# Patient Record
Sex: FEMALE | Race: WHITE | Employment: OTHER | URBAN - METROPOLITAN AREA
[De-identification: names, ages, dates, MRNs, and addresses within clinical notes are randomized per-mention and may not be internally consistent; named-entity substitution may affect disease eponyms.]

---

## 2017-06-15 PROBLEM — E11.9 TYPE II OR UNSPECIFIED TYPE DIABETES MELLITUS WITHOUT MENTION OF COMPLICATION, NOT STATED AS UNCONTROLLED: Status: ACTIVE | Noted: 2017-06-15

## 2017-12-27 PROBLEM — N17.9 ACUTE RENAL FAILURE (ARF) (HCC): Status: ACTIVE | Noted: 2017-12-27

## 2018-01-15 ENCOUNTER — CARE COORDINATION (OUTPATIENT)
Dept: CASE MANAGEMENT | Age: 73
End: 2018-01-15

## 2018-01-15 NOTE — CARE COORDINATION
[facility] Update Call    1/15/2018    Patient: Whitney Gutierrez Patient : 1945   MRN: <[id number]>  Reason for Admission: There are no discharge diagnoses documented for the most recent discharge. Discharge Date: 18 RARS: [facility] Risk Score: 14.75       Care Transitions Post Acute Facility Update    Care Transitions Interventions  Post Acute Facility Update  Reported Nursing Issues:  Nurse reports pt is going to dialysis and there has been no change in status since admission.     ADLs:  (Comment: toileting contact guard; lower body dressing min to mod assist; upper body dressing stand by assist; bathing upper body contact guard assist; bathing lower body moderate assist)   Bed Mobility:  Contact Guard Assist - Hands on patient for balance   Ambulation Assistance:  [address] on patient for balance   How far (in feet) is the patient ambulating?:  50   Does patient use an assistive device?:  Yes   Assistive Devices:  Front wheeled walker        Clive Martinez   [phone]

## 2018-01-26 ENCOUNTER — CARE COORDINATION (OUTPATIENT)
Dept: CASE MANAGEMENT | Age: 73
End: 2018-01-26

## 2018-01-26 NOTE — CARE COORDINATION
[facility] Discharge Call    2018    Patient: Gina Fung Patient : 1945   MRN: <[id number]>  Reason for Admission: There are no discharge diagnoses documented for the most recent discharge. Discharge Date: 18 RARS: [facility] Risk Score: 14.75       Discharge Facility: [address] [place] [address] [address] patient for transition to home call. Patient was resting at the time of my call. S.W her sister Mac Duran. Mac Duran reports that patient is \"fine\" she stated that patient did not elevate her legs last night and they were \"swollen and leaking fluid today\". I asked Mac Duran how long they have been \"leaking\" she stated since yesterday. Mac Duran denies that patient is having chest pain, severe sob, fever, weakness, productive cough, dizziness, confusion, or visual changes. Patient was D/C home with [facility] per Graciela Galindo is coming to see patient today. Sheri Dance [facility]. Mac Duran declined med review, they will review with nurse. Pt is scheduled to est care with new PCP on 18. Writer placed call to [facility] s/w Venus, she advised me that patient will be seen today and they are aware of the weeping edema to BLE. Will continue to follow patient for care transitions.       Care Transitions Post Acute Facility Transition    Do you have any ongoing symptoms?:  Yes   Onset of Patient-reported symptoms:  Yesterday   Patient-reported symptoms:  (Comment: BLE Swelling)   Interventions for patient-reported symptoms:  Notified Home Care   Do you have all of your prescriptions and are they filled?:  Yes   Do you have any questions related to your medications?:  No   Have you scheduled your follow up appointment?:  Yes   How are you going to get to your appointment?:  Car - family or friend to transport   Were you discharged with any Home Care or  [facility] or do you currently have any active services?:  Yes   Post Acute Services:  [facility] Mahad Stark (Comment: [facility] [phone])         Do you have support at home?:  Roommate/Housemate   Do you feel like you have everything you need to keep you well at home?:  Yes   Patient DME:  Cali Holly, Other   Other Patient DME:  grab bars in shower and built in shower seat in shower   Patient Home Equipment:  BiPAP, CPAP   Care Transitions Interventions  No Identified Needs     Other Services:  Completed (Comment: contacted [facility])          Future Appointments  Date Time Provider Leeroy Freedman   2/6/2018 1:00 PM Flakita Sheikh, [address]       Manisha Rodriguez RN

## 2018-02-08 ENCOUNTER — CARE COORDINATION (OUTPATIENT)
Dept: CASE MANAGEMENT | Age: 73
End: 2018-02-08

## 2018-02-08 NOTE — CARE COORDINATION
Rob 45 Transitions Follow Up Call    2018    Patient: Micaela Conrad  Patient : 1945   MRN: <[id number]>  Reason for Admission: There are no discharge diagnoses documented for the most recent discharge. Discharge Date: 18 RARS: Risk Score: 14.75       Spoke with: Sister Alan Part Transitions Subsequent and Final Call    Subsequent and Final Calls  Are you currently active with any services?:  Home Health  Care Transitions Interventions     Other Services:  Completed (Comment: contacted [facility])   Other Interventions:          Spoke with pt's sister Dixon Kern who stated that pt is currently at dialysis and goes three days per week. Stated she seems to be doing pretty well. Denies pain, edema has improved since starting dialysis. Pt is attempting to elevate feet more and UOP has been good. Stated pt was released from [facility] and she is unsure if aide is still coming. Stated she has only been to home once. Stated pt wants her hair washed tomorrow and sister in on continuous  and will have difficulty assisting. Called [facility] and spoke with Keyur Ramirez regarding aide for pt. Once nursing is discharged aide is discharged also. Called pt's sister back and updated her. Gave her contact information for senior services to inquire about assistance with aide services. Sister thanked CTC. Will continue to follow for transitions.     Gerda Lynne RN BSN   Care Transitions Coordinator  [phone]     Follow Up  Future Appointments  Date Time Provider Leeroy Freedman   2018 9:30 AM DO Ron Davisdon Lina       Gerda Lynne RN

## 2018-02-15 ENCOUNTER — CARE COORDINATION (OUTPATIENT)
Dept: CASE MANAGEMENT | Age: 73
End: 2018-02-15

## 2018-02-15 NOTE — CARE COORDINATION
Rob 45 Transitions Follow Up Call    2/15/2018    Patient: Saroj Joshua  Patient : 1945   MRN: <[id number]>  Reason for Admission: There are no discharge diagnoses documented for the most recent discharge. Discharge Date: 18 RARS: Risk Score: 14.75       Spoke with: Pedro Delgado (sister)    S/W patient's sister for final call, patient is doing much better. She is currently at dialysis. Per Pedro Delgado they are getting [facility] services now. They will be back to see patient on 18. Pedro Delgado stated that patient has her medications and everything she needs. No issues or concerns to report. Advised her this will be final call. Care Transitions Subsequent and Final Call    Subsequent and Final Calls  Do you have any ongoing symptoms?:  No  Do you have any questions related to your medications?:  No  Do you currently have any active services?:  Yes  Are you currently active with any services?:  Home Health  Do you have any needs or concerns that I can assist you with?:  No  Identified Barriers:  None  Care Transitions Interventions  No Identified Needs     Other Services:  Completed (Comment: contacted [facility])   Other Interventions:             Follow Up  Future Appointments  Date Time Provider Leeroy Freedman   3/2/2018 7:30 AM Amanda Nails MD [facility] None       Arnoldo Mcneill RN

## 2018-02-26 PROBLEM — G47.30 SLEEP APNEA: Status: ACTIVE | Noted: 2018-02-26

## 2018-02-26 PROBLEM — E78.2 MIXED HYPERLIPIDEMIA: Status: ACTIVE | Noted: 2018-02-26

## 2018-02-26 PROBLEM — E03.9 HYPOTHYROIDISM: Status: ACTIVE | Noted: 2018-02-26

## 2018-02-26 PROBLEM — M81.0 OSTEOPOROSIS: Status: ACTIVE | Noted: 2018-02-26

## 2018-02-26 PROBLEM — I10 ESSENTIAL HYPERTENSION: Status: ACTIVE | Noted: 2018-02-26

## 2018-03-20 ENCOUNTER — TELEPHONE (OUTPATIENT)
Dept: INTERNAL MEDICINE CLINIC | Age: 73
End: 2018-03-20

## 2018-03-23 ENCOUNTER — HOSPITAL ENCOUNTER (EMERGENCY)
Age: 73
Discharge: HOME | End: 2018-03-23
Attending: EMERGENCY MEDICINE
Payer: MEDICARE

## 2018-03-23 ENCOUNTER — HOSPITAL ENCOUNTER (OUTPATIENT)
Age: 73
Discharge: HOME | End: 2018-03-25
Payer: MEDICARE

## 2018-03-23 VITALS
HEART RATE: 88 BPM | HEIGHT: 66 IN | TEMPERATURE: 97.7 F | WEIGHT: 213 LBS | DIASTOLIC BLOOD PRESSURE: 71 MMHG | OXYGEN SATURATION: 100 % | SYSTOLIC BLOOD PRESSURE: 116 MMHG | BODY MASS INDEX: 34.23 KG/M2 | RESPIRATION RATE: 20 BRPM

## 2018-03-23 DIAGNOSIS — Z99.2 ESRD (END STAGE RENAL DISEASE) ON DIALYSIS (HCC): ICD-10-CM

## 2018-03-23 DIAGNOSIS — N39.0 URINARY TRACT INFECTION WITHOUT HEMATURIA, SITE UNSPECIFIED: Primary | ICD-10-CM

## 2018-03-23 DIAGNOSIS — E11.8 CONTROLLED TYPE 2 DIABETES MELLITUS WITH COMPLICATION, WITH LONG-TERM CURRENT USE OF INSULIN (HCC): ICD-10-CM

## 2018-03-23 DIAGNOSIS — N18.6 ESRD (END STAGE RENAL DISEASE) ON DIALYSIS (HCC): ICD-10-CM

## 2018-03-23 DIAGNOSIS — E03.9 HYPOTHYROIDISM, UNSPECIFIED TYPE: ICD-10-CM

## 2018-03-23 DIAGNOSIS — Z79.4 CONTROLLED TYPE 2 DIABETES MELLITUS WITH COMPLICATION, WITH LONG-TERM CURRENT USE OF INSULIN (HCC): ICD-10-CM

## 2018-03-23 DIAGNOSIS — E78.2 MIXED HYPERLIPIDEMIA: ICD-10-CM

## 2018-03-23 LAB
ALBUMIN SERPL-MCNC: 3.9 G/DL (ref 3.5–5.2)
ALP BLD-CCNC: 91 U/L (ref 35–104)
ALT SERPL-CCNC: 8 U/L (ref 0–32)
ANION GAP SERPL CALCULATED.3IONS-SCNC: 14 MMOL/L (ref 7–16)
AST SERPL-CCNC: 13 U/L (ref 0–31)
BACTERIA: ABNORMAL /HPF
BASOPHILS ABSOLUTE: 0.04 E9/L (ref 0–0.2)
BASOPHILS RELATIVE PERCENT: 0.6 % (ref 0–2)
BILIRUB SERPL-MCNC: 0.4 MG/DL (ref 0–1.2)
BILIRUBIN URINE: ABNORMAL
BLOOD, URINE: ABNORMAL
BUN BLDV-MCNC: 24 MG/DL (ref 8–23)
CALCIUM SERPL-MCNC: 9.3 MG/DL (ref 8.6–10.2)
CHLORIDE BLD-SCNC: 97 MMOL/L (ref 98–107)
CHOLESTEROL, TOTAL: 146 MG/DL (ref 0–199)
CLARITY: ABNORMAL
CO2: 28 MMOL/L (ref 22–29)
COLOR: YELLOW
CREAT SERPL-MCNC: 4.7 MG/DL (ref 0.5–1)
EOSINOPHILS ABSOLUTE: 0.22 E9/L (ref 0.05–0.5)
EOSINOPHILS RELATIVE PERCENT: 3.3 % (ref 0–6)
FOLATE: 16.9 NG/ML (ref 4.8–24.2)
GFR AFRICAN AMERICAN: 11
GFR NON-AFRICAN AMERICAN: 9 ML/MIN/1.73
GLUCOSE BLD-MCNC: 104 MG/DL (ref 74–109)
GLUCOSE URINE: NEGATIVE MG/DL
HCT VFR BLD CALC: 34.4 % (ref 34–48)
HDLC SERPL-MCNC: 53 MG/DL
HEMOGLOBIN: 11.2 G/DL (ref 11.5–15.5)
IMMATURE GRANULOCYTES #: 0.04 E9/L
IMMATURE GRANULOCYTES %: 0.6 % (ref 0–5)
KETONES, URINE: 15 MG/DL
LDL CHOLESTEROL CALCULATED: 71 MG/DL (ref 0–99)
LEUKOCYTE ESTERASE, URINE: ABNORMAL
LYMPHOCYTES ABSOLUTE: 1.66 E9/L (ref 1.5–4)
LYMPHOCYTES RELATIVE PERCENT: 24.7 % (ref 20–42)
MCH RBC QN AUTO: 33.7 PG (ref 26–35)
MCHC RBC AUTO-ENTMCNC: 32.6 % (ref 32–34.5)
MCV RBC AUTO: 103.6 FL (ref 80–99.9)
MONOCYTES ABSOLUTE: 0.64 E9/L (ref 0.1–0.95)
MONOCYTES RELATIVE PERCENT: 9.5 % (ref 2–12)
NEUTROPHILS ABSOLUTE: 4.11 E9/L (ref 1.8–7.3)
NEUTROPHILS RELATIVE PERCENT: 61.3 % (ref 43–80)
NITRITE, URINE: NEGATIVE
PDW BLD-RTO: 14.3 FL (ref 11.5–15)
PH UA: 5.5 (ref 5–9)
PLATELET # BLD: 185 E9/L (ref 130–450)
PMV BLD AUTO: 9.8 FL (ref 7–12)
POTASSIUM SERPL-SCNC: 4.8 MMOL/L (ref 3.5–5)
PROTEIN UA: >=300 MG/DL
RBC # BLD: 3.32 E12/L (ref 3.5–5.5)
RBC UA: ABNORMAL /HPF (ref 0–2)
SODIUM BLD-SCNC: 139 MMOL/L (ref 132–146)
SPECIFIC GRAVITY UA: 1.02 (ref 1–1.03)
T4 FREE: 0.93 NG/DL (ref 0.93–1.7)
TOTAL PROTEIN: 7 G/DL (ref 6.4–8.3)
TRIGL SERPL-MCNC: 109 MG/DL (ref 0–149)
TSH SERPL DL<=0.05 MIU/L-ACNC: 2.61 UIU/ML (ref 0.27–4.2)
UROBILINOGEN, URINE: 0.2 E.U./DL
VITAMIN B-12: 190 PG/ML (ref 211–946)
VLDLC SERPL CALC-MCNC: 22 MG/DL
WBC # BLD: 6.7 E9/L (ref 4.5–11.5)
WBC UA: ABNORMAL /HPF (ref 0–5)

## 2018-03-23 PROCEDURE — 36415 COLL VENOUS BLD VENIPUNCTURE: CPT

## 2018-03-23 PROCEDURE — 84443 ASSAY THYROID STIM HORMONE: CPT

## 2018-03-23 PROCEDURE — 85025 COMPLETE CBC W/AUTO DIFF WBC: CPT

## 2018-03-23 PROCEDURE — 99283 EMERGENCY DEPT VISIT LOW MDM: CPT

## 2018-03-23 PROCEDURE — 82607 VITAMIN B-12: CPT

## 2018-03-23 PROCEDURE — 81001 URINALYSIS AUTO W/SCOPE: CPT

## 2018-03-23 PROCEDURE — 80061 LIPID PANEL: CPT

## 2018-03-23 PROCEDURE — 82652 VIT D 1 25-DIHYDROXY: CPT

## 2018-03-23 PROCEDURE — 82746 ASSAY OF FOLIC ACID SERUM: CPT

## 2018-03-23 PROCEDURE — G0382 LEV 3 HOSP TYPE B ED VISIT: HCPCS

## 2018-03-23 PROCEDURE — 84439 ASSAY OF FREE THYROXINE: CPT

## 2018-03-23 PROCEDURE — 80053 COMPREHEN METABOLIC PANEL: CPT

## 2018-03-23 RX ORDER — CEPHALEXIN 500 MG/1
500 CAPSULE ORAL 2 TIMES DAILY
Qty: 14 CAPSULE | Refills: 0 | Status: SHIPPED | OUTPATIENT
Start: 2018-03-23 | End: 2018-03-30

## 2018-03-23 RX ORDER — AMLODIPINE BESYLATE 5 MG/1
5 TABLET ORAL DAILY
COMMUNITY
End: 2018-04-13 | Stop reason: ALTCHOICE

## 2018-03-23 ASSESSMENT — ENCOUNTER SYMPTOMS
VOMITING: 0
NAUSEA: 0

## 2018-03-26 ENCOUNTER — CARE COORDINATION (OUTPATIENT)
Dept: CARE COORDINATION | Age: 73
End: 2018-03-26

## 2018-03-26 LAB — VITAMIN D 1,25-DIHYDROXY: 28.7 PG/ML (ref 19.9–79.3)

## 2018-03-26 NOTE — CARE COORDINATION
Ambulatory Care Coordination ED Follow up Call       Reason for ED Visit: UTI  Care Management Risk Score: CMRS 6  How are you feeling? :     improved  Patient Reports the following:  Patient reports it's a little better not much. Did you call your PCP prior to going to the ED? No          Post Discharge Status:  What health concerns since you left the Emergency Room? None    Do you have wounds that you are caring for at home? Yes    Do you have a follow up appt scheduled? yes    Review of Instructions:                                 Do you have any questions regarding your discharge instructions?:  No  Medications:    What questions do you have about your medications? No  Are you taking your medications as directed? If not - why? Yes   Can you afford your medications? yes  ADLS:  Do you need assistance of any kind at home? Yes:    What assistance is needed? Bathing assistance      FU appts/Provider:    Future Appointments  Date Time Provider Leeroy Freedman   4/9/2018 1:00 PM [facility]       Health Maintenance Due   Topic Date Due    Diabetic foot exam  01/25/1955    Diabetic retinal exam  01/25/1957    Shingles Vaccine (1 of 2 - 2 Dose Series) 01/25/1997    Pneumococcal high/highest risk (1 of 2 - PCV13) 01/25/2012    Flu vaccine (1) 09/01/2017     Patient advised to contact PCP office to have HM items/records faxed to PCP Office directly?   N/A

## 2018-04-13 ENCOUNTER — OFFICE VISIT (OUTPATIENT)
Dept: INTERNAL MEDICINE CLINIC | Age: 73
End: 2018-04-13
Payer: MEDICARE

## 2018-04-13 ENCOUNTER — HOSPITAL ENCOUNTER (OUTPATIENT)
Age: 73
Discharge: HOME | End: 2018-04-15
Payer: MEDICARE

## 2018-04-13 VITALS
HEIGHT: 66 IN | TEMPERATURE: 97.6 F | WEIGHT: 215 LBS | SYSTOLIC BLOOD PRESSURE: 104 MMHG | HEART RATE: 86 BPM | BODY MASS INDEX: 34.55 KG/M2 | DIASTOLIC BLOOD PRESSURE: 41 MMHG | RESPIRATION RATE: 16 BRPM

## 2018-04-13 DIAGNOSIS — Z79.4 CONTROLLED TYPE 2 DIABETES MELLITUS WITH CHRONIC KIDNEY DISEASE ON CHRONIC DIALYSIS, WITH LONG-TERM CURRENT USE OF INSULIN (HCC): ICD-10-CM

## 2018-04-13 DIAGNOSIS — E11.22 CONTROLLED TYPE 2 DIABETES MELLITUS WITH CHRONIC KIDNEY DISEASE ON CHRONIC DIALYSIS, WITH LONG-TERM CURRENT USE OF INSULIN (HCC): ICD-10-CM

## 2018-04-13 DIAGNOSIS — N39.0 RECURRENT URINARY TRACT INFECTION: ICD-10-CM

## 2018-04-13 DIAGNOSIS — E03.9 HYPOTHYROIDISM, UNSPECIFIED TYPE: Primary | ICD-10-CM

## 2018-04-13 DIAGNOSIS — Z99.2 CONTROLLED TYPE 2 DIABETES MELLITUS WITH CHRONIC KIDNEY DISEASE ON CHRONIC DIALYSIS, WITH LONG-TERM CURRENT USE OF INSULIN (HCC): ICD-10-CM

## 2018-04-13 DIAGNOSIS — N18.6 CONTROLLED TYPE 2 DIABETES MELLITUS WITH CHRONIC KIDNEY DISEASE ON CHRONIC DIALYSIS, WITH LONG-TERM CURRENT USE OF INSULIN (HCC): ICD-10-CM

## 2018-04-13 DIAGNOSIS — D51.8 MACROCYTIC ANEMIA WITH VITAMIN B12 DEFICIENCY: ICD-10-CM

## 2018-04-13 LAB
BILIRUBIN, POC: ABNORMAL
BLOOD URINE, POC: ABNORMAL
CLARITY, POC: ABNORMAL
COLOR, POC: ABNORMAL
GLUCOSE URINE, POC: ABNORMAL
HBA1C MFR BLD: 5.7 %
KETONES, POC: ABNORMAL
LEUKOCYTE EST, POC: ABNORMAL
NITRITE, POC: ABNORMAL
PH, POC: 5
PROTEIN, POC: ABNORMAL
SPECIFIC GRAVITY, POC: 1030
UROBILINOGEN, POC: 0.2

## 2018-04-13 PROCEDURE — G8428 CUR MEDS NOT DOCUMENT: HCPCS | Performed by: FAMILY MEDICINE

## 2018-04-13 PROCEDURE — 4040F PNEUMOC VAC/ADMIN/RCVD: CPT | Performed by: FAMILY MEDICINE

## 2018-04-13 PROCEDURE — 1036F TOBACCO NON-USER: CPT | Performed by: FAMILY MEDICINE

## 2018-04-13 PROCEDURE — 81002 URINALYSIS NONAUTO W/O SCOPE: CPT | Performed by: FAMILY MEDICINE

## 2018-04-13 PROCEDURE — 2022F DILAT RTA XM EVC RTNOPTHY: CPT | Performed by: FAMILY MEDICINE

## 2018-04-13 PROCEDURE — 3044F HG A1C LEVEL LT 7.0%: CPT | Performed by: FAMILY MEDICINE

## 2018-04-13 PROCEDURE — 83036 HEMOGLOBIN GLYCOSYLATED A1C: CPT | Performed by: FAMILY MEDICINE

## 2018-04-13 PROCEDURE — 87186 SC STD MICRODIL/AGAR DIL: CPT

## 2018-04-13 PROCEDURE — 1123F ACP DISCUSS/DSCN MKR DOCD: CPT | Performed by: FAMILY MEDICINE

## 2018-04-13 PROCEDURE — 87077 CULTURE AEROBIC IDENTIFY: CPT

## 2018-04-13 PROCEDURE — 99215 OFFICE O/P EST HI 40 MIN: CPT | Performed by: FAMILY MEDICINE

## 2018-04-13 PROCEDURE — G8399 PT W/DXA RESULTS DOCUMENT: HCPCS | Performed by: FAMILY MEDICINE

## 2018-04-13 PROCEDURE — 1090F PRES/ABSN URINE INCON ASSESS: CPT | Performed by: FAMILY MEDICINE

## 2018-04-13 PROCEDURE — 3014F SCREEN MAMMO DOC REV: CPT | Performed by: FAMILY MEDICINE

## 2018-04-13 PROCEDURE — 3017F COLORECTAL CA SCREEN DOC REV: CPT | Performed by: FAMILY MEDICINE

## 2018-04-13 PROCEDURE — G8417 CALC BMI ABV UP PARAM F/U: HCPCS | Performed by: FAMILY MEDICINE

## 2018-04-13 PROCEDURE — 99213 OFFICE O/P EST LOW 20 MIN: CPT | Performed by: FAMILY MEDICINE

## 2018-04-13 PROCEDURE — 87088 URINE BACTERIA CULTURE: CPT

## 2018-04-13 RX ORDER — CYANOCOBALAMIN 1000 UG/ML
1000 INJECTION INTRAMUSCULAR; SUBCUTANEOUS ONCE
Status: COMPLETED | OUTPATIENT
Start: 2018-04-13 | End: 2018-04-13

## 2018-04-13 RX ORDER — CIPROFLOXACIN 250 MG/1
250 TABLET, FILM COATED ORAL 2 TIMES DAILY
Qty: 20 TABLET | Refills: 0 | Status: SHIPPED | OUTPATIENT
Start: 2018-04-13 | End: 2018-04-23

## 2018-04-13 RX ORDER — LEVOTHYROXINE SODIUM 0.05 MG/1
TABLET ORAL
Qty: 90 TABLET | Refills: 0 | Status: SHIPPED | OUTPATIENT
Start: 2018-04-13 | End: 2018-09-02 | Stop reason: SDUPTHER

## 2018-04-13 RX ADMIN — CYANOCOBALAMIN 1000 MCG: 1000 INJECTION, SOLUTION INTRAMUSCULAR at 14:40

## 2018-04-13 ASSESSMENT — ENCOUNTER SYMPTOMS
SORE THROAT: 0
NAUSEA: 0
DIARRHEA: 0
VOMITING: 0
BACK PAIN: 1
SHORTNESS OF BREATH: 0
CONSTIPATION: 0
COUGH: 0

## 2018-04-16 LAB
ORGANISM: ABNORMAL
URINE CULTURE, ROUTINE: ABNORMAL
URINE CULTURE, ROUTINE: ABNORMAL

## 2018-05-14 ENCOUNTER — OFFICE VISIT (OUTPATIENT)
Dept: INTERNAL MEDICINE CLINIC | Age: 73
End: 2018-05-14
Payer: MEDICARE

## 2018-05-14 VITALS
DIASTOLIC BLOOD PRESSURE: 56 MMHG | RESPIRATION RATE: 16 BRPM | WEIGHT: 213.8 LBS | HEIGHT: 66 IN | HEART RATE: 80 BPM | BODY MASS INDEX: 34.36 KG/M2 | SYSTOLIC BLOOD PRESSURE: 88 MMHG | TEMPERATURE: 97.9 F

## 2018-05-14 DIAGNOSIS — Z99.2 ESRD ON DIALYSIS (HCC): ICD-10-CM

## 2018-05-14 DIAGNOSIS — Z12.31 ENCOUNTER FOR SCREENING MAMMOGRAM FOR BREAST CANCER: ICD-10-CM

## 2018-05-14 DIAGNOSIS — Z00.00 ROUTINE GENERAL MEDICAL EXAMINATION AT A HEALTH CARE FACILITY: ICD-10-CM

## 2018-05-14 DIAGNOSIS — N18.6 ESRD ON DIALYSIS (HCC): ICD-10-CM

## 2018-05-14 DIAGNOSIS — E03.9 HYPOTHYROIDISM (ACQUIRED): Primary | ICD-10-CM

## 2018-05-14 DIAGNOSIS — Z23 NEED FOR PNEUMOCOCCAL VACCINATION: ICD-10-CM

## 2018-05-14 PROBLEM — N17.9 ACUTE RENAL FAILURE (ARF) (HCC): Status: RESOLVED | Noted: 2017-12-27 | Resolved: 2018-05-14

## 2018-05-14 PROCEDURE — 99213 OFFICE O/P EST LOW 20 MIN: CPT | Performed by: FAMILY MEDICINE

## 2018-05-14 PROCEDURE — 4040F PNEUMOC VAC/ADMIN/RCVD: CPT | Performed by: FAMILY MEDICINE

## 2018-05-14 PROCEDURE — 90670 PCV13 VACCINE IM: CPT | Performed by: FAMILY MEDICINE

## 2018-05-14 PROCEDURE — G0438 PPPS, INITIAL VISIT: HCPCS | Performed by: FAMILY MEDICINE

## 2018-05-14 RX ORDER — CALCIUM ACETATE 667 MG/1
1334 CAPSULE ORAL
COMMUNITY
Start: 2018-04-17 | End: 2018-12-17 | Stop reason: ALTCHOICE

## 2018-05-14 ASSESSMENT — ANXIETY QUESTIONNAIRES: GAD7 TOTAL SCORE: 0

## 2018-05-14 ASSESSMENT — PATIENT HEALTH QUESTIONNAIRE - PHQ9: SUM OF ALL RESPONSES TO PHQ QUESTIONS 1-9: 0

## 2018-05-20 ENCOUNTER — PREP FOR PROCEDURE (OUTPATIENT)
Dept: VASCULAR SURGERY | Age: 73
End: 2018-05-20

## 2018-05-20 RX ORDER — SODIUM CHLORIDE 9 MG/ML
INJECTION, SOLUTION INTRAVENOUS CONTINUOUS
Status: CANCELLED | OUTPATIENT
Start: 2018-05-20

## 2018-05-20 RX ORDER — SODIUM CHLORIDE 0.9 % (FLUSH) 0.9 %
10 SYRINGE (ML) INJECTION PRN
Status: CANCELLED | OUTPATIENT
Start: 2018-05-20

## 2018-05-20 RX ORDER — SODIUM CHLORIDE 0.9 % (FLUSH) 0.9 %
10 SYRINGE (ML) INJECTION EVERY 12 HOURS SCHEDULED
Status: CANCELLED | OUTPATIENT
Start: 2018-05-20

## 2018-05-21 RX ORDER — MIDODRINE HYDROCHLORIDE 5 MG/1
5 TABLET ORAL
COMMUNITY

## 2018-05-23 ENCOUNTER — ANESTHESIA EVENT (OUTPATIENT)
Dept: OPERATING ROOM | Age: 73
End: 2018-05-23
Payer: MEDICARE

## 2018-05-23 ENCOUNTER — HOSPITAL ENCOUNTER (OUTPATIENT)
Age: 73
Setting detail: OUTPATIENT SURGERY
Discharge: HOME | End: 2018-05-23
Attending: SPECIALIST | Admitting: SPECIALIST
Payer: MEDICARE

## 2018-05-23 ENCOUNTER — ANESTHESIA (OUTPATIENT)
Dept: OPERATING ROOM | Age: 73
End: 2018-05-23
Payer: MEDICARE

## 2018-05-23 VITALS
SYSTOLIC BLOOD PRESSURE: 120 MMHG | DIASTOLIC BLOOD PRESSURE: 87 MMHG | HEIGHT: 66 IN | WEIGHT: 213 LBS | OXYGEN SATURATION: 96 % | BODY MASS INDEX: 34.23 KG/M2 | TEMPERATURE: 98.2 F | RESPIRATION RATE: 19 BRPM | HEART RATE: 78 BPM

## 2018-05-23 VITALS — SYSTOLIC BLOOD PRESSURE: 106 MMHG | OXYGEN SATURATION: 100 % | DIASTOLIC BLOOD PRESSURE: 74 MMHG

## 2018-05-23 DIAGNOSIS — I77.0 AVF (ARTERIOVENOUS FISTULA) (HCC): Primary | ICD-10-CM

## 2018-05-23 LAB
ANION GAP SERPL CALCULATED.3IONS-SCNC: 16 MMOL/L (ref 7–16)
APTT: 29.6 SEC (ref 24.5–35.1)
BASOPHILS ABSOLUTE: 0.04 E9/L (ref 0–0.2)
BASOPHILS RELATIVE PERCENT: 0.6 % (ref 0–2)
BUN BLDV-MCNC: 23 MG/DL (ref 8–23)
CALCIUM SERPL-MCNC: 8.7 MG/DL (ref 8.6–10.2)
CHLORIDE BLD-SCNC: 95 MMOL/L (ref 98–107)
CO2: 25 MMOL/L (ref 22–29)
CREAT SERPL-MCNC: 3.8 MG/DL (ref 0.5–1)
EOSINOPHILS ABSOLUTE: 0.33 E9/L (ref 0.05–0.5)
EOSINOPHILS RELATIVE PERCENT: 4.9 % (ref 0–6)
GFR AFRICAN AMERICAN: 14
GFR NON-AFRICAN AMERICAN: 12 ML/MIN/1.73
GLUCOSE BLD-MCNC: 103 MG/DL (ref 74–109)
GLUCOSE BLD-MCNC: 113 MG/DL
HCT VFR BLD CALC: 37.5 % (ref 34–48)
HEMOGLOBIN: 11.9 G/DL (ref 11.5–15.5)
IMMATURE GRANULOCYTES #: 0.03 E9/L
IMMATURE GRANULOCYTES %: 0.4 % (ref 0–5)
INR BLD: 1
LYMPHOCYTES ABSOLUTE: 1.4 E9/L (ref 1.5–4)
LYMPHOCYTES RELATIVE PERCENT: 20.9 % (ref 20–42)
MCH RBC QN AUTO: 30 PG (ref 26–35)
MCHC RBC AUTO-ENTMCNC: 31.7 % (ref 32–34.5)
MCV RBC AUTO: 94.5 FL (ref 80–99.9)
METER GLUCOSE: 113 MG/DL (ref 70–110)
MONOCYTES ABSOLUTE: 0.65 E9/L (ref 0.1–0.95)
MONOCYTES RELATIVE PERCENT: 9.7 % (ref 2–12)
NEUTROPHILS ABSOLUTE: 4.24 E9/L (ref 1.8–7.3)
NEUTROPHILS RELATIVE PERCENT: 63.5 % (ref 43–80)
PDW BLD-RTO: 13.4 FL (ref 11.5–15)
PLATELET # BLD: 138 E9/L (ref 130–450)
PMV BLD AUTO: 11.4 FL (ref 7–12)
POTASSIUM REFLEX MAGNESIUM: 4.3 MMOL/L (ref 3.5–5)
PROTHROMBIN TIME: 11.8 SEC (ref 9.3–12.4)
RBC # BLD: 3.97 E12/L (ref 3.5–5.5)
SODIUM BLD-SCNC: 136 MMOL/L (ref 132–146)
WBC # BLD: 6.7 E9/L (ref 4.5–11.5)

## 2018-05-23 PROCEDURE — 36415 COLL VENOUS BLD VENIPUNCTURE: CPT

## 2018-05-23 PROCEDURE — 6360000002 HC RX W HCPCS

## 2018-05-23 PROCEDURE — 6360000002 HC RX W HCPCS: Performed by: NURSE ANESTHETIST, CERTIFIED REGISTERED

## 2018-05-23 PROCEDURE — 6360000002 HC RX W HCPCS: Performed by: ANESTHESIOLOGY

## 2018-05-23 PROCEDURE — 80048 BASIC METABOLIC PNL TOTAL CA: CPT

## 2018-05-23 PROCEDURE — 3700000000 HC ANESTHESIA ATTENDED CARE: Performed by: SPECIALIST

## 2018-05-23 PROCEDURE — 7100000010 HC PHASE II RECOVERY - FIRST 15 MIN: Performed by: SPECIALIST

## 2018-05-23 PROCEDURE — 2500000003 HC RX 250 WO HCPCS: Performed by: SPECIALIST

## 2018-05-23 PROCEDURE — 85610 PROTHROMBIN TIME: CPT

## 2018-05-23 PROCEDURE — 2709999900 HC NON-CHARGEABLE SUPPLY: Performed by: SPECIALIST

## 2018-05-23 PROCEDURE — 3600000012 HC SURGERY LEVEL 2 ADDTL 15MIN: Performed by: SPECIALIST

## 2018-05-23 PROCEDURE — 85025 COMPLETE CBC W/AUTO DIFF WBC: CPT

## 2018-05-23 PROCEDURE — 82962 GLUCOSE BLOOD TEST: CPT

## 2018-05-23 PROCEDURE — 7100000011 HC PHASE II RECOVERY - ADDTL 15 MIN: Performed by: SPECIALIST

## 2018-05-23 PROCEDURE — 85730 THROMBOPLASTIN TIME PARTIAL: CPT

## 2018-05-23 PROCEDURE — 3600000002 HC SURGERY LEVEL 2 BASE: Performed by: SPECIALIST

## 2018-05-23 PROCEDURE — 3700000001 HC ADD 15 MINUTES (ANESTHESIA): Performed by: SPECIALIST

## 2018-05-23 PROCEDURE — 2580000003 HC RX 258: Performed by: SPECIALIST

## 2018-05-23 RX ORDER — ONDANSETRON 2 MG/ML
INJECTION INTRAMUSCULAR; INTRAVENOUS PRN
Status: DISCONTINUED | OUTPATIENT
Start: 2018-05-23 | End: 2018-05-23 | Stop reason: SDUPTHER

## 2018-05-23 RX ORDER — MIDAZOLAM HYDROCHLORIDE 1 MG/ML
0.5 INJECTION INTRAMUSCULAR; INTRAVENOUS PRN
Status: DISCONTINUED | OUTPATIENT
Start: 2018-05-23 | End: 2018-05-23 | Stop reason: HOSPADM

## 2018-05-23 RX ORDER — SODIUM CHLORIDE 0.9 % (FLUSH) 0.9 %
10 SYRINGE (ML) INJECTION PRN
Status: DISCONTINUED | OUTPATIENT
Start: 2018-05-23 | End: 2018-05-23 | Stop reason: HOSPADM

## 2018-05-23 RX ORDER — HYDROCODONE BITARTRATE AND ACETAMINOPHEN 5; 325 MG/1; MG/1
1 TABLET ORAL EVERY 6 HOURS PRN
Qty: 10 TABLET | Refills: 0 | Status: SHIPPED | OUTPATIENT
Start: 2018-05-23 | End: 2018-05-26

## 2018-05-23 RX ORDER — FENTANYL CITRATE 50 UG/ML
25 INJECTION, SOLUTION INTRAMUSCULAR; INTRAVENOUS ONCE
Status: COMPLETED | OUTPATIENT
Start: 2018-05-23 | End: 2018-05-23

## 2018-05-23 RX ORDER — SODIUM CHLORIDE 0.9 % (FLUSH) 0.9 %
10 SYRINGE (ML) INJECTION EVERY 12 HOURS SCHEDULED
Status: DISCONTINUED | OUTPATIENT
Start: 2018-05-23 | End: 2018-05-23 | Stop reason: HOSPADM

## 2018-05-23 RX ORDER — ROPIVACAINE HYDROCHLORIDE 5 MG/ML
30 INJECTION, SOLUTION EPIDURAL; INFILTRATION; PERINEURAL
Status: COMPLETED | OUTPATIENT
Start: 2018-05-23 | End: 2018-05-23

## 2018-05-23 RX ORDER — SODIUM CHLORIDE 9 MG/ML
INJECTION, SOLUTION INTRAVENOUS CONTINUOUS
Status: DISCONTINUED | OUTPATIENT
Start: 2018-05-23 | End: 2018-05-23 | Stop reason: HOSPADM

## 2018-05-23 RX ORDER — LIDOCAINE HYDROCHLORIDE 10 MG/ML
INJECTION, SOLUTION INFILTRATION; PERINEURAL PRN
Status: DISCONTINUED | OUTPATIENT
Start: 2018-05-23 | End: 2018-05-23 | Stop reason: HOSPADM

## 2018-05-23 RX ADMIN — ONDANSETRON 4 MG: 2 INJECTION INTRAMUSCULAR; INTRAVENOUS at 09:08

## 2018-05-23 RX ADMIN — MIDAZOLAM 1 MG: 1 INJECTION INTRAMUSCULAR; INTRAVENOUS at 07:30

## 2018-05-23 RX ADMIN — VANCOMYCIN HYDROCHLORIDE 1000 MG: 1 INJECTION, POWDER, LYOPHILIZED, FOR SOLUTION INTRAVENOUS at 07:54

## 2018-05-23 RX ADMIN — MIDAZOLAM 1 MG: 1 INJECTION INTRAMUSCULAR; INTRAVENOUS at 07:40

## 2018-05-23 RX ADMIN — ROPIVACAINE HYDROCHLORIDE 30 ML: 5 INJECTION, SOLUTION EPIDURAL; INFILTRATION; PERINEURAL at 07:38

## 2018-05-23 RX ADMIN — SODIUM CHLORIDE: 9 INJECTION, SOLUTION INTRAVENOUS at 06:03

## 2018-05-23 RX ADMIN — FENTANYL CITRATE 50 MCG: 50 INJECTION INTRAMUSCULAR; INTRAVENOUS at 07:30

## 2018-05-23 RX ADMIN — SODIUM CHLORIDE: 9 INJECTION, SOLUTION INTRAVENOUS at 07:57

## 2018-05-23 ASSESSMENT — PULMONARY FUNCTION TESTS
PIF_VALUE: 0
PIF_VALUE: 18
PIF_VALUE: 0
PIF_VALUE: 25
PIF_VALUE: 0
PIF_VALUE: 29
PIF_VALUE: 18
PIF_VALUE: 0
PIF_VALUE: 18
PIF_VALUE: 0
PIF_VALUE: 0
PIF_VALUE: 25
PIF_VALUE: 0
PIF_VALUE: 29
PIF_VALUE: 0
PIF_VALUE: 25
PIF_VALUE: 25
PIF_VALUE: 0
PIF_VALUE: 1
PIF_VALUE: 0
PIF_VALUE: 18
PIF_VALUE: 0
PIF_VALUE: 25
PIF_VALUE: 0
PIF_VALUE: 28
PIF_VALUE: 0
PIF_VALUE: 23
PIF_VALUE: 0
PIF_VALUE: 18
PIF_VALUE: 18
PIF_VALUE: 30
PIF_VALUE: 0
PIF_VALUE: 30
PIF_VALUE: 0
PIF_VALUE: 25
PIF_VALUE: 0
PIF_VALUE: 0
PIF_VALUE: 18
PIF_VALUE: 0
PIF_VALUE: 26
PIF_VALUE: 26
PIF_VALUE: 0
PIF_VALUE: 18
PIF_VALUE: 0
PIF_VALUE: 30
PIF_VALUE: 0
PIF_VALUE: 29
PIF_VALUE: 0
PIF_VALUE: 0
PIF_VALUE: 1
PIF_VALUE: 0
PIF_VALUE: 25

## 2018-05-23 ASSESSMENT — PAIN SCALES - GENERAL
PAINLEVEL_OUTOF10: 0

## 2018-05-23 ASSESSMENT — PAIN - FUNCTIONAL ASSESSMENT: PAIN_FUNCTIONAL_ASSESSMENT: 0-10

## 2018-05-30 ENCOUNTER — HOSPITAL ENCOUNTER (OUTPATIENT)
Dept: MAMMOGRAPHY | Age: 73
Discharge: HOME | End: 2018-06-01
Payer: MEDICARE

## 2018-05-30 DIAGNOSIS — Z12.31 ENCOUNTER FOR SCREENING MAMMOGRAM FOR BREAST CANCER: ICD-10-CM

## 2018-05-30 PROCEDURE — 77063 BREAST TOMOSYNTHESIS BI: CPT

## 2018-06-01 ENCOUNTER — HOSPITAL ENCOUNTER (EMERGENCY)
Age: 73
Discharge: HOME | End: 2018-06-01
Payer: MEDICARE

## 2018-06-01 ENCOUNTER — APPOINTMENT (OUTPATIENT)
Dept: GENERAL RADIOLOGY | Age: 73
End: 2018-06-01
Payer: MEDICARE

## 2018-06-01 VITALS
HEART RATE: 84 BPM | WEIGHT: 214 LBS | SYSTOLIC BLOOD PRESSURE: 143 MMHG | HEIGHT: 66 IN | OXYGEN SATURATION: 98 % | TEMPERATURE: 97.9 F | DIASTOLIC BLOOD PRESSURE: 98 MMHG | BODY MASS INDEX: 34.39 KG/M2 | RESPIRATION RATE: 14 BRPM

## 2018-06-01 DIAGNOSIS — S50.11XA CONTUSION OF RIGHT FOREARM, INITIAL ENCOUNTER: Primary | ICD-10-CM

## 2018-06-01 PROCEDURE — 6370000000 HC RX 637 (ALT 250 FOR IP): Performed by: NURSE PRACTITIONER

## 2018-06-01 PROCEDURE — 99283 EMERGENCY DEPT VISIT LOW MDM: CPT

## 2018-06-01 PROCEDURE — 73090 X-RAY EXAM OF FOREARM: CPT

## 2018-06-01 RX ORDER — HYDROCODONE BITARTRATE AND ACETAMINOPHEN 5; 325 MG/1; MG/1
1 TABLET ORAL ONCE
Status: COMPLETED | OUTPATIENT
Start: 2018-06-01 | End: 2018-06-01

## 2018-06-01 RX ADMIN — HYDROCODONE BITARTRATE AND ACETAMINOPHEN 1 TABLET: 5; 325 TABLET ORAL at 15:46

## 2018-06-01 ASSESSMENT — PAIN SCALES - GENERAL
PAINLEVEL_OUTOF10: 10
PAINLEVEL_OUTOF10: 7

## 2018-06-01 ASSESSMENT — PAIN DESCRIPTION - ORIENTATION: ORIENTATION: RIGHT

## 2018-06-01 ASSESSMENT — PAIN DESCRIPTION - LOCATION: LOCATION: HAND

## 2018-06-01 ASSESSMENT — PAIN DESCRIPTION - PAIN TYPE: TYPE: ACUTE PAIN

## 2018-06-04 ENCOUNTER — CARE COORDINATION (OUTPATIENT)
Dept: CARE COORDINATION | Age: 73
End: 2018-06-04

## 2018-08-02 ENCOUNTER — TELEPHONE (OUTPATIENT)
Dept: INTERNAL MEDICINE CLINIC | Age: 73
End: 2018-08-02

## 2018-08-02 NOTE — TELEPHONE ENCOUNTER
Patient needs new bp machine ordered. Once you order it I will fax it to a medical supply place.  Thanks

## 2018-08-10 NOTE — TELEPHONE ENCOUNTER
Contacted patient regarding BP cuff that insurance will not cover it. Also her bp has been very stable and advises patient that can check 1-2 times weekly and can check it at grocery store/pharmacy. I wants one for personal use, will have to purchase one.

## 2018-09-02 ENCOUNTER — HOSPITAL ENCOUNTER (EMERGENCY)
Age: 73
Discharge: HOME | End: 2018-09-02
Payer: MEDICARE

## 2018-09-02 VITALS
WEIGHT: 211 LBS | RESPIRATION RATE: 8 BRPM | SYSTOLIC BLOOD PRESSURE: 129 MMHG | DIASTOLIC BLOOD PRESSURE: 79 MMHG | TEMPERATURE: 99.4 F | BODY MASS INDEX: 33.91 KG/M2 | OXYGEN SATURATION: 96 % | HEART RATE: 90 BPM | HEIGHT: 66 IN

## 2018-09-02 DIAGNOSIS — J02.9 ACUTE PHARYNGITIS, UNSPECIFIED ETIOLOGY: ICD-10-CM

## 2018-09-02 DIAGNOSIS — H10.9 BACTERIAL CONJUNCTIVITIS OF RIGHT EYE: Primary | ICD-10-CM

## 2018-09-02 DIAGNOSIS — E03.9 HYPOTHYROIDISM, UNSPECIFIED TYPE: ICD-10-CM

## 2018-09-02 PROCEDURE — 99283 EMERGENCY DEPT VISIT LOW MDM: CPT

## 2018-09-02 RX ORDER — MOXIFLOXACIN 5 MG/ML
SOLUTION/ DROPS OPHTHALMIC
Qty: 1 BOTTLE | Refills: 0 | Status: SHIPPED | OUTPATIENT
Start: 2018-09-02 | End: 2018-09-02 | Stop reason: ALTCHOICE

## 2018-09-02 RX ORDER — CIPROFLOXACIN HYDROCHLORIDE 3.5 MG/ML
1 SOLUTION/ DROPS TOPICAL
Qty: 120 DROP | Refills: 0 | Status: SHIPPED | OUTPATIENT
Start: 2018-09-02 | End: 2018-09-12

## 2018-09-02 RX ORDER — GUAIFENESIN AND DEXTROMETHORPHAN HYDROBROMIDE 1200; 60 MG/1; MG/1
1 TABLET, EXTENDED RELEASE ORAL 2 TIMES DAILY
Qty: 28 TABLET | Refills: 0 | Status: SHIPPED | OUTPATIENT
Start: 2018-09-02 | End: 2018-10-19 | Stop reason: ALTCHOICE

## 2018-09-02 ASSESSMENT — PAIN SCALES - GENERAL: PAINLEVEL_OUTOF10: 8

## 2018-09-02 ASSESSMENT — PAIN DESCRIPTION - LOCATION: LOCATION: THROAT

## 2018-09-02 ASSESSMENT — PAIN DESCRIPTION - PAIN TYPE: TYPE: ACUTE PAIN

## 2018-09-02 ASSESSMENT — PAIN DESCRIPTION - DESCRIPTORS: DESCRIPTORS: BURNING;SHARP

## 2018-09-02 NOTE — ED PROVIDER NOTES
only for positive culture results  Score of 4 or 5 = Probability of Strep Pharyngitis: 51% - 53%. Treat empirically with antibiotics. ROS    Pertinent positives and negatives are stated within HPI, all other systems reviewed and are negative. Past Surgical History:  has a past surgical history that includes hernia repair; Gastric bypass surgery (2004); knee surgery (2009); Foot surgery (Left, 2012,2013); Hand surgery (Right, 2012); Dialysis fistula creation (Left, 03/02/2018); and pr revise av fistula,w/o thrombectomy (Left, 5/23/2018). Social History:  reports that she has never smoked. She has never used smokeless tobacco. She reports that she drinks alcohol. She reports that she does not use drugs. Family History: family history is not on file. Allergies: Bactrim [sulfamethoxazole-trimethoprim]; Nsaids; Pcn [penicillins]; and Sulfa antibiotics    Physical Exam           ED Triage Vitals [09/02/18 1805]   BP Temp Temp Source Pulse Resp SpO2 Height Weight   129/79 99.4 °F (37.4 °C) Oral 90 8 96 % 5' 6\" (1.676 m) 211 lb (95.7 kg)     Oxygen Saturation Interpretation: Normal.    Constitutional:  Alert, development consistent with age. .  Eyes: Purulent discharge from the right eye with mild erythema. Ears:  TMs without perforation, injection, or bulging. External canals clear without exudate. Throat: Airway Patent. Mild erythema . No exudates noted. Teeth and gums normal..       Neck/Lymphatic:  Supple. There is Right  anterior cervical node tenderness. respiratory:  Clear to auscultation and breath sounds equal.    CV: Regular rate and rhythm, normal heart sounds, without pathological murmurs, ectopy, gallops, or rubs. GI:  Abdomen Soft, nontender, good bowel sounds. No firm or pulsatile mass. Inegument:  No rashes, erythema present. Neurological:  Oriented. Motor functions intact.     Lab / Imaging Results   (All laboratory and radiology results have been personally reviewed by

## 2018-09-04 RX ORDER — LEVOTHYROXINE SODIUM 0.05 MG/1
TABLET ORAL
Qty: 90 TABLET | Refills: 0 | Status: SHIPPED | OUTPATIENT
Start: 2018-09-04 | End: 2018-11-11 | Stop reason: SDUPTHER

## 2018-09-05 ENCOUNTER — HOSPITAL ENCOUNTER (OUTPATIENT)
Age: 73
Discharge: HOME | End: 2018-09-05
Payer: MEDICARE

## 2018-09-05 ENCOUNTER — CARE COORDINATION (OUTPATIENT)
Dept: CARE COORDINATION | Age: 73
End: 2018-09-05

## 2018-09-05 DIAGNOSIS — Z99.2 ESRD ON DIALYSIS (HCC): ICD-10-CM

## 2018-09-05 DIAGNOSIS — E03.9 HYPOTHYROIDISM (ACQUIRED): ICD-10-CM

## 2018-09-05 DIAGNOSIS — N18.6 ESRD ON DIALYSIS (HCC): ICD-10-CM

## 2018-09-05 LAB
ALBUMIN SERPL-MCNC: 3.3 G/DL (ref 3.5–5.2)
ALP BLD-CCNC: 118 U/L (ref 35–104)
ALT SERPL-CCNC: 9 U/L (ref 0–32)
ANION GAP SERPL CALCULATED.3IONS-SCNC: 13 MMOL/L (ref 7–16)
AST SERPL-CCNC: 10 U/L (ref 0–31)
BASOPHILS ABSOLUTE: 0.04 E9/L (ref 0–0.2)
BASOPHILS RELATIVE PERCENT: 0.5 % (ref 0–2)
BILIRUB SERPL-MCNC: 0.2 MG/DL (ref 0–1.2)
BUN BLDV-MCNC: 38 MG/DL (ref 8–23)
CALCIUM SERPL-MCNC: 8.7 MG/DL (ref 8.6–10.2)
CHLORIDE BLD-SCNC: 96 MMOL/L (ref 98–107)
CO2: 28 MMOL/L (ref 22–29)
CREAT SERPL-MCNC: 5.1 MG/DL (ref 0.5–1)
EOSINOPHILS ABSOLUTE: 0.15 E9/L (ref 0.05–0.5)
EOSINOPHILS RELATIVE PERCENT: 1.8 % (ref 0–6)
GFR AFRICAN AMERICAN: 10
GFR NON-AFRICAN AMERICAN: 8 ML/MIN/1.73
GLUCOSE BLD-MCNC: 170 MG/DL (ref 74–109)
HCT VFR BLD CALC: 34.3 % (ref 34–48)
HEMOGLOBIN: 11 G/DL (ref 11.5–15.5)
IMMATURE GRANULOCYTES #: 0.06 E9/L
IMMATURE GRANULOCYTES %: 0.7 % (ref 0–5)
LYMPHOCYTES ABSOLUTE: 1.64 E9/L (ref 1.5–4)
LYMPHOCYTES RELATIVE PERCENT: 19.5 % (ref 20–42)
MCH RBC QN AUTO: 31.4 PG (ref 26–35)
MCHC RBC AUTO-ENTMCNC: 32.1 % (ref 32–34.5)
MCV RBC AUTO: 98 FL (ref 80–99.9)
MONOCYTES ABSOLUTE: 0.76 E9/L (ref 0.1–0.95)
MONOCYTES RELATIVE PERCENT: 9 % (ref 2–12)
NEUTROPHILS ABSOLUTE: 5.76 E9/L (ref 1.8–7.3)
NEUTROPHILS RELATIVE PERCENT: 68.5 % (ref 43–80)
PDW BLD-RTO: 13.6 FL (ref 11.5–15)
PLATELET # BLD: 266 E9/L (ref 130–450)
PMV BLD AUTO: 9.8 FL (ref 7–12)
POTASSIUM SERPL-SCNC: 4.9 MMOL/L (ref 3.5–5)
RBC # BLD: 3.5 E12/L (ref 3.5–5.5)
SODIUM BLD-SCNC: 137 MMOL/L (ref 132–146)
TOTAL PROTEIN: 6.5 G/DL (ref 6.4–8.3)
TSH SERPL DL<=0.05 MIU/L-ACNC: 1.35 UIU/ML (ref 0.27–4.2)
WBC # BLD: 8.4 E9/L (ref 4.5–11.5)

## 2018-09-05 PROCEDURE — 36415 COLL VENOUS BLD VENIPUNCTURE: CPT

## 2018-09-05 PROCEDURE — 80053 COMPREHEN METABOLIC PANEL: CPT

## 2018-09-05 PROCEDURE — 84443 ASSAY THYROID STIM HORMONE: CPT

## 2018-09-05 PROCEDURE — 85025 COMPLETE CBC W/AUTO DIFF WBC: CPT

## 2018-09-05 NOTE — CARE COORDINATION
Ambulatory Care Coordination ED Follow up Call       Reason for ED Visit:  Pharyngitis, cough  Care Management Risk Score: CMRS 10      Called patient 3 times. Each time a message was received your call couldn't be completed as dialed. Unable to leave a voicemail.

## 2018-09-07 ENCOUNTER — OFFICE VISIT (OUTPATIENT)
Dept: INTERNAL MEDICINE CLINIC | Age: 73
End: 2018-09-07
Payer: MEDICARE

## 2018-09-07 ENCOUNTER — HOSPITAL ENCOUNTER (OUTPATIENT)
Age: 73
Discharge: HOME | End: 2018-09-09
Payer: MEDICARE

## 2018-09-07 ENCOUNTER — HOSPITAL ENCOUNTER (OUTPATIENT)
Dept: GENERAL RADIOLOGY | Age: 73
Discharge: HOME | End: 2018-09-09
Payer: MEDICARE

## 2018-09-07 VITALS
BODY MASS INDEX: 33.91 KG/M2 | WEIGHT: 211 LBS | HEART RATE: 85 BPM | OXYGEN SATURATION: 96 % | HEIGHT: 66 IN | TEMPERATURE: 97.8 F | DIASTOLIC BLOOD PRESSURE: 77 MMHG | SYSTOLIC BLOOD PRESSURE: 137 MMHG

## 2018-09-07 DIAGNOSIS — E11.22 CONTROLLED TYPE 2 DIABETES MELLITUS WITH CHRONIC KIDNEY DISEASE, WITHOUT LONG-TERM CURRENT USE OF INSULIN, UNSPECIFIED CKD STAGE (HCC): Primary | ICD-10-CM

## 2018-09-07 DIAGNOSIS — H91.93 BILATERAL HEARING LOSS, UNSPECIFIED HEARING LOSS TYPE: ICD-10-CM

## 2018-09-07 DIAGNOSIS — J40 BRONCHITIS: ICD-10-CM

## 2018-09-07 LAB — HBA1C MFR BLD: 7.2 %

## 2018-09-07 PROCEDURE — 71046 X-RAY EXAM CHEST 2 VIEWS: CPT

## 2018-09-07 PROCEDURE — 2022F DILAT RTA XM EVC RTNOPTHY: CPT | Performed by: PHYSICIAN ASSISTANT

## 2018-09-07 PROCEDURE — 4040F PNEUMOC VAC/ADMIN/RCVD: CPT | Performed by: PHYSICIAN ASSISTANT

## 2018-09-07 PROCEDURE — 99213 OFFICE O/P EST LOW 20 MIN: CPT | Performed by: PHYSICIAN ASSISTANT

## 2018-09-07 PROCEDURE — 1101F PT FALLS ASSESS-DOCD LE1/YR: CPT | Performed by: PHYSICIAN ASSISTANT

## 2018-09-07 PROCEDURE — 1123F ACP DISCUSS/DSCN MKR DOCD: CPT | Performed by: PHYSICIAN ASSISTANT

## 2018-09-07 PROCEDURE — 1036F TOBACCO NON-USER: CPT | Performed by: PHYSICIAN ASSISTANT

## 2018-09-07 PROCEDURE — G8427 DOCREV CUR MEDS BY ELIG CLIN: HCPCS | Performed by: PHYSICIAN ASSISTANT

## 2018-09-07 PROCEDURE — 3017F COLORECTAL CA SCREEN DOC REV: CPT | Performed by: PHYSICIAN ASSISTANT

## 2018-09-07 PROCEDURE — 1090F PRES/ABSN URINE INCON ASSESS: CPT | Performed by: PHYSICIAN ASSISTANT

## 2018-09-07 PROCEDURE — G8399 PT W/DXA RESULTS DOCUMENT: HCPCS | Performed by: PHYSICIAN ASSISTANT

## 2018-09-07 PROCEDURE — G8417 CALC BMI ABV UP PARAM F/U: HCPCS | Performed by: PHYSICIAN ASSISTANT

## 2018-09-07 PROCEDURE — 3045F PR MOST RECENT HEMOGLOBIN A1C LEVEL 7.0-9.0%: CPT | Performed by: PHYSICIAN ASSISTANT

## 2018-09-07 PROCEDURE — 83036 HEMOGLOBIN GLYCOSYLATED A1C: CPT | Performed by: PHYSICIAN ASSISTANT

## 2018-09-07 RX ORDER — FUROSEMIDE 40 MG/1
TABLET ORAL
Refills: 3 | COMMUNITY
Start: 2018-06-08 | End: 2018-12-12 | Stop reason: ALTCHOICE

## 2018-09-07 RX ORDER — CEFDINIR 250 MG/5ML
75 POWDER, FOR SUSPENSION ORAL 2 TIMES DAILY
Qty: 30 ML | Refills: 0 | Status: SHIPPED | OUTPATIENT
Start: 2018-09-07 | End: 2018-09-17

## 2018-09-07 ASSESSMENT — ENCOUNTER SYMPTOMS
SORE THROAT: 1
WHEEZING: 0
COUGH: 1
SHORTNESS OF BREATH: 0
ABDOMINAL PAIN: 0
SPUTUM PRODUCTION: 1

## 2018-09-07 NOTE — PROGRESS NOTES
dizziness and headaches. Physical Exam:    VS:  /77   Pulse 85   Temp 97.8 °F (36.6 °C) (Oral)   Ht 5' 6\" (1.676 m)   Wt 211 lb (95.7 kg)   SpO2 96%   Breastfeeding? No   BMI 34.06 kg/m²   LAST WEIGHT:  Wt Readings from Last 3 Encounters:   09/07/18 211 lb (95.7 kg)   09/02/18 211 lb (95.7 kg)   06/01/18 214 lb (97.1 kg)     Physical Exam   Constitutional: She is oriented to person, place, and time. She appears well-developed and well-nourished. HENT:   Head: Normocephalic and atraumatic. Right Ear: External ear normal.   Left Ear: External ear normal.   Nose: Nose normal.   Mouth/Throat: Oropharynx is clear and moist.   Eyes: Pupils are equal, round, and reactive to light. Conjunctivae are normal.   Conjunctiva is pink. It is not injected. There is no discharge from the puncta. Strabismus left eye. Neck: Normal range of motion. No tracheal deviation present. Cardiovascular: Normal rate, regular rhythm and normal heart sounds. Pulmonary/Chest: Effort normal and breath sounds normal. No respiratory distress. Breath sounds diminished bilaterally. Rhonchi right base clears with cough. Abdominal: Soft. She exhibits no distension and no mass. There is no tenderness. Musculoskeletal: Normal range of motion. She exhibits no edema or deformity. Neurological: She is alert and oriented to person, place, and time. Skin: Skin is warm and dry. No rash noted. Psychiatric: She has a normal mood and affect. Nursing note and vitals reviewed.       Labs:  Lab Results   Component Value Date     09/05/2018    K 4.9 09/05/2018    K 4.3 05/23/2018    CL 96 09/05/2018    CO2 28 09/05/2018    BUN 38 09/05/2018    CREATININE 5.1 09/05/2018    PROT 6.5 09/05/2018    LABALBU 3.3 09/05/2018    LABALBU 3.9 06/01/2012    CALCIUM 8.7 09/05/2018    GFRAA 10 09/05/2018    LABGLOM 8 09/05/2018    GLUCOSE 170 09/05/2018    GLUCOSE 135 06/01/2012    AST 10 09/05/2018    ALT 9 09/05/2018    ALKPHOS 118 09/05/2018    BILITOT 0.2 09/05/2018    TSH 1.350 09/05/2018    CHOL 146 03/23/2018    TRIG 109 03/23/2018    HDL 53 03/23/2018    LDLCALC 71 03/23/2018    LABA1C 7.2 09/07/2018        Lab Results   Component Value Date    CHOL 146 03/23/2018    CHOL 197 08/22/2017    CHOL 170 05/24/2016     Lab Results   Component Value Date    TRIG 109 03/23/2018    TRIG 164 (H) 08/22/2017    TRIG 137 05/24/2016     Lab Results   Component Value Date    HDL 53 03/23/2018    HDL 39 08/22/2017    HDL 51 05/24/2016     Lab Results   Component Value Date    LDLCALC 71 03/23/2018    LDLCALC 125 (H) 08/22/2017    LDLCALC 92 05/24/2016       Lab Results   Component Value Date    LABA1C 7.2 09/07/2018    LABA1C 5.7 04/13/2018    LABA1C 7.2 (H) 12/27/2017     Lab Results   Component Value Date    LABMICR 33.4 (H) 12/28/2017    LDLCALC 71 03/23/2018    CREATININE 5.1 (H) 09/05/2018           Assessment / Plan:      Doron Zaidi was seen today for follow-up and health maintenance. Diagnoses and all orders for this visit:    Controlled type 2 diabetes mellitus with chronic kidney disease, without long-term current use of insulin, unspecified CKD stage (HCC)  -     POCT glycosylated hemoglobin (Hb A1C)  -     [facility] [place] [facility]rales DPM, ([facility])    Bronchitis  -     XR CHEST STANDARD (2 VW); Future    Bilateral hearing loss, unspecified hearing loss type  -     [facility]    Other orders  -     cefdinir (OMNICEF) 250 MG/5ML suspension; Take 1.5 mLs by mouth 2 times daily for 10 days    Patient is given a prescription for Omnicef 75 mg twice a day. She is advised to follow-up with her primary care physician. Referrals also to [facility] audiology for her hearing complaints. She is also given a new podiatrist. Her old podiatrist when out of dizziness and there is nobody else available to her since she's gone on dialysis. She should go the emergency department if worse.      Call or go to ED immediately if

## 2018-09-24 ENCOUNTER — PROCEDURE VISIT (OUTPATIENT)
Dept: AUDIOLOGY | Age: 73
End: 2018-09-24
Payer: MEDICARE

## 2018-09-24 DIAGNOSIS — H90.3 SENSORINEURAL HEARING LOSS, BILATERAL: Primary | ICD-10-CM

## 2018-09-24 PROCEDURE — 92567 TYMPANOMETRY: CPT | Performed by: AUDIOLOGIST

## 2018-09-24 PROCEDURE — 92552 PURE TONE AUDIOMETRY AIR: CPT | Performed by: AUDIOLOGIST

## 2018-09-24 PROCEDURE — 92556 SPEECH AUDIOMETRY COMPLETE: CPT | Performed by: AUDIOLOGIST

## 2018-10-19 ENCOUNTER — OFFICE VISIT (OUTPATIENT)
Dept: INTERNAL MEDICINE CLINIC | Age: 73
End: 2018-10-19
Payer: MEDICARE

## 2018-10-19 VITALS
TEMPERATURE: 97.5 F | WEIGHT: 211 LBS | HEIGHT: 66 IN | HEART RATE: 86 BPM | OXYGEN SATURATION: 94 % | DIASTOLIC BLOOD PRESSURE: 75 MMHG | SYSTOLIC BLOOD PRESSURE: 124 MMHG | BODY MASS INDEX: 33.91 KG/M2

## 2018-10-19 DIAGNOSIS — R39.15 URINARY URGENCY: Primary | ICD-10-CM

## 2018-10-19 DIAGNOSIS — H91.93 BILATERAL HEARING LOSS, UNSPECIFIED HEARING LOSS TYPE: ICD-10-CM

## 2018-10-19 DIAGNOSIS — E11.22 CONTROLLED TYPE 2 DIABETES MELLITUS WITH CHRONIC KIDNEY DISEASE, WITHOUT LONG-TERM CURRENT USE OF INSULIN, UNSPECIFIED CKD STAGE (HCC): ICD-10-CM

## 2018-10-19 PROCEDURE — 99213 OFFICE O/P EST LOW 20 MIN: CPT | Performed by: FAMILY MEDICINE

## 2018-10-19 PROCEDURE — 3045F PR MOST RECENT HEMOGLOBIN A1C LEVEL 7.0-9.0%: CPT | Performed by: FAMILY MEDICINE

## 2018-10-19 PROCEDURE — 2022F DILAT RTA XM EVC RTNOPTHY: CPT | Performed by: FAMILY MEDICINE

## 2018-10-19 PROCEDURE — 1036F TOBACCO NON-USER: CPT | Performed by: FAMILY MEDICINE

## 2018-10-19 PROCEDURE — G8399 PT W/DXA RESULTS DOCUMENT: HCPCS | Performed by: FAMILY MEDICINE

## 2018-10-19 PROCEDURE — G8484 FLU IMMUNIZE NO ADMIN: HCPCS | Performed by: FAMILY MEDICINE

## 2018-10-19 PROCEDURE — G8427 DOCREV CUR MEDS BY ELIG CLIN: HCPCS | Performed by: FAMILY MEDICINE

## 2018-10-19 PROCEDURE — G8417 CALC BMI ABV UP PARAM F/U: HCPCS | Performed by: FAMILY MEDICINE

## 2018-10-19 PROCEDURE — 3017F COLORECTAL CA SCREEN DOC REV: CPT | Performed by: FAMILY MEDICINE

## 2018-10-19 PROCEDURE — 1123F ACP DISCUSS/DSCN MKR DOCD: CPT | Performed by: FAMILY MEDICINE

## 2018-10-19 PROCEDURE — 4040F PNEUMOC VAC/ADMIN/RCVD: CPT | Performed by: FAMILY MEDICINE

## 2018-10-19 PROCEDURE — 1090F PRES/ABSN URINE INCON ASSESS: CPT | Performed by: FAMILY MEDICINE

## 2018-10-19 PROCEDURE — 1101F PT FALLS ASSESS-DOCD LE1/YR: CPT | Performed by: FAMILY MEDICINE

## 2018-10-19 PROCEDURE — 99212 OFFICE O/P EST SF 10 MIN: CPT | Performed by: FAMILY MEDICINE

## 2018-10-19 ASSESSMENT — ENCOUNTER SYMPTOMS
CONSTIPATION: 0
DIARRHEA: 0
BLOOD IN STOOL: 0
WHEEZING: 0
SHORTNESS OF BREATH: 0
VOMITING: 0
NAUSEA: 0
SORE THROAT: 0
COUGH: 0

## 2018-10-19 NOTE — PATIENT INSTRUCTIONS
daily allowance of carbs to eat at one meal.  · Proteins have very little or no carbs per serving. Examples of proteins are beef, chicken, turkey, fish, eggs, tofu, cheese, cottage cheese, and peanut butter. A serving size of meat is 3 ounces, which is about the size of a deck of cards. Examples of meat substitute serving sizes (equal to 1 ounce of meat) are 1/4 cup of cottage cheese, 1 egg, 1 tablespoon of peanut butter, and ½ cup of tofu. How can you eat out and still eat healthy? · Learn to estimate the serving sizes of foods that have carbohydrate. If you measure food at home, it will be easier to estimate the amount in a serving of restaurant food. · If the meal you order has too much carbohydrate (such as potatoes, corn, or baked beans), ask to have a low-carbohydrate food instead. Ask for a salad or green vegetables. · If you use insulin, check your blood sugar before and after eating out to help you plan how much to eat in the future. · If you eat more carbohydrate at a meal than you had planned, take a walk or do other exercise. This will help lower your blood sugar. What else should you know? · Limit saturated fat, such as the fat from meat and dairy products. This is a healthy choice because people who have diabetes are at higher risk of heart disease. So choose lean cuts of meat and nonfat or low-fat dairy products. Use olive or canola oil instead of butter or shortening when cooking. · Don't skip meals. Your blood sugar may drop too low if you skip meals and take insulin or certain medicines for diabetes. · Check with your doctor before you drink alcohol. Alcohol can cause your blood sugar to drop too low. Alcohol can also cause a bad reaction if you take certain diabetes medicines. Follow-up care is a key part of your treatment and safety. Be sure to make and go to all appointments, and call your doctor if you are having problems.  It's also a good idea to know your test results and keep a

## 2018-10-23 NOTE — PROGRESS NOTES
Attending Physician Statement  I have discussed the case, including pertinent history and exam findings with the resident. I agree with the documented assessment and plan. Patient will follow up 3 months.   Lina Tate MD
Objective:  Vitals:    10/19/18 0915   BP: 124/75   Pulse: 86   Temp: 97.5 °F (36.4 °C)   SpO2: 94%     Physical Exam   Constitutional: No distress. HENT:   Head: Normocephalic and atraumatic. Eyes: Pupils are equal, round, and reactive to light. Conjunctivae are normal. No scleral icterus. Neck: Normal range of motion. Cardiovascular: Normal rate, regular rhythm, normal heart sounds and intact distal pulses. No murmur heard. Pulmonary/Chest: Effort normal and breath sounds normal. She has no wheezes. Abdominal: Soft. She exhibits no distension. There is no tenderness. Neurological: She is alert. Skin: No rash noted. She is not diaphoretic. No erythema. Osteopathic exam:  Patient evaluated in the seated position. Normal thoracic kyphosis and lumbar lordosis. Assessment and Plan:  Lydia Black was seen today for follow-up and health maintenance. Diagnoses and all orders for this visit:    Urinary urgency    Bilateral hearing loss, unspecified hearing loss type    Controlled type 2 diabetes mellitus with chronic kidney disease, without long-term current use of insulin, unspecified CKD stage ([address]    PT stable with current medications. Lifestyle modifications were discussed with regard to maintaining good blood glucose control. Will continue current therapy for above comorbidities with plans to pursue lifestyle modifications for better glycemic control. Will recheck A1C at next appointment. Pt may call for prescription for adult diapers once she decides what works best for her. She plans to get hearing aides before the next appointment so this will be reviewed at that time. Return in about 3 months (around 1/19/2019) for T2 DM, Hearing loss. Patient may come in sooner if needed for medicalconcerns. Patient advised to call at any time to cancel or re-schedule or for any questions/concerns.     Please note that >15 minutes was spent face-to-face with the patient gathering

## 2018-11-11 DIAGNOSIS — E03.9 HYPOTHYROIDISM, UNSPECIFIED TYPE: ICD-10-CM

## 2018-11-12 RX ORDER — LEVOTHYROXINE SODIUM 0.05 MG/1
TABLET ORAL
Qty: 90 TABLET | Refills: 3 | Status: SHIPPED | OUTPATIENT
Start: 2018-11-12 | End: 2019-01-28 | Stop reason: SDUPTHER

## 2018-12-12 ENCOUNTER — OFFICE VISIT (OUTPATIENT)
Dept: INTERNAL MEDICINE CLINIC | Age: 73
End: 2018-12-12
Payer: MEDICARE

## 2018-12-12 VITALS
WEIGHT: 213.4 LBS | HEIGHT: 66 IN | DIASTOLIC BLOOD PRESSURE: 69 MMHG | BODY MASS INDEX: 34.3 KG/M2 | SYSTOLIC BLOOD PRESSURE: 110 MMHG | TEMPERATURE: 97.3 F | HEART RATE: 93 BPM | OXYGEN SATURATION: 96 %

## 2018-12-12 DIAGNOSIS — J32.9 RHINOSINUSITIS: Primary | ICD-10-CM

## 2018-12-12 DIAGNOSIS — J31.0 RHINOSINUSITIS: Primary | ICD-10-CM

## 2018-12-12 LAB — HBA1C MFR BLD: 7.8 %

## 2018-12-12 PROCEDURE — G8484 FLU IMMUNIZE NO ADMIN: HCPCS | Performed by: FAMILY MEDICINE

## 2018-12-12 PROCEDURE — 2022F DILAT RTA XM EVC RTNOPTHY: CPT | Performed by: FAMILY MEDICINE

## 2018-12-12 PROCEDURE — 99213 OFFICE O/P EST LOW 20 MIN: CPT | Performed by: FAMILY MEDICINE

## 2018-12-12 PROCEDURE — 3017F COLORECTAL CA SCREEN DOC REV: CPT | Performed by: FAMILY MEDICINE

## 2018-12-12 PROCEDURE — 83036 HEMOGLOBIN GLYCOSYLATED A1C: CPT | Performed by: FAMILY MEDICINE

## 2018-12-12 PROCEDURE — G8399 PT W/DXA RESULTS DOCUMENT: HCPCS | Performed by: FAMILY MEDICINE

## 2018-12-12 PROCEDURE — 1123F ACP DISCUSS/DSCN MKR DOCD: CPT | Performed by: FAMILY MEDICINE

## 2018-12-12 PROCEDURE — G8417 CALC BMI ABV UP PARAM F/U: HCPCS | Performed by: FAMILY MEDICINE

## 2018-12-12 PROCEDURE — 1101F PT FALLS ASSESS-DOCD LE1/YR: CPT | Performed by: FAMILY MEDICINE

## 2018-12-12 PROCEDURE — 4040F PNEUMOC VAC/ADMIN/RCVD: CPT | Performed by: FAMILY MEDICINE

## 2018-12-12 PROCEDURE — G8427 DOCREV CUR MEDS BY ELIG CLIN: HCPCS | Performed by: FAMILY MEDICINE

## 2018-12-12 PROCEDURE — 1036F TOBACCO NON-USER: CPT | Performed by: FAMILY MEDICINE

## 2018-12-12 PROCEDURE — 3045F PR MOST RECENT HEMOGLOBIN A1C LEVEL 7.0-9.0%: CPT | Performed by: FAMILY MEDICINE

## 2018-12-12 PROCEDURE — 1090F PRES/ABSN URINE INCON ASSESS: CPT | Performed by: FAMILY MEDICINE

## 2018-12-12 ASSESSMENT — ENCOUNTER SYMPTOMS
RHINORRHEA: 1
SINUS PAIN: 1
WHEEZING: 0
CONSTIPATION: 0
DIARRHEA: 0
VOMITING: 0
BLOOD IN STOOL: 0
COUGH: 1
NAUSEA: 0
SINUS PRESSURE: 1
SHORTNESS OF BREATH: 1
SORE THROAT: 0

## 2018-12-12 NOTE — PROGRESS NOTES
Subjective:  72 y.o. female who presents in office today regarding:    T2 DM  Patient states that she has not been watching her diet as closely as she did previously as an explanation for why her HbA1c has risen from her previous visit to a 7.8 currently. She also states that she only checks her blood sugar once daily prior to breakfast.  She is prescribed NovoLog 1 unit per 10 over 150. She says she doesn't need this but has not been checking her blood sugar for some time. Rhinosinusitus  Patient states that since Saturday 12/08/2018 she has been having sinus congestion, cough, runny and stuffy nose. She admits to having shortness of breath with exertion. She denies orthopnea, paroxysmal nocturnal dyspnea. She also denies fevers, chills, weight loss, changes to bowel or bladder habits. She admits to receiving flu shot this year. Current Outpatient Prescriptions on File Prior to Visit   Medication Sig Dispense Refill    levothyroxine (SYNTHROID) 50 MCG tablet TAKE 1 TABLET BY MOUTH EVERY DAY 90 tablet 3    lidocaine viscous (XYLOCAINE) 2 % solution Take 15 mLs by mouth as needed for Irritation 100 mL 0    midodrine (PROAMATINE) 5 MG tablet Take 5 mg by mouth every morning Only on dialysis days  tue , thurs, sat.  Calcium Acetate, Phos Binder, 667 MG CAPS Take 1,334 mg by mouth 3 times daily (before meals)      insulin glargine (LANTUS SOLOSTAR) 100 UNIT/ML injection pen Inject 15 Units into the skin 2 times daily 5 pen 3    insulin aspart (NOVOLOG FLEXPEN) 100 UNIT/ML injection pen Use as needed TID per sliding scale 5 pen 3    Insulin Pen Needle 32G X 4 MM MISC 1 each by Does not apply route daily Use as directed with insulin pen 100 each 3    glucose blood VI test strips (ACCU-CHEK SMARTVIEW) strip 1 each by In Vitro route 4 times daily (before meals and nightly) As needed.  150 each 3    ACCU-CHEK FASTCLIX LANCETS MISC USE TO TEST BLOOD SUGAR FOUR TIMES DAILY BEFORE MEALS AND NIGHTLY 612 each 3

## 2018-12-14 NOTE — PROGRESS NOTES
[facility]    Attending Physician Statement:  Ximena Collier DO    I have discussed the case, including pertinent history and exam findings with the resident. I agree with the assessment, plan and orders as documented by the resident. Patient here for routine follow up of medical problems. Remainder of medical problems as per resident note.

## 2018-12-17 ENCOUNTER — HOSPITAL ENCOUNTER (EMERGENCY)
Age: 73
Discharge: HOME | End: 2018-12-17
Attending: EMERGENCY MEDICINE
Payer: MEDICARE

## 2018-12-17 ENCOUNTER — APPOINTMENT (OUTPATIENT)
Dept: GENERAL RADIOLOGY | Age: 73
End: 2018-12-17
Payer: MEDICARE

## 2018-12-17 VITALS
WEIGHT: 213 LBS | SYSTOLIC BLOOD PRESSURE: 108 MMHG | RESPIRATION RATE: 20 BRPM | HEIGHT: 66 IN | OXYGEN SATURATION: 96 % | BODY MASS INDEX: 34.23 KG/M2 | TEMPERATURE: 97.9 F | HEART RATE: 97 BPM | DIASTOLIC BLOOD PRESSURE: 69 MMHG

## 2018-12-17 DIAGNOSIS — N30.00 ACUTE CYSTITIS WITHOUT HEMATURIA: Primary | ICD-10-CM

## 2018-12-17 DIAGNOSIS — R53.83 OTHER FATIGUE: ICD-10-CM

## 2018-12-17 LAB
ALBUMIN SERPL-MCNC: 3.8 G/DL (ref 3.5–5.2)
ALP BLD-CCNC: 82 U/L (ref 35–104)
ALT SERPL-CCNC: 7 U/L (ref 0–32)
ANION GAP SERPL CALCULATED.3IONS-SCNC: 16 MMOL/L (ref 7–16)
AST SERPL-CCNC: 14 U/L (ref 0–31)
BACTERIA: ABNORMAL /HPF
BASOPHILS ABSOLUTE: 0.02 E9/L (ref 0–0.2)
BASOPHILS RELATIVE PERCENT: 0.3 % (ref 0–2)
BILIRUB SERPL-MCNC: 0.3 MG/DL (ref 0–1.2)
BILIRUBIN URINE: NEGATIVE
BLOOD, URINE: ABNORMAL
BUN BLDV-MCNC: 35 MG/DL (ref 8–23)
CALCIUM SERPL-MCNC: 8.4 MG/DL (ref 8.6–10.2)
CHLORIDE BLD-SCNC: 94 MMOL/L (ref 98–107)
CHP ED QC CHECK: YES
CLARITY: ABNORMAL
CO2: 29 MMOL/L (ref 22–29)
COLOR: YELLOW
CREAT SERPL-MCNC: 5.6 MG/DL (ref 0.5–1)
EKG ATRIAL RATE: 77 BPM
EKG P AXIS: -89 DEGREES
EKG P-R INTERVAL: 118 MS
EKG Q-T INTERVAL: 426 MS
EKG QRS DURATION: 90 MS
EKG QTC CALCULATION (BAZETT): 482 MS
EKG R AXIS: 4 DEGREES
EKG T AXIS: 39 DEGREES
EKG VENTRICULAR RATE: 77 BPM
EOSINOPHILS ABSOLUTE: 0.2 E9/L (ref 0.05–0.5)
EOSINOPHILS RELATIVE PERCENT: 3.4 % (ref 0–6)
EPITHELIAL CELLS, UA: ABNORMAL /HPF
GFR AFRICAN AMERICAN: 9
GFR NON-AFRICAN AMERICAN: 7 ML/MIN/1.73
GLUCOSE BLD-MCNC: 140 MG/DL (ref 74–99)
GLUCOSE BLD-MCNC: 175 MG/DL
GLUCOSE URINE: NEGATIVE MG/DL
HCT VFR BLD CALC: 32 % (ref 34–48)
HEMOGLOBIN: 10.2 G/DL (ref 11.5–15.5)
IMMATURE GRANULOCYTES #: 0.02 E9/L
IMMATURE GRANULOCYTES %: 0.3 % (ref 0–5)
KETONES, URINE: NEGATIVE MG/DL
LACTIC ACID: 2.3 MMOL/L (ref 0.5–2.2)
LEUKOCYTE ESTERASE, URINE: ABNORMAL
LYMPHOCYTES ABSOLUTE: 1.14 E9/L (ref 1.5–4)
LYMPHOCYTES RELATIVE PERCENT: 19.1 % (ref 20–42)
MCH RBC QN AUTO: 31.5 PG (ref 26–35)
MCHC RBC AUTO-ENTMCNC: 31.9 % (ref 32–34.5)
MCV RBC AUTO: 98.8 FL (ref 80–99.9)
METER GLUCOSE: 175 MG/DL (ref 74–99)
MONOCYTES ABSOLUTE: 0.53 E9/L (ref 0.1–0.95)
MONOCYTES RELATIVE PERCENT: 8.9 % (ref 2–12)
NEUTROPHILS ABSOLUTE: 4.05 E9/L (ref 1.8–7.3)
NEUTROPHILS RELATIVE PERCENT: 68 % (ref 43–80)
NITRITE, URINE: NEGATIVE
PDW BLD-RTO: 14.2 FL (ref 11.5–15)
PH UA: 8 (ref 5–9)
PLATELET # BLD: 165 E9/L (ref 130–450)
PMV BLD AUTO: 10 FL (ref 7–12)
POTASSIUM SERPL-SCNC: 4 MMOL/L (ref 3.5–5)
PRO-BNP: 4268 PG/ML (ref 0–125)
PROTEIN UA: 100 MG/DL
RBC # BLD: 3.24 E12/L (ref 3.5–5.5)
RBC UA: ABNORMAL /HPF (ref 0–2)
SODIUM BLD-SCNC: 139 MMOL/L (ref 132–146)
SPECIFIC GRAVITY UA: 1.02 (ref 1–1.03)
TOTAL PROTEIN: 6.3 G/DL (ref 6.4–8.3)
TROPONIN: <0.01 NG/ML (ref 0–0.03)
TSH SERPL DL<=0.05 MIU/L-ACNC: 3.07 UIU/ML (ref 0.27–4.2)
UROBILINOGEN, URINE: 0.2 E.U./DL
WBC # BLD: 6 E9/L (ref 4.5–11.5)
WBC UA: >20 /HPF (ref 0–5)

## 2018-12-17 PROCEDURE — 99285 EMERGENCY DEPT VISIT HI MDM: CPT

## 2018-12-17 PROCEDURE — 84443 ASSAY THYROID STIM HORMONE: CPT

## 2018-12-17 PROCEDURE — 80053 COMPREHEN METABOLIC PANEL: CPT

## 2018-12-17 PROCEDURE — 83880 ASSAY OF NATRIURETIC PEPTIDE: CPT

## 2018-12-17 PROCEDURE — 83605 ASSAY OF LACTIC ACID: CPT

## 2018-12-17 PROCEDURE — 36415 COLL VENOUS BLD VENIPUNCTURE: CPT

## 2018-12-17 PROCEDURE — 96365 THER/PROPH/DIAG IV INF INIT: CPT

## 2018-12-17 PROCEDURE — 93005 ELECTROCARDIOGRAM TRACING: CPT | Performed by: PHYSICIAN ASSISTANT

## 2018-12-17 PROCEDURE — 2580000003 HC RX 258: Performed by: PHYSICIAN ASSISTANT

## 2018-12-17 PROCEDURE — 96366 THER/PROPH/DIAG IV INF ADDON: CPT

## 2018-12-17 PROCEDURE — 81001 URINALYSIS AUTO W/SCOPE: CPT

## 2018-12-17 PROCEDURE — 85025 COMPLETE CBC W/AUTO DIFF WBC: CPT

## 2018-12-17 PROCEDURE — 87040 BLOOD CULTURE FOR BACTERIA: CPT

## 2018-12-17 PROCEDURE — 6360000002 HC RX W HCPCS: Performed by: PHYSICIAN ASSISTANT

## 2018-12-17 PROCEDURE — 71045 X-RAY EXAM CHEST 1 VIEW: CPT

## 2018-12-17 PROCEDURE — 87088 URINE BACTERIA CULTURE: CPT

## 2018-12-17 PROCEDURE — 84484 ASSAY OF TROPONIN QUANT: CPT

## 2018-12-17 PROCEDURE — 82962 GLUCOSE BLOOD TEST: CPT

## 2018-12-17 PROCEDURE — 2580000003 HC RX 258

## 2018-12-17 RX ORDER — SEVELAMER CARBONATE 800 MG/1
3 TABLET, FILM COATED ORAL
COMMUNITY
End: 2020-10-05

## 2018-12-17 RX ORDER — SODIUM CHLORIDE 9 MG/ML
INJECTION, SOLUTION INTRAVENOUS
Status: COMPLETED
Start: 2018-12-17 | End: 2018-12-17

## 2018-12-17 RX ORDER — FOLIC ACID/VIT B COMPLEX AND C 0.8 MG
1 TABLET ORAL DAILY
COMMUNITY

## 2018-12-17 RX ORDER — CEFDINIR 300 MG/1
300 CAPSULE ORAL DAILY
Qty: 10 CAPSULE | Refills: 0 | Status: SHIPPED | OUTPATIENT
Start: 2018-12-17 | End: 2018-12-27

## 2018-12-17 RX ORDER — 0.9 % SODIUM CHLORIDE 0.9 %
500 INTRAVENOUS SOLUTION INTRAVENOUS ONCE
Status: COMPLETED | OUTPATIENT
Start: 2018-12-17 | End: 2018-12-17

## 2018-12-17 RX ORDER — FUROSEMIDE 40 MG/1
40 TABLET ORAL
COMMUNITY
End: 2019-06-11 | Stop reason: ALTCHOICE

## 2018-12-17 RX ADMIN — CEFTRIAXONE SODIUM 1 G: 1 INJECTION, POWDER, FOR SOLUTION INTRAMUSCULAR; INTRAVENOUS at 13:35

## 2018-12-17 RX ADMIN — SODIUM CHLORIDE 50 ML: 900 INJECTION INTRAVENOUS at 13:36

## 2018-12-17 RX ADMIN — SODIUM CHLORIDE 500 ML: 9 INJECTION, SOLUTION INTRAVENOUS at 10:43

## 2018-12-17 NOTE — ED PROVIDER NOTES
surgery (Left, 2012,2013); Hand surgery (Right, 2012); Dialysis fistula creation (Left, 03/02/2018); and pr revise av fistula,w/o thrombectomy (Left, 5/23/2018). Social History:  reports that she has never smoked. She has never used smokeless tobacco. She reports that she drinks alcohol. She reports that she does not use drugs. Family History: family history is not on file. The patients home medications have been reviewed.     Allergies: Bactrim [sulfamethoxazole-trimethoprim]; Nsaids; Pcn [penicillins]; and Sulfa antibiotics    --------------------------------- RESULTS ------------------------------------------  All laboratory and radiology results have been personally reviewed by myself   LABS:  Results for orders placed or performed during the hospital encounter of 12/17/18   Urine Culture   Result Value Ref Range    Urine Culture, Routine Growth not present, incubation continues    CBC Auto Differential   Result Value Ref Range    WBC 6.0 4.5 - 11.5 E9/L    RBC 3.24 (L) 3.50 - 5.50 E12/L    Hemoglobin 10.2 (L) 11.5 - 15.5 g/dL    Hematocrit 32.0 (L) 34.0 - 48.0 %    MCV 98.8 80.0 - 99.9 fL    MCH 31.5 26.0 - 35.0 pg    MCHC 31.9 (L) 32.0 - 34.5 %    RDW 14.2 11.5 - 15.0 fL    Platelets 545 633 - 872 E9/L    MPV 10.0 7.0 - 12.0 fL    Neutrophils % 68.0 43.0 - 80.0 %    Immature Granulocytes % 0.3 0.0 - 5.0 %    Lymphocytes % 19.1 (L) 20.0 - 42.0 %    Monocytes % 8.9 2.0 - 12.0 %    Eosinophils % 3.4 0.0 - 6.0 %    Basophils % 0.3 0.0 - 2.0 %    Neutrophils # 4.05 1.80 - 7.30 E9/L    Immature Granulocytes # 0.02 E9/L    Lymphocytes # 1.14 (L) 1.50 - 4.00 E9/L    Monocytes # 0.53 0.10 - 0.95 E9/L    Eosinophils # 0.20 0.05 - 0.50 E9/L    Basophils # 0.02 0.00 - 0.20 E9/L   Comprehensive Metabolic Panel   Result Value Ref Range    Sodium 139 132 - 146 mmol/L    Potassium 4.0 3.5 - 5.0 mmol/L    Chloride 94 (L) 98 - 107 mmol/L    CO2 29 22 - 29 mmol/L    Anion Gap 16 7 - 16 mmol/L    Glucose 140 (H) 74 - 99 mg/dL    BUN 35 (H) 8 - 23 mg/dL    CREATININE 5.6 (H) 0.5 - 1.0 mg/dL    GFR Non-African American 7 >=60 mL/min/1.73    GFR African American 9     Calcium 8.4 (L) 8.6 - 10.2 mg/dL    Total Protein 6.3 (L) 6.4 - 8.3 g/dL    Alb 3.8 3.5 - 5.2 g/dL    Total Bilirubin 0.3 0.0 - 1.2 mg/dL    Alkaline Phosphatase 82 35 - 104 U/L    ALT 7 0 - 32 U/L    AST 14 0 - 31 U/L   Troponin   Result Value Ref Range    Troponin <0.01 0.00 - 0.03 ng/mL   Brain Natriuretic Peptide   Result Value Ref Range    Pro-BNP 4,268 (H) 0 - 125 pg/mL   Lactic Acid, Plasma   Result Value Ref Range    Lactic Acid 2.3 (H) 0.5 - 2.2 mmol/L   Urinalysis   Result Value Ref Range    Color, UA Yellow Straw/Yellow    Clarity, UA SLCLOUDY Clear    Glucose, Ur Negative Negative mg/dL    Bilirubin Urine Negative Negative    Ketones, Urine Negative Negative mg/dL    Specific Gravity, UA 1.020 1.005 - 1.030    Blood, Urine SMALL (A) Negative    pH, UA 8.0 5.0 - 9.0    Protein,  (A) Negative mg/dL    Urobilinogen, Urine 0.2 <2.0 E.U./dL    Nitrite, Urine Negative Negative    Leukocyte Esterase, Urine LARGE (A) Negative   TSH without Reflex   Result Value Ref Range    TSH 3.070 0.270 - 4.200 uIU/mL   Microscopic Urinalysis   Result Value Ref Range    WBC, UA >20 0 - 5 /HPF    RBC, UA 2-5 0 - 2 /HPF    Epi Cells RARE /HPF    Bacteria, UA MODERATE (A) /HPF   POCT Glucose   Result Value Ref Range    Glucose 175 mg/dL    QC OK?  yes    POCT Glucose   Result Value Ref Range    Meter Glucose 175 (H) 74 - 99 mg/dL   EKG 12 Lead   Result Value Ref Range    Ventricular Rate 77 BPM    Atrial Rate 77 BPM    P-R Interval 118 ms    QRS Duration 90 ms    Q-T Interval 426 ms    QTc Calculation (Bazett) 482 ms    P Axis -89 degrees    R Axis 4 degrees    T Axis 39 degrees       RADIOLOGY:  Interpreted by Radiologist.  XR CHEST PORTABLE   Final Result   No acute cardiopulmonary disease.          ----------------- NURSING NOTES AND VITALS REVIEWED ---------------   The

## 2018-12-19 LAB — URINE CULTURE, ROUTINE: NORMAL

## 2018-12-22 LAB
BLOOD CULTURE, ROUTINE: NORMAL
CULTURE, BLOOD 2: NORMAL

## 2019-01-09 ENCOUNTER — HOSPITAL ENCOUNTER (OUTPATIENT)
Dept: SLEEP CENTER | Age: 74
Discharge: HOME | End: 2019-01-09
Payer: COMMERCIAL

## 2019-01-09 DIAGNOSIS — G47.33 OBSTRUCTIVE SLEEP APNEA SYNDROME: Primary | ICD-10-CM

## 2019-01-09 PROCEDURE — 95806 SLEEP STUDY UNATT&RESP EFFT: CPT

## 2019-01-28 ENCOUNTER — OFFICE VISIT (OUTPATIENT)
Dept: INTERNAL MEDICINE CLINIC | Age: 74
End: 2019-01-28
Payer: COMMERCIAL

## 2019-01-28 VITALS
OXYGEN SATURATION: 94 % | BODY MASS INDEX: 35.65 KG/M2 | SYSTOLIC BLOOD PRESSURE: 114 MMHG | DIASTOLIC BLOOD PRESSURE: 67 MMHG | HEIGHT: 66 IN | WEIGHT: 221.8 LBS | TEMPERATURE: 97.5 F | HEART RATE: 83 BPM

## 2019-01-28 DIAGNOSIS — M06.9 RHEUMATOID ARTHRITIS INVOLVING MULTIPLE SITES, UNSPECIFIED RHEUMATOID FACTOR PRESENCE: ICD-10-CM

## 2019-01-28 DIAGNOSIS — E03.9 HYPOTHYROIDISM, UNSPECIFIED TYPE: ICD-10-CM

## 2019-01-28 DIAGNOSIS — E11.22 CONTROLLED TYPE 2 DIABETES MELLITUS WITH CHRONIC KIDNEY DISEASE ON CHRONIC DIALYSIS, WITH LONG-TERM CURRENT USE OF INSULIN (HCC): ICD-10-CM

## 2019-01-28 DIAGNOSIS — N18.6 CONTROLLED TYPE 2 DIABETES MELLITUS WITH CHRONIC KIDNEY DISEASE ON CHRONIC DIALYSIS, WITH LONG-TERM CURRENT USE OF INSULIN (HCC): ICD-10-CM

## 2019-01-28 DIAGNOSIS — F32.89 OTHER DEPRESSION: ICD-10-CM

## 2019-01-28 DIAGNOSIS — Z79.4 CONTROLLED TYPE 2 DIABETES MELLITUS WITH CHRONIC KIDNEY DISEASE ON CHRONIC DIALYSIS, WITH LONG-TERM CURRENT USE OF INSULIN (HCC): ICD-10-CM

## 2019-01-28 DIAGNOSIS — Z99.2 CONTROLLED TYPE 2 DIABETES MELLITUS WITH CHRONIC KIDNEY DISEASE ON CHRONIC DIALYSIS, WITH LONG-TERM CURRENT USE OF INSULIN (HCC): ICD-10-CM

## 2019-01-28 DIAGNOSIS — E78.49 OTHER HYPERLIPIDEMIA: ICD-10-CM

## 2019-01-28 PROCEDURE — 3046F HEMOGLOBIN A1C LEVEL >9.0%: CPT | Performed by: FAMILY MEDICINE

## 2019-01-28 PROCEDURE — G8417 CALC BMI ABV UP PARAM F/U: HCPCS | Performed by: FAMILY MEDICINE

## 2019-01-28 PROCEDURE — 99212 OFFICE O/P EST SF 10 MIN: CPT | Performed by: FAMILY MEDICINE

## 2019-01-28 PROCEDURE — 1101F PT FALLS ASSESS-DOCD LE1/YR: CPT | Performed by: FAMILY MEDICINE

## 2019-01-28 PROCEDURE — G8427 DOCREV CUR MEDS BY ELIG CLIN: HCPCS | Performed by: FAMILY MEDICINE

## 2019-01-28 PROCEDURE — 1090F PRES/ABSN URINE INCON ASSESS: CPT | Performed by: FAMILY MEDICINE

## 2019-01-28 PROCEDURE — 1123F ACP DISCUSS/DSCN MKR DOCD: CPT | Performed by: FAMILY MEDICINE

## 2019-01-28 PROCEDURE — G8484 FLU IMMUNIZE NO ADMIN: HCPCS | Performed by: FAMILY MEDICINE

## 2019-01-28 PROCEDURE — G8399 PT W/DXA RESULTS DOCUMENT: HCPCS | Performed by: FAMILY MEDICINE

## 2019-01-28 PROCEDURE — 3017F COLORECTAL CA SCREEN DOC REV: CPT | Performed by: FAMILY MEDICINE

## 2019-01-28 PROCEDURE — 99213 OFFICE O/P EST LOW 20 MIN: CPT | Performed by: FAMILY MEDICINE

## 2019-01-28 PROCEDURE — 4040F PNEUMOC VAC/ADMIN/RCVD: CPT | Performed by: FAMILY MEDICINE

## 2019-01-28 PROCEDURE — 2022F DILAT RTA XM EVC RTNOPTHY: CPT | Performed by: FAMILY MEDICINE

## 2019-01-28 PROCEDURE — 1036F TOBACCO NON-USER: CPT | Performed by: FAMILY MEDICINE

## 2019-01-28 RX ORDER — LEVOTHYROXINE SODIUM 0.05 MG/1
TABLET ORAL
Qty: 90 TABLET | Refills: 3 | Status: SHIPPED | OUTPATIENT
Start: 2019-01-28 | End: 2020-01-03 | Stop reason: DRUGHIGH

## 2019-01-28 RX ORDER — ATORVASTATIN CALCIUM 10 MG/1
10 TABLET, FILM COATED ORAL NIGHTLY
Qty: 30 TABLET | Refills: 3 | Status: SHIPPED | OUTPATIENT
Start: 2019-01-28 | End: 2019-07-29 | Stop reason: SDUPTHER

## 2019-01-28 RX ORDER — SERTRALINE HYDROCHLORIDE 100 MG/1
100 TABLET, FILM COATED ORAL NIGHTLY
Qty: 30 TABLET | Refills: 5 | Status: SHIPPED | OUTPATIENT
Start: 2019-01-28 | End: 2019-07-29 | Stop reason: SDUPTHER

## 2019-01-28 ASSESSMENT — ENCOUNTER SYMPTOMS
NAUSEA: 0
VOMITING: 0
DIARRHEA: 0
CONSTIPATION: 0
BLOOD IN STOOL: 0
COUGH: 0
SORE THROAT: 0
WHEEZING: 0

## 2019-03-11 ENCOUNTER — OFFICE VISIT (OUTPATIENT)
Dept: INTERNAL MEDICINE CLINIC | Age: 74
End: 2019-03-11
Payer: COMMERCIAL

## 2019-03-11 VITALS
WEIGHT: 220.6 LBS | TEMPERATURE: 97.8 F | HEIGHT: 66 IN | DIASTOLIC BLOOD PRESSURE: 85 MMHG | SYSTOLIC BLOOD PRESSURE: 123 MMHG | HEART RATE: 97 BPM | OXYGEN SATURATION: 94 % | BODY MASS INDEX: 35.45 KG/M2

## 2019-03-11 DIAGNOSIS — M06.9 RHEUMATOID ARTHRITIS INVOLVING MULTIPLE SITES, UNSPECIFIED RHEUMATOID FACTOR PRESENCE: ICD-10-CM

## 2019-03-11 DIAGNOSIS — R09.89 LEFT CAROTID BRUIT: ICD-10-CM

## 2019-03-11 DIAGNOSIS — I35.8 AORTIC SYSTOLIC MURMUR ON EXAMINATION: ICD-10-CM

## 2019-03-11 DIAGNOSIS — G25.81 RESTLESS LEG SYNDROME, UNCONTROLLED: ICD-10-CM

## 2019-03-11 PROBLEM — E11.8 DISORDER ASSOCIATED WITH WELL-CONTROLLED TYPE 2 DIABETES MELLITUS (HCC): Status: ACTIVE | Noted: 2017-06-15

## 2019-03-11 LAB — HBA1C MFR BLD: 6.8 %

## 2019-03-11 PROCEDURE — 3017F COLORECTAL CA SCREEN DOC REV: CPT | Performed by: FAMILY MEDICINE

## 2019-03-11 PROCEDURE — 99213 OFFICE O/P EST LOW 20 MIN: CPT | Performed by: FAMILY MEDICINE

## 2019-03-11 PROCEDURE — G8427 DOCREV CUR MEDS BY ELIG CLIN: HCPCS | Performed by: FAMILY MEDICINE

## 2019-03-11 PROCEDURE — 83036 HEMOGLOBIN GLYCOSYLATED A1C: CPT | Performed by: FAMILY MEDICINE

## 2019-03-11 PROCEDURE — 1123F ACP DISCUSS/DSCN MKR DOCD: CPT | Performed by: FAMILY MEDICINE

## 2019-03-11 PROCEDURE — 1036F TOBACCO NON-USER: CPT | Performed by: FAMILY MEDICINE

## 2019-03-11 PROCEDURE — 3044F HG A1C LEVEL LT 7.0%: CPT | Performed by: FAMILY MEDICINE

## 2019-03-11 PROCEDURE — 1090F PRES/ABSN URINE INCON ASSESS: CPT | Performed by: FAMILY MEDICINE

## 2019-03-11 PROCEDURE — G8399 PT W/DXA RESULTS DOCUMENT: HCPCS | Performed by: FAMILY MEDICINE

## 2019-03-11 PROCEDURE — 4040F PNEUMOC VAC/ADMIN/RCVD: CPT | Performed by: FAMILY MEDICINE

## 2019-03-11 PROCEDURE — 1101F PT FALLS ASSESS-DOCD LE1/YR: CPT | Performed by: FAMILY MEDICINE

## 2019-03-11 PROCEDURE — 2022F DILAT RTA XM EVC RTNOPTHY: CPT | Performed by: FAMILY MEDICINE

## 2019-03-11 PROCEDURE — G8484 FLU IMMUNIZE NO ADMIN: HCPCS | Performed by: FAMILY MEDICINE

## 2019-03-11 PROCEDURE — G8417 CALC BMI ABV UP PARAM F/U: HCPCS | Performed by: FAMILY MEDICINE

## 2019-03-11 RX ORDER — ROPINIROLE 0.25 MG/1
TABLET, FILM COATED ORAL
Qty: 60 TABLET | Refills: 1 | Status: SHIPPED | OUTPATIENT
Start: 2019-03-11 | End: 2019-03-11 | Stop reason: SDUPTHER

## 2019-03-11 ASSESSMENT — ENCOUNTER SYMPTOMS
VOMITING: 0
WHEEZING: 0
ABDOMINAL PAIN: 0
COUGH: 0
SHORTNESS OF BREATH: 0
NAUSEA: 0

## 2019-03-12 ENCOUNTER — TELEPHONE (OUTPATIENT)
Dept: INTERNAL MEDICINE CLINIC | Age: 74
End: 2019-03-12

## 2019-03-12 RX ORDER — ROPINIROLE 0.25 MG/1
TABLET, FILM COATED ORAL
Qty: 168 TABLET | Refills: 1 | Status: SHIPPED | OUTPATIENT
Start: 2019-03-12 | End: 2019-06-11 | Stop reason: DRUGHIGH

## 2019-03-20 ENCOUNTER — HOSPITAL ENCOUNTER (OUTPATIENT)
Dept: INTERVENTIONAL RADIOLOGY/VASCULAR | Age: 74
Discharge: HOME | End: 2019-03-22
Payer: COMMERCIAL

## 2019-03-20 DIAGNOSIS — R09.89 LEFT CAROTID BRUIT: ICD-10-CM

## 2019-03-20 PROCEDURE — 93880 EXTRACRANIAL BILAT STUDY: CPT

## 2019-04-22 ENCOUNTER — HOSPITAL ENCOUNTER (OUTPATIENT)
Dept: GENERAL RADIOLOGY | Age: 74
Discharge: HOME | End: 2019-04-24
Payer: COMMERCIAL

## 2019-04-22 ENCOUNTER — OFFICE VISIT (OUTPATIENT)
Dept: INTERNAL MEDICINE CLINIC | Age: 74
End: 2019-04-22
Payer: COMMERCIAL

## 2019-04-22 ENCOUNTER — HOSPITAL ENCOUNTER (OUTPATIENT)
Age: 74
Discharge: HOME | End: 2019-04-24
Payer: COMMERCIAL

## 2019-04-22 VITALS
SYSTOLIC BLOOD PRESSURE: 120 MMHG | HEIGHT: 66 IN | BODY MASS INDEX: 35.52 KG/M2 | WEIGHT: 221 LBS | HEART RATE: 92 BPM | OXYGEN SATURATION: 96 % | DIASTOLIC BLOOD PRESSURE: 80 MMHG | TEMPERATURE: 97.8 F

## 2019-04-22 DIAGNOSIS — Z99.2 CONTROLLED TYPE 2 DIABETES MELLITUS WITH CHRONIC KIDNEY DISEASE ON CHRONIC DIALYSIS, WITH LONG-TERM CURRENT USE OF INSULIN (HCC): ICD-10-CM

## 2019-04-22 DIAGNOSIS — N18.6 CONTROLLED TYPE 2 DIABETES MELLITUS WITH CHRONIC KIDNEY DISEASE ON CHRONIC DIALYSIS, WITH LONG-TERM CURRENT USE OF INSULIN (HCC): ICD-10-CM

## 2019-04-22 DIAGNOSIS — G47.33 OBSTRUCTIVE SLEEP APNEA SYNDROME: Primary | ICD-10-CM

## 2019-04-22 DIAGNOSIS — E11.22 CONTROLLED TYPE 2 DIABETES MELLITUS WITH CHRONIC KIDNEY DISEASE ON CHRONIC DIALYSIS, WITH LONG-TERM CURRENT USE OF INSULIN (HCC): ICD-10-CM

## 2019-04-22 DIAGNOSIS — R76.12 POSITIVE QUANTIFERON-TB GOLD TEST: ICD-10-CM

## 2019-04-22 DIAGNOSIS — E78.2 MIXED HYPERLIPIDEMIA: ICD-10-CM

## 2019-04-22 DIAGNOSIS — I65.23 BILATERAL CAROTID ARTERY STENOSIS: ICD-10-CM

## 2019-04-22 DIAGNOSIS — G25.81 RESTLESS LEG SYNDROME, CONTROLLED: ICD-10-CM

## 2019-04-22 DIAGNOSIS — Z79.4 CONTROLLED TYPE 2 DIABETES MELLITUS WITH CHRONIC KIDNEY DISEASE ON CHRONIC DIALYSIS, WITH LONG-TERM CURRENT USE OF INSULIN (HCC): ICD-10-CM

## 2019-04-22 PROCEDURE — 1090F PRES/ABSN URINE INCON ASSESS: CPT | Performed by: FAMILY MEDICINE

## 2019-04-22 PROCEDURE — 99213 OFFICE O/P EST LOW 20 MIN: CPT | Performed by: FAMILY MEDICINE

## 2019-04-22 PROCEDURE — G8599 NO ASA/ANTIPLAT THER USE RNG: HCPCS | Performed by: FAMILY MEDICINE

## 2019-04-22 PROCEDURE — 71046 X-RAY EXAM CHEST 2 VIEWS: CPT

## 2019-04-22 PROCEDURE — 4040F PNEUMOC VAC/ADMIN/RCVD: CPT | Performed by: FAMILY MEDICINE

## 2019-04-22 PROCEDURE — 2022F DILAT RTA XM EVC RTNOPTHY: CPT | Performed by: FAMILY MEDICINE

## 2019-04-22 PROCEDURE — 3044F HG A1C LEVEL LT 7.0%: CPT | Performed by: FAMILY MEDICINE

## 2019-04-22 PROCEDURE — G8417 CALC BMI ABV UP PARAM F/U: HCPCS | Performed by: FAMILY MEDICINE

## 2019-04-22 PROCEDURE — 3017F COLORECTAL CA SCREEN DOC REV: CPT | Performed by: FAMILY MEDICINE

## 2019-04-22 PROCEDURE — 1123F ACP DISCUSS/DSCN MKR DOCD: CPT | Performed by: FAMILY MEDICINE

## 2019-04-22 PROCEDURE — G8427 DOCREV CUR MEDS BY ELIG CLIN: HCPCS | Performed by: FAMILY MEDICINE

## 2019-04-22 PROCEDURE — 1036F TOBACCO NON-USER: CPT | Performed by: FAMILY MEDICINE

## 2019-04-22 PROCEDURE — G8399 PT W/DXA RESULTS DOCUMENT: HCPCS | Performed by: FAMILY MEDICINE

## 2019-04-22 ASSESSMENT — ENCOUNTER SYMPTOMS
COUGH: 0
NAUSEA: 0
WHEEZING: 0
SHORTNESS OF BREATH: 0
ABDOMINAL PAIN: 0
VOMITING: 0

## 2019-04-22 NOTE — PATIENT INSTRUCTIONS
Patient Education        Learning About Cutting Calories  How do calories affect your weight? Food gives your body energy. Energy from the food you eat is measured in calories. This energy keeps your heart beating, your brain active, and your muscles working. Your body needs a certain number of calories each day. After your body uses the calories it needs, it stores extra calories as fat. To lose weight safely, you have to eat fewer calories while eating in a healthy way. How many calories do you need each day? The more active you are, the more calories you need. When you are less active, you need fewer calories. How many calories you need each day also depends on several things, including your age and whether you are male or female. Here are some general guidelines for adults:  · Less active women and older adults need 1,600 to 2,000 calories each day. · Active women and less active men need 2,000 to 2,400 calories each day. · Active men need 2,400 to 3,000 calories each day. How can you cut calories and eat healthy meals? Whole grains, vegetables and fruits, and dried beans are good lower-calorie foods. They give you lots of nutrients and fiber. And they fill you up. Sweets, energy drinks, and soda pop are high in calories. They give you few nutrients and no fiber. Try to limit soda pop, fruit juice, and energy drinks. Drink water instead. Some fats can be part of a healthy diet. But cutting back on fats from highly processed foods like fast foods and many snack foods is a good way to lower the calories in your diet. Also, use smaller amounts of fats like butter, margarine, salad dressing, and mayonnaise. Add fresh garlic, lemon, or herbs to your meals to add flavor without adding fat. Meats and dairy products can be a big source of hidden fats. Try to choose lean or low-fat versions of these products. Fat-free cookies, candies, chips, and frozen treats can still be high in sugar and calories.  Some fat-free foods have more calories than regular ones. Eat fat-free treats in moderation, as you would other foods. If your favorite foods are high in fat, salt, sugar, or calories, limit how often you eat them. Eat smaller servings, or look for healthy substitutes. Fill up on fruits, vegetables, and whole grains. Eating at home  · Use meat as a side dish instead of as the main part of your meal.  · Try main dishes that use whole wheat pasta, brown rice, dried beans, or vegetables. · Find ways to cook with little or no fat, such as broiling, steaming, or grilling. · Use cooking spray instead of oil. If you use oil, use a monounsaturated oil, such as canola or olive oil. · Trim fat from meats before you cook them. · Drain off fat after you brown the meat or while you roast it. · Chill soups and stews after you cook them. Then skim the fat off the top after it hardens. Eating out  · Order foods that are broiled or poached rather than fried or breaded. · Cut back on the amount of butter or margarine that you use on bread. · Order sauces, gravies, and salad dressings on the side, and use only a little. · When you order pasta, choose tomato sauce rather than cream sauce. · Ask for salsa with your baked potato instead of sour cream, butter, cheese, or nunez. · Order meals in a small size instead of upgrading to a large. · Share an entree, or take part of your food home to eat as another meal.  · Share appetizers and desserts. Where can you learn more? Go to [email or web address] and sign in to your [employer] account. Enter L889 in the [employer] box to learn more about \"Learning About Cutting Calories. \"     If you do not have an account, please click on the \"Sign Up Now\" link. Current as of: March 28, 2018  Content Version: 11.9  © 4156-8505 [employer]. Care instructions adapted under license by [facility] ([facility]).  If you have questions about a medical condition or this instruction, always ask your healthcare professional. [place] [address] any warranty or liability for your use of this information.

## 2019-04-22 NOTE — PROGRESS NOTES
Subjective:  69 y.o. female who presents in office today regarding:    Carotid aa. U/S follow-up  B/l results less than 50% stenosis. The imaging was done after a subsequent finding of systolic murmur with b/l bruit on physical exam. The pt denies feeling light-headed, dizzy or any syncopal episodes. KAYKAY  Now on CPAP rather than the previous BiPAP. Working well and she feels better over all. Restless leg syndrome  Pt has been recently placed on Requip .5 mg and is doing well. Says she had jerking in legs worse at night and her sister had a similar problem. Now she reports they are only minimal and do not disturb her sleep. Current Outpatient Medications on File Prior to Visit   Medication Sig Dispense Refill    rOPINIRole (REQUIP) 0.25 MG tablet TAKE 1 TABLET BY MOUTH NIGHTLY FOR THE FIRST 5 NIGHTS, THEN 2 TABLETS NIGHTLY FOR RESTLESS LEGS 168 tablet 1    sertraline (ZOLOFT) 100 MG tablet Take 1 tablet by mouth nightly 30 tablet 5    insulin glargine (LANTUS SOLOSTAR) 100 UNIT/ML injection pen Use 17 units in the morning and 15 units at night every day.  5 pen 3    atorvastatin (LIPITOR) 10 MG tablet Take 1 tablet by mouth nightly 30 tablet 3    levothyroxine (SYNTHROID) 50 MCG tablet TAKE 1 TABLET BY MOUTH EVERY DAY 90 tablet 3    insulin lispro (HUMALOG) 100 UNIT/ML injection vial Inject 0-5 Units into the skin 3 times daily (before meals) *Per Sliding Scale*  1 unit for every 10 carbs      iron sucrose (VENOFER) 20 MG/ML injection Infuse 50 mg intravenously once a week Last Dose: 12/11/2018  Next Dose: 12/18/2018      B Complex-C-Folic Acid (TOD-RENETTA) TABS Take 1 tablet by mouth daily      sevelamer (RENVELA) 800 MG tablet Take 1 tablet by mouth 3 times daily (with meals)      midodrine (PROAMATINE) 5 MG tablet Take 5 mg by mouth three times a week Tuesday, Thursday, Saturday      ADVAIR DISKUS 500-50 MCG/DOSE diskus inhaler Inhale 1 puff into the lungs 2 times daily 60 each 5    traZODone (DESYREL) 50 MG tablet TAKE 1 TABLET BY MOUTH ONCE NIGHTLY 90 tablet 3    PROAIR  (90 BASE) MCG/ACT inhaler Inhale 2 puffs into the lungs every 6 hours as needed for Wheezing or Shortness of Breath   5    Insulin Pen Needle (B-D ULTRAFINE III SHORT PEN) 31G X 8 MM MISC Inject 1 each into the skin daily 100 each 5    influenza virus trivalent vaccine (FLUZONE) injection Inject 0.5 mLs into the muscle once Given 8/28/2018 @@ Dialysis      Methoxy PEG-Epoetin Beta (MIRCERA) 30 MCG/0.3ML SOSY Inject 30 mcg as directed every 30 days Last Dose: 12/13/2018  Next Dose: 1/10/2019      furosemide (LASIX) 40 MG tablet Take 40 mg by mouth four times a week Sunday, Monday, Wednesday, Friday      Insulin Pen Needle 32G X 4 MM MISC 1 each by Does not apply route daily Use as directed with insulin pen 100 each 3    glucose blood VI test strips (ACCU-CHEK SMARTVIEW) strip 1 each by In Vitro route 4 times daily (before meals and nightly) As needed. 150 each 3    ACCU-CHEK FASTCLIX LANCETS MISC USE TO TEST BLOOD SUGAR FOUR TIMES DAILY BEFORE MEALS AND NIGHTLY 612 each 3    Alcohol Swabs (ALCOHOL PREP) 70 % PADS 1 each by Does not apply route 4 times daily (before meals and nightly) 200 each 3    Blood Glucose Monitoring Suppl (ACCU-CHEK DERREK SMARTVIEW) W/DEVICE KIT 1 kit by Does not apply route 4 times daily (before meals and nightly) 1 kit 0    albuterol (PROVENTIL) (2.5 MG/3ML) 0.083% nebulizer solution Take 2.5 mg by nebulization every 4 hours as needed for Wheezing or Shortness of Breath   3     No current facility-administered medications on file prior to visit. Review of Systems   Constitutional: Negative for chills and fever. HENT: Negative for congestion and hearing loss. Respiratory: Negative for cough, shortness of breath and wheezing. Cardiovascular: Negative for chest pain, palpitations and leg swelling. Gastrointestinal: Negative for abdominal pain, nausea and vomiting.    Genitourinary:

## 2019-06-11 ENCOUNTER — APPOINTMENT (OUTPATIENT)
Dept: GENERAL RADIOLOGY | Age: 74
End: 2019-06-11
Payer: COMMERCIAL

## 2019-06-11 ENCOUNTER — HOSPITAL ENCOUNTER (EMERGENCY)
Age: 74
Discharge: HOME | End: 2019-06-11
Attending: EMERGENCY MEDICINE
Payer: COMMERCIAL

## 2019-06-11 VITALS
OXYGEN SATURATION: 93 % | BODY MASS INDEX: 35.36 KG/M2 | DIASTOLIC BLOOD PRESSURE: 57 MMHG | HEIGHT: 66 IN | HEART RATE: 81 BPM | RESPIRATION RATE: 16 BRPM | WEIGHT: 220 LBS | SYSTOLIC BLOOD PRESSURE: 96 MMHG | TEMPERATURE: 97.5 F

## 2019-06-11 DIAGNOSIS — R07.9 CHEST PAIN, UNSPECIFIED TYPE: Primary | ICD-10-CM

## 2019-06-11 LAB
ANION GAP SERPL CALCULATED.3IONS-SCNC: 16 MMOL/L (ref 7–16)
BASOPHILS ABSOLUTE: 0.03 E9/L (ref 0–0.2)
BASOPHILS RELATIVE PERCENT: 0.6 % (ref 0–2)
BUN BLDV-MCNC: 25 MG/DL (ref 8–23)
CALCIUM SERPL-MCNC: 8.1 MG/DL (ref 8.6–10.2)
CHLORIDE BLD-SCNC: 93 MMOL/L (ref 98–107)
CO2: 29 MMOL/L (ref 22–29)
CREAT SERPL-MCNC: 3.5 MG/DL (ref 0.5–1)
EOSINOPHILS ABSOLUTE: 0.16 E9/L (ref 0.05–0.5)
EOSINOPHILS RELATIVE PERCENT: 3 % (ref 0–6)
GFR AFRICAN AMERICAN: 16
GFR NON-AFRICAN AMERICAN: 13 ML/MIN/1.73
GLUCOSE BLD-MCNC: 101 MG/DL (ref 74–99)
HCT VFR BLD CALC: 32.9 % (ref 34–48)
HEMOGLOBIN: 11 G/DL (ref 11.5–15.5)
IMMATURE GRANULOCYTES #: 0.02 E9/L
IMMATURE GRANULOCYTES %: 0.4 % (ref 0–5)
LYMPHOCYTES ABSOLUTE: 1.74 E9/L (ref 1.5–4)
LYMPHOCYTES RELATIVE PERCENT: 32.5 % (ref 20–42)
MAGNESIUM: 2 MG/DL (ref 1.6–2.6)
MCH RBC QN AUTO: 33.4 PG (ref 26–35)
MCHC RBC AUTO-ENTMCNC: 33.4 % (ref 32–34.5)
MCV RBC AUTO: 100 FL (ref 80–99.9)
MONOCYTES ABSOLUTE: 0.53 E9/L (ref 0.1–0.95)
MONOCYTES RELATIVE PERCENT: 9.9 % (ref 2–12)
NEUTROPHILS ABSOLUTE: 2.87 E9/L (ref 1.8–7.3)
NEUTROPHILS RELATIVE PERCENT: 53.6 % (ref 43–80)
PDW BLD-RTO: 13.4 FL (ref 11.5–15)
PLATELET # BLD: 160 E9/L (ref 130–450)
PMV BLD AUTO: 10.7 FL (ref 7–12)
POTASSIUM REFLEX MAGNESIUM: 3.4 MMOL/L (ref 3.5–5)
RBC # BLD: 3.29 E12/L (ref 3.5–5.5)
SODIUM BLD-SCNC: 138 MMOL/L (ref 132–146)
TROPONIN: <0.01 NG/ML (ref 0–0.03)
WBC # BLD: 5.4 E9/L (ref 4.5–11.5)

## 2019-06-11 PROCEDURE — 83735 ASSAY OF MAGNESIUM: CPT

## 2019-06-11 PROCEDURE — 6370000000 HC RX 637 (ALT 250 FOR IP): Performed by: EMERGENCY MEDICINE

## 2019-06-11 PROCEDURE — 36415 COLL VENOUS BLD VENIPUNCTURE: CPT

## 2019-06-11 PROCEDURE — 71046 X-RAY EXAM CHEST 2 VIEWS: CPT

## 2019-06-11 PROCEDURE — 85025 COMPLETE CBC W/AUTO DIFF WBC: CPT

## 2019-06-11 PROCEDURE — 84484 ASSAY OF TROPONIN QUANT: CPT

## 2019-06-11 PROCEDURE — 99285 EMERGENCY DEPT VISIT HI MDM: CPT

## 2019-06-11 PROCEDURE — 80048 BASIC METABOLIC PNL TOTAL CA: CPT

## 2019-06-11 PROCEDURE — 93005 ELECTROCARDIOGRAM TRACING: CPT | Performed by: EMERGENCY MEDICINE

## 2019-06-11 RX ORDER — INSULIN GLARGINE 100 [IU]/ML
15 INJECTION, SOLUTION SUBCUTANEOUS NIGHTLY
COMMUNITY
End: 2020-05-01 | Stop reason: DRUGHIGH

## 2019-06-11 RX ORDER — PHENOL 1.4 %
1 AEROSOL, SPRAY (ML) MUCOUS MEMBRANE 2 TIMES DAILY
COMMUNITY
End: 2019-09-30 | Stop reason: SDUPTHER

## 2019-06-11 RX ORDER — INSULIN GLARGINE 100 [IU]/ML
17 INJECTION, SOLUTION SUBCUTANEOUS EVERY MORNING
COMMUNITY
End: 2020-05-01 | Stop reason: DRUGHIGH

## 2019-06-11 RX ORDER — ASPIRIN 81 MG/1
324 TABLET, CHEWABLE ORAL ONCE
Status: COMPLETED | OUTPATIENT
Start: 2019-06-11 | End: 2019-06-11

## 2019-06-11 RX ORDER — ROPINIROLE 0.25 MG/1
0.5 TABLET, FILM COATED ORAL NIGHTLY
COMMUNITY
End: 2019-07-29 | Stop reason: SDUPTHER

## 2019-06-11 RX ORDER — LIDOCAINE AND PRILOCAINE 25; 25 MG/G; MG/G
1 CREAM TOPICAL
Status: ON HOLD | COMMUNITY
End: 2022-06-21

## 2019-06-11 RX ADMIN — ASPIRIN 81 MG 324 MG: 81 TABLET ORAL at 11:16

## 2019-06-11 ASSESSMENT — PAIN SCALES - GENERAL
PAINLEVEL_OUTOF10: 0
PAINLEVEL_OUTOF10: 3

## 2019-06-11 ASSESSMENT — PAIN DESCRIPTION - ONSET: ONSET: SUDDEN

## 2019-06-11 ASSESSMENT — PAIN DESCRIPTION - PAIN TYPE: TYPE: ACUTE PAIN

## 2019-06-11 ASSESSMENT — PAIN DESCRIPTION - FREQUENCY: FREQUENCY: INTERMITTENT

## 2019-06-11 ASSESSMENT — PAIN DESCRIPTION - PROGRESSION: CLINICAL_PROGRESSION: NOT CHANGED

## 2019-06-11 ASSESSMENT — PAIN DESCRIPTION - LOCATION: LOCATION: CHEST

## 2019-06-11 ASSESSMENT — PAIN DESCRIPTION - DESCRIPTORS: DESCRIPTORS: ACHING;DULL

## 2019-06-11 ASSESSMENT — PAIN - FUNCTIONAL ASSESSMENT: PAIN_FUNCTIONAL_ASSESSMENT: PREVENTS OR INTERFERES SOME ACTIVE ACTIVITIES AND ADLS

## 2019-06-11 ASSESSMENT — PAIN DESCRIPTION - ORIENTATION: ORIENTATION: LEFT

## 2019-06-11 NOTE — ED NOTES
Bed: 09  Expected date:   Expected time:   Means of arrival:   Comments:  Kasey Renteria RN  06/11/19 6604 operating room

## 2019-06-11 NOTE — ED NOTES
Patient's sister, Rock Ramirez, to be called for ride home or admission information, home [phone] or cell [phone]     Abby Squires RN  06/11/19 7828

## 2019-06-11 NOTE — ED PROVIDER NOTES
HPI:  6/11/19, Time: 11:01 AM         Norm Marsh is a 69 y.o. female presenting to the ED for chest pain, beginning about an hour ago. The complaint has been persistent, mild in severity, and worsened by nothing. Patient states that she was about 2 hours into her dialysis session when she began to experience left-sided chest pain associated with a bit of lightheadedness and tingling in her fingertips. She continue with her dialysis and finished all but 45 minutes of her 4-hour session. She denies ever experiencing symptoms like this before during dialysis. She describes the pain as a dull aching sensation that does not radiate anywhere. She denies any associated palpitations or shortness of breath. She denies any previous cardiac history. She denies any recent cough, fever, or chills. Review of Systems:   Pertinent positives and negatives are stated within HPI, all other systems reviewed and are negative.          --------------------------------------------- PAST HISTORY ---------------------------------------------  Past Medical History:  has a past medical history of Asthma, Chronic kidney disease, Hemodialysis patient ([facility] Hyperlipidemia, Hypertension, Hypothyroid, Type II or unspecified type diabetes mellitus without mention of complication, not stated as uncontrolled, and 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 ([facility] Past Surgical History:  has a past surgical history that includes hernia repair; Gastric bypass surgery (2004); knee surgery (2009); Foot surgery (Left, 2012,2013); Hand surgery (Right, 2012); Dialysis fistula creation (Left, 03/02/2018); and pr revise av fistula,w/o thrombectomy (Left, 5/23/2018). Social History:  reports that she has never smoked. She has never used smokeless tobacco. She reports that she drinks alcohol. She reports that she does not use drugs. Family History: family history is not on file.      The patients home medications have been reviewed.     Allergies: Bactrim [sulfamethoxazole-trimethoprim]; Nsaids; Pcn [penicillins]; and Sulfa antibiotics    -------------------------------------------------- RESULTS -------------------------------------------------  All laboratory and radiology results have been personally reviewed by myself   LABS:  Results for orders placed or performed during the hospital encounter of 06/11/19   CBC Auto Differential   Result Value Ref Range    WBC 5.4 4.5 - 11.5 E9/L    RBC 3.29 (L) 3.50 - 5.50 E12/L    Hemoglobin 11.0 (L) 11.5 - 15.5 g/dL    Hematocrit 32.9 (L) 34.0 - 48.0 %    .0 (H) 80.0 - 99.9 fL    MCH 33.4 26.0 - 35.0 pg    MCHC 33.4 32.0 - 34.5 %    RDW 13.4 11.5 - 15.0 fL    Platelets 075 879 - 269 E9/L    MPV 10.7 7.0 - 12.0 fL    Neutrophils % 53.6 43.0 - 80.0 %    Immature Granulocytes % 0.4 0.0 - 5.0 %    Lymphocytes % 32.5 20.0 - 42.0 %    Monocytes % 9.9 2.0 - 12.0 %    Eosinophils % 3.0 0.0 - 6.0 %    Basophils % 0.6 0.0 - 2.0 %    Neutrophils # 2.87 1.80 - 7.30 E9/L    Immature Granulocytes # 0.02 E9/L    Lymphocytes # 1.74 1.50 - 4.00 E9/L    Monocytes # 0.53 0.10 - 0.95 E9/L    Eosinophils # 0.16 0.05 - 0.50 E9/L    Basophils # 0.03 0.00 - 0.20 O0/Z   Basic Metabolic Panel w/ Reflex to MG   Result Value Ref Range    Sodium 138 132 - 146 mmol/L    Potassium reflex Magnesium 3.4 (L) 3.5 - 5.0 mmol/L    Chloride 93 (L) 98 - 107 mmol/L    CO2 29 22 - 29 mmol/L    Anion Gap 16 7 - 16 mmol/L    Glucose 101 (H) 74 - 99 mg/dL    BUN 25 (H) 8 - 23 mg/dL    CREATININE 3.5 (H) 0.5 - 1.0 mg/dL    GFR Non-African American 13 >=60 mL/min/1.73    GFR African American 16     Calcium 8.1 (L) 8.6 - 10.2 mg/dL   Troponin   Result Value Ref Range    Troponin <0.01 0.00 - 0.03 ng/mL   Magnesium   Result Value Ref Range    Magnesium 2.0 1.6 - 2.6 mg/dL   Troponin   Result Value Ref Range    Troponin <0.01 0.00 - 0.03 ng/mL   Troponin   Result Value Ref Range    Troponin <0.01 0.00 - 0.03 ng/mL EKG 12 Lead   Result Value Ref Range    Ventricular Rate 86 BPM    Atrial Rate 86 BPM    P-R Interval 168 ms    QRS Duration 92 ms    Q-T Interval 442 ms    QTc Calculation (Bazett) 528 ms    R Axis -2 degrees    T Axis 27 degrees       RADIOLOGY:  Interpreted by Radiologist.  XR CHEST STANDARD (2 VW)   Final Result   No acute cardiopulmonary disease. EKG Interpretation    Interpreted by emergency department physician    Rhythm: normal sinus   Rate: normal  Axis: normal  Ectopy: none  Conduction: Prolonged QTC  ST Segments: no acute change  T Waves: no acute change  Q Waves: none    Clinical Impression: Normal sinus rhythm, rate of 86, normal axis, prolonged QTC of 528 ms, no ST segment changes, no T wave changes, no Q waves. No evidence of acute ischemia, infarction, or dysrhythmia. Compared with previous EKG of 12/17/2018, QTC is prolonged or but no other changes. Alexandra Parkinson      ------------------------- NURSING NOTES AND VITALS REVIEWED ---------------------------   The nursing notes within the ED encounter and vital signs as below have been reviewed. BP (!) 96/57   Pulse 81   Temp 97.5 °F (36.4 °C) (Oral)   Resp 16   Ht 5' 6\" (1.676 m)   Wt 220 lb (99.8 kg)   SpO2 93%   BMI 35.51 kg/m²   Oxygen Saturation Interpretation: Normal      ---------------------------------------------------PHYSICAL EXAM--------------------------------------      Constitutional/General: Alert and oriented x3, well appearing, non toxic in NAD  Head: Normocephalic and atraumatic  Eyes: PERRL, EOMI  Mouth: Oropharynx clear, handling secretions, no trismus  Neck: Supple, full ROM,   Pulmonary: Lungs clear to auscultation bilaterally, no wheezes, rales, or rhonchi. Not in respiratory distress  Cardiovascular:  Regular rate and rhythm, no murmurs, gallops, or rubs. 2+ distal pulses. Tenderness to palpation of the left anterior chest wall without any obvious bony deformity or rashes.   Abdomen: Soft, non tender, non distended,   Extremities: Moves all extremities x 4. Warm and well perfused. No calf erythema, edema, or tenderness. Skin: warm and dry without rash  Neurologic: GCS 15,  Psych: Normal Affect      ------------------------------ ED COURSE/MEDICAL DECISION MAKING----------------------  Medications   aspirin chewable tablet 324 mg (324 mg Oral Given 6/11/19 1116)         ED COURSE:  ED Course as of Jun 11 1542   Tue Jun 11, 2019   1119   ATTENDING PROVIDER ATTESTATION:     I have personally performed and/or participated in the history, exam, medical decision making, and procedures and agree with all pertinent clinical information unless otherwise noted. I have also reviewed and agree with the past medical, family and social history unless otherwise noted. I have discussed this patient in detail with the resident and provided the instruction and education regarding the evidence-based evaluation and treatment of [unfilled]  History: patient presents with complaint of chest pain that is dull in nature. It began during dialysis and they stopped 1.5 hours early. She states her pain has spontaneously gone from 8/10-2/10. She denies associated shortness of breath, nausea or light-headedness. My findings: Kevin Wesley is a 69 y.o. female whom is in no distress. Physical exam reveals well appearing. No pallor. Heart RRR, lungs CTA, abdomen is soft and nontender. No chest wall tenderness to palpation. No peripheral edema noted. My plan: Symptomatic and supportive care. Patient to have cardiac evaluation. Electronically signed by John Cohen DO on 6/11/19 at 11:20 AM          [JS]   1984 Patient's third troponin is negative. She states that she now has no more chest pain. She denies any issues or complaints at this time. Explained the importance of following up with her PCP for further evaluation and management.  Also explained that she should immediately return to the ED for any recurrence of chest pain. Patient voices an understanding and is agreeable with this plan. Patient is in no distress and is stable for discharge.    [BM]      ED Course User Index  [BM] Alecia Chadwick DO  [JS] [address], DO       Medical Decision Making:    Patient presents with an episode of left-sided chest pain and lightheadedness during dialysis. Without any interventions, the patient's pain went from a 10 out of 10 to 3 out of 10. She states that that the pain is currently very mild. Patient will have baseline blood work drawn including troponin and electro lites as well as an EKG and chest x-ray. She will be kept on telemetry monitoring and be given aspirin 325 mg. Counseling: The emergency provider has spoken with the patient and discussed todays results, in addition to providing specific details for the plan of care and counseling regarding the diagnosis and prognosis. Questions are answered at this time and they are agreeable with the plan.      --------------------------------- IMPRESSION AND DISPOSITION ---------------------------------    IMPRESSION  1.  Chest pain, unspecified type        DISPOSITION  Disposition: Discharge to home  Patient condition is stable           Alecia Chadwick DO  Resident  06/11/19 0744

## 2019-06-14 LAB
EKG ATRIAL RATE: 86 BPM
EKG P-R INTERVAL: 168 MS
EKG Q-T INTERVAL: 442 MS
EKG QRS DURATION: 92 MS
EKG QTC CALCULATION (BAZETT): 528 MS
EKG R AXIS: -2 DEGREES
EKG T AXIS: 27 DEGREES
EKG VENTRICULAR RATE: 86 BPM

## 2019-06-14 PROCEDURE — 93010 ELECTROCARDIOGRAM REPORT: CPT | Performed by: INTERNAL MEDICINE

## 2019-06-24 ENCOUNTER — OFFICE VISIT (OUTPATIENT)
Dept: INTERNAL MEDICINE CLINIC | Age: 74
End: 2019-06-24
Payer: COMMERCIAL

## 2019-06-24 VITALS
OXYGEN SATURATION: 94 % | DIASTOLIC BLOOD PRESSURE: 69 MMHG | HEIGHT: 66 IN | HEART RATE: 72 BPM | SYSTOLIC BLOOD PRESSURE: 117 MMHG | BODY MASS INDEX: 36.03 KG/M2 | WEIGHT: 224.2 LBS | TEMPERATURE: 97.4 F

## 2019-06-24 DIAGNOSIS — R07.9 LEFT-SIDED CHEST PAIN: Primary | ICD-10-CM

## 2019-06-24 DIAGNOSIS — F32.89 OTHER DEPRESSION: ICD-10-CM

## 2019-06-24 DIAGNOSIS — Z79.4 CONTROLLED TYPE 2 DIABETES MELLITUS WITH CHRONIC KIDNEY DISEASE ON CHRONIC DIALYSIS, WITH LONG-TERM CURRENT USE OF INSULIN (HCC): ICD-10-CM

## 2019-06-24 DIAGNOSIS — E11.22 CONTROLLED TYPE 2 DIABETES MELLITUS WITH CHRONIC KIDNEY DISEASE ON CHRONIC DIALYSIS, WITH LONG-TERM CURRENT USE OF INSULIN (HCC): ICD-10-CM

## 2019-06-24 DIAGNOSIS — Z99.2 CONTROLLED TYPE 2 DIABETES MELLITUS WITH CHRONIC KIDNEY DISEASE ON CHRONIC DIALYSIS, WITH LONG-TERM CURRENT USE OF INSULIN (HCC): ICD-10-CM

## 2019-06-24 DIAGNOSIS — N18.6 CONTROLLED TYPE 2 DIABETES MELLITUS WITH CHRONIC KIDNEY DISEASE ON CHRONIC DIALYSIS, WITH LONG-TERM CURRENT USE OF INSULIN (HCC): ICD-10-CM

## 2019-06-24 PROBLEM — F32.A DEPRESSION: Status: ACTIVE | Noted: 2019-06-24

## 2019-06-24 LAB — HBA1C MFR BLD: 7.4 %

## 2019-06-24 PROCEDURE — 2022F DILAT RTA XM EVC RTNOPTHY: CPT | Performed by: FAMILY MEDICINE

## 2019-06-24 PROCEDURE — 1123F ACP DISCUSS/DSCN MKR DOCD: CPT | Performed by: FAMILY MEDICINE

## 2019-06-24 PROCEDURE — G8427 DOCREV CUR MEDS BY ELIG CLIN: HCPCS | Performed by: FAMILY MEDICINE

## 2019-06-24 PROCEDURE — 3045F PR MOST RECENT HEMOGLOBIN A1C LEVEL 7.0-9.0%: CPT | Performed by: FAMILY MEDICINE

## 2019-06-24 PROCEDURE — G8399 PT W/DXA RESULTS DOCUMENT: HCPCS | Performed by: FAMILY MEDICINE

## 2019-06-24 PROCEDURE — G8598 ASA/ANTIPLAT THER USED: HCPCS | Performed by: FAMILY MEDICINE

## 2019-06-24 PROCEDURE — G8417 CALC BMI ABV UP PARAM F/U: HCPCS | Performed by: FAMILY MEDICINE

## 2019-06-24 PROCEDURE — 4040F PNEUMOC VAC/ADMIN/RCVD: CPT | Performed by: FAMILY MEDICINE

## 2019-06-24 PROCEDURE — 3017F COLORECTAL CA SCREEN DOC REV: CPT | Performed by: FAMILY MEDICINE

## 2019-06-24 PROCEDURE — 1090F PRES/ABSN URINE INCON ASSESS: CPT | Performed by: FAMILY MEDICINE

## 2019-06-24 PROCEDURE — 99214 OFFICE O/P EST MOD 30 MIN: CPT | Performed by: FAMILY MEDICINE

## 2019-06-24 PROCEDURE — 1036F TOBACCO NON-USER: CPT | Performed by: FAMILY MEDICINE

## 2019-06-24 PROCEDURE — 99213 OFFICE O/P EST LOW 20 MIN: CPT | Performed by: FAMILY MEDICINE

## 2019-06-24 PROCEDURE — 83036 HEMOGLOBIN GLYCOSYLATED A1C: CPT | Performed by: FAMILY MEDICINE

## 2019-06-24 RX ORDER — HYDROXYZINE HYDROCHLORIDE 25 MG/1
25 TABLET, FILM COATED ORAL 2 TIMES DAILY PRN
Qty: 60 TABLET | Refills: 1 | Status: SHIPPED | OUTPATIENT
Start: 2019-06-24 | End: 2019-07-24

## 2019-06-24 NOTE — PROGRESS NOTES
S: Arlyn Alva 69 y.o. female  here for ED F/U. IRDM with ESRD/on hemodialysis/ COPD/Asthma  Had chest pain while in dialysis and transferred to ED. Cardiac etiology R/O. She reports pain is worse with stress and anxiety. Echo ordered in 3/19 but not completed. Needs Cardiology referral for stress test, stress echo  Anxiety: on sertraline 100 mg/day with fair relief. Consider empiric trial of hydroxyzine for anxiety  Labs reviewed: mild hypokalemia  IRDM: HgbA1C 7.4% today. Advised to monitor and record BS and review at another visit  O: VS: /69   Pulse 72   Temp 97.4 °F (36.3 °C) (Oral)   Ht 5' 6\" (1.676 m)   Wt 224 lb 3.2 oz (101.7 kg)   SpO2 94%   Breastfeeding? No   BMI 36.19 kg/m²    General: NAD   CV:  RRR, no gallops, rubs, or murmurs   Resp: CTAB no R/R/W   Abd:  Soft, nontender, no masses    Ext:  no C/C/E. Left AV shunt of LUE    Assessment / Plan:      Eliza Hare was seen today for follow-up, diabetes and health maintenance. Diagnoses and all orders for this visit:    Left-sided chest pain  -     Cady Hernandez MD, Cardiology, [place] ([facility])    Other Depression and Anxiety  -     hydrOXYzine (ATARAX) 25 MG tablet; Take 1 tablet by mouth 2 times daily as needed for Anxiety    Controlled type 2 diabetes mellitus with chronic kidney disease on chronic dialysis, with long-term current use of insulin (HCC)  -     POCT glycosylated hemoglobin (Hb A1C)      Chest Pain: cardiology referral for assessment  Anxiety/Depression: add hydroxyzine  IRDM: Patient advised to monitor and record blood sugars for PCP review next visit         Return in about 1 month (around 7/22/2019). Attending Physician Statement  I have discussed the case, including pertinent history and exam findings with the resident. I also have seen the patient and performed key portions of the examination. I agree with the documented assessment and plan.          Jean Pierre Rubi MD

## 2019-06-26 ASSESSMENT — ENCOUNTER SYMPTOMS
SORE THROAT: 0
NAUSEA: 0
DIARRHEA: 0
COUGH: 0
VOMITING: 0
CONSTIPATION: 0
BLOOD IN STOOL: 0
WHEEZING: 0

## 2019-07-15 ENCOUNTER — HOSPITAL ENCOUNTER (OUTPATIENT)
Dept: GENERAL RADIOLOGY | Age: 74
Discharge: HOME | End: 2019-07-17
Payer: COMMERCIAL

## 2019-07-15 ENCOUNTER — HOSPITAL ENCOUNTER (OUTPATIENT)
Age: 74
Discharge: HOME | End: 2019-07-17
Payer: COMMERCIAL

## 2019-07-15 DIAGNOSIS — Z11.1 NORMAL SCREENING CHEST X-RAY FOR TUBERCULOSIS: ICD-10-CM

## 2019-07-15 PROCEDURE — 71046 X-RAY EXAM CHEST 2 VIEWS: CPT

## 2019-07-29 ENCOUNTER — OFFICE VISIT (OUTPATIENT)
Dept: INTERNAL MEDICINE CLINIC | Age: 74
End: 2019-07-29
Payer: COMMERCIAL

## 2019-07-29 VITALS
HEIGHT: 66 IN | TEMPERATURE: 97.8 F | DIASTOLIC BLOOD PRESSURE: 76 MMHG | HEART RATE: 95 BPM | WEIGHT: 222.4 LBS | BODY MASS INDEX: 35.74 KG/M2 | SYSTOLIC BLOOD PRESSURE: 133 MMHG | OXYGEN SATURATION: 93 %

## 2019-07-29 DIAGNOSIS — E78.49 OTHER HYPERLIPIDEMIA: ICD-10-CM

## 2019-07-29 DIAGNOSIS — G47.9 SLEEPING DIFFICULTY: ICD-10-CM

## 2019-07-29 DIAGNOSIS — F32.89 OTHER DEPRESSION: ICD-10-CM

## 2019-07-29 DIAGNOSIS — R07.9 LEFT-SIDED CHEST PAIN: ICD-10-CM

## 2019-07-29 DIAGNOSIS — G25.81 RESTLESS LEG SYNDROME, CONTROLLED: Primary | ICD-10-CM

## 2019-07-29 DIAGNOSIS — G25.81 RESTLESS LEG SYNDROME, CONTROLLED: ICD-10-CM

## 2019-07-29 PROCEDURE — 4040F PNEUMOC VAC/ADMIN/RCVD: CPT | Performed by: FAMILY MEDICINE

## 2019-07-29 PROCEDURE — 3045F PR MOST RECENT HEMOGLOBIN A1C LEVEL 7.0-9.0%: CPT | Performed by: FAMILY MEDICINE

## 2019-07-29 PROCEDURE — 99212 OFFICE O/P EST SF 10 MIN: CPT | Performed by: FAMILY MEDICINE

## 2019-07-29 PROCEDURE — G8427 DOCREV CUR MEDS BY ELIG CLIN: HCPCS | Performed by: FAMILY MEDICINE

## 2019-07-29 PROCEDURE — G8399 PT W/DXA RESULTS DOCUMENT: HCPCS | Performed by: FAMILY MEDICINE

## 2019-07-29 PROCEDURE — 1036F TOBACCO NON-USER: CPT | Performed by: FAMILY MEDICINE

## 2019-07-29 PROCEDURE — 2022F DILAT RTA XM EVC RTNOPTHY: CPT | Performed by: FAMILY MEDICINE

## 2019-07-29 PROCEDURE — G8417 CALC BMI ABV UP PARAM F/U: HCPCS | Performed by: FAMILY MEDICINE

## 2019-07-29 PROCEDURE — G8598 ASA/ANTIPLAT THER USED: HCPCS | Performed by: FAMILY MEDICINE

## 2019-07-29 PROCEDURE — 99213 OFFICE O/P EST LOW 20 MIN: CPT | Performed by: FAMILY MEDICINE

## 2019-07-29 PROCEDURE — 1090F PRES/ABSN URINE INCON ASSESS: CPT | Performed by: FAMILY MEDICINE

## 2019-07-29 PROCEDURE — 1123F ACP DISCUSS/DSCN MKR DOCD: CPT | Performed by: FAMILY MEDICINE

## 2019-07-29 PROCEDURE — 3017F COLORECTAL CA SCREEN DOC REV: CPT | Performed by: FAMILY MEDICINE

## 2019-07-29 RX ORDER — ATORVASTATIN CALCIUM 10 MG/1
10 TABLET, FILM COATED ORAL NIGHTLY
Qty: 30 TABLET | Refills: 3 | Status: SHIPPED | OUTPATIENT
Start: 2019-07-29 | End: 2019-07-29 | Stop reason: SDUPTHER

## 2019-07-29 RX ORDER — NITROGLYCERIN 0.4 MG/1
0.4 TABLET SUBLINGUAL EVERY 5 MIN PRN
Qty: 25 TABLET | Refills: 3 | Status: ON HOLD
Start: 2019-07-29 | End: 2022-06-21

## 2019-07-29 RX ORDER — SERTRALINE HYDROCHLORIDE 100 MG/1
100 TABLET, FILM COATED ORAL NIGHTLY
Qty: 30 TABLET | Refills: 5 | Status: SHIPPED | OUTPATIENT
Start: 2019-07-29 | End: 2019-07-29 | Stop reason: SDUPTHER

## 2019-07-29 RX ORDER — TRAZODONE HYDROCHLORIDE 50 MG/1
50 TABLET ORAL NIGHTLY
Qty: 90 TABLET | Refills: 1 | Status: SHIPPED
Start: 2019-07-29 | End: 2020-05-01 | Stop reason: SDUPTHER

## 2019-07-29 RX ORDER — ROPINIROLE 0.5 MG/1
TABLET, FILM COATED ORAL
Qty: 90 TABLET | Refills: 5 | Status: SHIPPED | OUTPATIENT
Start: 2019-07-29 | End: 2019-09-06 | Stop reason: SDUPTHER

## 2019-07-29 RX ORDER — ATORVASTATIN CALCIUM 10 MG/1
TABLET, FILM COATED ORAL
Qty: 90 TABLET | Refills: 3 | Status: SHIPPED | OUTPATIENT
Start: 2019-07-29 | End: 2019-09-06 | Stop reason: SDUPTHER

## 2019-07-29 RX ORDER — ROPINIROLE 0.5 MG/1
0.5 TABLET, FILM COATED ORAL NIGHTLY
Qty: 30 TABLET | Refills: 5 | Status: SHIPPED | OUTPATIENT
Start: 2019-07-29 | End: 2019-11-21 | Stop reason: SDUPTHER

## 2019-07-29 RX ORDER — SERTRALINE HYDROCHLORIDE 100 MG/1
TABLET, FILM COATED ORAL
Qty: 90 TABLET | Refills: 5 | Status: SHIPPED | OUTPATIENT
Start: 2019-07-29 | End: 2019-09-06 | Stop reason: SDUPTHER

## 2019-07-29 ASSESSMENT — ENCOUNTER SYMPTOMS
CONSTIPATION: 0
VOMITING: 0
DIARRHEA: 0
WHEEZING: 0
SORE THROAT: 0
COUGH: 0
BLOOD IN STOOL: 0
NAUSEA: 0

## 2019-07-29 NOTE — PROGRESS NOTES
Saturday      calcium carbonate 600 MG TABS tablet Take 1 tablet by mouth 2 times daily      Insulin Pen Needle (B-D ULTRAFINE III SHORT PEN) 31G X 8 MM MISC Inject 1 each into the skin daily 100 each 5    sertraline (ZOLOFT) 100 MG tablet Take 1 tablet by mouth nightly 30 tablet 5    atorvastatin (LIPITOR) 10 MG tablet Take 1 tablet by mouth nightly 30 tablet 3    levothyroxine (SYNTHROID) 50 MCG tablet TAKE 1 TABLET BY MOUTH EVERY DAY 90 tablet 3    insulin lispro (HUMALOG) 100 UNIT/ML injection vial Inject 0-5 Units into the skin 3 times daily (before meals) *Per Sliding Scale*  1 unit for every 10 carbs      iron sucrose (VENOFER) 20 MG/ML injection Infuse 50 mg intravenously once a week Last Dose: 6/4/2019  Next Dose: 6/11/2019      B Complex-C-Folic Acid (TOD-RENETTA) TABS Take 1 tablet by mouth daily      sevelamer (RENVELA) 800 MG tablet Take 3 tablets by mouth 3 times daily (with meals)       midodrine (PROAMATINE) 5 MG tablet Take 5 mg by mouth three times a week Tuesday, Thursday, Saturday      Insulin Pen Needle 32G X 4 MM MISC 1 each by Does not apply route daily Use as directed with insulin pen 100 each 3    glucose blood VI test strips (ACCU-CHEK SMARTVIEW) strip 1 each by In Vitro route 4 times daily (before meals and nightly) As needed.  150 each 3    ACCU-CHEK FASTCLIX LANCETS MISC USE TO TEST BLOOD SUGAR FOUR TIMES DAILY BEFORE MEALS AND NIGHTLY 612 each 3    Alcohol Swabs (ALCOHOL PREP) 70 % PADS 1 each by Does not apply route 4 times daily (before meals and nightly) 200 each 3    Blood Glucose Monitoring Suppl (ACCU-CHEK DERREK SMARTVIEW) W/DEVICE KIT 1 kit by Does not apply route 4 times daily (before meals and nightly) 1 kit 0    ADVAIR DISKUS 500-50 MCG/DOSE diskus inhaler Inhale 1 puff into the lungs 2 times daily (Patient taking differently: Inhale 1 puff into the lungs 2 times daily as needed (Shortness of Breath) ) 60 each 5    albuterol (PROVENTIL) (2.5 MG/3ML) 0.083% nebulizer solution Take 2.5 mg by nebulization every 4 hours as needed for Wheezing or Shortness of Breath   3    PROAIR  (90 BASE) MCG/ACT inhaler Inhale 2 puffs into the lungs every 6 hours as needed for Wheezing or Shortness of Breath   5     No current facility-administered medications on file prior to visit. Review of Systems   Constitutional: Positive for fatigue. Negative for chills and fever. HENT: Negative for congestion and sore throat. Respiratory: Negative for cough and wheezing. Cardiovascular: Negative for chest pain, palpitations and leg swelling. Gastrointestinal: Negative for blood in stool, constipation, diarrhea, nausea and vomiting. Genitourinary: Negative for dysuria, frequency and urgency. Skin: Negative for rash. Neurological: Negative for dizziness and weakness. Psychiatric/Behavioral: Positive for sleep disturbance. Objective:  Vitals:    07/29/19 0958   BP: 133/76   Pulse: 95   Temp: 97.8 °F (36.6 °C)   TempSrc: Oral   SpO2: 93%   Weight: 222 lb 6.4 oz (100.9 kg)   Height: 5' 6\" (1.676 m)     Physical Exam   Constitutional: She appears well-developed and well-nourished. Obese   HENT:   Head: Normocephalic and atraumatic. Cardiovascular: Normal rate, regular rhythm and normal heart sounds. Exam reveals no gallop and no friction rub. No murmur heard. Palpable bruit in the left brachial artery at dialysis site   Pulmonary/Chest: Effort normal and breath sounds normal. She has no wheezes. She has no rales. Abdominal: Soft. She exhibits no distension. There is no tenderness. There is no guarding. Skin: No rash noted. Psychiatric: She has a normal mood and affect. Her behavior is normal.   Vitals reviewed. Osteopathic exam: Patient evaluated in the seated position. Normal thoracic kyphosis and lumbar lordosis.   Lab Results  Hemoglobin A1C  03/19: 6.8%  06/19: 7.4%      Assessment and Plan:  Edis Reyes was seen today for 3 month follow-up and health maintenance. 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 If no relief, call 911. Sleeping difficulty  -     traZODone (DESYREL) 50 MG tablet; Take 1 tablet by mouth nightly      Refills provided as appropriate. The patient was given nitroglycerin as precaution for her chest pain. She is advised to discuss the potential need for this with her cardiologist when she sees him in August.  Additionally, the patient will begin tracking her blood glucose 3 times daily prior to meals. She will increase her Lantus to 20 units in the morning and 15 at night from the previous 17 in the morning and 15 at night. Return in about 2 months (around 9/29/2019) for Type II Diabetes Mellitus. Patient may come in sooner if needed for medical concerns. Patient advised to call at any time to cancel or re-schedule or for any questions/concerns. Please note that >15 minutes was spent face-to-face with the patient gathering history, performing physical exam, discussing findings, counseling the patient, and determining plan forward. Patient and plan was discussed with attending physician, Dr. Gregory Santos.     Levi Gomez DO PGY3 [facility] [place]  07/29/19  9:42

## 2019-08-29 ENCOUNTER — TELEPHONE (OUTPATIENT)
Dept: AUDIOLOGY | Age: 74
End: 2019-08-29

## 2019-09-04 DIAGNOSIS — E78.49 OTHER HYPERLIPIDEMIA: ICD-10-CM

## 2019-09-04 DIAGNOSIS — F32.89 OTHER DEPRESSION: ICD-10-CM

## 2019-09-05 DIAGNOSIS — G25.81 RESTLESS LEG SYNDROME, CONTROLLED: ICD-10-CM

## 2019-09-06 RX ORDER — SERTRALINE HYDROCHLORIDE 100 MG/1
TABLET, FILM COATED ORAL
Qty: 90 TABLET | Refills: 5 | Status: SHIPPED | OUTPATIENT
Start: 2019-09-06 | End: 2019-10-23 | Stop reason: SDUPTHER

## 2019-09-06 RX ORDER — ATORVASTATIN CALCIUM 10 MG/1
TABLET, FILM COATED ORAL
Qty: 90 TABLET | Refills: 3 | Status: SHIPPED | OUTPATIENT
Start: 2019-09-06 | End: 2019-10-23 | Stop reason: SDUPTHER

## 2019-09-06 RX ORDER — ROPINIROLE 0.5 MG/1
TABLET, FILM COATED ORAL
Qty: 90 TABLET | Refills: 5 | Status: SHIPPED | OUTPATIENT
Start: 2019-09-06 | End: 2019-10-23 | Stop reason: SDUPTHER

## 2019-09-25 ENCOUNTER — HOSPITAL ENCOUNTER (OUTPATIENT)
Age: 74
Discharge: HOME | End: 2019-09-25
Payer: COMMERCIAL

## 2019-09-25 DIAGNOSIS — E78.49 OTHER HYPERLIPIDEMIA: ICD-10-CM

## 2019-09-25 LAB
ALBUMIN SERPL-MCNC: 4 G/DL (ref 3.5–5.2)
ALP BLD-CCNC: 255 U/L (ref 35–104)
ALT SERPL-CCNC: 6 U/L (ref 0–32)
ANION GAP SERPL CALCULATED.3IONS-SCNC: 13 MMOL/L (ref 7–16)
AST SERPL-CCNC: 11 U/L (ref 0–31)
BILIRUB SERPL-MCNC: 0.5 MG/DL (ref 0–1.2)
BUN BLDV-MCNC: 36 MG/DL (ref 8–23)
CALCIUM SERPL-MCNC: 9 MG/DL (ref 8.6–10.2)
CHLORIDE BLD-SCNC: 90 MMOL/L (ref 98–107)
CHOLESTEROL, FASTING: 157 MG/DL (ref 0–199)
CO2: 33 MMOL/L (ref 22–29)
CREAT SERPL-MCNC: 4.7 MG/DL (ref 0.5–1)
GFR AFRICAN AMERICAN: 11
GFR NON-AFRICAN AMERICAN: 9 ML/MIN/1.73
GLUCOSE FASTING: 116 MG/DL (ref 74–99)
HCT VFR BLD CALC: 37.5 % (ref 34–48)
HDLC SERPL-MCNC: 60 MG/DL
HEMOGLOBIN: 12.2 G/DL (ref 11.5–15.5)
LDL CHOLESTEROL CALCULATED: 72 MG/DL (ref 0–99)
MCH RBC QN AUTO: 32.9 PG (ref 26–35)
MCHC RBC AUTO-ENTMCNC: 32.5 % (ref 32–34.5)
MCV RBC AUTO: 101.1 FL (ref 80–99.9)
PDW BLD-RTO: 13.8 FL (ref 11.5–15)
PLATELET # BLD: 174 E9/L (ref 130–450)
PMV BLD AUTO: 9.4 FL (ref 7–12)
POTASSIUM SERPL-SCNC: 5.2 MMOL/L (ref 3.5–5)
RBC # BLD: 3.71 E12/L (ref 3.5–5.5)
SODIUM BLD-SCNC: 136 MMOL/L (ref 132–146)
TOTAL PROTEIN: 7 G/DL (ref 6.4–8.3)
TRIGLYCERIDE, FASTING: 126 MG/DL (ref 0–149)
VLDLC SERPL CALC-MCNC: 25 MG/DL
WBC # BLD: 5.8 E9/L (ref 4.5–11.5)

## 2019-09-25 PROCEDURE — 80061 LIPID PANEL: CPT

## 2019-09-25 PROCEDURE — 36415 COLL VENOUS BLD VENIPUNCTURE: CPT

## 2019-09-25 PROCEDURE — 85027 COMPLETE CBC AUTOMATED: CPT

## 2019-09-25 PROCEDURE — 80053 COMPREHEN METABOLIC PANEL: CPT

## 2019-09-27 ENCOUNTER — OFFICE VISIT (OUTPATIENT)
Dept: ENT CLINIC | Age: 74
End: 2019-09-27
Payer: COMMERCIAL

## 2019-09-27 ENCOUNTER — PROCEDURE VISIT (OUTPATIENT)
Dept: AUDIOLOGY | Age: 74
End: 2019-09-27
Payer: COMMERCIAL

## 2019-09-27 VITALS
HEART RATE: 72 BPM | DIASTOLIC BLOOD PRESSURE: 76 MMHG | WEIGHT: 222 LBS | HEIGHT: 66 IN | SYSTOLIC BLOOD PRESSURE: 124 MMHG | BODY MASS INDEX: 35.68 KG/M2

## 2019-09-27 DIAGNOSIS — H93.13 TINNITUS OF BOTH EARS: ICD-10-CM

## 2019-09-27 DIAGNOSIS — H90.3 SENSORINEURAL HEARING LOSS, BILATERAL: ICD-10-CM

## 2019-09-27 DIAGNOSIS — H90.3 SENSORINEURAL HEARING LOSS (SNHL), BILATERAL: Primary | ICD-10-CM

## 2019-09-27 PROCEDURE — 1123F ACP DISCUSS/DSCN MKR DOCD: CPT | Performed by: OTOLARYNGOLOGY

## 2019-09-27 PROCEDURE — 1036F TOBACCO NON-USER: CPT | Performed by: OTOLARYNGOLOGY

## 2019-09-27 PROCEDURE — G8399 PT W/DXA RESULTS DOCUMENT: HCPCS | Performed by: OTOLARYNGOLOGY

## 2019-09-27 PROCEDURE — 92557 COMPREHENSIVE HEARING TEST: CPT | Performed by: AUDIOLOGIST

## 2019-09-27 PROCEDURE — 92567 TYMPANOMETRY: CPT | Performed by: AUDIOLOGIST

## 2019-09-27 PROCEDURE — 99213 OFFICE O/P EST LOW 20 MIN: CPT | Performed by: OTOLARYNGOLOGY

## 2019-09-27 PROCEDURE — 3017F COLORECTAL CA SCREEN DOC REV: CPT | Performed by: OTOLARYNGOLOGY

## 2019-09-27 PROCEDURE — G8598 ASA/ANTIPLAT THER USED: HCPCS | Performed by: OTOLARYNGOLOGY

## 2019-09-27 PROCEDURE — 4040F PNEUMOC VAC/ADMIN/RCVD: CPT | Performed by: OTOLARYNGOLOGY

## 2019-09-27 PROCEDURE — 1090F PRES/ABSN URINE INCON ASSESS: CPT | Performed by: OTOLARYNGOLOGY

## 2019-09-27 PROCEDURE — G8417 CALC BMI ABV UP PARAM F/U: HCPCS | Performed by: OTOLARYNGOLOGY

## 2019-09-27 PROCEDURE — G8427 DOCREV CUR MEDS BY ELIG CLIN: HCPCS | Performed by: OTOLARYNGOLOGY

## 2019-09-27 ASSESSMENT — ENCOUNTER SYMPTOMS
CHEST TIGHTNESS: 0
SINUS PAIN: 0
SHORTNESS OF BREATH: 0
EYE ITCHING: 0
SINUS PRESSURE: 0
RHINORRHEA: 0
COUGH: 0

## 2019-09-30 ENCOUNTER — OFFICE VISIT (OUTPATIENT)
Dept: INTERNAL MEDICINE CLINIC | Age: 74
End: 2019-09-30
Payer: COMMERCIAL

## 2019-09-30 VITALS
WEIGHT: 221.8 LBS | SYSTOLIC BLOOD PRESSURE: 138 MMHG | TEMPERATURE: 97.9 F | HEIGHT: 66 IN | BODY MASS INDEX: 35.65 KG/M2 | DIASTOLIC BLOOD PRESSURE: 89 MMHG | OXYGEN SATURATION: 96 % | HEART RATE: 87 BPM

## 2019-09-30 DIAGNOSIS — E83.51 HYPOCALCEMIA: ICD-10-CM

## 2019-09-30 DIAGNOSIS — M81.8 OTHER OSTEOPOROSIS WITHOUT CURRENT PATHOLOGICAL FRACTURE: ICD-10-CM

## 2019-09-30 DIAGNOSIS — D51.8 MACROCYTIC ANEMIA WITH VITAMIN B12 DEFICIENCY: ICD-10-CM

## 2019-09-30 DIAGNOSIS — M06.9 RHEUMATOID ARTHRITIS INVOLVING MULTIPLE SITES, UNSPECIFIED RHEUMATOID FACTOR PRESENCE: ICD-10-CM

## 2019-09-30 DIAGNOSIS — Z12.31 BREAST CANCER SCREENING BY MAMMOGRAM: ICD-10-CM

## 2019-09-30 DIAGNOSIS — J02.9 ACUTE PHARYNGITIS, UNSPECIFIED ETIOLOGY: ICD-10-CM

## 2019-09-30 LAB — HBA1C MFR BLD: 6.5 %

## 2019-09-30 PROCEDURE — 83036 HEMOGLOBIN GLYCOSYLATED A1C: CPT | Performed by: FAMILY MEDICINE

## 2019-09-30 PROCEDURE — 1090F PRES/ABSN URINE INCON ASSESS: CPT | Performed by: FAMILY MEDICINE

## 2019-09-30 PROCEDURE — G8427 DOCREV CUR MEDS BY ELIG CLIN: HCPCS | Performed by: FAMILY MEDICINE

## 2019-09-30 PROCEDURE — 1123F ACP DISCUSS/DSCN MKR DOCD: CPT | Performed by: FAMILY MEDICINE

## 2019-09-30 PROCEDURE — 1036F TOBACCO NON-USER: CPT | Performed by: FAMILY MEDICINE

## 2019-09-30 PROCEDURE — 99213 OFFICE O/P EST LOW 20 MIN: CPT | Performed by: FAMILY MEDICINE

## 2019-09-30 PROCEDURE — 4040F PNEUMOC VAC/ADMIN/RCVD: CPT | Performed by: FAMILY MEDICINE

## 2019-09-30 PROCEDURE — 3044F HG A1C LEVEL LT 7.0%: CPT | Performed by: FAMILY MEDICINE

## 2019-09-30 PROCEDURE — 2022F DILAT RTA XM EVC RTNOPTHY: CPT | Performed by: FAMILY MEDICINE

## 2019-09-30 PROCEDURE — 3017F COLORECTAL CA SCREEN DOC REV: CPT | Performed by: FAMILY MEDICINE

## 2019-09-30 PROCEDURE — G8598 ASA/ANTIPLAT THER USED: HCPCS | Performed by: FAMILY MEDICINE

## 2019-09-30 PROCEDURE — G8417 CALC BMI ABV UP PARAM F/U: HCPCS | Performed by: FAMILY MEDICINE

## 2019-09-30 PROCEDURE — G8399 PT W/DXA RESULTS DOCUMENT: HCPCS | Performed by: FAMILY MEDICINE

## 2019-09-30 RX ORDER — DOXYCYCLINE HYCLATE 100 MG
100 TABLET ORAL 2 TIMES DAILY WITH MEALS
Qty: 20 TABLET | Refills: 0 | Status: SHIPPED | OUTPATIENT
Start: 2019-09-30 | End: 2019-10-10

## 2019-09-30 RX ORDER — PHENOL 1.4 %
1 AEROSOL, SPRAY (ML) MUCOUS MEMBRANE 2 TIMES DAILY
Qty: 60 TABLET | Refills: 2 | Status: SHIPPED | OUTPATIENT
Start: 2019-09-30 | End: 2021-09-30 | Stop reason: SDUPTHER

## 2019-09-30 ASSESSMENT — ENCOUNTER SYMPTOMS
ABDOMINAL DISTENTION: 0
CONSTIPATION: 0
CHOKING: 0
COUGH: 1
WHEEZING: 0
DIARRHEA: 0
VOMITING: 0
RHINORRHEA: 0
BACK PAIN: 0
PHOTOPHOBIA: 0
EYE DISCHARGE: 0
ABDOMINAL PAIN: 0
CHEST TIGHTNESS: 1
SORE THROAT: 1
TROUBLE SWALLOWING: 0
NAUSEA: 0
SHORTNESS OF BREATH: 0
EYE PAIN: 0
BLOOD IN STOOL: 0

## 2019-10-15 ENCOUNTER — TELEPHONE (OUTPATIENT)
Dept: AUDIOLOGY | Age: 74
End: 2019-10-15

## 2019-10-18 ENCOUNTER — HOSPITAL ENCOUNTER (OUTPATIENT)
Dept: MAMMOGRAPHY | Age: 74
Discharge: HOME | End: 2019-10-20
Payer: COMMERCIAL

## 2019-10-18 DIAGNOSIS — M06.9 RHEUMATOID ARTHRITIS INVOLVING MULTIPLE SITES, UNSPECIFIED RHEUMATOID FACTOR PRESENCE: ICD-10-CM

## 2019-10-18 DIAGNOSIS — M81.8 OTHER OSTEOPOROSIS WITHOUT CURRENT PATHOLOGICAL FRACTURE: ICD-10-CM

## 2019-10-18 DIAGNOSIS — Z12.31 BREAST CANCER SCREENING BY MAMMOGRAM: ICD-10-CM

## 2019-10-18 PROCEDURE — 77080 DXA BONE DENSITY AXIAL: CPT

## 2019-10-18 PROCEDURE — 77067 SCR MAMMO BI INCL CAD: CPT

## 2019-10-22 DIAGNOSIS — E78.49 OTHER HYPERLIPIDEMIA: ICD-10-CM

## 2019-10-22 DIAGNOSIS — F32.89 OTHER DEPRESSION: ICD-10-CM

## 2019-10-22 DIAGNOSIS — G25.81 RESTLESS LEG SYNDROME, CONTROLLED: ICD-10-CM

## 2019-10-23 RX ORDER — ATORVASTATIN CALCIUM 10 MG/1
TABLET, FILM COATED ORAL
Qty: 90 TABLET | Refills: 3 | Status: SHIPPED
Start: 2019-10-23 | End: 2020-03-25

## 2019-10-23 RX ORDER — ROPINIROLE 0.5 MG/1
TABLET, FILM COATED ORAL
Qty: 90 TABLET | Refills: 5 | Status: SHIPPED | OUTPATIENT
Start: 2019-10-23 | End: 2020-01-03 | Stop reason: SDUPTHER

## 2019-10-23 RX ORDER — SERTRALINE HYDROCHLORIDE 100 MG/1
TABLET, FILM COATED ORAL
Qty: 90 TABLET | Refills: 5 | Status: SHIPPED
Start: 2019-10-23 | End: 2020-03-25

## 2019-10-28 ENCOUNTER — OFFICE VISIT (OUTPATIENT)
Dept: INTERNAL MEDICINE CLINIC | Age: 74
End: 2019-10-28
Payer: COMMERCIAL

## 2019-10-28 VITALS
WEIGHT: 220.6 LBS | TEMPERATURE: 97.8 F | HEART RATE: 93 BPM | HEIGHT: 66 IN | SYSTOLIC BLOOD PRESSURE: 134 MMHG | BODY MASS INDEX: 35.45 KG/M2 | DIASTOLIC BLOOD PRESSURE: 82 MMHG | OXYGEN SATURATION: 94 %

## 2019-10-28 DIAGNOSIS — N18.6 CONTROLLED TYPE 2 DIABETES MELLITUS WITH CHRONIC KIDNEY DISEASE ON CHRONIC DIALYSIS, WITH LONG-TERM CURRENT USE OF INSULIN (HCC): ICD-10-CM

## 2019-10-28 DIAGNOSIS — J06.9 UPPER RESPIRATORY TRACT INFECTION, UNSPECIFIED TYPE: ICD-10-CM

## 2019-10-28 DIAGNOSIS — J40 BRONCHITIS: Primary | ICD-10-CM

## 2019-10-28 DIAGNOSIS — Z79.4 CONTROLLED TYPE 2 DIABETES MELLITUS WITH CHRONIC KIDNEY DISEASE ON CHRONIC DIALYSIS, WITH LONG-TERM CURRENT USE OF INSULIN (HCC): ICD-10-CM

## 2019-10-28 DIAGNOSIS — Z99.2 CONTROLLED TYPE 2 DIABETES MELLITUS WITH CHRONIC KIDNEY DISEASE ON CHRONIC DIALYSIS, WITH LONG-TERM CURRENT USE OF INSULIN (HCC): ICD-10-CM

## 2019-10-28 DIAGNOSIS — F41.9 ANXIETY: ICD-10-CM

## 2019-10-28 DIAGNOSIS — J45.901 MODERATE ASTHMA WITH EXACERBATION, UNSPECIFIED WHETHER PERSISTENT: ICD-10-CM

## 2019-10-28 DIAGNOSIS — J30.9 ALLERGIC RHINITIS, UNSPECIFIED SEASONALITY, UNSPECIFIED TRIGGER: ICD-10-CM

## 2019-10-28 DIAGNOSIS — E11.22 CONTROLLED TYPE 2 DIABETES MELLITUS WITH CHRONIC KIDNEY DISEASE ON CHRONIC DIALYSIS, WITH LONG-TERM CURRENT USE OF INSULIN (HCC): ICD-10-CM

## 2019-10-28 PROCEDURE — 3017F COLORECTAL CA SCREEN DOC REV: CPT | Performed by: PHYSICIAN ASSISTANT

## 2019-10-28 PROCEDURE — G8484 FLU IMMUNIZE NO ADMIN: HCPCS | Performed by: PHYSICIAN ASSISTANT

## 2019-10-28 PROCEDURE — G8427 DOCREV CUR MEDS BY ELIG CLIN: HCPCS | Performed by: PHYSICIAN ASSISTANT

## 2019-10-28 PROCEDURE — 1036F TOBACCO NON-USER: CPT | Performed by: PHYSICIAN ASSISTANT

## 2019-10-28 PROCEDURE — 1123F ACP DISCUSS/DSCN MKR DOCD: CPT | Performed by: PHYSICIAN ASSISTANT

## 2019-10-28 PROCEDURE — 3044F HG A1C LEVEL LT 7.0%: CPT | Performed by: PHYSICIAN ASSISTANT

## 2019-10-28 PROCEDURE — 4040F PNEUMOC VAC/ADMIN/RCVD: CPT | Performed by: PHYSICIAN ASSISTANT

## 2019-10-28 PROCEDURE — G8598 ASA/ANTIPLAT THER USED: HCPCS | Performed by: PHYSICIAN ASSISTANT

## 2019-10-28 PROCEDURE — 99214 OFFICE O/P EST MOD 30 MIN: CPT | Performed by: PHYSICIAN ASSISTANT

## 2019-10-28 PROCEDURE — G8399 PT W/DXA RESULTS DOCUMENT: HCPCS | Performed by: PHYSICIAN ASSISTANT

## 2019-10-28 PROCEDURE — 1090F PRES/ABSN URINE INCON ASSESS: CPT | Performed by: PHYSICIAN ASSISTANT

## 2019-10-28 PROCEDURE — G8417 CALC BMI ABV UP PARAM F/U: HCPCS | Performed by: PHYSICIAN ASSISTANT

## 2019-10-28 PROCEDURE — 99212 OFFICE O/P EST SF 10 MIN: CPT | Performed by: PHYSICIAN ASSISTANT

## 2019-10-28 PROCEDURE — 2022F DILAT RTA XM EVC RTNOPTHY: CPT | Performed by: PHYSICIAN ASSISTANT

## 2019-10-28 RX ORDER — FEXOFENADINE HCL 180 MG/1
180 TABLET ORAL DAILY
Qty: 30 TABLET | Refills: 0 | Status: SHIPPED | OUTPATIENT
Start: 2019-10-28 | End: 2019-11-27

## 2019-10-28 RX ORDER — HYDROXYZINE PAMOATE 25 MG/1
25 CAPSULE ORAL 3 TIMES DAILY PRN
Qty: 30 CAPSULE | Refills: 0 | Status: SHIPPED | OUTPATIENT
Start: 2019-10-28 | End: 2019-11-27

## 2019-10-28 RX ORDER — DOXYCYCLINE HYCLATE 100 MG
100 TABLET ORAL 2 TIMES DAILY
Qty: 20 TABLET | Refills: 0 | Status: SHIPPED | OUTPATIENT
Start: 2019-10-28 | End: 2019-11-07

## 2019-10-28 RX ORDER — FLUTICASONE PROPIONATE 50 MCG
2 SPRAY, SUSPENSION (ML) NASAL DAILY
Qty: 1 BOTTLE | Refills: 0 | Status: SHIPPED
Start: 2019-10-28 | End: 2020-05-01

## 2019-10-28 ASSESSMENT — ENCOUNTER SYMPTOMS
SORE THROAT: 1
COLOR CHANGE: 0
SINUS PRESSURE: 1
WHEEZING: 1
TROUBLE SWALLOWING: 0
EYE REDNESS: 0
BACK PAIN: 0
COUGH: 1
EYE DISCHARGE: 0
RHINORRHEA: 1
SHORTNESS OF BREATH: 0
EYE ITCHING: 0
EYE PAIN: 0
VOICE CHANGE: 0
ABDOMINAL PAIN: 0
SINUS PAIN: 1
STRIDOR: 0
CHEST TIGHTNESS: 0

## 2019-11-21 DIAGNOSIS — G25.81 RESTLESS LEG SYNDROME, CONTROLLED: ICD-10-CM

## 2019-11-21 RX ORDER — ROPINIROLE 0.5 MG/1
TABLET, FILM COATED ORAL
Qty: 90 TABLET | Refills: 5 | Status: SHIPPED | OUTPATIENT
Start: 2019-11-21 | End: 2020-01-03 | Stop reason: DRUGHIGH

## 2019-12-31 ENCOUNTER — HOSPITAL ENCOUNTER (OUTPATIENT)
Age: 74
Discharge: HOME | End: 2019-12-31
Payer: COMMERCIAL

## 2019-12-31 LAB
ANION GAP SERPL CALCULATED.3IONS-SCNC: 19 MMOL/L (ref 7–16)
BASOPHILS ABSOLUTE: 0.02 E9/L (ref 0–0.2)
BASOPHILS RELATIVE PERCENT: 0.3 % (ref 0–2)
BUN BLDV-MCNC: 42 MG/DL (ref 8–23)
CALCIUM SERPL-MCNC: 8.8 MG/DL (ref 8.6–10.2)
CHLORIDE BLD-SCNC: 91 MMOL/L (ref 98–107)
CO2: 27 MMOL/L (ref 22–29)
CREAT SERPL-MCNC: 4.9 MG/DL (ref 0.5–1)
EOSINOPHILS ABSOLUTE: 0.25 E9/L (ref 0.05–0.5)
EOSINOPHILS RELATIVE PERCENT: 4.3 % (ref 0–6)
FOLATE: 4.4 NG/ML (ref 4.8–24.2)
GFR AFRICAN AMERICAN: 11
GFR NON-AFRICAN AMERICAN: 9 ML/MIN/1.73
GLUCOSE BLD-MCNC: 212 MG/DL (ref 74–99)
HCT VFR BLD CALC: 34.7 % (ref 34–48)
HEMOGLOBIN: 11.7 G/DL (ref 11.5–15.5)
IMMATURE GRANULOCYTES #: 0.02 E9/L
IMMATURE GRANULOCYTES %: 0.3 % (ref 0–5)
LYMPHOCYTES ABSOLUTE: 1.64 E9/L (ref 1.5–4)
LYMPHOCYTES RELATIVE PERCENT: 28.2 % (ref 20–42)
MCH RBC QN AUTO: 34.8 PG (ref 26–35)
MCHC RBC AUTO-ENTMCNC: 33.7 % (ref 32–34.5)
MCV RBC AUTO: 103.3 FL (ref 80–99.9)
MONOCYTES ABSOLUTE: 0.66 E9/L (ref 0.1–0.95)
MONOCYTES RELATIVE PERCENT: 11.4 % (ref 2–12)
NEUTROPHILS ABSOLUTE: 3.22 E9/L (ref 1.8–7.3)
NEUTROPHILS RELATIVE PERCENT: 55.5 % (ref 43–80)
PDW BLD-RTO: 14.3 FL (ref 11.5–15)
PLATELET # BLD: 148 E9/L (ref 130–450)
PMV BLD AUTO: 9.6 FL (ref 7–12)
POTASSIUM SERPL-SCNC: 4.8 MMOL/L (ref 3.5–5)
RBC # BLD: 3.36 E12/L (ref 3.5–5.5)
SODIUM BLD-SCNC: 137 MMOL/L (ref 132–146)
TSH SERPL DL<=0.05 MIU/L-ACNC: 5.16 UIU/ML (ref 0.27–4.2)
VITAMIN B-12: 191 PG/ML (ref 211–946)
WBC # BLD: 5.8 E9/L (ref 4.5–11.5)

## 2019-12-31 PROCEDURE — 84443 ASSAY THYROID STIM HORMONE: CPT

## 2019-12-31 PROCEDURE — 80048 BASIC METABOLIC PNL TOTAL CA: CPT

## 2019-12-31 PROCEDURE — 82746 ASSAY OF FOLIC ACID SERUM: CPT

## 2019-12-31 PROCEDURE — 82607 VITAMIN B-12: CPT

## 2019-12-31 PROCEDURE — 36415 COLL VENOUS BLD VENIPUNCTURE: CPT

## 2019-12-31 PROCEDURE — 85025 COMPLETE CBC W/AUTO DIFF WBC: CPT

## 2020-01-03 ENCOUNTER — TELEPHONE (OUTPATIENT)
Dept: INTERNAL MEDICINE CLINIC | Age: 75
End: 2020-01-03

## 2020-01-03 ENCOUNTER — OFFICE VISIT (OUTPATIENT)
Dept: INTERNAL MEDICINE CLINIC | Age: 75
End: 2020-01-03
Payer: COMMERCIAL

## 2020-01-03 VITALS
DIASTOLIC BLOOD PRESSURE: 85 MMHG | TEMPERATURE: 97.6 F | HEIGHT: 66 IN | HEART RATE: 90 BPM | OXYGEN SATURATION: 93 % | BODY MASS INDEX: 34.52 KG/M2 | WEIGHT: 214.8 LBS | SYSTOLIC BLOOD PRESSURE: 127 MMHG

## 2020-01-03 LAB — HBA1C MFR BLD: 7.2 %

## 2020-01-03 PROCEDURE — G8484 FLU IMMUNIZE NO ADMIN: HCPCS | Performed by: FAMILY MEDICINE

## 2020-01-03 PROCEDURE — 3017F COLORECTAL CA SCREEN DOC REV: CPT | Performed by: FAMILY MEDICINE

## 2020-01-03 PROCEDURE — 1123F ACP DISCUSS/DSCN MKR DOCD: CPT | Performed by: FAMILY MEDICINE

## 2020-01-03 PROCEDURE — 4040F PNEUMOC VAC/ADMIN/RCVD: CPT | Performed by: FAMILY MEDICINE

## 2020-01-03 PROCEDURE — 1090F PRES/ABSN URINE INCON ASSESS: CPT | Performed by: FAMILY MEDICINE

## 2020-01-03 PROCEDURE — 99213 OFFICE O/P EST LOW 20 MIN: CPT | Performed by: FAMILY MEDICINE

## 2020-01-03 PROCEDURE — 2022F DILAT RTA XM EVC RTNOPTHY: CPT | Performed by: FAMILY MEDICINE

## 2020-01-03 PROCEDURE — G8399 PT W/DXA RESULTS DOCUMENT: HCPCS | Performed by: FAMILY MEDICINE

## 2020-01-03 PROCEDURE — 83036 HEMOGLOBIN GLYCOSYLATED A1C: CPT | Performed by: FAMILY MEDICINE

## 2020-01-03 PROCEDURE — G8417 CALC BMI ABV UP PARAM F/U: HCPCS | Performed by: FAMILY MEDICINE

## 2020-01-03 PROCEDURE — 1036F TOBACCO NON-USER: CPT | Performed by: FAMILY MEDICINE

## 2020-01-03 PROCEDURE — G8427 DOCREV CUR MEDS BY ELIG CLIN: HCPCS | Performed by: FAMILY MEDICINE

## 2020-01-03 RX ORDER — ROPINIROLE 1 MG/1
TABLET, FILM COATED ORAL
Qty: 1 TABLET | Refills: 1 | Status: SHIPPED
Start: 2020-01-03 | End: 2020-05-01 | Stop reason: SDUPTHER

## 2020-01-03 RX ORDER — LEVOTHYROXINE SODIUM 75 MCG
75 TABLET ORAL DAILY
Qty: 30 TABLET | Refills: 3
Start: 2020-01-03 | End: 2020-05-01 | Stop reason: SDUPTHER

## 2020-01-03 ASSESSMENT — ENCOUNTER SYMPTOMS
SORE THROAT: 0
CHOKING: 0
ABDOMINAL PAIN: 0
EYE PAIN: 0
RHINORRHEA: 0
BACK PAIN: 0
VOMITING: 0
CONSTIPATION: 0
COUGH: 0
ABDOMINAL DISTENTION: 0
NAUSEA: 0
WHEEZING: 0
SHORTNESS OF BREATH: 0
BLOOD IN STOOL: 0
TROUBLE SWALLOWING: 0
DIARRHEA: 0
CHEST TIGHTNESS: 0
PHOTOPHOBIA: 0
EYE DISCHARGE: 0

## 2020-01-03 NOTE — PROGRESS NOTES
CO2 27 12/31/2019    TSH 5.160 (H) 12/31/2019    INR 1.0 05/23/2018    GLUF 116 (H) 09/25/2019    LABA1C 7.2 01/03/2020    LABMICR 33.4 (H) 12/28/2017         Assessment and Plan:  Mindi Bailey was seen today for diabetes and results. Diagnoses and all orders for this visit:    Controlled type 2 diabetes mellitus with chronic kidney disease on chronic dialysis, with long-term current use of insulin (HCC)  -     POCT glycosylated hemoglobin (Hb A1C)    Restless leg syndrome, controlled  -     rOPINIRole (REQUIP) 1 MG tablet; TAKE 1 TABLET BY MOUTH EVERY NIGHT    Obstructive sleep apnea syndrome    Rheumatoid arthritis involving multiple sites, unspecified [facility] factor presence ([facility]  -     AFL - Koby Salomon MD, Rheumatology, [place]  -     C-REACTIVE PROTEIN; Future  -     RHEUMATOID FACTOR; Future  -     SEDIMENTATION RATE; Future  -     Misc. Devices (BARIATRIC ROLLATOR) MISC; 1 Device by Does not apply route continuous prn (Walking)    Gait difficulty  -     Misc. Devices (BARIATRIC ROLLATOR) MISC; 1 Device by Does not apply route continuous prn (Walking)    Hypothyroidism, unspecified type  -     SYNTHROID 75 MCG tablet; Take 1 tablet by mouth Daily    B12 deficiency  -     cyanocobalamin ([employer] VITAMIN B12) 1000 MCG tablet; Take 1 tablet by mouth daily    The patient will continue current basal insulin doses and add Humalog at mealtimes 4 units with breakfast and supper. Return in about 2 weeks (around 1/17/2020) for Finger tingling, depression. Patient may come in sooner if needed for medical concerns. Patient advised tocall at any time to cancel or re-schedule or for any questions/concerns. Patient was seen and discussed with attending physician, Dr. Lizbeth Morris.     Maribel Chirinos DO PGY3 [facility]  01/03/20  11:34 AM

## 2020-01-03 NOTE — PATIENT INSTRUCTIONS
Patient Education        cyanocobalamin (oral)  Pronunciation:  ameya ISIDRO oh koe BAL a min  Brand:  B-12, Eligen B12, Vitamin B12  What is the most important information I should know about oral cyanocobalamin? You should not use this medicine if you are allergic to cobalt, or if you have Corine's disease. What is oral cyanocobalamin? Cyanocobalamin is a man-made form of vitamin B12. Vitamin B12 is important for growth, cell reproduction, blood formation, and protein and tissue synthesis. Cyanocobalamin is used to treat vitamin B12 deficiency in people with pernicious anemia and other conditions. Cyanocobalamin may also be used for purposes not listed in this medication guide. What should I discuss with my healthcare provider before taking oral cyanocobalamin? You should not use this medicine if you are allergic to cobalt, or if you have Corine's disease. Cyanocobalamin can lead to optic nerve damage (and possibly blindness) in people with Corine's disease. Tell your doctor if you have ever had:  · heart disease;  · diabetes;  · a bleeding or blood clotting disorder;  · gout;  · an iron or folic acid deficiency;  · low levels of potassium in your blood; or  · an intestinal disorder such as ulcerative colitis. Tell your doctor if you are pregnant or breast-feeding. Do not give this medicine to a child without medical advice. How should I take oral cyanocobalamin? Follow all directions on your prescription label and read all medication guides or instruction sheets. Use the medicine exactly as directed. Carefully follow instructions about whether to take your cyanocobalamin with or without food. Your dose needs may change if you become pregnant, if you breast-feed, or if you eat a vegetarian diet. Tell your doctor about any changes in your diet or medical condition. Do not swallow a lozenge or sublingual tablet whole. Allow it to dissolve in your mouth without chewing.  The sublingual tablet should be placed doctor for medical advice about side effects. You may report side effects to FDA at 8-155-FDA-5669. What other drugs will affect oral cyanocobalamin? Tell your doctor about all your other medicines, especially:  · an antibiotic;  · drugs that weaken the immune system such as cancer medicine, or steroids;  · medicine to reduce stomach acid such as cimetidine, omeprazole, lansoprazole, Nexium, Prilosec, or Zantac;  · oral diabetes medicine that contains metformin; or  · seizure medication. This list is not complete. Other drugs may affect cyanocobalamin, including prescription and over-the-counter medicines, vitamins, and herbal products. Not all possible drug interactions are listed here. Where can I get more information? Your pharmacist can provide more information about oral cyanocobalamin. Remember, keep this and all other medicines out of the reach of children, never share your medicines with others, and use this medication only for the indication prescribed. Every effort has been made to ensure that the information provided by [address] is accurate, up-to-date, and complete, but no guarantee is made to that effect. Drug information contained herein may be time sensitive. [facility] information has been compiled for use by healthcare practitioners and consumers in the [place] and therefore [employer] does not warrant that uses outside of the [place] are appropriate, unless specifically indicated otherwise. [facility]'s drug information does not endorse drugs, diagnose patients or recommend therapy. [facility] drug information is an informational resource designed to assist licensed healthcare practitioners in caring for their patients and/or to serve consumers viewing this service as a supplement to, and not a substitute for, the expertise, skill, knowledge and judgment of healthcare practitioners.  The absence of a warning for a given drug or drug combination in no way should be synthesis. Cyanocobalamin is used to treat vitamin B12 deficiency in people with pernicious anemia and other conditions. Cyanocobalamin may also be used for purposes not listed in this medication guide. What should I discuss with my healthcare provider before taking oral cyanocobalamin? You should not use this medicine if you are allergic to cobalt, or if you have Corine's disease. Cyanocobalamin can lead to optic nerve damage (and possibly blindness) in people with Corine's disease. Tell your doctor if you have ever had:  · heart disease;  · diabetes;  · a bleeding or blood clotting disorder;  · gout;  · an iron or folic acid deficiency;  · low levels of potassium in your blood; or  · an intestinal disorder such as ulcerative colitis. Tell your doctor if you are pregnant or breast-feeding. Do not give this medicine to a child without medical advice. How should I take oral cyanocobalamin? Follow all directions on your prescription label and read all medication guides or instruction sheets. Use the medicine exactly as directed. Carefully follow instructions about whether to take your cyanocobalamin with or without food. Your dose needs may change if you become pregnant, if you breast-feed, or if you eat a vegetarian diet. Tell your doctor about any changes in your diet or medical condition. Do not swallow a lozenge or sublingual tablet whole. Allow it to dissolve in your mouth without chewing. The sublingual tablet should be placed under your tongue. Do not crush, chew, or break an extended-release tablet. Swallow it whole with a full glass of water. You may need frequent medical tests to help your doctor determine how long to treat you with cyanocobalamin. To treat pernicious anemia, you will have to use cyanocobalamin on a regular basis for the rest of your life. Not using the medication can lead to irreversible nerve damage in your spinal cord.   Pernicious anemia is also treated with folic acid to help maintain red blood cells. However, folic acid will not treat Vitamin B12 deficiency and will not prevent possible damage to the spinal cord. Take all of your medications as directed. Store at room temperature away from moisture, heat, and light. What happens if I miss a dose? Take the medicine as soon as you can, but skip the missed dose if it is almost time for your next dose. Do not take two doses at one time. What happens if I overdose? Seek emergency medical attention or call the Poison Help line at 1-897.520.3840. What should I avoid while taking oral cyanocobalamin? Avoid drinking large amounts of alcohol. Heavy drinking can make it harder for your body to absorb cyanocobalamin. What are the possible side effects of oral cyanocobalamin? Get emergency medical help if you have signs of an allergic reaction:  hives; difficulty breathing; swelling of your face, lips, tongue, or throat. Call your doctor at once if you have:  · shortness of breath (even with mild exertion), swelling, rapid weight gain;  · cough, chest pain; or  · low potassium level --leg cramps, constipation, irregular heartbeats, fluttering in your chest, increased thirst or urination, numbness or tingling, muscle weakness or limp feeling. Common side effects may include diarrhea. · diarrhea;  · numbness or tingling;  · fever;  · joint pain;  · swollen tongue; or  · itching or rash. This is not a complete list of side effects and others may occur. Call your doctor for medical advice about side effects. You may report side effects to FDA at 3-592-IQN-7573. What other drugs will affect oral cyanocobalamin?   Tell your doctor about all your other medicines, especially:  · an antibiotic;  · drugs that weaken the immune system such as cancer medicine, or steroids;  · medicine to reduce stomach acid such as cimetidine, omeprazole, lansoprazole, Nexium, Prilosec, or Zantac;  · oral diabetes medicine that contains metformin;

## 2020-01-03 NOTE — TELEPHONE ENCOUNTER
Pharmacist left message regarding requip prescription. The quantity is 1 can you go in and change the quantity to 90? Thanks. Tried to contact pharmacy to have it changed verbally but the phone system is currently down. Also the bariatric rollator needs to be done as a dme order and not sent to the pharmacy. Thanks. When done we can send the order to one of the medical supply places for it.   Thanks

## 2020-01-13 ENCOUNTER — HOSPITAL ENCOUNTER (OUTPATIENT)
Age: 75
Discharge: HOME | End: 2020-01-13
Payer: COMMERCIAL

## 2020-01-13 LAB
C-REACTIVE PROTEIN: 0.4 MG/DL (ref 0–0.4)
RHEUMATOID FACTOR: <10 IU/ML (ref 0–13)
SEDIMENTATION RATE, ERYTHROCYTE: 35 MM/HR (ref 0–20)

## 2020-01-13 PROCEDURE — 85651 RBC SED RATE NONAUTOMATED: CPT

## 2020-01-13 PROCEDURE — 36415 COLL VENOUS BLD VENIPUNCTURE: CPT

## 2020-01-13 PROCEDURE — 86140 C-REACTIVE PROTEIN: CPT

## 2020-01-13 PROCEDURE — 86431 RHEUMATOID FACTOR QUANT: CPT

## 2020-01-17 ENCOUNTER — OFFICE VISIT (OUTPATIENT)
Dept: INTERNAL MEDICINE CLINIC | Age: 75
End: 2020-01-17
Payer: COMMERCIAL

## 2020-01-17 VITALS
OXYGEN SATURATION: 96 % | DIASTOLIC BLOOD PRESSURE: 78 MMHG | HEART RATE: 97 BPM | WEIGHT: 212.4 LBS | BODY MASS INDEX: 34.13 KG/M2 | HEIGHT: 66 IN | TEMPERATURE: 97.7 F | SYSTOLIC BLOOD PRESSURE: 122 MMHG

## 2020-01-17 PROCEDURE — G8399 PT W/DXA RESULTS DOCUMENT: HCPCS | Performed by: FAMILY MEDICINE

## 2020-01-17 PROCEDURE — 4040F PNEUMOC VAC/ADMIN/RCVD: CPT | Performed by: FAMILY MEDICINE

## 2020-01-17 PROCEDURE — 3017F COLORECTAL CA SCREEN DOC REV: CPT | Performed by: FAMILY MEDICINE

## 2020-01-17 PROCEDURE — G8427 DOCREV CUR MEDS BY ELIG CLIN: HCPCS | Performed by: FAMILY MEDICINE

## 2020-01-17 PROCEDURE — G8417 CALC BMI ABV UP PARAM F/U: HCPCS | Performed by: FAMILY MEDICINE

## 2020-01-17 PROCEDURE — 1123F ACP DISCUSS/DSCN MKR DOCD: CPT | Performed by: FAMILY MEDICINE

## 2020-01-17 PROCEDURE — 2022F DILAT RTA XM EVC RTNOPTHY: CPT | Performed by: FAMILY MEDICINE

## 2020-01-17 PROCEDURE — G8484 FLU IMMUNIZE NO ADMIN: HCPCS | Performed by: FAMILY MEDICINE

## 2020-01-17 PROCEDURE — 99213 OFFICE O/P EST LOW 20 MIN: CPT | Performed by: FAMILY MEDICINE

## 2020-01-17 PROCEDURE — 1090F PRES/ABSN URINE INCON ASSESS: CPT | Performed by: FAMILY MEDICINE

## 2020-01-17 PROCEDURE — 1036F TOBACCO NON-USER: CPT | Performed by: FAMILY MEDICINE

## 2020-01-17 ASSESSMENT — ENCOUNTER SYMPTOMS
SHORTNESS OF BREATH: 0
ABDOMINAL DISTENTION: 0
TROUBLE SWALLOWING: 0
ABDOMINAL PAIN: 0
PHOTOPHOBIA: 0
SORE THROAT: 0
DIARRHEA: 0
VOMITING: 0
EYE DISCHARGE: 0
EYE PAIN: 0
RHINORRHEA: 0
CHEST TIGHTNESS: 0
NAUSEA: 0
WHEEZING: 0
CONSTIPATION: 0
BACK PAIN: 0
CHOKING: 0
BLOOD IN STOOL: 0
COUGH: 0

## 2020-01-17 NOTE — PROGRESS NOTES
Subjective:  69 y.o. female with PMH of T2DM with CKD on dialysis and other co-morbidities who presents in office today for 2 month follow-up. T2DM with CKD  Lantus 20 units in AM and 15 units nightly  Humalog -at mealtimes 3 units with breakfast and supper  Checking sugars twice daily fasting in AM and just prior to bed. Dr. Jayda Sanchez for Podiatry    ESRD on Dialysis   Dr. Micah Torres, Nephrologist seen monthly f/u. No medication changes. Dialysis on Tues, Thurs, and Sat. Rheumatoid Arthritis   Patient relates that Humira did not relieve her symptoms, last used about 6 months ago. Patient has been referred to Dr. Lor Gonzalez office for further evaluation. B12 & Folate deficiency  Now on B12 replacement at 1000 mcg daily. No JENN. Hypothyroidism  On levothyroxine 50 mcg. She was supposed to increase to 75 mcg daily d/t elevated TSH but says she hasn't heard anything about the new dose from her pharmacy. She has no complaints of unexpected weight gain, dry hair/nails. TSH at 5. 16. She is taking it on an empty stomach now as directed. Review of Systems   Constitutional: Negative for activity change, appetite change, chills, fever and unexpected weight change. HENT: Negative for congestion, ear pain, hearing loss, rhinorrhea, sore throat and trouble swallowing. Eyes: Negative for photophobia, pain, discharge and visual disturbance. Respiratory: Negative for cough, choking, chest tightness, shortness of breath and wheezing. Cardiovascular: Negative for chest pain, palpitations and leg swelling. Gastrointestinal: Negative for abdominal distention, abdominal pain, blood in stool, constipation, diarrhea, nausea and vomiting. Endocrine: Negative for cold intolerance, heat intolerance, polydipsia and polyuria. Genitourinary: Negative for decreased urine volume, dysuria, flank pain, frequency, hematuria and urgency.    Musculoskeletal: Negative for arthralgias, back pain, joint swelling, neck pain and neck

## 2020-01-17 NOTE — PATIENT INSTRUCTIONS
all of the medicines you take, including those with or without a prescription. · Keep the numbers for these national suicide hotlines: 4-506-760-TALK (4-469.223.2381) and 7-175-WSBRCQA (8-973.928.1384). If you or someone you know talks about suicide or feeling hopeless, get help right away. When should you call for help? Call 911 anytime you think you may need emergency care. For example, call if:    · You feel like hurting yourself or someone else.     · Someone you know has depression and is about to attempt or is attempting suicide.   [facility] your doctor now or seek immediate medical care if:    · You hear voices.     · Someone you know has depression and:  ? Starts to give away his or her possessions. ? Uses illegal drugs or drinks alcohol heavily. ? Talks or writes about death, including writing suicide notes or talking about guns, knives, or pills. ? Starts to spend a lot of time alone. ? Acts very aggressively or suddenly appears calm.    Watch closely for changes in your health, and be sure to contact your doctor if:    · You do not get better as expected. Where can you learn more? Go to https://Ad Dynamo.[employer]. org and sign in to your [employer] account. Enter V157 in the [employer] box to learn more about \"Recovering From Depression: Care Instructions. \"     If you do not have an account, please click on the \"Sign Up Now\" link. Current as of: May 28, 2019  Content Version: 12.3  © 2855-0338 Healthwise, Incorporated. Care instructions adapted under license by [facility] ([facility]). If you have questions about a medical condition or this instruction, always ask your healthcare professional. [place] [address] any warranty or liability for your use of this information. Patient Education        Learning About Mood Disorders  What are mood disorders? Mood disorders are medical problems that affect how you feel. They can impact your moods, thoughts, and actions. Mood disorders include:  · Depression. This causes you to feel sad or hopeless for much of the time. · Bipolar disorder. This causes extreme mood changes from manic episodes of very high energy to extreme lows of depression. · Seasonal affective disorder (SAD). This is a type of depression that affects you during the same season each year. Most often people experience SAD during the fall and winter months when days are shorter and there is less light. What are the symptoms? Depression  You may:  · Feel sad or hopeless nearly every day. · Lose interest in or not get pleasure from most daily activities. You feel this way nearly every day. · Have low energy, changes in your appetite, or changes in how well you sleep. · Have trouble concentrating. · Think about death and suicide. Keep the numbers for these national suicide hotlines: 3-097-923-TALK (6-394.381.5947) and 4-442-NMWUGLS (9-262.136.3242). If you or someone you know talks about suicide or feeling hopeless, get help right away. Bipolar disorder  Symptoms depend on your mood swings. You may:  · Feel very happy, energetic, or on edge. · Feel like you need very little sleep. · Feel overly self-confident. · Do impulsive things, such as spending a lot of money. · Feel sad or hopeless. · Have racing thoughts or trouble thinking and making decisions. · Lose interest in things you have enjoyed in the past.  · Think about death and suicide. Keep the numbers for these national suicide hotlines: 1-188-495-TALK (8-953.398.7412) and 8-296-UTCTYQV (2-323.488.3504). If you or someone you know talks about suicide or feeling hopeless, get help right away. Seasonal affective disorder (SAD)  Symptoms come and go at about the same time each year. For most people with SAD, symptoms come during the winter when there is less daylight. You may:  · Feel sad, grumpy, guerrero, or anxious. · Lose interest in your usual activities.   · Eat more and crave carbohydrates, such as bread and pasta. · Gain weight. · Sleep more and feel drowsy during the daytime. How are mood disorders treated? Mood disorders can be treated with medicines or counseling, or a combination of both. Medicines for depression and SAD may include antidepressants. Medicines for bipolar disorder may include:  · Mood stabilizers. · Antipsychotics. · Benzodiazepines. Counseling may involve cognitive-behavioral therapy. It teaches you how to change the ways you think and behave. This can help you stop thinking bad thoughts about yourself and your life. Light therapy is the main treatment for SAD. This therapy uses a special kind of lamp. You let the lamp shine on you at certain times, usually in the morning. This may help your symptoms during the months when there is less sunlight. Healthy lifestyle  Healthy lifestyle changes may help you feel better. · Get at least 30 minutes of exercise on most days of the week. Walking is a good choice. · Eat a healthy diet. Include fruits, vegetables, lean proteins, and whole grains in your diet each day. · Keep a regular sleep schedule. Try for 8 hours of sleep a night. · Find ways to manage stress, such as relaxation exercises. · Avoid alcohol and illegal drugs. Follow-up care is a key part of your treatment and safety. Be sure to make and go to all appointments, and call your doctor if you are having problems. It's also a good idea to know your test results and keep a list of the medicines you take. Where can you learn more? Go to https://heather.[employer]M. org and sign in to your [employer] account. Enter G216 in the [facility] box to learn more about \"Learning About Mood Disorders. \"     If you do not have an account, please click on the \"Sign Up Now\" link. Current as of: May 28, 2019  Content Version: 12.3  © 0799-5054 Healthwise, Incorporated. Care instructions adapted under license by [facility] ([facility]).  If you have questions about a medical

## 2020-01-23 NOTE — PROGRESS NOTES
Attending Physician Statement  I have discussed the case, including pertinent history and exam findings with the resident. I agree with the documented assessment and plan. Patient will follow up 6 weeks.   Kindra De aL Fuente MD

## 2020-01-31 VITALS
BODY MASS INDEX: 34.39 KG/M2 | WEIGHT: 214 LBS | HEART RATE: 88 BPM | DIASTOLIC BLOOD PRESSURE: 82 MMHG | HEIGHT: 66 IN | SYSTOLIC BLOOD PRESSURE: 120 MMHG

## 2020-02-28 ENCOUNTER — OFFICE VISIT (OUTPATIENT)
Dept: INTERNAL MEDICINE CLINIC | Age: 75
End: 2020-02-28
Payer: COMMERCIAL

## 2020-02-28 ENCOUNTER — TELEPHONE (OUTPATIENT)
Dept: CARDIOLOGY CLINIC | Age: 75
End: 2020-02-28

## 2020-02-28 VITALS
TEMPERATURE: 97.8 F | DIASTOLIC BLOOD PRESSURE: 72 MMHG | HEIGHT: 66 IN | OXYGEN SATURATION: 95 % | RESPIRATION RATE: 18 BRPM | WEIGHT: 213 LBS | SYSTOLIC BLOOD PRESSURE: 108 MMHG | HEART RATE: 95 BPM | BODY MASS INDEX: 34.23 KG/M2

## 2020-02-28 PROCEDURE — 3051F HG A1C>EQUAL 7.0%<8.0%: CPT | Performed by: FAMILY MEDICINE

## 2020-02-28 PROCEDURE — 4040F PNEUMOC VAC/ADMIN/RCVD: CPT | Performed by: FAMILY MEDICINE

## 2020-02-28 PROCEDURE — 3017F COLORECTAL CA SCREEN DOC REV: CPT | Performed by: FAMILY MEDICINE

## 2020-02-28 PROCEDURE — 99213 OFFICE O/P EST LOW 20 MIN: CPT | Performed by: FAMILY MEDICINE

## 2020-02-28 PROCEDURE — G8427 DOCREV CUR MEDS BY ELIG CLIN: HCPCS | Performed by: FAMILY MEDICINE

## 2020-02-28 PROCEDURE — 1036F TOBACCO NON-USER: CPT | Performed by: FAMILY MEDICINE

## 2020-02-28 PROCEDURE — G8399 PT W/DXA RESULTS DOCUMENT: HCPCS | Performed by: FAMILY MEDICINE

## 2020-02-28 PROCEDURE — G8417 CALC BMI ABV UP PARAM F/U: HCPCS | Performed by: FAMILY MEDICINE

## 2020-02-28 PROCEDURE — 99212 OFFICE O/P EST SF 10 MIN: CPT | Performed by: FAMILY MEDICINE

## 2020-02-28 PROCEDURE — 1090F PRES/ABSN URINE INCON ASSESS: CPT | Performed by: FAMILY MEDICINE

## 2020-02-28 PROCEDURE — 2022F DILAT RTA XM EVC RTNOPTHY: CPT | Performed by: FAMILY MEDICINE

## 2020-02-28 PROCEDURE — G8484 FLU IMMUNIZE NO ADMIN: HCPCS | Performed by: FAMILY MEDICINE

## 2020-02-28 PROCEDURE — 1123F ACP DISCUSS/DSCN MKR DOCD: CPT | Performed by: FAMILY MEDICINE

## 2020-02-28 ASSESSMENT — ENCOUNTER SYMPTOMS
EYE PAIN: 0
ABDOMINAL DISTENTION: 0
CHOKING: 0
COUGH: 0
BLOOD IN STOOL: 0
EYE DISCHARGE: 0
CHEST TIGHTNESS: 0
BACK PAIN: 0
SHORTNESS OF BREATH: 0
DIARRHEA: 0
TROUBLE SWALLOWING: 0
RHINORRHEA: 0
NAUSEA: 0
SORE THROAT: 0
WHEEZING: 0
CONSTIPATION: 0
VOMITING: 0
ABDOMINAL PAIN: 0
PHOTOPHOBIA: 0

## 2020-02-28 NOTE — PROGRESS NOTES
Subjective:  70 y.o. female with PMH of T2DM with CKD on dialysis and other co-morbidities who presents in office today for 2 month follow-up. CTS on L  Pt hat attempted conservative therapy with no improvement. 3-4 months+ numbness/tingling on L. Patient has not yet had an EMG but notes that she is having trouble with buttons on her clothing due to the symptoms. T2DM with CKD  Lantus 20 units in AM and 15 units nightly  Humalog -at mealtimes 3 units with breakfast and supper  Checking sugars twice daily fasting in AM and just prior to bed. Dr. Luzmaria Collier for Podiatry    Lab Results   Component Value Date    LABA1C 7.2 01/03/2020    LABA1C 6.5 09/30/2019    LABA1C 7.4 06/24/2019    LABA1C 6.8 03/11/2019     ESRD on Dialysis   Dr. Kwan Livingston, Nephrologist seen monthly f/u. No medication changes. Dialysis on Tues, Thurs, and Sat. Rheumatoid Arthritis   Patient relates that Humira did not relieve her symptoms, last used about 6 months ago. Patient has been referred to Dr. Tegan Santos office for further evaluation. No appt scheduled yet. B12 & Folate deficiency  Now on B12 replacement at 1000 mcg daily. No JENN. Hypothyroidism  On levothyroxine 75 mcg daily -recently increased d/t elevated TSH- for about the last week. She has no complaints of unexpected weight gain, dry hair/nails. TSH at 5. 16. She is taking it on an empty stomach now as directed. Sleep difficulty and depression  Trazodone 50 mg nightly helps and Zoloft 100 mg helps keep her mood reasonably good. She has socioeconomic circumstances that are affecting her mood but they are temporary and she doesn't feel she needs any adjustment in her current therapy. KAYKAY not currently on CPAP  Not using d/t discomfort. She would like to see someone to discuss alternative mask/method for treatment. Review of Systems   Constitutional: Negative for activity change, appetite change, chills, fever and unexpected weight change.    HENT: Negative for Skin is warm and dry. Findings: No rash. Neurological:      Mental Status: She is alert and oriented to person, place, and time. Deep Tendon Reflexes: Reflexes are normal and symmetric. Psychiatric:         Thought Content: Thought content normal.         Judgment: Judgment normal.       Osteopathic exam:  Patient evaluated inthe seated position. Normal thoracic kyphosis and lumbar lordosis. No acute tissue texture changes. No acute somatic dysfunction of the cervical, thoracic, or lumbar spine. Lab Results    Lab Results   Component Value Date    WBC 5.8 12/31/2019    HGB 11.7 12/31/2019    HCT 34.7 12/31/2019     12/31/2019    CHOL 146 03/23/2018    TRIG 109 03/23/2018    HDL 60 09/25/2019    ALT 6 09/25/2019    AST 11 09/25/2019     12/31/2019    K 4.8 12/31/2019    CL 91 (L) 12/31/2019    CREATININE 4.9 (H) 12/31/2019    BUN 42 (H) 12/31/2019    CO2 27 12/31/2019    TSH 5.160 (H) 12/31/2019    INR 1.0 05/23/2018    GLUF 116 (H) 09/25/2019    LABA1C 7.2 01/03/2020    LABMICR 33.4 (H) 12/28/2017         Assessment and Plan:  Andrea Hays was seen today for diabetes, sleep apnea and depression. Diagnoses and all orders for this visit:    Essential hypertension    Obstructive sleep apnea syndrome  -     Maria De Jesus Jacobs MD, Sleep Medicine, [place]    Acquired hypothyroidism  -     TSH;  Future    Controlled type 2 diabetes mellitus with chronic kidney disease on chronic dialysis, with long-term current use of insulin (HCC)  -     HEMOGLOBIN A1C; Future    Rheumatoid arthritis of multiple sites with negative rheumatoid factor (HCC)    ESRD on dialysis ([address]    Restless leg syndrome, controlled    Current severe episode of major depressive disorder without psychotic features, unspecified whether recurrent ([address]    Carpal tunnel syndrome of left wrist  -     EMG; Future    Patient for the EMG to address carpal tunnel syndrome, referral for sleep medicine as patient wishes to be compliant with therapy for sleep apnea and TSH with recent adjustment of dosage for hypothyroidism. Return in about 2 months (around 4/28/2020) for Hypothyroidism, CTS, Type II Diabetes Mellitus. Patient may come in sooner if needed for medical concerns. Patient advised tocall at any time to cancel or re-schedule or for any questions/concerns. Patient was seen and discussed with attending physician, Dr. Nayla Goss.     Melina Werner DO PGY3 [facility]  02/28/20  11:02 AM

## 2020-02-28 NOTE — PATIENT INSTRUCTIONS
Patient Education        Carpal Tunnel Syndrome: Exercises  Introduction  Here are some examples of exercises for you to try. The exercises may be suggested for a condition or for rehabilitation. Start each exercise slowly. Ease off the exercises if you start to have pain. You will be told when to start these exercises and which ones will work best for you. Warm-up stretches  When you no longer have pain or numbness, you can do exercises to help prevent carpal tunnel syndrome from coming back. Do not do any stretch or movement that is uncomfortable or painful. 1. Rotate your wrist up, down, and from side to side. Repeat 4 times. 2. Stretch your fingers far apart. Relax them, and then stretch them again. Repeat 4 times. 3. Stretch your thumb by pulling it back gently, holding it, and then releasing it. Repeat 4 times. How to do the exercises  Prayer stretch   1. Start with your palms together in front of your chest just below your chin. 2. Slowly lower your hands toward your waistline, keeping your hands close to your stomach and your palms together until you feel a mild to moderate stretch under your forearms. 3. Hold for at least 15 to 30 seconds. Repeat 2 to 4 times. Wrist flexor stretch   1. Extend your arm in front of you with your palm up. 2. Bend your wrist, pointing your hand toward the floor. 3. With your other hand, gently bend your wrist farther until you feel a mild to moderate stretch in your forearm. 4. Hold for at least 15 to 30 seconds. Repeat 2 to 4 times. Wrist extensor stretch   1. Repeat steps 1 through 4 of the stretch above, but begin with your extended hand palm down. Follow-up care is a key part of your treatment and safety. Be sure to make and go to all appointments, and call your doctor if you are having problems. It's also a good idea to know your test results and keep a list of the medicines you take. Where can you learn more?   Go to such as when using a computer mouse. · Try to avoid bending or twisting your wrists. · Ask your doctor if you can take an over-the-counter pain medicine, such as acetaminophen (Tylenol), ibuprofen (Advil, Motrin), or naproxen (Aleve). Be safe with medicines. Read and follow all instructions on the label. · If your doctor prescribes corticosteroid medicine to help reduce pain and swelling, take it exactly as prescribed. Call your doctor if you think you are having a problem with your medicine. · Put ice or a cold pack on your wrist for 10 to 20 minutes at a time to ease pain. Put a thin cloth between the ice and your skin. · If your doctor or your physical or occupational therapist tells you to wear a wrist splint, wear it as directed to keep your wrist in a neutral position. This also eases pressure on your median nerve. · Ask your doctor whether you should have physical or occupational therapy to learn how to do tasks differently. · Try a yoga class to stretch your muscles and build strength in your hands and wrists. Yoga has been shown to ease carpal tunnel symptoms. To prevent carpal tunnel  · When working at a computer, keep your hands and wrists in line with your forearms. Hold your elbows close to your sides. Take a break every 10 to 15 minutes. · Try these exercises:  ? Warm up: Rotate your wrist up, down, and from side to side. Repeat this 4 times. Stretch your fingers far apart, relax them, then stretch them again. Repeat 4 times. Stretch your thumb by pulling it back gently, holding it, and then releasing it. Repeat 4 times. ? Prayer stretch: Start with your palms together in front of your chest just below your chin. Slowly lower your hands toward your waistline while keeping your hands close to your stomach and your palms together until you feel a mild to moderate stretch under your forearms. Hold for 10 to 20 seconds. Repeat 4 times.   ? Wrist flexor stretch: Hold your arm in front of you with your palm up. Bend your wrist, pointing your hand toward the floor. With your other hand, gently bend your wrist further until you feel a mild to moderate stretch in your forearm. Hold for 10 to 20 seconds. Repeat 4 times. ? Wrist extensor stretch: Repeat the steps for the wrist flexor stretch, but begin with your extended hand palm down. · Squeeze a rubber exercise ball several times a day to keep your hands and fingers strong. · Avoid holding objects (such as a book) in one position for a long time. When possible, use your whole hand to grasp an object. Using just the thumb and index finger can put stress on the wrist.  · Do not smoke. It can make this condition worse by reducing blood flow to the median nerve. If you need help quitting, talk to your doctor about stop-smoking programs and medicines. These can increase your chances of quitting for good. When should you call for help? Watch closely for changes in your health, and be sure to contact your doctor if:    · Your pain or other problems do not get better with home care.     · You want more information about physical or occupational therapy.     · You have side effects of your corticosteroid medicine, such as:  ? Weight gain. ? Mood changes. ? Trouble sleeping. ? Bruising easily.     · You have any other problems with your medicine. Where can you learn more? Go to https://[employer].eEye. org and sign in to your Egomotion account. Enter R432 in the MedStartr box to learn more about \"Carpal Tunnel Syndrome: Care Instructions. \"     If you do not have an account, please click on the \"Sign Up Now\" link. Current as of: June 26, 2019  Content Version: 12.3  © 5262-4268 Healthwise, Incorporated. Care instructions adapted under license by [facility] [employer] [facility] ([facility]).  If you have questions about a medical condition or this instruction, always ask your healthcare professional. [address] any warranty or liability for your

## 2020-03-04 NOTE — PROGRESS NOTES
Attending Physician Statement  I have discussed the case, including pertinent history and exam findings with the resident. I agree with the documented assessment and plan. Patient will follow up 2 months.   Walt Ledesma MD

## 2020-03-13 ENCOUNTER — OFFICE VISIT (OUTPATIENT)
Dept: PHYSICAL MEDICINE AND REHAB | Age: 75
End: 2020-03-13
Payer: COMMERCIAL

## 2020-03-13 VITALS — WEIGHT: 220 LBS | HEIGHT: 66 IN | BODY MASS INDEX: 35.36 KG/M2

## 2020-03-13 PROCEDURE — 99202 OFFICE O/P NEW SF 15 MIN: CPT | Performed by: PHYSICAL MEDICINE & REHABILITATION

## 2020-03-13 PROCEDURE — G8484 FLU IMMUNIZE NO ADMIN: HCPCS | Performed by: PHYSICAL MEDICINE & REHABILITATION

## 2020-03-13 PROCEDURE — 1036F TOBACCO NON-USER: CPT | Performed by: PHYSICAL MEDICINE & REHABILITATION

## 2020-03-13 PROCEDURE — 1090F PRES/ABSN URINE INCON ASSESS: CPT | Performed by: PHYSICAL MEDICINE & REHABILITATION

## 2020-03-13 PROCEDURE — 4040F PNEUMOC VAC/ADMIN/RCVD: CPT | Performed by: PHYSICAL MEDICINE & REHABILITATION

## 2020-03-13 PROCEDURE — G8428 CUR MEDS NOT DOCUMENT: HCPCS | Performed by: PHYSICAL MEDICINE & REHABILITATION

## 2020-03-13 PROCEDURE — 1123F ACP DISCUSS/DSCN MKR DOCD: CPT | Performed by: PHYSICAL MEDICINE & REHABILITATION

## 2020-03-13 PROCEDURE — G8399 PT W/DXA RESULTS DOCUMENT: HCPCS | Performed by: PHYSICAL MEDICINE & REHABILITATION

## 2020-03-13 PROCEDURE — 95886 MUSC TEST DONE W/N TEST COMP: CPT | Performed by: PHYSICAL MEDICINE & REHABILITATION

## 2020-03-13 PROCEDURE — 3017F COLORECTAL CA SCREEN DOC REV: CPT | Performed by: PHYSICAL MEDICINE & REHABILITATION

## 2020-03-13 PROCEDURE — 95909 NRV CNDJ TST 5-6 STUDIES: CPT | Performed by: PHYSICAL MEDICINE & REHABILITATION

## 2020-03-13 PROCEDURE — G8417 CALC BMI ABV UP PARAM F/U: HCPCS | Performed by: PHYSICAL MEDICINE & REHABILITATION

## 2020-03-13 NOTE — PROGRESS NOTES
[address] [facility]  Electrodiagnostic Laboratory  *Accredited by the [address] [place] [address] with exemplary status  [address] [place]  Phone: [phone]  Fax: [phone]    Referring Provider: Fidencio Campos DO  Primary Care Physician: Gabriel Frias DO  Patient Name: Cecy Morales  Patient YOB: 1945  Gender: female  BMI: Body mass index is 35.51 kg/m². Height 5' 6\" (1.676 m), weight 220 lb (99.8 kg), not currently breastfeeding. 3/13/2020    Description of clinical problem:   Chief Complaint   Patient presents with    Hand Pain     Occassional hand pain     Hand Numbness     Right hand numbness and tingling worse in digits 1-3 present for several months after no acute injury     Extremity Weakness     Right hand weakness      Pain Yes  Pain Score:   8; Numbness/tingling  Yes; Weakness  Yes       Sensory NCS      Nerve / Sites Rec. Site Peak Lat PP Amp Segments Distance Velocity Temp. ms µV  cm m/s °C   L Median - Digit II (Antidromic)      Palm Dig II 2.08 1.4* Palm - Dig II 7 50 32.6      Wrist Dig II NR NR* Wrist - Dig II 14 NR 32.6   L Ulnar - Digit V (Antidromic)      Wrist Dig V 3.91 10.5 Wrist - Dig V 14 50 32.1   L Radial - Anatomical snuff box (Forearm)      Forearm Wrist 2.76 11.0 Forearm - Wrist 10 51 33.1           Motor NCS      Nerve / Sites Muscle Onset Amplitude Segments Distance Velocity Temp.     ms mV  cm m/s °C   L Median - APB      Palm APB 2.03 6.0 Palm - APB   32.5      Wrist APB NR NR* Wrist - Palm 8 NR 32.5      Elbow APB NR NR* Elbow - Wrist 16.5 NR 32.5   L Ulnar - ADM      Wrist ADM 2.92 9.9 Wrist - ADM 8  33.2      B. Elbow ADM 6.88 9.5 B. Elbow - Wrist 20 51 33.2      A. Elbow ADM 8.85 9.2 A. Elbow - B. Elbow 10 51 33.2       F  Wave      Nerve Fmin % F    ms %   L Median - APB NR* NR   L Ulnar - ADM 27.34 50       EMG      EMG Summary Table     Spontaneous MUAP Recruitment   Muscle Nerve Roots IA Fib PSW Fasc Amp Dur. PPP Pattern   L. Deltoid Axillary C5-C6 N None None None N N N N   L. Triceps brachii Radial C6-C8 N None None None N N N N   L. Pronator teres Median C6-C7 N None None None N N N N   L. First dorsal interosseous Ulnar C8-T1 N None None None N N N N   L. Abductor pollicis brevis Median Z3-U8 N 2+ 2+ None N N N Reduced          Study Limitations:  obesity    Summary of Findings:   Nerve conduction studies:   · The following nerve conduction studies were abnormal:   · The left median sensory response at the palm was small and there is complete sensory conduction block across the wrist.    · There is complete median motor conduction block across the left wrist.  The left minimal F wave is absent. · All other nerve conduction studies listed in the table above were normal in latency, amplitude and conduction velocity. Needle EMG:   · Needle EMG was performed using a concentric needle. · The following abnormalities were seen on needle EMG: positive sharp waves, fibrillation potentials and decreased motor unit recruitment is present in the left abductor pollicis brevis. All other muscles tested, as listed in the table above demonstrated normal amplitude, duration, phases and recruitment and no active denervation signs were seen. Diagnostic Interpretation: This study was abnormal.     Electrodiagnosis: There is electrodiagnostic evidence of a median mononeuropathy. · Location: left, at the wrist.   · Nature: [  ] Axonal   [  ] Demyelinating  Turi.Gill  ] Mixed axonal and demyelinating     [  ] Sensory [  ] Motor               [ X ] Mixed sensorimotor     Turi.Gill  ] with active denervation       [  ] without active denervation  · Duration: Subacute  · Severity: severe  · Prognosis: Fair. The prognosis for recovery of demyelinating lesions is good if the cause is alleviated. The prognosis for recovery of axonal lesions is poor and dependant on collateral sprouting and reinnervation.         Previous Study: None      Follow up EMG is recommended if symptoms persist in one year. Technologist: Kellen Barnett LPN, CNCT   Physician:    Shavonne Narayanan D.O., P.T. Board Certified Physical Medicine and Rehabilitation  Board Certified Electrodiagnostic Medicine      Nerve conduction studies and electromyography were performed according to our laboratory policies and procedures which can be provided upon request. All abnormal values are identified in the table.  Laboratory normal values can also be provided upon request.       Cc: DO David Wu DO

## 2020-03-13 NOTE — PATIENT INSTRUCTIONS
Electrodiagnotic Laboratory  Accredited by the [facility] with Exemplary status  BHARAT Yanez D.O. [facility]  [address] [facility] [place]  Phone: [phone]  Fax: [phone]        Today you had an electrodiagnostic exam which included nerve conduction studies (NCS) and electromyography (EMG). This test evaluated the electrical activity of your nerves and muscles to help determine if you have a nerve or muscle disease. This test can help determine the location and type of a nerve or muscle problem. This will help your referring doctor diagnose your condition and determine the appropriate next step in your treatment plan. After your test:    1. There are no long lasting side effects of the test.     2. You may resume your normal activities without restrictions. 3.  Resume any medications that were stopped for the test.     4  If you have sore areas or bruising in your muscles where the needle was placed, apply a cold pack to the sore area for 15-20 minutes three to four times a day as needed for pain. The soreness should go away in about 1-2 days. 5. Your results were provided  Briefly at the end of your test and the final detailed report will be provided to your referring physician, and/or primary care physician and any other parties you requested within 1-2 days of the examination. You may wish to contact your referring provider after a few days to determine what they would like you to do next. 6.  Please call [phone] with any questions or concerns and if you develop increased body temperature/fever, swelling, tenderness, increased pain and/or drainage from the sites where the needle was placed. Thank you for choosing us for your health care needs.

## 2020-03-13 NOTE — PROGRESS NOTES
[address] [facility]  Electrodiagnostic Laboratory  *Accredited by the [address] [place] [address] with exemplary status  [address] [facility] [place]  Phone: [phone]  Fax: [phone]      Date of Examination: 03/13/20  Patient Name: Nimesh Celeste  is a 70y.o. year old female who was seen due to complaints of   Chief Complaint   Patient presents with    Hand Pain     Occassional hand pain     Hand Numbness     Right hand numbness and tingling worse in digits 1-3 present for several months after no acute injury     Extremity Weakness     Right hand weakness        Physical Exam: MSK: There is no joint effusion, deformity, instability, swelling, erythema or warmth. AROM is full in the spine and extremities. +tienl left wrist.  Neurologic:  Left  is weak and there is left thenar atrophy, otherwise, no focal sensorimotor deficit. Reflexes 2+ and symmetric. Gait is normal.    Impression:     1. Carpal tunnel syndrome of left wrist        Plan:   · EMG is indicated to evaluate the above diagnosis. Orders Placed This Encounter   Procedures    NM NEEDLE EMG EA EXTREMTY W/PARASPINL AREA COMPLETE    NM MOTOR &/SENS 5-6 NRV CNDJ PRECONF ELTRODE LIMB     · EMG was done today and showed carpal tunnel syndrome, severe. The patient was educated about the diagnosis and the prognosis. · Recommend surgical consultation. · Advised patient to follow up with referring provider. Thank you for allowing me to participate in the care of your patient.       Sincerely,     Adriano Hernandes

## 2020-03-18 RX ORDER — LEVOTHYROXINE SODIUM 0.05 MG/1
TABLET ORAL
Qty: 90 TABLET | Refills: 3 | OUTPATIENT
Start: 2020-03-18

## 2020-03-26 RX ORDER — SERTRALINE HYDROCHLORIDE 100 MG/1
TABLET, FILM COATED ORAL
Qty: 30 TABLET | Refills: 0 | Status: SHIPPED
Start: 2020-03-26 | End: 2020-04-20

## 2020-03-26 RX ORDER — ATORVASTATIN CALCIUM 10 MG/1
TABLET, FILM COATED ORAL
Qty: 30 TABLET | Refills: 0 | Status: SHIPPED
Start: 2020-03-26 | End: 2020-03-27

## 2020-03-27 RX ORDER — ATORVASTATIN CALCIUM 10 MG/1
TABLET, FILM COATED ORAL
Qty: 90 TABLET | Refills: 5 | Status: SHIPPED
Start: 2020-03-27 | End: 2021-09-30 | Stop reason: SDUPTHER

## 2020-04-13 ENCOUNTER — TELEPHONE (OUTPATIENT)
Dept: CARDIOLOGY CLINIC | Age: 75
End: 2020-04-13

## 2020-04-20 RX ORDER — SERTRALINE HYDROCHLORIDE 100 MG/1
TABLET, FILM COATED ORAL
Qty: 30 TABLET | Refills: 0 | Status: SHIPPED
Start: 2020-04-20 | End: 2020-05-01 | Stop reason: SDUPTHER

## 2020-05-01 ENCOUNTER — TELEPHONE (OUTPATIENT)
Dept: INTERNAL MEDICINE CLINIC | Age: 75
End: 2020-05-01

## 2020-05-01 ENCOUNTER — OFFICE VISIT (OUTPATIENT)
Dept: INTERNAL MEDICINE CLINIC | Age: 75
End: 2020-05-01
Payer: COMMERCIAL

## 2020-05-01 VITALS
WEIGHT: 219.2 LBS | SYSTOLIC BLOOD PRESSURE: 136 MMHG | HEIGHT: 66 IN | DIASTOLIC BLOOD PRESSURE: 86 MMHG | BODY MASS INDEX: 35.23 KG/M2 | TEMPERATURE: 97.2 F | HEART RATE: 100 BPM | OXYGEN SATURATION: 94 %

## 2020-05-01 LAB — HBA1C MFR BLD: 7.9 %

## 2020-05-01 PROCEDURE — 3017F COLORECTAL CA SCREEN DOC REV: CPT | Performed by: FAMILY MEDICINE

## 2020-05-01 PROCEDURE — 4040F PNEUMOC VAC/ADMIN/RCVD: CPT | Performed by: FAMILY MEDICINE

## 2020-05-01 PROCEDURE — 2022F DILAT RTA XM EVC RTNOPTHY: CPT | Performed by: FAMILY MEDICINE

## 2020-05-01 PROCEDURE — 99213 OFFICE O/P EST LOW 20 MIN: CPT | Performed by: FAMILY MEDICINE

## 2020-05-01 PROCEDURE — 1036F TOBACCO NON-USER: CPT | Performed by: FAMILY MEDICINE

## 2020-05-01 PROCEDURE — G8427 DOCREV CUR MEDS BY ELIG CLIN: HCPCS | Performed by: FAMILY MEDICINE

## 2020-05-01 PROCEDURE — 1123F ACP DISCUSS/DSCN MKR DOCD: CPT | Performed by: FAMILY MEDICINE

## 2020-05-01 PROCEDURE — G8399 PT W/DXA RESULTS DOCUMENT: HCPCS | Performed by: FAMILY MEDICINE

## 2020-05-01 PROCEDURE — G8417 CALC BMI ABV UP PARAM F/U: HCPCS | Performed by: FAMILY MEDICINE

## 2020-05-01 PROCEDURE — 1090F PRES/ABSN URINE INCON ASSESS: CPT | Performed by: FAMILY MEDICINE

## 2020-05-01 PROCEDURE — 83036 HEMOGLOBIN GLYCOSYLATED A1C: CPT | Performed by: FAMILY MEDICINE

## 2020-05-01 PROCEDURE — 3051F HG A1C>EQUAL 7.0%<8.0%: CPT | Performed by: FAMILY MEDICINE

## 2020-05-01 RX ORDER — INSULIN GLARGINE 100 [IU]/ML
35 INJECTION, SOLUTION SUBCUTANEOUS NIGHTLY
Qty: 5 PEN | Refills: 5 | Status: ON HOLD
Start: 2020-05-01 | End: 2020-12-28 | Stop reason: SDUPTHER

## 2020-05-01 RX ORDER — SERTRALINE HYDROCHLORIDE 100 MG/1
TABLET, FILM COATED ORAL
Qty: 30 TABLET | Refills: 0 | Status: SHIPPED
Start: 2020-05-01 | End: 2020-05-27

## 2020-05-01 RX ORDER — ROPINIROLE 1 MG/1
TABLET, FILM COATED ORAL
Qty: 1 TABLET | Refills: 1 | Status: SHIPPED
Start: 2020-05-01 | End: 2021-05-10

## 2020-05-01 RX ORDER — UBIQUINOL 100 MG
1 CAPSULE ORAL
Qty: 200 EACH | Refills: 3 | Status: SHIPPED
Start: 2020-05-01 | End: 2021-10-05 | Stop reason: SDUPTHER

## 2020-05-01 RX ORDER — TRAZODONE HYDROCHLORIDE 50 MG/1
50 TABLET ORAL NIGHTLY
Qty: 90 TABLET | Refills: 1 | Status: SHIPPED
Start: 2020-05-01 | End: 2021-10-05 | Stop reason: SDUPTHER

## 2020-05-01 RX ORDER — LEVOTHYROXINE SODIUM 75 MCG
75 TABLET ORAL DAILY
Qty: 30 TABLET | Refills: 3 | Status: SHIPPED
Start: 2020-05-01 | End: 2021-05-10 | Stop reason: SDUPTHER

## 2020-05-01 ASSESSMENT — ENCOUNTER SYMPTOMS
NAUSEA: 0
EYE PAIN: 0
EYE DISCHARGE: 0
BACK PAIN: 0
COUGH: 0
SHORTNESS OF BREATH: 0
DIARRHEA: 0
RHINORRHEA: 0
VOMITING: 0
ABDOMINAL PAIN: 0
SORE THROAT: 0
CHEST TIGHTNESS: 0
ABDOMINAL DISTENTION: 0
CHOKING: 0
WHEEZING: 0
TROUBLE SWALLOWING: 0
CONSTIPATION: 0
BLOOD IN STOOL: 0
PHOTOPHOBIA: 0

## 2020-05-01 NOTE — PATIENT INSTRUCTIONS
think you are having a problem with your medicine. You will get more details on the specific medicines your doctor prescribes. · Check your blood sugar as often as your doctor recommends. It is important to keep track of any symptoms you have, such as low blood sugar. Also tell your doctor if you have any changes in your activities, diet, or insulin use. · Talk to your doctor before you start taking aspirin every day. Aspirin can help certain people lower their risk of a heart attack or stroke. But taking aspirin isn't right for everyone, because it can cause serious bleeding. · Do not smoke. If you need help quitting, talk to your doctor about stop-smoking programs and medicines. These can increase your chances of quitting for good. · Keep your cholesterol and blood pressure at normal levels. You may need to take one or more medicines to reach your goals. Take them exactly as directed. Do not stop or change a medicine without talking to your doctor first.  When should you call for help? Call 911 anytime you think you may need emergency care. For example, call if:    · You passed out (lost consciousness), or you suddenly become very sleepy or confused. (You may have very low blood sugar.)    Call your doctor now or seek immediate medical care if:    · Your blood sugar is 300 mg/dL or is higher than the level your doctor has set for you.     · You have symptoms of low blood sugar, such as:  ? Sweating. ? Feeling nervous, shaky, and weak. ? Extreme hunger and slight nausea. ? Dizziness and headache.  ? Blurred vision. ? Confusion.    Watch closely for changes in your health, and be sure to contact your doctor if:    · You often have problems controlling your blood sugar.     · You have symptoms of long-term diabetes problems, such as:  ? New vision changes. ? New pain, numbness, or tingling in your hands or feet. ? Skin problems. Where can you learn more? Go to https://chdanikaeb.health[employer]. org and sign in to your Admittedly account. Enter C553 in the Vinogusto.com box to learn more about \"Type 2 Diabetes: Care Instructions. \"     If you do not have an account, please click on the \"Sign Up Now\" link. Current as of: December 19, 2019Content Version: 12.4  © 6571-0266 Healthwise, Incorporated. Care instructions adapted under license by [facility] ([facility]). If you have questions about a medical condition or this instruction, always ask your healthcare professional. [address] any warranty or liability for your use of this information.

## 2020-05-27 RX ORDER — SERTRALINE HYDROCHLORIDE 100 MG/1
TABLET, FILM COATED ORAL
Qty: 30 TABLET | Refills: 0 | Status: SHIPPED
Start: 2020-05-27 | End: 2021-05-10 | Stop reason: SDUPTHER

## 2020-06-19 ENCOUNTER — TELEPHONE (OUTPATIENT)
Dept: ORTHOPEDIC SURGERY | Age: 75
End: 2020-06-19

## 2020-06-22 ENCOUNTER — OFFICE VISIT (OUTPATIENT)
Dept: ORTHOPEDIC SURGERY | Age: 75
End: 2020-06-22
Payer: COMMERCIAL

## 2020-06-22 VITALS — TEMPERATURE: 96.7 F | HEIGHT: 66 IN | RESPIRATION RATE: 20 BRPM | WEIGHT: 220 LBS | BODY MASS INDEX: 35.36 KG/M2

## 2020-06-22 PROCEDURE — 1123F ACP DISCUSS/DSCN MKR DOCD: CPT | Performed by: ORTHOPAEDIC SURGERY

## 2020-06-22 PROCEDURE — 1036F TOBACCO NON-USER: CPT | Performed by: ORTHOPAEDIC SURGERY

## 2020-06-22 PROCEDURE — G8417 CALC BMI ABV UP PARAM F/U: HCPCS | Performed by: ORTHOPAEDIC SURGERY

## 2020-06-22 PROCEDURE — 3017F COLORECTAL CA SCREEN DOC REV: CPT | Performed by: ORTHOPAEDIC SURGERY

## 2020-06-22 PROCEDURE — 1090F PRES/ABSN URINE INCON ASSESS: CPT | Performed by: ORTHOPAEDIC SURGERY

## 2020-06-22 PROCEDURE — G8399 PT W/DXA RESULTS DOCUMENT: HCPCS | Performed by: ORTHOPAEDIC SURGERY

## 2020-06-22 PROCEDURE — 4040F PNEUMOC VAC/ADMIN/RCVD: CPT | Performed by: ORTHOPAEDIC SURGERY

## 2020-06-22 PROCEDURE — 99204 OFFICE O/P NEW MOD 45 MIN: CPT | Performed by: ORTHOPAEDIC SURGERY

## 2020-06-22 PROCEDURE — G8427 DOCREV CUR MEDS BY ELIG CLIN: HCPCS | Performed by: ORTHOPAEDIC SURGERY

## 2020-07-17 ENCOUNTER — OFFICE VISIT (OUTPATIENT)
Dept: PHYSICAL MEDICINE AND REHAB | Age: 75
End: 2020-07-17
Payer: COMMERCIAL

## 2020-07-17 VITALS — BODY MASS INDEX: 35.36 KG/M2 | WEIGHT: 220 LBS | HEIGHT: 66 IN

## 2020-07-17 PROCEDURE — 95886 MUSC TEST DONE W/N TEST COMP: CPT | Performed by: PHYSICAL MEDICINE & REHABILITATION

## 2020-07-17 PROCEDURE — 95909 NRV CNDJ TST 5-6 STUDIES: CPT | Performed by: PHYSICAL MEDICINE & REHABILITATION

## 2020-07-17 NOTE — PROGRESS NOTES
[address] [facility]  Electrodiagnostic Laboratory  *Accredited by the [address] [place] [address] with exemplary status  [address] [facility] [address]  Phone: [phone]  Fax: [phone]    Referring Provider: Nirmala Mendez MD  Primary Care Physician: Zahira Lincoln DO  Patient Name: Radha Man  Patient YOB: 1945  Gender: female  BMI: Body mass index is 35.51 kg/m². Height 5' 6\" (1.676 m), weight 220 lb (99.8 kg), not currently breastfeeding. 7/17/2020    Description of clinical problem:   Chief Complaint   Patient presents with    Hand Numbness     Intermittent numbness involving the right hand and fingers present for approximately 1 year after no acute injury. Pain No  Pain Score:   0 - No pain; Numbness/tingling  Yes; Weakness  No       Sensory NCS      Nerve / Sites Rec. Site Peak Lat PP Amp Segments Distance Velocity Temp. ms µV  cm m/s °C   R Median - Digit II (Antidromic)      Palm Dig II 1.93 1.4* Palm - Dig II 7 54 33.4      Wrist Dig II NR NR* Wrist - Dig II 14 NR 33.4   R Ulnar - Digit V (Antidromic)      Wrist Dig V 3.85 17.9 Wrist - Dig V 14 49 32.6   R Radial - Anatomical snuff box (Forearm)      Forearm Wrist 2.76 9.0 Forearm - Wrist 10 52 32.4       Motor NCS      Nerve / Sites Muscle Onset Amplitude Segments Distance Velocity Temp.     ms mV  cm m/s °C   R Median - APB      Palm APB 2.14 5.0 Palm - APB   32.6      Wrist APB 10.00* 3.7 Wrist - Palm 8 10* 32.6      Elbow APB 14.95 3.6 Elbow - Wrist 20 40* 32.6   R Ulnar - ADM      Wrist ADM 3.96 9.6 Wrist - ADM 8  32.6      B. Elbow ADM 7.55 9.5 B. Elbow - Wrist 18 50 32.6      A. Elbow ADM 9.38 8.3 A. Elbow - B. Elbow 10 55 32.6       F  Wave      Nerve Fmin % F    ms %   R Median - APB 42.08* 60   R Ulnar - ADM 30.99 20       EMG      EMG Summary Table     Spontaneous MUAP Recruitment   Muscle Nerve Roots IA Fib PSW Fasc Amp Dur. PPP Pattern   R.  Biceps brachii Musculocutaneous C5-C6 N None None None N N N N R. Triceps brachii Radial C6-C8 N None None None N N N N   R. Pronator teres Median C6-C7 N None None None N N N N   R. First dorsal interosseous Ulnar C8-T1 N None None None N N N N   R. Abductor pollicis brevis Median C9-G1 N None None None N N N Reduced       Study Limitations:  obesity    Summary of Findings:   Nerve conduction studies:   · The following nerve conduction studies were abnormal:   · The right median sensory response in the palm was very small and at the wrist was absent. · The right median motor latency at the wrist is prolonged and the conduction velocity is slow with focal slowing across the wrist.   · All other nerve conduction studies listed in the table above were normal in latency, amplitude and conduction velocity. Needle EMG:   · Needle EMG was performed using a concentric needle. · The following abnormalities were seen on needle EMG: reduced motor unit recruitment to the right abductor pollicis brevis. All other muscles tested, as listed in the table above demonstrated normal amplitude, duration, phases and recruitment and no active denervation signs were seen. Diagnostic Interpretation: This study was abnormal.     Electrodiagnosis: There is electrodiagnostic evidence of a median mononeuropathy. · Location: right at the wrist.   · Nature: [  ] Axonal   [  ] Demyelinating  [ X ] Mixed axonal and demyelinating     [  ] Sensory [  ] Motor               [ X ] Mixed sensorimotor     [  ] with active denervation       [ X ] without active denervation  · Duration: Subacute  · Severity: moderate  · Prognosis: Fair. The prognosis for recovery of demyelinating lesions is good if the cause is alleviated. The prognosis for recovery of axonal lesions is poor and dependant on collateral sprouting and reinnervation. Previous Study: None on the right hand.        Follow up EMG is recommended if no surgical intervention and symptoms persist in 6 months to monitor for further

## 2020-07-17 NOTE — PATIENT INSTRUCTIONS
Electrodiagnotic Laboratory  [facility]  BHARAT Crystal D.O. [facility]  [address] [facility] [address]  Phone: [phone]  Fax: [phone]        Today you had an electrodiagnostic exam which included nerve conduction studies (NCS) and electromyography (EMG). This test evaluated the electrical activity of your nerves and muscles to help determine if you have a nerve or muscle disease. This test can help determine the location and type of a nerve or muscle problem. This will help your referring doctor diagnose your condition and determine the appropriate next step in your treatment plan. After your test:    1. There are no long lasting side effects of the test.     2. You may resume your normal activities without restrictions. 3.  Resume any medications that were stopped for the test.     4  If you have sore areas or bruising in your muscles where the needle was placed, apply a cold pack to the sore area for 15-20 minutes three to four times a day as needed for pain. The soreness should go away in about 1-2 days. 5. Your results were provided  Briefly at the end of your test and the final detailed report will be provided to your referring physician, and/or primary care physician and any other parties you requested within 1-2 days of the examination. You may wish to contact your referring provider after a few days to determine what they would like you to do next. 6.  Please call [phone] with any questions or concerns and if you develop increased body temperature/fever, swelling, tenderness, increased pain and/or drainage from the sites where the needle was placed. Thank you for choosing us for your health care needs.

## 2020-08-06 ENCOUNTER — PREP FOR PROCEDURE (OUTPATIENT)
Dept: ORTHOPEDIC SURGERY | Age: 75
End: 2020-08-06

## 2020-08-06 ENCOUNTER — HOSPITAL ENCOUNTER (OUTPATIENT)
Age: 75
Discharge: HOME | End: 2020-08-08
Payer: COMMERCIAL

## 2020-08-06 PROCEDURE — U0003 INFECTIOUS AGENT DETECTION BY NUCLEIC ACID (DNA OR RNA); SEVERE ACUTE RESPIRATORY SYNDROME CORONAVIRUS 2 (SARS-COV-2) (CORONAVIRUS DISEASE [COVID-19]), AMPLIFIED PROBE TECHNIQUE, MAKING USE OF HIGH THROUGHPUT TECHNOLOGIES AS DESCRIBED BY CMS-2020-01-R: HCPCS

## 2020-08-06 RX ORDER — SODIUM CHLORIDE 0.9 % (FLUSH) 0.9 %
10 SYRINGE (ML) INJECTION EVERY 12 HOURS SCHEDULED
Status: CANCELLED | OUTPATIENT
Start: 2020-08-06

## 2020-08-06 RX ORDER — SODIUM CHLORIDE 0.9 % (FLUSH) 0.9 %
10 SYRINGE (ML) INJECTION PRN
Status: CANCELLED | OUTPATIENT
Start: 2020-08-06

## 2020-08-06 RX ORDER — SODIUM CHLORIDE 9 MG/ML
INJECTION, SOLUTION INTRAVENOUS CONTINUOUS
Status: CANCELLED | OUTPATIENT
Start: 2020-08-06

## 2020-08-08 LAB
SARS-COV-2: NOT DETECTED
SOURCE: NORMAL

## 2020-08-10 RX ORDER — CALCIUM ACETATE 667 MG/1
CAPSULE ORAL
COMMUNITY
Start: 2020-06-19 | End: 2020-09-30 | Stop reason: ALTCHOICE

## 2020-08-10 ASSESSMENT — PAIN DESCRIPTION - LOCATION: LOCATION: FINGER (COMMENT WHICH ONE)

## 2020-08-10 ASSESSMENT — PAIN DESCRIPTION - DESCRIPTORS: DESCRIPTORS: NUMBNESS

## 2020-08-10 ASSESSMENT — PAIN DESCRIPTION - FREQUENCY: FREQUENCY: CONTINUOUS

## 2020-08-10 ASSESSMENT — PAIN DESCRIPTION - ONSET: ONSET: ON-GOING

## 2020-08-10 ASSESSMENT — PAIN DESCRIPTION - PAIN TYPE: TYPE: ACUTE PAIN

## 2020-08-10 ASSESSMENT — PAIN DESCRIPTION - ORIENTATION: ORIENTATION: LEFT

## 2020-08-10 NOTE — PROGRESS NOTES
Have you been tested for COVID  Yes           Have you been told you were positive for COVID No  Have you had any known exposure to someone that is positive for COVID No  Do you have a cough                   No              Do you have shortness of breath No                 Do you have a sore throat            No                Are you having chills                    No                Are you having muscle aches. No                    Please come to the hospital wearing a mask and have your significant other wear a mask as well. Both of you should check your temperature before leaving to come here,  if it is 100 or higher please call [phone] for instruction.

## 2020-08-10 NOTE — PROGRESS NOTES
[facility] PRE-ADMISSION TESTING INSTRUCTIONS    The Preadmission Testing patient is instructed accordingly using the following criteria (check applicable):    ARRIVAL INSTRUCTIONS:  [x] Parking the day of Surgery is located in the Main Entrance lot. Upon entering the door, make an immediate right to the surgery reception desk    [] [phone] is available Monday through Friday 6 am to 6 pm    [x] Bring photo ID and insurance card    [] Bring in a copy of Living will or Durable Power of  papers. [x] Please be sure to arrange for responsible adult to provide transportation to and from the hospital    [x] Please arrange for responsible adult to be with you for the 24 hour period post procedure due to having anesthesia      GENERAL INSTRUCTIONS:    [x] Nothing by mouth after midnight, including gum, candy, mints or water    [x] You may brush your teeth, but do not swallow any water    [] Take medications as instructed with 1-2 oz of water    [] Stop herbal supplements and vitamins 5 days prior to procedure    [] Follow preop dosing of blood thinners per physician instructions    [x] Take 1/2 dose of evening insulin, but no insulin after midnight    [] No oral diabetic medications after midnight    [x] If diabetic and have low blood sugar or feel symptomatic, take 1-2oz apple juice only    [x] Bring inhalers day of surgery    [] Bring C-PAP/ Bi-Pap day of surgery    [] Bring urine specimen day of surgery    [x] Shower or bath with soap, lather and rinse well, AM of Surgery, no lotion, powders or creams to surgical site    [] Follow bowel prep as instructed per surgeon    [] No tobacco products within 24 hours of surgery     [x] No alcohol or illegal drug use within 24 hours of surgery.     [x] Jewelry, body piercing's, eyeglasses, contact lenses and dentures are not permitted into surgery (bring cases)      [x] Please do not wear any nail polish, make up or hair products on the day of surgery    [x] If not already done, you can expect a call from registration    [x] You can expect a call the business day prior to procedure to notify you if your arrival time changes    [x] If you receive a survey after surgery we would greatly appreciate your comments    [] Parent/guardian of a minor must accompany their child and remain on the premises  the entire time they are under our care     [] Pediatric patients may bring favorite toy, blanket or comfort item with them    [] A caregiver or family member must remain with the patient during their stay if they are mentally handicapped, have dementia, disoriented or unable to use a call light or would be a safety concern if left unattended    [x] Please notify surgeon if you develop any illness between now and time of surgery (cold, cough, sore throat, fever, nausea, vomiting) or any signs of infections  including skin, wounds, and dental.    [x]  The Outpatient Pharmacy is available to fill your prescription here on your day of surgery, ask your preop nurse for details    [] Other instructions  EDUCATIONAL MATERIALS PROVIDED:    [] PAT Preoperative Education Packet/Booklet     [] Medication List    [] Fluoroscopy Information Pamphlet    [] Transfusion bracelet applied with instructions    [] Joint replacement video reviewed    [] Shower with soap, lather and rinse well, and use CHG wipes provided the evening before surgery as instructed

## 2020-08-11 ENCOUNTER — TELEPHONE (OUTPATIENT)
Dept: SLEEP MEDICINE | Age: 75
End: 2020-08-11

## 2020-08-12 ENCOUNTER — ANESTHESIA EVENT (OUTPATIENT)
Dept: OPERATING ROOM | Age: 75
End: 2020-08-12
Payer: COMMERCIAL

## 2020-08-12 ENCOUNTER — HOSPITAL ENCOUNTER (OUTPATIENT)
Age: 75
Setting detail: OUTPATIENT SURGERY
Discharge: HOME | End: 2020-08-12
Attending: ORTHOPAEDIC SURGERY | Admitting: ORTHOPAEDIC SURGERY
Payer: COMMERCIAL

## 2020-08-12 ENCOUNTER — ANESTHESIA (OUTPATIENT)
Dept: OPERATING ROOM | Age: 75
End: 2020-08-12
Payer: COMMERCIAL

## 2020-08-12 VITALS
RESPIRATION RATE: 18 BRPM | WEIGHT: 220 LBS | HEART RATE: 80 BPM | DIASTOLIC BLOOD PRESSURE: 63 MMHG | BODY MASS INDEX: 35.36 KG/M2 | TEMPERATURE: 97.9 F | HEIGHT: 66 IN | OXYGEN SATURATION: 95 % | SYSTOLIC BLOOD PRESSURE: 96 MMHG

## 2020-08-12 VITALS — OXYGEN SATURATION: 93 % | DIASTOLIC BLOOD PRESSURE: 40 MMHG | SYSTOLIC BLOOD PRESSURE: 73 MMHG

## 2020-08-12 LAB
METER GLUCOSE: 163 MG/DL (ref 74–99)
POTASSIUM SERPL-SCNC: 4.2 MMOL/L (ref 3.5–5)

## 2020-08-12 PROCEDURE — 3600000012 HC SURGERY LEVEL 2 ADDTL 15MIN: Performed by: ORTHOPAEDIC SURGERY

## 2020-08-12 PROCEDURE — 2580000003 HC RX 258: Performed by: NURSE ANESTHETIST, CERTIFIED REGISTERED

## 2020-08-12 PROCEDURE — 7100000010 HC PHASE II RECOVERY - FIRST 15 MIN: Performed by: ORTHOPAEDIC SURGERY

## 2020-08-12 PROCEDURE — 2500000003 HC RX 250 WO HCPCS: Performed by: PHYSICIAN ASSISTANT

## 2020-08-12 PROCEDURE — 2709999900 HC NON-CHARGEABLE SUPPLY: Performed by: ORTHOPAEDIC SURGERY

## 2020-08-12 PROCEDURE — 36415 COLL VENOUS BLD VENIPUNCTURE: CPT

## 2020-08-12 PROCEDURE — 3700000000 HC ANESTHESIA ATTENDED CARE: Performed by: ORTHOPAEDIC SURGERY

## 2020-08-12 PROCEDURE — 6360000002 HC RX W HCPCS: Performed by: NURSE ANESTHETIST, CERTIFIED REGISTERED

## 2020-08-12 PROCEDURE — 2500000003 HC RX 250 WO HCPCS: Performed by: ORTHOPAEDIC SURGERY

## 2020-08-12 PROCEDURE — 82962 GLUCOSE BLOOD TEST: CPT

## 2020-08-12 PROCEDURE — 3600000002 HC SURGERY LEVEL 2 BASE: Performed by: ORTHOPAEDIC SURGERY

## 2020-08-12 PROCEDURE — 64721 CARPAL TUNNEL SURGERY: CPT | Performed by: ORTHOPAEDIC SURGERY

## 2020-08-12 PROCEDURE — 7100000011 HC PHASE II RECOVERY - ADDTL 15 MIN: Performed by: ORTHOPAEDIC SURGERY

## 2020-08-12 PROCEDURE — 84132 ASSAY OF SERUM POTASSIUM: CPT

## 2020-08-12 PROCEDURE — 3700000001 HC ADD 15 MINUTES (ANESTHESIA): Performed by: ORTHOPAEDIC SURGERY

## 2020-08-12 RX ORDER — FENTANYL CITRATE 50 UG/ML
INJECTION, SOLUTION INTRAMUSCULAR; INTRAVENOUS PRN
Status: DISCONTINUED | OUTPATIENT
Start: 2020-08-12 | End: 2020-08-12 | Stop reason: SDUPTHER

## 2020-08-12 RX ORDER — PROPOFOL 10 MG/ML
INJECTION, EMULSION INTRAVENOUS PRN
Status: DISCONTINUED | OUTPATIENT
Start: 2020-08-12 | End: 2020-08-12 | Stop reason: SDUPTHER

## 2020-08-12 RX ORDER — HYDROCODONE BITARTRATE AND ACETAMINOPHEN 5; 325 MG/1; MG/1
1 TABLET ORAL PRN
Status: DISCONTINUED | OUTPATIENT
Start: 2020-08-12 | End: 2020-08-12 | Stop reason: HOSPADM

## 2020-08-12 RX ORDER — SODIUM CHLORIDE 9 MG/ML
INJECTION, SOLUTION INTRAVENOUS CONTINUOUS PRN
Status: DISCONTINUED | OUTPATIENT
Start: 2020-08-12 | End: 2020-08-12 | Stop reason: SDUPTHER

## 2020-08-12 RX ORDER — SODIUM CHLORIDE 0.9 % (FLUSH) 0.9 %
10 SYRINGE (ML) INJECTION PRN
Status: DISCONTINUED | OUTPATIENT
Start: 2020-08-12 | End: 2020-08-12 | Stop reason: HOSPADM

## 2020-08-12 RX ORDER — SODIUM CHLORIDE 9 MG/ML
INJECTION, SOLUTION INTRAVENOUS CONTINUOUS
Status: DISCONTINUED | OUTPATIENT
Start: 2020-08-12 | End: 2020-08-12 | Stop reason: HOSPADM

## 2020-08-12 RX ORDER — HYDROCODONE BITARTRATE AND ACETAMINOPHEN 5; 325 MG/1; MG/1
1 TABLET ORAL EVERY 6 HOURS PRN
Qty: 12 TABLET | Refills: 0 | Status: SHIPPED | OUTPATIENT
Start: 2020-08-12 | End: 2020-08-19

## 2020-08-12 RX ORDER — LIDOCAINE HYDROCHLORIDE AND EPINEPHRINE 10; 10 MG/ML; UG/ML
INJECTION, SOLUTION INFILTRATION; PERINEURAL PRN
Status: DISCONTINUED | OUTPATIENT
Start: 2020-08-12 | End: 2020-08-12 | Stop reason: ALTCHOICE

## 2020-08-12 RX ORDER — CLINDAMYCIN PHOSPHATE 900 MG/50ML
900 INJECTION INTRAVENOUS
Status: COMPLETED | OUTPATIENT
Start: 2020-08-12 | End: 2020-08-12

## 2020-08-12 RX ORDER — SODIUM CHLORIDE 0.9 % (FLUSH) 0.9 %
10 SYRINGE (ML) INJECTION EVERY 12 HOURS SCHEDULED
Status: DISCONTINUED | OUTPATIENT
Start: 2020-08-12 | End: 2020-08-12 | Stop reason: HOSPADM

## 2020-08-12 RX ADMIN — FENTANYL CITRATE 100 MCG: 50 INJECTION, SOLUTION INTRAMUSCULAR; INTRAVENOUS at 12:40

## 2020-08-12 RX ADMIN — SODIUM CHLORIDE: 9 INJECTION, SOLUTION INTRAVENOUS at 12:37

## 2020-08-12 RX ADMIN — PROPOFOL 200 MG: 10 INJECTION, EMULSION INTRAVENOUS at 12:40

## 2020-08-12 RX ADMIN — CLINDAMYCIN IN 5 PERCENT DEXTROSE 900 MG: 18 INJECTION, SOLUTION INTRAVENOUS at 12:37

## 2020-08-12 ASSESSMENT — PULMONARY FUNCTION TESTS
PIF_VALUE: 1
PIF_VALUE: 0
PIF_VALUE: 0
PIF_VALUE: 1
PIF_VALUE: 0
PIF_VALUE: 1
PIF_VALUE: 0
PIF_VALUE: 1
PIF_VALUE: 0
PIF_VALUE: 1
PIF_VALUE: 1
PIF_VALUE: 0
PIF_VALUE: 1
PIF_VALUE: 0

## 2020-08-12 ASSESSMENT — PAIN SCALES - GENERAL
PAINLEVEL_OUTOF10: 0

## 2020-08-12 ASSESSMENT — PAIN DESCRIPTION - PAIN TYPE
TYPE: SURGICAL PAIN

## 2020-08-12 ASSESSMENT — ENCOUNTER SYMPTOMS: SHORTNESS OF BREATH: 0

## 2020-08-12 ASSESSMENT — LIFESTYLE VARIABLES: SMOKING_STATUS: 0

## 2020-08-12 NOTE — PROGRESS NOTES
CLINICAL PHARMACY NOTE: MEDS TO [address] Select Patient?: No  Total # of Prescriptions Filled: 1   The following medications were delivered to the patient:  · norco 5  Total # of Interventions Completed: 3  Time Spent (min): 30    Additional Documentation:

## 2020-08-12 NOTE — ANESTHESIA POSTPROCEDURE EVALUATION
Department of Anesthesiology  Postprocedure Note    Patient: Georgiana Laws  MRN: [id number]  YOB: 1945  Date of evaluation: 8/12/2020  Time:  4:41 PM     Procedure Summary     Date:  08/12/20 Room / Location:  [address]    Anesthesia Start:  0242 Anesthesia Stop:  1261    Procedure:  LEFT CARPAL TUNNEL RELEASE (Left ) Diagnosis:  (LEFT CARPAL TUNNEL SYNDROME)    Surgeon:  Emelyn Bang MD Responsible Provider:  Tony Ramirez MD    Anesthesia Type:  MAC ASA Status:  3          Anesthesia Type: MAC    Tavo Phase I: Tavo Score: 10    Tavo Phase II: Tavo Score: 10    Last vitals: Reviewed and per EMR flowsheets.        Anesthesia Post Evaluation    Patient location during evaluation: PACU  Patient participation: complete - patient participated  Level of consciousness: awake and alert  Airway patency: patent  Nausea & Vomiting: no vomiting and no nausea  Complications: no  Cardiovascular status: blood pressure returned to baseline  Respiratory status: acceptable  Hydration status: euvolemic

## 2020-08-12 NOTE — PROGRESS NOTES
Discharge instructions and post anesthesia instructions given to pt and nephew.  Verbalized agreement

## 2020-08-12 NOTE — ANESTHESIA PRE PROCEDURE
Facility-Administered Medications   Medication Dose Route Frequency Provider Last Rate Last Dose    0.9 % sodium chloride infusion   Intravenous Continuous TIMMY Smith        clindamycin (CLEOCIN) 900 mg in dextrose 5 % 50 mL IVPB  900 mg Intravenous On Call to [address]        sodium chloride flush 0.9 % injection 10 mL  10 mL Intravenous 2 times per day TIMMY Smith        sodium chloride flush 0.9 % injection 10 mL  10 mL Intravenous PRN TIMMY Smith           Allergies:     Allergies   Allergen Reactions    Bactrim [Sulfamethoxazole-Trimethoprim] Hives    Pcn [Penicillins] Hives    Penicillin G      Other reaction(s): BREAK OUT    Sulfa Antibiotics Other (See Comments)     \"passed out with IV\"  Other reaction(s): BREAK OUT       Problem List:    Patient Active Problem List   Diagnosis Code    Controlled type 2 diabetes mellitus with chronic kidney disease on chronic dialysis, with long-term current use of insulin ([facility]) E11.22, N18.6, Z79.4, Z99.2    Mixed hyperlipidemia E78.2    Hypothyroidism E03.9    Essential hypertension I10    Sleep apnea G47.30    Osteoporosis M81.0    Macrocytic anemia with vitamin B12 deficiency D51.9    ESRD on dialysis ([facility]) N18.6, Z99.2    Rheumatoid arthritis involving multiple sites ([facility] M06.9    Restless leg syndrome, controlled G25.81    Depression F32.9       Past Medical History:        Diagnosis Date    Anemia     Arthritis     RA    Asthma     Chronic kidney disease     Depression     Hemodialysis patient ([facility]     T-Th-Sat    Hyperlipidemia     Hypertension     Hypothyroid     Sleep apnea     no CPAP    Type II or unspecified type diabetes mellitus without mention of complication, not stated as uncontrolled     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 ([facility] 6/15/2017       Past Surgical History:        Procedure Laterality Date    CHOLECYSTECTOMY  2004    DIALYSIS FISTULA CREATION Left 03/02/2018    AV fistula arm/Dr. Saul Hernandez    FOOT SURGERY Left 2012,2013    pin put in, then pin removed   [facility]  [facility]    HAND SURGERY Right 2012    ligament was cut    HERNIA REPAIR      KNEE SURGERY  2009    R knee left side    MN REVISE AV FISTULA,W/O THROMBECTOMY Left 5/23/2018    SUPERFICIALIZATION AV FISTULA - LEFT UPPER ARM performed by Chakaesau Anderson MD at [address] History:    Social History     Tobacco Use    Smoking status: Never Smoker    Smokeless tobacco: Never Used   Substance Use Topics    Alcohol use: Yes     Comment: rarely                                Counseling given: Not Answered      Vital Signs (Current):   Vitals:    08/10/20 1137 08/12/20 1027   BP:  (!) 139/96   Pulse:  97   Resp:  16   Temp:  97.3 °F (36.3 °C)   SpO2:  95%   Weight: 220 lb (99.8 kg) 220 lb (99.8 kg)   Height: 5' 6\" (1.676 m) 5' 6\" (1.676 m)                                              BP Readings from Last 3 Encounters:   08/12/20 (!) 139/96   05/01/20 136/86   02/28/20 108/72       NPO Status: Time of last liquid consumption: 2200                        Time of last solid consumption: 2100                        Date of last liquid consumption: 08/11/20                        Date of last solid food consumption: 08/11/20    BMI:   Wt Readings from Last 3 Encounters:   08/12/20 220 lb (99.8 kg)   07/17/20 220 lb (99.8 kg)   06/22/20 220 lb (99.8 kg)     Body mass index is 35.51 kg/m².     CBC:   Lab Results   Component Value Date    WBC 5.8 12/31/2019    RBC 3.36 12/31/2019    HGB 11.7 12/31/2019    HCT 34.7 12/31/2019    .3 12/31/2019    RDW 14.3 12/31/2019     12/31/2019       CMP:   Lab Results   Component Value Date     12/31/2019    K 4.8 12/31/2019    K 3.4 06/11/2019    CL 91 12/31/2019    CO2 27 12/31/2019    BUN 42 12/31/2019    CREATININE 4.9 12/31/2019    GFRAA 11 12/31/2019    LABGLOM 9 12/31/2019    GLUCOSE 212 12/31/2019    GLUCOSE 135 06/01/2012    PROT 7.0 09/25/2019    CALCIUM 8.8 12/31/2019    BILITOT 0.5 09/25/2019    ALKPHOS 255 09/25/2019    AST 11 09/25/2019    ALT 6 09/25/2019       POC Tests: No results for input(s): POCGLU, POCNA, POCK, POCCL, POCBUN, POCHEMO, POCHCT in the last 72 hours. Coags:   Lab Results   Component Value Date    PROTIME 11.8 05/23/2018    INR 1.0 05/23/2018    APTT 29.6 05/23/2018       HCG (If Applicable): No results found for: PREGTESTUR, PREGSERUM, HCG, HCGQUANT     ABGs: No results found for: PHART, PO2ART, ACC7ELF, ULD1NHE, BEART, S5QAJNZB     Type & Screen (If Applicable):  No results found for: LABABO, LABRH    Drug/Infectious Status (If Applicable):  No results found for: HIV, HEPCAB    COVID-19 Screening (If Applicable):   Lab Results   Component Value Date    COVID19 Not Detected 08/06/2020         Anesthesia Evaluation  Patient summary reviewed and Nursing notes reviewed no history of anesthetic complications:   Airway: Mallampati: II  TM distance: >3 FB   Neck ROM: full  Mouth opening: > = 3 FB Dental:    (+) edentulous      Pulmonary: breath sounds clear to auscultation  (+) sleep apnea: on noncompliant,  asthma:     (-) shortness of breath, recent URI and not a current smoker                           Cardiovascular:  Exercise tolerance: poor (<4 METS),   (+) hypertension:, hyperlipidemia        Rhythm: regular  Rate: normal           Beta Blocker:  Not on Beta Blocker         Neuro/Psych:   (+) neuromuscular disease:, depression/anxiety              ROS comment: RLS GI/Hepatic/Renal:   (+) renal disease: dialysis and ESRD,           Endo/Other:    (+) DiabetesType II DM, using insulin, hypothyroidism: arthritis:., .                 Abdominal:           Vascular: negative vascular ROS. Anesthesia Plan      MAC     ASA 3             Anesthetic plan and risks discussed with patient.                       Onur Diego MD   8/12/2020

## 2020-08-12 NOTE — H&P
Department of Orthopedic Surgery  History and Physical        CHIEF COMPLAINT:  Left greater than right hand numbness     HISTORY OF PRESENT ILLNESS:                 The patient is a RHD 70 y.o. female who presents with left greater than right hand numbness. Patient states she has had left hand numbness for the last 6 months. This is progressively worsened over the last couple months. She also reports occasional numbness and tingling on the right. Her symptoms on the right only started about a month ago. She notices her symptoms when she is on the phone or computer. States her biggest complaint is left hand numbness. She states she does have occasional pain to her left thumb. She denies any pins-and-needles or paresthesias. She does wear braces at night which does not help her symptoms. She does have a fistula in her left upper extremity. She does have diabetic neuropathy to her ankles.     Patient did have an EMG and nerve conduction study test dated March 13 of 2020. Left median nerve sensory latency was non-recordable. Left median nerve motor velocity was nonreportable. On the EMG portion of the exam she did have 2+ fibrillations and 2+ PSW.   This is consistent with left severe carpal tunnel syndrome.     Past Medical History:    Past Medical History             Diagnosis Date    Asthma      Chronic kidney disease      [facility]od[facility] ([facility]      Hyperlipidemia      Hypertension      Hypothyroid      Type II or unspecified type diabetes mellitus without mention of complication, not stated as uncontrolled      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 ([facility] 6/15/2017        Past Surgical History:    Past Surgical History             Procedure Laterality Date    DIALYSIS FISTULA CREATION Left 03/02/2018     AV fistula arm/Dr. Toby Hickman    FOOT SURGERY Left 2012,2013     pin put in, then pin removed    GASTRIC BYPASS SURGERY   2004    HAND SURGERY Right 2012     ligament was cut    HERNIA REPAIR        KNEE SURGERY   2009     R knee left side    [place] REVISE AV FISTULA,W/O THROMBECTOMY Left 5/23/2018     SUPERFICIALIZATION AV FISTULA - LEFT UPPER ARM performed by Arlyn Koch MD at [address]        Current Medications:   [address] Medications   No current facility-administered medications for this visit. Allergies:  Bactrim [sulfamethoxazole-trimethoprim]; Nsaids; Pcn [penicillins]; Penicillin g; Sulfa antibiotics; and Sulfamethoxazole-trimethoprim     Social History:   TOBACCO:   reports that she has never smoked. She has never used smokeless tobacco.  ETOH:   reports current alcohol use. DRUGS:   reports no history of drug use. ACTIVITIES OF DAILY LIVING:    OCCUPATION:    Family History:   Family History   No family history on file.        REVIEW OF SYSTEMS:  CONSTITUTIONAL:  negative  EYES:  negative  HEENT:  negative  RESPIRATORY:  negative  CARDIOVASCULAR:  HTN, Hyperlipidemia  GASTROINTESTINAL:  negative  GENITOURINARY:  negative  INTEGUMENT/BREAST:  negative  HEMATOLOGIC/LYMPHATIC:  negative  ALLERGIC/IMMUNOLOGIC:  negative  ENDOCRINE:  negative  MUSCULOSKELETAL:  negative  NEUROLOGICAL:  numbness  BEHAVIOR/PSYCH:  negative     PHYSICAL EXAM:    VITALS:  Temp 96.7 °F (35.9 °C) (Infrared)   Resp 20   Ht 5' 6\" (1.676 m)   Wt 220 lb (99.8 kg)   BMI 35.51 kg/m²   CONSTITUTIONAL:  awake, alert, cooperative, no apparent distress, and appears stated age  EYES:  Lids and lashes normal, pupils equal, round and reactive to light, extra ocular muscles intact, sclera clear, conjunctiva normal  ENT:  Normocephalic, without obvious abnormality, atraumatic, sinuses nontender on palpation, external ears without lesions, oral pharynx with moist mucus membranes, tonsils without erythema or exudates, gums normal and good dentition.   NECK:  Supple, symmetrical, trachea midline, no adenopathy, thyroid symmetric, not enlarged and no tenderness, skin normal  LUNGS:  CTA  CARDIOVASCULAR:  2+ radial pulses, extremities warm and well perfused  ABDOMEN:  obese, NTTP  CHEST:  Atraumatic   GENITAL/URINARY:  deferred  NEUROLOGIC:  Awake, alert, oriented to name, place and time. Cranial nerves II-XII are grossly intact. Motor is 5 out of 5 bilaterally. Sensory is intact.  gait is normal.  MUSCULOSKELETAL:     Right upper extremity: Non-tender about the shoulder and elbow with good ROM. - tinels of the cubital tunnel, + tinels of the carpal tunnel, + durkans, - finkelsteins, - CMC grind, - tenderness over the A1 pulleys with no active triggering. Heberden and Bell's nodes to the fingers with stiffness with flexion extension of the fingers. - wartenbergs and cross finger testing, APB strength 2/5 with atrophy. Median, ulnar, radial n intact to light touch. Brisk capillary refill. Gross motor 5/5.      Left upper extremity: Non-tender about the shoulder and elbow with good ROM. - tinels of the cubital tunnel, + tinels of the carpal tunnel, + durkans, - finkelsteins, - CMC grind, - tenderness over the A1 pulleys with no active triggering. Heberden and Bell's nodes to the fingers with stiffness with flexion extension of the fingers - wartenbergs and cross finger testing, APB strength 4/5 with atrophy. Median, ulnar, radial n intact to light touch. Brisk capillary refill. Gross motor 5/5.         DATA:    CBC:         Lab Results   Component Value Date     WBC 5.8 12/31/2019     RBC 3.36 12/31/2019     HGB 11.7 12/31/2019     HCT 34.7 12/31/2019     .3 12/31/2019     MCH 34.8 12/31/2019     MCHC 33.7 12/31/2019     RDW 14.3 12/31/2019      12/31/2019     MPV 9.6 12/31/2019     PT/INR:          Lab Results   Component Value Date     PROTIME 11.8 05/23/2018     INR 1.0 05/23/2018        Radiology Review: X-rays of bilateral wrists were obtained today in the office and reviewed with patient.   4 views: AP, lateral, oblique, carpal tunnel view demonstrate

## 2020-08-12 NOTE — OP NOTE
Operative Note      Patient: Radha Man  YOB: 1945  MRN: [id number]    Date of Procedure: 8/12/2020    Pre-Op Diagnosis: LEFT CARPAL TUNNEL SYNDROME    Post-Op Diagnosis: Same       Procedure(s):  LEFT CARPAL TUNNEL RELEASE    Surgeon(s):  Peyman Cardona MD    Assistant:   * No surgical staff found *    Anesthesia: Monitor Anesthesia Care    Estimated Blood Loss (mL): Minimal    Complications: None    Specimens:   * No specimens in log *    Implants:  * No implants in log *      Drains: * No LDAs found *    Findings: see details    Detailed Description of Procedure:   DATE OF PROCEDURE: 8/12/2020  SURGEON: DELIA Salazarp: Dr Harrison Juan resident  PREOPERATIVE DIAGNOSIS: left carpal tunnel syndrome. POSTOPERATIVE DIAGNOSIS: left carpal tunnel syndrome. OPERATION: left carpal tunnel release. ANESTHESIA:  1. Monitored anesthesia care  2. Local anesthetic by surgeon consisting of 10cc of 1% lidocaine with epinephrine  ESTIMATED BLOOD LOSS: Minimal  COMPLICATIONS: None. TOTAL TOURNIQUET TIME: NONE USED    DISPOSITION: The patient was stable throughout the procedure. FINDINGS:  1. Evidence of median nerve compression beneath the transverse carpal  ligament. It was adequately decompressed with release of the transverse  carpal ligament. 2. Status post decompression, the median nerve was fully decompressed  throughout its course including distal forearm fascia through the carpal  tunnel to its trifurcation distally  OPERATIVE INDICATION: The patient is a very pleasant patient with  persistent and recalcitrant of carpal tunnel syndrome. After failing  conservative management, she wished to proceed with carpal tunnel release.   Risks, benefits, alternatives, and complications were fully explained to her  including, but not limited to, the risk of infection, damage to  nerves/vessels/tendons, failure to relieve her symptoms, worsening symptoms,  recurrence of symptoms, pillar pain, thenar pain, hypothenar pain, scar sensitivity as well as unforeseen complications. She voiced her understanding and wished to proceed. PROCEDURE IN DETAIL: The patient was identified in the holding area. The  left hand was identified as the operative site. She was then seen by  Anesthesia, taken to the operating room, placed supine on the table, and  underwent monitored anesthesia care per Anesthesia Department. Under sterile  conditions, approximately 10 mL of 1:1 mixture of 1% lidocaine with epinephrine were infiltrated over the surgical site. The left upperextremity was prepared and draped in standard sterile fashion. The arm was exsanguinated and the tourniquet was inflated to 250 mmHg. An incision in line with the radial border of the ring finger, extending from  Riojas's cardinal line to within 1 cm of the volar wrist flexion crease was  then created followed by blunt dissection and identification of the  superficial palmar fascia, underlying median nerve, and transverse carpal  ligament. Dissection was performed proximally to identify the contents of  Guyon canal, which was reflected off of the distal forearm fascia and  transverse carpal ligament. The transverse carpal ligament was then clearly  identified and released from a proximal to distal direction, decompressing  the carpal tunnel. The median nerve was inspected. There was flattening and  hyperemia to the nerve, consistent with median nerve compression. The  remaining portion of the proximal transverse carpal ligament and the distal  forearm fascia was then released using blunt-tip Metzenbaum scissors with a  distal to proximal slide technique while visualizing and protecting the  underlying median nerve. The median nerve was fully decompressed  throughout its visualized course, including the distal forearm fascia,  through the carpal tunnel and to its level of trifurcation distally.  With  this accomplished, the tourniquet was deflated and hemostasis was achieved with bipolar cautery. The wound was copiously irrigated out. The skin was closed with nylon suture followed by a soft sterile dressing.     Electronically signed by Jonah Mauricio MD on 8/12/2020 at 7:06 PM        Electronically signed by Jonah Mauricio MD on 8/12/2020 at 7:06 PM

## 2020-08-12 NOTE — BRIEF OP NOTE
Brief Postoperative Note      Patient: Keyshawn Hensley  YOB: 1945  MRN: [id number]    Date of Procedure: 8/12/2020    Pre-Op Diagnosis: LEFT CARPAL TUNNEL SYNDROME    Post-Op Diagnosis: Same       Procedure(s):  LEFT CARPAL TUNNEL RELEASE    Surgeon(s):  Kayli Carter MD    Assistant:  * No surgical staff found *    Anesthesia: Monitor Anesthesia Care    Estimated Blood Loss (mL): <17 cc    Complications: None    Specimens:   * No specimens in log *    Implants:  * No implants in log *      Drains: * No LDAs found *    Findings: see op note    Electronically signed by Khadijah Metz DO on 8/12/2020 at 1:01 PM

## 2020-08-24 ENCOUNTER — OFFICE VISIT (OUTPATIENT)
Dept: ORTHOPEDIC SURGERY | Age: 75
End: 2020-08-24
Payer: COMMERCIAL

## 2020-08-24 VITALS — RESPIRATION RATE: 18 BRPM | TEMPERATURE: 97.3 F | BODY MASS INDEX: 35.36 KG/M2 | WEIGHT: 220 LBS | HEIGHT: 66 IN

## 2020-08-24 PROCEDURE — G8417 CALC BMI ABV UP PARAM F/U: HCPCS | Performed by: ORTHOPAEDIC SURGERY

## 2020-08-24 PROCEDURE — 99214 OFFICE O/P EST MOD 30 MIN: CPT | Performed by: ORTHOPAEDIC SURGERY

## 2020-08-24 PROCEDURE — 4040F PNEUMOC VAC/ADMIN/RCVD: CPT | Performed by: ORTHOPAEDIC SURGERY

## 2020-08-24 PROCEDURE — 1036F TOBACCO NON-USER: CPT | Performed by: ORTHOPAEDIC SURGERY

## 2020-08-24 PROCEDURE — G8399 PT W/DXA RESULTS DOCUMENT: HCPCS | Performed by: ORTHOPAEDIC SURGERY

## 2020-08-24 PROCEDURE — G8427 DOCREV CUR MEDS BY ELIG CLIN: HCPCS | Performed by: ORTHOPAEDIC SURGERY

## 2020-08-24 PROCEDURE — 1090F PRES/ABSN URINE INCON ASSESS: CPT | Performed by: ORTHOPAEDIC SURGERY

## 2020-08-24 PROCEDURE — 3017F COLORECTAL CA SCREEN DOC REV: CPT | Performed by: ORTHOPAEDIC SURGERY

## 2020-08-24 PROCEDURE — 1123F ACP DISCUSS/DSCN MKR DOCD: CPT | Performed by: ORTHOPAEDIC SURGERY

## 2020-08-24 NOTE — PATIENT INSTRUCTIONS
Patient Education        Carpal Tunnel Release: Before Your Surgery  What is carpal tunnel release? Carpal tunnel surgery reduces the pressure on a nerve in the wrist. Your doctor will cut a ligament that presses on the nerve. This lets the nerve pass freely through the tunnel without being squeezed. This is also called carpal tunnel release surgery. The surgery can be open or endoscopic. In open surgery, your doctor makes a small cut in the palm of your hand. This cut is called an incision. In endoscopic surgery, your doctor makes one small incision in the wrist. Or you may have one small incision in the wrist and one in the palm. Your doctor puts a thin tube with a camera attached (endoscope) into the incision. Surgical tools are put in along with the endoscope. In both types of surgeries, the incisions are closed with stitches. The incisions leave scars that usually fade in time. You may be asleep during the surgery. Or you may be awake and have medicine to numb your hand and arm so you won't feel pain. After surgery, your wrist and hand pain should start to go away. It usually takes 3 to 4 months to recover and 1 year before your hand strength returns. How much hand strength returns is different for each person. You will go home the same day as the surgery. When you can go back to work depends on the type of work you do. Follow-up care is a key part of your treatment and safety. Be sure to make and go to all appointments, and call your doctor if you are having problems. It's also a good idea to know your test results and keep a list of the medicines you take. How do you prepare for surgery? Surgery can be stressful. This information will help you understand what you can expect. And it will help you safely prepare for surgery. Preparing for surgery  · Be sure you have someone to take you home. Anesthesia and pain medicine will make it unsafe for you to drive or get home on your own.   · Understand exactly what surgery is planned, along with the risks, benefits, and other options. · Tell your doctor ALL the medicines, vitamins, supplements, and herbal remedies you take. Some may increase the risk of problems during your surgery. Your doctor will tell you if you should stop taking any of them before the surgery and how soon to do it. · If you take aspirin or some other blood thinner, ask your doctor if you should stop taking it before your surgery. Make sure that you understand exactly what your doctor wants you to do. These medicines increase the risk of bleeding. · Make sure your doctor and the hospital have a copy of your advance directive. If you don't have one, you may want to prepare one. It lets others know your health care wishes. It's a good thing to have before any type of surgery or procedure. What happens on the day of surgery? · Follow the instructions exactly about when to stop eating and drinking. If you don't, your surgery may be canceled. If your doctor told you to take your medicines on the day of surgery, take them with only a sip of water. · Take a bath or shower before you come in for your surgery. Do not apply lotions, perfumes, deodorants, or nail polish. · Do not shave the surgical site yourself. · Take off all jewelry and piercings. And take out contact lenses, if you wear them. At the hospital or surgery center    · Bring a picture ID. · The area for surgery is often marked to make sure there are no errors. · You will be kept comfortable and safe by your anesthesia provider. The anesthesia may make you sleep. Or it may just numb the area being worked on. · The surgery will take about 15 to 60 minutes. When should you call your doctor? · You have questions or concerns. · You don't understand how to prepare for your surgery. · You become ill before the surgery (such as fever, flu, or a cold).   · You need to reschedule or have changed your mind about having the surgery. Where can you learn more? Go to [email or web address] and sign in to your [email or web address] account. Enter P143 in the [employer] box to learn more about \"Carpal Tunnel Release: Before Your Surgery. \"     If you do not have an account, please click on the \"Sign Up Now\" link. Current as of: March 2, 2020               Content Version: 12.5  © 2006-2020 [employer]. Care instructions adapted under license by [facility] ([facility]). If you have questions about a medical condition or this instruction, always ask your healthcare professional. [place] [address] any warranty or liability for your use of this information. Patient Education        Carpal Tunnel Release: What to Expect at [facility] tunnel reduces the pressure on a nerve in the wrist. Your doctor cut a ligament that presses on the nerve. This lets the nerve pass freely through the tunnel without being squeezed. Your hand will hurt and may feel weak with some numbness. This usually goes away in a few days, but it may take several months. Your doctor may remove the large bandage, or he or she will tell you when and how to remove it yourself. In some cases, you may have a splint. If you have one, you will wear it for about 2 weeks. Your doctor will take out your stitches in 1 to 2 weeks. Your hand and wrist may feel worse than they used to feel. But the pain should start to go away. It usually takes 3 to 4 months to recover and up to 1 year before hand strength returns. How much strength returns will vary. The timing of your return to work depends on the type of surgery you had, whether the surgery was on your dominant hand (the hand you use most), and your work activities. If you had open surgery on your dominant hand and you do repeated actions at work, you may be able to go back to work in 10 to 8 weeks. Repeated motions include typing or assembly-line work.  If the surgery was exactly what your doctor wants you to do. · Take pain medicines exactly as directed. ? If the doctor gave you a prescription medicine for pain, take it as prescribed. ? If you are not taking a prescription pain medicine, take an over-the-counter medicine such as acetaminophen (Tylenol), ibuprofen (Advil, Motrin), or naproxen (Aleve). Read and follow all instructions on the label. ? Do not take two or more pain medicines at the same time unless the doctor told you to. Many pain medicines have acetaminophen, which is Tylenol. Too much acetaminophen (Tylenol) can be harmful. · If you think your pain medicine is making you sick to your stomach:  ? Take your medicine after meals (unless your doctor has told you not to). ? Ask your doctor for a different pain medicine. · If your doctor prescribed antibiotics, take them as directed. Do not stop taking them just because you feel better. You need to take the full course of antibiotics. Incision and splint care  · Keep your bandage dry. If it gets dirty, you may change it. · If you have a splint, talk to your doctor about when you should wear it. Exercise  · You may need wrist and hand rehabilitation. This is a series of exercises you do after your surgery. This helps you get back your wrist's and hand's range of motion, strength, and . You will work with your doctor and physical or occupational therapist to plan this exercise program. To get the best results, you need to do the exercises correctly and as often and as long as your doctor tells you. Ice and elevation  · Put ice or a cold pack on your wrist for 10 to 20 minutes at a time. Try to do this every 1 to 2 hours for the next 3 days (when you are awake) or until the swelling goes down. Put a thin cloth between the ice and your skin. · Prop up the sore wrist on a pillow when you ice it or anytime you sit or lie down during the next 3 days. Try to keep it above the level of your heart.  This will help reduce swelling. Other instructions  · Avoid letting your hand hang down. This can cause swelling. Follow-up care is a key part of your treatment and safety. Be sure to make and go to all appointments, and call your doctor if you are having problems. It's also a good idea to know your test results and keep a list of the medicines you take. When should you call for help? [id number] anytime you think you may need emergency care. For example, call if:  · You passed out (lost consciousness). · You have chest pain, are short of breath, or cough up blood. Call your doctor now or seek immediate medical care if:  · You have pain that does not get better after you take pain medicine. · Your hand is cool or pale or changes color. · Your cast or splint feels too tight. · You have tingling, weakness, or numbness in your hand or fingers. · You are sick to your stomach or cannot drink fluids. · You have loose stitches, or your incision comes open. · You have signs of a blood clot in your leg (called a deep vein thrombosis), such as:  ? Pain in your calf, back of the knee, thigh, or groin. ? Redness or swelling in your leg. · You have signs of infection, such as:  ? Increased pain, swelling, warmth, or redness. ? Red streaks leading from the incision. ? Pus draining from the incision. ? A fever. · Bright red blood has soaked through the bandage over your incision. Watch closely for any changes in your health, and be sure to contact your doctor if:  · You have a problem with your cast or splint. · You do not get better as expected. Where can you learn more? Go to [email or web address] org and sign in to your Hyperink account. Enter N388 in the [employer] box to learn more about \"Carpal Tunnel Release: What to Expect at Home. \"     If you do not have an account, please click on the \"Sign Up Now\" link.   Current as of: March 2, 2020               Content Version: 12.5  © 0676-4940 is a key part of your treatment and safety. Be sure to make and go to all appointments, and call your doctor if you are having problems. It's also a good idea to know your test results and keep a list of the medicines you take. Where can you learn more? Go to [email or web address] and sign in to your Wrnch account. Enter G800 in the OffScale box to learn more about \"Carpal Tunnel Syndrome: Exercises. \"     If you do not have an account, please click on the \"Sign Up Now\" link. Current as of: March 2, 2020               Content Version: 12.5  © 3815-7951 [employer]. Care instructions adapted under license by [facility] ([facility]). If you have questions about a medical condition or this instruction, always ask your healthcare professional. [address] any warranty or liability for your use of this information.

## 2020-08-24 NOTE — PROGRESS NOTES
Department of Orthopedic Surgery  History and Physical      CHIEF COMPLAINT:  s/p left CTR,  right hand numbness    HISTORY OF PRESENT ILLNESS:                The patient is a RHD 70 y.o. female who presents with left greater than right hand numbness. Patient states she has had left hand numbness for the last 6 months. This is progressively worsened over the last couple months. She also reports occasional numbness and tingling on the right. Her symptoms on the right only started about a month ago. She notices her symptoms when she is on the phone or computer. States her biggest complaint is left hand numbness. She states she does have occasional pain to her left thumb. She denies any pins-and-needles or paresthesias. She does wear braces at night which does not help her symptoms. She does have a fistula in her left upper extremity. She does have diabetic neuropathy to her ankles. Patient is s/p left CTR. Preoperative symptoms have improved. She still reports numbness to the fingers. She would like to schedule surgery on the right. Patient did have an EMG and nerve conduction study test dated 7/17/20. Right median nerve sensory latency was non-recordable. Right median nerve motor latency was 10.00. On the EMG portion of the exam she did have reduced recruitment pattern of her right ABP. This is consistent with right moderate to severe carpal tunnel syndrome.     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 Ivan Sal    FOOT SURGERY Left 2012,2013    pin put in, then pin [facility]   [facility]  2004    HAND SURGERY Right 2012    ligament was cut    HERNIA REPAIR      KNEE SURGERY  2009    R knee left side    FL REVISE AV FISTULA,W/O THROMBECTOMY Left 5/23/2018    SUPERFICIALIZATION AV FISTULA - LEFT UPPER ARM performed by Claritza Oro MD at [address] [place] [address]     Current Medications:   No current facility-administered medications for this visit. Allergies:  Bactrim [sulfamethoxazole-trimethoprim]; Pcn [penicillins]; Penicillin g; and Sulfa antibiotics    Social History:   TOBACCO:   reports that she has never smoked. She has never used smokeless tobacco.  ETOH:   reports current alcohol use. DRUGS:   reports no history of drug use. ACTIVITIES OF DAILY LIVING:    OCCUPATION:    Family History:   No family history on file.     REVIEW OF SYSTEMS:  CONSTITUTIONAL:  negative  EYES:  negative  HEENT:  negative  RESPIRATORY:  negative  CARDIOVASCULAR:  HTN, Hyperlipidemia  GASTROINTESTINAL:  negative  GENITOURINARY:  negative  INTEGUMENT/BREAST:  negative  HEMATOLOGIC/LYMPHATIC:  negative  ALLERGIC/IMMUNOLOGIC:  negative  ENDOCRINE:  negative  MUSCULOSKELETAL:  negative  NEUROLOGICAL:  numbness  BEHAVIOR/PSYCH:  negative    PHYSICAL EXAM:    VITALS:  Temp 97.3 °F (36.3 °C) (Infrared)   Resp 18   Ht 5' 6\" (1.676 m)   Wt 220 lb (99.8 kg)   BMI 35.51 kg/m²   CONSTITUTIONAL:  awake, alert, cooperative, no apparent distress, and appears stated age  EYES:  Lids and lashes normal, pupils equal, round and reactive to light, extra ocular muscles intact, sclera clear, conjunctiva normal  ENT:  Normocephalic, without obvious abnormality, atraumatic, sinuses nontender on palpation, external ears without lesions, oral pharynx with moist mucus membranes, tonsils without erythema or exudates, gums normal and good dentition. NECK:  Supple, symmetrical, trachea midline, no adenopathy, thyroid symmetric, not enlarged and no tenderness, skin normal  LUNGS:  CTA  CARDIOVASCULAR:  2+ radial pulses, extremities warm and well perfused  ABDOMEN:  obese, NTTP  CHEST:  Atraumatic   GENITAL/URINARY:  deferred  NEUROLOGIC:  Awake, alert, oriented to name, place and time. Cranial nerves II-XII are grossly intact. Motor is 5 out of 5 bilaterally. Sensory is intact.  gait is normal.  MUSCULOSKELETAL:    Right upper extremity: Non-tender about the shoulder and elbow with good ROM. - tinels of the cubital tunnel, + tinels of the carpal tunnel, + durkans, - finkelsteins, - CMC grind, - tenderness over the A1 pulleys with no active triggering. Heberden and Bell's nodes to the fingers with stiffness with flexion extension of the fingers. - wartenbergs and cross finger testing, APB strength 2/5 with atrophy. Median, ulnar, radial n intact to light touch. Brisk capillary refill. Gross motor 5/5. Left upper extremity: incision c/d/i with sutures in place. No erythema or signs of infection. Stiffness with flexion and extension of the fingers. + pillar pain. Brisk capillary refill. Otherwise NVI. DATA:    CBC:   Lab Results   Component Value Date    WBC 5.8 12/31/2019    RBC 3.36 12/31/2019    HGB 11.7 12/31/2019    HCT 34.7 12/31/2019    .3 12/31/2019    MCH 34.8 12/31/2019    MCHC 33.7 12/31/2019    RDW 14.3 12/31/2019     12/31/2019    MPV 9.6 12/31/2019     PT/INR:    Lab Results   Component Value Date    PROTIME 11.8 05/23/2018    INR 1.0 05/23/2018         IMPRESSION:  · S/p left carpal tunnel release  · Right severe carpal tunnel syndrome    PLAN:  Discussed findings with patient. Sutures removed today in the office. Scar management advised. Activity restrictions reviewed with the patient.  HEP and ROM exercises demonstrated to the patient. Due to her good results on the left, she would like to proceed with surgery on the right. She does use a cane on her right. Postoperatively we will plan to the patient into a splint. We will plan for a right carpal tunnel release. All questions answered. I explained the risks, benefits, alternatives and complications of surgery with the patient including but not limited to the risks of infection, possible damage to nerves, vessels, or tendons, stiffness, loss of range of motion, scar sensitivity, wound healing complications, worsening symptoms, possible need for therapy, as well as the possible need further surgery and unanticipated complications. The patient voiced understanding and all questions were answered. The patient elected to proceed with surgical intervention. I have seen and evaluated the patient and agree with the above assessment and plan on today's visit. I have performed the key components of the history and physical examination with significant findings of severe right carpal tunnel syndrome. Doing well status post left carpal tunnel release. I concur with the findings and plan as documented.     Felicia Domingo MD  8/24/2020

## 2020-09-03 ENCOUNTER — TELEPHONE (OUTPATIENT)
Dept: SLEEP MEDICINE | Age: 75
End: 2020-09-03

## 2020-09-08 ENCOUNTER — TELEPHONE (OUTPATIENT)
Dept: SLEEP MEDICINE | Age: 75
End: 2020-09-08

## 2020-09-08 NOTE — TELEPHONE ENCOUNTER
Received message from pt that she is unable to come to her sched appt on 9/8--ret pt call to resched and pt stated she can not come to appt on Tuesday or thursdays d/t dialysis--appt was reschedule for 9/30 at 1130

## 2020-09-30 ENCOUNTER — OFFICE VISIT (OUTPATIENT)
Dept: SLEEP MEDICINE | Age: 75
End: 2020-09-30
Payer: COMMERCIAL

## 2020-09-30 VITALS
HEART RATE: 58 BPM | OXYGEN SATURATION: 95 % | SYSTOLIC BLOOD PRESSURE: 115 MMHG | RESPIRATION RATE: 12 BRPM | HEIGHT: 66 IN | BODY MASS INDEX: 34.58 KG/M2 | WEIGHT: 215.2 LBS | DIASTOLIC BLOOD PRESSURE: 70 MMHG

## 2020-09-30 PROCEDURE — 99204 OFFICE O/P NEW MOD 45 MIN: CPT | Performed by: PHYSICIAN ASSISTANT

## 2020-09-30 PROCEDURE — 4040F PNEUMOC VAC/ADMIN/RCVD: CPT | Performed by: PHYSICIAN ASSISTANT

## 2020-09-30 PROCEDURE — 3017F COLORECTAL CA SCREEN DOC REV: CPT | Performed by: PHYSICIAN ASSISTANT

## 2020-09-30 PROCEDURE — 1090F PRES/ABSN URINE INCON ASSESS: CPT | Performed by: PHYSICIAN ASSISTANT

## 2020-09-30 PROCEDURE — 1036F TOBACCO NON-USER: CPT | Performed by: PHYSICIAN ASSISTANT

## 2020-09-30 PROCEDURE — 1123F ACP DISCUSS/DSCN MKR DOCD: CPT | Performed by: PHYSICIAN ASSISTANT

## 2020-09-30 PROCEDURE — G8399 PT W/DXA RESULTS DOCUMENT: HCPCS | Performed by: PHYSICIAN ASSISTANT

## 2020-09-30 PROCEDURE — G8417 CALC BMI ABV UP PARAM F/U: HCPCS | Performed by: PHYSICIAN ASSISTANT

## 2020-09-30 PROCEDURE — G8427 DOCREV CUR MEDS BY ELIG CLIN: HCPCS | Performed by: PHYSICIAN ASSISTANT

## 2020-09-30 ASSESSMENT — SLEEP AND FATIGUE QUESTIONNAIRES
HOW LIKELY ARE YOU TO NOD OFF OR FALL ASLEEP IN A CAR, WHILE STOPPED FOR A FEW MINUTES IN TRAFFIC: 0
ESS TOTAL SCORE: 11
HOW LIKELY ARE YOU TO NOD OFF OR FALL ASLEEP WHILE SITTING INACTIVE IN A PUBLIC PLACE: 0
HOW LIKELY ARE YOU TO NOD OFF OR FALL ASLEEP WHILE LYING DOWN TO REST IN THE AFTERNOON WHEN CIRCUMSTANCES PERMIT: 3
HOW LIKELY ARE YOU TO NOD OFF OR FALL ASLEEP WHILE WATCHING TV: 3
HOW LIKELY ARE YOU TO NOD OFF OR FALL ASLEEP WHILE SITTING QUIETLY AFTER LUNCH WITHOUT ALCOHOL: 1
HOW LIKELY ARE YOU TO NOD OFF OR FALL ASLEEP WHILE SITTING AND TALKING TO SOMEONE: 0
HOW LIKELY ARE YOU TO NOD OFF OR FALL ASLEEP WHEN YOU ARE A PASSENGER IN A CAR FOR AN HOUR WITHOUT A BREAK: 3
HOW LIKELY ARE YOU TO NOD OFF OR FALL ASLEEP WHILE SITTING AND READING: 1

## 2020-09-30 NOTE — PROGRESS NOTES
[facility] Sleep Medicine    Patient Name: Ivey Collet  Age: 70 y.o.   : 1945    Date of Service: 2020      HPI:   Ivey Collet is a 70 y.o. female with medical history of ESRD on HD, T2DM, asthma, HTN, hyperlipidemia, hypothyroidism, depression, and anemia of chronic disease who presents as a new patient to Sleep Clinic, referred by Dr. Celia Ramos DO, for KAYKAY evaluation. She reports a history of snoring, unrefreshing sleep, witnessed apneas, and daytime fatigue. She had an in lab PSG about 5 years ago in [place], [place] and she was told she had severe KAYKAY. She wore a BIPAP with FFM but only wore it for a few months. She underwent HST in  and was diagnosed with severe KAYKAY. She was prescribed CPAP and was prescribed a FFM. She wore it for a few hours each night for a few weeks and she reports that she couldn't tolerate the mask. Her machine was ultimately revoked. She is a mouth breather and has reported sinus issues. She admits that since the pandemic and getting used to wearing her face mask daily, she now feels that she could tolerate a CPAP mask more easily. She is motivated to treat her KAYKAY and to wear a CPAP to treat her breathing events. She is presently on Trazodone 50 mg po nightly and Requip 1 mg po daily (reports prescribed by her pcp. She denies any dream enactment behavior.     STOP-BANG score of 6/8 for  (+)snoring, (+)daytime fatigue, (+)observed apneas, (+)high blood pressure, (-)BMI>35, (+)age >50, (+)neck circumference >15in, (-)female gender    Sleep History:  Time in bed: 11 pm  Bed activities: TV is on for 1-2 hours (sometimes it is on all night)  Lights out time: 11 pm  Time to fall asleep: 5-30 minutes  Nighttime awakenings: 3-4 times each night   Waking time: 4 am (has early dialysis)  Time out of bed in mornin am  Daytime naps: daily around 1 pm (sleeps for hour)  Sleep position: side sleeper, toss and turns    Bed partner: medium size dog  Symptoms witnessed:  Hx snoring, apneas, personally experienced choking and gasping  RLS symptoms: on Requip 1 mg po nightly for one year (prescribed by pcp)- she feels it helped.  Since taking Requip, she has rare occasions of RLS (urge to move legs, -3-4 times per month)  Cataplexy and sleep paralysis: none reported    Current employment: retired  Drowsy driving: does not drive  Caffeine use: 2 cups per day, 1 pop per day - last before 1 pm  Nicotine use: none   Alcohol use: rare social occasions  Sleep aides: on Trazodone 50 mg po nightly (has been on for 5 years, prescribed by pcp)     [place] Sleepiness Scale score of 11/24 (scores of 10 or higher are considered indicative of excessive daytime sleepiness)  Sleep Medicine 9/30/2020   Sitting and reading 1   Watching TV 3   Sitting, inactive in a public place (e.g. a theatre or a meeting) 0   As a passenger in a car for an hour without a break 3   Lying down to rest in the afternoon when circumstances permit 3   Sitting and talking to someone 0   Sitting quietly after a lunch without alcohol 1   In a car, while stopped for a few minutes in traffic 0   Total score 11   Neck circumference 16         PMH:  Past Medical History:   Diagnosis Date    Anemia     Arthritis     RA    Asthma     Chronic kidney disease     Depression     Hemodialysis patient ([facility]     T-Th-Sat    Hyperlipidemia     Hypertension     Hypothyroid     Sleep apnea     no CPAP    Type II or unspecified type diabetes mellitus without mention of complication, not stated as uncontrolled     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 ([facility] 6/15/2017        PSH:  Past Surgical History:   Procedure Laterality Date    CARPAL TUNNEL RELEASE Left 8/12/2020    LEFT CARPAL TUNNEL RELEASE performed by Marilyn Burnett MD at [address]  2004    DIALYSIS FISTULA CREATION Left 03/02/2018    AV fistula arm/Dr. Sam Josue    FOOT SURGERY Left 7132,1580    pin put in, then pin removed    GASTRIC BYPASS SURGERY  2004    HAND SURGERY Right 2012    ligament was cut    HERNIA REPAIR      KNEE SURGERY  2009    R knee left side    NC REVISE AV FISTULA,W/O THROMBECTOMY Left 5/23/2018    SUPERFICIALIZATION AV FISTULA - LEFT UPPER ARM performed by Ericka Conti MD at [address]      hx of adenotonsillectomy (childhood)    Soc Hx:  Social History     Tobacco Use    Smoking status: Never Smoker    Smokeless tobacco: Never Used   Substance Use Topics    Alcohol use: Yes     Comment: rarely    Drug use: No        Fam Hx:  History of KAYKAY or insomnia- none that she is aware of  No family history on file. Medications:   Current Outpatient Medications   Medication Sig Dispense Refill    ALBUTEROL IN Inhale into the lungs as needed      sertraline (ZOLOFT) 100 MG tablet TAKE 1 TABLET BY MOUTH EVERY NIGHT 30 tablet 0    cyanocobalamin ([employer] VITAMIN B12) 1000 MCG tablet Take 1 tablet by mouth daily 30 tablet 3    traZODone (DESYREL) 50 MG tablet Take 1 tablet by mouth nightly 90 tablet 1    SYNTHROID 75 MCG tablet Take 1 tablet by mouth Daily 30 tablet 3    rOPINIRole (REQUIP) 1 MG tablet TAKE 1 TABLET BY MOUTH EVERY NIGHT 1 tablet 1    Alcohol Swabs (ALCOHOL PREP) 70 % PADS 1 each by Does not apply route 4 times daily (before meals and nightly) 200 each 3    insulin glargine (LANTUS SOLOSTAR) 100 UNIT/ML injection pen Inject 35 Units into the skin nightly 5 pen 5    atorvastatin (LIPITOR) 10 MG tablet TAKE 1 TABLET BY MOUTH EVERY NIGHT 90 tablet 5    Elastic Bandages & Supports (WRIST SPLINT LEFT/RIGHT) MISC 1 each by Does not apply route daily as needed (numbness) 1 each 0    Misc.  Devices (BARIATRIC ROLLATOR) MISC 1 Device by Does not apply route continuous prn (Walking) 1 each 0    calcium carbonate 600 MG TABS tablet Take 1 tablet by mouth 2 times daily (Patient taking differently: Take 2 tablets by mouth 3 times daily (with meals) Rx) 60 tablet 2    Insulin Pen Needle (B-D ULTRAFINE III SHORT PEN) 31G X 8 MM MISC Inject 1 each into the skin daily 100 each 5    nitroGLYCERIN (NITROSTAT) 0.4 MG SL tablet Place 1 tablet under the tongue every 5 minutes as needed for Chest pain up to max of 3 total doses. If no relief, call 911. 25 tablet 3    Methoxy PEG-Epoetin Beta (MIRCERA) 50 MCG/0.3ML SOSY Inject 50 mcg as directed every 14 days Last Dose: 4/4/2019  Next Dose: 6/13/2019      lidocaine-prilocaine (EMLA) 2.5-2.5 % cream Apply 1 each topically three times a week Tuesday, Thursday, Saturday      insulin lispro (HUMALOG) 100 UNIT/ML injection vial Inject 0-5 Units into the skin 3 times daily (before meals) *Per Sliding Scale*  1 unit for every 10 carbs   Takes 2 units with each meal      iron sucrose (VENOFER) 20 MG/ML injection Infuse 50 mg intravenously once a week Last Dose: 6/4/ 2020 at dialysis      B Complex-C-Folic Acid (TOD-RENETTA) TABS Take 1 tablet by mouth daily Rx      sevelamer (RENVELA) 800 MG tablet Take 3 tablets by mouth 3 times daily (with meals) Has only been taking once a day      midodrine (PROAMATINE) 5 MG tablet Take 5 mg by mouth three times a week Tuesday, Thursday, Saturday      Insulin Pen Needle 32G X 4 MM MISC 1 each by Does not apply route daily Use as directed with insulin pen 100 each 3    glucose blood VI test strips (ACCU-CHEK SMARTVIEW) strip 1 each by In Vitro route 4 times daily (before meals and nightly) As needed. 150 each 3    ACCU-CHEK FASTCLIX LANCETS MISC USE TO TEST BLOOD SUGAR FOUR TIMES DAILY BEFORE MEALS AND NIGHTLY 612 each 3    Blood Glucose Monitoring Suppl (ACCU-CHEK DERREK SMARTVIEW) W/DEVICE KIT 1 kit by Does not apply route 4 times daily (before meals and nightly) 1 kit 0     No current facility-administered medications for this visit. Allergies:   is allergic to bactrim [sulfamethoxazole-trimethoprim]; pcn [penicillins]; penicillin g; and sulfa antibiotics.     Review of Systems:  Constitutional: + for Comment:     FERRITIN Reference Ranges:  Adult Males   20 - 61 yrars:    30 - 400 ng/mL  Adult females 16 - 61 years:    15 - 150 ng/mL  Adults greater than 60 years:   no established reference range  Pediatrics:                     no established reference range          Assessment & Plan:      Flakito Olson is a 70 y.o. female with medical history of ESRD on HD, T2DM, asthma, HTN, hyperlipidemia, hypothyroidism, depression, and anemia of chronic disease who presents as a new patient to Sleep Clinic, referred by Dr. Wing Marcus DO, for KAYKAY evaluation. She reports a history of snoring, unrefreshing sleep, witnessed apneas, and daytime fatigue. 1. Obstructive Sleep Apnea      Multiple risk factors with STOP-BANG 6/8. Will proceed with in lab titration study at [address] (note was diagnosed with severe KAYKAY on HST 1/9/19 and was initially noncompliant with CPAP; her CPAP ultimately was revoked by her prior DME>> she states that she is now motivated to wear CPAP to treat her KAYKAY.  Discussed pathophysiology of KAYKAY and its impact on daily well-being, as well as cardiometabolic and neurocognitive health (particularly in moderate-severe cases).  Discussed CPAP as first-line and gold-standard therapy for KAYKAY. Patient understands that CPAP should be worn every night for the duration of the night (in order to not miss therapy during early-morning REM period) for maximum benefit. Reviewed Medicare requirements for compliance (>70% of nights for >/=4 hours nightly in first 90 days).  Counseled on risks of driving while drowsy.  Provided handouts on KAYKAY, CPAP, and sleep hygiene. 2. Excessive Daytime Sleepiness      Elevated Melvin 11/24. Likely secondary to untreated KAYKAY, as above. Will assess for improvement with PAP therapy.  Counseled on risks of driving while drowsy.  Recommended a short, 10-15 min power nap (in a safe location, with car doors locked) as most effective tool if experiencing drowsiness while driving. 3. Chronic Sleep Maintenance Insomnia      Patient reports difficulty staying asleep. May be multifactorial: poor sleep hygiene + untreated KAYKAY + underlying anxiety.  Will assess for improvement with PAP therapy and consider Cognitive Behavioral Therapy for Insomnia if persistent. 4. Obesity (Body mass index is 34.73 kg/m².)      Discussed impact of weight gain on KAYKAY severity. Patient understands that KAYKAY severity may improve with weight loss but no guarantee of cure can be made. Encouraged weight loss efforts. 5. Restless Leg Syndrome       Mild in severity, symptoms occur twice per month on average.  Presently receives iron infusions bi-monthly at dialysis sessions (as per nephrology)   Discussed conservative management with pre-bedtime leg stretching and ambulation   Denies any impulse control issues; on Requip 1 mg po nightly (prescribed by pcp one year ago)      Patient will follow-up in 3 months or sooner if any issues arise. A total of 45 minutes' time was spent with the patient, of which >50% was spent in face-to-face discussion and counseling.        Ishaan Morley PA-C  [facility] Sleep Medicine

## 2020-09-30 NOTE — PATIENT INSTRUCTIONS
Please call our office at [phone] and dial #[phone] to reach JOANN. Please leave a message if there isn't an answer and your call will be returned. Thank you! Sleep Hygiene Guidelines  Good dental hygiene is important in determining the health of your teeth and gums. Similarly, good sleep hygiene is important in determining the quality and quantity of your sleep. Review the below guidelines and check the ones you think you might be breaking. 1. Screen time: Turn off TV, computers, tablets, and smart phones 1 hour Before Bedtime  The short waves of blue light (emitted from the screens of TVs, laptops, iPads, smart phones, etc.) mimic daylight. Thinking its daytime, your brain suppresses melatonin and becomes more alert because we have evolved to see this type of light only during the day. Whats more, the overall stimulation we get from these devices serves to keep us more alert. If TV is your relaxing activity, try to move it up a bit earlier in the evening. The TV should be in a separate room - NOT your bedroom. 2. Caffeine: Avoid Caffeine 6-8 Hours Before Bedtime   Caffeine disturbs sleep, even in people who dont think they experience a stimulation effect.  Individuals with insomnia are often more sensitive to mild stimulants than are normal sleepers.  Caffeine is found in items such as coffee, tea, soda, chocolate, and many over-the-counter medications (e.g., Excedrin).  Caffeine should be avoided in the afternoon and evening, preferably taken no later than 1pm. You might consider a trial period of no caffeine at all. 3. Nicotine: Avoid Nicotine Before Bedtime   Although some smokers claim that smoking helps them relax, nicotine is a stimulant.  The initial relaxing effects occur with the initial entry of the nicotine, but as the nicotine builds in the body, it produces an effect similar to caffeine.  Nicotine should be avoided near bedtime and during the night.  Dont smoke to get yourself back to sleep. 4. Alcohol: Avoid Alcohol After Dinner   Alcohol often promotes the onset of sleep, but as alcohol is metabolized during the night, sleep becomes disturbed and fragmented, leading to poor sleep quality.  Limit alcohol use to 1 beer or glass of wine per day for women, 2  drinks per day for men. 5. Sleeping Pills: Sleep Medications are Effective Only Temporarily   Research has shown that sleep medications lose their effectiveness in about 2 - 4 weeks when taken regularly.  Over time, sleeping pills actually can make sleep problems worse. When sleeping pills have been used over a long period, withdrawal from the medication can lead to an insomnia rebound. Thus, after long- term use, many individuals incorrectly conclude that they need sleeping pills in order to sleep normally.  Keep use of sleep meds infrequent, but dont worry if you need to use one on an occasional basis. (And always consult with your doctor first if you decide to make changes to your medication regimen.)     6. Regular Exercise   Exercise has been shown to aid sleep, although the positive effect often takes several weeks to become noticeable.  However, exercise earlier in the day if possible. Exercise within 2 hours of bedtime may elevate nervous system activity and interfere with sleep onset.  Get regular exercise, preferably at least 20 minutes each day of an activity that causes sweating. 7. Hot Baths   Spending 20 minutes in a tub of hot water an hour or two prior to bedtime may promote sleep. 8.Bedroom Environment: Moderate Temperature, Quiet, and Dark   Extremes of heat or cold can disrupt sleep. Keep your room at a comfortable temperature.  Noises can be masked with background white noise (such as the noise of a fan) or with earplugs.  Bedrooms may be darkened with black-out shades or sleep masks can be worn.    Position clocks out-of-sight since clock-watching can increase worry daytime. · CPAP may help keep heart failure or other heart problems from getting worse. · CPAP may help lower your blood pressure. · If you use CPAP, your bed partner may also sleep better because you are not snoring or restless. What are the side effects? Some people who use CPAP have:  · A dry or stuffy nose and a sore throat. · Irritated skin on the face. · Sore eyes. · Bloating. If you have any of these problems, work with your doctor to fix them. Here are some things you can try:  · Be sure the mask or nasal prongs fit well. · See if your doctor can adjust the pressure of your CPAP. · If your nose is dry, try a humidifier. · If your nose is runny or stuffy, try decongestant medicine or a steroid nasal spray. Be safe with medicines. Read and follow all instructions on the label. Do not use the medicine longer than the label says. If these things do not help, you might try a different type of machine. Some machines have air pressure that adjusts on its own. Others have air pressures that are different when you breathe in than when you breathe out. This may reduce discomfort caused by too much pressure in your nose. Where can you learn more? Go to https://bencheepeMibuzz.tv.SensiGen. org and sign in to your fivesquids.co.uk account. Enter C017 in the FlowJob box to learn more about \"Learning About CPAP for Sleep Apnea. \"     If you do not have an account, please click on the \"Sign Up Now\" link. Current as of: February 24, 2020               Content Version: 12.5  © 2006-2020 Healthwise, Incorporated. Care instructions adapted under license by [facility] ([facility]). If you have questions about a medical condition or this instruction, always ask your healthcare professional. [place] [address] any warranty or liability for your use of this information.

## 2020-10-02 ENCOUNTER — HOSPITAL ENCOUNTER (OUTPATIENT)
Age: 75
Discharge: HOME | End: 2020-10-04
Payer: COMMERCIAL

## 2020-10-02 ENCOUNTER — PREP FOR PROCEDURE (OUTPATIENT)
Dept: ORTHOPEDIC SURGERY | Age: 75
End: 2020-10-02

## 2020-10-02 ENCOUNTER — TELEPHONE (OUTPATIENT)
Dept: SLEEP CENTER | Age: 75
End: 2020-10-02

## 2020-10-02 PROCEDURE — U0003 INFECTIOUS AGENT DETECTION BY NUCLEIC ACID (DNA OR RNA); SEVERE ACUTE RESPIRATORY SYNDROME CORONAVIRUS 2 (SARS-COV-2) (CORONAVIRUS DISEASE [COVID-19]), AMPLIFIED PROBE TECHNIQUE, MAKING USE OF HIGH THROUGHPUT TECHNOLOGIES AS DESCRIBED BY CMS-2020-01-R: HCPCS

## 2020-10-02 RX ORDER — SODIUM CHLORIDE 0.9 % (FLUSH) 0.9 %
10 SYRINGE (ML) INJECTION EVERY 12 HOURS SCHEDULED
Status: CANCELLED | OUTPATIENT
Start: 2020-10-02

## 2020-10-02 RX ORDER — SODIUM CHLORIDE 9 MG/ML
INJECTION, SOLUTION INTRAVENOUS CONTINUOUS
Status: CANCELLED | OUTPATIENT
Start: 2020-10-02

## 2020-10-02 RX ORDER — SODIUM CHLORIDE 0.9 % (FLUSH) 0.9 %
10 SYRINGE (ML) INJECTION PRN
Status: CANCELLED | OUTPATIENT
Start: 2020-10-02

## 2020-10-04 LAB
SARS-COV-2: NOT DETECTED
SOURCE: NORMAL

## 2020-10-05 ENCOUNTER — TELEPHONE (OUTPATIENT)
Dept: SLEEP CENTER | Age: 75
End: 2020-10-05

## 2020-10-05 NOTE — PROGRESS NOTES
Have you been tested for COVID  Yes           Have you been told you were positive for COVID No  Have you had any known exposure to someone that is positive for COVID No  Do you have a cough                   No              Do you have shortness of breath No                 Do you have a sore throat            No                Are you having chills                    No                Are you having muscle aches. No                    Please come to the hospital wearing a mask and have your significant other wear a mask as well. Both of you should check your temperature before leaving to come here,  if it is 100 or higher please call [phone] for instruction.

## 2020-10-05 NOTE — PROGRESS NOTES
Updated Bennie Franco in scheduling, and  at Dr. Treva William office, not to change time of procedure. She is getting a medical ride which is not flexible.

## 2020-10-05 NOTE — PROGRESS NOTES
[facility] PRE-ADMISSION TESTING INSTRUCTIONS    The Preadmission Testing patient is instructed accordingly using the following criteria (check applicable):    ARRIVAL INSTRUCTIONS:  [x] Parking the day of Surgery is located in the Main Entrance lot. Upon entering the door, make an immediate right to the surgery reception desk    [x] Bring photo ID and insurance card    [x] Bring in a copy of Living will or Durable Power of  papers. [x] Please be sure to arrange transportation to and from the hospital    [x] Please arrange for someone to be with you the remainder of the day due to having anesthesia      GENERAL INSTRUCTIONS:    [x] Nothing by mouth after midnight, including gum, candy, mints or water    [x] You may brush your teeth, but do not swallow any water    [] Take medications as instructed with 1-2 oz of water None  [x] Stop herbal supplements and vitamins 5 days prior to procedure    [] Follow preop dosing of blood thinners per physician instructions    [] Do not take insulin or oral diabetic medications    [] If diabetic and have low blood sugar or feel symptomatic, take 1-2oz apple juice or glucose tablets    [] Bring inhalers day of surgery    [] Bring C-PAP/ Bi-Pap day of surgery    [] Bring urine specimen day of surgery    [x] Soap shower or bath AM of Surgery, no lotion, powders or creams to surgical site    [] Follow bowel prep as instructed per surgeon    [] No tobacco products within 24 hours of surgery     [] No alcohol or illegal drug use within 24 hours of surgery.     [x] Jewelry, body piercing's, eyeglasses, contact lenses and dentures are not permitted into surgery (bring cases)      [x] Please do not wear any nail polish or make up on the day of surgery    [x] If not already done, you can expect a call from registration    [x] If surgeon requests a time change you will be notified the day prior to surgery    [x] If you receive a survey after surgery we would greatly appreciate your comments    [] Parent/guardian of a minor must accompany their child and remain on the premises  the entire time they are under our care     [] Pediatric patients may bring favorite toy, blanket or comfort item with them    [] A caregiver or family member must remain with the patient during their stay if they are mentally handicapped, have dementia, disoriented or unable to use a call light or would be a safety concern if left unattended    [x] Please notify surgeon if you develop any illness between now and time of surgery (cold, cough, sore throat, fever, nausea, vomiting) or any signs of infections  including skin, wounds, and dental.    [] Other instructions    EDUCATIONAL MATERIALS PROVIDED:    [] PAT Preoperative Education Packet/Booklet     [] Medication List    [] Fluoroscopy Information Pamphlet    [] Transfusion bracelet applied with instructions    [] Joint replacement video reviewed    [] Shower with antibacterial soap and use CHG wipes provided the evening before surgery as instructed

## 2020-10-07 ENCOUNTER — ANESTHESIA (OUTPATIENT)
Dept: OPERATING ROOM | Age: 75
End: 2020-10-07
Payer: COMMERCIAL

## 2020-10-07 ENCOUNTER — ANESTHESIA EVENT (OUTPATIENT)
Dept: OPERATING ROOM | Age: 75
End: 2020-10-07
Payer: COMMERCIAL

## 2020-10-07 ENCOUNTER — HOSPITAL ENCOUNTER (OUTPATIENT)
Age: 75
Setting detail: OUTPATIENT SURGERY
Discharge: HOME | End: 2020-10-07
Attending: ORTHOPAEDIC SURGERY | Admitting: ORTHOPAEDIC SURGERY
Payer: COMMERCIAL

## 2020-10-07 VITALS
DIASTOLIC BLOOD PRESSURE: 58 MMHG | SYSTOLIC BLOOD PRESSURE: 120 MMHG | OXYGEN SATURATION: 97 % | RESPIRATION RATE: 2 BRPM | TEMPERATURE: 97 F

## 2020-10-07 VITALS
HEIGHT: 66 IN | BODY MASS INDEX: 34.55 KG/M2 | WEIGHT: 215 LBS | RESPIRATION RATE: 20 BRPM | OXYGEN SATURATION: 100 % | SYSTOLIC BLOOD PRESSURE: 143 MMHG | TEMPERATURE: 97.4 F | HEART RATE: 80 BPM | DIASTOLIC BLOOD PRESSURE: 63 MMHG

## 2020-10-07 LAB
METER GLUCOSE: 150 MG/DL (ref 74–99)
POTASSIUM SERPL-SCNC: 4.3 MMOL/L (ref 3.5–5)
REASON FOR REJECTION: NORMAL
REJECTED TEST: NORMAL

## 2020-10-07 PROCEDURE — 84132 ASSAY OF SERUM POTASSIUM: CPT

## 2020-10-07 PROCEDURE — 3700000001 HC ADD 15 MINUTES (ANESTHESIA): Performed by: ORTHOPAEDIC SURGERY

## 2020-10-07 PROCEDURE — 2500000003 HC RX 250 WO HCPCS: Performed by: NURSE ANESTHETIST, CERTIFIED REGISTERED

## 2020-10-07 PROCEDURE — 2580000003 HC RX 258: Performed by: PHYSICIAN ASSISTANT

## 2020-10-07 PROCEDURE — 2709999900 HC NON-CHARGEABLE SUPPLY: Performed by: ORTHOPAEDIC SURGERY

## 2020-10-07 PROCEDURE — 3700000000 HC ANESTHESIA ATTENDED CARE: Performed by: ORTHOPAEDIC SURGERY

## 2020-10-07 PROCEDURE — 6360000002 HC RX W HCPCS: Performed by: NURSE ANESTHETIST, CERTIFIED REGISTERED

## 2020-10-07 PROCEDURE — 82962 GLUCOSE BLOOD TEST: CPT

## 2020-10-07 PROCEDURE — 7100000010 HC PHASE II RECOVERY - FIRST 15 MIN: Performed by: ORTHOPAEDIC SURGERY

## 2020-10-07 PROCEDURE — 64721 CARPAL TUNNEL SURGERY: CPT | Performed by: ORTHOPAEDIC SURGERY

## 2020-10-07 PROCEDURE — 3600000002 HC SURGERY LEVEL 2 BASE: Performed by: ORTHOPAEDIC SURGERY

## 2020-10-07 PROCEDURE — 2500000003 HC RX 250 WO HCPCS: Performed by: ORTHOPAEDIC SURGERY

## 2020-10-07 PROCEDURE — 7100000011 HC PHASE II RECOVERY - ADDTL 15 MIN: Performed by: ORTHOPAEDIC SURGERY

## 2020-10-07 PROCEDURE — 3600000012 HC SURGERY LEVEL 2 ADDTL 15MIN: Performed by: ORTHOPAEDIC SURGERY

## 2020-10-07 PROCEDURE — 36415 COLL VENOUS BLD VENIPUNCTURE: CPT

## 2020-10-07 RX ORDER — DEXAMETHASONE SODIUM PHOSPHATE 4 MG/ML
INJECTION, SOLUTION INTRA-ARTICULAR; INTRALESIONAL; INTRAMUSCULAR; INTRAVENOUS; SOFT TISSUE PRN
Status: DISCONTINUED | OUTPATIENT
Start: 2020-10-07 | End: 2020-10-07 | Stop reason: SDUPTHER

## 2020-10-07 RX ORDER — LIDOCAINE HYDROCHLORIDE AND EPINEPHRINE 10; 10 MG/ML; UG/ML
INJECTION, SOLUTION INFILTRATION; PERINEURAL PRN
Status: DISCONTINUED | OUTPATIENT
Start: 2020-10-07 | End: 2020-10-07 | Stop reason: ALTCHOICE

## 2020-10-07 RX ORDER — HYDROCODONE BITARTRATE AND ACETAMINOPHEN 5; 325 MG/1; MG/1
1 TABLET ORAL EVERY 6 HOURS PRN
Qty: 12 TABLET | Refills: 0 | Status: SHIPPED | OUTPATIENT
Start: 2020-10-07 | End: 2020-10-14

## 2020-10-07 RX ORDER — CLINDAMYCIN PHOSPHATE 150 MG/ML
INJECTION, SOLUTION INTRAVENOUS PRN
Status: DISCONTINUED | OUTPATIENT
Start: 2020-10-07 | End: 2020-10-07 | Stop reason: SDUPTHER

## 2020-10-07 RX ORDER — PROPOFOL 10 MG/ML
INJECTION, EMULSION INTRAVENOUS PRN
Status: DISCONTINUED | OUTPATIENT
Start: 2020-10-07 | End: 2020-10-07 | Stop reason: SDUPTHER

## 2020-10-07 RX ORDER — MIDAZOLAM HYDROCHLORIDE 1 MG/ML
INJECTION INTRAMUSCULAR; INTRAVENOUS PRN
Status: DISCONTINUED | OUTPATIENT
Start: 2020-10-07 | End: 2020-10-07 | Stop reason: SDUPTHER

## 2020-10-07 RX ORDER — LIDOCAINE HYDROCHLORIDE 20 MG/ML
INJECTION, SOLUTION EPIDURAL; INFILTRATION; INTRACAUDAL; PERINEURAL PRN
Status: DISCONTINUED | OUTPATIENT
Start: 2020-10-07 | End: 2020-10-07 | Stop reason: SDUPTHER

## 2020-10-07 RX ORDER — CLINDAMYCIN PHOSPHATE 900 MG/50ML
900 INJECTION INTRAVENOUS
Status: DISCONTINUED | OUTPATIENT
Start: 2020-10-07 | End: 2020-10-07 | Stop reason: HOSPADM

## 2020-10-07 RX ORDER — SODIUM CHLORIDE 0.9 % (FLUSH) 0.9 %
10 SYRINGE (ML) INJECTION PRN
Status: DISCONTINUED | OUTPATIENT
Start: 2020-10-07 | End: 2020-10-07 | Stop reason: HOSPADM

## 2020-10-07 RX ORDER — SODIUM CHLORIDE 9 MG/ML
INJECTION, SOLUTION INTRAVENOUS CONTINUOUS
Status: DISCONTINUED | OUTPATIENT
Start: 2020-10-07 | End: 2020-10-07 | Stop reason: HOSPADM

## 2020-10-07 RX ORDER — HYDROCODONE BITARTRATE AND ACETAMINOPHEN 5; 325 MG/1; MG/1
1 TABLET ORAL
Status: DISCONTINUED | OUTPATIENT
Start: 2020-10-07 | End: 2020-10-07 | Stop reason: HOSPADM

## 2020-10-07 RX ORDER — ONDANSETRON 2 MG/ML
INJECTION INTRAMUSCULAR; INTRAVENOUS PRN
Status: DISCONTINUED | OUTPATIENT
Start: 2020-10-07 | End: 2020-10-07 | Stop reason: SDUPTHER

## 2020-10-07 RX ORDER — SODIUM CHLORIDE 0.9 % (FLUSH) 0.9 %
10 SYRINGE (ML) INJECTION EVERY 12 HOURS SCHEDULED
Status: DISCONTINUED | OUTPATIENT
Start: 2020-10-07 | End: 2020-10-07 | Stop reason: HOSPADM

## 2020-10-07 RX ADMIN — LIDOCAINE HYDROCHLORIDE 60 MG: 20 INJECTION, SOLUTION EPIDURAL; INFILTRATION; INTRACAUDAL; PERINEURAL at 12:25

## 2020-10-07 RX ADMIN — PROPOFOL 50 MG: 10 INJECTION, EMULSION INTRAVENOUS at 12:26

## 2020-10-07 RX ADMIN — ONDANSETRON 4 MG: 2 INJECTION INTRAMUSCULAR; INTRAVENOUS at 12:25

## 2020-10-07 RX ADMIN — MIDAZOLAM 1 MG: 1 INJECTION INTRAMUSCULAR; INTRAVENOUS at 12:25

## 2020-10-07 RX ADMIN — CLINDAMYCIN PHOSPHATE 900 MG: 150 INJECTION, SOLUTION INTRAVENOUS at 12:37

## 2020-10-07 RX ADMIN — DEXAMETHASONE SODIUM PHOSPHATE 10 MG: 4 INJECTION, SOLUTION INTRAMUSCULAR; INTRAVENOUS at 12:27

## 2020-10-07 RX ADMIN — SODIUM CHLORIDE: 9 INJECTION, SOLUTION INTRAVENOUS at 10:30

## 2020-10-07 ASSESSMENT — PULMONARY FUNCTION TESTS
PIF_VALUE: 1
PIF_VALUE: 16
PIF_VALUE: 21
PIF_VALUE: 0
PIF_VALUE: 0
PIF_VALUE: 1
PIF_VALUE: 1
PIF_VALUE: 0
PIF_VALUE: 1
PIF_VALUE: 28
PIF_VALUE: 0
PIF_VALUE: 1
PIF_VALUE: 22
PIF_VALUE: 1
PIF_VALUE: 0
PIF_VALUE: 0
PIF_VALUE: 1
PIF_VALUE: 0
PIF_VALUE: 0
PIF_VALUE: 1
PIF_VALUE: 1

## 2020-10-07 ASSESSMENT — ENCOUNTER SYMPTOMS: SHORTNESS OF BREATH: 0

## 2020-10-07 ASSESSMENT — PAIN SCALES - GENERAL
PAINLEVEL_OUTOF10: 0
PAINLEVEL_OUTOF10: 0

## 2020-10-07 ASSESSMENT — PAIN - FUNCTIONAL ASSESSMENT: PAIN_FUNCTIONAL_ASSESSMENT: 0-10

## 2020-10-07 ASSESSMENT — LIFESTYLE VARIABLES: SMOKING_STATUS: 0

## 2020-10-07 NOTE — ANESTHESIA PRE PROCEDURE
Department of Anesthesiology  Preprocedure Note       Name:  Precious Vázquez   Age:  70 y.o.  :  1945                                          MRN:  [id number]         Date:  10/7/2020      Surgeon: Dayna Toussaint):  Ofe Del Real MD    Procedure: Procedure(s):  RIGHT CARPAL TUNNEL RELEASE    Medications prior to admission:   Prior to Admission medications    Medication Sig Start Date End Date Taking? Authorizing Provider   HYDROcodone-acetaminophen (NORCO) 5-325 MG per tablet Take 1 tablet by mouth every 6 hours as needed for Pain for up to 7 days. 10/7/20 10/14/20 Yes TIMMY Fu   ALBUTEROL IN Inhale into the lungs as needed   Yes Historical Provider, MD   sertraline (ZOLOFT) 100 MG tablet TAKE 1 TABLET BY MOUTH EVERY NIGHT 20  Yes Dwane Litten, DO   cyanocobalamin ([employer] VITAMIN B12) 1000 MCG tablet Take 1 tablet by mouth daily 20  Yes Dwane Litten, DO   traZODone (DESYREL) 50 MG tablet Take 1 tablet by mouth nightly 20  Yes Dwane Litten, DO   SYNTHROID 75 MCG tablet Take 1 tablet by mouth Daily 20  Yes Dwane Litten, DO   rOPINIRole (REQUIP) 1 MG tablet TAKE 1 TABLET BY MOUTH EVERY NIGHT 20  Yes Dwane Litten, DO   Alcohol Swabs (ALCOHOL PREP) 70 % PADS 1 each by Does not apply route 4 times daily (before meals and nightly) 20  Yes Dwane Litten, DO   insulin glargine (LANTUS SOLOSTAR) 100 UNIT/ML injection pen Inject 35 Units into the skin nightly 20  Yes Dwane Litten, DO   atorvastatin (LIPITOR) 10 MG tablet TAKE 1 TABLET BY MOUTH EVERY NIGHT 3/27/20  Yes Dwane Litten, DO   Elastic Bandages & Supports (WRIST SPLINT LEFT/RIGHT) MISC 1 each by Does not apply route daily as needed (numbness) 20  Yes Dwane Litten, DO   Misc.  Devices (BARIATRIC ROLLATOR) MISC 1 Device by Does not apply route continuous prn (Walking) 1/3/20  Yes Dwane Litten, DO   calcium carbonate 600 MG TABS tablet Take 1 tablet by mouth 2 times daily  Patient taking differently: Take 2 tablets by mouth 3 times daily (with meals) Rx 9/30/19  Yes Tony Loya, DO   Insulin Pen Needle (B-D ULTRAFINE III SHORT PEN) 31G X 8 MM MISC Inject 1 each into the skin daily 7/29/19  Yes Deborah Quispe, DO   nitroGLYCERIN (NITROSTAT) 0.4 MG SL tablet Place 1 tablet under the tongue every 5 minutes as needed for Chest pain up to max of 3 total doses. If no relief, call 911. 7/29/19  Yes Deborah Quispe, DO   Methoxy PEG-Epoetin Beta (MIRCERA) 50 MCG/0.3ML SOSY Inject 50 mcg as directed every 14 days Last Dose: 4/4/2019  Next Dose: 6/13/2019   Yes Historical Provider, MD   lidocaine-prilocaine (EMLA) 2.5-2.5 % cream Apply 1 each topically three times a week Tuesday, Thursday, Saturday   Yes Historical Provider, MD   insulin lispro (HUMALOG) 100 UNIT/ML injection vial Inject 0-5 Units into the skin 3 times daily (before meals) *Per Sliding Scale*  1 unit for every 10 carbs   Takes 2 units with each meal   Yes Historical Provider, MD   iron sucrose (VENOFER) 20 MG/ML injection Infuse 50 mg intravenously once a week Last Dose: 6/4/ 2020 at dialysis   Yes Historical Provider, MD   B Complex-C-Folic Acid (TOD-RENETTA) TABS Take 1 tablet by mouth daily Rx   Yes Historical Provider, MD   midodrine (PROAMATINE) 5 MG tablet Take 5 mg by mouth three times a week Tuesday, Thursday, Saturday   Yes Historical Provider, MD   Insulin Pen Needle 32G X 4 MM MISC 1 each by Does not apply route daily Use as directed with insulin pen 2/14/18  Yes Deborah Quispe, DO   glucose blood VI test strips (ACCU-CHEK SMARTVIEW) strip 1 each by In Vitro route 4 times daily (before meals and nightly) As needed.  6/15/17  Yes Brannon Blanco, DO   ACCU-CHEK FASTCLIX LANCETS MISC USE TO TEST BLOOD SUGAR FOUR TIMES DAILY BEFORE MEALS AND NIGHTLY 6/8/17  Yes Laura Goodrich, DO   Blood Glucose Monitoring Suppl (ACCU-CHEK DERREK SMARTVIEW) W/DEVICE KIT 1 kit by Does not apply route 4 times daily (before meals and nightly) 3/29/17  Yes Shauna Hassan, DO OR    CHOLECYSTECTOMY  2004    DIALYSIS FISTULA CREATION Left 03/02/2018    AV fistula arm/Dr. Elvia Dorado    EYE SURGERY Right 2010    cataract    FOOT SURGERY Left 2012,2013    pin put in, then pin removed   Megan  2004    HAND SURGERY Right 2012    ligament was cut   [address]      KNEE SURGERY  2009    R knee left side    CA REVISE AV FISTULA,W/O THROMBECTOMY Left 5/23/2018    SUPERFICIALIZATION AV FISTULA - LEFT UPPER ARM performed by Maliha Luu MD at [address] History:    Social History     Tobacco Use    Smoking status: Never Smoker    Smokeless tobacco: Never Used   Substance Use Topics    Alcohol use: Yes     Comment: rarely                                Counseling given: Not Answered      Vital Signs (Current):   Vitals:    10/05/20 1013 10/07/20 0842   BP:  126/77   Pulse:  82   Resp:  20   Temp:  96.9 °F (36.1 °C)   TempSrc:  Temporal   SpO2:  95%   Weight: 215 lb (97.5 kg) 215 lb (97.5 kg)   Height: 5' 6\" (1.676 m) 5' 6\" (1.676 m)                                              BP Readings from Last 3 Encounters:   10/07/20 126/77   09/30/20 115/70   08/12/20 96/63       NPO Status: Time of last liquid consumption: 2100                        Time of last solid consumption: 1800                        Date of last liquid consumption: 10/06/20                        Date of last solid food consumption: 10/06/20    BMI:   Wt Readings from Last 3 Encounters:   10/07/20 215 lb (97.5 kg)   09/30/20 215 lb 3.2 oz (97.6 kg)   08/24/20 220 lb (99.8 kg)     Body mass index is 34.7 kg/m².     CBC:   Lab Results   Component Value Date    WBC 5.8 12/31/2019    RBC 3.36 12/31/2019    HGB 11.7 12/31/2019    HCT 34.7 12/31/2019    .3 12/31/2019    RDW 14.3 12/31/2019     12/31/2019       CMP:   Lab Results   Component Value Date     12/31/2019    K 4.2 08/12/2020    K 3.4 06/11/2019    CL 91 12/31/2019    CO2 27 12/31/2019    BUN 42 12/31/2019    CREATININE 4.9 12/31/2019    GFRAA 11 12/31/2019    LABGLOM 9 12/31/2019    GLUCOSE 212 12/31/2019    GLUCOSE 135 06/01/2012    PROT 7.0 09/25/2019    CALCIUM 8.8 12/31/2019    BILITOT 0.5 09/25/2019    ALKPHOS 255 09/25/2019    AST 11 09/25/2019    ALT 6 09/25/2019       POC Tests: No results for input(s): POCGLU, POCNA, POCK, POCCL, POCBUN, POCHEMO, POCHCT in the last 72 hours. Coags:   Lab Results   Component Value Date    PROTIME 11.8 05/23/2018    INR 1.0 05/23/2018    APTT 29.6 05/23/2018       HCG (If Applicable): No results found for: PREGTESTUR, PREGSERUM, HCG, HCGQUANT     ABGs: No results found for: PHART, PO2ART, GYE1XRA, CBG3AUF, BEART, J9UEAIMQ     Type & Screen (If Applicable):  No results found for: LABABO, LABRH    Drug/Infectious Status (If Applicable):  No results found for: HIV, HEPCAB    COVID-19 Screening (If Applicable):   Lab Results   Component Value Date    COVID19 Not Detected 10/02/2020         Anesthesia Evaluation  Patient summary reviewed and Nursing notes reviewed no history of anesthetic complications:   Airway: Mallampati: II  TM distance: >3 FB   Neck ROM: full  Mouth opening: > = 3 FB Dental:    (+) edentulous      Pulmonary: breath sounds clear to auscultation  (+) sleep apnea: on noncompliant,  asthma:     (-) shortness of breath, recent URI and not a current smoker                           Cardiovascular:  Exercise tolerance: poor (<4 METS),   (+) hypertension:, hyperlipidemia        Rhythm: regular  Rate: normal           Beta Blocker:  Not on Beta Blocker         Neuro/Psych:   (+) neuromuscular disease:, depression/anxiety    (-) psychiatric history            ROS comment: RLS GI/Hepatic/Renal:   (+) renal disease: dialysis and ESRD,           Endo/Other:    (+) DiabetesType II DM, using insulin, hypothyroidism: arthritis:., .                 Abdominal:           Vascular: negative vascular ROS.                                          Anesthesia

## 2020-10-07 NOTE — PROGRESS NOTES
1361 pt discharged into the care of her nephew and  [facility] is here to take pt home

## 2020-10-07 NOTE — H&P
Department of Orthopedic Surgery  History and Physical        CHIEF COMPLAINT:  s/p left CTR,  right hand numbness     HISTORY OF PRESENT ILLNESS:                 The patient is a RHD 70 y.o. female who presents with left greater than right hand numbness. Patient states she has had left hand numbness for the last 6 months. This is progressively worsened over the last couple months. She also reports occasional numbness and tingling on the right. Her symptoms on the right only started about a month ago. She notices her symptoms when she is on the phone or computer. States her biggest complaint is left hand numbness. She states she does have occasional pain to her left thumb. She denies any pins-and-needles or paresthesias. She does wear braces at night which does not help her symptoms. She does have a fistula in her left upper extremity. She does have diabetic neuropathy to her ankles.     Patient is s/p left CTR. Preoperative symptoms have improved. She still reports numbness to the fingers. She would like to schedule surgery on the right.      Patient did have an EMG and nerve conduction study test dated 7/17/20. Right median nerve sensory latency was non-recordable. Right median nerve motor latency was 10.00. On the EMG portion of the exam she did have reduced recruitment pattern of her right ABP.   This is consistent with right moderate to severe carpal tunnel syndrome.     Past Medical History:    Past Medical History             Diagnosis Date    Anemia      Arthritis       RA    Asthma      Chronic kidney disease      Depression      [facility] ([email or web address]       T-Th-Sat    Hyperlipidemia      Hypertension      Hypothyroid      Sleep apnea       no CPAP    Type II or unspecified type diabetes mellitus without mention of complication, not stated as uncontrolled      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 ([email or web address].) 6/15/2017        Past Surgical History:    Past Surgical History             Procedure Laterality Date    CARPAL TUNNEL RELEASE Left 8/12/2020     LEFT CARPAL TUNNEL RELEASE performed by Michelle Shaw MD at [address]   2004    DIALYSIS FISTULA CREATION Left 03/02/2018     AV fistula arm/Dr. Dolan Goltz    FOOT SURGERY Left 2012,2013     pin put in, then pin removed    GASTRIC BYPASS SURGERY   2004    HAND SURGERY Right 2012     ligament was cut    HERNIA REPAIR        KNEE SURGERY   2009     R knee left side    MO REVISE AV FISTULA,W/O THROMBECTOMY Left 5/23/2018     SUPERFICIALIZATION AV FISTULA - LEFT UPPER ARM performed by Rob Kovacs MD at [address]        Current Medications:   Current Hospital Medications   No current facility-administered medications for this visit. Allergies:  Bactrim [sulfamethoxazole-trimethoprim]; Pcn [penicillins]; Penicillin g; and Sulfa antibiotics     Social History:   TOBACCO:   reports that she has never smoked. She has never used smokeless tobacco.  ETOH:   reports current alcohol use. DRUGS:   reports no history of drug use.   ACTIVITIES OF DAILY LIVING:    OCCUPATION:    Family History:   Family History   No family history on file.        REVIEW OF SYSTEMS:  CONSTITUTIONAL:  negative  EYES:  negative  HEENT:  negative  RESPIRATORY:  negative  CARDIOVASCULAR:  HTN, Hyperlipidemia  GASTROINTESTINAL:  negative  GENITOURINARY:  negative  INTEGUMENT/BREAST:  negative  HEMATOLOGIC/LYMPHATIC:  negative  ALLERGIC/IMMUNOLOGIC:  negative  ENDOCRINE:  negative  MUSCULOSKELETAL:  negative  NEUROLOGICAL:  numbness  BEHAVIOR/PSYCH:  negative     PHYSICAL EXAM:    VITALS:  Temp 97.3 °F (36.3 °C) (Infrared)   Resp 18   Ht 5' 6\" (1.676 m)   Wt 220 lb (99.8 kg)   BMI 35.51 kg/m²   CONSTITUTIONAL:  awake, alert, cooperative, no apparent distress, and appears stated age  EYES:  Lids and lashes normal, pupils equal, round and reactive to light, extra ocular muscles intact, sclera clear, conjunctiva normal  ENT:  Normocephalic, without obvious abnormality, atraumatic, sinuses nontender on palpation, external ears without lesions, oral pharynx with moist mucus membranes, tonsils without erythema or exudates, gums normal and good dentition. NECK:  Supple, symmetrical, trachea midline, no adenopathy, thyroid symmetric, not enlarged and no tenderness, skin normal  LUNGS:  CTA  CARDIOVASCULAR:  2+ radial pulses, extremities warm and well perfused  ABDOMEN:  obese, NTTP  CHEST:  Atraumatic   GENITAL/URINARY:  deferred  NEUROLOGIC:  Awake, alert, oriented to name, place and time. Cranial nerves II-XII are grossly intact. Motor is 5 out of 5 bilaterally. Sensory is intact.  gait is normal.  MUSCULOSKELETAL:     Right upper extremity: Non-tender about the shoulder and elbow with good ROM. - tinels of the cubital tunnel, + tinels of the carpal tunnel, + durkans, - finkelsteins, - CMC grind, - tenderness over the A1 pulleys with no active triggering. Heberden and Bell's nodes to the fingers with stiffness with flexion extension of the fingers. - wartenbergs and cross finger testing, APB strength 2/5 with atrophy. Median, ulnar, radial n intact to light touch. Brisk capillary refill. Gross motor 5/5.      Left upper extremity: incision c/d/i with sutures in place. No erythema or signs of infection. Stiffness with flexion and extension of the fingers. + pillar pain. Brisk capillary refill.  Otherwise NVI.         DATA:    CBC:         Lab Results   Component Value Date     WBC 5.8 12/31/2019     RBC 3.36 12/31/2019     HGB 11.7 12/31/2019     HCT 34.7 12/31/2019     .3 12/31/2019     MCH 34.8 12/31/2019     MCHC 33.7 12/31/2019     RDW 14.3 12/31/2019      12/31/2019     MPV 9.6 12/31/2019      PT/INR:          Lab Results   Component Value Date     PROTIME 11.8 05/23/2018     INR 1.0 05/23/2018            IMPRESSION:  · S/p left carpal tunnel release  · Right severe carpal tunnel syndrome     PLAN:  Discussed findings with patient. Sutures removed today in the office. Scar management advised. Activity restrictions reviewed with the patient. HEP and ROM exercises demonstrated to the patient.      Due to her good results on the left, she would like to proceed with surgery on the right. She does use a cane on her right. Postoperatively we will plan to the patient into a splint. We will plan for a right carpal tunnel release. All questions answered.     I explained the risks, benefits, alternatives and complications of surgery with the patient including but not limited to the risks of infection, possible damage to nerves, vessels, or tendons, stiffness, loss of range of motion, scar sensitivity, wound healing complications, worsening symptoms, possible need for therapy, as well as the possible need further surgery and unanticipated complications. The patient voiced understanding and all questions were answered. The patient elected to proceed with surgical intervention.         I have seen and evaluated the patient and agree with the above assessment and plan on today's visit. I have performed the key components of the history and physical examination with significant findings of severe right carpal tunnel syndrome. Doing well status post left carpal tunnel release. I concur with the findings and plan as documented. The patient was counseled at length about the risks of arvind Covid-19 during their perioperative period and any recovery window from their procedure. The patient was made aware that arvind Covid-19  may worsen their prognosis for recovering from their procedure  and lend to a higher morbidity and/or mortality risk. All material risks, benefits, and reasonable alternatives including postponing the procedure were discussed. The patient does wish to proceed with the procedure at this time. History and Physical Update     Patient was seen and examined. Patient's history and physical was reviewed with the patient. There has been no significant interval changes.

## 2020-10-07 NOTE — BRIEF OP NOTE
Brief Postoperative Note      Patient: Leigh Gonzales  YOB: 1945  MRN: [id number]    Date of Procedure: 10/7/2020    Pre-Op Diagnosis: RIGHT CARPAL TUNNEL SYNDROME    Post-Op Diagnosis: Same       Procedure(s):  RIGHT CARPAL TUNNEL RELEASE    +++DO NOT MOVE+++    Surgeon(s):  Dayana Mina MD    Assistant:  Physician Assistant: TIMMY Smith  Resident: Gema Damon DO    Anesthesia: Monitor Anesthesia Care    Estimated Blood Loss (mL): Minimal    Complications: None    Specimens:   * No specimens in log *    Implants:  * No implants in log *      Drains: * No LDAs found *    Findings: see op note    Electronically signed by TIMMY Smith on 10/7/2020 at 12:29 PM

## 2020-10-07 NOTE — PROGRESS NOTES
CLINICAL PHARMACY NOTE: MEDS TO [address] Select Patient?: No  Total # of Prescriptions Filled: 1   The following medications were delivered to the patient:  · norco 5/325 mg     Total # of Interventions Completed: 2  Time Spent (min): 30    Additional Documentation:

## 2020-10-07 NOTE — OP NOTE
Operative Note      Patient: Fany Wang  YOB: 1945  MRN: [id number]    Date of Procedure: 10/7/2020    Pre-Op Diagnosis: RIGHT CARPAL TUNNEL SYNDROME    Post-Op Diagnosis: Same       Procedure(s):  RIGHT CARPAL TUNNEL RELEASE    Surgeon(s):  Lois Murphy MD    Assistant:   Physician Assistant: TIMMY Puentes  Resident: Duke Boyer DO    Anesthesia: Monitor Anesthesia Care    Estimated Blood Loss (mL): Minimal    Complications: None    Specimens:   * No specimens in log *    Implants:  * No implants in log *      Drains: * No LDAs found *    Findings: see details    Detailed Description of Procedure:   DATE OF PROCEDURE: 10/7/2020  SURGEON: Melodee December, M.D. Lucrecia Lesch: Dr Nixon Yepze resident  PREOPERATIVE DIAGNOSIS: right carpal tunnel syndrome. POSTOPERATIVE DIAGNOSIS: right carpal tunnel syndrome. OPERATION: right carpal tunnel release. ANESTHESIA:  1. Monitored anesthesia care  2. Local anesthetic by surgeon consisting of 10cc of 1% lidocaine with epinephrine  ESTIMATED BLOOD LOSS: Minimal  COMPLICATIONS: None. TOTAL TOURNIQUET TIME: Approximately 6 minutes, 250 mm brachial tourniquet. DISPOSITION: The patient was stable throughout the procedure. FINDINGS:  1. Evidence of median nerve compression beneath the transverse carpal  ligament. It was adequately decompressed with release of the transverse  carpal ligament. 2. Status post decompression, the median nerve was fully decompressed  throughout its course including distal forearm fascia through the carpal  tunnel to its trifurcation distally  OPERATIVE INDICATION: The patient is a very pleasant patient with  persistent and recalcitrant of carpal tunnel syndrome. After failing  conservative management, she wished to proceed with carpal tunnel release.   Risks, benefits, alternatives, and complications were fully explained to her  including, but not limited to, the risk of infection, damage to  nerves/vessels/tendons, failure to relieve her symptoms, worsening symptoms,  recurrence of symptoms, pillar pain, thenar pain, hypothenar pain, scar sensitivity as well as unforeseen complications. She voiced her understanding and wished to proceed. PROCEDURE IN DETAIL: The patient was identified in the holding area. The  right hand was identified as the operative site. She was then seen by  Anesthesia, taken to the operating room, placed supine on the table, and  underwent monitored anesthesia care per Anesthesia Department. Under sterile  conditions, approximately 10 mL of 1:1 mixture of 1% lidocaine with epinephrine were infiltrated over the surgical site. With thisaccomplished, a well-padded arm tourniquet was placed. The right upperextremity was prepared and draped in standard sterile fashion. The arm was exsanguinated and the tourniquet was inflated to 250 mmHg. An incision in line with the radial border of the ring finger, extending from  Riojas's cardinal line to within 1 cm of the volar wrist flexion crease was  then created followed by blunt dissection and identification of the  superficial palmar fascia, underlying median nerve, and transverse carpal  ligament. Dissection was performed proximally to identify the contents of  Guyon canal, which was reflected off of the distal forearm fascia and  transverse carpal ligament. The transverse carpal ligament was then clearly  identified and released from a proximal to distal direction, decompressing  the carpal tunnel. The median nerve was inspected. There was flattening and  hyperemia to the nerve, consistent with median nerve compression. The  remaining portion of the proximal transverse carpal ligament and the distal  forearm fascia was then released using blunt-tip Metzenbaum scissors with a  distal to proximal slide technique while visualizing and protecting the  underlying median nerve.  The median nerve was fully decompressed  throughout its visualized course, including the distal forearm fascia,  through the carpal tunnel and to its level of trifurcation distally. With  this accomplished, the tourniquet was deflated and hemostasis was achieved with bipolar cautery. The wound was copiously irrigated out. The skin was closed with nylon suture followed by a soft sterile dressing.       Electronically signed by Jack Mcbride MD on 10/7/2020 at 1:27 PM

## 2020-10-07 NOTE — PROGRESS NOTES
Patient verifies that a responsible adult will be with them for the next 24 hours. Patient has a ride to go home. Discharge instructions reviewed with patient and nephew, copy given. Anesthesia instructions reviewed and copy given. Denies questions or concerns. Prescription provided. Awaiting transport home via [employer], who stated they will arrive shortly.

## 2020-10-07 NOTE — PROGRESS NOTES
IV access established labs drawn and sent. IV site infiltrated, IV removed, direct pressure applied. Bleeding controlled. Dr. Beata Cullen notified, order for IV team placed.

## 2020-10-07 NOTE — ANESTHESIA POSTPROCEDURE EVALUATION
Department of Anesthesiology  Postprocedure Note    Patient: Fany Wang  MRN: [id number]  YOB: 1945  Date of evaluation: 10/7/2020  Time:  2:11 PM     Procedure Summary     Date:  10/07/20 Room / Location:  [address]    Anesthesia Start:  1217 Anesthesia Stop:  4239    Procedure:  RIGHT CARPAL TUNNEL RELEASE (Right Wrist) Diagnosis:  (RIGHT CARPAL TUNNEL SYNDROME)    Surgeon:  Lois Murphy MD Responsible Provider:  Serge Singh DO    Anesthesia Type:  MAC ASA Status:  4          Anesthesia Type: MAC    Tavo Phase I: Tavo Score: 10    Tavo Phase II: Tavo Score: 10    Last vitals: Reviewed and per EMR flowsheets.        Anesthesia Post Evaluation    Patient location during evaluation: PACU  Patient participation: complete - patient participated  Level of consciousness: awake and alert  Airway patency: patent  Nausea & Vomiting: no nausea and no vomiting  Complications: no  Cardiovascular status: hemodynamically stable  Respiratory status: acceptable  Hydration status: euvolemic

## 2020-10-19 ENCOUNTER — OFFICE VISIT (OUTPATIENT)
Dept: ORTHOPEDIC SURGERY | Age: 75
End: 2020-10-19

## 2020-10-19 VITALS — TEMPERATURE: 97.2 F | BODY MASS INDEX: 34.55 KG/M2 | HEIGHT: 66 IN | RESPIRATION RATE: 22 BRPM | WEIGHT: 215 LBS

## 2020-10-19 PROCEDURE — 99024 POSTOP FOLLOW-UP VISIT: CPT | Performed by: ORTHOPAEDIC SURGERY

## 2020-10-19 NOTE — PROGRESS NOTES
HPI:   Patient follows up today that is post right carpal tunnel release, open. No complaints. Pain controlled. Physical Exam:  right carpal tunnel incision clean, dry, and intact  No signs of infection  ROM appropriate  NVI  Radial, median, and ulnar sensation intact to light touch  Brisk capillary refill  Muscle strength 5/5 grossly      Assessment:  Post-op right carpal tunnel release on 10/7/2020    Plan:  Sutures removed in office  Okay for soapy water. Okay to resume doing dishes and other daily activities after the next 48 hours. If patient would like a brace will be provided for her given she uses a cane. Follow-up as needed    I have seen and evaluated the patient and agree with the above assessment and plan on today's visit. I have performed the key components of the history and physical examination with significant findings of doing well postop. I concur with the findings and plan as documented.     Jakob Alcantara MD  10/19/2020

## 2020-10-20 ENCOUNTER — HOSPITAL ENCOUNTER (OUTPATIENT)
Dept: SLEEP CENTER | Age: 75
Discharge: HOME | End: 2020-10-20
Payer: COMMERCIAL

## 2020-10-20 PROCEDURE — 95811 POLYSOM 6/>YRS CPAP 4/> PARM: CPT

## 2020-10-21 ENCOUNTER — OFFICE VISIT (OUTPATIENT)
Dept: FAMILY MEDICINE CLINIC | Age: 75
End: 2020-10-21
Payer: COMMERCIAL

## 2020-10-21 VITALS
TEMPERATURE: 97.8 F | HEART RATE: 97 BPM | WEIGHT: 216.6 LBS | BODY MASS INDEX: 34.81 KG/M2 | SYSTOLIC BLOOD PRESSURE: 110 MMHG | RESPIRATION RATE: 20 BRPM | DIASTOLIC BLOOD PRESSURE: 68 MMHG | OXYGEN SATURATION: 98 % | HEIGHT: 66 IN

## 2020-10-21 LAB — HBA1C MFR BLD: 8 %

## 2020-10-21 PROCEDURE — G0009 ADMIN PNEUMOCOCCAL VACCINE: HCPCS | Performed by: FAMILY MEDICINE

## 2020-10-21 PROCEDURE — 90732 PPSV23 VACC 2 YRS+ SUBQ/IM: CPT | Performed by: FAMILY MEDICINE

## 2020-10-21 PROCEDURE — 83036 HEMOGLOBIN GLYCOSYLATED A1C: CPT | Performed by: FAMILY MEDICINE

## 2020-10-21 PROCEDURE — 3052F HG A1C>EQUAL 8.0%<EQUAL 9.0%: CPT | Performed by: FAMILY MEDICINE

## 2020-10-21 PROCEDURE — 99213 OFFICE O/P EST LOW 20 MIN: CPT | Performed by: FAMILY MEDICINE

## 2020-10-21 RX ORDER — MONTELUKAST SODIUM 10 MG/1
10 TABLET ORAL NIGHTLY
Qty: 30 TABLET | Refills: 3 | Status: SHIPPED
Start: 2020-10-21 | End: 2020-10-22

## 2020-10-21 RX ORDER — ALBUTEROL SULFATE 90 UG/1
1 AEROSOL, METERED RESPIRATORY (INHALATION) EVERY 4 HOURS PRN
Qty: 1 INHALER | Refills: 3 | Status: ON HOLD
Start: 2020-10-21 | End: 2022-06-21

## 2020-10-21 NOTE — PROGRESS NOTES
Subjective:  70 y.o. female who presents to the office today with chief complaint:  Chief Complaint   Patient presents with   Tung Boyd Doctor     Previously seeing Dr. Marisol Patel, last seen on 5/1/2020. Had a sleep apnea test last night.  Diabetes     A1C today is     Referral - General     Refferal to a Pulmonologist     Asthma- would like referral to another pulmonologist. Needs albuterol inhaler refilled- using every other day. Previously using advair. 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 Forrest Taylor. Dialysis T,R,Sat    Medications: Atorvastatin 10 mg, insulin glargine 35 units nightly, humalog 2 units per meal. , nitroglycerin 0.4 mg sublingual tab, ropinirole 1 mg, Zoloft 100 mg, Synthroid 75 mcg, trazodone 50 mg nightly, iron sucrose 50 mg weekly, methoxy Peg-epoeitin, midodrine    Allergies: Bactrim, sulfa, penicillins    Family History: Mother: Emphysema. Does not know father's history. Sister with COPD. Social:    Education: 12th grade- [employer] in [place]    Employment: Retired. Diet: Follows Diabetic and renal diet. Caffeine: 2-3 cups of coffee daily. Occasional soda. Tobacco: Never smoker. Alcohol: holidays    Health Maintenance Due   Topic Date Due    Diabetic foot exam  01/25/1955    Shingles Vaccine (1 of 2) 01/25/1997    Pneumococcal 65+ yrs at Risk Vaccine (2 of 2 - PPSV23) 05/14/2019    Annual Wellness Visit (AWV)  06/21/2019    Flu vaccine (1) 09/01/2020    Lipid screen  09/25/2020     Podiatrist: Dr. Lulu Dupree- last visit 2 months ago. Will fill out BARBARA. Cancer History: None. Family Cancer History: none. Review of Systems    Review of Systems: All bolded are positive, all others are negative.   General:  Fever, chills, diaphoresis, fatigue, malaise, night sweats, weight loss  Psychological:  Anxiety, disorientation, hallucinations. ENT:  Epistaxis, headaches, vertigo, visual changes. Cardiovascular:  Chest pain, irregular heartbeats, palpitations, paroxysmal nocturnal dyspnea. Respiratory:  Shortness of breath, coughing, sputum production, hemoptysis, wheezing, orthopnea. Gastrointestinal:  Nausea, vomiting, diarrhea, heartburn, constipation, abdominal pain, hematemesis, hematochezia, melena, acholic stools  Genito-Urinary:  Dysuria, urgency, frequency, hematuria  Musculoskeletal:  Joint pain, joint stiffness, joint swelling, muscle pain  Neurology:  Headache, focal neurological deficits, weakness, numbness, paresthesia  Extremities:  Decreased ROM, peripheral edema, mottling      Objective:  Vitals:    10/21/20 0927   BP: 110/68   Pulse: 97   Resp: 20   Temp: 97.8 °F (36.6 °C)   SpO2: 98%     Physical Exam  Vitals signs and nursing note reviewed. Constitutional:       General: She is not in acute distress. Appearance: She is well-developed. She is obese. She is not ill-appearing, toxic-appearing or diaphoretic. Comments: Using large-base quad cane to walk. HENT:      Head: Normocephalic and atraumatic. Right Ear: External ear normal.      Left Ear: External ear normal.      Nose: Nose normal.   Eyes:      General:         Right eye: No discharge. Left eye: No discharge. Conjunctiva/sclera: Conjunctivae normal.      Pupils: Pupils are equal, round, and reactive to light. Neck:      Musculoskeletal: Normal range of motion and neck supple. Cardiovascular:      Rate and Rhythm: Normal rate and regular rhythm. Heart sounds: Normal heart sounds. No murmur. No friction rub. No gallop. Pulmonary:      Effort: Pulmonary effort is normal. No respiratory distress. Breath sounds: Normal breath sounds. No wheezing or rales.    Abdominal:      General: Bowel sounds are normal. There is no distension. Palpations: Abdomen is soft. Tenderness: There is no abdominal tenderness. There is no guarding or rebound. Lymphadenopathy:      Cervical: No cervical adenopathy. Neurological:      Mental Status: She is alert and oriented to person, place, and time. Psychiatric:         Mood and Affect: Mood normal.         Behavior: Behavior normal.         Thought Content: Thought content normal.         Judgment: Judgment normal.         Results for orders placed or performed during the hospital encounter of 10/07/20   SPECIMEN REJECTION   Result Value Ref Range    Rejected Test K     Reason for Rejection see below    Potassium   Result Value Ref Range    Potassium 4.3 3.5 - 5.0 mmol/L   POCT Glucose   Result Value Ref Range    Meter Glucose 150 (H) 74 - 99 mg/dL         Assessment and Plan:  Chandan Harrington was seen today for established new doctor, diabetes and referral - general.    Diagnoses and all orders for this visit:    Mild persistent asthma without complication  -     albuterol sulfate  (90 Base) MCG/ACT inhaler; Inhale 1 puff into the lungs every 4 hours as needed for Wheezing  -     fluticasone-salmeterol (ADVAIR DISKUS) 100-50 MCG/DOSE diskus inhaler; Inhale 1 puff into the lungs 2 times daily Rinse mouth after use. -     montelukast (SINGULAIR) 10 MG tablet; Take 1 tablet by mouth nightly    Controlled type 2 diabetes mellitus with chronic kidney disease on chronic dialysis, with long-term current use of insulin ([facility])  -     POCT glycosylated hemoglobin (Hb A1C)  -     CBC; Future  -     Comprehensive Metabolic Panel; Future  -     TSH without Reflex; Future  -     Lipid Panel; Future    ESRD on dialysis ([facility] [address]  -     CBC; Future  -     Comprehensive Metabolic Panel; Future  -     TSH without Reflex; Future  -     Lipid Panel; Future        1: Mild persistent asthma: Currently controlled.   Patient initially had requested a referral to pulmonology, however, at this time she may be managed at this clinic. Her Advair was restarted. She was also started on Singulair 10 mg nightly. 2: End-stage renal disease on dialysis: Continue to follow with nephrology. Follow-up in 1 month. Patient may come in sooner if needed for medical concerns. Patient advised to call at any time to cancel or re-scheduleor for any questions/concerns. Please note that >15 minutes was spent face-to-face with the patient gathering history, performing physical exam, discussing findings, counseling the patient, and determining planforward. All questions and concerns addressed and answered.          Carlos Kelly,    10/21/20  9:46 AM

## 2020-10-21 NOTE — PROGRESS NOTES
[address]                               SLEEP STUDY REPORT    PATIENT NAME: Sailaja Williamson                    :        1945  MED REC NO:   [id number]                            ROOM:  ACCOUNT NO:   [de-identified]                           ADMIT DATE: 10/20/2020  PROVIDER:     Dianne Nelson MD    DATE OF STUDY:  10/20/2020    POLYSOMNOGRAPHY    PATIENT OF:  TIMMY Villavicencio    INDICATION FOR POLYSOMNOGRAPHY:  Known sleep apnea - for positive airway  pressure titration. CURRENT MEDICATIONS:  Advair, atorvastatin, Humalog, midodrine,  Jaquelin-To, ropinirole, sertraline, sevelamer, Lantus, vitamin,  fexofenadine, calcium. INTERPRETATION:  SLEEP ARCHITECTURE:  This patient had a total time in bed of 473  minutes. Total sleep time was 375 minutes. Sleep efficiency was 79%. Sleep latency is 62 minutes. REM latency was 80 minutes. SLEEP STAGING:  The patient was awake 21% of the time in bed. Stage N1  was 8% and N2 was 52% of the total sleep time. Slow-wave sleep was 12%  of the sleep time and stage REM sleep was 29% of the sleep time. RESPIRATION SUMMARY:  APNEA:  There were 56 apneic events including 9 central and 47  obstructive. Maximum duration of an apneic event was 115 seconds and 24  events occurred during REM stage sleep. HYPOPNEA:  There were 102 hypopneic events, 5 occurred during REM stage  sleep and maximum duration was 44 seconds. APNEA/HYPOPNEA INDEX:  The apnea/hypopnea index is 25. TITRATION OF POSITIVE AIRWAY PRESSURE:  This patient was started on CPAP  of 7 which was gradually increased to 13 before changing to Bilevel. Bilevel was increased to a maximum of 22/18 cm water pressure. At a  level of 20/16 cm water pressure, the patient slept for 32 minutes which  was all REM stage sleep. There were no respiratory events.     AROUSAL ANALYSIS:  There were 93 Sukhwinder Blount MD

## 2020-10-22 RX ORDER — MONTELUKAST SODIUM 10 MG/1
10 TABLET ORAL NIGHTLY
Qty: 90 TABLET | Refills: 0 | Status: SHIPPED
Start: 2020-10-22 | End: 2021-10-05 | Stop reason: SDUPTHER

## 2020-10-26 ENCOUNTER — TELEPHONE (OUTPATIENT)
Dept: ADMINISTRATIVE | Age: 75
End: 2020-10-26

## 2020-10-27 NOTE — PROGRESS NOTES
Patient's in lab titration sleep study interpreted by Briana Bui, consistent with moderate KAYKAY which improved with BiPAP therapy. Auto-BiPAP therapy ordered. Patient will be notified of results and order will be sent to preferred DME. She will be reminded of insurance requirements for compliance and 30-day window to exchange mask interface. She will follow up in clinic within 3 months or sooner if any issues arise. René Rodríguez.  Neyda Masters, [address] [facility] Sleep Medicine

## 2020-10-27 NOTE — Clinical Note
Milton Guzman,    Can you please:    1) contact the patient with sleep study results (KAYKAY) and let them know that we recommend auto-BiPAP therapy? 2) send the order for PAP plus all necessary documents to [facility] (unless patient prefers another DME)? 3) please arrange for patient to have Sleep Medicine follow up in 12/2020 with Dr. Tawanda Luu or sooner if any issues arise ? Thank you!

## 2020-10-27 NOTE — PROGRESS NOTES
[facility] Cardiology Progress Note  Dr. [facility]      Referring Physician: Jo-Ann Velazquez DO  CHIEF COMPLAINT:   Chief Complaint   Patient presents with    Follow-up     6 month. Pt complains of occasional SOBOE. No other cardiac complaints today       HISTORY OF PRESENT ILLNESS:   70year old female with history of end-stage renal disease on hemodialysis, hyperlipidemia, i diabetes mellitus is here for a follow up visit. Patient denies any chest pain, no shortness of breath, no lightheadedness, no dizziness, no palpitations, no pedal edema, no PND, no orthopnea, no syncope, no presyncopal episodes.     Past Medical History:   Diagnosis Date    Anemia     Arthritis     RA    Asthma     Chronic kidney disease     Depression     Hemodialysis patient ([facility] [address]     T-Th-Sat    Hyperlipidemia     Hypothyroid     Sleep apnea     no CPAP    Type II or unspecified type diabetes mellitus without mention of complication, not stated as uncontrolled     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 ([facility] [address] 6/15/2017         Past Surgical History:   Procedure Laterality Date    CARPAL TUNNEL RELEASE Left 8/12/2020    LEFT CARPAL TUNNEL RELEASE performed by Fidelina Jenkins MD at [facility] Right 10/7/2020    RIGHT CARPAL TUNNEL RELEASE performed by Fidelina Jenkins MD at [address]  2004    DIALYSIS FISTULA CREATION Left 03/02/2018    AV fistula arm/Dr. Monique Nunn    EYE SURGERY Right 2010    cataract    FOOT SURGERY Left 2012,2013    pin put in, then pin removed   [facility]  2004    HAND SURGERY Right 2012    ligament was cut    HERNIA REPAIR      KNEE SURGERY  2009    R knee left side    [place] REVISE AV FISTULA,W/O THROMBECTOMY Left 5/23/2018    SUPERFICIALIZATION AV FISTULA - LEFT UPPER ARM performed by Hallie Santos MD at [address]           Current Outpatient Medications   Medication Sig Dispense Refill  montelukast (SINGULAIR) 10 MG tablet Take 1 tablet by mouth nightly 90 tablet 0    albuterol sulfate  (90 Base) MCG/ACT inhaler Inhale 1 puff into the lungs every 4 hours as needed for Wheezing 1 Inhaler 3    fluticasone-salmeterol (ADVAIR DISKUS) 100-50 MCG/DOSE diskus inhaler Inhale 1 puff into the lungs 2 times daily Rinse mouth after use. 1 Inhaler 5    sertraline (ZOLOFT) 100 MG tablet TAKE 1 TABLET BY MOUTH EVERY NIGHT 30 tablet 0    cyanocobalamin ([employer] VITAMIN B12) 1000 MCG tablet Take 1 tablet by mouth daily 30 tablet 3    traZODone (DESYREL) 50 MG tablet Take 1 tablet by mouth nightly 90 tablet 1    SYNTHROID 75 MCG tablet Take 1 tablet by mouth Daily 30 tablet 3    rOPINIRole (REQUIP) 1 MG tablet TAKE 1 TABLET BY MOUTH EVERY NIGHT 1 tablet 1    Alcohol Swabs (ALCOHOL PREP) 70 % PADS 1 each by Does not apply route 4 times daily (before meals and nightly) 200 each 3    insulin glargine (LANTUS SOLOSTAR) 100 UNIT/ML injection pen Inject 35 Units into the skin nightly 5 pen 5    atorvastatin (LIPITOR) 10 MG tablet TAKE 1 TABLET BY MOUTH EVERY NIGHT 90 tablet 5    Elastic Bandages & Supports (WRIST SPLINT LEFT/RIGHT) MISC 1 each by Does not apply route daily as needed (numbness) 1 each 0    Misc. Devices (BARIATRIC ROLLATOR) MISC 1 Device by Does not apply route continuous prn (Walking) 1 each 0    calcium carbonate 600 MG TABS tablet Take 1 tablet by mouth 2 times daily (Patient taking differently: Take 2 tablets by mouth 3 times daily (with meals) Rx) 60 tablet 2    Insulin Pen Needle (B-D ULTRAFINE III SHORT PEN) 31G X 8 MM MISC Inject 1 each into the skin daily 100 each 5    nitroGLYCERIN (NITROSTAT) 0.4 MG SL tablet Place 1 tablet under the tongue every 5 minutes as needed for Chest pain up to max of 3 total doses.  If no relief, call 911. 25 tablet 3    Methoxy PEG-Epoetin Beta (MIRCERA) 50 MCG/0.3ML SOSY Inject 50 mcg as directed every 14 days Last Dose: 4/4/2019  Next Dose: 6/13/2019      lidocaine-prilocaine (EMLA) 2.5-2.5 % cream Apply 1 each topically three times a week Tuesday, Thursday, Saturday      insulin lispro (HUMALOG) 100 UNIT/ML injection vial Inject 0-5 Units into the skin 3 times daily (before meals) *Per Sliding Scale*  1 unit for every 10 carbs   Takes 2 units with each meal      iron sucrose (VENOFER) 20 MG/ML injection Infuse 50 mg intravenously once a week Last Dose: 6/4/ 2020 at dialysis      B Complex-C-Folic Acid (TOD-RENETTA) TABS Take 1 tablet by mouth daily Rx      midodrine (PROAMATINE) 5 MG tablet Take 5 mg by mouth three times a week Tuesday, Thursday, Saturday      Insulin Pen Needle 32G X 4 MM MISC 1 each by Does not apply route daily Use as directed with insulin pen 100 each 3    glucose blood VI test strips (ACCU-CHEK SMARTVIEW) strip 1 each by In Vitro route 4 times daily (before meals and nightly) As needed. 150 each 3    ACCU-CHEK FASTCLIX LANCETS MISC USE TO TEST BLOOD SUGAR FOUR TIMES DAILY BEFORE MEALS AND NIGHTLY 612 each 3    Blood Glucose Monitoring Suppl (ACCU-CHEK DERREK SMARTVIEW) W/DEVICE KIT 1 kit by Does not apply route 4 times daily (before meals and nightly) 1 kit 0     No current facility-administered medications for this visit.           Allergies as of 11/02/2020 - Review Complete 11/02/2020   Allergen Reaction Noted    Pcn [penicillins] Hives 11/13/2014    Sulfa antibiotics Other (See Comments) 11/13/2014    Bactrim [sulfamethoxazole-trimethoprim] Hives 11/13/2014       Social History     Socioeconomic History    Marital status:      Spouse name: Not on file    Number of children: Not on file    Years of education: Not on file    Highest education level: Not on file   Occupational History    Not on file   Social Needs    Financial resource strain: Not on file    Food insecurity     Worry: Not on file     Inability: Not on file    Transportation needs     Medical: Not on file     Non-medical: Not on file Tobacco Use    Smoking status: Never Smoker    Smokeless tobacco: Never Used   Substance and Sexual Activity    Alcohol use: Yes     Comment: rarely. 2-3 coffee per day    Drug use: No    Sexual activity: Not on file   Lifestyle    Physical activity     Days per week: Not on file     Minutes per session: Not on file    Stress: Not on file   Relationships    Social connections     Talks on phone: Not on file     Gets together: Not on file     Attends Islam service: Not on file     Active member of club or organization: Not on file     Attends meetings of clubs or organizations: Not on file     Relationship status: Not on file    Intimate partner violence     Fear of current or ex partner: Not on file     Emotionally abused: Not on file     Physically abused: Not on file     Forced sexual activity: Not on file   Other Topics Concern    Not on file   Social History Narrative    Not on file       History reviewed. No pertinent family history. REVIEW OF SYSTEMS:     CONSTITUTIONAL:  negative for  fevers, chills, sweats and fatigue  HEENT:  negative for  tinnitus, earaches, nasal congestion and epistaxis  RESPIRATORY:  negative for  dry cough, cough with sputum, dyspnea, wheezing and hemoptysis  GASTROINTESTINAL:  negative for nausea, vomiting, diarrhea, constipation, pruritus and jaundice  HEMATOLOGIC/LYMPHATIC:  negative for easy bruising, bleeding, lymphadenopathy and petechiae  ENDOCRINE:  negative for heat intolerance, cold intolerance, tremor, hair loss and diabetic symptoms including neither polyuria nor polydipsia nor blurred vision  MUSCULOSKELETAL:  negative for  myalgias, arthralgias, joint swelling, stiff joints and decreased range of motion  NEUROLOGICAL:  negative for memory problems, speech problems, visual disturbance, dysphagia, weakness and numbness      PHYSICAL EXAM:   Constitutional:  Awake, alert cooperative, no apparant distress, and appears stated age.    HEENT:  Moist and pink mucous membranes, normocephalic, without obvious abnormality, atraumatic, normal ears and nose. NECK:  Supple, symmetrical, trachea midline, no JVD, no adenopathy, thyroid symmetric, not enlarged and no tenderness, good carotid upstroke bilaterally, no carotid bruit, skin normal.  LUNGS: No increased work of breathing, good air exchange, clear to ascultation bilaterally, no crackles or wheezing. Cardiovascular: Normal apical impulse, regular rate and rhythm, normal S1 and S2, no S3 or S4, no murmur, no pedal edema, good carotid upstroke bilaterally, no carotid bruit, no JVD, no adbominal pulsating masses. ABDOMEN: Soft, nontender, no hepatomegaly, no splenomegaly, bowel sound positive. CHEST:  Expands symmetrically, nontender to palpation. Musculoskeletal:  No clubbing or cyanosis. No redness, warmth, or swelling of the joints. Neurological: Alert, awake, and oriented X3   SKIN: No bruises, no bleeding, normal skin color, texture, turgor and no redness, warmth or swelling. /86 (Site: Right Upper Arm, Position: Sitting, Cuff Size: Large Adult)   Pulse 94   Ht 5' 6\" (1.676 m)   Wt 215 lb (97.5 kg)   BMI 34.70 kg/m²     DATA:   I personally reviewed the visit EKG with the following interpretation: Junctional rhythm alternating with sinus rhythm    8/23/2019) Normal sinus rhythm. Cannot rule out Anterior infarct. Prolonged QT. ECHO: 12/30/17 Summary   Left ventricular size is grossly normal.   Normal left ventricular wall thickness. Ejection fraction is visually estimated at 50%. No evidence of left ventricular mass or thrombus noted. No regional wall motion abnormalities seen. The left atrium is mildly dilated. Interatrial septum appears intact. No evidence of thrombus within left atrium. Borderline dilated right ventricle. No evidence of a thrombus in the right ventricle. Physiologic and/or trace mitral regurgitation is present. No mitral valve stenosis present. Physiologic and/or trace tricuspid regurgitation. Regular rhythm. Stress Test: 8/30/19 1. Negative Lexiscan stress test for ischemic symptoms or ischemic EKG changes  2. Probably normal Cardiolite perfusion scan showing decreased uptake of the radioactive tracer in the lateral wall on stress and rest images with improvement of the stress images which is consistent with artifacts, very small mild reversible apical defect most likely represents soft tissue attenuation artifact  3. Normal left ventricular systolic function with normal wall motion  4. There is no comparison study  5.   The results of the study predict low probability for significant coronary artery disease or future cardiac events    Angiography:    Cardiology Labs: BMP:    Lab Results   Component Value Date     11/09/2020    K 5.7 11/09/2020    K 3.4 06/11/2019    CL 92 11/09/2020    CO2 32 11/09/2020    BUN 36 11/09/2020    CREATININE 5.2 11/09/2020     CMP:    Lab Results   Component Value Date     11/09/2020    K 5.7 11/09/2020    K 3.4 06/11/2019    CL 92 11/09/2020    CO2 32 11/09/2020    BUN 36 11/09/2020    CREATININE 5.2 11/09/2020    PROT 7.0 11/09/2020     CBC:    Lab Results   Component Value Date    WBC 7.6 11/09/2020    RBC 3.21 11/09/2020    HGB 10.9 11/09/2020    HCT 34.5 11/09/2020    .5 11/09/2020    RDW 14.7 11/09/2020     11/09/2020     PT/INR:  No results found for: PTINR  PT/INR Warfarin:  No components found for: PTPATWAR, PTINRWAR  PTT:    Lab Results   Component Value Date    APTT 29.6 05/23/2018     PTT Heparin:  No components found for: APTTHEP  Magnesium:    Lab Results   Component Value Date    MG 2.0 06/11/2019     TSH:    Lab Results   Component Value Date    TSH 4.260 11/09/2020     TROPONIN:  No components found for: TROP  BNP:  No results found for: BNP  FASTING LIPID PANEL:    Lab Results   Component Value Date    CHOL 175 11/09/2020    HDL 40 11/09/2020    TRIG 229 11/09/2020     No orders to display     I have personally reviewed the laboratory, cardiac diagnostic and radiographic testing as outlined above:      IMPRESSION:  1. Chest pain: No recurrence  2. Hyperlipidemia: On statin  3. Type 2 diabetes mellitus  4. End-stage renal disease on hemodialysis replacement therapy    RECOMMENDATIONS:   1. Continue current treatment  2. Follow-up with Dr. Clarence Green as scheduled  3. Follow-up with Dr. Severiano Hoe in 1 year, sooner if symptomatic for the visit    I have reviewed my findings and recommendations with patient    Electronically signed by Angelina Childs MD on 12/19/2020 at 5:54 PM    NOTE: This report was transcribed using voice recognition software.  Every effort was made to ensure accuracy; however, inadvertent computerized transcription errors may be present 98.5 36.9

## 2020-10-28 ENCOUNTER — TELEPHONE (OUTPATIENT)
Dept: SLEEP MEDICINE | Age: 75
End: 2020-10-28

## 2020-10-28 NOTE — TELEPHONE ENCOUNTER
Spoke with pt and gave SS results (KAYKAY)-- recommends BiPAP therapy--pt would like to use [facility] as her DME co--all necessary info will be sent to [facility]

## 2020-11-02 ENCOUNTER — OFFICE VISIT (OUTPATIENT)
Dept: CARDIOLOGY CLINIC | Age: 75
End: 2020-11-02
Payer: COMMERCIAL

## 2020-11-02 VITALS
WEIGHT: 215 LBS | BODY MASS INDEX: 34.55 KG/M2 | HEART RATE: 94 BPM | DIASTOLIC BLOOD PRESSURE: 86 MMHG | HEIGHT: 66 IN | SYSTOLIC BLOOD PRESSURE: 128 MMHG

## 2020-11-02 PROCEDURE — 1090F PRES/ABSN URINE INCON ASSESS: CPT | Performed by: INTERNAL MEDICINE

## 2020-11-02 PROCEDURE — G8484 FLU IMMUNIZE NO ADMIN: HCPCS | Performed by: INTERNAL MEDICINE

## 2020-11-02 PROCEDURE — G8417 CALC BMI ABV UP PARAM F/U: HCPCS | Performed by: INTERNAL MEDICINE

## 2020-11-02 PROCEDURE — 4040F PNEUMOC VAC/ADMIN/RCVD: CPT | Performed by: INTERNAL MEDICINE

## 2020-11-02 PROCEDURE — 1036F TOBACCO NON-USER: CPT | Performed by: INTERNAL MEDICINE

## 2020-11-02 PROCEDURE — 99213 OFFICE O/P EST LOW 20 MIN: CPT | Performed by: INTERNAL MEDICINE

## 2020-11-02 PROCEDURE — G8399 PT W/DXA RESULTS DOCUMENT: HCPCS | Performed by: INTERNAL MEDICINE

## 2020-11-02 PROCEDURE — 93000 ELECTROCARDIOGRAM COMPLETE: CPT | Performed by: INTERNAL MEDICINE

## 2020-11-02 PROCEDURE — G8427 DOCREV CUR MEDS BY ELIG CLIN: HCPCS | Performed by: INTERNAL MEDICINE

## 2020-11-02 PROCEDURE — 1123F ACP DISCUSS/DSCN MKR DOCD: CPT | Performed by: INTERNAL MEDICINE

## 2020-11-02 PROCEDURE — 3017F COLORECTAL CA SCREEN DOC REV: CPT | Performed by: INTERNAL MEDICINE

## 2020-11-09 ENCOUNTER — HOSPITAL ENCOUNTER (OUTPATIENT)
Age: 75
Discharge: HOME | End: 2020-11-09
Payer: COMMERCIAL

## 2020-11-09 LAB
ALBUMIN SERPL-MCNC: 3.8 G/DL (ref 3.5–5.2)
ALP BLD-CCNC: 103 U/L (ref 35–104)
ALT SERPL-CCNC: 5 U/L (ref 0–32)
ANION GAP SERPL CALCULATED.3IONS-SCNC: 14 MMOL/L (ref 7–16)
AST SERPL-CCNC: 9 U/L (ref 0–31)
BILIRUB SERPL-MCNC: 0.6 MG/DL (ref 0–1.2)
BUN BLDV-MCNC: 36 MG/DL (ref 8–23)
CALCIUM SERPL-MCNC: 9.3 MG/DL (ref 8.6–10.2)
CHLORIDE BLD-SCNC: 92 MMOL/L (ref 98–107)
CHOLESTEROL, TOTAL: 175 MG/DL (ref 0–199)
CO2: 32 MMOL/L (ref 22–29)
CREAT SERPL-MCNC: 5.2 MG/DL (ref 0.5–1)
GFR AFRICAN AMERICAN: 10
GFR NON-AFRICAN AMERICAN: 8 ML/MIN/1.73
GLUCOSE BLD-MCNC: 185 MG/DL (ref 74–99)
HCT VFR BLD CALC: 34.5 % (ref 34–48)
HDLC SERPL-MCNC: 40 MG/DL
HEMOGLOBIN: 10.9 G/DL (ref 11.5–15.5)
LDL CHOLESTEROL CALCULATED: 89 MG/DL (ref 0–99)
MCH RBC QN AUTO: 34 PG (ref 26–35)
MCHC RBC AUTO-ENTMCNC: 31.6 % (ref 32–34.5)
MCV RBC AUTO: 107.5 FL (ref 80–99.9)
PDW BLD-RTO: 14.7 FL (ref 11.5–15)
PLATELET # BLD: 153 E9/L (ref 130–450)
PMV BLD AUTO: 10.5 FL (ref 7–12)
POTASSIUM SERPL-SCNC: 5.7 MMOL/L (ref 3.5–5)
RBC # BLD: 3.21 E12/L (ref 3.5–5.5)
SODIUM BLD-SCNC: 138 MMOL/L (ref 132–146)
TOTAL PROTEIN: 7 G/DL (ref 6.4–8.3)
TRIGL SERPL-MCNC: 229 MG/DL (ref 0–149)
TSH SERPL DL<=0.05 MIU/L-ACNC: 4.26 UIU/ML (ref 0.27–4.2)
VLDLC SERPL CALC-MCNC: 46 MG/DL
WBC # BLD: 7.6 E9/L (ref 4.5–11.5)

## 2020-11-09 PROCEDURE — 36415 COLL VENOUS BLD VENIPUNCTURE: CPT

## 2020-11-09 PROCEDURE — 80053 COMPREHEN METABOLIC PANEL: CPT

## 2020-11-09 PROCEDURE — 84443 ASSAY THYROID STIM HORMONE: CPT

## 2020-11-09 PROCEDURE — 85027 COMPLETE CBC AUTOMATED: CPT

## 2020-11-09 PROCEDURE — 80061 LIPID PANEL: CPT

## 2020-11-20 ENCOUNTER — VIRTUAL VISIT (OUTPATIENT)
Dept: FAMILY MEDICINE CLINIC | Age: 75
End: 2020-11-20
Payer: COMMERCIAL

## 2020-11-20 PROCEDURE — 99212 OFFICE O/P EST SF 10 MIN: CPT | Performed by: FAMILY MEDICINE

## 2020-11-20 NOTE — PROGRESS NOTES
Deja Hanley is a 70 y.o. female evaluated via telephone on 11/20/2020. Consent:  She and/or health care decision maker is aware that that she may receive a bill for this telephone service, depending on her insurance coverage, and has provided verbal consent to proceed: Yes      Documentation:  I communicated with the patient and/or health care decision maker about asthma, labs. Details of this discussion including any medical advice provided:     Early satiety: Over the last several months. Feels full no matter how much she eats. Increased burping. Had gastric bypass completed in 2003. No upper endoscopy. Not on reflux medications- admits to indigestion less than weekly. No history of stomach or abdominal cancers in the family. Asthma: Better since starting the advair. Has not had to use use rescue inhaler. Taking singulair. Very happy about improvement. Follow-up in 2 months for KAYKAY. I affirm this is a Patient Initiated Episode with a Patient who has not had a related appointment within my department in the past 7 days or scheduled within the next 24 hours.     Patient identification was verified at the start of the visit: Yes    Total Time: minutes: 11-20 minutes    Note: not billable if this call serves to triage the patient into an appointment for the relevant concern      Phyllis Zaidi DO  11/20/20  12:16 PM

## 2020-12-21 ENCOUNTER — APPOINTMENT (OUTPATIENT)
Dept: CT IMAGING | Age: 75
DRG: 193 | End: 2020-12-21
Payer: COMMERCIAL

## 2020-12-21 ENCOUNTER — HOSPITAL ENCOUNTER (INPATIENT)
Age: 75
LOS: 7 days | Discharge: HOME | DRG: 193 | End: 2020-12-28
Attending: EMERGENCY MEDICINE | Admitting: INTERNAL MEDICINE
Payer: COMMERCIAL

## 2020-12-21 PROBLEM — R06.02 SHORTNESS OF BREATH: Status: ACTIVE | Noted: 2020-12-21

## 2020-12-21 PROBLEM — N18.6 END STAGE RENAL DISEASE (HCC): Status: ACTIVE | Noted: 2020-12-21

## 2020-12-21 LAB
ALBUMIN SERPL-MCNC: 2.8 G/DL (ref 3.5–5.2)
ALP BLD-CCNC: 116 U/L (ref 35–104)
ALT SERPL-CCNC: 7 U/L (ref 0–32)
ANION GAP SERPL CALCULATED.3IONS-SCNC: 19 MMOL/L (ref 7–16)
ANISOCYTOSIS: ABNORMAL
AST SERPL-CCNC: 10 U/L (ref 0–31)
BASOPHILIC STIPPLING: ABNORMAL
BASOPHILS ABSOLUTE: 0 E9/L (ref 0–0.2)
BASOPHILS RELATIVE PERCENT: 0.2 % (ref 0–2)
BILIRUB SERPL-MCNC: 0.3 MG/DL (ref 0–1.2)
BUN BLDV-MCNC: 81 MG/DL (ref 8–23)
CALCIUM SERPL-MCNC: 8.5 MG/DL (ref 8.6–10.2)
CHLORIDE BLD-SCNC: 92 MMOL/L (ref 98–107)
CO2: 23 MMOL/L (ref 22–29)
CREAT SERPL-MCNC: 8.8 MG/DL (ref 0.5–1)
EOSINOPHILS ABSOLUTE: 0 E9/L (ref 0.05–0.5)
EOSINOPHILS RELATIVE PERCENT: 0.2 % (ref 0–6)
GFR AFRICAN AMERICAN: 5
GFR NON-AFRICAN AMERICAN: 4 ML/MIN/1.73
GLUCOSE BLD-MCNC: 216 MG/DL (ref 74–99)
HCT VFR BLD CALC: 27.6 % (ref 34–48)
HEMOGLOBIN: 9.2 G/DL (ref 11.5–15.5)
LACTIC ACID: 1 MMOL/L (ref 0.5–2.2)
LYMPHOCYTES ABSOLUTE: 1.14 E9/L (ref 1.5–4)
LYMPHOCYTES RELATIVE PERCENT: 9.6 % (ref 20–42)
MCH RBC QN AUTO: 35.9 PG (ref 26–35)
MCHC RBC AUTO-ENTMCNC: 33.3 % (ref 32–34.5)
MCV RBC AUTO: 107.8 FL (ref 80–99.9)
METAMYELOCYTES RELATIVE PERCENT: 2.6 % (ref 0–1)
METER GLUCOSE: 244 MG/DL (ref 74–99)
MONOCYTES ABSOLUTE: 0.57 E9/L (ref 0.1–0.95)
MONOCYTES RELATIVE PERCENT: 5.2 % (ref 2–12)
NEUTROPHILS ABSOLUTE: 9.69 E9/L (ref 1.8–7.3)
NEUTROPHILS RELATIVE PERCENT: 82.6 % (ref 43–80)
PDW BLD-RTO: 15.2 FL (ref 11.5–15)
PLATELET # BLD: 243 E9/L (ref 130–450)
PMV BLD AUTO: 9.8 FL (ref 7–12)
POLYCHROMASIA: ABNORMAL
POTASSIUM REFLEX MAGNESIUM: 4.8 MMOL/L (ref 3.5–5)
PRO-BNP: ABNORMAL PG/ML (ref 0–125)
RBC # BLD: 2.56 E12/L (ref 3.5–5.5)
SARS-COV-2, NAAT: NOT DETECTED
SODIUM BLD-SCNC: 134 MMOL/L (ref 132–146)
TOTAL PROTEIN: 6.2 G/DL (ref 6.4–8.3)
TROPONIN: 0.03 NG/ML (ref 0–0.03)
WBC # BLD: 11.4 E9/L (ref 4.5–11.5)

## 2020-12-21 PROCEDURE — 1200000000 HC SEMI PRIVATE

## 2020-12-21 PROCEDURE — 71275 CT ANGIOGRAPHY CHEST: CPT

## 2020-12-21 PROCEDURE — G0378 HOSPITAL OBSERVATION PER HR: HCPCS

## 2020-12-21 PROCEDURE — U0002 COVID-19 LAB TEST NON-CDC: HCPCS

## 2020-12-21 PROCEDURE — 96360 HYDRATION IV INFUSION INIT: CPT

## 2020-12-21 PROCEDURE — 6360000004 HC RX CONTRAST MEDICATION: Performed by: RADIOLOGY

## 2020-12-21 PROCEDURE — 99284 EMERGENCY DEPT VISIT MOD MDM: CPT

## 2020-12-21 PROCEDURE — 5A1D70Z PERFORMANCE OF URINARY FILTRATION, INTERMITTENT, LESS THAN 6 HOURS PER DAY: ICD-10-PCS | Performed by: INTERNAL MEDICINE

## 2020-12-21 PROCEDURE — 96361 HYDRATE IV INFUSION ADD-ON: CPT

## 2020-12-21 PROCEDURE — 83605 ASSAY OF LACTIC ACID: CPT

## 2020-12-21 PROCEDURE — 83880 ASSAY OF NATRIURETIC PEPTIDE: CPT

## 2020-12-21 PROCEDURE — 85025 COMPLETE CBC W/AUTO DIFF WBC: CPT

## 2020-12-21 PROCEDURE — 93005 ELECTROCARDIOGRAM TRACING: CPT | Performed by: STUDENT IN AN ORGANIZED HEALTH CARE EDUCATION/TRAINING PROGRAM

## 2020-12-21 PROCEDURE — 6370000000 HC RX 637 (ALT 250 FOR IP): Performed by: INTERNAL MEDICINE

## 2020-12-21 PROCEDURE — 6360000002 HC RX W HCPCS: Performed by: INTERNAL MEDICINE

## 2020-12-21 PROCEDURE — 2580000003 HC RX 258: Performed by: STUDENT IN AN ORGANIZED HEALTH CARE EDUCATION/TRAINING PROGRAM

## 2020-12-21 PROCEDURE — 84484 ASSAY OF TROPONIN QUANT: CPT

## 2020-12-21 PROCEDURE — 80053 COMPREHEN METABOLIC PANEL: CPT

## 2020-12-21 PROCEDURE — 82962 GLUCOSE BLOOD TEST: CPT

## 2020-12-21 RX ORDER — MIDODRINE HYDROCHLORIDE 5 MG/1
5 TABLET ORAL
Status: DISCONTINUED | OUTPATIENT
Start: 2020-12-21 | End: 2020-12-23

## 2020-12-21 RX ORDER — ALBUTEROL SULFATE 90 UG/1
1 AEROSOL, METERED RESPIRATORY (INHALATION) EVERY 4 HOURS PRN
Status: DISCONTINUED | OUTPATIENT
Start: 2020-12-21 | End: 2020-12-28 | Stop reason: HOSPADM

## 2020-12-21 RX ORDER — ALBUTEROL SULFATE 2.5 MG/3ML
2.5 SOLUTION RESPIRATORY (INHALATION) EVERY 6 HOURS PRN
Status: DISCONTINUED | OUTPATIENT
Start: 2020-12-21 | End: 2020-12-21 | Stop reason: SDUPTHER

## 2020-12-21 RX ORDER — MONTELUKAST SODIUM 10 MG/1
10 TABLET ORAL NIGHTLY
Status: DISCONTINUED | OUTPATIENT
Start: 2020-12-21 | End: 2020-12-28 | Stop reason: HOSPADM

## 2020-12-21 RX ORDER — LANOLIN ALCOHOL/MO/W.PET/CERES
1000 CREAM (GRAM) TOPICAL DAILY
Status: DISCONTINUED | OUTPATIENT
Start: 2020-12-22 | End: 2020-12-28 | Stop reason: HOSPADM

## 2020-12-21 RX ORDER — TRAZODONE HYDROCHLORIDE 50 MG/1
50 TABLET ORAL NIGHTLY
Status: DISCONTINUED | OUTPATIENT
Start: 2020-12-21 | End: 2020-12-28 | Stop reason: HOSPADM

## 2020-12-21 RX ORDER — ONDANSETRON 2 MG/ML
4 INJECTION INTRAMUSCULAR; INTRAVENOUS EVERY 6 HOURS PRN
Status: DISCONTINUED | OUTPATIENT
Start: 2020-12-21 | End: 2020-12-28 | Stop reason: HOSPADM

## 2020-12-21 RX ORDER — LEVOTHYROXINE SODIUM 0.07 MG/1
75 TABLET ORAL DAILY
Status: DISCONTINUED | OUTPATIENT
Start: 2020-12-22 | End: 2020-12-28 | Stop reason: HOSPADM

## 2020-12-21 RX ORDER — HEPARIN SODIUM 5000 [USP'U]/ML
5000 INJECTION, SOLUTION INTRAVENOUS; SUBCUTANEOUS EVERY 8 HOURS SCHEDULED
Status: DISCONTINUED | OUTPATIENT
Start: 2020-12-21 | End: 2020-12-23

## 2020-12-21 RX ORDER — NICOTINE POLACRILEX 4 MG
15 LOZENGE BUCCAL PRN
Status: DISCONTINUED | OUTPATIENT
Start: 2020-12-21 | End: 2020-12-28 | Stop reason: HOSPADM

## 2020-12-21 RX ORDER — CHOLECALCIFEROL (VITAMIN D3) 10 MCG
1 TABLET ORAL DAILY
Status: DISCONTINUED | OUTPATIENT
Start: 2020-12-22 | End: 2020-12-28 | Stop reason: HOSPADM

## 2020-12-21 RX ORDER — INSULIN GLARGINE 100 [IU]/ML
35 INJECTION, SOLUTION SUBCUTANEOUS NIGHTLY
Status: DISCONTINUED | OUTPATIENT
Start: 2020-12-21 | End: 2020-12-25

## 2020-12-21 RX ORDER — ATORVASTATIN CALCIUM 10 MG/1
10 TABLET, FILM COATED ORAL DAILY
Status: DISCONTINUED | OUTPATIENT
Start: 2020-12-22 | End: 2020-12-28 | Stop reason: HOSPADM

## 2020-12-21 RX ORDER — DEXTROSE MONOHYDRATE 25 G/50ML
12.5 INJECTION, SOLUTION INTRAVENOUS PRN
Status: DISCONTINUED | OUTPATIENT
Start: 2020-12-21 | End: 2020-12-28 | Stop reason: HOSPADM

## 2020-12-21 RX ORDER — LIDOCAINE AND PRILOCAINE 25; 25 MG/G; MG/G
1 CREAM TOPICAL
Status: DISCONTINUED | OUTPATIENT
Start: 2020-12-23 | End: 2020-12-28 | Stop reason: HOSPADM

## 2020-12-21 RX ORDER — BUDESONIDE AND FORMOTEROL FUMARATE DIHYDRATE 160; 4.5 UG/1; UG/1
2 AEROSOL RESPIRATORY (INHALATION) 2 TIMES DAILY
Status: DISCONTINUED | OUTPATIENT
Start: 2020-12-21 | End: 2020-12-28 | Stop reason: HOSPADM

## 2020-12-21 RX ORDER — CALCIUM CARBONATE 500(1250)
1000 TABLET ORAL
Status: DISCONTINUED | OUTPATIENT
Start: 2020-12-22 | End: 2020-12-28 | Stop reason: HOSPADM

## 2020-12-21 RX ORDER — 0.9 % SODIUM CHLORIDE 0.9 %
1000 INTRAVENOUS SOLUTION INTRAVENOUS ONCE
Status: COMPLETED | OUTPATIENT
Start: 2020-12-21 | End: 2020-12-21

## 2020-12-21 RX ORDER — DEXTROSE MONOHYDRATE 50 MG/ML
100 INJECTION, SOLUTION INTRAVENOUS PRN
Status: DISCONTINUED | OUTPATIENT
Start: 2020-12-21 | End: 2020-12-28 | Stop reason: HOSPADM

## 2020-12-21 RX ADMIN — INSULIN LISPRO 1 UNITS: 100 INJECTION, SOLUTION INTRAVENOUS; SUBCUTANEOUS at 23:56

## 2020-12-21 RX ADMIN — IOPAMIDOL 75 ML: 755 INJECTION, SOLUTION INTRAVENOUS at 18:16

## 2020-12-21 RX ADMIN — TRAZODONE HYDROCHLORIDE 50 MG: 50 TABLET ORAL at 23:56

## 2020-12-21 RX ADMIN — INSULIN GLARGINE 35 UNITS: 100 INJECTION, SOLUTION SUBCUTANEOUS at 23:56

## 2020-12-21 RX ADMIN — HEPARIN SODIUM 5000 UNITS: 5000 INJECTION INTRAVENOUS; SUBCUTANEOUS at 23:55

## 2020-12-21 RX ADMIN — SODIUM CHLORIDE 1000 ML: 9 INJECTION, SOLUTION INTRAVENOUS at 16:08

## 2020-12-21 RX ADMIN — MONTELUKAST 10 MG: 10 TABLET, FILM COATED ORAL at 23:56

## 2020-12-21 ASSESSMENT — ENCOUNTER SYMPTOMS
PHOTOPHOBIA: 0
ABDOMINAL PAIN: 0
RHINORRHEA: 0
NAUSEA: 0
DIARRHEA: 0
VOMITING: 0
BLOOD IN STOOL: 0
CONSTIPATION: 0
SHORTNESS OF BREATH: 1

## 2020-12-21 NOTE — ED PROVIDER NOTES
Patient is a 27-year-old female with a history of end-stage renal disease on dialysis (Tuesday, Thursday, Saturday), diabetes, hyperlipidemia presents to the emergency department with a complaint of shortness of breath. Patient states he she has had flulike symptoms since last Monday and has not been to dialysis for the last 3 appointments. Her last HD was last Tuesday, which she states was a full treatment. She states she has called her dialysis center which stated she needed to negative test for Covid prior to getting dialysis there. She also states that the ambulance has not been picking her up to take her to dialysis. Patient complains of shortness of breath at rest and on exertion, decreased appetite, decreased energy for the past week. Patient denies fever or chills, no urinary or GI symptoms. Review of Systems   Constitutional: Positive for fatigue. Negative for chills and fever. HENT: Negative for congestion and rhinorrhea. Eyes: Negative for photophobia and visual disturbance. Respiratory: Positive for shortness of breath. Cardiovascular: Negative for chest pain, palpitations and leg swelling. Gastrointestinal: Negative for abdominal pain, blood in stool, constipation, diarrhea, nausea and vomiting. Endocrine: Negative for polyuria. Genitourinary: Negative for dysuria, frequency, hematuria and urgency. Musculoskeletal: Negative for arthralgias and myalgias. Skin: Negative for rash and wound. Allergic/Immunologic: Negative for immunocompromised state. Neurological: Negative for dizziness, syncope, weakness, light-headedness and numbness. Psychiatric/Behavioral: Negative for behavioral problems. The patient is not nervous/anxious. Physical Exam  Vitals signs and nursing note reviewed. Constitutional:       General: She is not in acute distress. Appearance: She is not ill-appearing or toxic-appearing. HENT:      Head: Normocephalic and atraumatic. Patient lactic acid is negative. EKG shows sinus tachycardia without ectopy or signs of ischemia. Covid test is negative. CTA for pulmonary embolus was ordered for new onset shortness of breath which shows no pulmonary embolism, is positive for infiltrates consistent with bilateral pneumonia. Patient was monitored in the emergency department for further change. Work-up was discussed with patient as well as need for dialysis which it is unsure if she will be able to do her outpatient dialysis tomorrow due to the need for negative Covid testing. Decision was made to admit and plan was discussed with patient, she is agreeable. Nephrologist was consulted and patient was discussed, was accepted for dialysis planned in the morning. Medicine service was consulted and patient was accepted with plan again for dialysis in the morning. She was monitored in the emergency department that further change and was admitted in stable condition. Amount and/or Complexity of Data Reviewed  Clinical lab tests: ordered and reviewed  Tests in the radiology section of CPT®: ordered and reviewed         Radiology Procedure Waiver   Name: Mayelin Bustos  : 1945  MRN: [id number]    Date:  20    Time: 5:15 PM EST    Benefits of immediately proceeding with Radiology exam(s) without pre-testing outweigh the risks or are not indicated as specified below and therefore the following is/are being waived:    [] Pregnancy test   [] Patients LMP on-time and regular.   [] Patient had Tubal Ligation or has other Contraception Device. [] Patient  is Menopausal or Premenarcheal.    [] Patient had Full or Partial Hysterectomy. [] Protocol for Iodine allergy    [] MRI Questionnaire     [x] BUN/Creatinine   [] Patient age w/no hx of renal dysfunction. [x] Patient on Dialysis. [] Recent Normal Labs.   Electronically signed by Marta Chamorro MD on 20 at 5:15 PM EST          ED Course as of Dec 21 2231   Mon Dec 21, 2020 [address] Edith Mctawer:     I have personally performed and/or participated in the history, exam, medical decision making, and procedures and agree with all pertinent clinical information unless otherwise noted. I have also reviewed and agree with the past medical, family and social history unless otherwise noted. I have discussed this patient in detail with the resident and provided the instruction and education regarding the evidence-based evaluation and treatment of [unfilled]  History: patient presents with complaint of increased SOB and states she has not had her dialysis in a week. She states they would not allow her to come while having URI symptoms. My findings: Ana Silver is a 70 y.o. female whom is in no distress. Physical exam reveals generalized weakness. Heart rate is elevated and regular. Lungs CTA. Abdomen is soft and nontender. No peripheral edema or focal neurologic deficits. My plan: Symptomatic and supportive care. Will evaluate with labs and admit for dialysis. Electronically signed by Wanda Hay DO on 12/21/20 at 4:38 PM EST          [JS]   2012 Spoke with Dr. Cruz Moreland patient was discussed. He recommends patient be admitted and will get HD in the morning.    [QC]   2012 Creatinine(!!): 8.8 [QC]      ED Course User Index  [JS] Wanda Hay DO  [QC] Omaira Duffy MD       --------------------------------------------- PAST HISTORY ---------------------------------------------  Past Medical History:  has a past medical history of Anemia, Arthritis, Asthma, Chronic kidney disease, Depression, [facility] Hyperlipidemia, Hypothyroid, Sleep apnea, Type II or unspecified type diabetes mellitus without mention of complication, not stated as uncontrolled, and 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 ([facility]     Past Surgical History:  has a past surgical history that includes hernia repair; Gastric bypass surgery (2004); knee surgery (2009); Foot surgery (Left, 2012,2013); Hand surgery (Right, 2012); Dialysis fistula creation (Left, 03/02/2018); pr revise av fistula,w/o thrombectomy (Left, 5/23/2018); Cholecystectomy (2004); Carpal tunnel release (Left, 8/12/2020); Tonsillectomy; eye surgery (Right, 2010); and Carpal tunnel release (Right, 10/7/2020). Social History:  reports that she has never smoked. She has never used smokeless tobacco. She reports current alcohol use. She reports that she does not use drugs. Family History: family history is not on file. The patients home medications have been reviewed.     Allergies: Pcn [penicillins], Sulfa antibiotics, and Bactrim [sulfamethoxazole-trimethoprim]    -------------------------------------------------- RESULTS -------------------------------------------------    LABS:  Results for orders placed or performed during the hospital encounter of 12/21/20   CBC Auto Differential   Result Value Ref Range    WBC 11.4 4.5 - 11.5 E9/L    RBC 2.56 (L) 3.50 - 5.50 E12/L    Hemoglobin 9.2 (L) 11.5 - 15.5 g/dL    Hematocrit 27.6 (L) 34.0 - 48.0 %    .8 (H) 80.0 - 99.9 fL    MCH 35.9 (H) 26.0 - 35.0 pg    MCHC 33.3 32.0 - 34.5 %    RDW 15.2 (H) 11.5 - 15.0 fL    Platelets 362 648 - 517 E9/L    MPV 9.8 7.0 - 12.0 fL    Neutrophils % 82.6 (H) 43.0 - 80.0 %    Lymphocytes % 9.6 (L) 20.0 - 42.0 %    Monocytes % 5.2 2.0 - 12.0 %    Eosinophils % 0.2 0.0 - 6.0 %    Basophils % 0.2 0.0 - 2.0 %    Neutrophils Absolute 9.69 (H) 1.80 - 7.30 E9/L    Lymphocytes Absolute 1.14 (L) 1.50 - 4.00 E9/L    Monocytes Absolute 0.57 0.10 - 0.95 E9/L    Eosinophils Absolute 0.00 (L) 0.05 - 0.50 E9/L    Basophils Absolute 0.00 0.00 - 0.20 E9/L    Metamyelocytes Relative 2.6 (H) 0.0 - 1.0 %    Anisocytosis 1+     Polychromasia 1+     Basophilic Stippling 1+    Comprehensive Metabolic Panel w/ Reflex to MG   Result Value Ref Range    Sodium 134 132 - 146 mmol/L Potassium reflex Magnesium 4.8 3.5 - 5.0 mmol/L    Chloride 92 (L) 98 - 107 mmol/L    CO2 23 22 - 29 mmol/L    Anion Gap 19 (H) 7 - 16 mmol/L    Glucose 216 (H) 74 - 99 mg/dL    BUN 81 (H) 8 - 23 mg/dL    CREATININE 8.8 (HH) 0.5 - 1.0 mg/dL    GFR Non-African American 4 >=60 mL/min/1.73    GFR African American 5     Calcium 8.5 (L) 8.6 - 10.2 mg/dL    Total Protein 6.2 (L) 6.4 - 8.3 g/dL    Alb 2.8 (L) 3.5 - 5.2 g/dL    Total Bilirubin 0.3 0.0 - 1.2 mg/dL    Alkaline Phosphatase 116 (H) 35 - 104 U/L    ALT 7 0 - 32 U/L    AST 10 0 - 31 U/L   Troponin   Result Value Ref Range    Troponin 0.03 0.00 - 0.03 ng/mL   Brain Natriuretic Peptide   Result Value Ref Range    Pro-BNP >70,000 (H) 0 - 125 pg/mL   Lactic Acid, Plasma   Result Value Ref Range    Lactic Acid 1.0 0.5 - 2.2 mmol/L   COVID-19   Result Value Ref Range    SARS-CoV-2, NAAT Not Detected Not Detected   EKG 12 Lead   Result Value Ref Range    Ventricular Rate 108 BPM    Atrial Rate 108 BPM    P-R Interval 88 ms    QRS Duration 78 ms    Q-T Interval 352 ms    QTc Calculation (Bazett) 471 ms    P Axis -91 degrees    R Axis 7 degrees    T Axis 49 degrees     RADIOLOGY:  CTA CHEST W CONTRAST   Final Result   1. Bilateral multifocal infiltrates compatible with pneumonia. 2.  No PE or pleural effusion. 3.  Mediastinal and right hilar mild lymphadenopathy. 4.  Prior cholecystectomy and gastric surgery. EKG: This EKG is signed and interpreted by me. Rate: 108  Rhythm: Sinus  Interpretation: sinus tachycardia  Comparison: changes compared to previous EKG      ------------------------- NURSING NOTES AND VITALS REVIEWED ---------------------------  Date / Time Roomed:  12/21/2020  3:08 PM  ED Bed Assignment:  0421/0421-02    The nursing notes within the ED encounter and vital signs as below have been reviewed.      Patient Vitals for the past 24 hrs:   BP Temp Temp src Pulse Resp SpO2   12/21/20 1953 137/65 98.2 °F (36.8 °C) Oral 114 16 100 % 12/21/20 1608 (!) 138/92 -- -- 111 20 99 %   12/21/20 1501 (!) 153/102 98.2 °F (36.8 °C) Oral 114 28 (!) 88 %   12/21/20 1500 -- 97 °F (36.1 °C) Temporal -- -- --       Oxygen Saturation Interpretation: Normal    ------------------------------------------ PROGRESS NOTES ------------------------------------------  Re-evaluation(s):  Patients symptoms are improving  Repeat physical examination is not changed    Counseling:  I have spoken with the patient and discussed todays results, in addition to providing specific details for the plan of care and counseling regarding the diagnosis and prognosis. Their questions are answered at this time and they are agreeable with the plan of admission.    --------------------------------- ADDITIONAL PROVIDER NOTES ---------------------------------  Consultations:  Spoke with Dr. Marlon Mobley. Discussed case. They will admit the patient. Spoke with Dr. Ellis Ballard. Discussed case. They will be on consult. This patient's ED course included: a personal history and physicial examination, re-evaluation prior to disposition, multiple bedside re-evaluations, cardiac monitoring and continuous pulse oximetry    This patient has remained hemodynamically stable during their ED course. Diagnosis:  1. Shortness of breath    2. ESRD (end stage renal disease) on dialysis ([facility]    3. Elevated serum creatinine      Disposition:  Patient's disposition: Admit to telemetry  Patient's condition is stable. 12/21/20, 10:28 PM EST.     This note is prepared by Aravind Flores MD -PGY-1           Aravind Flores MD  Resident  12/21/20 5375

## 2020-12-22 LAB
ADENOVIRUS BY PCR: NOT DETECTED
ANION GAP SERPL CALCULATED.3IONS-SCNC: 20 MMOL/L (ref 7–16)
BASOPHILIC STIPPLING: ABNORMAL
BASOPHILS ABSOLUTE: 0 E9/L (ref 0–0.2)
BASOPHILS RELATIVE PERCENT: 0.3 % (ref 0–2)
BORDETELLA PARAPERTUSSIS BY PCR: NOT DETECTED
BORDETELLA PERTUSSIS BY PCR: NOT DETECTED
BUN BLDV-MCNC: 81 MG/DL (ref 8–23)
CALCIUM SERPL-MCNC: 8.3 MG/DL (ref 8.6–10.2)
CHLAMYDOPHILIA PNEUMONIAE BY PCR: NOT DETECTED
CHLORIDE BLD-SCNC: 95 MMOL/L (ref 98–107)
CHOLESTEROL, TOTAL: 123 MG/DL (ref 0–199)
CO2: 21 MMOL/L (ref 22–29)
CORONAVIRUS 229E BY PCR: NOT DETECTED
CORONAVIRUS HKU1 BY PCR: NOT DETECTED
CORONAVIRUS NL63 BY PCR: NOT DETECTED
CORONAVIRUS OC43 BY PCR: NOT DETECTED
CREAT SERPL-MCNC: 9.3 MG/DL (ref 0.5–1)
EKG ATRIAL RATE: 108 BPM
EKG P AXIS: -91 DEGREES
EKG P-R INTERVAL: 88 MS
EKG Q-T INTERVAL: 352 MS
EKG QRS DURATION: 78 MS
EKG QTC CALCULATION (BAZETT): 471 MS
EKG R AXIS: 7 DEGREES
EKG T AXIS: 49 DEGREES
EKG VENTRICULAR RATE: 108 BPM
EOSINOPHILS ABSOLUTE: 0.1 E9/L (ref 0.05–0.5)
EOSINOPHILS RELATIVE PERCENT: 0.9 % (ref 0–6)
GFR AFRICAN AMERICAN: 5
GFR NON-AFRICAN AMERICAN: 4 ML/MIN/1.73
GLUCOSE BLD-MCNC: 140 MG/DL (ref 74–99)
HBA1C MFR BLD: 7.3 % (ref 4–5.6)
HCT VFR BLD CALC: 29.3 % (ref 34–48)
HDLC SERPL-MCNC: 40 MG/DL
HEMOGLOBIN: 9.3 G/DL (ref 11.5–15.5)
HUMAN METAPNEUMOVIRUS BY PCR: NOT DETECTED
HUMAN RHINOVIRUS/ENTEROVIRUS BY PCR: NOT DETECTED
HYPOCHROMIA: ABNORMAL
INFLUENZA A BY PCR: NOT DETECTED
INFLUENZA B BY PCR: NOT DETECTED
LDL CHOLESTEROL CALCULATED: 59 MG/DL (ref 0–99)
LYMPHOCYTES ABSOLUTE: 0.58 E9/L (ref 1.5–4)
LYMPHOCYTES RELATIVE PERCENT: 5.3 % (ref 20–42)
MAGNESIUM: 2.2 MG/DL (ref 1.6–2.6)
MCH RBC QN AUTO: 35.9 PG (ref 26–35)
MCHC RBC AUTO-ENTMCNC: 31.7 % (ref 32–34.5)
MCV RBC AUTO: 113.1 FL (ref 80–99.9)
METAMYELOCYTES RELATIVE PERCENT: 0.9 % (ref 0–1)
METER GLUCOSE: 118 MG/DL (ref 74–99)
METER GLUCOSE: 147 MG/DL (ref 74–99)
METER GLUCOSE: 213 MG/DL (ref 74–99)
MONOCYTES ABSOLUTE: 0.58 E9/L (ref 0.1–0.95)
MONOCYTES RELATIVE PERCENT: 5.3 % (ref 2–12)
MYCOPLASMA PNEUMONIAE BY PCR: NOT DETECTED
NEUTROPHILS ABSOLUTE: 10.24 E9/L (ref 1.8–7.3)
NEUTROPHILS RELATIVE PERCENT: 87.7 % (ref 43–80)
PARAINFLUENZA VIRUS 1 BY PCR: NOT DETECTED
PARAINFLUENZA VIRUS 2 BY PCR: NOT DETECTED
PARAINFLUENZA VIRUS 3 BY PCR: NOT DETECTED
PARAINFLUENZA VIRUS 4 BY PCR: NOT DETECTED
PDW BLD-RTO: 15.4 FL (ref 11.5–15)
PHOSPHORUS: 10.6 MG/DL (ref 2.5–4.5)
PLATELET # BLD: 251 E9/L (ref 130–450)
PMV BLD AUTO: 9.8 FL (ref 7–12)
POLYCHROMASIA: ABNORMAL
POTASSIUM SERPL-SCNC: 4.2 MMOL/L (ref 3.5–5)
POTASSIUM SERPL-SCNC: 5.6 MMOL/L (ref 3.5–5)
PROCALCITONIN: 2.06 NG/ML (ref 0–0.08)
RBC # BLD: 2.59 E12/L (ref 3.5–5.5)
RESPIRATORY SYNCYTIAL VIRUS BY PCR: NOT DETECTED
SARS-COV-2, PCR: NOT DETECTED
SODIUM BLD-SCNC: 136 MMOL/L (ref 132–146)
TRIGL SERPL-MCNC: 119 MG/DL (ref 0–149)
TROPONIN: 0.02 NG/ML (ref 0–0.03)
TSH SERPL DL<=0.05 MIU/L-ACNC: 1.17 UIU/ML (ref 0.27–4.2)
VLDLC SERPL CALC-MCNC: 24 MG/DL
WBC # BLD: 11.5 E9/L (ref 4.5–11.5)

## 2020-12-22 PROCEDURE — 84443 ASSAY THYROID STIM HORMONE: CPT

## 2020-12-22 PROCEDURE — 80048 BASIC METABOLIC PNL TOTAL CA: CPT

## 2020-12-22 PROCEDURE — 85025 COMPLETE CBC W/AUTO DIFF WBC: CPT

## 2020-12-22 PROCEDURE — 94664 DEMO&/EVAL PT USE INHALER: CPT

## 2020-12-22 PROCEDURE — 2500000003 HC RX 250 WO HCPCS: Performed by: INTERNAL MEDICINE

## 2020-12-22 PROCEDURE — 84145 PROCALCITONIN (PCT): CPT

## 2020-12-22 PROCEDURE — 36415 COLL VENOUS BLD VENIPUNCTURE: CPT

## 2020-12-22 PROCEDURE — 82962 GLUCOSE BLOOD TEST: CPT

## 2020-12-22 PROCEDURE — 83735 ASSAY OF MAGNESIUM: CPT

## 2020-12-22 PROCEDURE — 80074 ACUTE HEPATITIS PANEL: CPT

## 2020-12-22 PROCEDURE — U0003 INFECTIOUS AGENT DETECTION BY NUCLEIC ACID (DNA OR RNA); SEVERE ACUTE RESPIRATORY SYNDROME CORONAVIRUS 2 (SARS-COV-2) (CORONAVIRUS DISEASE [COVID-19]), AMPLIFIED PROBE TECHNIQUE, MAKING USE OF HIGH THROUGHPUT TECHNOLOGIES AS DESCRIBED BY CMS-2020-01-R: HCPCS

## 2020-12-22 PROCEDURE — 6360000002 HC RX W HCPCS: Performed by: INTERNAL MEDICINE

## 2020-12-22 PROCEDURE — 90935 HEMODIALYSIS ONE EVALUATION: CPT

## 2020-12-22 PROCEDURE — 94660 CPAP INITIATION&MGMT: CPT

## 2020-12-22 PROCEDURE — 80061 LIPID PANEL: CPT

## 2020-12-22 PROCEDURE — 6370000000 HC RX 637 (ALT 250 FOR IP): Performed by: INTERNAL MEDICINE

## 2020-12-22 PROCEDURE — 84484 ASSAY OF TROPONIN QUANT: CPT

## 2020-12-22 PROCEDURE — 84100 ASSAY OF PHOSPHORUS: CPT

## 2020-12-22 PROCEDURE — 83036 HEMOGLOBIN GLYCOSYLATED A1C: CPT

## 2020-12-22 PROCEDURE — 0202U NFCT DS 22 TRGT SARS-COV-2: CPT

## 2020-12-22 PROCEDURE — 84132 ASSAY OF SERUM POTASSIUM: CPT

## 2020-12-22 PROCEDURE — 94640 AIRWAY INHALATION TREATMENT: CPT

## 2020-12-22 PROCEDURE — 2580000003 HC RX 258: Performed by: INTERNAL MEDICINE

## 2020-12-22 PROCEDURE — 93005 ELECTROCARDIOGRAM TRACING: CPT | Performed by: INTERNAL MEDICINE

## 2020-12-22 PROCEDURE — 1200000000 HC SEMI PRIVATE

## 2020-12-22 RX ORDER — SODIUM CHLORIDE 9 MG/ML
25 INJECTION, SOLUTION INTRAVENOUS EVERY 24 HOURS
Status: DISCONTINUED | OUTPATIENT
Start: 2020-12-22 | End: 2020-12-28 | Stop reason: HOSPADM

## 2020-12-22 RX ORDER — METOPROLOL TARTRATE 5 MG/5ML
5 INJECTION INTRAVENOUS EVERY 5 MIN PRN
Status: DISCONTINUED | OUTPATIENT
Start: 2020-12-22 | End: 2020-12-28 | Stop reason: HOSPADM

## 2020-12-22 RX ADMIN — CALCIUM 1000 MG: 500 TABLET ORAL at 07:57

## 2020-12-22 RX ADMIN — HEPARIN SODIUM 5000 UNITS: 5000 INJECTION INTRAVENOUS; SUBCUTANEOUS at 05:34

## 2020-12-22 RX ADMIN — METOROPROLOL TARTRATE 5 MG: 5 INJECTION, SOLUTION INTRAVENOUS at 16:35

## 2020-12-22 RX ADMIN — TRAZODONE HYDROCHLORIDE 50 MG: 50 TABLET ORAL at 22:07

## 2020-12-22 RX ADMIN — VANCOMYCIN HYDROCHLORIDE 1500 MG: 10 INJECTION, POWDER, LYOPHILIZED, FOR SOLUTION INTRAVENOUS at 15:37

## 2020-12-22 RX ADMIN — MONTELUKAST 10 MG: 10 TABLET, FILM COATED ORAL at 22:07

## 2020-12-22 RX ADMIN — CALCIUM 1000 MG: 500 TABLET ORAL at 14:47

## 2020-12-22 RX ADMIN — SODIUM CHLORIDE 25 ML: 9 INJECTION, SOLUTION INTRAVENOUS at 18:01

## 2020-12-22 RX ADMIN — BUDESONIDE AND FORMOTEROL FUMARATE DIHYDRATE 2 PUFF: 160; 4.5 AEROSOL RESPIRATORY (INHALATION) at 06:26

## 2020-12-22 RX ADMIN — INSULIN LISPRO 2 UNITS: 100 INJECTION, SOLUTION INTRAVENOUS; SUBCUTANEOUS at 17:45

## 2020-12-22 RX ADMIN — METOROPROLOL TARTRATE 5 MG: 5 INJECTION, SOLUTION INTRAVENOUS at 16:26

## 2020-12-22 RX ADMIN — LEVOTHYROXINE SODIUM 75 MCG: 75 TABLET ORAL at 05:34

## 2020-12-22 RX ADMIN — HEPARIN SODIUM 5000 UNITS: 5000 INJECTION INTRAVENOUS; SUBCUTANEOUS at 14:47

## 2020-12-22 RX ADMIN — CALCIUM 1000 MG: 500 TABLET ORAL at 17:45

## 2020-12-22 RX ADMIN — INSULIN LISPRO 1 UNITS: 100 INJECTION, SOLUTION INTRAVENOUS; SUBCUTANEOUS at 07:58

## 2020-12-22 RX ADMIN — BUDESONIDE AND FORMOTEROL FUMARATE DIHYDRATE 2 PUFF: 160; 4.5 AEROSOL RESPIRATORY (INHALATION) at 18:34

## 2020-12-22 RX ADMIN — CYANOCOBALAMIN TAB 1000 MCG 1000 MCG: 1000 TAB at 07:56

## 2020-12-22 RX ADMIN — NEPHROCAP 1 MG: 1 CAP ORAL at 07:56

## 2020-12-22 RX ADMIN — EPOETIN ALFA-EPBX 10000 UNITS: 10000 INJECTION, SOLUTION INTRAVENOUS; SUBCUTANEOUS at 12:02

## 2020-12-22 RX ADMIN — ANTI-FUNGAL POWDER MICONAZOLE NITRATE TALC FREE: 1.42 POWDER TOPICAL at 22:07

## 2020-12-22 RX ADMIN — INSULIN GLARGINE 35 UNITS: 100 INJECTION, SOLUTION SUBCUTANEOUS at 22:07

## 2020-12-22 RX ADMIN — ATORVASTATIN CALCIUM 10 MG: 10 TABLET, FILM COATED ORAL at 07:57

## 2020-12-22 RX ADMIN — EPOETIN ALFA-EPBX 3000 UNITS: 3000 INJECTION, SOLUTION INTRAVENOUS; SUBCUTANEOUS at 12:04

## 2020-12-22 RX ADMIN — CEFEPIME HYDROCHLORIDE 1 G: 1 INJECTION, POWDER, FOR SOLUTION INTRAMUSCULAR; INTRAVENOUS at 14:56

## 2020-12-22 RX ADMIN — HEPARIN SODIUM 5000 UNITS: 5000 INJECTION INTRAVENOUS; SUBCUTANEOUS at 22:07

## 2020-12-22 RX ADMIN — SERTRALINE HYDROCHLORIDE 100 MG: 50 TABLET ORAL at 07:57

## 2020-12-22 ASSESSMENT — PAIN SCALES - GENERAL
PAINLEVEL_OUTOF10: 0
PAINLEVEL_OUTOF10: 0

## 2020-12-22 NOTE — PROGRESS NOTES
Physical Therapy      0421/0421-02    Patient unavailable for physical therapy treatment due to out of room for testing:   dialysis.

## 2020-12-22 NOTE — PROGRESS NOTES
Occupational Therapy  OT SESSION ATTEMPT     Date:2020  Patient Name: Ana Silver  MRN: [id number]  : 1945  Room: [address] [place] [address]     Attempted OT session this date:    [] unavailable due to other medical staff currently with pt   [] on hold per nursing staff   [] on hold per nursing staff secondary to lab / radiology results    [] pt declined due to ____. Benefits of participation in therapy reviewed with pt. [x] off unit - dialysis   [] Other:     Will reattempt OT evaluation and/or treatment at a later time.     Yair Jason OTR/L #[id number]

## 2020-12-22 NOTE — PROGRESS NOTES
Meenu Stewart MD  Nephrology    Hemodialysis/Progress note. Patient seen and examined on hemodialyisis. Chart reviewed. Patient is well-known to me from inpatient and outpatient follow-up. Formal consultation is deferred. Patient is a 68-year-old lady with underlying history of chronic disease stage G5/ESRD. Patient recently experienced symptoms suggestive of Covid infection such as loss of taste, shortness of breath and fatigue. Patient was placed in a separate hemodialysis unit for COVID-19 patients and patients under investigation for COVID-19. There were issues with her transportation. So patient ended up missing her hemodialysis treatments. Her last hemodialysis treatment was a week ago. .  Patient presented with chief complaints of shortness of breath. Rapid Covid test was negative. Patient had been temporarily relocated to another dialysis unit pending investigation for COVID-19. She was to revert t back to her old hemodialysis unit once she had two  COVID-19 tests. When seen on dialysis she is less short of breath. Overall she is feeling better. .  Awake and alert . In no acute distress. Labs and medications reviewed.       PAST MEDICAL Hx:  Past Medical History[]Expand by Default        Past Medical History:   Diagnosis Date    Anemia      Arthritis       RA    Asthma      Chronic kidney disease      Depression      Hemodialysis patient ([facility]       T-Th-Sat    Hyperlipidemia      Hypothyroid      Sleep apnea       no CPAP    Type II or unspecified type diabetes mellitus without mention of complication, not stated as uncontrolled      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 ([facility] 6/15/2017            PAST SURGICAL Hx:   Past Surgical History[]Expand by Default         Past Surgical History:   Procedure Laterality Date    CARPAL TUNNEL RELEASE Left 8/12/2020     LEFT CARPAL TUNNEL RELEASE performed by Cat Olson MD at [address] [place] [address] puff  1 puff Inhalation Q4H PRN Annelise Scriver, DO        atorvastatin (LIPITOR) tablet 10 mg  10 mg Oral Daily Annelise Scriver, DO   10 mg at 12/22/20 0757    b complex-C-folic acid (NEPHROCAPS) capsule 1 mg  1 mg Oral Daily Annelise Scriver, DO   1 mg at 12/22/20 0756    calcium elemental (OSCAL) tablet 1,000 mg  1,000 mg Oral TID WC Annelies Scriver, DO   1,000 mg at 12/22/20 0757    vitamin B-12 (CYANOCOBALAMIN) tablet 1,000 mcg  1,000 mcg Oral Daily Annelise Scriver, DO   1,000 mcg at 12/22/20 0756    budesonide-formoterol (SYMBICORT) 160-4.5 MCG/ACT inhaler 2 puff  2 puff Inhalation BID Annelise Scriver, DO   2 puff at 12/22/20 4456    insulin glargine (LANTUS) injection vial 35 Units  35 Units Subcutaneous Nightly Annelise Scriver, DO   35 Units at 12/21/20 2356    [START ON 12/23/2020] lidocaine-prilocaine (EMLA) cream 1 each  1 each Topical Once per day on Mon Wed Fri Danny Black, DO        midodrine (PROAMATINE) tablet 5 mg  5 mg Oral Once per day on Mon Wed Fri Danny Black, DO        montelukast (SINGULAIR) tablet 10 mg  10 mg Oral Nightly Annelise Scriver, DO   10 mg at 12/21/20 2356    sertraline (ZOLOFT) tablet 100 mg  100 mg Oral Daily Annelise Scriver, DO   100 mg at 12/22/20 0757    levothyroxine (SYNTHROID) tablet 75 mcg  75 mcg Oral Daily Annelise Scriver, DO   75 mcg at 12/22/20 0534    traZODone (DESYREL) tablet 50 mg  50 mg Oral Nightly Annelise Scriver, DO   50 mg at 12/21/20 2356    ondansetron (ZOFRAN) injection 4 mg  4 mg Intravenous Q6H PRN Annelise Scriver, DO        heparin (porcine) injection 5,000 Units  5,000 Units Subcutaneous 3 times per day Annelise Scriver, DO   5,000 Units at 12/22/20 0534    glucose (GLUTOSE) 40 % oral gel 15 g  15 g Oral PRN Annelise Scriver, DO        dextrose 50 % IV solution  12.5 g Intravenous PRN Annelise Scriver, DO        glucagon (rDNA) injection 1 mg  1 mg Intramuscular PRN Annelise Scriver, DO        dextrose 5 % solution  100 mL/hr Intravenous PRN Annelise Scriver, DO        insulin lispro (HUMALOG) injection

## 2020-12-22 NOTE — PROGRESS NOTES
Pharmacy Consultation Note  (Antibiotic Dosing and Monitoring)    Initial consult date: 12/22/2020  Consulting physician:  [facility]  Drug(s): Vancomycin  Indication: Pneumonia    Ht Readings from Last 1 Encounters:   12/21/20 5' 6\" (1.676 m)     Wt Readings from Last 1 Encounters:   12/21/20 221 lb (100.2 kg)     Age/  Gender IBW DW  Allergy Information   70 y.o.  female 59.3 kg 75.7 kg  Pcn [penicillins], Sulfa antibiotics, and Bactrim [sulfamethoxazole-trimethoprim]         Date  Tmax WBC Dialysis Drug/Dose Time   Given Level(s)   (Time) Comments   12/22  (#1) afebrile 11.5 HD x 3.75 hr Vancomycin 1500 mg IV x 1 <1530>       (#2)            (#3)            (#4)            (#5)            (#6)            (#7)            Estimated Creatinine Clearance: 6 mL/min (A) (based on SCr of 9.3 mg/dL (HH)). UOP over the past 24 hours:       Intake/Output Summary (Last 24 hours) at 12/22/2020 0858  Last data filed at 12/22/2020 6436  Gross per 24 hour   Intake 60 ml   Output 0 ml   Net 60 ml     Anti-infective Regimen:  Anti-infective Dose Date Initiated Date Stopped   Cefepime 1g IV q24hr 12/22      Cultures:  available culture and sensitivity results were reviewed in EPIC  Cultures sent and are pending. Culture Date Result    COVID-19 12/21 Negative   Respiratory panel, molecular 12/22                Assessment:  · Consulted by  [facility] to dose/monitor vancomycin  · Goal trough level:  15-20 mcg/mL  · Pt is a 70 yoF who presented from home after missing 3 consecutive dialysis treatments. Started on antibiotics for possible pneumonia.    · Hx ESRD, on hemodialysis three times weekly  · S/p 3.75hr HD today    Plan:  · Vancomycin 1500 mg IV x 1 today  · Follow hemodialysis schedule for further dosing and levels  · Pharmacist will follow and monitor/adjust dosing as necessary      Thank you for the consult,    Annette Hanna, PharmD, BCPS 12/22/2020 8:58 AM   Ext: [phone]

## 2020-12-22 NOTE — H&P
Department of Internal Medicine  History and Physical    PCP: Dr. Amelie Queen   Admitting Physician: Dr. Armani Duncan  Consultants: Dr. Marte Glasiddharth:  Missed dialysis    HISTORY OF PRESENT ILLNESS:    Ari Nixon is a 19-year-old female who presented to [address] [place] [address] emergency department after missing 3 dialysis appointments as an outpatient. She states she initially presented to hemodialysis last week with cold-like symptoms. She was notified by the dialysis unit that she was not to return until she tested negative for Covid. She was unable to undergo Covid testing and therefore missed 3 consecutive dialysis treatments. Upon presentation to the hospital, she is weak and deconditioned as expected. Work-up has revealed questionable multifocal pneumonia. Rapid Covid test was negative but respiratory film array panel is pending. She anticipates receiving hemodialysis today.     PAST MEDICAL Hx:  Past Medical History:   Diagnosis Date    Anemia     Arthritis     RA    Asthma     Chronic kidney disease     Depression     Hemodialysis patient ([facility]     T-Th-Sat    Hyperlipidemia     Hypothyroid     Sleep apnea     no CPAP    Type II or unspecified type diabetes mellitus without mention of complication, not stated as uncontrolled     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 ([facility] 6/15/2017       PAST SURGICAL Hx:   Past Surgical History:   Procedure Laterality Date    CARPAL TUNNEL RELEASE Left 8/12/2020    LEFT CARPAL TUNNEL RELEASE performed by Kate Ballesteros MD at [facility] Right 10/7/2020    RIGHT CARPAL TUNNEL RELEASE performed by Kate Ballesteros MD at [address] [place] [address]   2004    DIALYSIS FISTULA CREATION Left 03/02/2018    AV fistula arm/Dr. Nicolas Tolbert    EYE SURGERY Right 2010    cataract    FOOT SURGERY Left 2012,2013    pin put in, then pin removed   Megan  2004    HAND SURGERY Right 2012 ligament was cut    HERNIA REPAIR      KNEE SURGERY  2009    R knee left side    [place] REVISE AV FISTULA,W/O THROMBECTOMY Left 5/23/2018    SUPERFICIALIZATION AV FISTULA - LEFT UPPER ARM performed by Carmen Washington MD at [address] [place] [address] Hx:  No family history on file. HOME MEDICATIONS:  Prior to Admission medications    Medication Sig Start Date End Date Taking? Authorizing Provider   montelukast (SINGULAIR) 10 MG tablet Take 1 tablet by mouth nightly 10/22/20  Yes Ric Kelly, DO   sertraline (ZOLOFT) 100 MG tablet TAKE 1 TABLET BY MOUTH EVERY NIGHT 5/27/20  Yes Wing Marcus DO   traZODone (DESYREL) 50 MG tablet Take 1 tablet by mouth nightly 5/1/20  Yes Wing Marcus DO   SYNTHROID 75 MCG tablet Take 1 tablet by mouth Daily 5/1/20  Yes Wing Marcus DO   rOPINIRole (REQUIP) 1 MG tablet TAKE 1 TABLET BY MOUTH EVERY NIGHT 5/1/20  Yes Wing Marcus DO   insulin glargine (LANTUS SOLOSTAR) 100 UNIT/ML injection pen Inject 35 Units into the skin nightly 5/1/20  Yes Wing Marcus DO   atorvastatin (LIPITOR) 10 MG tablet TAKE 1 TABLET BY MOUTH EVERY NIGHT 3/27/20  Yes Wing Marcus DO   albuterol sulfate  (90 Base) MCG/ACT inhaler Inhale 1 puff into the lungs every 4 hours as needed for Wheezing 10/21/20   Ric Kelly DO   fluticasone-salmeterol (ADVAIR DISKUS) 100-50 MCG/DOSE diskus inhaler Inhale 1 puff into the lungs 2 times daily Rinse mouth after use. 10/21/20   Ric Kelly, DO   cyanocobalamin ([employer] VITAMIN B12) 1000 MCG tablet Take 1 tablet by mouth daily 5/1/20   Wing Marcus, DO   Alcohol Swabs (ALCOHOL PREP) 70 % PADS 1 each by Does not apply route 4 times daily (before meals and nightly) 5/1/20   Wing Marcus, DO   Elastic Bandages & Supports (WRIST SPLINT LEFT/RIGHT) MISC 1 each by Does not apply route daily as needed (numbness) 1/17/20   Wing Marcus, DO   Misc.  Devices (BARIATRIC ROLLATOR) MISC 1 Device by Does not apply route continuous prn (Walking) 1/3/20   Wing Marcus DO   calcium carbonate 600 MG TABS tablet Take 1 tablet by mouth 2 times daily  Patient taking differently: Take 2 tablets by mouth 3 times daily (with meals) Rx 9/30/19   Hayden Willoughby, DO   Insulin Pen Needle (B-D ULTRAFINE III SHORT PEN) 31G X 8 MM MISC Inject 1 each into the skin daily 7/29/19   Wing Marcus DO   nitroGLYCERIN (NITROSTAT) 0.4 MG SL tablet Place 1 tablet under the tongue every 5 minutes as needed for Chest pain up to max of 3 total doses. If no relief, call 911. 7/29/19   Wing Marcus DO   Methoxy PEG-Epoetin Beta (MIRCERA) 50 MCG/0.3ML SOSY Inject 50 mcg as directed every 14 days Last Dose: 4/4/2019  Next Dose: 6/13/2019    Historical Provider, MD   lidocaine-prilocaine (EMLA) 2.5-2.5 % cream Apply 1 each topically three times a week Tuesday, Thursday, Saturday    Historical Provider, MD   insulin lispro (HUMALOG) 100 UNIT/ML injection vial Inject 0-5 Units into the skin 3 times daily (before meals) *Per Sliding Scale*  1 unit for every 10 carbs   Takes 2 units with each meal    Historical Provider, MD   iron sucrose (VENOFER) 20 MG/ML injection Infuse 50 mg intravenously once a week Last Dose: 6/4/ 2020 at dialysis    Historical Provider, MD ZHANG Complex-C-Folic Acid (TOD-RENETTA) TABS Take 1 tablet by mouth daily Rx    Historical Provider, MD   midodrine (PROAMATINE) 5 MG tablet Take 5 mg by mouth three times a week Tuesday, Thursday, Saturday    Historical Provider, MD   Insulin Pen Needle 32G X 4 MM MISC 1 each by Does not apply route daily Use as directed with insulin pen 2/14/18   Wing Marcus DO   glucose blood VI test strips (ACCU-CHEK SMARTVIEW) strip 1 each by In Vitro route 4 times daily (before meals and nightly) As needed.  6/15/17   Brannon Blanco, DO   ACCU-CHEK FASTCLIX LANCETS MISC USE TO TEST BLOOD SUGAR FOUR TIMES DAILY BEFORE MEALS AND NIGHTLY 6/8/17   Alicja Charles, DO   Blood Glucose Monitoring Suppl (ACCU-CHEK DERREK SMARTVIEW) W/DEVICE KIT 1 kit by Does not apply route 4 times daily (before meals and nightly) 3/29/17   John Holiday Bisel, DO       ALLERGIES:  Pcn [penicillins], Sulfa antibiotics, and Bactrim [sulfamethoxazole-trimethoprim]    SOCIAL Hx:  Social History     Socioeconomic History    Marital status:      Spouse name: Not on file    Number of children: Not on file    Years of education: Not on file    Highest education level: Not on file   Occupational History    Not on file   Social Needs    Financial resource strain: Not on file    Food insecurity     Worry: Not on file     Inability: Not on file   [employer] needs     Medical: Not on file     Non-medical: Not on file   Tobacco Use    Smoking status: Never Smoker    Smokeless tobacco: Never Used   Substance and Sexual Activity    Alcohol use: Yes     Comment: rarely. 2-3 coffee per day    Drug use: No    Sexual activity: Not on file   Lifestyle    Physical activity     Days per week: Not on file     Minutes per session: Not on file    Stress: Not on file   Relationships    Social connections     Talks on phone: Not on file     Gets together: Not on file     Attends Rastafari service: Not on file     Active member of club or organization: Not on file     Attends meetings of clubs or organizations: Not on file     Relationship status: Not on file    Intimate partner violence     Fear of current or ex partner: Not on file     Emotionally abused: Not on file     Physically abused: Not on file     Forced sexual activity: Not on file   Other Topics Concern    Not on file   Social History Narrative    Not on file       ROS:  General:   Admits to profound weakness and deconditioning. Admits to generalized malaise and fatigue. Psychological:   Denies anxiety, disorientation or hallucinations    ENT:    Denies epistaxis, headaches, vertigo or visual changes. Admits to sinus congestion.     Cardiovascular:   Denies any chest pain, irregular heartbeats, or palpitations. No paroxysmal nocturnal dyspnea. Respiratory:   Admits to shortness of breath with minimal coughing. Gastrointestinal:   Denies nausea, vomiting, diarrhea, or constipation. Denies any abdominal pain. Denies change in bowel habits or stools. Genito-Urinary:    Denies any urgency, frequency, hematuria. Voiding without difficulty. Musculoskeletal:   Denies joint pain, joint stiffness, joint swelling or muscle pain    Neurology:    Denies any headache or focal neurological deficits. Admits to weakness and deconditioning. Derm:    Denies any rashes, ulcers, or excoriations. Denies bruising. Extremities:   Admits to chronic lower extremity edema. PHYSICAL EXAM:  VITALS:  Vitals:    12/22/20 0740   BP: 110/62   Pulse: 105   Resp: 20   Temp: 97.8 °F (36.6 °C)   SpO2: 97%         CONSTITUTIONAL:    Awake, alert, cooperative, no apparent distress, and appears stated age    EYES:    PERRL, EOMI, sclera clear, conjunctiva normal    ENT:    Normocephalic, atraumatic, sinuses nontender on palpation. External ears without lesions. Oral pharynx with moist mucus membranes. Tonsils without erythema or exudates. NECK:    Supple, symmetrical, trachea midline, no adenopathy, thyroid symmetric, not enlarged and no tenderness, skin normal, no bruits, no JVD    HEMATOLOGIC/LYMPHATICS:    No cervical lymphadenopathy and no supraclavicular lymphadenopathy    LUNGS:    Diminished and coarse bilaterally. CARDIOVASCULAR:    Normal apical impulse, regular rate and rhythm, normal S1 and S2, no S3 or S4, and no murmur noted    ABDOMEN:    No scars, normal bowel sounds, soft, non-distended, non-tender, no masses palpated, no hepatosplenomegaly, no rebound or guarding elicited on palpation     MUSCULOSKELETAL:    There is no redness, warmth, or swelling of the joints. Full range of motion noted. Motor strength is 5 out of 5 all extremities bilaterally.   Tone is array panel. We will temporarily employ antibiotics. I believe her chest x-ray may represent volume overload as she has missed the above-mentioned dialysis treatments. The nephrology team will provide consultation and the patient will undergo dialysis today. Chronic comorbidities will be monitored. The therapy teams will work with the patient.     Joselo Koenig D.O., FACOI  8:36 AM  12/22/2020    Electronically signed by Joselo Koenig DO on 12/22/20 at 8:36 AM EST

## 2020-12-22 NOTE — PROGRESS NOTES
Notified about patient being in atrial fibrillation with RVR. EKG and troponin ordered. Echocardiogram ordered. patient was given 2 doses of 5 mg IV metoprolol. Rate improved however patient has a history of hypotension. Additionally her risk of stroke with atrial fibrillation is increased about she also has bleeding risks as well. Rate control and anticoagulation per cardiology recommendation.

## 2020-12-22 NOTE — PROGRESS NOTES
Physical Therapy    Physical Therapy Initial Evaluation    Room #:  0421/0421-02  Patient Name: Jose Antonio Garsia  YOB: 1945  MRN: [id number]    Referring Provider:   Quincy Lefort, DO      Date of Service: 12/23/2020    Evaluating Physical Therapist: Caty Riojas, PT  #[id number]       Diagnosis:   Shortness of breath [R06.02]   Admitted with   Missed dialysis d/t flu like symptoms    Patient Active Problem List   Diagnosis    Controlled type 2 diabetes mellitus with chronic kidney disease on chronic dialysis, with long-term current use of insulin ([address]    Mixed hyperlipidemia    Hypothyroidism    Essential hypertension    Sleep apnea    Osteoporosis    Macrocytic anemia with vitamin B12 deficiency    ESRD on dialysis ([address]    Rheumatoid arthritis involving multiple sites ([facility]    Restless leg syndrome, controlled    Depression    Shortness of breath    End stage renal disease ([facility]        Tentative placement recommendation: Home Health Physical Therapy     Equipment recommendation: Patient has needed equipment       Prior Level of Function: Patient ambulated independently  with quad cane  Rehab Potential: good  for baseline    Past medical history:   Past Medical History:   Diagnosis Date    Anemia     Arthritis     RA    Asthma     Chronic kidney disease     Depression     Hemodialysis patient ([address]     T-Th-Sat    Hyperlipidemia     Hypothyroid     Sleep apnea     no CPAP    Type II or unspecified type diabetes mellitus without mention of complication, not stated as uncontrolled     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 ([facility] 6/15/2017     Past Surgical History:   Procedure Laterality Date    CARPAL TUNNEL RELEASE Left 8/12/2020    LEFT CARPAL TUNNEL RELEASE performed by Phyllis [facility]co MD at [facility] Right 10/7/2020    RIGHT CARPAL TUNNEL RELEASE performed by Phyllis Orozco MD at [address] [place] [address]  2004    DIALYSIS FISTULA CREATION Left 03/02/2018    AV fistula arm/Dr. Roxann Zaldivar    EYE SURGERY Right 2010    cataract    FOOT SURGERY Left 2012,2013    pin put in, then pin removed   Megan Guallpa    HAND SURGERY Right 2012    ligament was cut    HERNIA REPAIR      KNEE SURGERY  2009    R knee left side    [place] REVISE AV FISTULA,W/O THROMBECTOMY Left 5/23/2018    SUPERFICIALIZATION AV FISTULA - LEFT UPPER ARM [facility]         Precautions: Activity as tolerated, falls, alarm, O2 and rule out COVID-19 , 4 Liters of o2 via nasal cannula     SUBJECTIVE:    Social history: Patient lives sister   in a ranch home  with No steps  to enter   Equipment owned: [employer], [address] [employer] [address], Standard Walker and Shower chair,  cpap but no home o2    AM-PAC Basic Mobility         AM-PAC Mobility Inpatient   How much difficulty turning over in bed?: A Little  How much difficulty sitting down on / standing up from a chair with arms?: A Little  How much difficulty moving from lying on back to sitting on side of bed?: A Lot  How much help from another person moving to and from a bed to a chair?: A Little  How much help from another person needed to walk in hospital room?: A Little  How much help from another person for climbing 3-5 steps with a railing?: A Lot  AM-PAC Inpatient Mobility Raw Score : 16  AM-PAC Inpatient T-Scale Score : 40.78  Mobility Inpatient CMS 0-100% Score: 54.16  Mobility Inpatient CMS G-Code Modifier : CK    Nursing cleared patient for PT evaluation. The admitting diagnosis and active problem list as listed above have been reviewed prior to the initiation of this evaluation. OBJECTIVE;   Initial Evaluation  Date: 12/23/20 Treatment Date:     Short Term/ Long Term   Goals   Was pt agreeable to Eval/treatment?  Yes    To be met in 2 days   Pain level   0/10        Bed Mobility    Rolling: Minimal assist of 1    Supine to sit: Minimal assist of 1 Sit to supine: Not assessed     Scooting: Minimal assist of 1    Rolling: Independent    Supine to sit: Independent    Sit to supine: Independent    Scooting: Independent     Transfers Sit to stand: Minimal assist of 1 x 2 reps ; cues for hand placement and to lower to chair slower  Sit to stand: Independent     Ambulation    2x20 feet using  wheeled walker with Minimal assist of 1   with dizziness   100 feet using  wheeled walker with Independent    ROM Within functional limits       Strength BUE:  4-/5  RLE:  4-/5  LLE:  4-/5   Increase strength in affected mm groups by 1/3 grade   Balance Sitting EOB:  fair    Dynamic Standing:  poor    Sitting EOB:  good    Dynamic Standing: fair       Patient is Alert & Oriented x person, place, time and situation and follows directions    Sensation:  Patient  denies numbness and tingling     Edema:  yes bilateral lower extremities    Endurance: poor      Vitals: 4 Liters of o2 via nasal cannula   Blood Pressure at rest 89/52 Blood Pressure during session 99/56   Heart Rate at rest 107 Heart Rate during session 98   SPO2 at rest 95%  SPO2 during session 89-94%  Increased o2 to 6 Liters of o2 via nasal cannula     Patient education  Patient educated on role of Physical Therapy, risks of immobility, safety and plan of care, energy conservation, importance of positional changes for oxygen exchange and  importance of mobility while in hospital       Patient response to education:   Pt verbalized understanding Pt demonstrated skill Pt requires further education in this area   Yes Partial Yes      Treatment:  Patient practiced and was instructed/facilitated in the following treatment: Patient   Sat edge of bed 10 minutes with Supervision  to increase dynamic sitting balance and activity tolerance. Gait and standing challenges      Therapeutic Exercises:  ankle pumps  x 10 reps.          At end of session, patient in chair with alarm call light and phone within reach,   all lines and tubes intact, nursing notified. Patient would benefit from continued skilled Physical Therapy to improve functional independence and quality of life. Patient's/ family goals   home        ASSESSMENT: Patient exhibits decreased strength, balance, coordination impairing functional mobility. Decreased balance d/t dizziness, decreased endurandce and strength  impacts safe mobility and while patient requires min assist with mobility and continues to require skilled intervention to progress activity and monitor vital signs and response to activity. Plan of Care:     -Bed Mobility: Lower extremity exercises , Upper extremity exercises  and Trunk control activities   -Standing Balance: Perform strengthening exercises in standing to promote motor control with or without upper extremity support   -Transfers: Provide instruction on proper hand and foot position for adequate transfer of weight onto lower extremities and use of gait device, Cues for hand placement, technique and safety and Provide stabilization to prevent fall   -Gait: Gait training and Standing activities to improve: base of support, weight shift, weight bearing    -Endurance: Utilize Supervised activities to increase level of endurance to allow for safe functional mobility including transfers and gait   -Stairs: Stair training with instruction on proper technique and hand placement on rail    Patient and or family understand(s) diagnosis, prognosis, and plan of care. Frequency of treatments: Patient will be seen   daily. Time in  945  Time out  1002    Total Treatment Time  10 minutes    Evaluation time includes thorough review of current medical information, gathering information on past medical history/social history and prior level of function, completion of standardized testing/informal observation of tasks, assessment of data, and development of Plan of care and goals.      CPT codes:  Low Complexity PT evaluation (79832)  Gait Training (37124) 10 minutes 1 unit(s)    Eladio Herrmann, PT

## 2020-12-22 NOTE — FLOWSHEET NOTE
12/22/20 1410   Vital Signs   BP (!) 83/45   Temp 98.1 °F (36.7 °C)   Pulse 99   Resp 18   Weight 220 lb 7.4 oz (100 kg)   Weight Method Bed scale   Percent Weight Change -1.38   Pain Assessment   Pain Assessment 0-10   Pain Level 0   Post-Hemodialysis Assessment   Post-Treatment Procedures Blood returned; Access bleeding time < 10 minutes   Machine Disinfection Process Acid/Vinegar Clean;Bleach; Exterior Machine Disinfection   Dialyzer Clearance Clear   Duration of Treatment (minutes) 225 minutes   Hemodialysis Intake (ml) 600 ml   Hemodialysis Output (ml) 2100 ml   NET Removed (ml) 1500 ml   Tolerated Treatment Fair   Patient Response to Treatment pt became hypotensive throughout tx 1500 ml removed   Bilateral Breath Sounds Diminished   Edema Generalized

## 2020-12-23 ENCOUNTER — TELEPHONE (OUTPATIENT)
Dept: FAMILY MEDICINE CLINIC | Age: 75
End: 2020-12-23

## 2020-12-23 LAB
ANION GAP SERPL CALCULATED.3IONS-SCNC: 9 MMOL/L (ref 7–16)
BASOPHILIC STIPPLING: ABNORMAL
BASOPHILS ABSOLUTE: 0 E9/L (ref 0–0.2)
BASOPHILS RELATIVE PERCENT: 0.2 % (ref 0–2)
BUN BLDV-MCNC: 38 MG/DL (ref 8–23)
CALCIUM SERPL-MCNC: 8 MG/DL (ref 8.6–10.2)
CHLORIDE BLD-SCNC: 96 MMOL/L (ref 98–107)
CO2: 29 MMOL/L (ref 22–29)
CREAT SERPL-MCNC: 5.3 MG/DL (ref 0.5–1)
EKG ATRIAL RATE: 115 BPM
EKG Q-T INTERVAL: 368 MS
EKG QRS DURATION: 78 MS
EKG QTC CALCULATION (BAZETT): 531 MS
EKG R AXIS: -10 DEGREES
EKG T AXIS: 134 DEGREES
EKG VENTRICULAR RATE: 125 BPM
EOSINOPHILS ABSOLUTE: 0.09 E9/L (ref 0.05–0.5)
EOSINOPHILS RELATIVE PERCENT: 0.9 % (ref 0–6)
GFR AFRICAN AMERICAN: 10
GFR NON-AFRICAN AMERICAN: 8 ML/MIN/1.73
GLUCOSE BLD-MCNC: 81 MG/DL (ref 74–99)
HAV IGM SER IA-ACNC: NORMAL
HCT VFR BLD CALC: 26.6 % (ref 34–48)
HEMOGLOBIN: 8.4 G/DL (ref 11.5–15.5)
HEPATITIS B CORE IGM ANTIBODY: NORMAL
HEPATITIS B SURFACE ANTIGEN INTERPRETATION: NORMAL
HEPATITIS C ANTIBODY INTERPRETATION: NORMAL
LV EF: 74 %
LVEF MODALITY: NORMAL
LYMPHOCYTES ABSOLUTE: 0.38 E9/L (ref 1.5–4)
LYMPHOCYTES RELATIVE PERCENT: 3.5 % (ref 20–42)
MAGNESIUM: 2.2 MG/DL (ref 1.6–2.6)
MCH RBC QN AUTO: 35.4 PG (ref 26–35)
MCHC RBC AUTO-ENTMCNC: 31.6 % (ref 32–34.5)
MCV RBC AUTO: 112.2 FL (ref 80–99.9)
METAMYELOCYTES RELATIVE PERCENT: 0.9 % (ref 0–1)
METER GLUCOSE: 111 MG/DL (ref 74–99)
METER GLUCOSE: 136 MG/DL (ref 74–99)
METER GLUCOSE: 69 MG/DL (ref 74–99)
MONOCYTES ABSOLUTE: 0.38 E9/L (ref 0.1–0.95)
MONOCYTES RELATIVE PERCENT: 4.3 % (ref 2–12)
NEUTROPHILS ABSOLUTE: 8.65 E9/L (ref 1.8–7.3)
NEUTROPHILS RELATIVE PERCENT: 90.4 % (ref 43–80)
PDW BLD-RTO: 15.2 FL (ref 11.5–15)
PHOSPHORUS: 5.8 MG/DL (ref 2.5–4.5)
PLATELET # BLD: 188 E9/L (ref 130–450)
PMV BLD AUTO: 10.1 FL (ref 7–12)
POLYCHROMASIA: ABNORMAL
POTASSIUM SERPL-SCNC: 4.3 MMOL/L (ref 3.5–5)
RBC # BLD: 2.37 E12/L (ref 3.5–5.5)
SARS-COV-2, PCR: NOT DETECTED
SODIUM BLD-SCNC: 134 MMOL/L (ref 132–146)
WBC # BLD: 9.5 E9/L (ref 4.5–11.5)

## 2020-12-23 PROCEDURE — 83735 ASSAY OF MAGNESIUM: CPT

## 2020-12-23 PROCEDURE — 97165 OT EVAL LOW COMPLEX 30 MIN: CPT

## 2020-12-23 PROCEDURE — 6360000002 HC RX W HCPCS: Performed by: INTERNAL MEDICINE

## 2020-12-23 PROCEDURE — 6370000000 HC RX 637 (ALT 250 FOR IP): Performed by: INTERNAL MEDICINE

## 2020-12-23 PROCEDURE — APPSS60 APP SPLIT SHARED TIME 46-60 MINUTES: Performed by: NURSE PRACTITIONER

## 2020-12-23 PROCEDURE — 97161 PT EVAL LOW COMPLEX 20 MIN: CPT | Performed by: PHYSICAL THERAPIST

## 2020-12-23 PROCEDURE — 1200000000 HC SEMI PRIVATE

## 2020-12-23 PROCEDURE — 84100 ASSAY OF PHOSPHORUS: CPT

## 2020-12-23 PROCEDURE — 97116 GAIT TRAINING THERAPY: CPT | Performed by: PHYSICAL THERAPIST

## 2020-12-23 PROCEDURE — 94660 CPAP INITIATION&MGMT: CPT

## 2020-12-23 PROCEDURE — 99223 1ST HOSP IP/OBS HIGH 75: CPT | Performed by: INTERNAL MEDICINE

## 2020-12-23 PROCEDURE — 2700000000 HC OXYGEN THERAPY PER DAY

## 2020-12-23 PROCEDURE — 94640 AIRWAY INHALATION TREATMENT: CPT

## 2020-12-23 PROCEDURE — 80048 BASIC METABOLIC PNL TOTAL CA: CPT

## 2020-12-23 PROCEDURE — 93306 TTE W/DOPPLER COMPLETE: CPT

## 2020-12-23 PROCEDURE — 97530 THERAPEUTIC ACTIVITIES: CPT

## 2020-12-23 PROCEDURE — 82962 GLUCOSE BLOOD TEST: CPT

## 2020-12-23 PROCEDURE — 85025 COMPLETE CBC W/AUTO DIFF WBC: CPT

## 2020-12-23 PROCEDURE — 2580000003 HC RX 258: Performed by: INTERNAL MEDICINE

## 2020-12-23 PROCEDURE — 90935 HEMODIALYSIS ONE EVALUATION: CPT

## 2020-12-23 PROCEDURE — 36415 COLL VENOUS BLD VENIPUNCTURE: CPT

## 2020-12-23 PROCEDURE — 2500000003 HC RX 250 WO HCPCS: Performed by: INTERNAL MEDICINE

## 2020-12-23 RX ORDER — MIDODRINE HYDROCHLORIDE 5 MG/1
10 TABLET ORAL
Status: DISCONTINUED | OUTPATIENT
Start: 2020-12-25 | End: 2020-12-28 | Stop reason: HOSPADM

## 2020-12-23 RX ADMIN — NEPHROCAP 1 MG: 1 CAP ORAL at 09:20

## 2020-12-23 RX ADMIN — CEFEPIME HYDROCHLORIDE 1 G: 1 INJECTION, POWDER, FOR SOLUTION INTRAMUSCULAR; INTRAVENOUS at 09:20

## 2020-12-23 RX ADMIN — BUDESONIDE AND FORMOTEROL FUMARATE DIHYDRATE 2 PUFF: 160; 4.5 AEROSOL RESPIRATORY (INHALATION) at 19:20

## 2020-12-23 RX ADMIN — ATORVASTATIN CALCIUM 10 MG: 10 TABLET, FILM COATED ORAL at 09:20

## 2020-12-23 RX ADMIN — SODIUM CHLORIDE 25 ML: 9 INJECTION, SOLUTION INTRAVENOUS at 14:00

## 2020-12-23 RX ADMIN — MIDODRINE HYDROCHLORIDE 5 MG: 5 TABLET ORAL at 09:20

## 2020-12-23 RX ADMIN — APIXABAN 5 MG: 5 TABLET, FILM COATED ORAL at 23:37

## 2020-12-23 RX ADMIN — INSULIN GLARGINE 35 UNITS: 100 INJECTION, SOLUTION SUBCUTANEOUS at 23:38

## 2020-12-23 RX ADMIN — ANTI-FUNGAL POWDER MICONAZOLE NITRATE TALC FREE: 1.42 POWDER TOPICAL at 09:21

## 2020-12-23 RX ADMIN — ALBUTEROL SULFATE 1 PUFF: 90 AEROSOL, METERED RESPIRATORY (INHALATION) at 06:27

## 2020-12-23 RX ADMIN — SERTRALINE HYDROCHLORIDE 100 MG: 50 TABLET ORAL at 09:20

## 2020-12-23 RX ADMIN — TRAZODONE HYDROCHLORIDE 50 MG: 50 TABLET ORAL at 23:37

## 2020-12-23 RX ADMIN — CYANOCOBALAMIN TAB 1000 MCG 1000 MCG: 1000 TAB at 09:20

## 2020-12-23 RX ADMIN — MONTELUKAST 10 MG: 10 TABLET, FILM COATED ORAL at 23:54

## 2020-12-23 RX ADMIN — APIXABAN 5 MG: 5 TABLET, FILM COATED ORAL at 09:20

## 2020-12-23 RX ADMIN — LIDOCAINE AND PRILOCAINE 1 EACH: 25; 25 CREAM TOPICAL at 10:43

## 2020-12-23 RX ADMIN — HEPARIN SODIUM 5000 UNITS: 5000 INJECTION INTRAVENOUS; SUBCUTANEOUS at 06:06

## 2020-12-23 RX ADMIN — BUDESONIDE AND FORMOTEROL FUMARATE DIHYDRATE 2 PUFF: 160; 4.5 AEROSOL RESPIRATORY (INHALATION) at 06:27

## 2020-12-23 RX ADMIN — CALCIUM 1000 MG: 500 TABLET ORAL at 09:20

## 2020-12-23 RX ADMIN — LEVOTHYROXINE SODIUM 75 MCG: 75 TABLET ORAL at 06:56

## 2020-12-23 RX ADMIN — ANTI-FUNGAL POWDER MICONAZOLE NITRATE TALC FREE: 1.42 POWDER TOPICAL at 23:54

## 2020-12-23 ASSESSMENT — PAIN SCALES - GENERAL
PAINLEVEL_OUTOF10: 0

## 2020-12-23 NOTE — PROGRESS NOTES
Mark Adame MD  Nephrology    Hemodialysis/Progress note. Patient seen and examined on hemodialyisis. Patient being dialyzed again to get her on her regular schedule. Chart reviewed. Patient developed new onset atrial fibrillation with rapid ventricular response which converted to normal sinus rhythm spontaneously. Feels better. Less short of breath. Appetite improving. No nausea or dysguesia. Awake and alert . In no acute distress. Vital SignsBP (!) 113/57   Pulse 96   Temp 98.2 °F (36.8 °C) (Oral)   Resp 18   Ht 5' 6\" (1.676 m)   Wt 220 lb 7.4 oz (100 kg)   SpO2 93%   BMI 35.58 kg/m²   24HR INTAKE/OUTPUT:    Intake/Output Summary (Last 24 hours) at 12/23/2020 1350  Last data filed at 12/23/2020 1137  Gross per 24 hour   Intake 1633 ml   Output 2100 ml   Net -467 ml          Physical  Exam      Neck: No JVD  Lungs: Breath sounds decreased at the bases. No rales or ronchi. Heart: Regular rate and rhythm. No S3 gallop. No murmrur. Abdomen: Soft non distended, non tender and normal bowel sounds.   Extremeties: +1 bipedal edema           Medications:  Current Facility-Administered Medications   Medication Dose Route Frequency Provider Last Rate Last Admin    apixaban (ELIQUIS) tablet 5 mg  5 mg Oral BID Taylor Padilla, DO   5 mg at 12/23/20 0920    cefepime (MAXIPIME) 1 g IVPB extended (mini-bag)  1 g Intravenous Q24H Taylor Padilla, DO 12.5 mL/hr at 12/23/20 0920 1 g at 12/23/20 0920    0.9 % sodium chloride infusion  25 mL Intravenous Q24H Taylor Padilla, DO   Stopped at 12/22/20 2001    epoetin andrzej-epbx (RETACRIT) injection 10,000 Units  10,000 Units Subcutaneous Once per day on Tue Thu Jesse Almeida MD   10,000 Units at 12/22/20 1202    And    epoetin andrzej-epbx (RETACRIT) injection 3,000 Units  3,000 Units Subcutaneous Once per day on Tue Thu Jesse Almeida MD   3,000 Units at 12/22/20 1204    miconazole (MICOTIN) 2 % powder   Topical BID Taylor Padilla DO   Given at 12/23/20 9273    % oral gel 15 g  15 g Oral PRN Angelica Fess, DO        dextrose 50 % IV solution  12.5 g Intravenous PRN Angelica Fess, DO        glucagon (rDNA) injection 1 mg  1 mg Intramuscular PRN Angelica Fess, DO        dextrose 5 % solution  100 mL/hr Intravenous PRN Angelica Fess, DO        insulin lispro (HUMALOG) injection vial 0-6 Units  0-6 Units Subcutaneous TID WC Angelica Fess, DO   2 Units at 12/22/20 1745    insulin lispro (HUMALOG) injection vial 0-3 Units  0-3 Units Subcutaneous Nightly Angelica Fess, DO   1 Units at 12/21/20 2356       Labs:    CBC:   Recent Labs     12/21/20  1520 12/22/20  0620 12/23/20  0615   WBC 11.4 11.5 9.5   HGB 9.2* 9.3* 8.4*    251 188       BMP:   Recent Labs     12/21/20  1520 12/22/20  0620 12/22/20  1900 12/23/20  0615    136  --  134   K 4.8 5.6* 4.2 4.3   CL 92* 95*  --  96*   CO2 23 21*  --  29   BUN 81* 81*  --  38*   CREATININE 8.8* 9.3*  --  5.3*   GLUCOSE 216* 140*  --  81       Phos and Calcium:  Lab Results   Component Value Date    CALCIUM 8.0 (L) 12/23/2020    PHOS 5.8 (H) 12/23/2020        Hemodialysis Note  Procedure: Hemodialysis  Access:  AVF      Dialysate: 3 K     3  Ca  Indication:  Chronic kidney disease stage G5/ESRD ,  Volume Overload ,    Hyperkalemia    Goal: Diffuse dialytic support and ultrafilteration     Assessment/Plan:     1. Chronic kidney disease stage G5/ESRD. Acute kidney Injury. Continue dialytic support.     2.   Volume overload/fluid retention. Attempt  2.5  L off on hemodialysis. Attempt ultrafilteration as allowed by hemodynamics.     3. Hypertension with Chronic Kidney Disease Stage G5/ESRD. Hypertension  control is  fair with blood pressure at target of less than 130/80 mmHg. Will continue current medications.     4. Anemia secondary to chronic kidney disease. Target hemoglobin is 10 g/dL. Continue JESSE and adjust dosage.     5.  Renal Osteodystrophy. Follow calcium phosphorus and PTH levels.   Phosphorus levels markedly

## 2020-12-23 NOTE — PROGRESS NOTES
Occupational Therapy  OCCUPATIONAL THERAPY INITIAL EVALUATION      Date:2020  Patient Name: Leeann Hooker  MRN: [id number]  : 1945  Room: [address] [place] [address]    Referring Provider: Cristin Escoto DO    Evaluating OT: Anju Graves OTR/L #[id number]    AM-PAC Daily Activity Raw Score:     Recommended Placement: Home with HHOT  Recommended Adaptive Equipment: TBD     Diagnosis:   1. Shortness of breath    2. ESRD (end stage renal disease) on dialysis (HCC)    3. Elevated serum creatinine        Surgery: N/A      Pertinent Medical History:    Past Medical History:   Diagnosis Date    Anemia     Arthritis     RA    Asthma     Chronic kidney disease     Depression     Hemodialysis patient ([facility]     T-Th-Sat    Hyperlipidemia     Hypothyroid     Sleep apnea     no CPAP    Type II or unspecified type diabetes mellitus without mention of complication, not stated as uncontrolled     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 ([facility] 6/15/2017      Precautions:  Falls, alarm, O2     Home Living: Pt lives with sister in a 1 story home with ramp  Bathroom setup: walk in shower   Equipment owned: cane, [employer], SW, shower chair, O2    Prior Level of Function: Mod I with ADLs , Mod I with  with IADLs; ambulated with QC  Driving: No    Pain Level: 0/10;   Cognition: A&O: 4/4; Follows 2 step directions   Memory:  good   Sequencing:  Fair+   Problem solving:  Fair+   Judgement/safety:  Fair+     Functional Assessment:   Initial Eval Status  Date: 20 Treatment Status  Date: STGs = LTGs  Time frame: 5-7 days   Feeding Independent        Grooming Supervision     Independent    UB Dressing Independent     Independent    LB Dressing Moderate Assist     Modified Power    Bathing Moderate Assist    Modified Power    Toileting Minimal Assist     Independent    Bed Mobility  Supine to sit: NT   Sit to supine: Stand by Assist     Supine to sit:  Independent   Sit to supine: Independent    Functional Transfers Minimal Assist   Sit<>stand  Chair, bed  Independent    Functional Mobility Minimal Assist   Using [employer]  Several feet in room. Independent    Balance Sitting:     Static:  good    Dynamic:good  Standing: fair+  Sitting:     Static:  good    Dynamic:good  Standing: good   Activity Tolerance Fair+  good   Visual/  Perceptual Glasses: Yes   WFL       Hand Dominance Right     Strength ROM Additional Info:    RUE  4/5  WFL good  and wfl FMC/dexterity noted during ADL tasks       LUE 4/5  WFL good  and wfl FMC/dexterity noted during ADL tasks       Hearing: WFL   Sensation:  No c/o numbness or tingling   Tone: WFL   Edema: None noted    Comments:   Nursing approved therapy session. Upon arrival, patient up in chair and agreeable to OT session. Therapist educated pt on role of OT. At end of session, patient supine in bed with call light and phone within reach, alarm on, all lines and tubes intact; nursing aware. Pt demonstrated good understanding of education/techniques and decreased independence and safety during completion of ADL/functional transfer/mobility tasks. Pt would benefit from continued skilled OT to increase safety and independence with completion of ADL/IADL tasks for functional independence and quality of life. Treatment:   Skilled occupational therapy services provided include instruction/training on safety and adapted techniques for completion of therapeutic activities. Skilled monitoring of O2 sats, HR, and pt response throughout treatment.  Therapist facilitated therapeutic activities: bed mobility, functional transfers, graded functional activities (static/dynamic sitting, static/dynamic standing, functional reaching), and functional ambulation - providing min cuing (verbal, visual, tactile) on AD management, hand placement, body mechanics, posture, breathing techniques, energy conservation, compensatory strategies, and safety.      Eval Complexity: Low    Assessment of current deficits   Functional mobility [x]  ADLs [x] Strength [x]  Cognition []  Functional transfers  [x] IADLs [x] Safety Awareness [x]  Endurance [x]  Fine Motor Coordination [] Balance [x] Vision/perception [] Sensation []   Gross Motor Coordination [] ROM [] Delirium []                  Motor Control []    Plan of Care: 1-3 days/week for 1-2 weeks PRN   Instruction/training on adapted ADL techniques and AE recommendations to increase functional independence within precautions  Training on energy conservation strategies/techniques to improve independence/tolerance for self-care routine  Functional transfer/mobility training/DME recommendations for increased independence, safety, and fall prevention  Patient/Family education to increase follow through with safety techniques and functional independence  Recommendation of environmental modifications for increased safety with functional transfers/mobility and ADLs  Therapeutic exercise to improve motor endurance, ROM, and functional strength for ADLs/functional transfers  Therapeutic activities to facilitate/challenge dynamic balance, stand tolerance, fine motor dexterity/in-hand manipulation for increased independence with ADLs    Rehab Potential: Good for established goals     Patient / Family Goal: Not reported      Patient and/or family were instructed on functional diagnosis, prognosis/goals and OT plan of care. Demonstrated good understanding.     Eval Complexity: Low      Time In: 1115  Time Out: 1140  Total Treatment Time: 8    Min Units   OT Eval Low 97165  X  1   OT Eval Medium 50029      OT Eval High 59125       OT Re-Eval [id number]       Therapeutic Ex 28358       Therapeutic Activities 32773  8 1    ADL/Self Care 47143       Orthotic Management 78276       Neuro Re-Ed 40394       Non-Billable Time          Evaluation Time includes thorough review of current medical information, gathering information on past medical history/social history and prior level of function, completion of standardized testing/informal observation of tasks, assessment of data and education on plan of care and goals.     Tate Alberts, OTR/L #[id number]

## 2020-12-23 NOTE — TELEPHONE ENCOUNTER
Bud Bell from [facility] home care called will you follow pt for home care skilled nursing PT an OT    [phone] ext.  [phone]    Last seen 11/20/2020  Next appt 1/20/2021

## 2020-12-23 NOTE — FLOWSHEET NOTE
12/23/20 1540   Vital Signs   BP (!) 94/56   Temp 98.4 °F (36.9 °C)   Pulse 109   Resp 16   Weight 215 lb 6.2 oz (97.7 kg)   Weight Method Bed scale   Percent Weight Change -1.51   Pain Assessment   Pain Assessment 0-10   Pain Level 0   Post-Hemodialysis Assessment   Post-Treatment Procedures Blood returned; Access bleeding time < 10 minutes   Machine Disinfection Process Acid/Vinegar Clean;Exterior Machine Disinfection;Bleach; Machine Absence of Bleach Machine   Rinseback Volume (ml) 300 ml   Total Liters Processed (l/min) 76.8 l/min   Dialyzer Clearance Lightly streaked   Duration of Treatment (minutes) 210 minutes   Heparin amount administered during treatment (units) 0 units   Hemodialysis Intake (ml) 300 ml   Hemodialysis Output (ml) 1800 ml   NET Removed (ml) 1500 ml   Tolerated Treatment Good   Patient Response to Treatment Tolerated well; net UF 1.5 L; A-B profile throughout treatment; report called to Ila Britt RN   Bilateral Breath Sounds Diminished   Edema Generalized   Edema Generalized +1;Non-pitting   Physician Notified?  No

## 2020-12-23 NOTE — CARE COORDINATION
Tried multiple times to call room for IMM signature and tried to call cell. Phone just rang busy. Left voice message on cell I needed her verbal signature for letter.  Electronically signed by Cynthia Conti on 12/23/2020 at 2:24 PM

## 2020-12-23 NOTE — CONSULTS
Inpatient Cardiology Consultation      Reason for Consult:  New Onset Atrial Fibrillation     Consulting Physician: Dr. Erin Stacy     Requesting Physician:  Dr. Corwin Mathis    Date of Consultation: 12/23/2020    HISTORY OF PRESENT ILLNESS:   Ms. Yue Dyer is a 70year old  female who follows with Dr. Erin Stacy. She was most recently seen in the office with Dr. Erin Stacy on 11/02/2020. Her medical history includes ESRD on HD, HLD, DM, Morbid Obesity s/p gastric bypass, Rheumatoid Arthritis, Hypothyroidism, KAYKAY with noncompliance with Cpap, and depression. Ms. Yue Dyer presented to [facility] ED on 12/21/2020 with complaints of DE LA CRUZ. She states that prior to presentation, she missed 3 HD treatments as \"I didn't have a ride to get a Covid test and the drivers wouldn't take me to dialysis without the test\". She states that over the past week she has been having worsening DE LA CRUZ with a dry, hacking cough. She denies chest pain, orthopnea and PND and states that she has barely been eating and has not taken any of her medication in >1 week as \"I just wasn't feeling well\". Upon arrival to the  ED her VS were -04-88% on RA-153/102. EKG ST. WBC 11.4. H&H 9.2/27. 6. CTA of the chest with bilateral multifocal infiltrates, pneumonia. Negative for pulmonary embolism. BUN/SCr 81/8.8. Troponin 0.03. ProBNP 70,000. Rapid Covid negative. She received NS. Repeat EKG A Fib with RVR with lateral T inversions. She was placed on Eliquis BID. She was admitted to a telemetry monitored unit. Cardiology was consulted by Dr. Corwin Mathis for management of new onset Atrial Fibrillation. Please note: past medical records were reviewed per electronic medical record (EMR) - see detailed reports under Past Medical/ Surgical History. Past Medical and Surgical  History:    1. Echocardiogram 12/30/2017: No WMA. EF 50%. 2. Lexiscan MPS 08/30/2019: Negative Lexiscan stress test for ischemic symptoms or ischemic EKG changes.  Probably normal Cardiolite perfusion scan showing decreased uptake of the radioactive tracer in the lateral wall on stress and rest images with improvement of the stress images which is consistent with artifacts, very small mild reversible apical defect most likely represents soft tissue attenuation artifact. Normal left ventricular systolic function with normal wall motion. There is no comparison study. The results of the study predict low probability for significant coronary artery disease or future cardiac events  3. ESRD on HD  4. HLD  5. DM  6. Depression  7. Rheumatoid Arthritis  8. Hypothyroidism  9. KAYKAY with noncompliance with Cpap  10. Morbid Obesity s/p gastric bypass  11. S/p Hernia repair, right knee, tonsillectomy, bilateral carpal tunnel release, cholecystectomy, cataract removal, and left foot surgery        Medications Prior to admit:  Prior to Admission medications    Medication Sig Start Date End Date Taking?  Authorizing Provider   montelukast (SINGULAIR) 10 MG tablet Take 1 tablet by mouth nightly 10/22/20  Yes Ric Kelly, DO   sertraline (ZOLOFT) 100 MG tablet TAKE 1 TABLET BY MOUTH EVERY NIGHT 5/27/20  Yes Alida Dwyer, DO   traZODone (DESYREL) 50 MG tablet Take 1 tablet by mouth nightly 5/1/20  Yes Alida Dwyer DO   SYNTHROID 75 MCG tablet Take 1 tablet by mouth Daily 5/1/20  Yes Alida Dwyer DO   rOPINIRole (REQUIP) 1 MG tablet TAKE 1 TABLET BY MOUTH EVERY NIGHT 5/1/20  Yes Alida Dwyer, DO   insulin glargine (LANTUS SOLOSTAR) 100 UNIT/ML injection pen Inject 35 Units into the skin nightly 5/1/20  Yes Alida Dwyer DO   atorvastatin (LIPITOR) 10 MG tablet TAKE 1 TABLET BY MOUTH EVERY NIGHT 3/27/20  Yes Alida Dwyer, DO   albuterol sulfate  (90 Base) MCG/ACT inhaler Inhale 1 puff into the lungs every 4 hours as needed for Wheezing 10/21/20   Ric Kelly, DO   fluticasone-salmeterol (ADVAIR DISKUS) 100-50 MCG/DOSE diskus inhaler Inhale 1 puff into the lungs 2 times daily Rinse mouth after use. 10/21/20   Ric Kelly, DO   cyanocobalamin ([employer] VITAMIN B12) 1000 MCG tablet Take 1 tablet by mouth daily 5/1/20   Francisco Webb, DO   Alcohol Swabs (ALCOHOL PREP) 70 % PADS 1 each by Does not apply route 4 times daily (before meals and nightly) 5/1/20   Francisco Webb, DO   Elastic Bandages & Supports (WRIST SPLINT LEFT/RIGHT) MISC 1 each by Does not apply route daily as needed (numbness) 1/17/20   Francisco Webb, DO   Misc. Devices (BARIATRIC ROLLATOR) MISC 1 Device by Does not apply route continuous prn (Walking) 1/3/20   Francisco Webb, DO   calcium carbonate 600 MG TABS tablet Take 1 tablet by mouth 2 times daily  Patient taking differently: Take 2 tablets by mouth 3 times daily (with meals) Rx 9/30/19   Pritesh Barragan, DO   Insulin Pen Needle (B-D ULTRAFINE III SHORT PEN) 31G X 8 MM MISC Inject 1 each into the skin daily 7/29/19   Francisco Webb DO   nitroGLYCERIN (NITROSTAT) 0.4 MG SL tablet Place 1 tablet under the tongue every 5 minutes as needed for Chest pain up to max of 3 total doses.  If no relief, call 911. 7/29/19   Francisco Webb, DO   Methoxy PEG-Epoetin Beta (MIRCERA) 50 MCG/0.3ML SOSY Inject 50 mcg as directed every 14 days Last Dose: 4/4/2019  Next Dose: 6/13/2019    Historical Provider, MD   lidocaine-prilocaine (EMLA) 2.5-2.5 % cream Apply 1 each topically three times a week Tuesday, Thursday, Saturday    Historical Provider, MD   insulin lispro (HUMALOG) 100 UNIT/ML injection vial Inject 0-5 Units into the skin 3 times daily (before meals) *Per Sliding Scale*  1 unit for every 10 carbs   Takes 2 units with each meal    Historical Provider, MD   iron sucrose (VENOFER) 20 MG/ML injection Infuse 50 mg intravenously once a week Last Dose: 6/4/ 2020 at dialysis    Historical Provider, MD   B Complex-C-Folic Acid (TOD-RENETTA) TABS Take 1 tablet by mouth daily Rx    Historical Provider, MD pickardrine (PROAMATINE) 5 MG tablet Take 5 mg by mouth three times a week Tuesday, Thursday, Saturday    Historical Provider, MD   Insulin Pen Needle 32G X 4 MM MISC 1 each by Does not apply route daily Use as directed with insulin pen 2/14/18   Garrett Pollack, DO   glucose blood VI test strips (ACCU-CHEK SMARTVIEW) strip 1 each by In Vitro route 4 times daily (before meals and nightly) As needed.  6/15/17   Carmen Blanco, DO   ACCU-CHEK FASTCLIX LANCETS MISC USE TO TEST BLOOD SUGAR FOUR TIMES DAILY BEFORE MEALS AND NIGHTLY 6/8/17   Susie Almeida, DO   Blood Glucose Monitoring Suppl (ACCU-CHEK DERREK SMARTVIEW) W/DEVICE KIT 1 kit by Does not apply route 4 times daily (before meals and nightly) 3/29/17   Elsa Chase DO       Current Medications:    Current Facility-Administered Medications: apixaban (ELIQUIS) tablet 5 mg, 5 mg, Oral, BID  cefepime (MAXIPIME) 1 g IVPB extended (mini-bag), 1 g, Intravenous, Q24H  0.9 % sodium chloride infusion, 25 mL, Intravenous, Q24H  epoetin andrzej-epbx (RETACRIT) injection 10,000 Units, 10,000 Units, Subcutaneous, Once per day on Tue Thu Sat **AND** epoetin andrzej-epbx (RETACRIT) injection 3,000 Units, 3,000 Units, Subcutaneous, Once per day on Tue Thu Sat  miconazole (MICOTIN) 2 % powder, , Topical, BID  vancomycin (VANCOCIN) intermittent dosing (placeholder), , Other, RX Placeholder  metoprolol (LOPRESSOR) injection 5 mg, 5 mg, Intravenous, Q5 Min PRN  perflutren lipid microspheres (DEFINITY) injection 1.65 mg, 1.5 mL, Intravenous, ONCE PRN  albuterol sulfate  (90 Base) MCG/ACT inhaler 1 puff, 1 puff, Inhalation, Q4H PRN  atorvastatin (LIPITOR) tablet 10 mg, 10 mg, Oral, Daily  b complex-C-folic acid (NEPHROCAPS) capsule 1 mg, 1 mg, Oral, Daily  calcium elemental (OSCAL) tablet 1,000 mg, 1,000 mg, Oral, TID WC  vitamin B-12 (CYANOCOBALAMIN) tablet 1,000 mcg, 1,000 mcg, Oral, Daily  budesonide-formoterol (SYMBICORT) 160-4.5 MCG/ACT inhaler 2 puff, 2 puff, Inhalation, BID  insulin glargine (LANTUS) injection vial 35 Units, 35 Units, Subcutaneous, Nightly  lidocaine-prilocaine (EMLA) cream 1 each, 1 each, Topical, Once per day on Mon Wed Fri  midodrine (PROAMATINE) tablet 5 mg, 5 mg, Oral, Once per day on Mon Wed Fri  montelukast (SINGULAIR) tablet 10 mg, 10 mg, Oral, Nightly  sertraline (ZOLOFT) tablet 100 mg, 100 mg, Oral, Daily  levothyroxine (SYNTHROID) tablet 75 mcg, 75 mcg, Oral, Daily  traZODone (DESYREL) tablet 50 mg, 50 mg, Oral, Nightly  ondansetron (ZOFRAN) injection 4 mg, 4 mg, Intravenous, Q6H PRN  glucose (GLUTOSE) 40 % oral gel 15 g, 15 g, Oral, PRN  dextrose 50 % IV solution, 12.5 g, Intravenous, PRN  glucagon (rDNA) injection 1 mg, 1 mg, Intramuscular, PRN  dextrose 5 % solution, 100 mL/hr, Intravenous, PRN  insulin lispro (HUMALOG) injection vial 0-6 Units, 0-6 Units, Subcutaneous, TID WC  insulin lispro (HUMALOG) injection vial 0-3 Units, 0-3 Units, Subcutaneous, Nightly    Allergies:  Pcn [penicillins], Sulfa antibiotics, and Bactrim [sulfamethoxazole-trimethoprim]    Social History:    Denies tobacco, alcohol, and illicit drug use. She drinks 2 cups of coffee a day. She lives with her sister, brother, and her nephew. She uses a cane with ambulation. Family History:   Please note this information was not obtained at this time as it is limited in nature due to the patient's advanced age. REVIEW OF SYSTEMS:     · Constitutional: Denies fevers, chills, night sweats, and fatigue  · HEENT: Denies headaches, nose bleeds, and blurred vision,oral pain, abscess or lesion. · Musculoskeletal: Denies falls, pain to BLE with ambulation and edema to BLE. · Neurological: Denies dizziness and lightheadedness, numbness and tingling  · Cardiovascular: Denies chest pain, palpitations, and feelings of heart racing. · Respiratory: Denies orthopnea and PND. Complains of cough and DE LA CRUZ. · Gastrointestinal: Denies heartburn, nausea/vomiting, diarrhea and constipation, black/bloody, and tarry stools.    · Genitourinary: Denies dysuria and hematuria  · Hematologic: Denies excessive bruising or bleeding  · Lymphatic: Denies lumps and bumps to neck, axilla, breast, and groin  · Endocrine: Denies excessive thirst. Denies intolerance to hot and cold  · GYN: Postmenopausal state; Denies vaginal bleeding. · Psychiatric: Denies anxiety and depression. PHYSICAL EXAM:   BP (!) 89/52   Pulse 107   Temp 98.2 °F (36.8 °C) (Oral)   Resp 18   Ht 5' 6\" (1.676 m)   Wt 220 lb 7.4 oz (100 kg)   SpO2 93%   BMI 35.58 kg/m²   CONST:  Well developed, obese, elderly female who appears stated age. Awake, alert, cooperative, no apparent distress  HEENT:   Head- Normocephalic, atraumatic   Eyes- Conjunctivae pink, anicteric  Throat- Oral mucosa pink and moist  Neck-  No stridor, trachea midline, no jugular venous distention. No adenopathy   CHEST: Chest symmetrical and non-tender to palpation. No accessory muscle use or intercostal retractions  RESPIRATORY: Lung sounds -diminished throughout fields   CARDIOVASCULAR:     No carotid bruit  Heart Inspection- shows no noted pulsations  Heart Palpation- no heaves or thrills; PMI is non-displaced   Heart Ausculation- Irregular rate and rhythm, no murmur. No s3, or rub   PV: No lower extremity edema. No varicosities. Pedal pulses palpable, no clubbing or cyanosis   ABDOMEN: Soft, non-tender to light palpation. Bowel sounds present. No palpable masses no organomegaly; no abdominal bruit  MS: Good muscle strength and tone. No atrophy or abnormal movements. : Deferred  SKIN: Warm and dry no statis dermatitis or ulcers   NEURO / PSYCH: Oriented to person, place and time. Speech clear and appropriate. Follows all commands.  Pleasant affect     DATA:    ECG: As above   Tele strips: A Fib with HR now 104-118  Diagnostic:      Intake/Output Summary (Last 24 hours) at 12/23/2020 0948  Last data filed at 12/23/2020 0600  Gross per 24 hour   Intake 1513 ml   Output 2100 ml   Net -587 ml       Labs:   CBC:   Recent Labs 12/22/20  0620 12/23/20  0615   WBC 11.5 9.5   HGB 9.3* 8.4*   HCT 29.3* 26.6*    188     BMP:   Recent Labs     12/22/20  0620 12/22/20  1900 12/23/20  0615     --  134   K 5.6* 4.2 4.3   CO2 21*  --  29   BUN 81*  --  38*   CREATININE 9.3*  --  5.3*   LABGLOM 4  --  8   CALCIUM 8.3*  --  8.0*     Mag:   Recent Labs     12/22/20  0620   MG 2.2     Phos:   Recent Labs     12/22/20  0620   PHOS 10.6*     TSH:   Recent Labs     12/22/20  0620   TSH 1.170     HgA1c:   Lab Results   Component Value Date    LABA1C 7.3 (H) 12/22/2020   CARDIAC ENZYMES:  Recent Labs     12/21/20  1520 12/22/20  1805   TROPONINI 0.03 0.02     FASTING LIPID PANEL:  Lab Results   Component Value Date    CHOL 123 12/22/2020    HDL 40 12/22/2020    LDLCALC 59 12/22/2020    TRIG 119 12/22/2020     LIVER PROFILE:  Recent Labs     12/21/20  1520   AST 10   ALT 7   LABALBU 2.8*     12/21/2020 CTA of Chest:   1.  Bilateral multifocal infiltrates compatible with pneumonia. 2.  No PE or pleural effusion. 3.  Mediastinal and right hilar mild lymphadenopathy. 4.  Prior cholecystectomy and gastric surgery. IMPRESSION and PLAN to follow per Dr. Erin Stacy     Electronically signed by KACIE Lugo CNP on 12/23/2020 at 9:48 AM     [facility] Cardiology Consult       Frances Trejo    I have personally participated in a face-to-face and personally obtained history and performed physical exam on the date of service. I reviewed chart, vitals, labs and radiologic studies. I also participated in medical decision making with KACIE Lugo CNP on the date of service All of the assessments and recommendations are from me and I agree with all of the pertinent clinical information, assessment and treatment plan. I have reviewed and edited the note above based on my findings during my history, exam, and decision making. Please see my additional contributions to the history, physical exam, assessment, and recommendations below.       HISTORY OF PRESENT ILLNESS:     As above      Past medical history:  Reviewed, as above. Past surgical history:  Reviewed, as above. Current medications:  Reviewed, as above    Allergies:  Reviewed, as above    Social history:  Reviewed, as above    Family medical history:  Reviewed, as above. REVIEW OF SYSTEMS:   Reviewed, as above. PHYSICAL EXAM:   CONSTITUTIONAL:  awake, alert, cooperative, no apparent distress, and appears stated age  EYES:  lids and lashes normal and pupils equal, round and reactive to light, anicteric sclerae  HEAD:  normocepalic, without obvious abnormality, atraumatic, pink, moist mucous membranes. NECK:  Supple, symmetrical, trachea midline, no adenopathy, thyroid symmetric, not enlarged and no tenderness, skin normal  HEMATOLOGIC/LYMPHATICS:  no cervical lymphadenopathy and no supraclavicular lymphadenopathy  LUNGS:  No increased work of breathing, good air exchange, scattered rhonchi CARDIOVASCULAR:  Normal apical impulse, tachycardic, normal S1 and S2, no S3 or S4, and no murmur noted and no JVD, no carotid bruit, no pedal edema, good carotid upstroke bilaterally. ABDOMEN:  Soft, nontender, no masses, no hepatomegaly or splenomegaly, BS+  CHEST: nontender to palpation, expands symmetrically  MUSCULOSKELETAL:  No clubbing no cyanosis. there is no redness, warmth, or swelling of the joints, full range of motion noted  NEUROLOGIC:  Alert, awake,oriented x3. SKIN:  no bruising or bleeding, normal skin color, texture, turgor and no redness, warmth, or swelling    BP (!) 94/56   Pulse 109   Temp 98.4 °F (36.9 °C)   Resp 16   Ht 5' 6\" (1.676 m)   Wt 215 lb 6.2 oz (97.7 kg)   SpO2 94%   BMI 34.76 kg/m²       I/O last 3 completed shifts:   In: 46 [P.O.:460; I.V.:333]  Out: 0   I/O this shift:  In: 300   Out: 1800       DATA:   I personally reviewed the admission EKG with the following interpretation: Atrial tachycardia    ECHO: 12/30/2017:Summary   Left ventricular size is grossly normal.   Normal left ventricular wall thickness. Ejection fraction is visually estimated at 50%. No evidence of left ventricular mass or thrombus noted. No regional wall motion abnormalities seen. The left atrium is mildly dilated. Interatrial septum appears intact. No evidence of thrombus within left atrium. Borderline dilated right ventricle. No evidence of a thrombus in the right ventricle. Physiologic and/or trace mitral regurgitation is present. No mitral valve stenosis present. Physiologic and/or trace tricuspid regurgitation. Regular rhythm. Stress Test:    Angiography:    Cardiology Labs:   BMP:    Lab Results   Component Value Date     12/23/2020    K 4.3 12/23/2020    K 4.8 12/21/2020    CL 96 12/23/2020    CO2 29 12/23/2020    BUN 38 12/23/2020     CMP:    Lab Results   Component Value Date     12/23/2020    K 4.3 12/23/2020    K 4.8 12/21/2020    CL 96 12/23/2020    CO2 29 12/23/2020    BUN 38 12/23/2020    PROT 6.2 12/21/2020     CBC:    Lab Results   Component Value Date    WBC 9.5 12/23/2020    RBC 2.37 12/23/2020    HGB 8.4 12/23/2020    HCT 26.6 12/23/2020    .2 12/23/2020    RDW 15.2 12/23/2020     12/23/2020     PT/INR:  No results found for: PTINR  PT/INR Warfarin:  No components found for: PTPATWAR, PTINRWAR  PTT:    Lab Results   Component Value Date    APTT 29.6 05/23/2018     PTT Heparin:  No components found for: APTTHEP  Magnesium:    Lab Results   Component Value Date    MG 2.2 12/23/2020     TSH:    Lab Results   Component Value Date    TSH 1.170 12/22/2020     TROPONIN:  No components found for: TROP  BNP:  No results found for: BNP  FASTING LIPID PANEL:    Lab Results   Component Value Date    CHOL 123 12/22/2020    HDL 40 12/22/2020    TRIG 119 12/22/2020     CTA CHEST W CONTRAST   Final Result   1. Bilateral multifocal infiltrates compatible with pneumonia. 2.  No PE or pleural effusion.    3.  Mediastinal and right hilar mild lymphadenopathy. 4.  Prior cholecystectomy and gastric surgery. I have personally reviewed the laboratory, cardiac diagnostic and radiographic testing as outlined above:    IMPRESSION:  1. Atrial fibrillation: Paroxysmal, rapid ventricular response, low 100s, low blood pressure, we cannot start on beta-blocker or calcium channel blocker, will start digoxin, VIGNESH 2 DS 2 VASC score of 4, started on Eliquis  2. History of atrial tachycardia  3. Hypotension: Etiology?,  Will increase midodrine to 10 mg 3 times daily, will check cortisol level  4. Type 2 diabetes mellitus  5. End-stage renal disease on hemodialysis replacement therapy  6. History of rheumatoid arthritis  7. Hypothyroidism    RECOMMENDATIONS:   1. Digoxin  2. Will increase midodrine to 10 mg 3 times daily  3. will continue the rest of treatment  4. Cortisol level in a.m.  5. Basic metabolic panel and CBC in a.m.  6. Further cardiac recommendations will be forthcoming pending her clinical course and diagnostic test findings    I have reviewed my findings and recommendations with patient    Thank you for the consult  Electronically signed by Javier Coker MD on 12/23/2020 at 8:47 PM  NOTE: This report was transcribed using voice recognition software.  Every effort was made to ensure accuracy; however, inadvertent computerized transcription errors may be present

## 2020-12-23 NOTE — PROGRESS NOTES
Internal Medicine Progress Note    [facility]    Remigio Craw. Bessie Kocher., F.A.C.O.I. Jillian Beck D.O., PATIOIVAN Vargas D.O. Irina Cali, MSN, APRN, NP-C  Aliya Ramirez. Silas Hernandez, MSN, APRN-CNP     Primary Care Physician: Layo Donovan DO   Admitting Physician:  Rosalind Wen DO  Admission date and time: 12/21/2020  3:08 PM    Room:  [address] [place] [address]  Admitting diagnosis: Shortness of breath [R06.02]    Patient Name: Ana Silver  MRN: [id number]    Date of Service: 12/23/2020     Subjective:  Colby Singh is a 70 y.o. female who was seen and examined today,12/23/2020, at the bedside. Colby Singh states she is feeling much better today but did develop new onset atrial fibrillation with rapid ventricular response last evening. She converted to normal sinus rhythm with the administration of metoprolol. Unfortunately, she remains intermittently hypotensive. Her respiratory status is stable. We discussed complete work-up for the atrial fibrillation. Review of System:   Constitutional:   Denies fever or chills, weight loss or gain, fatigue or malaise. HEENT:   Denies ear pain, sore throat, sinus or eye problems. Cardiovascular:   Denies any chest pain, irregular heartbeats, or palpitations. Respiratory:   Admits to improving shortness of breath. Minimal coughing without sputum production. Gastrointestinal:   Denies nausea, vomiting, diarrhea, or constipation. Denies any abdominal pain. Genitourinary:    Denies any urgency, frequency, hematuria. Voiding  without difficulty. Extremities:   Denies lower extremity swelling, edema or cyanosis. Neurology:    Denies any headache or focal neurological deficits, Denies generalized weakness or memory difficulty. Psch:   Denies being anxious or depressed. Musculoskeletal:    Denies  myalgias, joint complaints or back pain. Integumentary:   Denies any rashes, ulcers, or excoriations.   Denies bruising. Hematologic/Lymphatic:  Denies bruising or bleeding. Physical Exam:  No intake/output data recorded. Intake/Output Summary (Last 24 hours) at 12/23/2020 0700  Last data filed at 12/22/2020 1806  Gross per 24 hour   Intake 1413 ml   Output 2100 ml   Net -687 ml   I/O last 3 completed shifts: In: 2639 [P.O.:540; I.V.:333]  Out: 2100   Patient Vitals for the past 96 hrs (Last 3 readings):   Weight   12/22/20 1410 220 lb 7.4 oz (100 kg)   12/22/20 1000 223 lb 8.7 oz (101.4 kg)   12/21/20 2257 221 lb (100.2 kg)     Vital Signs:   Blood pressure 95/62, pulse 120, temperature 97.8 °F (36.6 °C), temperature source Axillary, resp. rate 21, height 5' 6\" (1.676 m), weight 220 lb 7.4 oz (100 kg), SpO2 96 %, not currently breastfeeding. General appearance:  Alert, responsive, oriented to person, place, and time. Well preserved, alert, no distress. Head:  Normocephalic. No masses, lesions or tenderness. Eyes:  PERRLA. EOMI. Sclera clear. Buccal mucosa moist.  ENT:  Ears normal. Mucosa normal.  Nasal cannula oxygen is in place. Neck:    Supple. Trachea midline. No thyromegaly. No JVD. No bruits. Heart:    Rhythm regular. Rate controlled. No murmurs. Lungs:    Symmetrical. Clear to auscultation bilaterally. No wheezes. No rhonchi. No rales. Abdomen:   Soft. Non-tender. Non-distended. Bowel sounds positive. No organomegaly or masses. No pain on palpation. Extremities:    Peripheral pulses present. No peripheral edema. No ulcers. No cyanosis. No clubbing. Neurologic:    Alert x 3. No focal deficit. Cranial nerves grossly intact. No focal weakness. Psych:   Behavior is normal. Mood appears normal. Speech is not rapid and/or pressured. Musculoskeletal:   Spine ROM normal. Muscular strength intact. Gait not assessed. Integumentary:  No rashes  Skin normal color and texture.   Genitalia/Breast:  Deferred    Medication:  Scheduled Meds:   apixaban  5 mg Oral BID    cefepime  1 g Intravenous Q24H  epoetin andrzej-epbx  10,000 Units Subcutaneous Once per day on Tue Thu Sat    And    epoetin andrzej-epbx  3,000 Units Subcutaneous Once per day on Tue Thu Sat    miconazole   Topical BID    vancomycin (VANCOCIN) intermittent dosing (placeholder)   Other RX Placeholder    atorvastatin  10 mg Oral Daily    b complex-C-folic acid  1 mg Oral Daily    calcium elemental  1,000 mg Oral TID WC    cyanocobalamin  1,000 mcg Oral Daily    budesonide-formoterol  2 puff Inhalation BID    insulin glargine  35 Units Subcutaneous Nightly    lidocaine-prilocaine  1 each Topical Once per day on Mon Wed Fri    midodrine  5 mg Oral Once per day on Mon Wed Fri    montelukast  10 mg Oral Nightly    sertraline  100 mg Oral Daily    levothyroxine  75 mcg Oral Daily    traZODone  50 mg Oral Nightly    insulin lispro  0-6 Units Subcutaneous TID WC    insulin lispro  0-3 Units Subcutaneous Nightly     Continuous Infusions:   sodium chloride Stopped (12/22/20 2001)    dextrose         Objective Data:  CBC with Differential:    Lab Results   Component Value Date    WBC 9.5 12/23/2020    RBC 2.37 12/23/2020    HGB 8.4 12/23/2020    HCT 26.6 12/23/2020     12/23/2020    .2 12/23/2020    MCH 35.4 12/23/2020    MCHC 31.6 12/23/2020    RDW 15.2 12/23/2020    SEGSPCT 54 05/18/2012    METASPCT 0.9 12/22/2020    LYMPHOPCT 5.3 12/22/2020    MONOPCT 5.3 12/22/2020    MYELOPCT 0.5 01/01/2018    BASOPCT 0.3 12/22/2020    MONOSABS 0.58 12/22/2020    LYMPHSABS 0.58 12/22/2020    EOSABS 0.10 12/22/2020    BASOSABS 0.00 12/22/2020     BMP:    Lab Results   Component Value Date     12/23/2020    K 4.3 12/23/2020    K 4.8 12/21/2020    CL 96 12/23/2020    CO2 29 12/23/2020    BUN 38 12/23/2020    LABALBU 2.8 12/21/2020    LABALBU 3.9 06/01/2012    CREATININE 5.3 12/23/2020    CALCIUM 8.0 12/23/2020    GFRAA 10 12/23/2020    LABGLOM 8 12/23/2020    GLUCOSE 81 12/23/2020    GLUCOSE 135 06/01/2012       Wound Documentation: Wound 12/21/20 Abdomen Lower;Medial (Active)   Dressing Status Clean;Dry; Intact 12/22/20 0746   Dressing/Treatment Foam 12/21/20 2257   Wound Length (cm) 1.1 cm 12/21/20 2257   Wound Width (cm) 1.4 cm 12/21/20 2257   Wound Surface Area (cm^2) 1.54 cm^2 12/21/20 2257   Wound Assessment Dry;Pink/red 12/22/20 0746   Drainage Amount None 12/22/20 0746   Odor None 12/21/20 2257   Number of days: 1       Assessment:  1. Acute on end-stage renal disease in the setting of missed dialysis x3 as an outpatient  2. Healthcare associated pneumonia resulting in acute respiratory failure with hypoxia and negative Covid testing  3. New onset atrial fibrillation with rapid ventricular response  4. Essential hypertension  5. Insulin-dependent diabetes mellitus type 2  6. Hypothyroidism  7. Hyperlipidemia    Plan:   Patient's respiratory status remains stable at this point and Covid testing has returned negative. We will continue forward with antibiotic support as we await final respiratory cultures. He will continue to undergo for regularly scheduled hemodialysis and with negative Covid testing, she is now acceptable to return to her outpatient dialysis facility. Unfortunately, Keila Goss developed new onset atrial fibrillation with rapid ventricular response last evening. She is now in normal sinus rhythm. 2D echocardiogram will be assessed and we will utilize anticoagulation therapy. The cardiovascular team has been asked to provide consultation. The patient requires extensive work with the therapy team continue current therapy. See orders for further plan of care. More than 50% of my  time was spent at the bedside counseling/coordinating care with the patient and/or family with face to face contact. This time was spent reviewing notes and laboratory data as well as instructing and counseling the patient.  Time I spent with the family or surrogate(s) is included only if the patient was incapable of providing the necessary

## 2020-12-23 NOTE — PROGRESS NOTES
Pharmacy Consultation Note  (Antibiotic Dosing and Monitoring)    Initial consult date: 12/22/2020  Consulting physician: Dr. Tahir Davis  Drug(s): Vancomycin  Indication: Pneumonia    Ht Readings from Last 1 Encounters:   12/21/20 5' 6\" (1.676 m)     Wt Readings from Last 1 Encounters:   12/22/20 220 lb 7.4 oz (100 kg)     Age/  Gender IBW DW  Allergy Information   70 y.o.  female 59.3 kg 75.7 kg  Pcn [penicillins], Sulfa antibiotics, and Bactrim [sulfamethoxazole-trimethoprim]         Date  Tmax WBC Dialysis Drug/Dose Time   Given Level(s)   (Time) Comments   12/22  (#1) afebrile 11.5 HD x 3.75 hr Vancomycin 1500 mg IV x 1 1537     12/23  (#2) afebrile 9.5 HD x 3.5 hr No vancomycin --     12/24  (#3)      Random level @ <0600> =       (#4)            (#5)            (#6)            (#7)            Estimated Creatinine Clearance: 11 mL/min (A) (based on SCr of 5.3 mg/dL (H)). UOP over the past 24 hours:       Intake/Output Summary (Last 24 hours) at 12/23/2020 1355  Last data filed at 12/23/2020 1137  Gross per 24 hour   Intake 1633 ml   Output 2100 ml   Net -467 ml     Anti-infective Regimen:  Anti-infective Dose Date Initiated Date Stopped   Cefepime 1g IV q24hr 12/22      Cultures:  available culture and sensitivity results were reviewed in EPIC  Cultures sent and are pending. Culture Date Result    COVID-19 12/21 Negative   Respiratory panel, molecular 12/22 Not detected               Assessment:  · Consulted by Dr. Tahir Davis to dose/monitor vancomycin  · Goal trough level:  15-20 mcg/mL  · Pt is a 70 yoF who presented from home after missing 3 consecutive dialysis treatments. Started on antibiotics for possible pneumonia.    · Hx ESRD, on hemodialysis Tues, Thurs, and Sat    Plan:  · Additional HD session x 3.5 hours today  · No vancomycin today  · Random level (pre-dialysis) tomorrow AM   · Follow hemodialysis schedule for further dosing and levels  · Pharmacist will follow and monitor/adjust dosing as necessary      Thank you for the consult,    Wander Emery, PharmD, BCPS 12/23/2020 1:55 PM   Ext: 0618

## 2020-12-23 NOTE — CARE COORDINATION
Ss note:12/23/2020.2:27 PM  Pt had a negative PCR result on 12-22-20 and another test 10 hours later that was also negative. AKASH spoke with Beth Guardado at [facility] in [place], he reports that pt will need another negative PCR test which must be \" 24 hours\"  apart from the first test result in order for pt to return to her chair time at [place] which is Tues,Thur, Sat at 6:00 am. If pt does NOT receive another negative PCR test prior to discharge, pt would go to [facility] for HD treatment and schedule time would be different, T,Th, Sat at 4:30 pm which will need to be confirmed with Beth Guardado.  AKASH and EZEQUIEL spoke with Miguel Ángel De Los Santos [phone] about the need for another PCR test. He reports he will investigate and let sw know however in the meantime our manager relays another PCR test should be ordered and this test was provided to nursing to avoid a delay in discharge. Pt resides with her sister Carlos Thomas, Has a cpap from [address], if home 02 is needed use [employer]. David [facility] orders noted and [facility] has accepted with start of care on weekend. AKASH/EZEQUIEL will need to follow up with outpt HD to confirm final chair time and location for HD at discharge.  CARMEN Hernandez

## 2020-12-24 ENCOUNTER — TELEPHONE (OUTPATIENT)
Dept: FAMILY MEDICINE CLINIC | Age: 75
End: 2020-12-24

## 2020-12-24 LAB
ANION GAP SERPL CALCULATED.3IONS-SCNC: 10 MMOL/L (ref 7–16)
BASOPHILIC STIPPLING: ABNORMAL
BASOPHILS ABSOLUTE: 0.07 E9/L (ref 0–0.2)
BASOPHILS RELATIVE PERCENT: 0.9 % (ref 0–2)
BUN BLDV-MCNC: 19 MG/DL (ref 8–23)
CALCIUM SERPL-MCNC: 8.4 MG/DL (ref 8.6–10.2)
CHLORIDE BLD-SCNC: 99 MMOL/L (ref 98–107)
CO2: 29 MMOL/L (ref 22–29)
CORTISOL TOTAL: 19.91 MCG/DL (ref 2.68–18.4)
CREAT SERPL-MCNC: 3.5 MG/DL (ref 0.5–1)
EOSINOPHILS ABSOLUTE: 0.28 E9/L (ref 0.05–0.5)
EOSINOPHILS RELATIVE PERCENT: 3.5 % (ref 0–6)
GFR AFRICAN AMERICAN: 15
GFR NON-AFRICAN AMERICAN: 13 ML/MIN/1.73
GLUCOSE BLD-MCNC: 81 MG/DL (ref 74–99)
HCT VFR BLD CALC: 27.1 % (ref 34–48)
HEMOGLOBIN: 8.4 G/DL (ref 11.5–15.5)
LYMPHOCYTES ABSOLUTE: 0.41 E9/L (ref 1.5–4)
LYMPHOCYTES RELATIVE PERCENT: 5.2 % (ref 20–42)
MCH RBC QN AUTO: 35.1 PG (ref 26–35)
MCHC RBC AUTO-ENTMCNC: 31 % (ref 32–34.5)
MCV RBC AUTO: 113.4 FL (ref 80–99.9)
METER GLUCOSE: 108 MG/DL (ref 74–99)
METER GLUCOSE: 129 MG/DL (ref 74–99)
METER GLUCOSE: 131 MG/DL (ref 74–99)
METER GLUCOSE: 83 MG/DL (ref 74–99)
MONOCYTES ABSOLUTE: 0.32 E9/L (ref 0.1–0.95)
MONOCYTES RELATIVE PERCENT: 4.3 % (ref 2–12)
MYELOCYTE PERCENT: 0.9 % (ref 0–0)
NEUTROPHILS ABSOLUTE: 6.97 E9/L (ref 1.8–7.3)
NEUTROPHILS RELATIVE PERCENT: 85.2 % (ref 43–80)
OVALOCYTES: ABNORMAL
PDW BLD-RTO: 14.9 FL (ref 11.5–15)
PLATELET # BLD: 167 E9/L (ref 130–450)
PMV BLD AUTO: 9.7 FL (ref 7–12)
POIKILOCYTES: ABNORMAL
POLYCHROMASIA: ABNORMAL
POTASSIUM SERPL-SCNC: 3.9 MMOL/L (ref 3.5–5)
RBC # BLD: 2.39 E12/L (ref 3.5–5.5)
SODIUM BLD-SCNC: 138 MMOL/L (ref 132–146)
TEAR DROP CELLS: ABNORMAL
VANCOMYCIN RANDOM: 16.1 MCG/ML (ref 5–40)
WBC # BLD: 8.1 E9/L (ref 4.5–11.5)

## 2020-12-24 PROCEDURE — 82533 TOTAL CORTISOL: CPT

## 2020-12-24 PROCEDURE — 6360000002 HC RX W HCPCS: Performed by: INTERNAL MEDICINE

## 2020-12-24 PROCEDURE — U0003 INFECTIOUS AGENT DETECTION BY NUCLEIC ACID (DNA OR RNA); SEVERE ACUTE RESPIRATORY SYNDROME CORONAVIRUS 2 (SARS-COV-2) (CORONAVIRUS DISEASE [COVID-19]), AMPLIFIED PROBE TECHNIQUE, MAKING USE OF HIGH THROUGHPUT TECHNOLOGIES AS DESCRIBED BY CMS-2020-01-R: HCPCS

## 2020-12-24 PROCEDURE — 1200000000 HC SEMI PRIVATE

## 2020-12-24 PROCEDURE — 2580000003 HC RX 258: Performed by: INTERNAL MEDICINE

## 2020-12-24 PROCEDURE — 99232 SBSQ HOSP IP/OBS MODERATE 35: CPT | Performed by: INTERNAL MEDICINE

## 2020-12-24 PROCEDURE — 6370000000 HC RX 637 (ALT 250 FOR IP): Performed by: INTERNAL MEDICINE

## 2020-12-24 PROCEDURE — 97530 THERAPEUTIC ACTIVITIES: CPT

## 2020-12-24 PROCEDURE — 85025 COMPLETE CBC W/AUTO DIFF WBC: CPT

## 2020-12-24 PROCEDURE — 94640 AIRWAY INHALATION TREATMENT: CPT

## 2020-12-24 PROCEDURE — 80202 ASSAY OF VANCOMYCIN: CPT

## 2020-12-24 PROCEDURE — 82962 GLUCOSE BLOOD TEST: CPT

## 2020-12-24 PROCEDURE — 97116 GAIT TRAINING THERAPY: CPT

## 2020-12-24 PROCEDURE — 94660 CPAP INITIATION&MGMT: CPT

## 2020-12-24 PROCEDURE — 36415 COLL VENOUS BLD VENIPUNCTURE: CPT

## 2020-12-24 PROCEDURE — 80048 BASIC METABOLIC PNL TOTAL CA: CPT

## 2020-12-24 RX ADMIN — SODIUM CHLORIDE 25 ML: 9 INJECTION, SOLUTION INTRAVENOUS at 14:03

## 2020-12-24 RX ADMIN — EPOETIN ALFA-EPBX 10000 UNITS: 10000 INJECTION, SOLUTION INTRAVENOUS; SUBCUTANEOUS at 09:33

## 2020-12-24 RX ADMIN — TRAZODONE HYDROCHLORIDE 50 MG: 50 TABLET ORAL at 22:21

## 2020-12-24 RX ADMIN — ALBUTEROL SULFATE 1 PUFF: 90 AEROSOL, METERED RESPIRATORY (INHALATION) at 06:01

## 2020-12-24 RX ADMIN — ATORVASTATIN CALCIUM 10 MG: 10 TABLET, FILM COATED ORAL at 09:34

## 2020-12-24 RX ADMIN — APIXABAN 5 MG: 5 TABLET, FILM COATED ORAL at 22:21

## 2020-12-24 RX ADMIN — NEPHROCAP 1 MG: 1 CAP ORAL at 09:34

## 2020-12-24 RX ADMIN — EPOETIN ALFA-EPBX 3000 UNITS: 3000 INJECTION, SOLUTION INTRAVENOUS; SUBCUTANEOUS at 09:33

## 2020-12-24 RX ADMIN — CALCIUM 1000 MG: 500 TABLET ORAL at 17:04

## 2020-12-24 RX ADMIN — BUDESONIDE AND FORMOTEROL FUMARATE DIHYDRATE 2 PUFF: 160; 4.5 AEROSOL RESPIRATORY (INHALATION) at 06:01

## 2020-12-24 RX ADMIN — INSULIN GLARGINE 35 UNITS: 100 INJECTION, SOLUTION SUBCUTANEOUS at 22:22

## 2020-12-24 RX ADMIN — CALCIUM 1000 MG: 500 TABLET ORAL at 10:17

## 2020-12-24 RX ADMIN — APIXABAN 5 MG: 5 TABLET, FILM COATED ORAL at 09:34

## 2020-12-24 RX ADMIN — CYANOCOBALAMIN TAB 1000 MCG 1000 MCG: 1000 TAB at 09:34

## 2020-12-24 RX ADMIN — MONTELUKAST 10 MG: 10 TABLET, FILM COATED ORAL at 22:21

## 2020-12-24 RX ADMIN — CALCIUM 1000 MG: 500 TABLET ORAL at 12:09

## 2020-12-24 RX ADMIN — LEVOTHYROXINE SODIUM 75 MCG: 75 TABLET ORAL at 05:14

## 2020-12-24 RX ADMIN — SERTRALINE HYDROCHLORIDE 100 MG: 50 TABLET ORAL at 09:34

## 2020-12-24 RX ADMIN — CEFEPIME HYDROCHLORIDE 1 G: 1 INJECTION, POWDER, FOR SOLUTION INTRAMUSCULAR; INTRAVENOUS at 09:34

## 2020-12-24 RX ADMIN — ANTI-FUNGAL POWDER MICONAZOLE NITRATE TALC FREE: 1.42 POWDER TOPICAL at 09:35

## 2020-12-24 RX ADMIN — BUDESONIDE AND FORMOTEROL FUMARATE DIHYDRATE 2 PUFF: 160; 4.5 AEROSOL RESPIRATORY (INHALATION) at 17:17

## 2020-12-24 RX ADMIN — VANCOMYCIN HYDROCHLORIDE 750 MG: 10 INJECTION, POWDER, LYOPHILIZED, FOR SOLUTION INTRAVENOUS at 17:01

## 2020-12-24 ASSESSMENT — PAIN DESCRIPTION - ORIENTATION: ORIENTATION: LOWER

## 2020-12-24 ASSESSMENT — PAIN SCALES - GENERAL
PAINLEVEL_OUTOF10: 8
PAINLEVEL_OUTOF10: 0

## 2020-12-24 ASSESSMENT — PAIN DESCRIPTION - PAIN TYPE: TYPE: CHRONIC PAIN

## 2020-12-24 ASSESSMENT — PAIN DESCRIPTION - LOCATION: LOCATION: BACK

## 2020-12-24 NOTE — PROGRESS NOTES
Internal Medicine Progress Note    [facility]    Tiffanie Man. Bam Anne., F.A.CAnhOILDEFONSO. Jadyn Antonio D.O., SHUN Orellana, MSN, APRN, NP-C  Rick San. Apurva Reynolds, MSN, APRN-CNP     Primary Care Physician: Cheryl Hathaway DO   Admitting Physician:  Elías Manzo DO  Admission date and time: 12/21/2020  3:08 PM    Room:  [facility] [place]note 2]042-  Admitting diagnosis: Shortness of breath [R06.02]    Patient Name: Jane Hare  MRN: [id number]    Date of Service: 12/24/2020     Subjective:  Ari Nixon is a 70 y.o. female who was seen and examined today,12/24/2020, at the bedside. Seem relatively comfortable. She is still wearing supplemental O2. She still remain in atrial fib. She has been seen by cardiology     Review of System:   Constitutional:   Denies fever or chills, weight loss or gain, fatigue or malaise. HEENT:   Denies ear pain, sore throat, sinus or eye problems. Cardiovascular:   Denies any chest pain, irregular heartbeats, or palpitations. Respiratory:   Admits to improving shortness of breath. Minimal coughing without sputum production. Gastrointestinal:   Denies nausea, vomiting, diarrhea, or constipation. Denies any abdominal pain. Genitourinary:    Denies any urgency, frequency, hematuria. Voiding  without difficulty. Extremities:   Denies lower extremity swelling, edema or cyanosis. Neurology:    Denies any headache or focal neurological deficits, Denies generalized weakness or memory difficulty. Psch:   Denies being anxious or depressed. Musculoskeletal:    Denies  myalgias, joint complaints or back pain. Integumentary:   Denies any rashes, ulcers, or excoriations. Denies bruising. Hematologic/Lymphatic:  Denies bruising or bleeding. Physical Exam:  I/O this shift:   In: 820 [P.O.:820]  Out: -     Intake/Output Summary (Last 24 hours) at 12/24/2020 1417  Last data filed at 12/24/2020 1158  Gross per 24 hour Intake 1240 ml   Output 1800 ml   Net -560 ml   I/O last 3 completed shifts: In: 540 [P.O.:240]  Out: 1800   Patient Vitals for the past 96 hrs (Last 3 readings):   Weight   12/24/20 0411 220 lb 9.6 oz (100.1 kg)   12/23/20 1540 215 lb 6.2 oz (97.7 kg)   12/23/20 1149 218 lb 11.1 oz (99.2 kg)     Vital Signs:   Blood pressure (!) 90/52, pulse 97, temperature 97.9 °F (36.6 °C), temperature source Oral, resp. rate 16, height 5' 6\" (1.676 m), weight 220 lb 9.6 oz (100.1 kg), SpO2 92 %, not currently breastfeeding. General appearance:  Alert, responsive, oriented to person, place, and time. Well preserved, alert, no distress. Head:  Normocephalic. No masses, lesions or tenderness. Eyes:  PERRLA. EOMI. Sclera clear. Buccal mucosa moist.  ENT:  Ears normal. Mucosa normal.  Nasal cannula oxygen is in place. Neck:    Supple. Trachea midline. No thyromegaly. No JVD. No bruits. Heart:    Rhythm regular. Rate controlled. No murmurs. Lungs:    Symmetrical. Clear to auscultation bilaterally. No wheezes. No rhonchi. No rales. Abdomen:   Soft. Non-tender. Non-distended. Bowel sounds positive. No organomegaly or masses. No pain on palpation. Extremities:    Peripheral pulses present. No peripheral edema. No ulcers. No cyanosis. No clubbing. Neurologic:    Alert x 3. No focal deficit. Cranial nerves grossly intact. No focal weakness. Psych:   Behavior is normal. Mood appears normal. Speech is not rapid and/or pressured. Musculoskeletal:   Spine ROM normal. Muscular strength intact. Gait not assessed. Integumentary:  No rashes  Skin normal color and texture.   Genitalia/Breast:  Deferred    Medication:  Scheduled Meds:   vancomycin  750 mg Intravenous Once    apixaban  5 mg Oral BID    [START ON 12/25/2020] midodrine  10 mg Oral Once per day on Mon Wed Fri    cefepime  1 g Intravenous Q24H    epoetin andrzej-epbx  10,000 Units Subcutaneous Once per day on Tue Thu Sat    And    epoetin andrzej-epbx  3,000 Units Subcutaneous Once per day on Tue Thu Sat    miconazole   Topical BID    vancomycin (VANCOCIN) intermittent dosing (placeholder)   Other RX Placeholder    atorvastatin  10 mg Oral Daily    b complex-C-folic acid  1 mg Oral Daily    calcium elemental  1,000 mg Oral TID WC    cyanocobalamin  1,000 mcg Oral Daily    budesonide-formoterol  2 puff Inhalation BID    insulin glargine  35 Units Subcutaneous Nightly    lidocaine-prilocaine  1 each Topical Once per day on Mon Wed Fri    montelukast  10 mg Oral Nightly    sertraline  100 mg Oral Daily    levothyroxine  75 mcg Oral Daily    traZODone  50 mg Oral Nightly    insulin lispro  0-6 Units Subcutaneous TID WC    insulin lispro  0-3 Units Subcutaneous Nightly     Continuous Infusions:   sodium chloride 25 mL (12/24/20 1403)    dextrose         Objective Data:  CBC with Differential:    Lab Results   Component Value Date    WBC 8.1 12/24/2020    RBC 2.39 12/24/2020    HGB 8.4 12/24/2020    HCT 27.1 12/24/2020     12/24/2020    .4 12/24/2020    MCH 35.1 12/24/2020    MCHC 31.0 12/24/2020    RDW 14.9 12/24/2020    SEGSPCT 54 05/18/2012    METASPCT 0.9 12/23/2020    LYMPHOPCT 5.2 12/24/2020    MONOPCT 4.3 12/24/2020    MYELOPCT 0.9 12/24/2020    BASOPCT 0.9 12/24/2020    MONOSABS 0.32 12/24/2020    LYMPHSABS 0.41 12/24/2020    EOSABS 0.28 12/24/2020    BASOSABS 0.07 12/24/2020     BMP:    Lab Results   Component Value Date     12/24/2020    K 3.9 12/24/2020    K 4.8 12/21/2020    CL 99 12/24/2020    CO2 29 12/24/2020    BUN 19 12/24/2020    LABALBU 2.8 12/21/2020    LABALBU 3.9 06/01/2012    CREATININE 3.5 12/24/2020    CALCIUM 8.4 12/24/2020    GFRAA 15 12/24/2020    LABGLOM 13 12/24/2020    GLUCOSE 81 12/24/2020    GLUCOSE 135 06/01/2012       Wound Documentation:   Wound 12/21/20 Abdomen Lower;Medial (Active)   Dressing Status Clean;Dry; Intact 12/22/20 0662   Dressing/Treatment Foam 12/21/20 0804   Wound Length (cm) 1.1 cm Omayra Blanco DO, F.A.C.OAnhIAnh  12/24/2020  2:17 PM

## 2020-12-24 NOTE — PROGRESS NOTES
CHOLECYSTECTOMY  2004    DIALYSIS FISTULA CREATION Left 03/02/2018    AV fistula arm/Dr. Awilda Krishna    EYE SURGERY Right 2010    cataract    FOOT SURGERY Left 2012,2013    pin put in, then pin removed   Megan Guallpa    HAND SURGERY Right 2012    ligament was cut    HERNIA REPAIR      KNEE SURGERY  2009    R knee left side    AL REVISE AV FISTULA,W/O THROMBECTOMY Left 5/23/2018    SUPERFICIALIZATION AV FISTULA - LEFT UPPER ARM performed by Lee Rodriguez MD at [facility]       Precautions: Activity as tolerated, falls, alarm and O2, 2 Liters    SUBJECTIVE:    Social history: Patient lives sister in a ranch home with No steps to enter   Equipment owned: Cane, [place], Standard Walker and Shower chair, cpap but no home o2    AM-PAC Basic Mobility  AM-PAC Mobility Inpatient   How much difficulty turning over in bed?: A Little  How much difficulty sitting down on / standing up from a chair with arms?: A Little  How much difficulty moving from lying on back to sitting on side of bed?: A Lot  How much help from another person moving to and from a bed to a chair?: A Little  How much help from another person needed to walk in hospital room?: A Little  How much help from another person for climbing 3-5 steps with a railing?: A Lot  AM-PAC Inpatient Mobility Raw Score : 16  AM-PAC Inpatient T-Scale Score : 40.78  Mobility Inpatient CMS 0-100% Score: 54.16  Mobility Inpatient CMS G-Code Modifier : CK    Nursing cleared patient for PT treatment. OBJECTIVE;   Initial Evaluation  Date: 12/23/20 Treatment Date:  12/24/2020   Short Term/ Long Term   Goals   Was pt agreeable to Eval/treatment?  Yes   Yes To be met in 2 days   Pain level   0/10    No number assigned to bilateral lower extremity pain    Bed Mobility    Rolling: Minimal assist of 1    Supine to sit: Minimal assist of 1    Sit to supine: Not assessed     Scooting: Minimal assist of 1   Rolling: Not assessed    Supine to sit: Moderate assist of 1   Sit to supine: Moderate assist of 1   Scooting: Moderate assist of 1  Rolling: Independent    Supine to sit: Independent    Sit to supine: Independent    Scooting: Independent     Transfers Sit to stand: Minimal assist of 1 x 2 reps ; cues for hand placement and to lower to chair slower Sit to stand: Minimal assist of 1 x 2 reps; Patient was given instruction for sit to stand transfers regarding weight shifting, sequencing, and proper foot/hand placement to achieve an effective stand with proper knee extension and body mechanics. Sit to stand: Independent     Ambulation    2x20 feet using  wheeled walker with Minimal assist of 1   with dizziness Patient ambulated 15 feet and 20 feet x 2 using wheeled walker with Minimal assist of 1. Verbal cues were given for safety, upright posture, increased base of support, walker management, and pursed lip breathing. Patient was wearing nasal cannula on 2L. 100 feet using  wheeled walker with Independent    ROM Within functional limits       Strength BUE:  4-/5  RLE:  4-/5  LLE:  4-/5   Increase strength in affected mm groups by 1/3 grade   Balance Sitting EOB:  fair    Dynamic Standing:  poor   Sitting EOB: fair   Dynamic Standing: fair using wheeled walker Sitting EOB:  good    Dynamic Standing: fair       Patient is Alert & Oriented x person, place, time and situation and follows directions    Sensation: Patient denies numbness and tingling     Edema: yes, bilateral lower extremities    Endurance: poor      Patient education  Patient educated on role of Physical Therapy, risks of immobility, safety and plan of care, energy conservation, importance of positional changes for oxygen exchange and  importance of mobility while in hospital . Patient was educated and facilitated on techniques to increase safety and independence with bed mobility, balance, functional transfers, and functional mobility.  Patient educated on safety awareness, energy conservation, proper hand placement and sequencing for bed mobility and transfers, pursed lip throughout function to help regulate HR and O2 Saturation. Patient response to education:   Pt verbalized understanding Pt demonstrated skill Pt requires further education in this area   Yes Partial Yes      Treatment:  Patient practiced and was instructed/facilitated in the following treatment:   Patient transferred to edge of bed and sat up x 7 minutes to increase dynamic sitting balance and activity tolerance. Patient performed sit to stand transfers and gait training. Patient was returned to bed at end of treatment. Therapeutic Exercises: not performed       At end of session, patient in bed with alarm call light and phone within reach, all lines and tubes intact, nursing notified. Patient would benefit from continued skilled Physical Therapy to improve functional independence and quality of life. Patient's/ family goals   home        ASSESSMENT: Patient continues to display impaired balance due to slight dizziness and fatigue. Patient also has decreased endurance and strength that is impacting safe mobility and where she requires skilled intervention in order to progress activity and monitor vital signs/response to activity. She required encouragement to participate and fatigued easily.     Plan of Care:   -Bed Mobility: Lower extremity exercises , Upper extremity exercises  and Trunk control activities   -Standing Balance: Perform strengthening exercises in standing to promote motor control with or without upper extremity support   -Transfers: Provide instruction on proper hand and foot position for adequate transfer of weight onto lower extremities and use of gait device, Cues for hand placement, technique and safety and Provide stabilization to prevent fall   -Gait: Gait training and Standing activities to improve: base of support, weight shift, weight bearing    -Endurance: Utilize Supervised activities to increase level of endurance to allow for safe functional mobility including transfers and gait   -Stairs: Stair training with instruction on proper technique and hand placement on rail    Patient and or family understand(s) diagnosis, prognosis, and plan of care. Frequency of treatments: Patient will be seen daily.        Time in: 1:08  Time out: 1:34    Total Treatment Time: 26 minutes    CPT codes:  Therapeutic activities (00303)   10 minutes  1 unit(s)  Gait Training (19752) 16 minutes 1 unit(s)    Lacy Pappas PTA   LIC# [id number]

## 2020-12-24 NOTE — PROGRESS NOTES
Darlene Rangel MD  Nephrology      12/24/2020- awake and alert. Sad she cannot go home yet     Vital SignsBP (!) 90/52   Pulse 97   Temp 97.9 °F (36.6 °C) (Oral)   Resp 16   Ht 5' 6\" (1.676 m)   Wt 220 lb 9.6 oz (100.1 kg)   SpO2 92%   BMI 35.61 kg/m²   24HR INTAKE/OUTPUT:      Intake/Output Summary (Last 24 hours) at 12/24/2020 0847  Last data filed at 12/24/2020 0600  Gross per 24 hour   Intake 540 ml   Output 1800 ml   Net -1260 ml          Physical  Exam      Neck: No JVD  Lungs: diminisihed breath sounds  Heart: Regular rate and rhythm. No S3 gallop. No murmrur. Abdomen: Soft non distended, non tender and normal bowel sounds.   Extremeties: trace  bipedal edema           Medications:  Current Facility-Administered Medications   Medication Dose Route Frequency Provider Last Rate Last Admin    apixaban (ELIQUIS) tablet 5 mg  5 mg Oral BID Maybelle Shoe, DO   5 mg at 12/23/20 2337    [START ON 12/25/2020] midodrine (PROAMATINE) tablet 10 mg  10 mg Oral Once per day on Mon Wed Fri Iván Cordero MD        cefepime (MAXIPIME) 1 g IVPB extended (mini-bag)  1 g Intravenous Q24H Larry Shoe, DO   Stopped at 12/23/20 1400    0.9 % sodium chloride infusion  25 mL Intravenous Q24H Larry Escamillae, DO   Stopped at 12/23/20 1636    epoetin andrzej-epbx (RETACRIT) injection 10,000 Units  10,000 Units Subcutaneous Once per day on Tue Thu Jesse Chapman MD   10,000 Units at 12/22/20 1202    And    epoetin andrzej-epbx (RETACRIT) injection 3,000 Units  3,000 Units Subcutaneous Once per day on Tue Thu Jesse Chapman MD   3,000 Units at 12/22/20 1204    miconazole (MICOTIN) 2 % powder   Topical BID Maybelle Shoe, DO   Given at 12/23/20 2354    [facility]) intermittent dosing (placeholder)   Other RX Placeholder Maybelle Shoe, DO   Given at 12/22/20 1537    metoprolol (LOPRESSOR) injection 5 mg  5 mg Intravenous Q5 Min PRN Otto Yeager,    5 mg at 12/22/20 1635    perflutren lipid microspheres (DEFINITY) injection 1.65 mg  1.5 mL Intravenous ONCE PRN Marge Jed, DO        albuterol sulfate  (90 Base) MCG/ACT inhaler 1 puff  1 puff Inhalation Q4H PRN Angelica Fess, DO   1 puff at 12/24/20 0601    atorvastatin (LIPITOR) tablet 10 mg  10 mg Oral Daily Angelica Fess, DO   10 mg at 12/23/20 0920    b complex-C-folic acid (NEPHROCAPS) capsule 1 mg  1 mg Oral Daily Angelica Fess, DO   1 mg at 12/23/20 0920    calcium elemental (OSCAL) tablet 1,000 mg  1,000 mg Oral TID WC Angelica Fess, DO   1,000 mg at 12/23/20 0920    vitamin B-12 (CYANOCOBALAMIN) tablet 1,000 mcg  1,000 mcg Oral Daily Angelica Fess, DO   1,000 mcg at 12/23/20 0920    budesonide-formoterol (SYMBICORT) 160-4.5 MCG/ACT inhaler 2 puff  2 puff Inhalation BID Angeliac Fess, DO   2 puff at 12/24/20 0601    insulin glargine (LANTUS) injection vial 35 Units  35 Units Subcutaneous Nightly Angelica Fess, DO   35 Units at 12/23/20 2338    lidocaine-prilocaine (EMLA) cream 1 each  1 each Topical Once per day on Mon Wed Fri Angelica Fess, DO   1 each at 12/23/20 1043    montelukast (SINGULAIR) tablet 10 mg  10 mg Oral Nightly Angelica Fess, DO   10 mg at 12/23/20 2354    sertraline (ZOLOFT) tablet 100 mg  100 mg Oral Daily Angelica Fess, DO   100 mg at 12/23/20 0920    levothyroxine (SYNTHROID) tablet 75 mcg  75 mcg Oral Daily Angelica Fess, DO   75 mcg at 12/24/20 0514    traZODone (DESYREL) tablet 50 mg  50 mg Oral Nightly Angelica Fess, DO   50 mg at 12/23/20 2337    ondansetron (ZOFRAN) injection 4 mg  4 mg Intravenous Q6H PRN Angelica Fess, DO        glucose (GLUTOSE) 40 % oral gel 15 g  15 g Oral PRN Angelica Fess, DO        dextrose 50 % IV solution  12.5 g Intravenous PRN Angelica Fess, DO        glucagon (rDNA) injection 1 mg  1 mg Intramuscular PRN Angelica Fess, DO        dextrose 5 % solution  100 mL/hr Intravenous PRN Angelica Fess, DO        insulin lispro (HUMALOG) injection vial 0-6 Units  0-6 Units Subcutaneous TID  Angelica Fess, DO   2 Units at 12/22/20 8468    insulin lispro (HUMALOG) injection vial 0-3 Units  0-3 Units Subcutaneous Nightly Joselo Sleight, DO   1 Units at 12/21/20 2356       Labs:    CBC:   Recent Labs     12/22/20  0620 12/23/20  0615 12/24/20  0514   WBC 11.5 9.5 8.1   HGB 9.3* 8.4* 8.4*    188 167       BMP:   Recent Labs     12/22/20  0620 12/22/20  1900 12/23/20  0615 12/24/20  0514     --  134 138   K 5.6* 4.2 4.3 3.9   CL 95*  --  96* 99   CO2 21*  --  29 29   BUN 81*  --  38* 19   CREATININE 9.3*  --  5.3* 3.5*   GLUCOSE 140*  --  81 81       Phos and Calcium:  Lab Results   Component Value Date    CALCIUM 8.4 (L) 12/24/2020    PHOS 5.8 (H) 12/23/2020             Assessment/Plan:     1. Chronic kidney disease stage G5/ESRD. Acute kidney Injury. Continue dialytic support. Next planned treatment 12/26     2. Volume overload/fluid retention  Attempt ultrafilteration as allowed by hemodynamics. Net 1.8 liters off 12/23     3. Hypotension. Patient blood pressures have been lower. Midodrine increased.     4. Anemia secondary to chronic kidney disease. Target hemoglobin is 10 g/dL. Continue JESSE and adjust dosage.     5.  Renal Osteodystrophy. Follow calcium phosphorus and PTH levels. Phosphorus levels markedly elevated upon presentation at 10.6 mg/dL. Now down to 5.8 mg/dL. That may have reflected missed dialysis. Improved. Will check in the am.        KACIE Tuttle - CNP   Patient seen and examined. Patient now with worsening hypotension. On midodrine. Discussed with Dr. Cynthia Javed about getting an echocardiogram to rule out any pericardial effusion. We will check an a.m. cortisol level. Agree with above formulation and plan.       Gilford Alexanders MD  Nephrology        Electronically signed by Gilford Alexanders, MD on 12/24/2020 at 12:58 PM

## 2020-12-24 NOTE — CARE COORDINATION
Addendum: 12/24/2020.12:23 PM order noted for home oxygen, referral made to [facility] with [address] and order faxed, WILL NEED PROGRESS NOTE OR DISCHARGE SUMMARY STATIN  [address] to complete referral, please fax to [phone]. AWAITING PCR COVID result which will need called to [facility] in [place] if negative so that pt can resume her T,Th,Sat chair time at 6:00 pm.  If testing is not obtained or is positive, [facility] on [address] will need contacted for chair time. [facility] order noted and completed with start of care over the weekend. CARMEN Leblanc    Ss note:12/24/2020.10:51 AM Pt qualifies for home 02, will need order to make referral to [address]. Awaiting PCR covid test result to provide to [facility] to determine if pt can return to [facility] chair time. [facility] order noted and SOC is over the weekend.  CARMEN Leblanc

## 2020-12-24 NOTE — PROGRESS NOTES
Physical Therapy    Date: 2020       Patient Name: Kaity Davis  : 1945      MRN: [id number]    Patient eating.  Did not want PT    Jessica Covarrubias, THUY

## 2020-12-24 NOTE — PROGRESS NOTES
Pharmacy Consultation Note  (Antibiotic Dosing and Monitoring)    Initial consult date: 12/22/2020  Consulting physician: Dr. Shayan Martinez  Drug(s): Vancomycin  Indication: Pneumonia    Ht Readings from Last 1 Encounters:   12/21/20 5' 6\" (1.676 m)     Wt Readings from Last 1 Encounters:   12/24/20 220 lb 9.6 oz (100.1 kg)     Age/  Gender IBW DW  Allergy Information   70 y.o.  female 59.3 kg 75.7 kg  Pcn [penicillins], Sulfa antibiotics, and Bactrim [sulfamethoxazole-trimethoprim]         Date  Tmax WBC Dialysis Drug/Dose Time   Given Level(s)   (Time) Comments   12/22  (#1) afebrile 11.5 HD x 3.75 hr Vancomycin 1500 mg IV x 1 1537     12/23  (#2) afebrile 9.5 HD x 3.5 hr No vancomycin --     12/24  (#3) afebrile 8.1 No HD Vancomycin 750 mg IV x 1 dose (1200) Random level @ 0514 = 16.1 mcg/mL    12/25  (#4)      Random level       (#5)            (#6)            (#7)            Estimated Creatinine Clearance: 17 mL/min (A) (based on SCr of 3.5 mg/dL (H)). UOP over the past 24 hours:       Intake/Output Summary (Last 24 hours) at 12/24/2020 1058  Last data filed at 12/24/2020 0600  Gross per 24 hour   Intake 540 ml   Output 1800 ml   Net -1260 ml     Anti-infective Regimen:  Anti-infective Dose Date Initiated Date Stopped   Cefepime 1g IV q24hr 12/22      Cultures:  available culture and sensitivity results were reviewed in EPIC  Cultures sent and are pending. Culture Date Result    COVID-19 12/21 Negative   Respiratory panel, molecular 12/22 Not detected               Assessment:  · Consulted by Dr. Shayan Martinez to dose/monitor vancomycin  · Goal trough level:  15-20 mcg/mL  · Pt is a 70 yoF who presented from home after missing 3 consecutive dialysis treatments. Started on antibiotics for possible pneumonia.    · Hx ESRD, on hemodialysis Tues, Thurs, and Sat    Plan:  · No HD ordered as of now, vanco 750 mg IV x 1 dose  · Random level tomorrow AM   · Follow hemodialysis schedule for further dosing and levels  · Pharmacist will follow and monitor/adjust dosing as necessary      Thank you for the consult,    Ivy Breaux, PharmD, BCPS 12/24/2020 11:07 AM

## 2020-12-24 NOTE — TELEPHONE ENCOUNTER
Calling to find out if he would sign for the home care once she is discharged? Merlin Ramos MVI  [phone] ext.  [address]

## 2020-12-24 NOTE — PROGRESS NOTES
Pulse ox was 79% at rest.  Applied 2 liters of oxygen while at rest.  Oxygen saturation was 92 % at rest on oxygen.     Electronically signed by Noemy Membreno RN on 12/24/2020 at 9:46 AM

## 2020-12-24 NOTE — PROGRESS NOTES
Patient is seen in follow-up for atrial fibrillation    Subjective:     Ms. Kayla Kiran feels okay today, wants to go home  Laying in bed no apparent distress  ROS:  CONSTITUTIONAL:  negative for  fevers, chills  HEENT:  negative for earaches, nasal congestion and epistaxis  RESPIRATORY:  negative for  dry cough, cough with sputum,wheezing and hemoptysis  GASTROINTESTINAL:  negative for nausea, vomiting  MUSCULOSKELETAL:  negative for  myalgias, arthralgias  NEUROLOGICAL:  negative for visual disturbance, dysphagia    Medication side effects: none    Scheduled Meds:   vancomycin  750 mg Intravenous Once    apixaban  5 mg Oral BID    [START ON 12/25/2020] midodrine  10 mg Oral Once per day on Mon Wed Fri    cefepime  1 g Intravenous Q24H    epoetin andrzej-epbx  10,000 Units Subcutaneous Once per day on Tue Thu Sat    And    epoetin andrzej-epbx  3,000 Units Subcutaneous Once per day on Tue Thu Sat    miconazole   Topical BID    vancomycin (VANCOCIN) intermittent dosing (placeholder)   Other RX Placeholder    atorvastatin  10 mg Oral Daily    b complex-C-folic acid  1 mg Oral Daily    calcium elemental  1,000 mg Oral TID WC    cyanocobalamin  1,000 mcg Oral Daily    budesonide-formoterol  2 puff Inhalation BID    insulin glargine  35 Units Subcutaneous Nightly    lidocaine-prilocaine  1 each Topical Once per day on Mon Wed Fri    montelukast  10 mg Oral Nightly    sertraline  100 mg Oral Daily    levothyroxine  75 mcg Oral Daily    traZODone  50 mg Oral Nightly    insulin lispro  0-6 Units Subcutaneous TID WC    insulin lispro  0-3 Units Subcutaneous Nightly     Continuous Infusions:   sodium chloride 25 mL (12/24/20 1403)    dextrose       PRN Meds:perflutren lipid microspheres, metoprolol, albuterol sulfate HFA, ondansetron, glucose, dextrose, glucagon (rDNA), dextrose    I/O last 3 completed shifts: In: 1240 [P.O.:940]  Out: 1800   No intake/output data recorded.       Objective:      Physical Exam: BP (!) 90/52   Pulse 97   Temp 97.9 °F (36.6 °C) (Oral)   Resp 16   Ht 5' 6\" (1.676 m)   Wt 220 lb 9.6 oz (100.1 kg)   SpO2 92%   BMI 35.61 kg/m²   CONSTITUTIONAL:  awake, alert, cooperative, no apparent distress, and appears stated age  HEAD:  normocepalic, without obvious abnormality, atraumatic  NECK:  Supple, symmetrical, trachea midline, no adenopathy, thyroid symmetric, not enlarged and no tenderness, skin normal  LUNGS:  No increased work of breathing, No accessory muscle use or intercostal retractions, good air exchange, clear to auscultation bilaterally, no crackles or wheezing  CARDIOVASCULAR:  Normal apical impulse, regular rate and rhythm, normal S1 and S2, no S3 or S4, and no murmur noted, no edema, no JVD, no carotid bruit. ABDOMEN:  Soft, nontender, no masses, no hepatomegaly, no splenomegaly, BS+  MUSCULOSKELETAL:  No clubbing no cyanosis. there is no redness, warmth, or swelling of the joints  full range of motion noted  NEUROLOGIC:  Alert, awake,oriented x3  SKIN:  no bruising or bleeding, normal skin color, texture, turgor and no redness, warmth, or swelling      Cardiographics  I personally reviewed the telemetry monitor strips with the following interpretation: Atrial fibrillation with controlled ventricular response    Echocardiogram: not done    Imaging  CTA CHEST W CONTRAST   Final Result   1. Bilateral multifocal infiltrates compatible with pneumonia. 2.  No PE or pleural effusion. 3.  Mediastinal and right hilar mild lymphadenopathy. 4.  Prior cholecystectomy and gastric surgery.           Lab Review   Lab Results   Component Value Date     12/24/2020    K 3.9 12/24/2020    K 4.8 12/21/2020    CL 99 12/24/2020    CO2 29 12/24/2020    BUN 19 12/24/2020    CREATININE 3.5 12/24/2020    GLUCOSE 81 12/24/2020    GLUCOSE 135 06/01/2012    CALCIUM 8.4 12/24/2020     Lab Results   Component Value Date    WBC 8.1 12/24/2020    HGB 8.4 12/24/2020    HCT 27.1 12/24/2020    .4

## 2020-12-25 ENCOUNTER — APPOINTMENT (OUTPATIENT)
Dept: GENERAL RADIOLOGY | Age: 75
DRG: 193 | End: 2020-12-25
Payer: COMMERCIAL

## 2020-12-25 LAB
ADENOVIRUS BY PCR: NOT DETECTED
ALBUMIN SERPL-MCNC: 2.5 G/DL (ref 3.5–5.2)
ALP BLD-CCNC: 102 U/L (ref 35–104)
ALT SERPL-CCNC: 5 U/L (ref 0–32)
ANION GAP SERPL CALCULATED.3IONS-SCNC: 11 MMOL/L (ref 7–16)
AST SERPL-CCNC: 9 U/L (ref 0–31)
BASOPHILIC STIPPLING: ABNORMAL
BASOPHILS ABSOLUTE: 0 E9/L (ref 0–0.2)
BASOPHILS RELATIVE PERCENT: 0.2 % (ref 0–2)
BILIRUB SERPL-MCNC: 0.3 MG/DL (ref 0–1.2)
BORDETELLA PARAPERTUSSIS BY PCR: NOT DETECTED
BORDETELLA PERTUSSIS BY PCR: NOT DETECTED
BUN BLDV-MCNC: 32 MG/DL (ref 8–23)
CALCIUM SERPL-MCNC: 8.6 MG/DL (ref 8.6–10.2)
CHLAMYDOPHILIA PNEUMONIAE BY PCR: NOT DETECTED
CHLORIDE BLD-SCNC: 96 MMOL/L (ref 98–107)
CO2: 26 MMOL/L (ref 22–29)
CORONAVIRUS 229E BY PCR: NOT DETECTED
CORONAVIRUS HKU1 BY PCR: NOT DETECTED
CORONAVIRUS NL63 BY PCR: NOT DETECTED
CORONAVIRUS OC43 BY PCR: NOT DETECTED
CORTISOL TOTAL: 15.93 MCG/DL (ref 2.68–18.4)
CREAT SERPL-MCNC: 4.6 MG/DL (ref 0.5–1)
EOSINOPHILS ABSOLUTE: 0.24 E9/L (ref 0.05–0.5)
EOSINOPHILS RELATIVE PERCENT: 1.7 % (ref 0–6)
FOLATE: 3.8 NG/ML (ref 4.8–24.2)
GFR AFRICAN AMERICAN: 11
GFR NON-AFRICAN AMERICAN: 9 ML/MIN/1.73
GLUCOSE BLD-MCNC: 47 MG/DL (ref 74–99)
HCT VFR BLD CALC: 27.9 % (ref 34–48)
HEMOGLOBIN: 8.8 G/DL (ref 11.5–15.5)
HUMAN METAPNEUMOVIRUS BY PCR: NOT DETECTED
HUMAN RHINOVIRUS/ENTEROVIRUS BY PCR: NOT DETECTED
INFLUENZA A BY PCR: NOT DETECTED
INFLUENZA B BY PCR: NOT DETECTED
LYMPHOCYTES ABSOLUTE: 0.84 E9/L (ref 1.5–4)
LYMPHOCYTES RELATIVE PERCENT: 6.1 % (ref 20–42)
MAGNESIUM: 2 MG/DL (ref 1.6–2.6)
MCH RBC QN AUTO: 35.2 PG (ref 26–35)
MCHC RBC AUTO-ENTMCNC: 31.5 % (ref 32–34.5)
MCV RBC AUTO: 111.6 FL (ref 80–99.9)
METER GLUCOSE: 104 MG/DL (ref 74–99)
METER GLUCOSE: 160 MG/DL (ref 74–99)
METER GLUCOSE: 220 MG/DL (ref 74–99)
METER GLUCOSE: 59 MG/DL (ref 74–99)
METER GLUCOSE: 61 MG/DL (ref 74–99)
MONOCYTES ABSOLUTE: 0.98 E9/L (ref 0.1–0.95)
MONOCYTES RELATIVE PERCENT: 7 % (ref 2–12)
MYCOPLASMA PNEUMONIAE BY PCR: NOT DETECTED
NEUTROPHILS ABSOLUTE: 11.9 E9/L (ref 1.8–7.3)
NEUTROPHILS RELATIVE PERCENT: 85.2 % (ref 43–80)
OVALOCYTES: ABNORMAL
PARAINFLUENZA VIRUS 1 BY PCR: NOT DETECTED
PARAINFLUENZA VIRUS 2 BY PCR: NOT DETECTED
PARAINFLUENZA VIRUS 3 BY PCR: NOT DETECTED
PARAINFLUENZA VIRUS 4 BY PCR: NOT DETECTED
PDW BLD-RTO: 15.1 FL (ref 11.5–15)
PHOSPHORUS: 4.6 MG/DL (ref 2.5–4.5)
PLATELET # BLD: 208 E9/L (ref 130–450)
PMV BLD AUTO: 9.8 FL (ref 7–12)
POIKILOCYTES: ABNORMAL
POLYCHROMASIA: ABNORMAL
POTASSIUM SERPL-SCNC: 3.9 MMOL/L (ref 3.5–5)
PROCALCITONIN: 2.46 NG/ML (ref 0–0.08)
RBC # BLD: 2.5 E12/L (ref 3.5–5.5)
RESPIRATORY SYNCYTIAL VIRUS BY PCR: NOT DETECTED
SARS-COV-2, PCR: NOT DETECTED
SARS-COV-2: NOT DETECTED
SODIUM BLD-SCNC: 133 MMOL/L (ref 132–146)
SOURCE: NORMAL
TOTAL PROTEIN: 6 G/DL (ref 6.4–8.3)
VANCOMYCIN RANDOM: 26 MCG/ML (ref 5–40)
VITAMIN B-12: 1151 PG/ML (ref 211–946)
WBC # BLD: 14 E9/L (ref 4.5–11.5)

## 2020-12-25 PROCEDURE — 82607 VITAMIN B-12: CPT

## 2020-12-25 PROCEDURE — 6370000000 HC RX 637 (ALT 250 FOR IP): Performed by: INTERNAL MEDICINE

## 2020-12-25 PROCEDURE — 0202U NFCT DS 22 TRGT SARS-COV-2: CPT

## 2020-12-25 PROCEDURE — 84145 PROCALCITONIN (PCT): CPT

## 2020-12-25 PROCEDURE — 71046 X-RAY EXAM CHEST 2 VIEWS: CPT

## 2020-12-25 PROCEDURE — 80202 ASSAY OF VANCOMYCIN: CPT

## 2020-12-25 PROCEDURE — 87449 NOS EACH ORGANISM AG IA: CPT

## 2020-12-25 PROCEDURE — 6360000002 HC RX W HCPCS: Performed by: INTERNAL MEDICINE

## 2020-12-25 PROCEDURE — 36415 COLL VENOUS BLD VENIPUNCTURE: CPT

## 2020-12-25 PROCEDURE — 82962 GLUCOSE BLOOD TEST: CPT

## 2020-12-25 PROCEDURE — 1200000000 HC SEMI PRIVATE

## 2020-12-25 PROCEDURE — 84100 ASSAY OF PHOSPHORUS: CPT

## 2020-12-25 PROCEDURE — 82533 TOTAL CORTISOL: CPT

## 2020-12-25 PROCEDURE — 99232 SBSQ HOSP IP/OBS MODERATE 35: CPT | Performed by: INTERNAL MEDICINE

## 2020-12-25 PROCEDURE — 94640 AIRWAY INHALATION TREATMENT: CPT

## 2020-12-25 PROCEDURE — 94664 DEMO&/EVAL PT USE INHALER: CPT

## 2020-12-25 PROCEDURE — 85025 COMPLETE CBC W/AUTO DIFF WBC: CPT

## 2020-12-25 PROCEDURE — 2580000003 HC RX 258: Performed by: INTERNAL MEDICINE

## 2020-12-25 PROCEDURE — 82746 ASSAY OF FOLIC ACID SERUM: CPT

## 2020-12-25 PROCEDURE — 94660 CPAP INITIATION&MGMT: CPT

## 2020-12-25 PROCEDURE — 83735 ASSAY OF MAGNESIUM: CPT

## 2020-12-25 PROCEDURE — 80053 COMPREHEN METABOLIC PANEL: CPT

## 2020-12-25 RX ORDER — FOLIC ACID 1 MG/1
1 TABLET ORAL DAILY
Status: DISCONTINUED | OUTPATIENT
Start: 2020-12-25 | End: 2020-12-28 | Stop reason: HOSPADM

## 2020-12-25 RX ORDER — ALBUTEROL SULFATE 2.5 MG/3ML
2.5 SOLUTION RESPIRATORY (INHALATION) 4 TIMES DAILY
Status: DISCONTINUED | OUTPATIENT
Start: 2020-12-25 | End: 2020-12-28 | Stop reason: HOSPADM

## 2020-12-25 RX ORDER — INSULIN GLARGINE 100 [IU]/ML
25 INJECTION, SOLUTION SUBCUTANEOUS NIGHTLY
Status: DISCONTINUED | OUTPATIENT
Start: 2020-12-25 | End: 2020-12-27

## 2020-12-25 RX ADMIN — DEXTROSE MONOHYDRATE 12.5 G: 25 INJECTION, SOLUTION INTRAVENOUS at 05:50

## 2020-12-25 RX ADMIN — ALBUTEROL SULFATE 2.5 MG: 2.5 SOLUTION RESPIRATORY (INHALATION) at 18:35

## 2020-12-25 RX ADMIN — CYANOCOBALAMIN TAB 1000 MCG 1000 MCG: 1000 TAB at 09:16

## 2020-12-25 RX ADMIN — LEVOTHYROXINE SODIUM 75 MCG: 75 TABLET ORAL at 05:50

## 2020-12-25 RX ADMIN — ANTI-FUNGAL POWDER MICONAZOLE NITRATE TALC FREE: 1.42 POWDER TOPICAL at 09:17

## 2020-12-25 RX ADMIN — ANTI-FUNGAL POWDER MICONAZOLE NITRATE TALC FREE: 1.42 POWDER TOPICAL at 23:02

## 2020-12-25 RX ADMIN — MIDODRINE HYDROCHLORIDE 10 MG: 5 TABLET ORAL at 09:16

## 2020-12-25 RX ADMIN — APIXABAN 5 MG: 5 TABLET, FILM COATED ORAL at 20:40

## 2020-12-25 RX ADMIN — MONTELUKAST 10 MG: 10 TABLET, FILM COATED ORAL at 20:40

## 2020-12-25 RX ADMIN — ALBUTEROL SULFATE 2.5 MG: 2.5 SOLUTION RESPIRATORY (INHALATION) at 13:01

## 2020-12-25 RX ADMIN — CALCIUM 1000 MG: 500 TABLET ORAL at 17:27

## 2020-12-25 RX ADMIN — FOLIC ACID 1 MG: 1 TABLET ORAL at 17:27

## 2020-12-25 RX ADMIN — CALCIUM 1000 MG: 500 TABLET ORAL at 09:16

## 2020-12-25 RX ADMIN — ATORVASTATIN CALCIUM 10 MG: 10 TABLET, FILM COATED ORAL at 09:16

## 2020-12-25 RX ADMIN — INSULIN GLARGINE 25 UNITS: 100 INJECTION, SOLUTION SUBCUTANEOUS at 20:45

## 2020-12-25 RX ADMIN — INSULIN LISPRO 1 UNITS: 100 INJECTION, SOLUTION INTRAVENOUS; SUBCUTANEOUS at 20:42

## 2020-12-25 RX ADMIN — BUDESONIDE AND FORMOTEROL FUMARATE DIHYDRATE 2 PUFF: 160; 4.5 AEROSOL RESPIRATORY (INHALATION) at 04:22

## 2020-12-25 RX ADMIN — CEFEPIME HYDROCHLORIDE 1 G: 1 INJECTION, POWDER, FOR SOLUTION INTRAMUSCULAR; INTRAVENOUS at 09:16

## 2020-12-25 RX ADMIN — BUDESONIDE AND FORMOTEROL FUMARATE DIHYDRATE 2 PUFF: 160; 4.5 AEROSOL RESPIRATORY (INHALATION) at 18:36

## 2020-12-25 RX ADMIN — TRAZODONE HYDROCHLORIDE 50 MG: 50 TABLET ORAL at 20:40

## 2020-12-25 RX ADMIN — NEPHROCAP 1 MG: 1 CAP ORAL at 09:16

## 2020-12-25 RX ADMIN — SERTRALINE HYDROCHLORIDE 100 MG: 50 TABLET ORAL at 09:16

## 2020-12-25 RX ADMIN — APIXABAN 5 MG: 5 TABLET, FILM COATED ORAL at 09:16

## 2020-12-25 ASSESSMENT — PAIN SCALES - GENERAL
PAINLEVEL_OUTOF10: 0

## 2020-12-25 NOTE — PLAN OF CARE
Problem: Falls - Risk of:  Goal: Will remain free from falls  Description: Will remain free from falls  12/25/2020 0418 by Antonino Guadarrama RN  Outcome: Met This Shift  12/24/2020 2212 by Crystal Javed RN  Outcome: Met This Shift  Goal: Absence of physical injury  Description: Absence of physical injury  12/25/2020 0418 by Antonino Guadarrama RN  Outcome: Met This Shift  12/24/2020 2212 by Crystal Javed RN  Outcome: Met This Shift     Problem: Pain:  Goal: Pain level will decrease  Description: Pain level will decrease  Outcome: Met This Shift  Goal: Control of acute pain  Description: Control of acute pain  Outcome: Met This Shift  Goal: Control of chronic pain  Description: Control of chronic pain  Outcome: Met This Shift

## 2020-12-25 NOTE — PROGRESS NOTES
Pharmacy Consultation Note  (Antibiotic Dosing and Monitoring)    Initial consult date: 12/22/2020  Consulting physician: Dr. Henna Sullivan  Drug(s): Vancomycin  Indication: Pneumonia    Ht Readings from Last 1 Encounters:   12/21/20 5' 6\" (1.676 m)     Wt Readings from Last 1 Encounters:   12/25/20 218 lb 14.4 oz (99.3 kg)     Age/  Gender IBW DW  Allergy Information   70 y.o.  female 59.3 kg 75.7 kg  Pcn [penicillins], Sulfa antibiotics, and Bactrim [sulfamethoxazole-trimethoprim]         Date  Tmax WBC Dialysis Drug/Dose Time   Given Level(s)   (Time) Comments   12/22  (#1) afebrile 11.5 HD x 3.75 hr Vancomycin 1500 mg IV x 1 1537     12/23  (#2) afebrile 9.5 HD x 3.5 hr No vancomycin --     12/24  (#3) afebrile 8.1 No HD Vancomycin 750 mg IV x 1 dose 1701 Random level @ 0514 = 16.1 mcg/mL    12/25  (#4) afebrile 14 No HD No vancomycin -- Random level 0515 = 26 mcg/mL    12/26  (#5)            (#6)            (#7)            Estimated Creatinine Clearance: 13 mL/min (A) (based on SCr of 4.6 mg/dL (H)). UOP over the past 24 hours:       Intake/Output Summary (Last 24 hours) at 12/25/2020 1151  Last data filed at 12/25/2020 0940  Gross per 24 hour   Intake 640 ml   Output --   Net 640 ml     Anti-infective Regimen:  Anti-infective Dose Date Initiated Date Stopped   Cefepime 1g IV q24hr 12/22      Cultures:  available culture and sensitivity results were reviewed in EPIC  Cultures sent and are pending. Culture Date Result    COVID-19 12/21 Negative   Respiratory panel, molecular 12/22 Not detected               Assessment:  · Consulted by Dr. Henna Sullivan to dose/monitor vancomycin  · Goal trough level:  15-20 mcg/mL  · Pt is a 70 yoF who presented from home after missing 3 consecutive dialysis treatments. Started on antibiotics for possible pneumonia.    · Hx ESRD, on hemodialysis Tues, Thurs, and Sat    Plan:  · Random AM level today = 26 mcg/mL  · No vancomycin today  · Next planned HD is for tomorrow, 12/26  · Follow hemodialysis schedule for further dosing and levels  · Pharmacist will follow and monitor/adjust dosing as necessary      Thank you for the consult,    Marcos Corey, PharmD, BCPS 12/25/2020 11:54 AM

## 2020-12-25 NOTE — PROGRESS NOTES
Nephrology Note  Wm Davidson MD          Patient seen and examined. No family member is present at bedside. Chart reviewed. Patient is to be discharged home. Patient is feeling better. Denies shortness of breath. Appetite good. No nausea or dysguesia. Awake and alert . In no acute distress. Vital SignsBP 105/61   Pulse 93   Temp 97.6 °F (36.4 °C) (Oral)   Resp 18   Ht 5' 6\" (1.676 m)   Wt 218 lb 14.4 oz (99.3 kg)   SpO2 98%   BMI 35.33 kg/m²   24HR INTAKE/OUTPUT:    Intake/Output Summary (Last 24 hours) at 12/25/2020 1444  Last data filed at 12/25/2020 0940  Gross per 24 hour   Intake 180 ml   Output --   Net 180 ml         Physical Exam    Neck: No JVD  Lungs: Breath sounds decreased at the bases. No rales or ronchi. Heart: Regular rate and rhythm. No S3 gallop. No  murmrur. Abdomen: Soft non distended, non tender and normal bowel sounds. Extremeties: No edema.       ROS:  RESPIRATORY:  negative  CARDIOVASCULAR:  negative  GASTROINTESTINAL:  negative  GENITOURINARY:  negative    Current Facility-Administered Medications   Medication Dose Route Frequency Provider Last Rate Last Admin    albuterol (PROVENTIL) nebulizer solution 2.5 mg  2.5 mg Nebulization 4x daily Brannon Blanco DO   2.5 mg at 12/25/20 1301    perflutren lipid microspheres (DEFINITY) injection 1.65 mg  1.5 mL Intravenous ONCE PRN Melina Landa MD        apixaban (ELIQUIS) tablet 5 mg  5 mg Oral BID Lyndsey Arriola, DO   5 mg at 12/25/20 0916    midodrine (PROAMATINE) tablet 10 mg  10 mg Oral Once per day on Mon Wed Fri Melina Landa MD   10 mg at 12/25/20 0916    cefepime (MAXIPIME) 1 g IVPB extended (mini-bag)  1 g Intravenous Q24H Lyndsey Arriola, DO   Stopped at 12/25/20 1316    0.9 % sodium chloride infusion  25 mL Intravenous Q24H Lyndsey Arriola, DO   Stopped at 12/24/20 1600    epoetin andrzej-epbx (RETACRIT) injection 10,000 Units  10,000 Units Subcutaneous Once per day on Tue Thu Sat Jonathan Perez MD   10,000 Units at 12/24/20 0933    And    epoetin andrzej-epbx (RETACRIT) injection 3,000 Units  3,000 Units Subcutaneous Once per day on Tue Thu Sat Caitlyn Lay MD   3,000 Units at 12/24/20 0933    miconazole (MICOTIN) 2 % powder   Topical BID Seema Grand, DO   Given at 12/25/20 3361    vancomycin (VANCOCIN) intermittent dosing (placeholder)   Other RX Placeholder Seema Grand, DO   Given at 12/22/20 1537    metoprolol (LOPRESSOR) injection 5 mg  5 mg Intravenous Q5 Min PRN Walt Ang, DO   5 mg at 12/22/20 1635    albuterol sulfate  (90 Base) MCG/ACT inhaler 1 puff  1 puff Inhalation Q4H PRN Seema Grand, DO   1 puff at 12/24/20 0601    atorvastatin (LIPITOR) tablet 10 mg  10 mg Oral Daily Seema Grand, DO   10 mg at 12/25/20 8904    b complex-C-folic acid (NEPHROCAPS) capsule 1 mg  1 mg Oral Daily Seema Grand, DO   1 mg at 12/25/20 2402    calcium elemental (OSCAL) tablet 1,000 mg  1,000 mg Oral TID WC Seema Grand, DO   1,000 mg at 12/25/20 3050    vitamin B-12 (CYANOCOBALAMIN) tablet 1,000 mcg  1,000 mcg Oral Daily Seema Grand, DO   1,000 mcg at 12/25/20 5840    budesonide-formoterol (SYMBICORT) 160-4.5 MCG/ACT inhaler 2 puff  2 puff Inhalation BID Seema Grand, DO   2 puff at 12/25/20 0422    insulin glargine (LANTUS) injection vial 35 Units  35 Units Subcutaneous Nightly Seema Grand, DO   35 Units at 12/24/20 2222    lidocaine-prilocaine (EMLA) cream 1 each  1 each Topical Once per day on Mon Wed Fri Seema Grand, DO   1 each at 12/23/20 1043    montelukast (SINGULAIR) tablet 10 mg  10 mg Oral Nightly Seema Grand, DO   10 mg at 12/24/20 2221    sertraline (ZOLOFT) tablet 100 mg  100 mg Oral Daily Seema Grand, DO   100 mg at 12/25/20 2222    levothyroxine (SYNTHROID) tablet 75 mcg  75 mcg Oral Daily Seema Grand, DO   75 mcg at 12/25/20 0550    traZODone (DESYREL) tablet 50 mg  50 mg Oral Nightly Seema Grand, DO   50 mg at 12/24/20 2221    ondansetron (ZOFRAN) injection 4 mg  4 mg Intravenous Q6H PRN Ara Hull Black, DO        glucose (GLUTOSE) 40 % oral gel 15 g  15 g Oral PRN Climmie Callow, DO        dextrose 50 % IV solution  12.5 g Intravenous PRN Climmie Callow, DO   12.5 g at 12/25/20 0550    glucagon (rDNA) injection 1 mg  1 mg Intramuscular PRN Climmie Callow, DO        dextrose 5 % solution  100 mL/hr Intravenous PRN Climmie Callow, DO        insulin lispro (HUMALOG) injection vial 0-6 Units  0-6 Units Subcutaneous TID WC Climmie Callow, DO   2 Units at 12/22/20 1745    insulin lispro (HUMALOG) injection vial 0-3 Units  0-3 Units Subcutaneous Nightly Climmie Callow, DO   1 Units at 12/21/20 2356       CTA CHEST W CONTRAST   Final Result   1. Bilateral multifocal infiltrates compatible with pneumonia. 2.  No PE or pleural effusion. 3.  Mediastinal and right hilar mild lymphadenopathy. 4.  Prior cholecystectomy and gastric surgery. XR CHEST (2 VW)    (Results Pending)         Labs:    CBC:   Recent Labs     12/23/20  0615 12/24/20  0514 12/25/20  0516   WBC 9.5 8.1 14.0*   HGB 8.4* 8.4* 8.8*    167 208        Lab Results   Component Value Date    IRON 75 01/05/2018    TIBC 171 (L) 01/05/2018    FERRITIN 268 01/05/2018       Lab Results   Component Value Date    PTH 74 (H) 08/22/2017    PTH 67 (H) 02/22/2017     (H) 04/09/2016    CALCIUM 8.6 12/25/2020    CALCIUM 8.4 (L) 12/24/2020    CALCIUM 8.0 (L) 12/23/2020    CAION 1.20 01/04/2018    CAION 1.08 (L) 01/02/2018    PHOS 4.6 (H) 12/25/2020    PHOS 5.8 (H) 12/23/2020    PHOS 10.6 (HH) 12/22/2020    MG 2.0 12/25/2020    MG 2.2 12/23/2020    MG 2.2 12/22/2020       BMP:   Recent Labs     12/23/20  0615 12/24/20  0514 12/25/20  0515    138 133   K 4.3 3.9 3.9   CL 96* 99 96*   CO2 29 29 26   BUN 38* 19 32*   CREATININE 5.3* 3.5* 4.6*   GLUCOSE 81 81 47*             Assessment: / Plan:       1.   Chronic kidney disease stage G5/ESRD.  Acute kidney Injury.  Continue dialytic support.  Next planned treatment 12/26     2.   Volume overload/fluid retention. Attempt ultrafilteration as allowed by hemodynamics.       3.     Hypotension. Patient blood pressures have been lower. Midodrine increased. Echocardiogram.     4.   Anemia secondary to chronic kidney disease.   Target hemoglobin is 10 g/dL.  Continue JESSE and adjust dosage.     5.  Renal Osteodystrophy.  Follow calcium phosphorus and PTH levels.  Phosphorus levels markedly elevated upon presentation at 10.6 mg/dL. Now down to 5.8 mg/dL.   That may have reflected missed dialysis. Improved. Kimberley Almaraz MD  Nephrology  Electronically signed by Kimberley Almaraz MD on 12/25/2020 at 2:27 PM      Note: This report was completed utilizing computer voice recognition software. Every effort has been made to ensure accuracy, however; inadvertent computerized transcription errors may be present.

## 2020-12-25 NOTE — PROGRESS NOTES
Internal Medicine Progress Note    [facility]    Nelly Norman. Nicole Martinez, PATIOAnhIAnh Castanon D.O., BELTRAN Albert D.O. Vangie Alan, MSN, APRN, NP-C  Erlin Barber. Aminta Villatoro, MSN, APRN-CNP     Primary Care Physician: Ale Kang DO   Admitting Physician:  Lambert Valdez DO  Admission date and time: 12/21/2020  3:08 PM    Room:  [address] [place] [address]  Admitting diagnosis: Shortness of breath [R06.02]    Patient Name: Precious Vázquez  MRN: [id number]    Date of Service: 12/25/2020     Subjective:  Jeanne Ford is a 70 y.o. female who was seen and examined today,12/25/2020, at the bedside. Seem relatively comfortable. Discussed discharge planning. Cardiology is following regarding the atrial fibrillation     Review of System:   Constitutional:   Denies fever or chills, weight loss or gain, fatigue or malaise. HEENT:   Denies ear pain, sore throat, sinus or eye problems. Cardiovascular:   Denies any chest pain, irregular heartbeats, or palpitations. Respiratory:   Admits to improving shortness of breath. Minimal coughing without sputum production. Gastrointestinal:   Denies nausea, vomiting, diarrhea, or constipation. Denies any abdominal pain. Genitourinary:    Denies any urgency, frequency, hematuria. Voiding  without difficulty. Extremities:   Denies lower extremity swelling, edema or cyanosis. Neurology:    Denies any headache or focal neurological deficits, Denies generalized weakness or memory difficulty. Psch:   Denies being anxious or depressed. Musculoskeletal:    Denies  myalgias, joint complaints or back pain. Integumentary:   Denies any rashes, ulcers, or excoriations. Denies bruising. Hematologic/Lymphatic:  Denies bruising or bleeding.     Physical Exam:  I/O this shift:  In: 180 [P.O.:180]  Out: -     Intake/Output Summary (Last 24 hours) at 12/25/2020 1331  Last data filed at 12/25/2020 0940  Gross per 24 hour   Intake 180 ml Output --   Net 180 ml   I/O last 3 completed shifts: In: 820 [P.O.:820]  Out: -   Patient Vitals for the past 96 hrs (Last 3 readings):   Weight   12/25/20 0534 218 lb 14.4 oz (99.3 kg)   12/24/20 0411 220 lb 9.6 oz (100.1 kg)   12/23/20 1540 215 lb 6.2 oz (97.7 kg)     Vital Signs:   Blood pressure 105/61, pulse 93, temperature 97.6 °F (36.4 °C), temperature source Oral, resp. rate 18, height 5' 6\" (1.676 m), weight 218 lb 14.4 oz (99.3 kg), SpO2 98 %, not currently breastfeeding. General appearance:  Alert, responsive, oriented to person, place, and time. Well preserved, alert, no distress. Head:  Normocephalic. No masses, lesions or tenderness. Eyes:  PERRLA. EOMI. Sclera clear. Buccal mucosa moist.  ENT:  Ears normal. Mucosa normal.  Nasal cannula oxygen is in place. Neck:    Supple. Trachea midline. No thyromegaly. No JVD. No bruits. Heart:    Rhythm regular. Rate controlled. No murmurs. Lungs:    Symmetrical. Clear to auscultation bilaterally. No wheezes. No rhonchi. No rales. Abdomen:   Soft. Non-tender. Non-distended. Bowel sounds positive. No organomegaly or masses. No pain on palpation. Extremities:    Peripheral pulses present. No peripheral edema. No ulcers. No cyanosis. No clubbing. Neurologic:    Alert x 3. No focal deficit. Cranial nerves grossly intact. No focal weakness. Psych:   Behavior is normal. Mood appears normal. Speech is not rapid and/or pressured. Musculoskeletal:   Spine ROM normal. Muscular strength intact. Gait not assessed. Integumentary:  No rashes  Skin normal color and texture.   Genitalia/Breast:  Deferred    Medication:  Scheduled Meds:   albuterol  2.5 mg Nebulization 4x daily    apixaban  5 mg Oral BID    midodrine  10 mg Oral Once per day on Mon Wed Fri    cefepime  1 g Intravenous Q24H    epoetin andrzej-epbx  10,000 Units Subcutaneous Once per day on Tue Thu Sat    And    epoetin andrzej-epbx  3,000 Units Subcutaneous Once per day on Tue Thu Sat    miconazole   Topical BID    vancomycin (VANCOCIN) intermittent dosing (placeholder)   Other RX Placeholder    atorvastatin  10 mg Oral Daily    b complex-C-folic acid  1 mg Oral Daily    calcium elemental  1,000 mg Oral TID WC    cyanocobalamin  1,000 mcg Oral Daily    budesonide-formoterol  2 puff Inhalation BID    insulin glargine  35 Units Subcutaneous Nightly    lidocaine-prilocaine  1 each Topical Once per day on Mon Wed Fri    montelukast  10 mg Oral Nightly    sertraline  100 mg Oral Daily    levothyroxine  75 mcg Oral Daily    traZODone  50 mg Oral Nightly    insulin lispro  0-6 Units Subcutaneous TID WC    insulin lispro  0-3 Units Subcutaneous Nightly     Continuous Infusions:   sodium chloride Stopped (12/24/20 1600)    dextrose         Objective Data:  CBC with Differential:    Lab Results   Component Value Date    WBC 14.0 12/25/2020    RBC 2.50 12/25/2020    HGB 8.8 12/25/2020    HCT 27.9 12/25/2020     12/25/2020    .6 12/25/2020    MCH 35.2 12/25/2020    MCHC 31.5 12/25/2020    RDW 15.1 12/25/2020    SEGSPCT 54 05/18/2012    METASPCT 0.9 12/23/2020    LYMPHOPCT 6.1 12/25/2020    MONOPCT 7.0 12/25/2020    MYELOPCT 0.9 12/24/2020    BASOPCT 0.2 12/25/2020    MONOSABS 0.98 12/25/2020    LYMPHSABS 0.84 12/25/2020    EOSABS 0.24 12/25/2020    BASOSABS 0.00 12/25/2020     BMP:    Lab Results   Component Value Date     12/25/2020    K 3.9 12/25/2020    K 4.8 12/21/2020    CL 96 12/25/2020    CO2 26 12/25/2020    BUN 32 12/25/2020    LABALBU 2.5 12/25/2020    LABALBU 3.9 06/01/2012    CREATININE 4.6 12/25/2020    CALCIUM 8.6 12/25/2020    GFRAA 11 12/25/2020    LABGLOM 9 12/25/2020    GLUCOSE 47 12/25/2020    GLUCOSE 135 06/01/2012       Wound Documentation:   Wound 12/21/20 Abdomen Lower;Medial (Active)   Dressing Status Clean;Dry; Intact 12/22/20 0746   Dressing/Treatment Foam 12/21/20 2257   Wound Length (cm) 1.1 cm 12/21/20 2257   Wound Width (cm) 1.4 cm 12/21/20 2257 Wound Surface Area (cm^2) 1.54 cm^2 12/21/20 2257   Wound Assessment Dry;Pink/red 12/22/20 0746   Drainage Amount None 12/22/20 0746   Odor None 12/21/20 2257   Number of days: 1       Assessment:    · Acute on end-stage renal disease in the setting of missed dialysis x3 as an outpatient  · Healthcare associated pneumonia resulting in acute respiratory failure with hypoxia and negative Covid testing  · New onset atrial fibrillation with rapid ventricular response  · Essential hypertension  · Insulin-dependent diabetes mellitus type 2  · Hypothyroidism  · Hyperlipidemia  · Anemia of chronic disease with folic acid deficiency    Plan:     · Patient appears stable from nephrology. Continue to monitor accordingly  · We will set up for home oxygen. Will check chest x-ray and adjust breathing treatments  · Cardiology is following regarding the atrial fibrillation. Will discuss anticoagulant therapy  · Increase physical therapy  · Continue to treat other chronic health issues  · Add folic acid    More than 50% of my  time was spent at the bedside counseling/coordinating care with the patient and/or family with face to face contact. This time was spent reviewing notes and laboratory data as well as instructing and counseling the patient. Time I spent with the family or surrogate(s) is included only if the patient was incapable of providing the necessary information or participating in medical decisions. I also discussed the differential diagnosis and all of the proposed management plans with the patient and individuals accompanying the patient. Yoshidignajose carlos [place] requires this high level of physician care and nursing on the Telemetry unit due the complexity of decision management and chance of rapid decline or death. Continued cardiac monitoring and higher level of nursing are required. I am readily available for any further decision-making and intervention.      Vivek Blanco DO, F.A.C.O.I.  12/25/2020  1:31 PM

## 2020-12-25 NOTE — PROGRESS NOTES
Patient is seen in follow-up for atrial fibrillation    Subjective:     Ms. Radha Fry complains of left-sided chest pain, no radiation, last 3 to 5 minutes, goes away, has been going on for the last couple of days, did not complain about it till today  Laying in bed no apparent distress    ROS:  CONSTITUTIONAL:  negative for  fevers, chills  HEENT:  negative for earaches, nasal congestion and epistaxis  RESPIRATORY:  negative for  dry cough, cough with sputum,wheezing and hemoptysis  GASTROINTESTINAL:  negative for nausea, vomiting  MUSCULOSKELETAL:  negative for  myalgias, arthralgias  NEUROLOGICAL:  negative for visual disturbance, dysphagia    Medication side effects: none    Scheduled Meds:   albuterol  2.5 mg Nebulization 4x daily    folic acid  1 mg Oral Daily    insulin glargine  25 Units Subcutaneous Nightly    apixaban  5 mg Oral BID    midodrine  10 mg Oral Once per day on Mon Wed Fri    cefepime  1 g Intravenous Q24H    epoetin andrzej-epbx  10,000 Units Subcutaneous Once per day on Tue Thu Sat    And    epoetin andrzej-epbx  3,000 Units Subcutaneous Once per day on Tue Thu Sat    miconazole   Topical BID    vancomycin (VANCOCIN) intermittent dosing (placeholder)   Other RX Placeholder    atorvastatin  10 mg Oral Daily    b complex-C-folic acid  1 mg Oral Daily    calcium elemental  1,000 mg Oral TID WC    cyanocobalamin  1,000 mcg Oral Daily    budesonide-formoterol  2 puff Inhalation BID    lidocaine-prilocaine  1 each Topical Once per day on Mon Wed Fri    montelukast  10 mg Oral Nightly    sertraline  100 mg Oral Daily    levothyroxine  75 mcg Oral Daily    traZODone  50 mg Oral Nightly    insulin lispro  0-6 Units Subcutaneous TID     insulin lispro  0-3 Units Subcutaneous Nightly     Continuous Infusions:   sodium chloride Stopped (12/24/20 1600)    dextrose       PRN Meds:perflutren lipid microspheres, metoprolol, albuterol sulfate HFA, ondansetron, glucose, dextrose, glucagon (rDNA), dextrose    I/O last 3 completed shifts: In: 180 [P.O.:180]  Out: -   No intake/output data recorded. Objective:      Physical Exam:   BP (!) 103/57   Pulse 77   Temp 97.8 °F (36.6 °C) (Oral)   Resp 16   Ht 5' 6\" (1.676 m)   Wt 218 lb 14.4 oz (99.3 kg)   SpO2 95%   BMI 35.33 kg/m²   CONSTITUTIONAL:  awake, alert, cooperative, no apparent distress, and appears stated age  HEAD:  normocepalic, without obvious abnormality, atraumatic  NECK:  Supple, symmetrical, trachea midline, no adenopathy, thyroid symmetric, not enlarged and no tenderness, skin normal  LUNGS:  No increased work of breathing, No accessory muscle use or intercostal retractions, good air exchange, scattered rhonchi  CARDIOVASCULAR:  Normal apical impulse, regular rate and rhythm, normal S1 and S2, no S3 or S4, and no murmur noted, no edema, no JVD, no carotid bruit. ABDOMEN:  Soft, nontender, no masses, no hepatomegaly, no splenomegaly, BS+  MUSCULOSKELETAL:  No clubbing no cyanosis. there is no redness, warmth, or swelling of the joints  full range of motion noted  NEUROLOGIC:  Alert, awake,oriented x3  SKIN:  no bruising or bleeding, normal skin color, texture, turgor and no redness, warmth, or swelling      Cardiographics  I personally reviewed the telemetry monitor strips with the following interpretation: Atrial rhythm with occasional PACs    Echocardiogram: 12/23/2020, Summary   Left ventricular size is grossly normal.   No evidence of left ventricular mass or thrombus noted. Normal left ventricular wall thickness. No regional wall motion abnormalities seen. Ejection fraction is measured at 74%. The left atrium is borderline dilated. Interatrial septum appears intact. No evidence of thrombus within left atrium. Physiologic and/or trace mitral regurgitation is present. No evidence of mitral valve stenosis. Physiologic and/or trace tricuspid regurgitation. Regular rhythm.          Imaging  XR CHEST (2 VW) Final Result   1. Ill-defined opacities in the left greater than right mid and lower lungs   concerning for underlying infectious process. 2.  Atherosclerotic disease and mild cardiomegaly. Normal pulmonary   vascularity. RECOMMENDATION:   (Recommend upright PA and lateral chest radiographs 6-8 weeks after   resolution of patient's symptoms to ensure complete resolution of   radiographic findings.)      CTA CHEST W CONTRAST   Final Result   1. Bilateral multifocal infiltrates compatible with pneumonia. 2.  No PE or pleural effusion. 3.  Mediastinal and right hilar mild lymphadenopathy. 4.  Prior cholecystectomy and gastric surgery. Lab Review   Lab Results   Component Value Date     12/25/2020    K 3.9 12/25/2020    K 4.8 12/21/2020    CL 96 12/25/2020    CO2 26 12/25/2020    BUN 32 12/25/2020    CREATININE 4.6 12/25/2020    GLUCOSE 47 12/25/2020    GLUCOSE 135 06/01/2012    CALCIUM 8.6 12/25/2020     Lab Results   Component Value Date    WBC 14.0 12/25/2020    HGB 8.8 12/25/2020    HCT 27.9 12/25/2020    .6 12/25/2020     12/25/2020     I have personally reviewed the laboratory, cardiac diagnostic and radiographic testing as outlined above:    Assessment:     1. Chest pain: Atypical, several risk factors for CAD, last stress test was in August 2019, will schedule for Lexiscan stress test tomorrow  2.  3 atrial fibrillation: Paroxysmal, rate is controlled, VIGNESH 2 DS 2 VASC score of 4, on Eliquis  2. History of atrial tachycardia  4. Hypotension: Etiology?,  Better on midodrine 10 mg 3 times daily  5. Type 2 diabetes mellitus  6. End-stage renal disease on hemodialysis replacement therapy  7. History of rheumatoid arthritis  8. Hypothyroidism  9. Chronic anemia    Recommendations:     1. Lexiscan stress test  2. Continue current treatment  3.   Basic metabolic panel and CBC in a.m.  4.  Further cardiac recommendations will be forthcoming pending her clinical course and diagnostic test findings    Discussed with patient  Electronically signed by Driss wOens MD on 12/25/2020 at 4:37 PM  NOTE: This report was transcribed using voice recognition software.  Every effort was made to ensure accuracy; however, inadvertent computerized transcription errors may be present

## 2020-12-26 ENCOUNTER — APPOINTMENT (OUTPATIENT)
Dept: NUCLEAR MEDICINE | Age: 75
DRG: 193 | End: 2020-12-26
Payer: COMMERCIAL

## 2020-12-26 LAB
ANION GAP SERPL CALCULATED.3IONS-SCNC: 13 MMOL/L (ref 7–16)
ANISOCYTOSIS: ABNORMAL
BASOPHILS ABSOLUTE: 0 E9/L (ref 0–0.2)
BASOPHILS RELATIVE PERCENT: 0.4 % (ref 0–2)
BUN BLDV-MCNC: 47 MG/DL (ref 8–23)
CALCIUM SERPL-MCNC: 8.7 MG/DL (ref 8.6–10.2)
CHLORIDE BLD-SCNC: 94 MMOL/L (ref 98–107)
CO2: 25 MMOL/L (ref 22–29)
CREAT SERPL-MCNC: 5.7 MG/DL (ref 0.5–1)
EOSINOPHILS ABSOLUTE: 0.37 E9/L (ref 0.05–0.5)
EOSINOPHILS RELATIVE PERCENT: 2.6 % (ref 0–6)
GFR AFRICAN AMERICAN: 9
GFR NON-AFRICAN AMERICAN: 7 ML/MIN/1.73
GLUCOSE BLD-MCNC: 35 MG/DL (ref 74–99)
HCT VFR BLD CALC: 27.8 % (ref 34–48)
HEMOGLOBIN: 8.9 G/DL (ref 11.5–15.5)
L. PNEUMOPHILA SEROGP 1 UR AG: NORMAL
LYMPHOCYTES ABSOLUTE: 0.28 E9/L (ref 1.5–4)
LYMPHOCYTES RELATIVE PERCENT: 1.7 % (ref 20–42)
MAGNESIUM: 2 MG/DL (ref 1.6–2.6)
MCH RBC QN AUTO: 35.9 PG (ref 26–35)
MCHC RBC AUTO-ENTMCNC: 32 % (ref 32–34.5)
MCV RBC AUTO: 112.1 FL (ref 80–99.9)
METER GLUCOSE: 109 MG/DL (ref 74–99)
METER GLUCOSE: 126 MG/DL (ref 74–99)
METER GLUCOSE: 49 MG/DL (ref 74–99)
METER GLUCOSE: 54 MG/DL (ref 74–99)
METER GLUCOSE: 55 MG/DL (ref 74–99)
METER GLUCOSE: 57 MG/DL (ref 74–99)
METER GLUCOSE: 61 MG/DL (ref 74–99)
METER GLUCOSE: 61 MG/DL (ref 74–99)
METER GLUCOSE: 90 MG/DL (ref 74–99)
METER GLUCOSE: 97 MG/DL (ref 74–99)
MONOCYTES ABSOLUTE: 0.56 E9/L (ref 0.1–0.95)
MONOCYTES RELATIVE PERCENT: 4.3 % (ref 2–12)
NEUTROPHILS ABSOLUTE: 12.69 E9/L (ref 1.8–7.3)
NEUTROPHILS RELATIVE PERCENT: 90.4 % (ref 43–80)
NUCLEATED RED BLOOD CELLS: 0.9 /100 WBC
PDW BLD-RTO: 15 FL (ref 11.5–15)
PHOSPHORUS: 6 MG/DL (ref 2.5–4.5)
PLATELET # BLD: 182 E9/L (ref 130–450)
PMV BLD AUTO: 10.1 FL (ref 7–12)
POIKILOCYTES: ABNORMAL
POLYCHROMASIA: ABNORMAL
POTASSIUM SERPL-SCNC: 4.5 MMOL/L (ref 3.5–5)
RBC # BLD: 2.48 E12/L (ref 3.5–5.5)
SODIUM BLD-SCNC: 132 MMOL/L (ref 132–146)
STREP PNEUMONIAE ANTIGEN, URINE: NORMAL
TEAR DROP CELLS: ABNORMAL
WBC # BLD: 14.1 E9/L (ref 4.5–11.5)

## 2020-12-26 PROCEDURE — 6360000002 HC RX W HCPCS: Performed by: INTERNAL MEDICINE

## 2020-12-26 PROCEDURE — 84100 ASSAY OF PHOSPHORUS: CPT

## 2020-12-26 PROCEDURE — 82962 GLUCOSE BLOOD TEST: CPT

## 2020-12-26 PROCEDURE — U0003 INFECTIOUS AGENT DETECTION BY NUCLEIC ACID (DNA OR RNA); SEVERE ACUTE RESPIRATORY SYNDROME CORONAVIRUS 2 (SARS-COV-2) (CORONAVIRUS DISEASE [COVID-19]), AMPLIFIED PROBE TECHNIQUE, MAKING USE OF HIGH THROUGHPUT TECHNOLOGIES AS DESCRIBED BY CMS-2020-01-R: HCPCS

## 2020-12-26 PROCEDURE — 6370000000 HC RX 637 (ALT 250 FOR IP): Performed by: INTERNAL MEDICINE

## 2020-12-26 PROCEDURE — 2700000000 HC OXYGEN THERAPY PER DAY

## 2020-12-26 PROCEDURE — 80048 BASIC METABOLIC PNL TOTAL CA: CPT

## 2020-12-26 PROCEDURE — 90935 HEMODIALYSIS ONE EVALUATION: CPT

## 2020-12-26 PROCEDURE — 94660 CPAP INITIATION&MGMT: CPT

## 2020-12-26 PROCEDURE — 94640 AIRWAY INHALATION TREATMENT: CPT

## 2020-12-26 PROCEDURE — 2500000003 HC RX 250 WO HCPCS: Performed by: INTERNAL MEDICINE

## 2020-12-26 PROCEDURE — 1200000000 HC SEMI PRIVATE

## 2020-12-26 PROCEDURE — U0002 COVID-19 LAB TEST NON-CDC: HCPCS

## 2020-12-26 PROCEDURE — 99231 SBSQ HOSP IP/OBS SF/LOW 25: CPT | Performed by: INTERNAL MEDICINE

## 2020-12-26 PROCEDURE — 85025 COMPLETE CBC W/AUTO DIFF WBC: CPT

## 2020-12-26 PROCEDURE — 83735 ASSAY OF MAGNESIUM: CPT

## 2020-12-26 PROCEDURE — 36415 COLL VENOUS BLD VENIPUNCTURE: CPT

## 2020-12-26 PROCEDURE — 2580000003 HC RX 258: Performed by: INTERNAL MEDICINE

## 2020-12-26 PROCEDURE — 94669 MECHANICAL CHEST WALL OSCILL: CPT

## 2020-12-26 RX ADMIN — CALCIUM 1000 MG: 500 TABLET ORAL at 17:33

## 2020-12-26 RX ADMIN — SERTRALINE HYDROCHLORIDE 100 MG: 50 TABLET ORAL at 11:49

## 2020-12-26 RX ADMIN — ATORVASTATIN CALCIUM 10 MG: 10 TABLET, FILM COATED ORAL at 11:50

## 2020-12-26 RX ADMIN — BUDESONIDE AND FORMOTEROL FUMARATE DIHYDRATE 2 PUFF: 160; 4.5 AEROSOL RESPIRATORY (INHALATION) at 18:28

## 2020-12-26 RX ADMIN — CYANOCOBALAMIN TAB 1000 MCG 1000 MCG: 1000 TAB at 11:49

## 2020-12-26 RX ADMIN — ANTI-FUNGAL POWDER MICONAZOLE NITRATE TALC FREE: 1.42 POWDER TOPICAL at 11:49

## 2020-12-26 RX ADMIN — CEFEPIME HYDROCHLORIDE 1 G: 1 INJECTION, POWDER, FOR SOLUTION INTRAMUSCULAR; INTRAVENOUS at 22:41

## 2020-12-26 RX ADMIN — ALBUTEROL SULFATE 2.5 MG: 2.5 SOLUTION RESPIRATORY (INHALATION) at 09:32

## 2020-12-26 RX ADMIN — NEPHROCAP 1 MG: 1 CAP ORAL at 11:49

## 2020-12-26 RX ADMIN — ALBUTEROL SULFATE 2.5 MG: 2.5 SOLUTION RESPIRATORY (INHALATION) at 12:55

## 2020-12-26 RX ADMIN — EPOETIN ALFA-EPBX 10000 UNITS: 10000 INJECTION, SOLUTION INTRAVENOUS; SUBCUTANEOUS at 11:49

## 2020-12-26 RX ADMIN — MONTELUKAST 10 MG: 10 TABLET, FILM COATED ORAL at 23:51

## 2020-12-26 RX ADMIN — VANCOMYCIN HYDROCHLORIDE 750 MG: 10 INJECTION, POWDER, LYOPHILIZED, FOR SOLUTION INTRAVENOUS at 22:19

## 2020-12-26 RX ADMIN — ALBUTEROL SULFATE 2.5 MG: 2.5 SOLUTION RESPIRATORY (INHALATION) at 18:28

## 2020-12-26 RX ADMIN — GLUCAGON HYDROCHLORIDE 1 MG: KIT at 06:27

## 2020-12-26 RX ADMIN — TRAZODONE HYDROCHLORIDE 50 MG: 50 TABLET ORAL at 23:52

## 2020-12-26 RX ADMIN — APIXABAN 5 MG: 5 TABLET, FILM COATED ORAL at 11:50

## 2020-12-26 RX ADMIN — ANTI-FUNGAL POWDER MICONAZOLE NITRATE TALC FREE: 1.42 POWDER TOPICAL at 23:59

## 2020-12-26 RX ADMIN — ALBUTEROL SULFATE 1 PUFF: 90 AEROSOL, METERED RESPIRATORY (INHALATION) at 06:13

## 2020-12-26 RX ADMIN — APIXABAN 5 MG: 5 TABLET, FILM COATED ORAL at 23:51

## 2020-12-26 RX ADMIN — Medication 15 G: at 08:33

## 2020-12-26 RX ADMIN — CALCIUM 1000 MG: 500 TABLET ORAL at 11:50

## 2020-12-26 RX ADMIN — BUDESONIDE AND FORMOTEROL FUMARATE DIHYDRATE 2 PUFF: 160; 4.5 AEROSOL RESPIRATORY (INHALATION) at 06:13

## 2020-12-26 RX ADMIN — FOLIC ACID 1 MG: 1 TABLET ORAL at 11:50

## 2020-12-26 RX ADMIN — EPOETIN ALFA-EPBX 3000 UNITS: 3000 INJECTION, SOLUTION INTRAVENOUS; SUBCUTANEOUS at 11:49

## 2020-12-26 ASSESSMENT — PAIN SCALES - GENERAL: PAINLEVEL_OUTOF10: 0

## 2020-12-26 NOTE — PROGRESS NOTES
for HD later today    Plan:  · Vancomycin 750 mg IV x 1 after dialysis  · Follow hemodialysis schedule for further dosing and levels  · Pharmacist will follow and monitor/adjust dosing as necessary      Thank you for the consult,    Aramis PhamD, BCPS 12/26/2020 1:37 PM   [phone]

## 2020-12-26 NOTE — PROGRESS NOTES
Nephrology Note  Mark Adame MD      12/26/2020- awake and alert. She states she was to have a stress test this am, thinks it has been postponed, she is hungry. Spoke with her nurse and she is checking about stress as she lost IV access overnight. Plan HD today. Vital SignsBP 124/64   Pulse 82   Temp 97.6 °F (36.4 °C) (Oral)   Resp 18   Ht 5' 6\" (1.676 m)   Wt 217 lb 6.4 oz (98.6 kg)   SpO2 100%   BMI 35.09 kg/m²   24HR INTAKE/OUTPUT:  No intake or output data in the 24 hours ending 12/26/20 1003      Physical Exam    Neck: No JVD  Lungs: Breath sounds decreased at the bases. No rales or ronchi. Heart: Regular rate and rhythm. No S3 gallop. No  murmrur. Abdomen: Soft non distended, non tender and normal bowel sounds. Extremeties: No edema.       ROS:  RESPIRATORY:  negative  CARDIOVASCULAR:  negative  GASTROINTESTINAL:  negative  GENITOURINARY:  negative    Current Facility-Administered Medications   Medication Dose Route Frequency Provider Last Rate Last Admin    albuterol (PROVENTIL) nebulizer solution 2.5 mg  2.5 mg Nebulization 4x daily Brannon Blanco DO   2.5 mg at 12/97/61 8021    folic acid (FOLVITE) tablet 1 mg  1 mg Oral Daily Brannon Blanco DO   1 mg at 12/25/20 1727    insulin glargine (LANTUS) injection vial 25 Units  25 Units Subcutaneous Nightly Dominique Blanco DO   25 Units at 12/25/20 2045    regadenoson (LEXISCAN) injection 0.4 mg  0.4 mg Intravenous ONCE PRN Trever Hernandez MD        perflutren lipid microspheres (DEFINITY) injection 1.65 mg  1.5 mL Intravenous ONCE PRN Trever Hernandez MD        apixaban (ELIQUIS) tablet 5 mg  5 mg Oral BID Taylor Padilla DO   5 mg at 12/25/20 2040    midodrine (PROAMATINE) tablet 10 mg  10 mg Oral Once per day on Mon Wed Fri Trever Hernandez MD   10 mg at 12/25/20 0916    cefepime (MAXIPIME) 1 g IVPB extended (mini-bag)  1 g Intravenous Q24H Taylor Padilla DO   Stopped at 12/25/20 1316    0.9 % sodium chloride infusion  25 mL Intravenous Q24H Derick Casas Black, DO   Stopped at 12/24/20 1600    epoetin andrzej-epbx (RETACRIT) injection 10,000 Units  10,000 Units Subcutaneous Once per day on Tue Thu Sat Jeanette Hair MD   10,000 Units at 12/24/20 7367    And    epoetin andrzej-epbx (RETACRIT) injection 3,000 Units  3,000 Units Subcutaneous Once per day on Tue Thu Sat Jeanette Hair MD   3,000 Units at 12/24/20 0933    miconazole (MICOTIN) 2 % powder   Topical BID Annelise Scriver, DO   Given at 12/25/20 2302    vancomycin (VANCOCIN) intermittent dosing (placeholder)   Other RX Placeholder Annelise Scriver, DO   Given at 12/22/20 1537    metoprolol (LOPRESSOR) injection 5 mg  5 mg Intravenous Q5 Min PRN Nayely Valdez, DO   5 mg at 12/22/20 1635    albuterol sulfate  (90 Base) MCG/ACT inhaler 1 puff  1 puff Inhalation Q4H PRN Annelise Scriver, DO   1 puff at 12/26/20 3095    atorvastatin (LIPITOR) tablet 10 mg  10 mg Oral Daily Annelise Scriver, DO   10 mg at 12/25/20 8524    b complex-C-folic acid (NEPHROCAPS) capsule 1 mg  1 mg Oral Daily Annelise Scriver, DO   1 mg at 12/25/20 1066    calcium elemental (OSCAL) tablet 1,000 mg  1,000 mg Oral TID WC Annelise Scriver, DO   1,000 mg at 12/25/20 1727    vitamin B-12 (CYANOCOBALAMIN) tablet 1,000 mcg  1,000 mcg Oral Daily Annelise Scriver, DO   1,000 mcg at 12/25/20 6561    budesonide-formoterol (SYMBICORT) 160-4.5 MCG/ACT inhaler 2 puff  2 puff Inhalation BID Annelise Scriver, DO   2 puff at 12/26/20 1751    lidocaine-prilocaine (EMLA) cream 1 each  1 each Topical Once per day on Mon Wed Fri Annelise Scriver, DO   1 each at 12/23/20 1043    montelukast (SINGULAIR) tablet 10 mg  10 mg Oral Nightly Annelise Scriver, DO   10 mg at 12/25/20 2040    sertraline (ZOLOFT) tablet 100 mg  100 mg Oral Daily Annelise Scriver, DO   100 mg at 12/25/20 4851    levothyroxine (SYNTHROID) tablet 75 mcg  75 mcg Oral Daily Annelise Scriver, DO   75 mcg at 12/25/20 0550    traZODone (DESYREL) tablet 50 mg  50 mg Oral Nightly Annelise Scriver, DO   50 mg at 12/25/20 2040    ondansetron [facility]) injection 4 mg  4 mg Intravenous Q6H PRN Rogelio Bustamante, DO        glucose (GLUTOSE) 40 % oral gel 15 g  15 g Oral PRN Rogelio Bustamante, DO   15 g at 12/26/20 2624    dextrose 50 % IV solution  12.5 g Intravenous PRN Rogelio Bustamante, DO   12.5 g at 12/25/20 0550    glucagon (rDNA) injection 1 mg  1 mg Intramuscular PRN Rogelio Bustamante, DO   1 mg at 12/26/20 3827    dextrose 5 % solution  100 mL/hr Intravenous PRN Rogelio Bustamante, DO        insulin lispro (HUMALOG) injection vial 0-6 Units  0-6 Units Subcutaneous TID WC Rogelio Bustamante, DO   2 Units at 12/22/20 1745    insulin lispro (HUMALOG) injection vial 0-3 Units  0-3 Units Subcutaneous Nightly Rogelio Bustamante, DO   1 Units at 12/25/20 2042       XR CHEST (2 VW)   Final Result   1. Ill-defined opacities in the left greater than right mid and lower lungs   concerning for underlying infectious process. 2.  Atherosclerotic disease and mild cardiomegaly. Normal pulmonary   vascularity. RECOMMENDATION:   (Recommend upright PA and lateral chest radiographs 6-8 weeks after   resolution of patient's symptoms to ensure complete resolution of   radiographic findings.)      CTA CHEST W CONTRAST   Final Result   1. Bilateral multifocal infiltrates compatible with pneumonia. 2.  No PE or pleural effusion. 3.  Mediastinal and right hilar mild lymphadenopathy. 4.  Prior cholecystectomy and gastric surgery.       NM Cardiac Stress Test Nuclear Imaging    (Results Pending)         Labs:    CBC:   Recent Labs     12/24/20  0514 12/25/20  0516 12/26/20  0546   WBC 8.1 14.0* 14.1*   HGB 8.4* 8.8* 8.9*    208 182        Lab Results   Component Value Date    IRON 75 01/05/2018    TIBC 171 (L) 01/05/2018    FERRITIN 268 01/05/2018       Lab Results   Component Value Date    PTH 74 (H) 08/22/2017    PTH 67 (H) 02/22/2017     (H) 04/09/2016    CALCIUM 8.6 12/25/2020    CALCIUM 8.4 (L) 12/24/2020    CALCIUM 8.0 (L) 12/23/2020    CAION 1.20 01/04/2018    CAION 1.08 (L)

## 2020-12-26 NOTE — PROGRESS NOTES
Patient is seen in follow-up for atrial fibrillation    Subjective:     Ms. David Hinds denies any complaints, wants to go home  Laying in bed no apparent distress    ROS:  CONSTITUTIONAL:  negative for  fevers, chills  HEENT:  negative for earaches, nasal congestion and epistaxis  RESPIRATORY:  negative for  dry cough, cough with sputum,wheezing and hemoptysis  GASTROINTESTINAL:  negative for nausea, vomiting  MUSCULOSKELETAL:  negative for  myalgias, arthralgias  NEUROLOGICAL:  negative for visual disturbance, dysphagia    Medication side effects: none    Scheduled Meds:   vancomycin  750 mg Intravenous Once    albuterol  2.5 mg Nebulization 4x daily    folic acid  1 mg Oral Daily    insulin glargine  25 Units Subcutaneous Nightly    apixaban  5 mg Oral BID    midodrine  10 mg Oral Once per day on Mon Wed Fri    cefepime  1 g Intravenous Q24H    epoetin andrzej-epbx  10,000 Units Subcutaneous Once per day on Tue Thu Sat    And    epoetin andrzej-epbx  3,000 Units Subcutaneous Once per day on Tue Thu Sat    miconazole   Topical BID    vancomycin (VANCOCIN) intermittent dosing (placeholder)   Other RX Placeholder    atorvastatin  10 mg Oral Daily    b complex-C-folic acid  1 mg Oral Daily    calcium elemental  1,000 mg Oral TID WC    cyanocobalamin  1,000 mcg Oral Daily    budesonide-formoterol  2 puff Inhalation BID    lidocaine-prilocaine  1 each Topical Once per day on Mon Wed Fri    montelukast  10 mg Oral Nightly    sertraline  100 mg Oral Daily    levothyroxine  75 mcg Oral Daily    traZODone  50 mg Oral Nightly    insulin lispro  0-6 Units Subcutaneous TID WC    insulin lispro  0-3 Units Subcutaneous Nightly     Continuous Infusions:   sodium chloride Stopped (12/24/20 1600)    dextrose       PRN Meds:regadenoson, perflutren lipid microspheres, metoprolol, albuterol sulfate HFA, ondansetron, glucose, dextrose, glucagon (rDNA), dextrose    I/O last 3 completed shifts:   In: 240 [P.O.:240]  Out: - No intake/output data recorded. Objective:      Physical Exam:   /64   Pulse 82   Temp 97.6 °F (36.4 °C) (Oral)   Resp 18   Ht 5' 6\" (1.676 m)   Wt 217 lb 6.4 oz (98.6 kg)   SpO2 100%   BMI 35.09 kg/m²   CONSTITUTIONAL:  awake, alert, cooperative, no apparent distress, and appears stated age  HEAD:  normocepalic, without obvious abnormality, atraumatic  NECK:  Supple, symmetrical, trachea midline, no adenopathy, thyroid symmetric, not enlarged and no tenderness, skin normal  LUNGS:  No increased work of breathing, No accessory muscle use or intercostal retractions, good air exchange, scattered rhonchi  CARDIOVASCULAR:  Normal apical impulse, regular rate and rhythm, normal S1 and S2, no S3 or S4, and no murmur noted, no edema, no JVD, no carotid bruit. ABDOMEN:  Soft, nontender, no masses, no hepatomegaly, no splenomegaly, BS+  MUSCULOSKELETAL:  No clubbing no cyanosis. there is no redness, warmth, or swelling of the joints  full range of motion noted  NEUROLOGIC:  Alert, awake,oriented x3  SKIN:  no bruising or bleeding, normal skin color, texture, turgor and no redness, warmth, or swelling      Cardiographics  I personally reviewed the telemetry monitor strips with the following interpretation: Atrial rhythm with occasional PACs    Echocardiogram: 12/23/2020, Summary   Left ventricular size is grossly normal.   No evidence of left ventricular mass or thrombus noted. Normal left ventricular wall thickness. No regional wall motion abnormalities seen. Ejection fraction is measured at 74%. The left atrium is borderline dilated. Interatrial septum appears intact. No evidence of thrombus within left atrium. Physiologic and/or trace mitral regurgitation is present. No evidence of mitral valve stenosis. Physiologic and/or trace tricuspid regurgitation. Regular rhythm. Imaging  XR CHEST (2 VW)   Final Result   1.   Ill-defined opacities in the left greater than right mid and lower lungs   concerning for underlying infectious process. 2.  Atherosclerotic disease and mild cardiomegaly. Normal pulmonary   vascularity. RECOMMENDATION:   (Recommend upright PA and lateral chest radiographs 6-8 weeks after   resolution of patient's symptoms to ensure complete resolution of   radiographic findings.)      CTA CHEST W CONTRAST   Final Result   1. Bilateral multifocal infiltrates compatible with pneumonia. 2.  No PE or pleural effusion. 3.  Mediastinal and right hilar mild lymphadenopathy. 4.  Prior cholecystectomy and gastric surgery. NM Cardiac Stress Test Nuclear Imaging    (Results Pending)   IR MIDLINE CATH    (Results Pending)       Lab Review   Lab Results   Component Value Date     12/26/2020    K 4.5 12/26/2020    K 4.8 12/21/2020    CL 94 12/26/2020    CO2 25 12/26/2020    BUN 47 12/26/2020    CREATININE 5.7 12/26/2020    GLUCOSE 35 12/26/2020    GLUCOSE 135 06/01/2012    CALCIUM 8.7 12/26/2020     Lab Results   Component Value Date    WBC 14.1 12/26/2020    HGB 8.9 12/26/2020    HCT 27.8 12/26/2020    .1 12/26/2020     12/26/2020     I have personally reviewed the laboratory, cardiac diagnostic and radiographic testing as outlined above:    Assessment:     1. Chest pain: Atypical, no recurrence, wants to go home   2.  atrial fibrillation: Paroxysmal, now in sinus rhythm alternating with ectopic atrial rhythm, VIGNESH 2 DS 2 VASC score of 4, on Eliquis  3. History of atrial tachycardia  4. Hypotension: Etiology?,  Resolved, will continue midodrine 10 mg 3 times daily  5. Type 2 diabetes mellitus  6. End-stage renal disease on hemodialysis replacement therapy  7. History of rheumatoid arthritis  8. Hypothyroidism  9. Chronic anemia    Recommendations:     1.   Does not want stress test, would like to go home   okay to discharge home from cardiac standpoint, patient was strongly advised to call 911 if chest pain recurs for any reason    Electronically signed by Chanelle Marsh MD on 12/26/2020 at 4:13 PM  NOTE: This report was transcribed using voice recognition software.  Every effort was made to ensure accuracy; however, inadvertent computerized transcription errors may be present

## 2020-12-26 NOTE — PROGRESS NOTES
Internal Medicine Progress Note    [facility]    Remigio Craw. Bessie Kocher., F.A.C.O.I. Jillian Beck D.O., PATIOIVAN Vargas D.O. Irina Cali, MSN, APRN, NP-C  Aliya Ramirez. Silas Hernandez, MSN, APRN-CNP     Primary Care Physician: Layo Donovan DO   Admitting Physician:  Rosalind Wen DO  Admission date and time: 12/21/2020  3:08 PM    Room:  [address] [place] [address]  Admitting diagnosis: Shortness of breath [R06.02]    Patient Name: Ana Silver  MRN: [id number]    Date of Service: 12/26/2020     Subjective:  Colby Singh is a 70 y.o. female who was seen and examined today,12/26/2020, at the bedside. She remained comfortable today. Patient has been seen by cardiology and Mary Deed stress test is being planned for today. This will make further decision on the atrial fibrillation. Pending this discharge planning will be completed    Review of System:   Constitutional:   Denies fever or chills, weight loss or gain, fatigue or malaise. HEENT:   Denies ear pain, sore throat, sinus or eye problems. Cardiovascular:   Denies any chest pain, irregular heartbeats, or palpitations. Respiratory:   Admits to improving shortness of breath. Minimal coughing without sputum production. Gastrointestinal:   Denies nausea, vomiting, diarrhea, or constipation. Denies any abdominal pain. Genitourinary:    Denies any urgency, frequency, hematuria. Voiding  without difficulty. Extremities:   Denies lower extremity swelling, edema or cyanosis. Neurology:    Denies any headache or focal neurological deficits, Denies generalized weakness or memory difficulty. Psch:   Denies being anxious or depressed. Musculoskeletal:    Denies  myalgias, joint complaints or back pain. Integumentary:   Denies any rashes, ulcers, or excoriations. Denies bruising. Hematologic/Lymphatic:  Denies bruising or bleeding.     Physical Exam:  I/O this shift:  In: 240 [P.O.:240]  Out: -     Intake/Output Summary (Last 24 hours) at 12/26/2020 1301  Last data filed at 12/26/2020 1221  Gross per 24 hour   Intake 240 ml   Output --   Net 240 ml   I/O last 3 completed shifts: In: 180 [P.O.:180]  Out: -   Patient Vitals for the past 96 hrs (Last 3 readings):   Weight   12/26/20 0542 217 lb 6.4 oz (98.6 kg)   12/25/20 0534 218 lb 14.4 oz (99.3 kg)   12/24/20 0411 220 lb 9.6 oz (100.1 kg)     Vital Signs:   Blood pressure 124/64, pulse 82, temperature 97.6 °F (36.4 °C), temperature source Oral, resp. rate 18, height 5' 6\" (1.676 m), weight 217 lb 6.4 oz (98.6 kg), SpO2 100 %, not currently breastfeeding. General appearance:  Alert, responsive, oriented to person, place, and time. Well preserved, alert, no distress. Head:  Normocephalic. No masses, lesions or tenderness. Eyes:  PERRLA. EOMI. Sclera clear. Buccal mucosa moist.  ENT:  Ears normal. Mucosa normal.  Nasal cannula oxygen is in place. Neck:    Supple. Trachea midline. No thyromegaly. No JVD. No bruits. Heart:    Rhythm regular. Rate controlled. No murmurs. Lungs:    Symmetrical. Clear to auscultation bilaterally. No wheezes. No rhonchi. No rales. Abdomen:   Soft. Non-tender. Non-distended. Bowel sounds positive. No organomegaly or masses. No pain on palpation. Extremities:    Peripheral pulses present. No peripheral edema. No ulcers. No cyanosis. No clubbing. Neurologic:    Alert x 3. No focal deficit. Cranial nerves grossly intact. No focal weakness. Psych:   Behavior is normal. Mood appears normal. Speech is not rapid and/or pressured. Musculoskeletal:   Spine ROM normal. Muscular strength intact. Gait not assessed. Integumentary:  No rashes  Skin normal color and texture.   Genitalia/Breast:  Deferred    Medication:  Scheduled Meds:   albuterol  2.5 mg Nebulization 4x daily    folic acid  1 mg Oral Daily    insulin glargine  25 Units Subcutaneous Nightly    apixaban  5 mg Oral BID    midodrine  10 mg Oral Once per day on Mon Wed Fri  cefepime  1 g Intravenous Q24H    epoetin andrzej-epbx  10,000 Units Subcutaneous Once per day on Tue Thu Sat    And    epoetin andrzej-epbx  3,000 Units Subcutaneous Once per day on Tue Thu Sat    miconazole   Topical BID    vancomycin (VANCOCIN) intermittent dosing (placeholder)   Other RX Placeholder    atorvastatin  10 mg Oral Daily    b complex-C-folic acid  1 mg Oral Daily    calcium elemental  1,000 mg Oral TID WC    cyanocobalamin  1,000 mcg Oral Daily    budesonide-formoterol  2 puff Inhalation BID    lidocaine-prilocaine  1 each Topical Once per day on Mon Wed Fri    montelukast  10 mg Oral Nightly    sertraline  100 mg Oral Daily    levothyroxine  75 mcg Oral Daily    traZODone  50 mg Oral Nightly    insulin lispro  0-6 Units Subcutaneous TID WC    insulin lispro  0-3 Units Subcutaneous Nightly     Continuous Infusions:   sodium chloride Stopped (12/24/20 1600)    dextrose         Objective Data:  CBC with Differential:    Lab Results   Component Value Date    WBC 14.1 12/26/2020    RBC 2.48 12/26/2020    HGB 8.9 12/26/2020    HCT 27.8 12/26/2020     12/26/2020    .1 12/26/2020    MCH 35.9 12/26/2020    MCHC 32.0 12/26/2020    RDW 15.0 12/26/2020    NRBC 0.9 12/26/2020    SEGSPCT 54 05/18/2012    METASPCT 0.9 12/23/2020    LYMPHOPCT 1.7 12/26/2020    MONOPCT 4.3 12/26/2020    MYELOPCT 0.9 12/24/2020    BASOPCT 0.4 12/26/2020    MONOSABS 0.56 12/26/2020    LYMPHSABS 0.28 12/26/2020    EOSABS 0.37 12/26/2020    BASOSABS 0.00 12/26/2020     BMP:    Lab Results   Component Value Date     12/26/2020    K 4.5 12/26/2020    K 4.8 12/21/2020    CL 94 12/26/2020    CO2 25 12/26/2020    BUN 47 12/26/2020    LABALBU 2.5 12/25/2020    LABALBU 3.9 06/01/2012    CREATININE 5.7 12/26/2020    CALCIUM 8.7 12/26/2020    GFRAA 9 12/26/2020    LABGLOM 7 12/26/2020    GLUCOSE 35 12/26/2020    GLUCOSE 135 06/01/2012       Wound Documentation:   Wound 12/21/20 Abdomen Lower;Medial (Active) readily available for any further decision-making and intervention.      James Blanco DO, FAnhA.C.O.I.  12/26/2020  1:01 PM

## 2020-12-27 ENCOUNTER — APPOINTMENT (OUTPATIENT)
Dept: NUCLEAR MEDICINE | Age: 75
DRG: 193 | End: 2020-12-27
Payer: COMMERCIAL

## 2020-12-27 LAB
ALBUMIN SERPL-MCNC: 2 G/DL (ref 3.5–5.2)
ALP BLD-CCNC: 102 U/L (ref 35–104)
ALT SERPL-CCNC: 5 U/L (ref 0–32)
ANION GAP SERPL CALCULATED.3IONS-SCNC: 9 MMOL/L (ref 7–16)
AST SERPL-CCNC: 11 U/L (ref 0–31)
BASOPHILS ABSOLUTE: 0.03 E9/L (ref 0–0.2)
BASOPHILS RELATIVE PERCENT: 0.3 % (ref 0–2)
BILIRUB SERPL-MCNC: 0.2 MG/DL (ref 0–1.2)
BUN BLDV-MCNC: 22 MG/DL (ref 8–23)
CALCIUM SERPL-MCNC: 8.4 MG/DL (ref 8.6–10.2)
CHLORIDE BLD-SCNC: 96 MMOL/L (ref 98–107)
CO2: 30 MMOL/L (ref 22–29)
CREAT SERPL-MCNC: 3.2 MG/DL (ref 0.5–1)
EOSINOPHILS ABSOLUTE: 0.31 E9/L (ref 0.05–0.5)
EOSINOPHILS RELATIVE PERCENT: 3.3 % (ref 0–6)
GFR AFRICAN AMERICAN: 17
GFR NON-AFRICAN AMERICAN: 14 ML/MIN/1.73
GLUCOSE BLD-MCNC: 97 MG/DL (ref 74–99)
HCT VFR BLD CALC: 25.2 % (ref 34–48)
HEMOGLOBIN: 8.2 G/DL (ref 11.5–15.5)
IMMATURE GRANULOCYTES #: 0.38 E9/L
IMMATURE GRANULOCYTES %: 4 % (ref 0–5)
LV EF: 60 %
LVEF MODALITY: NORMAL
LYMPHOCYTES ABSOLUTE: 0.47 E9/L (ref 1.5–4)
LYMPHOCYTES RELATIVE PERCENT: 5 % (ref 20–42)
MCH RBC QN AUTO: 35.3 PG (ref 26–35)
MCHC RBC AUTO-ENTMCNC: 32.5 % (ref 32–34.5)
MCV RBC AUTO: 108.6 FL (ref 80–99.9)
METER GLUCOSE: 107 MG/DL (ref 74–99)
METER GLUCOSE: 108 MG/DL (ref 74–99)
METER GLUCOSE: 128 MG/DL (ref 74–99)
METER GLUCOSE: 201 MG/DL (ref 74–99)
MONOCYTES ABSOLUTE: 0.8 E9/L (ref 0.1–0.95)
MONOCYTES RELATIVE PERCENT: 8.5 % (ref 2–12)
NEUTROPHILS ABSOLUTE: 7.43 E9/L (ref 1.8–7.3)
NEUTROPHILS RELATIVE PERCENT: 78.9 % (ref 43–80)
PDW BLD-RTO: 15.3 FL (ref 11.5–15)
PLATELET # BLD: 174 E9/L (ref 130–450)
PMV BLD AUTO: 10 FL (ref 7–12)
POTASSIUM SERPL-SCNC: 4.2 MMOL/L (ref 3.5–5)
RBC # BLD: 2.32 E12/L (ref 3.5–5.5)
SODIUM BLD-SCNC: 135 MMOL/L (ref 132–146)
TOTAL PROTEIN: 5.4 G/DL (ref 6.4–8.3)
WBC # BLD: 9.4 E9/L (ref 4.5–11.5)

## 2020-12-27 PROCEDURE — 85025 COMPLETE CBC W/AUTO DIFF WBC: CPT

## 2020-12-27 PROCEDURE — 3430000000 HC RX DIAGNOSTIC RADIOPHARMACEUTICAL: Performed by: RADIOLOGY

## 2020-12-27 PROCEDURE — 6360000002 HC RX W HCPCS: Performed by: INTERNAL MEDICINE

## 2020-12-27 PROCEDURE — A9500 TC99M SESTAMIBI: HCPCS | Performed by: RADIOLOGY

## 2020-12-27 PROCEDURE — 82962 GLUCOSE BLOOD TEST: CPT

## 2020-12-27 PROCEDURE — 2580000003 HC RX 258: Performed by: INTERNAL MEDICINE

## 2020-12-27 PROCEDURE — 80053 COMPREHEN METABOLIC PANEL: CPT

## 2020-12-27 PROCEDURE — 94669 MECHANICAL CHEST WALL OSCILL: CPT

## 2020-12-27 PROCEDURE — 94660 CPAP INITIATION&MGMT: CPT

## 2020-12-27 PROCEDURE — 93016 CV STRESS TEST SUPVJ ONLY: CPT | Performed by: INTERNAL MEDICINE

## 2020-12-27 PROCEDURE — 6370000000 HC RX 637 (ALT 250 FOR IP): Performed by: INTERNAL MEDICINE

## 2020-12-27 PROCEDURE — 94640 AIRWAY INHALATION TREATMENT: CPT

## 2020-12-27 PROCEDURE — 36415 COLL VENOUS BLD VENIPUNCTURE: CPT

## 2020-12-27 PROCEDURE — 99232 SBSQ HOSP IP/OBS MODERATE 35: CPT | Performed by: INTERNAL MEDICINE

## 2020-12-27 PROCEDURE — 93017 CV STRESS TEST TRACING ONLY: CPT

## 2020-12-27 PROCEDURE — 1200000000 HC SEMI PRIVATE

## 2020-12-27 PROCEDURE — 93018 CV STRESS TEST I&R ONLY: CPT | Performed by: INTERNAL MEDICINE

## 2020-12-27 PROCEDURE — 78452 HT MUSCLE IMAGE SPECT MULT: CPT | Performed by: INTERNAL MEDICINE

## 2020-12-27 PROCEDURE — 78452 HT MUSCLE IMAGE SPECT MULT: CPT

## 2020-12-27 RX ORDER — INSULIN GLARGINE 100 [IU]/ML
5 INJECTION, SOLUTION SUBCUTANEOUS NIGHTLY
Status: DISCONTINUED | OUTPATIENT
Start: 2020-12-27 | End: 2020-12-28 | Stop reason: HOSPADM

## 2020-12-27 RX ADMIN — BUDESONIDE AND FORMOTEROL FUMARATE DIHYDRATE 2 PUFF: 160; 4.5 AEROSOL RESPIRATORY (INHALATION) at 18:01

## 2020-12-27 RX ADMIN — Medication 10 MILLICURIE: at 07:48

## 2020-12-27 RX ADMIN — FOLIC ACID 1 MG: 1 TABLET ORAL at 12:58

## 2020-12-27 RX ADMIN — CALCIUM 1000 MG: 500 TABLET ORAL at 12:58

## 2020-12-27 RX ADMIN — ATORVASTATIN CALCIUM 10 MG: 10 TABLET, FILM COATED ORAL at 12:58

## 2020-12-27 RX ADMIN — ALBUTEROL SULFATE 2.5 MG: 2.5 SOLUTION RESPIRATORY (INHALATION) at 06:06

## 2020-12-27 RX ADMIN — MONTELUKAST 10 MG: 10 TABLET, FILM COATED ORAL at 21:44

## 2020-12-27 RX ADMIN — CYANOCOBALAMIN TAB 1000 MCG 1000 MCG: 1000 TAB at 12:58

## 2020-12-27 RX ADMIN — ANTI-FUNGAL POWDER MICONAZOLE NITRATE TALC FREE: 1.42 POWDER TOPICAL at 13:01

## 2020-12-27 RX ADMIN — Medication 35 MILLICURIE: at 14:13

## 2020-12-27 RX ADMIN — BUDESONIDE AND FORMOTEROL FUMARATE DIHYDRATE 2 PUFF: 160; 4.5 AEROSOL RESPIRATORY (INHALATION) at 06:06

## 2020-12-27 RX ADMIN — APIXABAN 5 MG: 5 TABLET, FILM COATED ORAL at 12:58

## 2020-12-27 RX ADMIN — CEFEPIME HYDROCHLORIDE 1 G: 1 INJECTION, POWDER, FOR SOLUTION INTRAMUSCULAR; INTRAVENOUS at 21:44

## 2020-12-27 RX ADMIN — ALBUTEROL SULFATE 2.5 MG: 2.5 SOLUTION RESPIRATORY (INHALATION) at 18:00

## 2020-12-27 RX ADMIN — APIXABAN 5 MG: 5 TABLET, FILM COATED ORAL at 21:44

## 2020-12-27 RX ADMIN — CALCIUM 1000 MG: 500 TABLET ORAL at 16:54

## 2020-12-27 RX ADMIN — REGADENOSON 0.4 MG: 0.08 INJECTION, SOLUTION INTRAVENOUS at 11:23

## 2020-12-27 RX ADMIN — NEPHROCAP 1 MG: 1 CAP ORAL at 12:58

## 2020-12-27 RX ADMIN — INSULIN GLARGINE 5 UNITS: 100 INJECTION, SOLUTION SUBCUTANEOUS at 21:45

## 2020-12-27 RX ADMIN — INSULIN LISPRO 2 UNITS: 100 INJECTION, SOLUTION INTRAVENOUS; SUBCUTANEOUS at 16:54

## 2020-12-27 RX ADMIN — SERTRALINE HYDROCHLORIDE 100 MG: 50 TABLET ORAL at 12:57

## 2020-12-27 RX ADMIN — ANTI-FUNGAL POWDER MICONAZOLE NITRATE TALC FREE: 1.42 POWDER TOPICAL at 21:47

## 2020-12-27 RX ADMIN — TRAZODONE HYDROCHLORIDE 50 MG: 50 TABLET ORAL at 21:44

## 2020-12-27 ASSESSMENT — PAIN SCALES - GENERAL
PAINLEVEL_OUTOF10: 0
PAINLEVEL_OUTOF10: 0

## 2020-12-27 NOTE — PROGRESS NOTES
andrzej-epbx (RETACRIT) injection 10,000 Units  10,000 Units Subcutaneous Once per day on Tue Thu Sat Celi Dhaliwal MD   10,000 Units at 12/26/20 1149    And    epoetin andrzej-epbx (RETACRIT) injection 3,000 Units  3,000 Units Subcutaneous Once per day on Tue Thu Sat Celi Dhaliwal MD   3,000 Units at 12/26/20 1149    miconazole (MICOTIN) 2 % powder   Topical BID Mardelle Postal, DO   Given at 12/26/20 2359    vancomycin (VANCOCIN) intermittent dosing (placeholder)   Other RX Placeholder Mardelle Postal, DO   Given at 12/22/20 1537    metoprolol (LOPRESSOR) injection 5 mg  5 mg Intravenous Q5 Min PRN Akin Emely, DO   5 mg at 12/22/20 1635    albuterol sulfate  (90 Base) MCG/ACT inhaler 1 puff  1 puff Inhalation Q4H PRN Mardelle Postal, DO   1 puff at 12/26/20 7597    atorvastatin (LIPITOR) tablet 10 mg  10 mg Oral Daily Mardelle Postal, DO   10 mg at 12/26/20 1150    b complex-C-folic acid (NEPHROCAPS) capsule 1 mg  1 mg Oral Daily Mardelle Postal, DO   1 mg at 12/26/20 1149    calcium elemental (OSCAL) tablet 1,000 mg  1,000 mg Oral TID WC Mardelle Postal, DO   1,000 mg at 12/26/20 1733    vitamin B-12 (CYANOCOBALAMIN) tablet 1,000 mcg  1,000 mcg Oral Daily Mardelle Postal, DO   1,000 mcg at 12/26/20 1149    budesonide-formoterol (SYMBICORT) 160-4.5 MCG/ACT inhaler 2 puff  2 puff Inhalation BID Mardelle Postal, DO   2 puff at 12/27/20 0606    lidocaine-prilocaine (EMLA) cream 1 each  1 each Topical Once per day on Mon Wed Fri Mardelle Postal, DO   1 each at 12/23/20 1043    montelukast (SINGULAIR) tablet 10 mg  10 mg Oral Nightly Mardelle Postal, DO   10 mg at 12/26/20 2351    sertraline (ZOLOFT) tablet 100 mg  100 mg Oral Daily Mardelle Postal, DO   100 mg at 12/26/20 1149    levothyroxine (SYNTHROID) tablet 75 mcg  75 mcg Oral Daily Mardelle Postal, DO   75 mcg at 12/25/20 0550    traZODone (DESYREL) tablet 50 mg  50 mg Oral Nightly Mardelle Postal, DO   50 mg at 12/26/20 8112    ondansetron (ZOFRAN) injection 4 mg  4 mg Intravenous Q6H PRN Sheri Ashburn, DO        glucose (GLUTOSE) 40 % oral gel 15 g  15 g Oral PRN Sheri Ashburn, DO   15 g at 12/26/20 4187    dextrose 50 % IV solution  12.5 g Intravenous PRN Sheri Ashburn, DO   12.5 g at 12/25/20 0550    glucagon (rDNA) injection 1 mg  1 mg Intramuscular PRN Sheri Marbin, DO   1 mg at 12/26/20 9407    dextrose 5 % solution  100 mL/hr Intravenous PRN Sheri Ashburn, DO        insulin lispro (HUMALOG) injection vial 0-6 Units  0-6 Units Subcutaneous TID WC Sheri Ashburn, DO   2 Units at 12/22/20 1745    insulin lispro (HUMALOG) injection vial 0-3 Units  0-3 Units Subcutaneous Nightly Sheri Marbin, DO   1 Units at 12/25/20 2042       XR CHEST (2 VW)   Final Result   1. Ill-defined opacities in the left greater than right mid and lower lungs   concerning for underlying infectious process. 2.  Atherosclerotic disease and mild cardiomegaly. Normal pulmonary   vascularity. RECOMMENDATION:   (Recommend upright PA and lateral chest radiographs 6-8 weeks after   resolution of patient's symptoms to ensure complete resolution of   radiographic findings.)      CTA CHEST W CONTRAST   Final Result   1. Bilateral multifocal infiltrates compatible with pneumonia. 2.  No PE or pleural effusion. 3.  Mediastinal and right hilar mild lymphadenopathy. 4.  Prior cholecystectomy and gastric surgery.       NM Cardiac Stress Test Nuclear Imaging    (Results Pending)   IR MIDLINE CATH    (Results Pending)         Labs:    CBC:   Recent Labs     12/25/20  0516 12/26/20  0546 12/27/20  0531   WBC 14.0* 14.1* 9.4   HGB 8.8* 8.9* 8.2*    182 174        Lab Results   Component Value Date    IRON 75 01/05/2018    TIBC 171 (L) 01/05/2018    FERRITIN 268 01/05/2018       Lab Results   Component Value Date    PTH 74 (H) 08/22/2017    PTH 67 (H) 02/22/2017     (H) 04/09/2016    CALCIUM 8.4 (L) 12/27/2020    CALCIUM 8.7 12/26/2020    CALCIUM 8.6 12/25/2020    CAION 1.20 01/04/2018    CAION 1.08 (L) 01/02/2018    PHOS 6.0 (H) 12/26/2020    PHOS 4.6 (H) 12/25/2020    PHOS 5.8 (H) 12/23/2020    MG 2.0 12/26/2020    MG 2.0 12/25/2020    MG 2.2 12/23/2020       BMP:   Recent Labs     12/25/20  0515 12/26/20  0546 12/27/20  0531    132 135   K 3.9 4.5 4.2   CL 96* 94* 96*   CO2 26 25 30*   BUN 32* 47* 22   CREATININE 4.6* 5.7* 3.2*   GLUCOSE 47* 35* 97             Assessment: / Plan:       1.   Chronic kidney disease stage G5/ESRD.   Continue dialytic support. Next planned treatment tomorrow. Planned discharge post treatment      2.   Volume overload/fluid retention. Attempt ultrafilteration as allowed by hemodynamics. 3.     Hypotension. Patient blood pressures have been lower. Midodrine increased. Echocardiogram done 12/23.     4.   Anemia secondary to chronic kidney disease.   Target hemoglobin is less than goal of > 10 g/dL.  Continue JESSE and adjust dosage.     5.  Renal Osteodystrophy.  Follow calcium phosphorus and PTH levels.  Phosphorus levels markedly elevated upon presentation at 10.6 mg/dL. Now down to 6.0 mg/dL.   That may have reflected missed dialysis. KACIE Serrano - CNP     Patient seen and examined. Patient finished hemodialysis treatment late last night. She is undergoingcardiac stress test.      Agree with above formulation and plan.       Shala Hooks MD  Nephrology        Electronically signed by Shala Hooks MD on 12/27/2020 at 3:58 PM

## 2020-12-27 NOTE — PROGRESS NOTES
Patient is seen in follow-up for atrial fibrillation    Subjective:     Ms. Thea Willard denies any complaints, wants to go home  Laying in bed no apparent distress    ROS:  CONSTITUTIONAL:  negative for  fevers, chills  HEENT:  negative for earaches, nasal congestion and epistaxis  RESPIRATORY:  negative for  dry cough, cough with sputum,wheezing and hemoptysis  GASTROINTESTINAL:  negative for nausea, vomiting  MUSCULOSKELETAL:  negative for  myalgias, arthralgias  NEUROLOGICAL:  negative for visual disturbance, dysphagia    Medication side effects: none    Scheduled Meds:   albuterol  2.5 mg Nebulization 4x daily    folic acid  1 mg Oral Daily    insulin glargine  25 Units Subcutaneous Nightly    apixaban  5 mg Oral BID    midodrine  10 mg Oral Once per day on Mon Wed Fri    cefepime  1 g Intravenous Q24H    epoetin andrzej-epbx  10,000 Units Subcutaneous Once per day on Tue Thu Sat    And    epoetin andrzej-epbx  3,000 Units Subcutaneous Once per day on Tue Thu Sat    miconazole   Topical BID    vancomycin (VANCOCIN) intermittent dosing (placeholder)   Other RX Placeholder    atorvastatin  10 mg Oral Daily    b complex-C-folic acid  1 mg Oral Daily    calcium elemental  1,000 mg Oral TID WC    cyanocobalamin  1,000 mcg Oral Daily    budesonide-formoterol  2 puff Inhalation BID    lidocaine-prilocaine  1 each Topical Once per day on Mon Wed Fri    montelukast  10 mg Oral Nightly    sertraline  100 mg Oral Daily    levothyroxine  75 mcg Oral Daily    traZODone  50 mg Oral Nightly    insulin lispro  0-6 Units Subcutaneous TID WC    insulin lispro  0-3 Units Subcutaneous Nightly     Continuous Infusions:   sodium chloride Stopped (12/24/20 1600)    dextrose       PRN Meds:perflutren lipid microspheres, metoprolol, albuterol sulfate HFA, ondansetron, glucose, dextrose, glucagon (rDNA), dextrose    I/O last 3 completed shifts: In: 980 [P.O.:480]  Out: 1500   No intake/output data recorded.       Objective: Physical Exam:   BP (!) 98/53   Pulse 80   Temp 97.9 °F (36.6 °C) (Oral)   Resp 18   Ht 5' 6\" (1.676 m)   Wt 216 lb 12.8 oz (98.3 kg)   SpO2 98%   BMI 34.99 kg/m²   CONSTITUTIONAL:  awake, alert, cooperative, no apparent distress, and appears stated age  HEAD:  normocepalic, without obvious abnormality, atraumatic  NECK:  Supple, symmetrical, trachea midline, no adenopathy, thyroid symmetric, not enlarged and no tenderness, skin normal  LUNGS:  No increased work of breathing, No accessory muscle use or intercostal retractions, good air exchange, scattered rhonchi  CARDIOVASCULAR:  Normal apical impulse, regular rate and rhythm, normal S1 and S2, no S3 or S4, and no murmur noted, no edema, no JVD, no carotid bruit. ABDOMEN:  Soft, nontender, no masses, no hepatomegaly, no splenomegaly, BS+  MUSCULOSKELETAL:  No clubbing no cyanosis. there is no redness, warmth, or swelling of the joints  full range of motion noted  NEUROLOGIC:  Alert, awake,oriented x3  SKIN:  no bruising or bleeding, normal skin color, texture, turgor and no redness, warmth, or swelling      Cardiographics  I personally reviewed the telemetry monitor strips with the following interpretation: Atrial rhythm with occasional PACs    Echocardiogram: 12/23/2020, Summary   Left ventricular size is grossly normal.   No evidence of left ventricular mass or thrombus noted. Normal left ventricular wall thickness. No regional wall motion abnormalities seen. Ejection fraction is measured at 74%. The left atrium is borderline dilated. Interatrial septum appears intact. No evidence of thrombus within left atrium. Physiologic and/or trace mitral regurgitation is present. No evidence of mitral valve stenosis. Physiologic and/or trace tricuspid regurgitation. Regular rhythm. Imaging  XR CHEST (2 VW)   Final Result   1.   Ill-defined opacities in the left greater than right mid and lower lungs   concerning for underlying infectious process. 2.  Atherosclerotic disease and mild cardiomegaly. Normal pulmonary   vascularity. RECOMMENDATION:   (Recommend upright PA and lateral chest radiographs 6-8 weeks after   resolution of patient's symptoms to ensure complete resolution of   radiographic findings.)      CTA CHEST W CONTRAST   Final Result   1. Bilateral multifocal infiltrates compatible with pneumonia. 2.  No PE or pleural effusion. 3.  Mediastinal and right hilar mild lymphadenopathy. 4.  Prior cholecystectomy and gastric surgery. NM Cardiac Stress Test Nuclear Imaging    (Results Pending)   IR MIDLINE CATH    (Results Pending)       Lab Review   Lab Results   Component Value Date     12/27/2020    K 4.2 12/27/2020    K 4.8 12/21/2020    CL 96 12/27/2020    CO2 30 12/27/2020    BUN 22 12/27/2020    CREATININE 3.2 12/27/2020    GLUCOSE 97 12/27/2020    GLUCOSE 135 06/01/2012    CALCIUM 8.4 12/27/2020     Lab Results   Component Value Date    WBC 9.4 12/27/2020    HGB 8.2 12/27/2020    HCT 25.2 12/27/2020    .6 12/27/2020     12/27/2020     I have personally reviewed the laboratory, cardiac diagnostic and radiographic testing as outlined above:    Assessment:     1. Chest pain: Atypical, changed her mind, agreeable to stress test, scheduled for today  2.  atrial fibrillation: Paroxysmal, now in sinus rhythm alternating with ectopic atrial rhythm, VIGNESH 2 DS 2 VASC score of 4, on Eliquis  3. History of atrial tachycardia  4. Hypotension: Better, will continue midodrine 10 mg 3 times daily  5. Type 2 diabetes mellitus  6. End-stage renal disease on hemodialysis replacement therapy  7. History of rheumatoid arthritis  8. Hypothyroidism  9. Chronic anemia    Recommendations:     1. Lexiscan stress test today  2. Continue current treatment      Electronically signed by Dez Terrazas MD on 12/27/2020 at 12:52 PM  NOTE: This report was transcribed using voice recognition software.  Every effort

## 2020-12-27 NOTE — PROCEDURES
Dr. Jessi Dickinson present, Completed Lexiscan Protocol 0.4 mg IV per Right hand vein. Patient  experienced feeling \"weird\" mild headache. Post sips of Diet Pepsi, symptoms lessened.

## 2020-12-27 NOTE — PROCEDURES
Procedure: Nydia stress test     Ordering physician: Cassie Marcano  Referring [email or web address] Ashamookie    Indication: Chest Pain     Pretest evaluation no chest pain, no shortness of breath    Resting EKG showed: atrial rhythm     Protocol: Patient was given 0.4mg of Lexiscan followed by Cardiolite injection    Heart rate response:   Resting heart rate: 84 BPM   Stress heart rate: 92 BPM     Blood pressure response:   Resting blood pressure:116/78 mmHg   Stress blood pressure: 110/64 mmHg     Symptoms and signs:feeling weird,headache     EKG changes:  Resting EKG: nonischemic   Stress EKG : nonischemic with PACs    Impression:   Clinical: nonischemic   EKG: nonischemic with PACs    Cardiolite injected and nuclear images are pending

## 2020-12-27 NOTE — FLOWSHEET NOTE
12/26/20 2316   Vital Signs   /60   Pulse 81   Resp 18   Weight 216 lb 0.8 oz (98 kg)   Weight Method Bed scale   Percent Weight Change -1.01   Post-Hemodialysis Assessment   Post-Treatment Procedures Blood returned; Access bleeding time < 10 minutes   Machine Disinfection Process Acid/Vinegar Clean;Heat Disinfect; Machine Absence of [employer]; Exterior Machine Disinfection   Rinseback Volume (ml) 300 ml   Total Liters Processed (l/min) 74.7 l/min   Dialyzer Clearance Lightly streaked   Duration of Treatment (minutes) 210 minutes   Hemodialysis Intake (ml) 500 ml   Hemodialysis Output (ml) 1500 ml   NET Removed (ml) 1000 ml   Tolerated Treatment Good   Patient Response to Treatment tolerated tx well vss no complaints sites held and dressed + bruit + thrill no bleeding removed 1L without difficulty   Bilateral Breath Sounds Diminished   Edema Generalized   Edema Generalized Trace

## 2020-12-27 NOTE — PROGRESS NOTES
Nephrology Progress Note      12/28/2020- awake and alert up in bed eating breakfast          Vital SignsBP (!) 111/54   Pulse 86   Temp 98.1 °F (36.7 °C) (Axillary)   Resp 18   Ht 5' 6\" (1.676 m)   Wt 216 lb 12.8 oz (98.3 kg)   SpO2 98%   BMI 34.99 kg/m²   24HR INTAKE/OUTPUT:      Intake/Output Summary (Last 24 hours) at 12/27/2020 1737  Last data filed at 12/27/2020 1705  Gross per 24 hour   Intake 980 ml   Output 1500 ml   Net -520 ml         Physical Exam    Neck: No JVD  Lungs: clear upper, diminished in bases  Heart: Regular rate and rhythm. No S3 gallop. No  murmrur. Abdomen: Soft non distended, non tender and normal bowel sounds. Extremeties: No edema.       ROS:  RESPIRATORY:  negative  CARDIOVASCULAR:  negative  GASTROINTESTINAL:  negative  GENITOURINARY:  negative    Current Facility-Administered Medications   Medication Dose Route Frequency Provider Last Rate Last Admin    albuterol (PROVENTIL) nebulizer solution 2.5 mg  2.5 mg Nebulization 4x daily Brannon Blanco, DO   2.5 mg at 30/75/50 4866    folic acid (FOLVITE) tablet 1 mg  1 mg Oral Daily Brannon Blanco DO   1 mg at 12/27/20 1258    insulin glargine (LANTUS) injection vial 25 Units  25 Units Subcutaneous Nightly Phylliss Shay Blanco, DO   25 Units at 12/25/20 2045    perflutren lipid microspheres (DEFINITY) injection 1.65 mg  1.5 mL Intravenous ONCE PRN Javier Coker MD        apixaban (ELIQUIS) tablet 5 mg  5 mg Oral BID Leonardo Lefort, DO   5 mg at 12/27/20 1258    midodrine (PROAMATINE) tablet 10 mg  10 mg Oral Once per day on Mon Wed Fri Javier Coker MD   10 mg at 12/25/20 0916    cefepime (MAXIPIME) 1 g IVPB extended (mini-bag)  1 g Intravenous Q24H Leonardo Lefort, DO   Stopped at 12/27/20 0241    0.9 % sodium chloride infusion  25 mL Intravenous Q24H Leonardo Lefort, DO   Stopped at 12/24/20 1600    epoetin andrzej-epbx (RETACRIT) injection 10,000 Units  10,000 Units Subcutaneous Once per day on Tue Thu Jesse Rojas MD   10,000 Units at 12/26/20 1149    And    epoetin andrzej-epbx (RETACRIT) injection 3,000 Units  3,000 Units Subcutaneous Once per day on Tue Thu Sat Anoop Salazar MD   3,000 Units at 12/26/20 1149    miconazole (MICOTIN) 2 % powder   Topical BID Fouzia Ramp, DO   Given at 12/27/20 1301    vancomycin (VANCOCIN) intermittent dosing (placeholder)   Other RX Placeholder Fouzia Ramp, DO   Given at 12/22/20 1537    metoprolol (LOPRESSOR) injection 5 mg  5 mg Intravenous Q5 Min PRN Nicolas Esters, DO   5 mg at 12/22/20 1635    albuterol sulfate  (90 Base) MCG/ACT inhaler 1 puff  1 puff Inhalation Q4H PRN Fouzia Ramp, DO   1 puff at 12/26/20 8119    atorvastatin (LIPITOR) tablet 10 mg  10 mg Oral Daily Fouzia Ramp, DO   10 mg at 12/27/20 1258    b complex-C-folic acid (NEPHROCAPS) capsule 1 mg  1 mg Oral Daily Fouzia Ramp, DO   1 mg at 12/27/20 1258    calcium elemental (OSCAL) tablet 1,000 mg  1,000 mg Oral TID WC Fouzia Ramp, DO   1,000 mg at 12/27/20 1654    vitamin B-12 (CYANOCOBALAMIN) tablet 1,000 mcg  1,000 mcg Oral Daily Fouzia Ramp, DO   1,000 mcg at 12/27/20 1258    budesonide-formoterol (SYMBICORT) 160-4.5 MCG/ACT inhaler 2 puff  2 puff Inhalation BID Fouzia Ramp, DO   2 puff at 12/27/20 0606    lidocaine-prilocaine (EMLA) cream 1 each  1 each Topical Once per day on Mon Wed Fri Fouzia Ramp, DO   1 each at 12/23/20 1043    montelukast (SINGULAIR) tablet 10 mg  10 mg Oral Nightly Fouzia Ramp, DO   10 mg at 12/26/20 2351    sertraline (ZOLOFT) tablet 100 mg  100 mg Oral Daily Fouzia Ramp, DO   100 mg at 12/27/20 1257    levothyroxine (SYNTHROID) tablet 75 mcg  75 mcg Oral Daily Fouzia Ramp, DO   75 mcg at 12/25/20 0550    traZODone (DESYREL) tablet 50 mg  50 mg Oral Nightly Fouzia Ramp, DO   50 mg at 12/26/20 9148    ondansetron (ZOFRAN) injection 4 mg  4 mg Intravenous Q6H PRN Fouzia Ramp, DO        glucose (GLUTOSE) 40 % oral gel 15 g  15 g Oral PRN Fouzia Ramp, DO   15 g at 12/26/20 0833    dextrose 50 % IV solution  12.5 g Intravenous PRN Shauna Shall, DO   12.5 g at 12/25/20 0550    glucagon (rDNA) injection 1 mg  1 mg Intramuscular PRN Shauna Shall, DO   1 mg at 12/26/20 2063    dextrose 5 % solution  100 mL/hr Intravenous PRN Shauna Shall, DO        insulin lispro (HUMALOG) injection vial 0-6 Units  0-6 Units Subcutaneous TID WC Shauna Shall, DO   2 Units at 12/27/20 1654    insulin lispro (HUMALOG) injection vial 0-3 Units  0-3 Units Subcutaneous Nightly Shauna Shall, DO   1 Units at 12/25/20 2042       NM Cardiac Stress Test Nuclear Imaging   Final Result   The myocardial perfusion imaging was abnormal   The abnormality was a fixed defects in the moderate area of inferior   wall and small area of inferolateral wall   Overall left ventricular systolic function was normal, 60%   No recent stress test available for comparison. XR CHEST (2 VW)   Final Result   1. Ill-defined opacities in the left greater than right mid and lower lungs   concerning for underlying infectious process. 2.  Atherosclerotic disease and mild cardiomegaly. Normal pulmonary   vascularity. RECOMMENDATION:   (Recommend upright PA and lateral chest radiographs 6-8 weeks after   resolution of patient's symptoms to ensure complete resolution of   radiographic findings.)      CTA CHEST W CONTRAST   Final Result   1. Bilateral multifocal infiltrates compatible with pneumonia. 2.  No PE or pleural effusion. 3.  Mediastinal and right hilar mild lymphadenopathy. 4.  Prior cholecystectomy and gastric surgery.       IR MIDLINE CATH    (Results Pending)         Labs:    CBC:   Recent Labs     12/25/20  0516 12/26/20  0546 12/27/20  0531   WBC 14.0* 14.1* 9.4   HGB 8.8* 8.9* 8.2*    182 174        Lab Results   Component Value Date    IRON 75 01/05/2018    TIBC 171 (L) 01/05/2018    FERRITIN 268 01/05/2018       Lab Results   Component Value Date    PTH 74 (H) 08/22/2017    PTH 67 (H) 02/22/2017     (H) 04/09/2016         There also was a mild defect present in the inferolateral  wall(s)   that was small  sized by quantification. The resting images showed no reversibility in the inferior or   inferolateral wall    Gated SPECT left ventricular ejection fraction was estimated about   60%.     Impression   The myocardial perfusion imaging was abnormal   The abnormality was a fixed defects in the moderate area of inferior   wall and small area of inferolateral wall   Overall left ventricular systolic function was normal, 60%   No recent stress test available for comparison. Assessment: / Plan:       1.   Chronic kidney disease stage G5/ESRD.   Continue dialytic support. PLAN:1. discharge post IHD treatment today     2.   Volume overload/fluid retention. PLAN:1.Attempt ultrafilteration as allowed by hemodynamics. 3.     Hypotension.  Echocardiogram done 12/23. 12/27/20 Nuc Med Stress Fixed defect  PLAN:1. Continue midodrine     4.   Anemia secondary to chronic kidney disease.   Target hemoglobin is less than goal of > 10 g/dL.    PLAN:1. Continue JESSE      5.  Renal Osteodystrophy.  Phosphorus levels markedly elevated upon presentation at 10.6 mg/dL down to 6.0 to 4.8 mg/dL.   PLAN:1. Follow calcium phosphorus and PTH levels.            Ela Weems MD

## 2020-12-27 NOTE — PROGRESS NOTES
Pharmacy Consultation Note  (Antibiotic Dosing and Monitoring)    Initial consult date: 12/22/2020  Consulting physician: Dr. Caroline Russell  Drug(s): Vancomycin  Indication: Pneumonia    Ht Readings from Last 1 Encounters:   12/21/20 5' 6\" (1.676 m)     Wt Readings from Last 1 Encounters:   12/27/20 216 lb 12.8 oz (98.3 kg)     Age/  Gender IBW DW  Allergy Information   70 y.o.  female 59.3 kg 75.7 kg  Pcn [penicillins], Sulfa antibiotics, and Bactrim [sulfamethoxazole-trimethoprim]         Date  Tmax WBC Dialysis Drug/Dose Time   Given Level(s)   (Time) Comments   12/22  (#1) afebrile 11.5 HD x 3.75 hr Vancomycin 1500 mg IV x 1 1537     12/23  (#2) afebrile 9.5 HD x 3.5 hr No vancomycin --     12/24  (#3) afebrile 8.1 No HD Vancomycin 750 mg IV x 1 dose 1701 Random level @ 0514 = 16.1 mcg/mL    12/25  (#4) afebrile 14 No HD No vancomycin -- Random level 0515 = 26 mcg/mL    12/26  (#5) afebrile 14.1 HD x 3.5 h Vancomycin 750 mg IV x 1 after dialysis 2219     12/27  (#6) afebrile 9.4 No HD No vancomycin --       (#7)            Estimated Creatinine Clearance: 19 mL/min (A) (based on SCr of 3.2 mg/dL (H)). UOP over the past 24 hours:       Intake/Output Summary (Last 24 hours) at 12/27/2020 1232  Last data filed at 12/27/2020 0747  Gross per 24 hour   Intake 740 ml   Output 1500 ml   Net -760 ml     Anti-infective Regimen:  Anti-infective Dose Date Initiated Date Stopped   Cefepime 1g IV q24hr 12/22      Cultures:  available culture and sensitivity results were reviewed in EPIC  Culture Date Result    COVID-19 12/21 Negative   Respiratory panel, molecular 12/22 Not detected   Legionella/strep pneumo urine ag 12/25 Negative          Assessment:  · Consulted by Dr. Caroline Russell to dose/monitor vancomycin  · Goal trough level:  15-20 mcg/mL  · Pt is a 70 yoF who presented from home after missing 3 consecutive dialysis treatments. Started on antibiotics for possible pneumonia.    · Hx ESRD, on hemodialysis Tues, Thurs, and Sat    Plan:  · No HD, no dose today  · Follow hemodialysis schedule for further dosing and levels  · Pharmacist will follow and monitor/adjust dosing as necessary      Thank you for the consult,    Jose Armando Richardson PharmD, BCPS 12/27/2020 12:32 PM   [phone]

## 2020-12-27 NOTE — PROGRESS NOTES
12/27/2020  [id number]  [id number]      Patient unavailable for physical therapy treatment due to out of room. Will attempt treatment at a later time/date.         Zayda Ruelas PTA  LIC# [id number]

## 2020-12-27 NOTE — CARE COORDINATION
Pt is unable to get transportation to dialysis center for tomorrow. So they pt will stay again tonight per Marcia Malave NP. She can go home after dialysis tomorrow. She is scheduled to resume at the dialysis center, 12/28 and 12/30, a chair time of 6am.  Pt will need to set up transportation. Nurse at dialysis center [facility] [phone]     Pt will not be going to the dialysis center tomorrow the 28th, she will get dialysis here at the hospital.    Upon discharge she will have her next appointment on 12-. She will need to set up transport to the dialysis center.

## 2020-12-27 NOTE — PROGRESS NOTES
bleeding. Physical Exam:  No intake/output data recorded. Intake/Output Summary (Last 24 hours) at 12/27/2020 1220  Last data filed at 12/27/2020 0747  Gross per 24 hour   Intake 980 ml   Output 1500 ml   Net -520 ml   I/O last 3 completed shifts: In: 980 [P.O.:480]  Out: 1500   Patient Vitals for the past 96 hrs (Last 3 readings):   Weight   12/27/20 0530 216 lb 12.8 oz (98.3 kg)   12/26/20 2316 216 lb 0.8 oz (98 kg)   12/26/20 1946 218 lb 4.1 oz (99 kg)     Vital Signs:   Blood pressure (!) 98/53, pulse 80, temperature 97.9 °F (36.6 °C), temperature source Oral, resp. rate 18, height 5' 6\" (1.676 m), weight 216 lb 12.8 oz (98.3 kg), SpO2 98 %, not currently breastfeeding. General appearance:  Alert, responsive, oriented to person, place, and time. Well preserved, alert, no distress. Head:  Normocephalic. No masses, lesions or tenderness. Eyes:  PERRLA. EOMI. Sclera clear. ENT:  Ears normal. Mucosa normal.  Impaired vision. Eyeglasses on. Neck:    Supple. Trachea midline. No thyromegaly. No JVD. No bruits. Heart:    Rhythm regular. Rate controlled. No murmurs. Lungs:    Symmetrical. Clear to auscultation bilaterally. No wheezes. No rhonchi. No rales. Abdomen:   Soft. Non-tender. Non-distended. Bowel sounds positive. No organomegaly or masses. No pain on palpation. Extremities:    Peripheral pulses present. No peripheral edema. No ulcers. No cyanosis. No clubbing. AV fistula left upper extremity. Neurologic:    Alert x 3. No focal deficit. Cranial nerves grossly intact. No focal weakness. Psych:   Behavior is normal. Mood appears normal. Speech is not rapid and/or pressured. Musculoskeletal:   Spine ROM normal. Muscular strength intact. Gait not assessed. Integumentary:  No rashes  Skin normal color and texture.   Genitalia/Breast:  Deferred    Medication:  Scheduled Meds:   albuterol  2.5 mg Nebulization 4x daily    folic acid  1 mg Oral Daily    insulin glargine  25 Units Subcutaneous Nightly    apixaban  5 mg Oral BID    midodrine  10 mg Oral Once per day on Mon Wed Fri    cefepime  1 g Intravenous Q24H    epoetin andrzej-epbx  10,000 Units Subcutaneous Once per day on Tue Thu Sat    And    epoetin andrzej-epbx  3,000 Units Subcutaneous Once per day on Tue Thu Sat    miconazole   Topical BID    vancomycin (VANCOCIN) intermittent dosing (placeholder)   Other RX Placeholder    atorvastatin  10 mg Oral Daily    b complex-C-folic acid  1 mg Oral Daily    calcium elemental  1,000 mg Oral TID WC    cyanocobalamin  1,000 mcg Oral Daily    budesonide-formoterol  2 puff Inhalation BID    lidocaine-prilocaine  1 each Topical Once per day on Mon Wed Fri    montelukast  10 mg Oral Nightly    sertraline  100 mg Oral Daily    levothyroxine  75 mcg Oral Daily    traZODone  50 mg Oral Nightly    insulin lispro  0-6 Units Subcutaneous TID WC    insulin lispro  0-3 Units Subcutaneous Nightly     Continuous Infusions:   sodium chloride Stopped (12/24/20 1600)    dextrose         Objective Data:  CBC with Differential:    Lab Results   Component Value Date    WBC 9.4 12/27/2020    RBC 2.32 12/27/2020    HGB 8.2 12/27/2020    HCT 25.2 12/27/2020     12/27/2020    .6 12/27/2020    MCH 35.3 12/27/2020    MCHC 32.5 12/27/2020    RDW 15.3 12/27/2020    NRBC 0.9 12/26/2020    SEGSPCT 54 05/18/2012    METASPCT 0.9 12/23/2020    LYMPHOPCT 5.0 12/27/2020    MONOPCT 8.5 12/27/2020    MYELOPCT 0.9 12/24/2020    BASOPCT 0.3 12/27/2020    MONOSABS 0.80 12/27/2020    LYMPHSABS 0.47 12/27/2020    EOSABS 0.31 12/27/2020    BASOSABS 0.03 12/27/2020     BMP:    Lab Results   Component Value Date     12/27/2020    K 4.2 12/27/2020    K 4.8 12/21/2020    CL 96 12/27/2020    CO2 30 12/27/2020    BUN 22 12/27/2020    LABALBU 2.0 12/27/2020    LABALBU 3.9 06/01/2012    CREATININE 3.2 12/27/2020    CALCIUM 8.4 12/27/2020    GFRAA 17 12/27/2020    LABGLOM 14 12/27/2020    GLUCOSE 97 12/27/2020    GLUCOSE 135 06/01/2012       Wound Documentation:   Wound 12/21/20 Abdomen Lower;Medial (Active)   Dressing Status Clean;Dry; Intact 12/22/20 0746   Dressing/Treatment Foam 12/21/20 2257   Wound Length (cm) 1.1 cm 12/21/20 2257   Wound Width (cm) 1.4 cm 12/21/20 2257   Wound Surface Area (cm^2) 1.54 cm^2 12/21/20 2257   Wound Assessment Dry;Pink/red 12/22/20 0746   Drainage Amount None 12/22/20 0746   Odor None 12/21/20 2257   Number of days: 1       Assessment:    · Acute on end-stage renal disease in the setting of missed dialysis x3 as an outpatient  · Healthcare associated pneumonia resulting in acute respiratory failure with hypoxia and negative Covid testing  · New onset atrial fibrillation with rapid ventricular response  · Essential hypertension  · Insulin-dependent diabetes mellitus type 2  · Hypothyroidism  · Hyperlipidemia  · Anemia of chronic disease with folic acid deficiency    Plan:     · Patient was seen and evaluated in the cardiac suite. We will await results from stress test.  Dr. Maurice De La Rosa to perform. · Continue folic acid secondary to deficiency  · Social service has set up for home oxygen-currently on 2 L nasal cannula with oxygen saturations greater than 90%. · Nephrology for dialysis. · Pending stress test will dictate further care and treatment upon discharge  · Blood pressures have somewhat low but patient denied any lightheadedness or dizziness. We will continue to monitor make adjustment in her medication regimen as warranted. More than 50% of my  time was spent at the bedside counseling/coordinating care with the patient and/or family with face to face contact. This time was spent reviewing notes and laboratory data as well as instructing and counseling the patient. Time I spent with the family or surrogate(s) is included only if the patient was incapable of providing the necessary information or participating in medical decisions.  I also discussed the differential diagnosis and all of the proposed management plans with the patient and individuals accompanying the patient. Torres Damico requires this high level of physician care and nursing on the Telemetry unit due the complexity of decision management and chance of rapid decline or death. Continued cardiac monitoring and higher level of nursing are required. I am readily available for any further decision-making and intervention. The patient was seen, examined and then discussed with Dr. Darline Gloria. Howard Mccoy, KACIE - CNP,   12/27/20  12:21 PM EST      I  discussed the patient's management with the Howard Mccoy NP-C. I reviewed the NP-C's note and agree with the documented findings and plan of care.     Shauna Hassan D.O., FACOI  1:51 PM  12/27/2020

## 2020-12-28 VITALS
DIASTOLIC BLOOD PRESSURE: 66 MMHG | HEIGHT: 66 IN | RESPIRATION RATE: 18 BRPM | OXYGEN SATURATION: 92 % | WEIGHT: 215.17 LBS | SYSTOLIC BLOOD PRESSURE: 121 MMHG | TEMPERATURE: 98.4 F | BODY MASS INDEX: 34.58 KG/M2 | HEART RATE: 76 BPM

## 2020-12-28 LAB
ANION GAP SERPL CALCULATED.3IONS-SCNC: 10 MMOL/L (ref 7–16)
ANISOCYTOSIS: ABNORMAL
BASOPHILIC STIPPLING: ABNORMAL
BASOPHILS ABSOLUTE: 0 E9/L (ref 0–0.2)
BASOPHILS RELATIVE PERCENT: 0 % (ref 0–2)
BUN BLDV-MCNC: 34 MG/DL (ref 8–23)
CALCIUM SERPL-MCNC: 8.3 MG/DL (ref 8.6–10.2)
CHLORIDE BLD-SCNC: 97 MMOL/L (ref 98–107)
CO2: 29 MMOL/L (ref 22–29)
CREAT SERPL-MCNC: 4.3 MG/DL (ref 0.5–1)
EOSINOPHILS ABSOLUTE: 0.37 E9/L (ref 0.05–0.5)
EOSINOPHILS RELATIVE PERCENT: 4 % (ref 0–6)
GFR AFRICAN AMERICAN: 12
GFR NON-AFRICAN AMERICAN: 10 ML/MIN/1.73
GLUCOSE BLD-MCNC: 128 MG/DL (ref 74–99)
HCT VFR BLD CALC: 26.3 % (ref 34–48)
HEMOGLOBIN: 7.9 G/DL (ref 11.5–15.5)
LYMPHOCYTES ABSOLUTE: 0.74 E9/L (ref 1.5–4)
LYMPHOCYTES RELATIVE PERCENT: 8 % (ref 20–42)
MAGNESIUM: 2 MG/DL (ref 1.6–2.6)
MCH RBC QN AUTO: 35.1 PG (ref 26–35)
MCHC RBC AUTO-ENTMCNC: 30 % (ref 32–34.5)
MCV RBC AUTO: 116.9 FL (ref 80–99.9)
METAMYELOCYTES RELATIVE PERCENT: 2 % (ref 0–1)
METER GLUCOSE: 184 MG/DL (ref 74–99)
MONOCYTES ABSOLUTE: 0.65 E9/L (ref 0.1–0.95)
MONOCYTES RELATIVE PERCENT: 7 % (ref 2–12)
MYELOCYTE PERCENT: 1 % (ref 0–0)
NEUTROPHILS ABSOLUTE: 7.53 E9/L (ref 1.8–7.3)
NEUTROPHILS RELATIVE PERCENT: 78 % (ref 43–80)
PDW BLD-RTO: 15.7 FL (ref 11.5–15)
PHOSPHORUS: 4.8 MG/DL (ref 2.5–4.5)
PLATELET # BLD: 190 E9/L (ref 130–450)
PMV BLD AUTO: 10.2 FL (ref 7–12)
POLYCHROMASIA: ABNORMAL
POTASSIUM SERPL-SCNC: 4.1 MMOL/L (ref 3.5–5)
RBC # BLD: 2.25 E12/L (ref 3.5–5.5)
SARS-COV-2, PCR: NOT DETECTED
SODIUM BLD-SCNC: 136 MMOL/L (ref 132–146)
WBC # BLD: 9.3 E9/L (ref 4.5–11.5)

## 2020-12-28 PROCEDURE — 6370000000 HC RX 637 (ALT 250 FOR IP): Performed by: INTERNAL MEDICINE

## 2020-12-28 PROCEDURE — 94660 CPAP INITIATION&MGMT: CPT

## 2020-12-28 PROCEDURE — 6360000002 HC RX W HCPCS: Performed by: INTERNAL MEDICINE

## 2020-12-28 PROCEDURE — 80048 BASIC METABOLIC PNL TOTAL CA: CPT

## 2020-12-28 PROCEDURE — 82962 GLUCOSE BLOOD TEST: CPT

## 2020-12-28 PROCEDURE — 90935 HEMODIALYSIS ONE EVALUATION: CPT

## 2020-12-28 PROCEDURE — 85025 COMPLETE CBC W/AUTO DIFF WBC: CPT

## 2020-12-28 PROCEDURE — 2700000000 HC OXYGEN THERAPY PER DAY

## 2020-12-28 PROCEDURE — 84100 ASSAY OF PHOSPHORUS: CPT

## 2020-12-28 PROCEDURE — 83735 ASSAY OF MAGNESIUM: CPT

## 2020-12-28 PROCEDURE — 94640 AIRWAY INHALATION TREATMENT: CPT

## 2020-12-28 PROCEDURE — 94669 MECHANICAL CHEST WALL OSCILL: CPT

## 2020-12-28 PROCEDURE — 36415 COLL VENOUS BLD VENIPUNCTURE: CPT

## 2020-12-28 RX ORDER — FOLIC ACID 1 MG/1
1 TABLET ORAL DAILY
Qty: 30 TABLET | Refills: 0 | Status: SHIPPED
Start: 2020-12-29 | End: 2021-05-10 | Stop reason: SDUPTHER

## 2020-12-28 RX ORDER — DOXYCYCLINE HYCLATE 100 MG
100 TABLET ORAL 2 TIMES DAILY WITH MEALS
Qty: 10 TABLET | Refills: 0 | Status: SHIPPED | OUTPATIENT
Start: 2020-12-28 | End: 2021-01-02

## 2020-12-28 RX ORDER — INSULIN GLARGINE 100 [IU]/ML
5 INJECTION, SOLUTION SUBCUTANEOUS NIGHTLY
Qty: 5 PEN | Refills: 5 | Status: SHIPPED
Start: 2020-12-28 | End: 2021-05-10 | Stop reason: SDUPTHER

## 2020-12-28 RX ADMIN — ALBUTEROL SULFATE 2.5 MG: 2.5 SOLUTION RESPIRATORY (INHALATION) at 09:15

## 2020-12-28 RX ADMIN — EPOETIN ALFA-EPBX 2000 UNITS: 2000 INJECTION, SOLUTION INTRAVENOUS; SUBCUTANEOUS at 14:43

## 2020-12-28 RX ADMIN — LEVOTHYROXINE SODIUM 75 MCG: 75 TABLET ORAL at 05:47

## 2020-12-28 RX ADMIN — EPOETIN ALFA-EPBX 3000 UNITS: 3000 INJECTION, SOLUTION INTRAVENOUS; SUBCUTANEOUS at 14:43

## 2020-12-28 RX ADMIN — ANTI-FUNGAL POWDER MICONAZOLE NITRATE TALC FREE: 1.42 POWDER TOPICAL at 08:39

## 2020-12-28 RX ADMIN — INSULIN LISPRO 1 UNITS: 100 INJECTION, SOLUTION INTRAVENOUS; SUBCUTANEOUS at 08:35

## 2020-12-28 RX ADMIN — ALBUTEROL SULFATE 2.5 MG: 2.5 SOLUTION RESPIRATORY (INHALATION) at 06:09

## 2020-12-28 RX ADMIN — MIDODRINE HYDROCHLORIDE 10 MG: 5 TABLET ORAL at 08:34

## 2020-12-28 RX ADMIN — EPOETIN ALFA-EPBX 10000 UNITS: 10000 INJECTION, SOLUTION INTRAVENOUS; SUBCUTANEOUS at 14:44

## 2020-12-28 RX ADMIN — BUDESONIDE AND FORMOTEROL FUMARATE DIHYDRATE 2 PUFF: 160; 4.5 AEROSOL RESPIRATORY (INHALATION) at 06:10

## 2020-12-28 ASSESSMENT — PAIN SCALES - GENERAL
PAINLEVEL_OUTOF10: 0

## 2020-12-28 NOTE — DISCHARGE SUMMARY
Internal Medicine Progress Note     DIOR=[facility]     [facility]. Dania Guerra, BELTRAN Franco D.O., SHUN Velásquez, MSN, APRN, NP-C  Connor Soto. Ramonita Girard, MSN, APRN-CNP       Internal Medicine  Discharge Summary    NAME: April Asencio  :  1945  MRN:  [id number]  PCP:Ric Kelly DO  ADMITTED: 2020      DISCHARGED: 20    ADMITTING PHYSICIAN: Sally Shell DO    CONSULTANT(S):   IP CONSULT TO NEPHROLOGY  IP CONSULT TO NEPHROLOGY  IP CONSULT TO SOCIAL WORK  IP CONSULT TO PHARMACY  IP CONSULT TO CARDIOLOGY  IP CONSULT TO SOCIAL WORK     ADMITTING DIAGNOSIS:   Shortness of breath [R06.02]     DISCHARGE DIAGNOSES:   · Acute on end-stage renal disease in the setting of missed dialysis x3 as an outpatient  · Healthcare associated pneumonia resulting in acute respiratory failure with hypoxia and negative Covid testing  · New onset atrial fibrillation with rapid ventricular response  · Essential hypertension  · Insulin-dependent diabetes mellitus type 2  · Hypothyroidism  · Hyperlipidemia  · Anemia of chronic disease with folic acid deficiency    BRIEF HISTORY OF PRESENT ILLNESS:   Aj Quiroz is a 21-year-old female who presented to [address] [place] [address] emergency department after missing 3 dialysis appointments as an outpatient. She states she initially presented to hemodialysis last week with cold-like symptoms. She was notified by the dialysis unit that she was not to return until she tested negative for Covid. She was unable to undergo Covid testing and therefore missed 3 consecutive dialysis treatments. Upon presentation to the hospital, she is weak and deconditioned as expected. Work-up has revealed questionable multifocal pneumonia. Rapid Covid test was negative but respiratory film array panel is pending. She anticipates receiving hemodialysis today.     LABS[de-identified]  Lab Results   Component Value Date    WBC 9.3 12/28/2020    HGB 7.9 (L) 12/28/2020    HCT 26.3 (L) 12/28/2020     12/28/2020     12/28/2020    K 4.1 12/28/2020    CL 97 (L) 12/28/2020    CREATININE 4.3 (H) 12/28/2020    BUN 34 (H) 12/28/2020    CO2 29 12/28/2020    GLUCOSE 128 (H) 12/28/2020    ALT 5 12/27/2020    AST 11 12/27/2020    INR 1.0 05/23/2018     Lab Results   Component Value Date    INR 1.0 05/23/2018    INR 1.1 03/02/2018    INR 1.1 01/09/2018    PROTIME 11.8 05/23/2018    PROTIME 11.6 03/02/2018    PROTIME 13.0 (H) 01/09/2018      Lab Results   Component Value Date    TSH 1.170 12/22/2020     Lab Results   Component Value Date    TRIG 119 12/22/2020    TRIG 229 (H) 11/09/2020    TRIG 109 03/23/2018     Lab Results   Component Value Date    HDL 40 12/22/2020    HDL 40 11/09/2020    HDL 60 09/25/2019     Lab Results   Component Value Date    LDLCALC 59 12/22/2020    LDLCALC 89 11/09/2020    LDLCALC 72 09/25/2019     Lab Results   Component Value Date    LABA1C 7.3 (H) 12/22/2020       IMAGING:  Echo Complete    Result Date: 12/25/2020  Transthoracic Echocardiography Report (TTE)  Demographics   Patient Name    Tanesha Rice Gender            Female                  L   Medical Record  [id number]      Room Number       0421  Number   Account #       [de-identified]     Procedure Date    12/23/2020   Corporate ID                  Ordering                                Physician   Accession       [id number]    Referring  Number                        Physician   Date of Birth   1945    Sonographer       Xi Mathis RDABBY   Age             70 year(s)    Interpreting      Fish 66                                Physician         Physician Cardiology                                                  Isadordontae Bills DO                                 Any Other  Procedure Type of Study   TTE procedure  Procedure Date Date: 12/23/2020 Start: 05:42 PM Study Location: Echo Lab Technical Quality: Adequate visualization Indications:Atrial fibrillation. Patient Status: Routine Height: 66 inches Weight: 220 pounds BSA: 2.08 m^2 BMI: 35.51 kg/m^2 Rhythm: Within normal limits HR: 90 bpm BP: 94/56 mmHg  Findings   Left Ventricle  Left ventricular size is grossly normal.  No evidence of left ventricular mass or thrombus noted. Normal left ventricular wall thickness. No regional wall motion abnormalities seen. Ejection fraction is measured at 74%. Right Ventricle  Normal right ventricular size and function. No evidence of a thrombus in the right ventricle. Left Atrium  The left atrium is borderline dilated. Interatrial septum appears intact. No evidence of thrombus within left atrium. Right Atrium  Normal right atrium size. Mitral Valve  Physiologic and/or trace mitral regurgitation is present. No evidence of mitral valve stenosis. Tricuspid Valve  Physiologic and/or trace tricuspid regurgitation. Aortic Valve  Aortic valve opens well. The aortic valve is trileaflet. No evidence of aortic valve regurgitation. No hemodynamically significant aortic stenosis is present. Pulmonic Valve  Pulmonic valve is structurally normal.   Pericardial Effusion  No evidence of pericardial effusion. Aorta  The aorta is within normal limits. Miscellaneous  Regular rhythm. Conclusions   Summary  Left ventricular size is grossly normal.  No evidence of left ventricular mass or thrombus noted. Normal left ventricular wall thickness. No regional wall motion abnormalities seen. Ejection fraction is measured at 74%. The left atrium is borderline dilated. Interatrial septum appears intact. No evidence of thrombus within left atrium. Physiologic and/or trace mitral regurgitation is present. No evidence of mitral valve stenosis. Physiologic and/or trace tricuspid regurgitation. Regular rhythm.    Signature   ----------------------------------------------------------------  Electronically signed by Marilyn Madrid DO(Interpreting physician) on 12/25/2020 04:16 PM  ----------------------------------------------------------------  M-Mode/2D Measurements & Calculations   LV Diastolic     LV Systolic Dimension: 2.5 cm   AV Cusp Separation: 2  Dimension: 4.4   LV Volume Diastolic: 82.5 ml    cmAO Root Dimension: 3.1  cm               LV Volume Systolic: 56.1 ml     cm  LV FS:43.2 %     LV EDV/LV EDV Index: 86.3 FR/41  LV PW Diastolic: UA/O^9UT ESV/LV ESV Index: 22.1  1.1 cm           ml/11ml/ m^2  Septum           EF Calculated: 74.4 %           RV Diastolic Dimension:  Diastolic: 1.1   LV Mass Index: 81 l/min*m^2     2.7 cm  cm               LV Length: 6.9 cm  CO: 4.13 l/min                                   LA/Aorta: 1.06  CI: 1.99 l/m*m^2 LVOT: 2 cm  LV Mass: 168.92                                  LA volume/Index: 43.8 ml  g                                                /21.04ml/m^2  Doppler Measurements & Calculations   MV Peak E-Wave: 0.6 AV Peak Velocity:     LVOT Peak Velocity: 0.92 m/s  m/s                 1.46 m/s              LVOT Mean Velocity: 0.59 m/s  MV Peak A-Wave:     AV Peak Gradient:     LVOT Peak Gradient: 3.4 mmHgLVOT  0.58 m/s            8.48 mmHg             Mean Gradient: 1.7 mmHg  MV E/A Ratio: 1.04  AV Mean Velocity:     Estimated RAP:3 mmHg  MV Peak Gradient:   0.99 m/s  2.6 mmHg            AV Mean Gradient: 4.4  MV Mean Gradient:   mmHg                  TR Velocity:1.75 m/s  1.1 mmHg            AV VTI: 24.2 cm       TR Gradient:12.26 mmHg  MV Mean Velocity:   AV Area               PV Peak Velocity: 0.78 m/s  0.49 m/s            (Continuity):1.89     PV Peak Gradient: 2.46 mmHg  MV Deceleration     cm^2  Time: 256 msec                      LVOT VTI: 14.6 cm  MV Area             IVRT: 78.4 msec  (continuity): 1.8   Estimated PASP: 15.26  cm^2                mmHg  MV E' Septal  Velocity: 0.04 m/s  MV E' Lateral  Velocity: 7 m/s [email or web address] [id number]    Xr Chest (2 Vw)    Result Date: 12/25/2020  EXAMINATION: TWO XRAY VIEWS OF THE CHEST 12/25/2020 2:17 pm COMPARISON: Multiple prior chest series with the most recent dated July 15, 2019 HISTORY: ORDERING SYSTEM PROVIDED HISTORY: Dyspnea TECHNOLOGIST PROVIDED HISTORY: Reason for exam:->Dyspnea FINDINGS: Adequate and symmetric aeration of the lungs. There are ill-defined opacities in the left greater than right mid and lower lungs which were not present on prior exam.  No pleural effusion or pneumothorax. Trachea and central mainstem bronchi appear clear. Atherosclerotic disease in the aortic arch. Cardiac silhouette is mildly prominent. The remaining cardiomediastinal silhouette and pulmonary vascularity appear within normal limits. Osseous and thoracic soft tissue structures demonstrate no acute findings. 1.  Ill-defined opacities in the left greater than right mid and lower lungs concerning for underlying infectious process. 2.  Atherosclerotic disease and mild cardiomegaly. Normal pulmonary vascularity. RECOMMENDATION: (Recommend upright PA and lateral chest radiographs 6-8 weeks after resolution of patient's symptoms to ensure complete resolution of radiographic findings.)    Cta Chest W Contrast    Result Date: 12/21/2020  EXAMINATION: CTA OF THE CHEST 12/21/2020 6:07 pm TECHNIQUE: CTA of the chest was performed after the administration of intravenous contrast.  Multiplanar reformatted images are provided for review. MIP images are provided for review. Dose modulation, iterative reconstruction, and/or weight based adjustment of the mA/kV was utilized to reduce the radiation dose to as low as reasonably achievable.  COMPARISON: CT CHEST 01/12/2016 HISTORY: ORDERING SYSTEM PROVIDED HISTORY: DYSPNEA ON EXERTION, likely covid FINDINGS: Pulmonary Arteries: Pulmonary arteries are adequately opacified for evaluation. No evidence of intraluminal filling defect to suggest pulmonary embolism. Main pulmonary artery is normal in caliber. Mediastinum: Precarinal mildly enlarged lymph node measuring 11 mm in the short axis. Additional subcarinal and right hilar mild lymph node enlargement. Normal heart size. No pericardial effusion. Thoracic aorta is normal in caliber. Lungs/pleura: Right middle lobe lateral segment infiltrate and bilateral lower lobe infiltrates. Left perihilar and bilateral upper lobe small patchy infiltrates. No pleural effusion or pneumothorax. No mass lesion. Upper Abdomen: Limited images of the upper abdomen show no acute disease. Prior cholecystectomy and prior gastric surgery. Bones: No acute osseous abnormality. 1.  Bilateral multifocal infiltrates compatible with pneumonia. 2.  No PE or pleural effusion. 3.  Mediastinal and right hilar mild lymphadenopathy. 4.  Prior cholecystectomy and gastric surgery. Nm Cardiac Stress Test Nuclear Imaging    Result Date: 12/27/2020  Indication:  Chest Pain. Clinical History:   Patient has history of coronary artery disease. IMAGING: Myocardial perfusion imaging was performed at rest 30-35 minutes following the intravenous injection of 11.8 mCi of (Tc-Sestamibi) followed by 10 ml of Normal Saline. At peak exercise, the patient was injected intravenously with 26mCi of (Tc-Sestamibi) followed by 10 ml of Normal Saline. Gated post-stress tomographic imaging was performed 20-25 minutes after stress. FINDINGS: The overall quality of the study was adequate. Left ventricular cavity size was noted to be normal.. Rotational analog analysis demonstrated no motion artifact The gated SPECT stress imaging in the short, vertical long, and horizontal long axis demonstrated normal homogeneous tracer distribution throughout the myocardium.  A milddefect was present in the inferior wall(s) that was moderatesized by quantification. There also was a mild defect present in the inferolateral  wall(s) that was small  sized by quantification. The resting images showed no reversibility in the inferior or inferolateral wall Gated SPECT left ventricular ejection fraction was estimated about 60%. The myocardial perfusion imaging was abnormal The abnormality was a fixed defects in the moderate area of inferior wall and small area of inferolateral wall Overall left ventricular systolic function was normal, 60% No recent stress test available for comparison. HOSPITAL COURSE:   Evelyn Ordaz did well throughout hospitalization. Initially admitted December 22 with volume overload as she missed 3 outpatient hemodialysis appointments. Patient states that as she developed some respiratory symptoms they would not dialyze her unless she tested negative for Covid and she was unable to arrange this at home. She appeared to be volume overloaded and underwent subsequent dialysis treatments and reached euvolemia. Infectious work-up was undertaken as well and she was treated empirically for healthcare associated pneumonia. Work-up returned essentially negative aside from procalcitonin which was markedly elevated. She was maintained on antibiotic therapy and aggressive respiratory supportive measures and improve dramatically. Overall, she improved to the point where she can be discharged home however cardiology was consulted due to elevated cardiac enzymes and new onset atrial fibrillation. She underwent stress testing which did show a fixed defect. This was discussed with the cardiology team and no further work-up was warranted as this was not reversible and not felt to be acute. She was maintained on rate control medications and therapeutic anticoagulation. She will require close PCP and cardiology follow-up.      BRIEF PHYSICAL EXAMINATION AND LABORATORIES ON DAY OF DISCHARGE:  VITALS:  BP (!) 87/54   Pulse 89   Temp 98.8 °F route 4 times daily (before meals and nightly)             apixaban (ELIQUIS) 5 MG TABS tablet  Take 1 tablet by mouth 2 times daily             atorvastatin (LIPITOR) 10 MG tablet  TAKE 1 TABLET BY MOUTH EVERY NIGHT             B Complex-C-Folic Acid (TOD-RENETTA) TABS  Take 1 tablet by mouth daily Rx             Blood Glucose Monitoring Suppl (ACCU-CHEK DERREK SMARTVIEW) W/DEVICE KIT  1 kit by Does not apply route 4 times daily (before meals and nightly)             calcium carbonate 600 MG TABS tablet  Take 1 tablet by mouth 2 times daily             cyanocobalamin ([employer] VITAMIN B12) 1000 MCG tablet  Take 1 tablet by mouth daily             doxycycline hyclate (VIBRA-TABS) 100 MG tablet  Take 1 tablet by mouth 2 times daily (with meals) for 5 days             Elastic Bandages & Supports (WRIST SPLINT LEFT/RIGHT) MISC  1 each by Does not apply route daily as needed (numbness)             fluticasone-salmeterol (ADVAIR DISKUS) 100-50 MCG/DOSE diskus inhaler  Inhale 1 puff into the lungs 2 times daily Rinse mouth after use. folic acid (FOLVITE) 1 MG tablet  Take 1 tablet by mouth daily             glucose blood VI test strips (ACCU-CHEK SMARTVIEW) strip  1 each by In Vitro route 4 times daily (before meals and nightly) As needed.              insulin glargine (LANTUS SOLOSTAR) 100 UNIT/ML injection pen  Inject 5 Units into the skin nightly             insulin lispro (HUMALOG) 100 UNIT/ML injection vial  Inject 0-5 Units into the skin 3 times daily (before meals) *Per Sliding Scale*  1 unit for every 10 carbs   Takes 2 units with each meal             Insulin Pen Needle (B-D ULTRAFINE III SHORT PEN) 31G X 8 MM MISC  Inject 1 each into the skin daily             Insulin Pen Needle 32G X 4 MM MISC  1 each by Does not apply route daily Use as directed with insulin pen             iron sucrose (VENOFER) 20 MG/ML injection  Infuse 50 mg intravenously once a week Last Dose: 6/4/ 2020 at dialysis

## 2020-12-28 NOTE — PROGRESS NOTES
Pharmacy Consultation Note  (Antibiotic Dosing and Monitoring)    Initial consult date: 12/22/2020  Consulting physician: Dr. Tarik Johnston  Drug(s): Vancomycin  Indication: Pneumonia    Ht Readings from Last 1 Encounters:   12/21/20 5' 6\" (1.676 m)     Wt Readings from Last 1 Encounters:   12/28/20 215 lb 2.7 oz (97.6 kg)     Age/  Gender IBW DW  Allergy Information   70 y.o.  female 59.3 kg 75.7 kg  Pcn [penicillins], Sulfa antibiotics, and Bactrim [sulfamethoxazole-trimethoprim]         Date  Tmax WBC Dialysis Drug/Dose Time   Given Level(s)   (Time) Comments   12/22  (#1) afebrile 11.5 HD x 3.75 hr Vancomycin 1500 mg IV x 1 1537     12/23  (#2) afebrile 9.5 HD x 3.5 hr No vancomycin --     12/24  (#3) afebrile 8.1 No HD Vancomycin 750 mg IV x 1 dose 1701 Random level @ 0514 = 16.1 mcg/mL    12/25  (#4) afebrile 14 No HD No vancomycin -- Random level 0515 = 26 mcg/mL    12/26  (#5) afebrile 14.1 HD x 3.5 h Vancomycin 750 mg IV x 1 after dialysis 2219     12/27  (#6) afebrile 9.4 No HD No vancomycin --     12/28  (#7) afebrile 9.3 HD x 3.5 h         Estimated Creatinine Clearance: 14 mL/min (A) (based on SCr of 4.3 mg/dL (H)). UOP over the past 24 hours:       Intake/Output Summary (Last 24 hours) at 12/28/2020 1549  Last data filed at 12/28/2020 1441  Gross per 24 hour   Intake 1070 ml   Output 1900 ml   Net -830 ml     Anti-infective Regimen:  Anti-infective Dose Date Initiated Date Stopped   Cefepime 1g IV q24hr 12/22      Cultures:  available culture and sensitivity results were reviewed in EPIC  Culture Date Result    COVID-19 12/21 Negative   Respiratory panel, molecular 12/22 Not detected   Legionella/strep pneumo urine ag 12/25 Negative          Assessment:  · Consulted by Dr. Tarik Johnston to dose/monitor vancomycin  · Goal trough level:  15-20 mcg/mL  · Pt is a 70 yoF who presented from home after missing 3 consecutive dialysis treatments. Started on antibiotics for possible pneumonia.    · Hx ESRD, on hemodialysis Plan:  · No vancomycin today, patient being discharged home  · Follow hemodialysis schedule for further dosing and levels  · Pharmacist will follow and monitor/adjust dosing as necessary      Thank you for the consult,    Gina Brice, PharmD, BCPS 12/28/2020 3:51 PM   Ext: [phone]

## 2020-12-28 NOTE — FLOWSHEET NOTE
12/28/20 1319   Vital Signs   /66   Temp 98.4 °F (36.9 °C)   Pulse 76   Resp 18   Weight 215 lb 2.7 oz (97.6 kg)   Weight Method Bed scale   Percent Weight Change -1.61   Pain Assessment   Pain Assessment 0-10   Pain Level 0   Post-Hemodialysis Assessment   Machine Disinfection Process Exterior Machine Disinfection   Dialyzer Clearance Lightly streaked   Duration of Treatment (minutes) 215 minutes   Hemodialysis Intake (ml) 300 ml   Hemodialysis Output (ml) 1900 ml   NET Removed (ml) 1600 ml   Tolerated Treatment Good   Patient Response to Treatment pt tolerated tx well 1600 ml removed   Bilateral Breath Sounds Diminished   Edema Generalized   Edema Generalized Non-pitting

## 2021-01-05 ENCOUNTER — OFFICE VISIT (OUTPATIENT)
Dept: SLEEP MEDICINE | Age: 76
End: 2021-01-05
Payer: COMMERCIAL

## 2021-01-05 VITALS
DIASTOLIC BLOOD PRESSURE: 71 MMHG | RESPIRATION RATE: 20 BRPM | WEIGHT: 202.9 LBS | OXYGEN SATURATION: 93 % | HEART RATE: 102 BPM | HEIGHT: 66 IN | SYSTOLIC BLOOD PRESSURE: 103 MMHG | BODY MASS INDEX: 32.61 KG/M2

## 2021-01-05 DIAGNOSIS — G47.19 EXCESSIVE DAYTIME SLEEPINESS: ICD-10-CM

## 2021-01-05 DIAGNOSIS — G47.33 OBSTRUCTIVE SLEEP APNEA: Primary | ICD-10-CM

## 2021-01-05 DIAGNOSIS — G25.81 RLS (RESTLESS LEGS SYNDROME): ICD-10-CM

## 2021-01-05 PROCEDURE — 4040F PNEUMOC VAC/ADMIN/RCVD: CPT | Performed by: STUDENT IN AN ORGANIZED HEALTH CARE EDUCATION/TRAINING PROGRAM

## 2021-01-05 PROCEDURE — G8427 DOCREV CUR MEDS BY ELIG CLIN: HCPCS | Performed by: STUDENT IN AN ORGANIZED HEALTH CARE EDUCATION/TRAINING PROGRAM

## 2021-01-05 PROCEDURE — G8417 CALC BMI ABV UP PARAM F/U: HCPCS | Performed by: STUDENT IN AN ORGANIZED HEALTH CARE EDUCATION/TRAINING PROGRAM

## 2021-01-05 PROCEDURE — 3017F COLORECTAL CA SCREEN DOC REV: CPT | Performed by: STUDENT IN AN ORGANIZED HEALTH CARE EDUCATION/TRAINING PROGRAM

## 2021-01-05 PROCEDURE — 99214 OFFICE O/P EST MOD 30 MIN: CPT | Performed by: STUDENT IN AN ORGANIZED HEALTH CARE EDUCATION/TRAINING PROGRAM

## 2021-01-05 PROCEDURE — 1090F PRES/ABSN URINE INCON ASSESS: CPT | Performed by: STUDENT IN AN ORGANIZED HEALTH CARE EDUCATION/TRAINING PROGRAM

## 2021-01-05 PROCEDURE — 1123F ACP DISCUSS/DSCN MKR DOCD: CPT | Performed by: STUDENT IN AN ORGANIZED HEALTH CARE EDUCATION/TRAINING PROGRAM

## 2021-01-05 PROCEDURE — 1036F TOBACCO NON-USER: CPT | Performed by: STUDENT IN AN ORGANIZED HEALTH CARE EDUCATION/TRAINING PROGRAM

## 2021-01-05 PROCEDURE — 1111F DSCHRG MED/CURRENT MED MERGE: CPT | Performed by: STUDENT IN AN ORGANIZED HEALTH CARE EDUCATION/TRAINING PROGRAM

## 2021-01-05 PROCEDURE — G8484 FLU IMMUNIZE NO ADMIN: HCPCS | Performed by: STUDENT IN AN ORGANIZED HEALTH CARE EDUCATION/TRAINING PROGRAM

## 2021-01-05 PROCEDURE — G8399 PT W/DXA RESULTS DOCUMENT: HCPCS | Performed by: STUDENT IN AN ORGANIZED HEALTH CARE EDUCATION/TRAINING PROGRAM

## 2021-01-05 ASSESSMENT — SLEEP AND FATIGUE QUESTIONNAIRES
HOW LIKELY ARE YOU TO NOD OFF OR FALL ASLEEP IN A CAR, WHILE STOPPED FOR A FEW MINUTES IN TRAFFIC: 0
HOW LIKELY ARE YOU TO NOD OFF OR FALL ASLEEP WHEN YOU ARE A PASSENGER IN A CAR FOR AN HOUR WITHOUT A BREAK: 3
HOW LIKELY ARE YOU TO NOD OFF OR FALL ASLEEP WHILE LYING DOWN TO REST IN THE AFTERNOON WHEN CIRCUMSTANCES PERMIT: 3

## 2021-01-05 NOTE — PATIENT INSTRUCTIONS
It was a pleasure seeing you today Sydni Ventura! Summary of Today's Visit      - Please discuss you mild blurry vision and dizziness with your primary care doctor. PAP HELP  - Please try to  use your PAP device longer while sleeping (Goal: 7-8 hours nightly)  - Please consider PAP Nap (mini sleep study) to get more comfortable with wearing the PAP Mask or a referral to sleep psychology for desensitization to wearing the PAP mask  - Also consider using a CPAP pillow. - Please take a look at the common problems and solutions below or in your handout. Please note that complications of KAYKAY if not treated can increase risk for: systemic hypertension , pulmonary hypertension (high blood pressure in the lungs), cardiovascular morbidities (heart attack and stroke), car accidents and all cause mortality (increased risk of death). It is recommended that you be treated for sleep apnea. The general categories for the treatment of Sleep Apnea include:     1. Supportive Care  -Elevate the head of the bed   -Avoid sleeping on your back      2. Weight loss  -Start a healthy weight loss program  -Consider a referral to Weight Loss Medicine Clinic     3. Oral Appliance \"Mouth Piece\"  (Mandibular Advancement Device)     4. Positive pressure therapy with a machine and a mask (CPAP or BiPAP)     5. Different kinds of surgeries, including nasal surgery, surgery of the throat/windpipe, and nerve stimulation therapy (INSPIRE). Please call our sleep nurses to schedule a follow up at - [phone] option 2              1. Continue using your machine nightly     ------------Please bring your machine/Data Card to every visit. -------------     -Request all data downloads be sent to my nurse        2. Contact your [email or web address] company about supplies or issues with your machine.   As a general guideline, please replace your:  -CPAP mask every 3-6 months  -CPAP hose  every 3-6 months  -Filter every 2-4 weeks  -CPAP/BiPAP/ASV replacements every 5-7+ years     3. Continue healthy weight loss/stabilization, as this is recommended as a long-term intervention. 4. Please do not drive or operate machinery when you feel fatigued/sleepy/drowsy      5. Please try to sleep between 6-8 hours nightly with the CPAP mask    6. Please avoid sedatives, alcohol and caffeinated drinks at/near bed time. 7. Please call your supply (DME) company with any issues with your PAP device. Please call our office with any issues pertaining to the therapy.   ----Please note that complications of KAYKAY if not treated can increase risk for: systemic hypertension, pulmonary hypertension, cardiovascular morbidities (heart attack and stroke), car accidents and all cause mortality. For general questions or scheduling issues, call my nurse at [phone] option [id number] [place] [address], [address] [place], [place] [phone] option 2  f- [phone]           ----------------------------------------------------------  Common Issues and Solutions     Pillow is dislodging mask - Consider getting a CPAP pillow or switching to a mask with the hose at the top. Dry Mouth - Adjust Humidity and/or try Biotene Gel. (Excessive leak can also cause this)     Leak - Please call your equipment provider  as they may need to adjust your mask. Difficulty tolerating the PAP mask - Contact your equipment company to try a different mask, we can order a \"mini sleep study\"with the sleep tech to try different masks/settings of pressure, also we have a sleep psychologist to help with anxiety related to wearing the PAP mask      ----------------------------------------------------------     For Questions and Concerns: In case of difficulty with your PAP device or mask interface, please FIRST contact your [address] [facility] (The company who provides you the CPAP/BiPAP/ASV machine/supplies).      If you need the number for any other [employer] company, please call my Nurse at [phone] option 2     For questions concerning your [place] appointment: Call [phone] option 2  SLEEP LAB SCHEDULING:        ----------------------------------------------------------     Helpful Web Sites: For patients with ALL SLEEP DISORDERS: American Academy of Sleep Medicine http://sleepeducation. org; or Celula[employer].com: https://sleepfoundation. org  For patients with KAYKAY: American Sleep Apnea Association: http://lopez.org/. org  For patients with RLS: RLS Foundation: [place].  For patients with INSOMNIA: https://www.walker.org/. org/articles/sleep/insomnia-causes-and-cures. htm  For patients with DEPRESSION: W[employer].com.WinDensity/depression            Learning About CPAP for Sleep Apnea  What is CPAP? CPAP/Bi-PAP is a small machine that you use at home every night while you sleep. It increases air pressure in your throat to keep your airway open. When you have sleep apnea, this can help you sleep better so you feel much better. CPAP stands for \"continuous positive airway pressure. \" Bi-PAP is a different PAP setting that allows for different pressures when you breathe in and out. The CPAP/Bi-PAP machine will have one of the following:  · A mask that covers your nose and mouth  · Prongs that fit into your nose  · A mask that covers your nose only, the most common type. This type is called NCPAP. The N stands for \"nasal.\"  Why is it done? CPAP is a common/effective treatment for obstructive sleep apnea. How does it help? · CPAP can help you have more normal sleep, so you feel less sleepy and more alert during the daytime through the treatment of sleep apnea. · CPAP may help keep heart failure or other heart problems from getting worse. · CPAP may help lower your blood pressure. · If you use CPAP, your bed partner may also sleep better because you are snoring less. What are the side effects?   Some people who use CPAP have:  · A dry or stuffy nose and a sore throat. · Irritated skin on the face. · Sore eyes. · Bloating. If you have any of these problems, work with your doctor to fix them. Here are some things you can try:  · Be sure the mask or nasal prongs fit well. · See if your doctor can adjust the pressure of your CPAP. · If your nose is dry, try a humidifier. If these things do not help, you might try a different type of machine. Some machines have air pressure that adjusts on its own. Others have air pressures that are different when you breathe in than when you breathe out. This may reduce discomfort caused by too much pressure in your nose. Where can you learn more? Go to https://Insurance Noodle.DiabetOmics. org and sign in to your DrAvailable account. Enter J847 in the YieldMo box to learn more about \"Learning About CPAP for Sleep Apnea. \"     If you do not have an account, please click on the \"Sign Up Now\" link. Current as of: February 24, 2020               Content Version: 12.5  © 9438-1567 Healthwise, Incorporated. Care instructions adapted under license by [facility] [place] ([facility]). If you have questions about a medical condition or this instruction, always ask your healthcare professional. Bill Baker any warranty or liability for your use of this information.

## 2021-01-05 NOTE — PROGRESS NOTES
[facility] Sleep Medicine    Patient Name: rBad Romo  Age: 70 y.o.   : 1945    Date of Visit: 21        Review of Last Visit Summary:    The patient was last seen on 2020by TIMMY Barrow for  Obstructive Sleep Apnea, Obesity. Excessive Daytime Sleepiness, RLS, and Insomnia. Interim History:     Brad Romo is a 70 y.o. female in office for follow up. Patient reports that she has not been using her BiPAP machine due to discomfort. She states that she is able to tolerate it at start up pressures, but that it is too intense at times during the night. She does state however that she did not use it very long before discontinuing. Patient also reports that she was recently admitted to the hospital for cardiac arrhythmia and pneumonia. Patient is interested in restarting Pap therapy due to the recommendation of her inpatient physicians. Benefits: n/a  DME: Micaela      No data download available today.  Reported compliance:  Not currently using it        Past Medical History:  Past Medical History:   Diagnosis Date    Anemia     Arthritis     RA    Asthma     Chronic kidney disease     Depression     Hemodialysis patient ([facility] [address]     T-Th-Sat    Hyperlipidemia     Hypothyroid     Sleep apnea     no CPAP    Type II or unspecified type diabetes mellitus without mention of complication, not stated as uncontrolled     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 ([facility] [address] 6/15/2017       Past Surgical History:        Procedure Laterality Date    CARPAL TUNNEL RELEASE Left 2020    LEFT CARPAL TUNNEL RELEASE performed by Serge Sanchez MD at [facility] Right 10/7/2020    RIGHT CARPAL TUNNEL RELEASE performed by Serge Sanchez MD at [facility] [place]       DIALYSIS FISTULA CREATION Left 2018    AV fistula arm/Dr. Brandon Gonzalez    EYE SURGERY Right     cataract    FOOT SURGERY Left , pin put in, then pin removed   [facility]  2004    HAND SURGERY Right 2012    ligament was cut    HERNIA REPAIR      KNEE SURGERY  2009    R knee left side    SC REVISE AV FISTULA,W/O THROMBECTOMY Left 5/23/2018    SUPERFICIALIZATION AV FISTULA - LEFT UPPER ARM performed by Odalis Valle MD at [facility]         Allergies:  is allergic to pcn [penicillins]; sulfa antibiotics; and bactrim [sulfamethoxazole-trimethoprim]. Social History:    Social History     Tobacco Use    Smoking status: Never Smoker    Smokeless tobacco: Never Used   Substance Use Topics    Alcohol use: Yes     Comment: rarely. 2-3 coffee per day    Drug use: No        Family History:   No family history on file.     Current Medications:    Current Outpatient Medications:     apixaban (ELIQUIS) 5 MG TABS tablet, Take 1 tablet by mouth 2 times daily, Disp: 60 tablet, Rfl: 0    insulin glargine (LANTUS SOLOSTAR) 100 UNIT/ML injection pen, Inject 5 Units into the skin nightly (Patient taking differently: Inject 25 Units into the skin nightly ), Disp: 5 pen, Rfl: 5    miconazole (MICOTIN) 2 % powder, Apply topically 2 times daily to excoriated areas, Disp: 45 g, Rfl: 1    montelukast (SINGULAIR) 10 MG tablet, Take 1 tablet by mouth nightly, Disp: 90 tablet, Rfl: 0    albuterol sulfate  (90 Base) MCG/ACT inhaler, Inhale 1 puff into the lungs every 4 hours as needed for Wheezing, Disp: 1 Inhaler, Rfl: 3    fluticasone-salmeterol (ADVAIR DISKUS) 100-50 MCG/DOSE diskus inhaler, Inhale 1 puff into the lungs 2 times daily Rinse mouth after use., Disp: 1 Inhaler, Rfl: 5    sertraline (ZOLOFT) 100 MG tablet, TAKE 1 TABLET BY MOUTH EVERY NIGHT, Disp: 30 tablet, Rfl: 0    cyanocobalamin (CVS VITAMIN B12) 1000 MCG tablet, Take 1 tablet by mouth daily, Disp: 30 tablet, Rfl: 3    traZODone (DESYREL) 50 MG tablet, Take 1 tablet by mouth nightly, Disp: 90 tablet, Rfl: 1    SYNTHROID 75 MCG tablet, Take 1 tablet by mouth Daily, Disp: 30 tablet, Rfl: 3    rOPINIRole (REQUIP) 1 MG tablet, TAKE 1 TABLET BY MOUTH EVERY NIGHT, Disp: 1 tablet, Rfl: 1    Alcohol Swabs (ALCOHOL PREP) 70 % PADS, 1 each by Does not apply route 4 times daily (before meals and nightly), Disp: 200 each, Rfl: 3    atorvastatin (LIPITOR) 10 MG tablet, TAKE 1 TABLET BY MOUTH EVERY NIGHT, Disp: 90 tablet, Rfl: 5    Elastic Bandages & Supports (WRIST SPLINT LEFT/RIGHT) MISC, 1 each by Does not apply route daily as needed (numbness), Disp: 1 each, Rfl: 0    Misc. Devices (BARIATRIC ROLLATOR) MISC, 1 Device by Does not apply route continuous prn (Walking), Disp: 1 each, Rfl: 0    calcium carbonate 600 MG TABS tablet, Take 1 tablet by mouth 2 times daily (Patient taking differently: Take 2 tablets by mouth 3 times daily (with meals) Rx), Disp: 60 tablet, Rfl: 2    Insulin Pen Needle (B-D ULTRAFINE III SHORT PEN) 31G X 8 MM MISC, Inject 1 each into the skin daily, Disp: 100 each, Rfl: 5    nitroGLYCERIN (NITROSTAT) 0.4 MG SL tablet, Place 1 tablet under the tongue every 5 minutes as needed for Chest pain up to max of 3 total doses.  If no relief, call 911., Disp: 25 tablet, Rfl: 3    Methoxy PEG-Epoetin Beta (MIRCERA) 50 MCG/0.3ML SOSY, Inject 50 mcg as directed every 14 days Last Dose: 4/4/2019 Next Dose: 6/13/2019, Disp: , Rfl:     lidocaine-prilocaine (EMLA) 2.5-2.5 % cream, Apply 1 each topically three times a week Tuesday, Thursday, Saturday, Disp: , Rfl:     insulin lispro (HUMALOG) 100 UNIT/ML injection vial, Inject 0-5 Units into the skin 3 times daily (before meals) *Per Sliding Scale* 1 unit for every 10 carbs  Takes 2 units with each meal, Disp: , Rfl:     iron sucrose (VENOFER) 20 MG/ML injection, Infuse 50 mg intravenously once a week Last Dose: 6/4/ 2020 at dialysis, Disp: , Rfl:     B Complex-C-Folic Acid (TOD-RENETTA) TABS, Take 1 tablet by mouth daily Rx, Disp: , Rfl:     midodrine (PROAMATINE) 5 MG tablet, Take 5 mg by mouth three times a week Tuesday, Thursday, Saturday, Disp: , Rfl:     Insulin Pen Needle 32G X 4 MM MISC, 1 each by Does not apply route daily Use as directed with insulin pen, Disp: 100 each, Rfl: 3    glucose blood VI test strips (ACCU-CHEK SMARTVIEW) strip, 1 each by In Vitro route 4 times daily (before meals and nightly) As needed. , Disp: 150 each, Rfl: 3    ACCU-CHEK FASTCLIX LANCETS MISC, USE TO TEST BLOOD SUGAR FOUR TIMES DAILY BEFORE MEALS AND NIGHTLY, Disp: 612 each, Rfl: 3    Blood Glucose Monitoring Suppl (ACCU-CHEK DERREK SMARTVIEW) W/DEVICE KIT, 1 kit by Does not apply route 4 times daily (before meals and nightly), Disp: 1 kit, Rfl: 0    folic acid (FOLVITE) 1 MG tablet, Take 1 tablet by mouth daily (Patient not taking: Reported on 1/5/2021), Disp: 30 tablet, Rfl: 0          Objective:   PHYSICAL EXAM:    /71 (Site: Right Upper Arm, Position: Sitting, Cuff Size: Large Adult)   Pulse 102   Resp 20   Ht 5' 6\" (1.676 m)   Wt 202 lb 14.4 oz (92 kg)   SpO2 93%   BMI 32.75 kg/m²       (Sleep ROS above)     Constitutional: no chills, no fever   Eyes: no blurred vision and no eyesight problems. ENT: no hoarseness and no sore throat. Cardiovascular: no chest pain,   Respiratory: no cough, no shortness of breath   Gastrointestinal:  no nausea,  no vomiting, no diarrhea. Musculoskeletal: no arthralgias, no back pain and no myalgias. Integumentary: no rashes or lacerations. Neurological:  ,  no headache, no memory changes,  and no tingling. Mild blurry vision and dizziness, since being discharged from the hospital (9 days ago)  Endocrine: No chills      Physical exam:  Gen: No acute distress. BMI of Body mass index is 32.75 kg/m². Currently in a wheelchair  Eyes: PERRL. No sclera icterus. No conjunctival injection. ENT: No nasal/oral discharge. Pharynx clear. Neck: Trachea midline. No obvious mass. Neck circumference Neck circumference: 16.5   Resp: No accessory muscle use. No crackles.  No wheezes. No rhonchi. CV: Regular rate. Regular rhythm. No murmur or rub. Skin: Warm and dry. No bleeding observed   M/S: No cyanosis. No obvious joint deformity. Neuro: Awake. Alert. Moves all four extremities. Psych: Alert and oriented. No anxiety. Sleep Medicine 1/5/2021 9/30/2020   Sitting and reading 1 1   Watching TV 3 3   Sitting, inactive in a public place (e.g. a theatre or a meeting) 3 0   As a passenger in a car for an hour without a break 3 3   Lying down to rest in the afternoon when circumstances permit 3 3   Sitting and talking to someone 0 0   Sitting quietly after a lunch without alcohol 0 1   In a car, while stopped for a few minutes in traffic 0 0   Total score 13 11   Neck circumference 16.5 16       DATA:     Home sleep study 1/9/2019. AHI 36. Titration study 10/20/2020. Recommendation bilevel 20/16 CWP      Assessment:      Donna Stephenson was seen today for sleep apnea. Diagnoses and all orders for this visit:    Obstructive sleep apnea  -     DME Order for CPAP as OP    BMI 34.0-34.9,adult    Excessive daytime sleepiness    RLS (restless legs syndrome)    ·    Plan:       1. Obstructive Sleep Apnea. Severe. Patient is not currently using Pap therapy, however she is willing to retry it. Patient is currently scribed BiPAP at settings likely of 20/16 CWP as recommended on her titration study. Data download is available for comparison today. Patient was advised about PAP naps and sleep psychology referral which could help her tolerate the mask at night, however the patient wishes to restart BiPAP on her own at this time. Patient was advised to call in 2 weeks with a status update. We will follow-up in 2 months.  - Continue current PAP settings    2. Obesity. Body mass index is 32.75 kg/m². Patient is currently losing weight after her inpatient stay. Follow-up at next visit.  -Healthy weight loss advised    3. RLS. Stable when she takes her Requip.   Follow-up at next visit.    4.  ESS. Sleepy most days. Currently sleeping only 2 hours off PAP therapy. Patient will try to restart Pap therapy. Patient reports feeling fatigued after hemodialysis, which she had today. Follow up: Return in about 2 months (around 3/5/2021) for Follow up KAYKAY.

## 2021-01-20 ENCOUNTER — OFFICE VISIT (OUTPATIENT)
Dept: FAMILY MEDICINE CLINIC | Age: 76
End: 2021-01-20
Payer: COMMERCIAL

## 2021-01-20 VITALS
BODY MASS INDEX: 33.27 KG/M2 | DIASTOLIC BLOOD PRESSURE: 86 MMHG | HEIGHT: 66 IN | WEIGHT: 207 LBS | TEMPERATURE: 97.1 F | OXYGEN SATURATION: 98 % | HEART RATE: 56 BPM | SYSTOLIC BLOOD PRESSURE: 136 MMHG | RESPIRATION RATE: 20 BRPM

## 2021-01-20 DIAGNOSIS — J18.9 PNEUMONIA OF BOTH LOWER LOBES DUE TO INFECTIOUS ORGANISM: Primary | ICD-10-CM

## 2021-01-20 PROCEDURE — 1111F DSCHRG MED/CURRENT MED MERGE: CPT | Performed by: FAMILY MEDICINE

## 2021-01-20 PROCEDURE — G8399 PT W/DXA RESULTS DOCUMENT: HCPCS | Performed by: FAMILY MEDICINE

## 2021-01-20 PROCEDURE — 3017F COLORECTAL CA SCREEN DOC REV: CPT | Performed by: FAMILY MEDICINE

## 2021-01-20 PROCEDURE — 1036F TOBACCO NON-USER: CPT | Performed by: FAMILY MEDICINE

## 2021-01-20 PROCEDURE — 4040F PNEUMOC VAC/ADMIN/RCVD: CPT | Performed by: FAMILY MEDICINE

## 2021-01-20 PROCEDURE — G8484 FLU IMMUNIZE NO ADMIN: HCPCS | Performed by: FAMILY MEDICINE

## 2021-01-20 PROCEDURE — 1090F PRES/ABSN URINE INCON ASSESS: CPT | Performed by: FAMILY MEDICINE

## 2021-01-20 PROCEDURE — 99213 OFFICE O/P EST LOW 20 MIN: CPT | Performed by: FAMILY MEDICINE

## 2021-01-20 PROCEDURE — G8427 DOCREV CUR MEDS BY ELIG CLIN: HCPCS | Performed by: FAMILY MEDICINE

## 2021-01-20 PROCEDURE — 1123F ACP DISCUSS/DSCN MKR DOCD: CPT | Performed by: FAMILY MEDICINE

## 2021-01-20 PROCEDURE — G8417 CALC BMI ABV UP PARAM F/U: HCPCS | Performed by: FAMILY MEDICINE

## 2021-01-20 RX ORDER — FAMOTIDINE 40 MG/1
TABLET, FILM COATED ORAL
COMMUNITY
Start: 2021-01-08

## 2021-01-20 NOTE — PROGRESS NOTES
Subjective:  70 y.o. female who presents to the office today with chief complaint:  Chief Complaint   Patient presents with    Diabetes    Numbness     Having numbness on right side of mouth.  Health Maintenance     AWV scheduled. Drooling: Out of right side of mouth. Started while in the hospital. Some tingling and numbness around the lips that has resolved. Denies pain or open area at corner of mouth. Normal tongue control. Denies HA. Vision more blurry generally, no visual field losses. Denies hemiplegia. Denies loss of bowel, bladder. Asthma: Doing well on advair. Uses rescue inhaler twice per week. Breathing is at baseline. Hypertension: Will take midodrine if BP too low while at dialysis. Hypothyroidism: On synthroid 75mcg daily. Hyperlipidemia: on Atorvastatin 10mg daily. Rheumatoid arthritis: Not on any medication for this. No complaints of joint pain at this time. No recent flares. A. Fib: New onset while hospitalized after missing dialysis in December. Currently on eliquis 5mg. Follows with Dr. Kellie Solano in February to follow-up on this. Denies symptoms of a-fib. No worsening shortness of breath. Macrocytic anemia: Due to follow with GI regarding this- 3/21 with [facility]. Has hx of gastric bypass. ESRD: Follows Dr. Farhat Perez. Dialysis T,R,Sat    Anxiety and depression: Sertraline 100mg daily. Mood stable on this. Health Maintenance Due   Topic Date Due    Diabetic foot exam  01/25/1955    Shingles Vaccine (1 of 2) 01/25/1997    Annual Wellness Visit (AWV)  06/21/2019    Flu vaccine (1) 09/01/2020     Podiatrist: Dr. Steven Tristan- last visit 2 months ago. Will fill out BARBARA. Cancer History: None. Family Cancer History: none.      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 General:  Fever, chills, diaphoresis, fatigue, malaise, night sweats, weight loss  Psychological:  Anxiety, disorientation, hallucinations. ENT:  Epistaxis, headaches, vertigo, visual changes. Cardiovascular:  Chest pain, irregular heartbeats, palpitations, paroxysmal nocturnal dyspnea. Respiratory:  Shortness of breath, coughing, sputum production, hemoptysis, wheezing, orthopnea. Gastrointestinal:  Nausea, vomiting, diarrhea, heartburn, constipation, abdominal pain, hematemesis, hematochezia, melena, acholic stools  Genito-Urinary:  Dysuria, urgency, frequency, hematuria  Musculoskeletal:  Joint pain, joint stiffness, joint swelling, muscle pain  Neurology:  Headache, focal neurological deficits, weakness, numbness, paresthesia  Extremities:  Decreased ROM, peripheral edema, mottling      Objective:  Vitals:    01/20/21 1156   BP: 136/86   Pulse: 56   Resp: 20   Temp: 97.1 °F (36.2 °C)   SpO2: 98%     Physical Exam  Vitals signs and nursing note reviewed. Constitutional:       General: She is not in acute distress. Appearance: She is well-developed. She is obese. She is not ill-appearing, toxic-appearing or diaphoretic. Comments: Using large-base quad cane to walk. HENT:      Head: Normocephalic and atraumatic. Right Ear: External ear normal.      Left Ear: External ear normal.      Nose: Nose normal.   Eyes:      General:         Right eye: No discharge. Left eye: No discharge. Conjunctiva/sclera: Conjunctivae normal.      Pupils: Pupils are equal, round, and reactive to light. Neck:      Musculoskeletal: Normal range of motion and neck supple. Cardiovascular:      Rate and Rhythm: Normal rate and regular rhythm. Heart sounds: Normal heart sounds. No murmur. No friction rub. No gallop. Pulmonary:      Effort: Pulmonary effort is normal. No respiratory distress. if needed for medical concerns. Patient advised to call at any time to cancel or re-scheduleor for any questions/concerns. Please note that >15 minutes was spent face-to-face with the patient gathering history, performing physical exam, discussing findings, counseling the patient, and determining planforward. All questions and concerns addressed and answered.          Ramila Kelly,    1/20/21    8:34 AM

## 2021-01-23 NOTE — PROGRESS NOTES
side    IN REVISE AV FISTULA,W/O THROMBECTOMY Left 5/23/2018    SUPERFICIALIZATION AV FISTULA - LEFT UPPER ARM performed by Angel Mullen MD at [facility]           Current Outpatient Medications   Medication Sig Dispense Refill    famotidine (PEPCID) 40 MG tablet TAKE 1 TABLET BY MOUTH DAILY      apixaban (ELIQUIS) 5 MG TABS tablet Take 1 tablet by mouth 2 times daily 60 tablet 0    insulin glargine (LANTUS SOLOSTAR) 100 UNIT/ML injection pen Inject 5 Units into the skin nightly (Patient taking differently: Inject 25 Units into the skin nightly ) 5 pen 5    folic acid (FOLVITE) 1 MG tablet Take 1 tablet by mouth daily 30 tablet 0    montelukast (SINGULAIR) 10 MG tablet Take 1 tablet by mouth nightly 90 tablet 0    albuterol sulfate  (90 Base) MCG/ACT inhaler Inhale 1 puff into the lungs every 4 hours as needed for Wheezing 1 Inhaler 3    fluticasone-salmeterol (ADVAIR DISKUS) 100-50 MCG/DOSE diskus inhaler Inhale 1 puff into the lungs 2 times daily Rinse mouth after use. 1 Inhaler 5    sertraline (ZOLOFT) 100 MG tablet TAKE 1 TABLET BY MOUTH EVERY NIGHT 30 tablet 0    cyanocobalamin ([employer] VITAMIN B12) 1000 MCG tablet Take 1 tablet by mouth daily 30 tablet 3    traZODone (DESYREL) 50 MG tablet Take 1 tablet by mouth nightly 90 tablet 1    SYNTHROID 75 MCG tablet Take 1 tablet by mouth Daily 30 tablet 3    rOPINIRole (REQUIP) 1 MG tablet TAKE 1 TABLET BY MOUTH EVERY NIGHT 1 tablet 1    Alcohol Swabs (ALCOHOL PREP) 70 % PADS 1 each by Does not apply route 4 times daily (before meals and nightly) 200 each 3    atorvastatin (LIPITOR) 10 MG tablet TAKE 1 TABLET BY MOUTH EVERY NIGHT 90 tablet 5    Elastic Bandages & Supports (WRIST SPLINT LEFT/RIGHT) MISC 1 each by Does not apply route daily as needed (numbness) 1 each 0    Misc.  Devices (BARIATRIC ROLLATOR) MISC 1 Device by Does not apply route continuous prn (Walking) 1 each 0    calcium carbonate 600 MG TABS tablet Take 1 tablet by mouth 2 times daily (Patient taking differently: Take 2 tablets by mouth 3 times daily (with meals) Rx) 60 tablet 2    Insulin Pen Needle (B-D ULTRAFINE III SHORT PEN) 31G X 8 MM MISC Inject 1 each into the skin daily 100 each 5    nitroGLYCERIN (NITROSTAT) 0.4 MG SL tablet Place 1 tablet under the tongue every 5 minutes as needed for Chest pain up to max of 3 total doses. If no relief, call 911. 25 tablet 3    Methoxy PEG-Epoetin Beta (MIRCERA) 50 MCG/0.3ML SOSY Inject 50 mcg as directed every 14 days Last Dose: 4/4/2019  Next Dose: 6/13/2019      lidocaine-prilocaine (EMLA) 2.5-2.5 % cream Apply 1 each topically three times a week Tuesday, Thursday, Saturday      insulin lispro (HUMALOG) 100 UNIT/ML injection vial Inject 0-5 Units into the skin 3 times daily (before meals) *Per Sliding Scale*  1 unit for every 10 carbs   Takes 2 units with each meal      iron sucrose (VENOFER) 20 MG/ML injection Infuse 50 mg intravenously once a week Last Dose: 6/4/ 2020 at dialysis      B Complex-C-Folic Acid (TOD-RENETTA) TABS Take 1 tablet by mouth daily Rx      midodrine (PROAMATINE) 5 MG tablet Take 5 mg by mouth three times a week Tuesday, Thursday, Saturday      Insulin Pen Needle 32G X 4 MM MISC 1 each by Does not apply route daily Use as directed with insulin pen 100 each 3    glucose blood VI test strips (ACCU-CHEK SMARTVIEW) strip 1 each by In Vitro route 4 times daily (before meals and nightly) As needed. 150 each 3    ACCU-CHEK FASTCLIX LANCETS MISC USE TO TEST BLOOD SUGAR FOUR TIMES DAILY BEFORE MEALS AND NIGHTLY 612 each 3    Blood Glucose Monitoring Suppl (ACCU-CHEK DERREK SMARTVIEW) W/DEVICE KIT 1 kit by Does not apply route 4 times daily (before meals and nightly) 1 kit 0     No current facility-administered medications for this visit.           Allergies as of 02/03/2021 - Review Complete 02/03/2021   Allergen Reaction Noted    Pcn [penicillins] Hives 11/13/2014    Sulfa antibiotics Other (See Comments) 11/13/2014    Bactrim [sulfamethoxazole-trimethoprim] Hives 11/13/2014       Social History     Socioeconomic History    Marital status:      Spouse name: Not on file    Number of children: Not on file    Years of education: Not on file    Highest education level: Not on file   Occupational History    Not on file   Social Needs    Financial resource strain: Not on file    Food insecurity     Worry: Not on file     Inability: Not on file    Transportation needs     Medical: Not on file     Non-medical: Not on file   Tobacco Use    Smoking status: Never Smoker    Smokeless tobacco: Never Used   Substance and Sexual Activity    Alcohol use: Yes     Comment: rarely.  2-3 coffee per day    Drug use: No    Sexual activity: Not on file   Lifestyle    Physical activity     Days per week: Not on file     Minutes per session: Not on file    Stress: Not on file   Relationships    Social connections     Talks on phone: Not on file     Gets together: Not on file     Attends Alevism service: Not on file     Active member of club or organization: Not on file     Attends meetings of clubs or organizations: Not on file     Relationship status: Not on file    Intimate partner violence     Fear of current or ex partner: Not on file     Emotionally abused: Not on file     Physically abused: Not on file     Forced sexual activity: Not on file   Other Topics Concern    Not on file   Social History Narrative    Not on file       Family History   Problem Relation Age of Onset    Emphysema Mother     No Known Problems Father        REVIEW OF SYSTEMS:     CONSTITUTIONAL:  negative for  fevers, chills, sweats, + fatigue  HEENT:  negative for  tinnitus, earaches, nasal congestion and epistaxis  RESPIRATORY:  negative for  dry cough, cough with sputum, wheezing and hemoptysis  GASTROINTESTINAL:  negative for nausea, vomiting, diarrhea, constipation, pruritus and jaundice  HEMATOLOGIC/LYMPHATIC:  negative for easy bruising, bleeding, lymphadenopathy and petechiae  ENDOCRINE:  negative for heat intolerance, cold intolerance, tremor, hair loss and diabetic symptoms including neither polyuria nor polydipsia nor blurred vision  MUSCULOSKELETAL:  negative for  myalgias, arthralgias, joint swelling, stiff joints and decreased range of motion  NEUROLOGICAL:  negative for memory problems, speech problems, visual disturbance, dysphagia, weakness and numbness      PHYSICAL EXAM:   Constitutional:  Awake, alert cooperative, no apparant distress, and appears stated age. HEENT:  Moist and pink mucous membranes, normocephalic, without obvious abnormality, atraumatic, normal ears and nose. NECK:  Supple, symmetrical, trachea midline, no JVD, no adenopathy, thyroid symmetric, not enlarged and no tenderness, good carotid upstroke bilaterally, no carotid bruit, skin normal.  LUNGS: No increased work of breathing, good air exchange, clear to ascultation bilaterally, no crackles or wheezing. Cardiovascular: Normal apical impulse, regular rate and rhythm, normal S1 and S2, no S3 or S4, no murmur, no pedal edema, good carotid upstroke bilaterally, no carotid bruit, no JVD, no adbominal pulsating masses. ABDOMEN: Soft, nontender, no hepatomegaly, no splenomegaly, bowel sound positive. CHEST:  Expands symmetrically, nontender to palpation. Musculoskeletal:  No clubbing or cyanosis. No redness, warmth, or swelling of the joints. Neurological: Alert, awake, and oriented X3   SKIN: No bruises, no bleeding, normal skin color, texture, turgor and no redness, warmth or swelling.       /82 (Site: Right Upper Arm, Position: Sitting, Cuff Size: Large Adult)   Pulse 94   Ht 5' 6\" (1.676 m)   Wt 211 lb (95.7 kg)   BMI 34.06 kg/m²     DATA:   I personally reviewed the visit EKG with the following interpretation: Ectopic atrial rhythm, poor R wave progression, nonspecific T wave changes    EKG 12/22/20 Atrial fibrillation with rapid ventricular response  Nonspecific ST and T wave abnormality  Abnormal ECG  When compared with ECG of 22-DEC-2020 16:04,  Nonspecific T wave abnormality no longer evident in Inferior leads    ECHO:12/23/20 Summary   Left ventricular size is grossly normal.   No evidence of left ventricular mass or thrombus noted. Normal left ventricular wall thickness. No regional wall motion abnormalities seen. Ejection fraction is measured at 74%. The left atrium is borderline dilated. Interatrial septum appears intact. No evidence of thrombus within left atrium. Physiologic and/or trace mitral regurgitation is present. No evidence of mitral valve stenosis. Physiologic and/or trace tricuspid regurgitation. Regular rhythm. Stress Test: 12/27/20   The myocardial perfusion imaging was abnormal   The abnormality was a fixed defects in the moderate area of inferior   wall and small area of inferolateral wall   Overall left ventricular systolic function was normal, 60%   No recent stress test available for comparison.        Angiography:    Cardiology Labs: BMP:    Lab Results   Component Value Date     12/28/2020    K 4.1 12/28/2020    K 4.8 12/21/2020    CL 97 12/28/2020    CO2 29 12/28/2020    BUN 34 12/28/2020    CREATININE 4.3 12/28/2020     CMP:    Lab Results   Component Value Date     12/28/2020    K 4.1 12/28/2020    K 4.8 12/21/2020    CL 97 12/28/2020    CO2 29 12/28/2020    BUN 34 12/28/2020    CREATININE 4.3 12/28/2020    PROT 5.4 12/27/2020     CBC:    Lab Results   Component Value Date    WBC 9.3 12/28/2020    RBC 2.25 12/28/2020    HGB 7.9 12/28/2020    HCT 26.3 12/28/2020    .9 12/28/2020    RDW 15.7 12/28/2020     12/28/2020     PT/INR:  No results found for: PTINR  PT/INR Warfarin:  No components found for: PTPATWAR, PTINRWAR  PTT:    Lab Results   Component Value Date    APTT 29.6 05/23/2018     PTT Heparin:  No components found for: APTTHEP  Magnesium:    Lab Results Component Value Date    MG 2.0 12/28/2020     TSH:    Lab Results   Component Value Date    TSH 1.170 12/22/2020     TROPONIN:  No components found for: TROP  BNP:  No results found for: BNP  FASTING LIPID PANEL:    Lab Results   Component Value Date    CHOL 123 12/22/2020    HDL 40 12/22/2020    TRIG 119 12/22/2020     No orders to display     I have personally reviewed the laboratory, cardiac diagnostic and radiographic testing as outlined above:      IMPRESSION:  1.  Chest pain: Noncardiac, patient was reassured  2.  atrial fibrillation: Paroxysmal, now in sinus rhythm alternating with ectopic atrial rhythm, VIGNESH 2 DS 2 VASC score of 4, on Eliquis  3.  History of atrial tachycardia  4.  Hypotension: Better, will continue midodrine 10 mg 3 times daily  5.  Type 2 diabetes mellitus  6.  End-stage renal disease on hemodialysis replacement therapy  7.  History of rheumatoid arthritis  8.  Hypothyroidism  9. Chronic anemia  10. Chronic anticoagulation    RECOMMENDATIONS:   1. Continue current treatment  2. Increase risk of bleeding due to being on anti-coagulation, symptoms and signs of bleeding discussed with patient, patient was advised to seek medical attention at the earliest symptoms or signs of bleeding. 3.  Follow-up with Dr. Jr Bee as scheduled  4. Follow-up with Dr. Kathya Nunes in 1 year, sooner if symptomatic for the visit    I have reviewed my findings and recommendations with patient    Electronically signed by Rosio Miguel MD on 2/13/2021 at 6:19 PM    NOTE: This report was transcribed using voice recognition software.  Every effort was made to ensure accuracy; however, inadvertent computerized transcription errors may be present

## 2021-02-03 ENCOUNTER — OFFICE VISIT (OUTPATIENT)
Dept: CARDIOLOGY CLINIC | Age: 76
End: 2021-02-03
Payer: COMMERCIAL

## 2021-02-03 VITALS
WEIGHT: 211 LBS | DIASTOLIC BLOOD PRESSURE: 82 MMHG | BODY MASS INDEX: 33.91 KG/M2 | HEIGHT: 66 IN | SYSTOLIC BLOOD PRESSURE: 124 MMHG | HEART RATE: 94 BPM

## 2021-02-03 DIAGNOSIS — I48.91 ATRIAL FIBRILLATION, UNSPECIFIED TYPE (HCC): Primary | ICD-10-CM

## 2021-02-03 PROCEDURE — 93000 ELECTROCARDIOGRAM COMPLETE: CPT | Performed by: INTERNAL MEDICINE

## 2021-02-03 PROCEDURE — 1123F ACP DISCUSS/DSCN MKR DOCD: CPT | Performed by: INTERNAL MEDICINE

## 2021-02-03 PROCEDURE — 1036F TOBACCO NON-USER: CPT | Performed by: INTERNAL MEDICINE

## 2021-02-03 PROCEDURE — 99214 OFFICE O/P EST MOD 30 MIN: CPT | Performed by: INTERNAL MEDICINE

## 2021-02-03 PROCEDURE — G8417 CALC BMI ABV UP PARAM F/U: HCPCS | Performed by: INTERNAL MEDICINE

## 2021-02-03 PROCEDURE — G8399 PT W/DXA RESULTS DOCUMENT: HCPCS | Performed by: INTERNAL MEDICINE

## 2021-02-03 PROCEDURE — 1090F PRES/ABSN URINE INCON ASSESS: CPT | Performed by: INTERNAL MEDICINE

## 2021-02-03 PROCEDURE — G8482 FLU IMMUNIZE ORDER/ADMIN: HCPCS | Performed by: INTERNAL MEDICINE

## 2021-02-03 PROCEDURE — G8427 DOCREV CUR MEDS BY ELIG CLIN: HCPCS | Performed by: INTERNAL MEDICINE

## 2021-02-03 PROCEDURE — 3017F COLORECTAL CA SCREEN DOC REV: CPT | Performed by: INTERNAL MEDICINE

## 2021-02-03 PROCEDURE — 4040F PNEUMOC VAC/ADMIN/RCVD: CPT | Performed by: INTERNAL MEDICINE

## 2021-02-08 ENCOUNTER — OFFICE VISIT (OUTPATIENT)
Dept: FAMILY MEDICINE CLINIC | Age: 76
End: 2021-02-08
Payer: COMMERCIAL

## 2021-02-08 VITALS
DIASTOLIC BLOOD PRESSURE: 86 MMHG | RESPIRATION RATE: 20 BRPM | HEIGHT: 66 IN | SYSTOLIC BLOOD PRESSURE: 116 MMHG | WEIGHT: 209.7 LBS | TEMPERATURE: 97.8 F | HEART RATE: 78 BPM | OXYGEN SATURATION: 98 % | BODY MASS INDEX: 33.7 KG/M2

## 2021-02-08 DIAGNOSIS — Z00.00 ROUTINE GENERAL MEDICAL EXAMINATION AT A HEALTH CARE FACILITY: Primary | ICD-10-CM

## 2021-02-08 DIAGNOSIS — Z79.4 CONTROLLED TYPE 2 DIABETES MELLITUS WITH CHRONIC KIDNEY DISEASE ON CHRONIC DIALYSIS, WITH LONG-TERM CURRENT USE OF INSULIN (HCC): ICD-10-CM

## 2021-02-08 DIAGNOSIS — E11.22 CONTROLLED TYPE 2 DIABETES MELLITUS WITH CHRONIC KIDNEY DISEASE ON CHRONIC DIALYSIS, WITH LONG-TERM CURRENT USE OF INSULIN (HCC): ICD-10-CM

## 2021-02-08 DIAGNOSIS — Z23 NEED FOR PROPHYLACTIC VACCINATION AND INOCULATION AGAINST VARICELLA: ICD-10-CM

## 2021-02-08 DIAGNOSIS — N18.6 CONTROLLED TYPE 2 DIABETES MELLITUS WITH CHRONIC KIDNEY DISEASE ON CHRONIC DIALYSIS, WITH LONG-TERM CURRENT USE OF INSULIN (HCC): ICD-10-CM

## 2021-02-08 DIAGNOSIS — Z99.2 CONTROLLED TYPE 2 DIABETES MELLITUS WITH CHRONIC KIDNEY DISEASE ON CHRONIC DIALYSIS, WITH LONG-TERM CURRENT USE OF INSULIN (HCC): ICD-10-CM

## 2021-02-08 PROCEDURE — 3017F COLORECTAL CA SCREEN DOC REV: CPT | Performed by: FAMILY MEDICINE

## 2021-02-08 PROCEDURE — G0439 PPPS, SUBSEQ VISIT: HCPCS | Performed by: FAMILY MEDICINE

## 2021-02-08 PROCEDURE — 3046F HEMOGLOBIN A1C LEVEL >9.0%: CPT | Performed by: FAMILY MEDICINE

## 2021-02-08 PROCEDURE — G8482 FLU IMMUNIZE ORDER/ADMIN: HCPCS | Performed by: FAMILY MEDICINE

## 2021-02-08 PROCEDURE — 1123F ACP DISCUSS/DSCN MKR DOCD: CPT | Performed by: FAMILY MEDICINE

## 2021-02-08 PROCEDURE — 4040F PNEUMOC VAC/ADMIN/RCVD: CPT | Performed by: FAMILY MEDICINE

## 2021-02-08 ASSESSMENT — LIFESTYLE VARIABLES: HOW OFTEN DO YOU HAVE A DRINK CONTAINING ALCOHOL: 0

## 2021-02-08 ASSESSMENT — PATIENT HEALTH QUESTIONNAIRE - PHQ9
SUM OF ALL RESPONSES TO PHQ9 QUESTIONS 1 & 2: 0
SUM OF ALL RESPONSES TO PHQ QUESTIONS 1-9: 0
1. LITTLE INTEREST OR PLEASURE IN DOING THINGS: 0
2. FEELING DOWN, DEPRESSED OR HOPELESS: 0

## 2021-02-08 NOTE — PATIENT INSTRUCTIONS
Body Mass Index: Care Instructions  Your Care Instructions     Body mass index (BMI) can help you see if your weight is raising your risk for health problems. It uses a formula to compare how much you weigh with how tall you are. · A BMI lower than 18.5 is considered underweight. · A BMI between 18.5 and 24.9 is considered healthy. · A BMI between 25 and 29.9 is considered overweight. A BMI of 30 or higher is considered obese. If your BMI is in the normal range, it means that you have a lower risk for weight-related health problems. If your BMI is in the overweight or obese range, you may be at increased risk for weight-related health problems, such as high blood pressure, heart disease, stroke, arthritis or joint pain, and diabetes. If your BMI is in the underweight range, you may be at increased risk for health problems such as fatigue, lower protection (immunity) against illness, muscle loss, bone loss, hair loss, and hormone problems. BMI is just one measure of your risk for weight-related health problems. You may be at higher risk for health problems if you are not active, you eat an unhealthy diet, or you drink too much alcohol or use tobacco products. Follow-up care is a key part of your treatment and safety. Be sure to make and go to all appointments, and call your doctor if you are having problems. It's also a good idea to know your test results and keep a list of the medicines you take. How can you care for yourself at home? · Practice healthy eating habits. This includes eating plenty of fruits, vegetables, whole grains, lean protein, and low-fat dairy. · If your doctor recommends it, get more exercise. Walking is a good choice. Bit by bit, increase the amount you walk every day. Try for at least 30 minutes on most days of the week. · Do not smoke. Smoking can increase your risk for health problems. If you need help quitting, talk to your doctor about stop-smoking programs and medicines. These can increase your chances of quitting for good. · Limit alcohol to 2 drinks a day for men and 1 drink a day for women. Too much alcohol can cause health problems. If you have a BMI higher than 25  · Your doctor may do other tests to check your risk for weight-related health problems. This may include measuring the distance around your waist. A waist measurement of more than 40 inches in men or 35 inches in women can increase the risk of weight-related health problems. · Talk with your doctor about steps you can take to stay healthy or improve your health. You may need to make lifestyle changes to lose weight and stay healthy, such as changing your diet and getting regular exercise. If you have a BMI lower than 18.5  · Your doctor may do other tests to check your risk for health problems. · Talk with your doctor about steps you can take to stay healthy or improve your health. You may need to make lifestyle changes to gain or maintain weight and stay healthy, such as getting more healthy foods in your diet and doing exercises to build muscle. Where can you learn more? Go to https://[email or web address]. org and sign in to your [employer] account. Enter S176 in the [employer] box to learn more about \"Body Mass Index: Care Instructions. \"     If you do not have an account, please click on the \"Sign Up Now\" link. Current as of: December 11, 2019               Content Version: 12.6  © 6056-6840 [employer]. Care instructions adapted under license by [facility] ([facility]). If you have questions about a medical condition or this instruction, always ask your healthcare professional. [place] [address] any warranty or liability for your use of this information. Personalized Preventive Plan for Rosalie Narayanan - 2/8/2021  Medicare offers a range of preventive health benefits.  Some of the tests and screenings are paid in full while other may be subject to a deductible, co-insurance, and/or copay. Some of these benefits include a comprehensive review of your medical history including lifestyle, illnesses that may run in your family, and various assessments and screenings as appropriate. After reviewing your medical record and screening and assessments performed today your provider may have ordered immunizations, labs, imaging, and/or referrals for you. A list of these orders (if applicable) as well as your Preventive Care list are included within your After Visit Summary for your review. Other Preventive Recommendations:    · A preventive eye exam performed by an eye specialist is recommended every 1-2 years to screen for glaucoma; cataracts, macular degeneration, and other eye disorders. · A preventive dental visit is recommended every 6 months. · Try to get at least 150 minutes of exercise per week or 10,000 steps per day on a pedometer . · Order or download the FREE \"Exercise & Physical Activity: Your Everyday Guide\" from [employer]. Call 7-858.828.2737 or search [employer] online. · You need 7607-5578 mg of calcium and 1326-3962 IU of vitamin D per day. It is possible to meet your calcium requirement with diet alone, but a vitamin D supplement is usually necessary to meet this goal.  · When exposed to the sun, use a sunscreen that protects against both UVA and UVB radiation with an SPF of 30 or greater. Reapply every 2 to 3 hours or after sweating, drying off with a towel, or swimming. · Always wear a seat belt when traveling in a car. Always wear a helmet when riding a bicycle or motorcycle.

## 2021-02-08 NOTE — PROGRESS NOTES
Medicare Annual Wellness Visit  Name: Julius Richter Date: 2021   MRN: [id number] Sex: Female   Age: 78 y.o. Ethnicity: Non-/Non    : 1945 Race: Praneeth Villarreal is here for Medicare AWV    Screenings for behavioral, psychosocial and functional/safety risks, and cognitive dysfunction are all negative except as indicated below. These results, as well as other patient data from the [address] form, are documented in Flowsheets linked to this Encounter. Allergies   Allergen Reactions    Pcn [Penicillins] Hives    Sulfa Antibiotics Other (See Comments)     \"passed out with IV\"    Bactrim [Sulfamethoxazole-Trimethoprim] Hives       Prior to Visit Medications    Medication Sig Taking? Authorizing Provider   zoster recombinant adjuvanted vaccine [facility]) 50 MCG/0.5ML SUSR injection Inject 0.5 mLs into the muscle once for 1 dose Yes Ric Kelly, DO   famotidine (PEPCID) 40 MG tablet TAKE 1 TABLET BY MOUTH DAILY Yes Historical Provider, MD   apixaban (ELIQUIS) 5 MG TABS tablet Take 1 tablet by mouth 2 times daily Yes KACIE Garcia CNP   insulin glargine (LANTUS SOLOSTAR) 100 UNIT/ML injection pen Inject 5 Units into the skin nightly  Patient taking differently: Inject 25 Units into the skin nightly  Yes KACIE Garcia CNP   folic acid (FOLVITE) 1 MG tablet Take 1 tablet by mouth daily Yes KACIE Garcia CNP   montelukast (SINGULAIR) 10 MG tablet Take 1 tablet by mouth nightly Yes Ric Kelly, DO   albuterol sulfate  (90 Base) MCG/ACT inhaler Inhale 1 puff into the lungs every 4 hours as needed for Wheezing Yes Ric Kelly DO   fluticasone-salmeterol (ADVAIR DISKUS) 100-50 MCG/DOSE diskus inhaler Inhale 1 puff into the lungs 2 times daily Rinse mouth after use.  Yes Ric Kelly DO   sertraline (ZOLOFT) 100 MG tablet TAKE 1 TABLET BY MOUTH EVERY NIGHT Yes Jovan Browning, DO   cyanocobalamin ([facility] week Last Dose: 6/4/ 2020 at dialysis Yes Historical Provider, MD   B Complex-C-Folic Acid (TOD-RENETTA) TABS Take 1 tablet by mouth daily Rx Yes Historical Provider, MD   midodrine (PROAMATINE) 5 MG tablet Take 5 mg by mouth three times a week Tuesday, Thursday, Saturday Yes Historical Provider, MD   Insulin Pen Needle 32G X 4 MM MISC 1 each by Does not apply route daily Use as directed with insulin pen Yes Gerardo Haskins, DO   glucose blood VI test strips (ACCU-CHEK SMARTVIEW) strip 1 each by In Vitro route 4 times daily (before meals and nightly) As needed.  Yes Brannon Blanco, DO   ACCU-CHEK FASTCLIX LANCETS MISC USE TO TEST BLOOD SUGAR FOUR TIMES DAILY BEFORE MEALS AND NIGHTLY Yes Quique Felix, DO   Blood Glucose Monitoring Suppl (ACCU-CHEK DERREK SMARTVIEW) W/DEVICE KIT 1 kit by Does not apply route 4 times daily (before meals and nightly) Yes Lacey Amin, DO       Past Medical History:   Diagnosis Date    Anemia     Arrhythmia     AF    Arthritis     RA    Asthma     Chronic kidney disease     Depression     Hemodialysis patient ([facility]     T-Th-Sat    Hyperlipidemia     Hypothyroid     Sleep apnea     no CPAP    Type II or unspecified type diabetes mellitus without mention of complication, not stated as uncontrolled     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 ([facility] 6/15/2017       Past Surgical History:   Procedure Laterality Date    CARPAL TUNNEL RELEASE Left 8/12/2020    LEFT CARPAL TUNNEL RELEASE performed by Mattie Cooper MD at [address] [facility] Right 10/7/2020    RIGHT CARPAL TUNNEL RELEASE performed by Mattie Cooper MD at [address]   2004    DIALYSIS FISTULA CREATION Left 03/02/2018    AV fistula arm/Dr. Wharton Redhead    EYE SURGERY Right 2010    cataract    FOOT SURGERY Left 2012,2013    pin put in, then pin removed   Megan Guallpa    HAND SURGERY Right 2012    ligament was cut   R Benjamin Sinha KNEE SURGERY  2009    R knee left side    OR REVISE AV FISTULA,W/O THROMBECTOMY Left 5/23/2018    SUPERFICIALIZATION AV FISTULA - LEFT UPPER ARM performed by Marcus Dos Santos MD at [facility] TONSILLECTOMY         Family History   Problem Relation Age of Onset    Emphysema Mother     No Known Problems Father        CareTeam (Including outside providers/suppliers regularly involved in providing care):   Patient Care Team:  Miguelina Zuniga DO as PCP - General (Family Medicine)  Miguelina Zuniga DO as PCP - [facility]  Ayesha Call MD as Consulting Physician (Cardiology)    Wt Readings from Last 3 Encounters:   02/08/21 209 lb 11.2 oz (95.1 kg)   02/03/21 211 lb (95.7 kg)   01/20/21 207 lb (93.9 kg)     Vitals:    02/08/21 1117   BP: 116/86   Pulse: 78   Resp: 20   Temp: 97.8 °F (36.6 °C)   TempSrc: Temporal   SpO2: 98%   Weight: 209 lb 11.2 oz (95.1 kg)   Height: 5' 6\" (1.676 m)     Body mass index is 33.85 kg/m². Based upon direct observation of the patient, evaluation of cognition reveals recent and remote memory intact. General Appearance: alert and oriented to person, place and time, well developed and well- nourished, in no acute distress  Skin: warm and dry, no rash or erythema  Head: normocephalic and atraumatic. Very minor droop of mouth on the right side.   Eyes: pupils equal, round, and reactive to light, extraocular eye movements intact, conjunctivae normal.  Exotropia left eye  ENT: tympanic membrane, external ear and ear canal normal bilaterally, nose without deformity, nasal mucosa and turbinates normal without polyps  Neck: supple and non-tender without mass, no thyromegaly or thyroid nodules, no cervical lymphadenopathy  Pulmonary/Chest: clear to auscultation bilaterally- no wheezes, rales or rhonchi, normal air movement, no respiratory distress  Cardiovascular: normal rate, regular rhythm, normal S1 and S2, no murmurs, rubs, clicks, or gallops, distal pulses intact, no carotid bruits  Abdomen: soft, non-tender, non-distended, normal bowel sounds, no masses or organomegaly  Extremities: no cyanosis, clubbing or edema  Musculoskeletal: normal range of motion, no joint swelling, deformity or tenderness  Neurologic: reflexes normal and symmetric, no cranial nerve deficit, gait, coordination and speech normal    Patient's complete Health Risk Assessment and screening values have been reviewed and are found in Flowsheets. The following problems were reviewed today and where indicated follow up appointments were made and/or referrals ordered. Positive Risk Factor Screenings with Interventions:          General Health and ACP:  General  In general, how would you say your health is?: Good  In the past 7 days, have you experienced any of the following? New or Increased Pain, New or Increased Fatigue, Loneliness, Social Isolation, Stress or Anger?: (!) Stress  Do you get the social and emotional support that you need?: Yes  Do you have a Living Will?: Yes  Advance Directives     Power of  Living Will ACP-Advance Directive ACP-Power of     Not on File Filed on 08/12/20 Filed [address] Risk Interventions:  · Loneliness: Due to covid-19, social isolation. Defers help. Takes antidepressant.      Health Habits/Nutrition:  Health Habits/Nutrition  Do you exercise for at least 20 minutes 2-3 times per week?: (!) No  Have you lost any weight without trying in the past 3 months?: No  Do you eat only one meal per day?: No  Have you seen the dentist within the past year?: N/A - wear dentures  Body mass index: (!) 33.84  Health Habits/Nutrition Interventions:  · Inadequate physical activity:  patient is not ready to increase his/her physical activity level at this time  · Nutritional issues:  educational materials to promote weight loss provided    Hearing/Vision:  No exam data present  Hearing/Vision  Do you or your family notice any trouble with your hearing that hasn't been managed with hearing aids?: (!) Yes  Do you have difficulty driving, watching TV, or doing any of your daily activities because of your eyesight?: (!) Yes  Have you had an eye exam within the past year?: Yes  Hearing/Vision Interventions:  · Has a hearing test scheduled in the future. Don Louder for eye care. Safety:  Safety  Do you have working smoke detectors?: Yes  Have all throw rugs been removed or fastened?: Yes  Do you have non-slip mats or surfaces in all bathtubs/showers?: Yes  Do all of your stairways have a railing or banister?: Yes  Are your doorways, halls and stairs free of clutter?: (!) No  Do you always fasten your seatbelt when you are in a car?: Yes  Safety Interventions:  · Home safety tips provided    ADL:  ADLs  In the past 7 days, did you need help from others to perform any of the following everyday activities? Eating, dressing, grooming, bathing, toileting, or walking/balance?: None  In the past 7 days, did you need help from others to take care of any of the following? Laundry, housekeeping, banking/finances, shopping, telephone use, food preparation, transportation, or taking medications?: [employer], Transportation  ADL Interventions:  · Only has limited transportation as aide has been in quarantine for COVID- ends in a week. Nephew does laundry.      Personalized Preventive Plan   Current Health Maintenance Status  Immunization History   Administered Date(s) Administered    Hepatitis B vaccine 02/24/2018, 03/24/2018, 04/26/2018, 08/25/2018, 11/17/2018, 12/18/2018, 01/19/2019, 05/21/2019    Influenza Virus Vaccine 11/13/2014    Influenza, High Dose (Fluzone 65 yrs and older) 09/26/2020    Influenza, Triv, inactivated, subunit, adjuvanted, IM (Fluad 65 yrs and older) 10/13/2017    Pneumococcal Conjugate 13-valent ([id number]) 07/10/2016, 05/14/2018, 11/06/2018    Pneumococcal Polysaccharide ([id number]) 10/21/2020    Tdap (Boostrix, Adacel) 03/10/2016 Health Maintenance   Topic Date Due    Diabetic foot exam  01/25/1955    COVID-19 Vaccine (1 of 2) 01/25/1963    Shingles Vaccine (1 of 2) 01/25/1997    Annual Wellness Visit (AWV)  06/21/2019    Diabetic retinal exam  06/29/2021    Breast cancer screen  10/18/2021    A1C test (Diabetic or Prediabetic)  12/22/2021    Lipid screen  12/22/2021    TSH testing  12/22/2021    Potassium monitoring  12/28/2021    Creatinine monitoring  12/28/2021    DTaP/Tdap/Td vaccine (2 - Td) 03/10/2026    Colon cancer screen colonoscopy  05/10/2026    DEXA (modify frequency per FRAX score)  Completed    Flu vaccine  Completed    Pneumococcal 65+ yrs at Risk Vaccine  Completed    Hepatitis C screen  Completed    Hepatitis A vaccine  Aged Out    Hib vaccine  Aged Out    Meningococcal (ACWY) vaccine  Aged Out     Recommendations for [employer] Due: see orders and patient instructions/AVS.  . Recommended screening schedule for the next 5-10 years is provided to the patient in written form: see Patient Raleigh Sites was seen today for medicare awv. Diagnoses and all orders for this visit:    Controlled type 2 diabetes mellitus with chronic kidney disease on chronic dialysis, with long-term current use of insulin ([facility]    Need for prophylactic vaccination and inoculation against varicella  -     zoster recombinant adjuvanted vaccine [facility]) 50 MCG/0.5ML SUSR injection;  Inject 0.5 mLs into the muscle once for 1 dose    Routine general medical examination at a health care facility             Hola Lee DO  2/8/2021  12:00

## 2021-02-10 ENCOUNTER — TELEPHONE (OUTPATIENT)
Dept: SPIRITUAL SERVICES | Age: 76
End: 2021-02-10

## 2021-02-15 ENCOUNTER — HOSPITAL ENCOUNTER (OUTPATIENT)
Age: 76
Discharge: HOME | End: 2021-02-15
Payer: COMMERCIAL

## 2021-02-15 LAB
ALBUMIN SERPL-MCNC: 3.6 G/DL (ref 3.5–5.2)
ALP BLD-CCNC: 95 U/L (ref 35–104)
ALT SERPL-CCNC: 6 U/L (ref 0–32)
AMYLASE: 27 U/L (ref 20–100)
ANION GAP SERPL CALCULATED.3IONS-SCNC: 14 MMOL/L (ref 7–16)
AST SERPL-CCNC: 12 U/L (ref 0–31)
BASOPHILS ABSOLUTE: 0.02 E9/L (ref 0–0.2)
BASOPHILS RELATIVE PERCENT: 0.3 % (ref 0–2)
BILIRUB SERPL-MCNC: 0.4 MG/DL (ref 0–1.2)
BUN BLDV-MCNC: 35 MG/DL (ref 8–23)
CALCIUM SERPL-MCNC: 9 MG/DL (ref 8.6–10.2)
CHLORIDE BLD-SCNC: 93 MMOL/L (ref 98–107)
CO2: 29 MMOL/L (ref 22–29)
CREAT SERPL-MCNC: 5.3 MG/DL (ref 0.5–1)
EOSINOPHILS ABSOLUTE: 0.19 E9/L (ref 0.05–0.5)
EOSINOPHILS RELATIVE PERCENT: 2.7 % (ref 0–6)
FERRITIN: 999 NG/ML
GFR AFRICAN AMERICAN: 10
GFR NON-AFRICAN AMERICAN: 8 ML/MIN/1.73
GLUCOSE BLD-MCNC: 167 MG/DL (ref 74–99)
HCT VFR BLD CALC: 37.6 % (ref 34–48)
HEMOGLOBIN: 11.7 G/DL (ref 11.5–15.5)
IMMATURE GRANULOCYTES #: 0.02 E9/L
IMMATURE GRANULOCYTES %: 0.3 % (ref 0–5)
IMMATURE RETIC FRACT: 24.1 % (ref 3–15.9)
IRON SATURATION: 21 % (ref 15–50)
IRON: 30 MCG/DL (ref 37–145)
LIPASE: 8 U/L (ref 13–60)
LYMPHOCYTES ABSOLUTE: 1.49 E9/L (ref 1.5–4)
LYMPHOCYTES RELATIVE PERCENT: 21.1 % (ref 20–42)
MCH RBC QN AUTO: 32.3 PG (ref 26–35)
MCHC RBC AUTO-ENTMCNC: 31 % (ref 32–34.5)
MCV RBC AUTO: 104.4 FL (ref 80–99.9)
MONOCYTES ABSOLUTE: 0.55 E9/L (ref 0.1–0.95)
MONOCYTES RELATIVE PERCENT: 7.8 % (ref 2–12)
NEUTROPHILS ABSOLUTE: 4.8 E9/L (ref 1.8–7.3)
NEUTROPHILS RELATIVE PERCENT: 67.8 % (ref 43–80)
PDW BLD-RTO: 15 FL (ref 11.5–15)
PLATELET # BLD: 190 E9/L (ref 130–450)
PMV BLD AUTO: 9.5 FL (ref 7–12)
POTASSIUM SERPL-SCNC: 4.6 MMOL/L (ref 3.5–5)
RBC # BLD: 3.62 E12/L (ref 3.5–5.5)
RETIC HGB EQUIVALENT: 27.8 PG (ref 28.2–36.6)
RETICULOCYTE ABSOLUTE COUNT: 0.11 E12/L
RETICULOCYTE COUNT PCT: 3.2 % (ref 0.4–1.9)
SODIUM BLD-SCNC: 136 MMOL/L (ref 132–146)
TOTAL IRON BINDING CAPACITY: 140 MCG/DL (ref 250–450)
TOTAL PROTEIN: 6.8 G/DL (ref 6.4–8.3)
WBC # BLD: 7.1 E9/L (ref 4.5–11.5)

## 2021-02-15 PROCEDURE — 82728 ASSAY OF FERRITIN: CPT

## 2021-02-15 PROCEDURE — 83540 ASSAY OF IRON: CPT

## 2021-02-15 PROCEDURE — 80053 COMPREHEN METABOLIC PANEL: CPT

## 2021-02-15 PROCEDURE — 82150 ASSAY OF AMYLASE: CPT

## 2021-02-15 PROCEDURE — 85045 AUTOMATED RETICULOCYTE COUNT: CPT

## 2021-02-15 PROCEDURE — 83550 IRON BINDING TEST: CPT

## 2021-02-15 PROCEDURE — 83690 ASSAY OF LIPASE: CPT

## 2021-02-15 PROCEDURE — 36415 COLL VENOUS BLD VENIPUNCTURE: CPT

## 2021-02-15 PROCEDURE — 85025 COMPLETE CBC W/AUTO DIFF WBC: CPT

## 2021-02-22 ENCOUNTER — TELEPHONE (OUTPATIENT)
Dept: SLEEP MEDICINE | Age: 76
End: 2021-02-22

## 2021-02-22 NOTE — TELEPHONE ENCOUNTER
Spoke with pt re moving appt on 3/18 to 3/16 d/t Dr ed Strauss.   Pt is able to move appt to 3/16 at 240--appt changed

## 2021-02-23 NOTE — TELEPHONE ENCOUNTER
Checking back with Frieda Mccarthy to see if see found her living will and POA documents. I left phone message for her today.

## 2021-03-04 ENCOUNTER — HOSPITAL ENCOUNTER (EMERGENCY)
Age: 76
Discharge: HOME | End: 2021-03-04
Payer: COMMERCIAL

## 2021-03-04 ENCOUNTER — APPOINTMENT (OUTPATIENT)
Dept: GENERAL RADIOLOGY | Age: 76
End: 2021-03-04
Payer: COMMERCIAL

## 2021-03-04 VITALS
HEART RATE: 98 BPM | OXYGEN SATURATION: 94 % | TEMPERATURE: 98.1 F | RESPIRATION RATE: 18 BRPM | SYSTOLIC BLOOD PRESSURE: 140 MMHG | DIASTOLIC BLOOD PRESSURE: 86 MMHG

## 2021-03-04 DIAGNOSIS — S50.02XA CONTUSION OF LEFT ELBOW, INITIAL ENCOUNTER: Primary | ICD-10-CM

## 2021-03-04 DIAGNOSIS — S80.02XA CONTUSION OF LEFT KNEE, INITIAL ENCOUNTER: ICD-10-CM

## 2021-03-04 PROCEDURE — 99284 EMERGENCY DEPT VISIT MOD MDM: CPT

## 2021-03-04 PROCEDURE — 73562 X-RAY EXAM OF KNEE 3: CPT

## 2021-03-04 PROCEDURE — 73070 X-RAY EXAM OF ELBOW: CPT

## 2021-03-04 ASSESSMENT — PAIN DESCRIPTION - ORIENTATION: ORIENTATION: LEFT

## 2021-03-04 ASSESSMENT — PAIN DESCRIPTION - LOCATION: LOCATION: ARM

## 2021-03-04 NOTE — ED PROVIDER NOTES
Tonsillectomy; eye surgery (Right, 2010); and Carpal tunnel release (Right, 10/7/2020). Social History:  reports that she has never smoked. She has never used smokeless tobacco. She reports current alcohol use. She reports that she does not use drugs. Family History: family history includes Emphysema in her mother; No Known Problems in her father. The patients home medications have been reviewed. Allergies: Pcn [penicillins], Sulfa antibiotics, and Bactrim [sulfamethoxazole-trimethoprim]    -------------------------------------------------- RESULTS -------------------------------------------------  All laboratory and radiology results have been personally reviewed by myself   LABS:  No results found for this visit on 03/04/21. RADIOLOGY:  Interpreted by Radiologist.  XR KNEE LEFT (3 VIEWS)   Final Result   1. No acute fracture or dislocation. 2. Osteopenia. Moderate to severe degenerative change, specially involving   the medial compartment of the tibiofemoral joint. XR ELBOW LEFT (2 VIEWS)   Final Result   No acute fractures or dislocations in left elbow.             ------------------------- NURSING NOTES AND VITALS REVIEWED ---------------------------   The nursing notes within the ED encounter and vital signs as below have been reviewed. BP (!) 140/86   Pulse 98   Temp 98.1 °F (36.7 °C) (Oral)   Resp 18   SpO2 94%   Oxygen Saturation Interpretation: Normal      ---------------------------------------------------PHYSICAL EXAM--------------------------------------      Constitutional/General: Alert and oriented x3, well appearing, non toxic in NAD  Head: Normocephalic and atraumatic  Eyes: PERRL, EOMI  Mouth: Oropharynx clear, handling secretions, no trismus  Neck: Supple, full ROM,   Pulmonary: Lungs clear to auscultation bilaterally, no wheezes, rales, or rhonchi. Not in respiratory distress  Cardiovascular:  Regular rate and rhythm, no murmurs, gallops, or rubs.  2+ distal pulses  Abdomen: Soft, non tender, non distended,   Extremities: Moves all extremities x 4. Warm and well perfused, tenderness to palpation to the lateral left elbow. Small area of ecchymosis overlying the painful area. Full range of motion to the left elbow. Compartments of the forearm remain soft. 2+ radial pulse laterally. Mild anterior left knee tenderness to palpation. FROM. 2+ dorsalis pedis pulse on the left. Skin: warm and dry without rash  Neurologic: GCS 15,  Psych: Normal Affect      ------------------------------ ED COURSE/MEDICAL DECISION MAKING----------------------  Medications - No data to display    Medical Decision Making:   ED COURSE:  ED Course as of Mar 04 1256   Thu Mar 04, 2021   1047 Patient declines pain medication at this time. [MS]   [phone] Reassessed patient. Remained stable. She continues to decline pain medication at this time. [MS]   [phone] Reassessed patient. Remained stable and neurologically intact. Discussed imaging results with the patient. Recommended close follow-up with family doctor for recheck and return to the ED with any new or worsening symptoms. She may take Tylenol or Motrin as needed for pain. She voiced understanding and is agreeable to the above treatment plan. [MS]      ED Course User Index  [MS] Amada Joseph, [place]          Counseling: The emergency provider has spoken with the patient and discussed todays results, in addition to providing specific details for the plan of care and counseling regarding the diagnosis and prognosis. Questions are answered at this time and they are agreeable with the plan.      --------------------------------- IMPRESSION AND DISPOSITION ---------------------------------    IMPRESSION  1. Contusion of left elbow, initial encounter    2.  Contusion of left knee, initial encounter        DISPOSITION  Disposition: Discharge to home  Patient condition is stable      NOTE: This report was transcribed using voice recognition software.  Every effort was made to ensure accuracy; however, inadvertent computerized transcription errors may be present         Gucci Bernal  03/04/21 3658

## 2021-03-18 NOTE — TELEPHONE ENCOUNTER
Several attempts to contact at this point I am removing Ainsley from referral list. I did call and leave my contact information should she want to complete documents.

## 2021-03-22 ENCOUNTER — TELEPHONE (OUTPATIENT)
Dept: SLEEP MEDICINE | Age: 76
End: 2021-03-22

## 2021-03-22 NOTE — TELEPHONE ENCOUNTER
Call to pt to reschedule appt 3-30-21 d/t schedule OV but  doing doxy me  appts that day, LM with call back #

## 2021-03-23 NOTE — TELEPHONE ENCOUNTER
Pt called several times to reschedule or change appt 3-30-21 to doxy. me appt, LM with call back # with no response, appt cancelled

## 2021-05-10 ENCOUNTER — OFFICE VISIT (OUTPATIENT)
Dept: FAMILY MEDICINE CLINIC | Age: 76
End: 2021-05-10
Payer: COMMERCIAL

## 2021-05-10 VITALS
WEIGHT: 209.6 LBS | TEMPERATURE: 97.5 F | BODY MASS INDEX: 33.68 KG/M2 | RESPIRATION RATE: 20 BRPM | OXYGEN SATURATION: 97 % | DIASTOLIC BLOOD PRESSURE: 98 MMHG | SYSTOLIC BLOOD PRESSURE: 142 MMHG | HEIGHT: 66 IN | HEART RATE: 102 BPM

## 2021-05-10 DIAGNOSIS — Z99.2 CONTROLLED TYPE 2 DIABETES MELLITUS WITH CHRONIC KIDNEY DISEASE ON CHRONIC DIALYSIS, WITH LONG-TERM CURRENT USE OF INSULIN (HCC): Primary | ICD-10-CM

## 2021-05-10 DIAGNOSIS — F32.89 OTHER DEPRESSION: ICD-10-CM

## 2021-05-10 DIAGNOSIS — N18.6 CONTROLLED TYPE 2 DIABETES MELLITUS WITH CHRONIC KIDNEY DISEASE ON CHRONIC DIALYSIS, WITH LONG-TERM CURRENT USE OF INSULIN (HCC): Primary | ICD-10-CM

## 2021-05-10 DIAGNOSIS — G25.81 RESTLESS LEG SYNDROME, CONTROLLED: ICD-10-CM

## 2021-05-10 DIAGNOSIS — E03.9 HYPOTHYROIDISM, UNSPECIFIED TYPE: ICD-10-CM

## 2021-05-10 DIAGNOSIS — E11.22 CONTROLLED TYPE 2 DIABETES MELLITUS WITH CHRONIC KIDNEY DISEASE ON CHRONIC DIALYSIS, WITH LONG-TERM CURRENT USE OF INSULIN (HCC): Primary | ICD-10-CM

## 2021-05-10 DIAGNOSIS — Z79.4 CONTROLLED TYPE 2 DIABETES MELLITUS WITH CHRONIC KIDNEY DISEASE ON CHRONIC DIALYSIS, WITH LONG-TERM CURRENT USE OF INSULIN (HCC): Primary | ICD-10-CM

## 2021-05-10 LAB — HBA1C MFR BLD: NORMAL %

## 2021-05-10 PROCEDURE — G8417 CALC BMI ABV UP PARAM F/U: HCPCS | Performed by: FAMILY MEDICINE

## 2021-05-10 PROCEDURE — G8399 PT W/DXA RESULTS DOCUMENT: HCPCS | Performed by: FAMILY MEDICINE

## 2021-05-10 PROCEDURE — 3046F HEMOGLOBIN A1C LEVEL >9.0%: CPT | Performed by: FAMILY MEDICINE

## 2021-05-10 PROCEDURE — G8427 DOCREV CUR MEDS BY ELIG CLIN: HCPCS | Performed by: FAMILY MEDICINE

## 2021-05-10 PROCEDURE — 83036 HEMOGLOBIN GLYCOSYLATED A1C: CPT | Performed by: FAMILY MEDICINE

## 2021-05-10 PROCEDURE — 1123F ACP DISCUSS/DSCN MKR DOCD: CPT | Performed by: FAMILY MEDICINE

## 2021-05-10 PROCEDURE — 2022F DILAT RTA XM EVC RTNOPTHY: CPT | Performed by: FAMILY MEDICINE

## 2021-05-10 PROCEDURE — 1036F TOBACCO NON-USER: CPT | Performed by: FAMILY MEDICINE

## 2021-05-10 PROCEDURE — 4040F PNEUMOC VAC/ADMIN/RCVD: CPT | Performed by: FAMILY MEDICINE

## 2021-05-10 PROCEDURE — 99214 OFFICE O/P EST MOD 30 MIN: CPT | Performed by: FAMILY MEDICINE

## 2021-05-10 PROCEDURE — 1090F PRES/ABSN URINE INCON ASSESS: CPT | Performed by: FAMILY MEDICINE

## 2021-05-10 PROCEDURE — 3017F COLORECTAL CA SCREEN DOC REV: CPT | Performed by: FAMILY MEDICINE

## 2021-05-10 RX ORDER — LEVOTHYROXINE SODIUM 75 MCG
75 TABLET ORAL DAILY
Qty: 90 TABLET | Refills: 0 | Status: ON HOLD
Start: 2021-05-10 | End: 2022-06-21

## 2021-05-10 RX ORDER — FOLIC ACID 1 MG/1
1 TABLET ORAL DAILY
Qty: 90 TABLET | Refills: 1 | Status: SHIPPED
Start: 2021-05-10 | End: 2021-11-01

## 2021-05-10 RX ORDER — INSULIN GLARGINE 100 [IU]/ML
30 INJECTION, SOLUTION SUBCUTANEOUS NIGHTLY
Qty: 5 PEN | Refills: 5 | Status: SHIPPED
Start: 2021-05-10 | End: 2021-10-05 | Stop reason: SDUPTHER

## 2021-05-10 RX ORDER — AMLODIPINE BESYLATE 5 MG/1
1 TABLET ORAL
COMMUNITY
Start: 2021-04-01 | End: 2021-09-17 | Stop reason: ALTCHOICE

## 2021-05-10 RX ORDER — ROPINIROLE 2 MG/1
TABLET, FILM COATED ORAL
Qty: 90 TABLET | Refills: 1 | Status: SHIPPED
Start: 2021-05-10 | End: 2021-10-05 | Stop reason: SDUPTHER

## 2021-05-10 RX ORDER — SERTRALINE HYDROCHLORIDE 100 MG/1
TABLET, FILM COATED ORAL
Qty: 90 TABLET | Refills: 1 | Status: SHIPPED
Start: 2021-05-10 | End: 2021-11-01

## 2021-05-10 NOTE — PATIENT INSTRUCTIONS
Patient Education        Neck: Exercises  Introduction  Here are some examples of exercises for you to try. The exercises may be suggested for a condition or for rehabilitation. Start each exercise slowly. Ease off the exercises if you start to have pain. You will be told when to start these exercises and which ones will work best for you. How to do the exercises  Neck stretch   1. This stretch works best if you keep your shoulder down as you lean away from it. To help you remember to do this, start by relaxing your shoulders and lightly holding on to your thighs or your chair. 2. Tilt your head toward your shoulder and hold for 15 to 30 seconds. Let the weight of your head stretch your muscles. 3. If you would like a little added stretch, use your hand to gently and steadily pull your head toward your shoulder. For example, keeping your right shoulder down, lean your head to the left. 4. Repeat 2 to 4 times toward each shoulder. Diagonal neck stretch   1. Turn your head slightly toward the direction you will be stretching, and tilt your head diagonally toward your chest and hold for 15 to 30 seconds. 2. If you would like a little added stretch, use your hand to gently and steadily pull your head forward on the diagonal.  3. Repeat 2 to 4 times toward each side. Dorsal glide stretch   The dorsal glide stretches the back of the neck. If you feel pain, do not glide so far back. Some people find this exercise easier to do while lying on their backs with an ice pack on the neck. 1. Sit or stand tall and look straight ahead. 2. Slowly tuck your chin as you glide your head backward over your body  3. Hold for a count of 6, and then relax for up to 10 seconds. 4. Repeat 8 to 12 times. Chest and shoulder stretch   1. Sit or stand tall and glide your head backward as in the dorsal glide stretch. 2. Raise both arms so that your hands are next to your ears.   3. Take a deep breath, and as you breathe out, lower your elbows down and behind your back. You will feel your shoulder blades slide down and together, and at the same time you will feel a stretch across your chest and the front of your shoulders. 4. Hold for about 6 seconds, and then relax for up to 10 seconds. 5. Repeat 8 to 12 times. Strengthening: Hands on head   1. Move your head backward, forward, and side to side against gentle pressure from your hands, holding each position for about 6 seconds. 2. Repeat 8 to 12 times. Follow-up care is a key part of your treatment and safety. Be sure to make and go to all appointments, and call your doctor if you are having problems. It's also a good idea to know your test results and keep a list of the medicines you take. Where can you learn more? Go to https://[employer]Xyo. org and sign in to your [employer] account. Enter P975 in the Mapbar box to learn more about \"Neck: Exercises. \"     If you do not have an account, please click on the \"Sign Up Now\" link. Current as of: November 16, 2020               Content Version: 12.8  © 4291-0164 [employer]. Care instructions adapted under license by [facility] ([facility]). If you have questions about a medical condition or this instruction, always ask your healthcare professional. [address] any warranty or liability for your use of this information.

## 2021-05-10 NOTE — PROGRESS NOTES
Subjective:  78 y.o. female who presents to the office today with chief complaint:  Chief Complaint   Patient presents with    Diabetes     A1C today is     Other     States that the Requip is not helping. Insulin-dependent diabetes: Patient currently taking 30 units of glargine at night and a sliding scale throughout the day. She states that her blood sugars run between 90 and 120 without any lows below 80. She has been managing this well with an improved diet along with the medication. Restless leg syndrome: Patient states that the ropinirole is no longer helping at current dose. Patient asked to have this dose increased. Drooling: Occurs still, but occasionally. Tingling and numbness have resolved. Denies any hemiplegia. Asthma: Doing well on advair. Uses rescue inhaler twice per week. Breathing is at baseline. Hypertension: Was on midodrine previously, but began having elevated BP at dialysis. Started on amlodipine 5mg by nephrology. Hypothyroidism: On synthroid 75mcg daily. Hyperlipidemia: on Atorvastatin 10mg daily. Rheumatoid arthritis: Not on any medication for this. No complaints of joint pain at this time. No recent flares. Doing well overall. A. Fib: New onset while hospitalized after missing dialysis in December. Currently on eliquis 5mg. Follows with Dr. Gwen Charles every 6 months. Denies dyspnea on exertion, chest pain, palpitations. ESRD: Follows Dr. Giovanna Bell. Dialysis T,R,Sat    Anxiety and depression: Sertraline 100mg daily. Mood stable on this. Does not feel that she can come off of the medication without worsening of symptoms. Health Maintenance Due   Topic Date Due    Diabetic foot exam  Never done    Shingles Vaccine (1 of 2) Never done     Podiatrist: Dr. Mary Jo Callahan- last visit 2 months ago. Will fill out BARBARA. Cancer History: None. Family Cancer History: none.      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 General:  Fever, chills, diaphoresis, fatigue, malaise, night sweats, weight loss  Psychological:  Anxiety, disorientation, hallucinations. ENT:  Epistaxis, headaches, vertigo, visual changes. Cardiovascular:  Chest pain, irregular heartbeats, palpitations, paroxysmal nocturnal dyspnea. Respiratory:  Shortness of breath, coughing, sputum production, hemoptysis, wheezing, orthopnea. Gastrointestinal:  Nausea, vomiting, diarrhea, heartburn, constipation, abdominal pain, hematemesis, hematochezia, melena, acholic stools  Genito-Urinary:  Dysuria, urgency, frequency, hematuria  Musculoskeletal:  Joint pain, joint stiffness, joint swelling, muscle pain  Neurology:  Headache, focal neurological deficits, weakness, numbness, paresthesia  Extremities:  Decreased ROM, peripheral edema, mottling      Objective:  Vitals:    05/10/21 1033   BP: (!) 142/98   Pulse:    Resp:    Temp:    SpO2:      Physical Exam  Vitals signs and nursing note reviewed. Constitutional:       General: She is not in acute distress. Appearance: She is well-developed. She is obese. She is not ill-appearing, toxic-appearing or diaphoretic. Comments: Using Rollator walker. Sueellen Payment HENT:      Head: Normocephalic and atraumatic. Right Ear: External ear normal.      Left Ear: External ear normal.      Nose: Nose normal.   Eyes:      General:         Right eye: No discharge. Left eye: No discharge. Conjunctiva/sclera: Conjunctivae normal.      Pupils: Pupils are equal, round, and reactive to light. Neck:      Musculoskeletal: Normal range of motion and neck supple. Cardiovascular:      Rate and Rhythm: Normal rate and regular rhythm.       Heart sounds: Normal heart sounds. No murmur. No friction rub. No gallop. Pulmonary:      Effort: Pulmonary effort is normal. No respiratory distress. Breath sounds: No wheezing or rales. Abdominal:      General: Bowel sounds are normal. There is no distension. Palpations: Abdomen is soft. Tenderness: There is no abdominal tenderness. There is no guarding or rebound. Lymphadenopathy:      Cervical: No cervical adenopathy. Neurological:      Mental Status: She is alert and oriented to person, place, and time. Comments: Cranial nerves II through XII intact   Psychiatric:         Mood and Affect: Mood normal.         Behavior: Behavior normal.         Thought Content: Thought content normal.         Judgment: Judgment normal.             Assessment and Plan:  Jaimie Freire was seen today for diabetes and other. Diagnoses and all orders for this visit:    Controlled type 2 diabetes mellitus with chronic kidney disease on chronic dialysis, with long-term current use of insulin ([facility])  -     POCT glycosylated hemoglobin (Hb A1C)  -     insulin glargine (LANTUS SOLOSTAR) 100 UNIT/ML injection pen; Inject 30 Units into the skin nightly  -     Insulin Pen Needle 32G X 4 MM MISC; 1 each by Does not apply route daily Use as directed with insulin pen  -     Comprehensive Metabolic Panel; Future  -     CBC; Future  -     TSH without Reflex; Future  -     Lipid Panel; Future    Other depression  -     sertraline (ZOLOFT) 100 MG tablet; Take 1 tab qhs. Hypothyroidism, unspecified type  -     SYNTHROID 75 MCG tablet; Take 1 tablet by mouth Daily  -     Comprehensive Metabolic Panel; Future  -     CBC; Future  -     TSH without Reflex; Future  -     Lipid Panel; Future    Restless leg syndrome, controlled  -     rOPINIRole (REQUIP) 2 MG tablet; TAKE 1 TABLET BY MOUTH EVERY NIGHT    Other orders  -     folic acid (FOLVITE) 1 MG tablet;  Take 1 tablet by mouth daily      Insulin-dependent diabetes: Well-controlled on current regimen. Continue. Restless leg syndrome: Increase ropinirole to 2 mg nightly. Drooling: Continue to follow. .     Asthma: Continue current regimen. Hypertension: Blood pressure is elevated today even on recheck. This is being managed by nephrology. Hypothyroidism: Collect TSH prior to next visit. Hyperlipidemia: Collect CMP and lipid panel prior to next visit. Rheumatoid arthritis: Continue to follow. A. Fib: Notes reviewed from Dr. Azul Shetty. Patient currently not in A. fib. Continue to follow. ESRD: Follows Dr. Michelle Amaya. Dialysis T,R,Sat    Anxiety and depression: Mood at baseline on current regimen. Continue. Follow-up in 3 month. Patient may come in sooner if needed for medical concerns. Patient advised to call at any time to cancel or re-scheduleor for any questions/concerns. Please note that >15 minutes was spent face-to-face with the patient gathering history, performing physical exam, discussing findings, counseling the patient, and determining planforward. All questions and concerns addressed and answered.          Bj Kelly DO   5/10/21      11:20 AM

## 2021-07-26 ENCOUNTER — HOSPITAL ENCOUNTER (OUTPATIENT)
Age: 76
Discharge: HOME | End: 2021-07-26
Payer: COMMERCIAL

## 2021-07-26 DIAGNOSIS — Z79.4 CONTROLLED TYPE 2 DIABETES MELLITUS WITH CHRONIC KIDNEY DISEASE ON CHRONIC DIALYSIS, WITH LONG-TERM CURRENT USE OF INSULIN (HCC): ICD-10-CM

## 2021-07-26 DIAGNOSIS — N18.6 CONTROLLED TYPE 2 DIABETES MELLITUS WITH CHRONIC KIDNEY DISEASE ON CHRONIC DIALYSIS, WITH LONG-TERM CURRENT USE OF INSULIN (HCC): ICD-10-CM

## 2021-07-26 DIAGNOSIS — Z99.2 CONTROLLED TYPE 2 DIABETES MELLITUS WITH CHRONIC KIDNEY DISEASE ON CHRONIC DIALYSIS, WITH LONG-TERM CURRENT USE OF INSULIN (HCC): ICD-10-CM

## 2021-07-26 DIAGNOSIS — E11.22 CONTROLLED TYPE 2 DIABETES MELLITUS WITH CHRONIC KIDNEY DISEASE ON CHRONIC DIALYSIS, WITH LONG-TERM CURRENT USE OF INSULIN (HCC): ICD-10-CM

## 2021-07-26 DIAGNOSIS — E03.9 HYPOTHYROIDISM, UNSPECIFIED TYPE: ICD-10-CM

## 2021-07-26 LAB
ALBUMIN SERPL-MCNC: 4 G/DL (ref 3.5–5.2)
ALP BLD-CCNC: 101 U/L (ref 35–104)
ALT SERPL-CCNC: 5 U/L (ref 0–32)
ANION GAP SERPL CALCULATED.3IONS-SCNC: 14 MMOL/L (ref 7–16)
AST SERPL-CCNC: 11 U/L (ref 0–31)
BILIRUB SERPL-MCNC: 0.5 MG/DL (ref 0–1.2)
BUN BLDV-MCNC: 35 MG/DL (ref 6–23)
CALCIUM SERPL-MCNC: 8.9 MG/DL (ref 8.6–10.2)
CHLORIDE BLD-SCNC: 95 MMOL/L (ref 98–107)
CHOLESTEROL, TOTAL: 177 MG/DL (ref 0–199)
CO2: 29 MMOL/L (ref 22–29)
CREAT SERPL-MCNC: 5.5 MG/DL (ref 0.5–1)
GFR AFRICAN AMERICAN: 9
GFR NON-AFRICAN AMERICAN: 8 ML/MIN/1.73
GLUCOSE BLD-MCNC: 144 MG/DL (ref 74–99)
HCT VFR BLD CALC: 35.9 % (ref 34–48)
HDLC SERPL-MCNC: 51 MG/DL
HEMOGLOBIN: 11.6 G/DL (ref 11.5–15.5)
LDL CHOLESTEROL CALCULATED: 97 MG/DL (ref 0–99)
MCH RBC QN AUTO: 34.4 PG (ref 26–35)
MCHC RBC AUTO-ENTMCNC: 32.3 % (ref 32–34.5)
MCV RBC AUTO: 106.5 FL (ref 80–99.9)
PDW BLD-RTO: 14.7 FL (ref 11.5–15)
PLATELET # BLD: 166 E9/L (ref 130–450)
PMV BLD AUTO: 10 FL (ref 7–12)
POTASSIUM SERPL-SCNC: 4.5 MMOL/L (ref 3.5–5)
RBC # BLD: 3.37 E12/L (ref 3.5–5.5)
SODIUM BLD-SCNC: 138 MMOL/L (ref 132–146)
TOTAL PROTEIN: 6.9 G/DL (ref 6.4–8.3)
TRIGL SERPL-MCNC: 143 MG/DL (ref 0–149)
TSH SERPL DL<=0.05 MIU/L-ACNC: 4.82 UIU/ML (ref 0.27–4.2)
VLDLC SERPL CALC-MCNC: 29 MG/DL
WBC # BLD: 7.2 E9/L (ref 4.5–11.5)

## 2021-07-26 PROCEDURE — 80061 LIPID PANEL: CPT

## 2021-07-26 PROCEDURE — 84443 ASSAY THYROID STIM HORMONE: CPT

## 2021-07-26 PROCEDURE — 36415 COLL VENOUS BLD VENIPUNCTURE: CPT

## 2021-07-26 PROCEDURE — 85027 COMPLETE CBC AUTOMATED: CPT

## 2021-07-26 PROCEDURE — 80053 COMPREHEN METABOLIC PANEL: CPT

## 2021-09-17 ENCOUNTER — OFFICE VISIT (OUTPATIENT)
Dept: PAIN MANAGEMENT | Age: 76
End: 2021-09-17
Payer: COMMERCIAL

## 2021-09-17 VITALS
WEIGHT: 210 LBS | OXYGEN SATURATION: 95 % | SYSTOLIC BLOOD PRESSURE: 150 MMHG | TEMPERATURE: 97.5 F | HEART RATE: 85 BPM | HEIGHT: 66 IN | DIASTOLIC BLOOD PRESSURE: 90 MMHG | BODY MASS INDEX: 33.75 KG/M2 | RESPIRATION RATE: 16 BRPM

## 2021-09-17 DIAGNOSIS — M51.36 DDD (DEGENERATIVE DISC DISEASE), LUMBAR: ICD-10-CM

## 2021-09-17 DIAGNOSIS — M25.562 CHRONIC PAIN OF LEFT KNEE: ICD-10-CM

## 2021-09-17 DIAGNOSIS — M47.817 LUMBOSACRAL SPONDYLOSIS WITHOUT MYELOPATHY: Primary | ICD-10-CM

## 2021-09-17 DIAGNOSIS — G89.29 CHRONIC PAIN OF LEFT KNEE: ICD-10-CM

## 2021-09-17 DIAGNOSIS — M17.12 OSTEOARTHRITIS OF LEFT KNEE, UNSPECIFIED OSTEOARTHRITIS TYPE: ICD-10-CM

## 2021-09-17 PROCEDURE — 3017F COLORECTAL CA SCREEN DOC REV: CPT | Performed by: ANESTHESIOLOGY

## 2021-09-17 PROCEDURE — 4040F PNEUMOC VAC/ADMIN/RCVD: CPT | Performed by: ANESTHESIOLOGY

## 2021-09-17 PROCEDURE — 1090F PRES/ABSN URINE INCON ASSESS: CPT | Performed by: ANESTHESIOLOGY

## 2021-09-17 PROCEDURE — G8417 CALC BMI ABV UP PARAM F/U: HCPCS | Performed by: ANESTHESIOLOGY

## 2021-09-17 PROCEDURE — 99204 OFFICE O/P NEW MOD 45 MIN: CPT | Performed by: ANESTHESIOLOGY

## 2021-09-17 PROCEDURE — 1036F TOBACCO NON-USER: CPT | Performed by: ANESTHESIOLOGY

## 2021-09-17 PROCEDURE — G8427 DOCREV CUR MEDS BY ELIG CLIN: HCPCS | Performed by: ANESTHESIOLOGY

## 2021-09-17 PROCEDURE — 1123F ACP DISCUSS/DSCN MKR DOCD: CPT | Performed by: ANESTHESIOLOGY

## 2021-09-17 PROCEDURE — G8399 PT W/DXA RESULTS DOCUMENT: HCPCS | Performed by: ANESTHESIOLOGY

## 2021-09-17 RX ORDER — FERRIC CITRATE 210 MG/1
TABLET, COATED ORAL
Status: ON HOLD | COMMUNITY
Start: 2021-07-16 | End: 2022-06-22 | Stop reason: HOSPADM

## 2021-09-17 RX ORDER — FERRIC CITRATE 210 MG/1
3 TABLET, COATED ORAL
COMMUNITY
Start: 2021-09-14

## 2021-09-17 RX ORDER — BUPROPION HYDROCHLORIDE 150 MG/1
TABLET ORAL
Status: ON HOLD | COMMUNITY
Start: 2021-08-16 | End: 2022-06-21

## 2021-09-17 NOTE — PROGRESS NOTES
Do you currently have any of the following:    Fever: No  Headache:  yes  Cough: No  Shortness of breath: Yes  Exposed to anyone with these symptoms: No                                                                                                                Precious Vázquez presents to the [facility] on 9/17/2021. Jeanne Ford is complaining of pain back, knee and joints. The pain is constant. The pain is described as aching, stabbing, dull, sharp and numb. Pain is rated on her best day at a 6, on her worst day at a 10, and on average at a 5 on the VAS scale. She took her last dose of nothing . Jeanne Ford does not have issues with constipation. Any procedures since your last visit: No,   She is not on NSAIDS and  is not on anticoagulation medications to include none . Pacemaker or defibrillator: No.    Medication Contract and Consent for Opioid Use Documents Filed      No documents found                   BP (!) 150/90   Pulse 85   Temp 97.5 °F (36.4 °C) (Infrared)   Resp 16   Ht 5' 6\" (1.676 m)   Wt 210 lb (95.3 kg)   SpO2 95%   BMI 33.89 kg/m²      No LMP recorded.  Patient is postmenopausal.

## 2021-09-17 NOTE — PROGRESS NOTES
[address]        [address] [facility], [address] [facility]      [phone]                  Consult Note      Patient:  ALDAIR Roberts 1945    Date of Service:  21     Requesting Physician:  Cleve Huffman, *    Reason for Consult:      Patient presents with complaints of low back pain and left knee pain    HISTORY OF PRESENT ILLNESS:      Ms. Deja Hanley is a 78 y.o. female presented today to [address] [place] [address] for evaluation of  chronic low back pain and left knee pain. Low back pain predominantly axial in nature- denies significant LE radiation. Left knee pain : mainly on walking. Prior X-ray - OA. Pain is constant and is described as aching and throbbing. Pain does not radiate. Alleviating factors include: rest.  Aggravating factors include: movement, bending, lifting. Pain causes functional limitations/ limits Adl's : Yes    Nursing notes and details of the pain history reviewed. Please see intake notes for details. H/o ESRD- on HD / Thurs/ Sat    Previous treatments:   Physical Therapy/ HEP : yes     Medications: - NSAID's : yes - but stopped due to ESRD            - Membrane stabilizers : no            - Opioids : no            - Adjuvants or Others : yes    TENS Unit: no    Surgeries: no- LS spine surgery or left knee surgery    Interventional Pain procedures/ nerve blocks: no    She has not been on anticoagulation medications     She has not been on herbal supplements. She is diabetic. H/O Smoking: no  H/O alcohol abuse : no  H/O Illicit drug use : denies    Imaging:     X-ray left knee: 3/4/2021:     Impression   1. No acute fracture or dislocation.    2. Osteopenia.  Moderate to severe degenerative change, specially involving   the medial compartment of the tibiofemoral joint.           Past Medical History:   Diagnosis Date    Anemia     Arrhythmia     AF    Arthritis     RA    Asthma     DO   folic acid (FOLVITE) 1 MG tablet Take 1 tablet by mouth daily 5/10/21  Yes Ric Kelly, DO   famotidine (PEPCID) 40 MG tablet TAKE 1 TABLET BY MOUTH DAILY 1/8/21  Yes Historical Provider, MD   montelukast (SINGULAIR) 10 MG tablet Take 1 tablet by mouth nightly 10/22/20  Yes Ric Kelly, DO   albuterol sulfate  (90 Base) MCG/ACT inhaler Inhale 1 puff into the lungs every 4 hours as needed for Wheezing 10/21/20  Yes Ric Kelly DO   fluticasone-salmeterol (ADVAIR DISKUS) 100-50 MCG/DOSE diskus inhaler Inhale 1 puff into the lungs 2 times daily Rinse mouth after use. 10/21/20  Yes Ric Kelly DO   cyanocobalamin ([employer] VITAMIN B12) 1000 MCG tablet Take 1 tablet by mouth daily 5/1/20  Yes Jade Huang DO   traZODone (DESYREL) 50 MG tablet Take 1 tablet by mouth nightly 5/1/20  Yes Jade Huang DO   Alcohol Swabs (ALCOHOL PREP) 70 % PADS 1 each by Does not apply route 4 times daily (before meals and nightly) 5/1/20  Yes Jade Huang DO   atorvastatin (LIPITOR) 10 MG tablet TAKE 1 TABLET BY MOUTH EVERY NIGHT 3/27/20  Yes Jade Huang DO   Elastic Bandages & Supports (WRIST SPLINT LEFT/RIGHT) MISC 1 each by Does not apply route daily as needed (numbness) 1/17/20  Yes Jade Huang DO   Misc. Devices (BARIATRIC ROLLATOR) MISC 1 Device by Does not apply route continuous prn (Walking) 1/3/20  Yes Jade Huang DO   calcium carbonate 600 MG TABS tablet Take 1 tablet by mouth 2 times daily  Patient taking differently: Take 2 tablets by mouth 3 times daily (with meals) Rx 9/30/19  Yes Yadi Loaiza, DO   Insulin Pen Needle (B-D ULTRAFINE III SHORT PEN) 31G X 8 MM MISC Inject 1 each into the skin daily 7/29/19  Yes Jade Huang DO   nitroGLYCERIN (NITROSTAT) 0.4 MG SL tablet Place 1 tablet under the tongue every 5 minutes as needed for Chest pain up to max of 3 total doses.  If no relief, call 911. 7/29/19  Yes Jade Huang, DO   Methoxy PEG-Epoetin Beta (MIRCERA) 50 MCG/0.3ML SOSY Inject 50 mcg as directed every 14 days Last Dose: 4/4/2019  Next Dose: 6/13/2019   Yes Historical Provider, MD   lidocaine-prilocaine (EMLA) 2.5-2.5 % cream Apply 1 each topically three times a week Tuesday, Thursday, Saturday   Yes Historical Provider, MD   insulin lispro (HUMALOG) 100 UNIT/ML injection vial Inject 0-5 Units into the skin 3 times daily (before meals) *Per Sliding Scale*  1 unit for every 10 carbs   Takes 2 units with each meal   Yes Historical Provider, MD   iron sucrose (VENOFER) 20 MG/ML injection Infuse 50 mg intravenously once a week Last Dose: 6/4/ 2020 at dialysis   Yes Historical Provider, MD   B Complex-C-Folic Acid (TOD-RENETTA) TABS Take 1 tablet by mouth daily Rx   Yes Historical Provider, MD   midodrine (PROAMATINE) 5 MG tablet Take 5 mg by mouth three times a week Tuesday, Thursday, Saturday   Yes Historical Provider, MD   glucose blood VI test strips (ACCU-CHEK SMARTVIEW) strip 1 each by In Vitro route 4 times daily (before meals and nightly) As needed.  6/15/17  Yes Brannon Blanco, DO   ACCU-CHEK FASTCLIX LANCETS MISC USE TO TEST BLOOD SUGAR FOUR TIMES DAILY BEFORE MEALS AND NIGHTLY 6/8/17  Yes Carmen Causey, DO   Blood Glucose Monitoring Suppl (ACCU-CHEK DERREK SMARTVIEW) W/DEVICE KIT 1 kit by Does not apply route 4 times daily (before meals and nightly) 3/29/17  Yes Brannon Blanco, DO   buPROPion (WELLBUTRIN XL) 150 MG extended release tablet TAKE 1 TABLET BY MOUTH EVERY DAY FOR 7 DAYS THEN 2 TABLETS BY MOUTH THEREAFTER 8/16/21   Historical Provider, MD   AURYXIA 1  MG(Fe) TABS TAKE 2 TABLETS BY MOUTH THREE TIMES DAILY WITH MEALS AND 1 TABLET WITH SNACKS   SWALLOW WHOLE, DO NOT CHEW OR CRUSH MEDICATION 7/16/21   Historical Provider, MD       Allergies   Allergen Reactions    Pcn [Penicillins] Hives    Sulfa Antibiotics Other (See Comments)     \"passed out with IV\"    Bactrim [Sulfamethoxazole-Trimethoprim] Hives       Social History     Socioeconomic History    Marital status:      Spouse name: Not on file    Number of children: Not on file    Years of education: Not on file    Highest education level: Not on file   Occupational History    Not on file   Tobacco Use    Smoking status: Never Smoker    Smokeless tobacco: Never Used   Vaping Use    Vaping Use: Never used   Substance and Sexual Activity    Alcohol use: Yes     Comment: rarely. 2-3 coffee per day    Drug use: No    Sexual activity: Not on file   Other Topics Concern    Not on file   Social History Narrative    Not on file     Social Determinants of Health     Financial Resource Strain:     Difficulty of Paying Living Expenses:    Food Insecurity:     Worried About Running Out of Food in the Last Year:     [address] in the Last Year:    Transportation Needs:     Lack of Transportation (Medical):  Lack of Transportation (Non-Medical):    Physical Activity:     Days of Exercise per Week:     Minutes of Exercise per Session:    Stress:     Feeling of Stress :    Social Connections:     Frequency of Communication with Friends and Family:     Frequency of Social Gatherings with Friends and Family:     Attends [employer] Services:     Active Member of Clubs or Organizations:     Attends Club or Organization Meetings:     Marital Status:    Intimate Partner Violence:     Fear of Current or Ex-Partner:     Emotionally Abused:     Physically Abused:     Sexually Abused:        Family History   Problem Relation Age of Onset    Emphysema Mother     No Known Problems Father        REVIEW OF SYSTEMS:     Patient specifically denies fever/chills, chest pain, shortness of breath, new bowel or bladder complaints. All other review of systems was negative.     PHYSICAL EXAMINATION:      BP (!) 150/90   Pulse 85   Temp 97.5 °F (36.4 °C) (Infrared)   Resp 16   Ht 5' 6\" (1.676 m)   Wt 210 lb (95.3 kg)   SpO2 95%   BMI 33.89 kg/m²     General:      General appearance:  Pleasant and well-hydrated, in no distress and A & O x 3  Build:Overweight  Function: Rises from seated position easily and Moves about room without difficulty    HEENT:    Head:normocephalic, atraumatic  Pupils:regular, round, equal  Sclera: icterus absent    Lungs:    Breathing:normal breathing pattern     CVS:     RRR    Abdomen:    Shape:non-distended and normal    Cervical spine:    Inspection:normal  Palpation:tenderness paravertebral muscles, tenderness trapezium, left, right and positive. Range of motion:Normal flexion, extension, rotation bilaterally and is not painful. Thoracic spine:     Spine inspection:normal   Palpation:No tenderness over the midline and paraspinal area, bilaterally  Range of motion:normal in flexion, extension rotation bilateral and is not painful. Lumbar spine:    Spine inspection: Normal   Palpation: Tenderness paravertebral muscles Yes bilaterally  Range of motion: Decreased, flexion Decreased, Lateral bending, extension and rotation bilaterally reduced is painful. Sacroiliac joint tenderness No bilaterally  ALEYDA test: negative bilaterally  Gaenslen's test:negative bilaterally   Piriformis tenderness: negative bilaterally  SLR : negative bilaterally    Musculoskeletal:    Trigger points no    Extremities:    Tremors:None bilaterally upper and lower  Edema:none x all 4 extremities    Knee:     Inspection: swelling none bilaterally  Tenderness of [place]:Lateral and Medial, left  Effusion:absent bilaterally  ROM:Left pian +    No signs of instability    Neurological:    Sensory: Normal to light touch     Motor:   Hip flexors 4+              Right Quadriceps 5/5          Left Quadriceps 5/5           Right Gastrocnemius 5/5    Left Gastrocnemius 5/5  Right Ant Tibialis 5/5  Left Ant Tibialis 5/5    Reflexes:    B/l equal    Gait:uses a cane to assist in ambulation    Dermatology:    Skin:no rashes or lesions noted    Assessment/Plan:     Diagnosis Orders 1. Lumbosacral spondylosis without myelopathy     2. DDD (degenerative disc disease), lumbar     3. Chronic pain of left knee     4. Osteoarthritis of left knee, unspecified osteoarthritis type       78 y.o. female with H/o chronic low back pain and left knee pain. Low back pain features fo DDD/ facet pain. Left knee pain - features of OA. H/o ESRD- on HD T/ Th/ Sat. H/o DM +    Plan:  X-ray LS spine    Physical therapy    TENS unit trial    Compound cream for local use    Left knee injection. Consider lumbar facet MBNB in future. If pain persists, consider low dose analgesics. Follow up after PT. Counseling : Patient encouraged to stay active and to watch/lose weight    Encouraged to continue Regular home exercise program as tolerated - stretching / strengthening. Treatment plan discussed with the patient including medication and procedure side effects. Controlled Substances Monitoring:     Prakash Romero MD    Dear Dr. Jen Steward,   Thank you for referring Ms. Kalyani Mclaughlin and allowing us to participate in her care. Please do not hesitate to contact me if you have any questions regarding her care.     Cruz Hinojosa MD    CC:    Christiano Geiger, [facility] [address],  [facility]

## 2021-09-23 ENCOUNTER — HOSPITAL ENCOUNTER (OUTPATIENT)
Age: 76
Discharge: HOME | End: 2021-09-25
Payer: COMMERCIAL

## 2021-09-23 ENCOUNTER — HOSPITAL ENCOUNTER (OUTPATIENT)
Dept: GENERAL RADIOLOGY | Age: 76
Discharge: HOME | End: 2021-09-25
Payer: COMMERCIAL

## 2021-09-23 DIAGNOSIS — M47.817 LUMBOSACRAL SPONDYLOSIS WITHOUT MYELOPATHY: ICD-10-CM

## 2021-09-23 DIAGNOSIS — M51.36 DDD (DEGENERATIVE DISC DISEASE), LUMBAR: ICD-10-CM

## 2021-09-23 PROCEDURE — 72110 X-RAY EXAM L-2 SPINE 4/>VWS: CPT

## 2021-09-24 ENCOUNTER — EVALUATION (OUTPATIENT)
Dept: PHYSICAL THERAPY | Age: 76
End: 2021-09-24
Payer: COMMERCIAL

## 2021-09-24 DIAGNOSIS — M47.817 LUMBOSACRAL SPONDYLOSIS WITHOUT MYELOPATHY: Primary | ICD-10-CM

## 2021-09-24 PROCEDURE — 97162 PT EVAL MOD COMPLEX 30 MIN: CPT | Performed by: PHYSICAL THERAPIST

## 2021-09-24 NOTE — PROGRESS NOTES
Physical Therapy Treatment Note    Date: 2021  Patient Name: Precious Vázquez  : 1945   MRN: [id number]  DOInjury: Chronic   DOSx: None  Referring Provider: Remedios Alanis MD  [address] [facility],  [address] [facility]     Medical Diagnosis:    Diagnosis Orders   1. Lumbosacral spondylosis without myelopathy         Outcome Measure:  Modified Oswestry 42% disability    S: see eval  O:  Time [id number]     Visit 1 Repeat outcome measure at mid point and end. Pain 6/10     ROM      Modalities      MH + ES      Traction            Exercise      ALL EXERCISE DONE WITH DRAW-IN TECHNIQUE       Manual     Mobilization/Manipulation              THEREX     Supine Knee to Chest HEP     LTR  HEP     Supine Clams HEP     Pelvic Tilts HEP     Bridges HEP     Supine HS Stretch  HEP     Piriformis Stretch      Trunk Flexion      Trunk Extension      Quadratus Lumborum Stretch on Wall            Cat/Camel      Bird Dog      Prone Lumbar Extension      Prone Arm and Leg Raises      Prone Multifidus Strengthening       Crunches      Plank      Lateral Plank       Functional Activities To aid in reaching , pushing, pulling tasks at home     Nustep        ROWS: H      ROWS: M      ROWS: L      Punches      Triceps Ext Standing      Trunk Ext TB      Trunk Flex TB      Obliques - low      Obliques - high            SLR      Supine Abduction      Supine Marches      SL Clamshells      SL Abduction            Standing Marching      Standing Hip Abduction      Standing Hip Extension      Standing Hip Flexion       Squats      Stairs - FWD      Stairs - LAT            TG Squats      Leg Press       Gait     Marching Gait      Side Stepping             Weighted Activities      Lat Pull Down      Vertical Row      ROWS: H      ROWS: M      ROWS: L      Dead Lift      [place] Dead Lift      Luxembour Split Squat      Squats              A:  Tolerated well. Above added to written HEP.   P: Continue with rehab plan  Melinda Davis PT    Treatment Charges: Mins Units   Initial Evaluation 35 1   Re-Evaluation     Ther Exercise         TE     Manual Therapy     MT     Ther Activities        TA     Gait Training          GT     Neuro Re-education NR     Modalities     Non-Billable Service Time     Other     Total Time/Units 35 1

## 2021-09-24 NOTE — PROGRESS NOTES
[address] [facility] OUTPATIENT REHABILITATION  PHYSICAL THERAPY INITIAL EVALUATION         Date:  2021   Patient: Di De La Cruz  : 1945  MRN: [id number]  Referring Provider: Hussein Goss MD  [address] Diagnosis:      Diagnosis Orders   1. Lumbosacral spondylosis without myelopathy          Physician Order: Amber Larson and Treat     SUBJECTIVE:     Onset date: 15 years    Onset: Gradual     History / Mechanism of Injury: Pt stated that she cannot recall a specific injury just that her back has been getting progressively worse throughout the years. Interventions for current problem: none    Chief complaint: pain, decreased motion, decreased mobility, weakness    Behavior: condition is staying the same    Pain: constant  Current: 6/10     Best: 5/10     Worst:10/10 (occurs with walking, lying, sitting). Pain returns to baseline in a half hour    Symptom Type / Quality: aching, shooting  Location[de-identified] Back: noted above lumbar region and right lateral side radiates down the posterior right leg 3x a week typically. LB/LE Ratio: 75/25       Provoking Activities/Positions: sitting, walking, walking long distances, lifting/carrying/material handling                 Relieving Activities/Positions: lying on back    Disturbed Sleep: yes  Bladder Dysfunction: no  Bowel Dysfunction: no     Imaging results: XR LUMBAR SPINE (MIN 4 VIEWS)    Result Date: 2021  EXAMINATION: 5 XRAY VIEWS OF THE LUMBAR SPINE 2021 2:51 pm COMPARISON: None. HISTORY: ORDERING SYSTEM PROVIDED HISTORY: Lumbosacral spondylosis without myelopathy TECHNOLOGIST PROVIDED HISTORY: Reason for exam:->low back pain FINDINGS: Moderate to severe multilevel degenerative disc disease is seen involving the lumbar spine with endplate osteophytes and disc space narrowing with vacuum disc phenomena involving multiple levels. This is not significantly changed.  There is no acute compression TABLETS BY MOUTH THREE TIMES DAILY WITH MEALS AND 1 TABLET WITH SNACKS   SWALLOW WHOLE, DO NOT CHEW OR CRUSH MEDICATION      sertraline (ZOLOFT) 100 MG tablet Take 1 tab qhs. 90 tablet 1    SYNTHROID 75 MCG tablet Take 1 tablet by mouth Daily 90 tablet 0    insulin glargine (LANTUS SOLOSTAR) 100 UNIT/ML injection pen Inject 30 Units into the skin nightly 5 pen 5    Insulin Pen Needle 32G X 4 MM MISC 1 each by Does not apply route daily Use as directed with insulin pen 100 each 3    rOPINIRole (REQUIP) 2 MG tablet TAKE 1 TABLET BY MOUTH EVERY NIGHT 90 tablet 1    folic acid (FOLVITE) 1 MG tablet Take 1 tablet by mouth daily 90 tablet 1    famotidine (PEPCID) 40 MG tablet TAKE 1 TABLET BY MOUTH DAILY      montelukast (SINGULAIR) 10 MG tablet Take 1 tablet by mouth nightly 90 tablet 0    albuterol sulfate  (90 Base) MCG/ACT inhaler Inhale 1 puff into the lungs every 4 hours as needed for Wheezing 1 Inhaler 3    fluticasone-salmeterol (ADVAIR DISKUS) 100-50 MCG/DOSE diskus inhaler Inhale 1 puff into the lungs 2 times daily Rinse mouth after use. 1 Inhaler 5    cyanocobalamin ([employer] VITAMIN B12) 1000 MCG tablet Take 1 tablet by mouth daily 30 tablet 3    traZODone (DESYREL) 50 MG tablet Take 1 tablet by mouth nightly 90 tablet 1    Alcohol Swabs (ALCOHOL PREP) 70 % PADS 1 each by Does not apply route 4 times daily (before meals and nightly) 200 each 3    atorvastatin (LIPITOR) 10 MG tablet TAKE 1 TABLET BY MOUTH EVERY NIGHT 90 tablet 5    Elastic Bandages & Supports (WRIST SPLINT LEFT/RIGHT) MISC 1 each by Does not apply route daily as needed (numbness) 1 each 0    Misc.  Devices (BARIATRIC ROLLATOR) MISC 1 Device by Does not apply route continuous prn (Walking) 1 each 0    calcium carbonate 600 MG TABS tablet Take 1 tablet by mouth 2 times daily (Patient taking differently: Take 2 tablets by mouth 3 times daily (with meals) Rx) 60 tablet 2    Insulin Pen Needle (B-D ULTRAFINE III SHORT PEN) 31G X 8 MM MISC Inject 1 each into the skin daily 100 each 5    nitroGLYCERIN (NITROSTAT) 0.4 MG SL tablet Place 1 tablet under the tongue every 5 minutes as needed for Chest pain up to max of 3 total doses. If no relief, call 911. 25 tablet 3    Methoxy PEG-Epoetin Beta (MIRCERA) 50 MCG/0.3ML SOSY Inject 50 mcg as directed every 14 days Last Dose: 4/4/2019  Next Dose: 6/13/2019      lidocaine-prilocaine (EMLA) 2.5-2.5 % cream Apply 1 each topically three times a week Tuesday, Thursday, Saturday      insulin lispro (HUMALOG) 100 UNIT/ML injection vial Inject 0-5 Units into the skin 3 times daily (before meals) *Per Sliding Scale*  1 unit for every 10 carbs   Takes 2 units with each meal      iron sucrose (VENOFER) 20 MG/ML injection Infuse 50 mg intravenously once a week Last Dose: 6/4/ 2020 at dialysis      B Complex-C-Folic Acid (TOD-RENETTA) TABS Take 1 tablet by mouth daily Rx      midodrine (PROAMATINE) 5 MG tablet Take 5 mg by mouth three times a week Tuesday, Thursday, Saturday      glucose blood VI test strips (ACCU-CHEK SMARTVIEW) strip 1 each by In Vitro route 4 times daily (before meals and nightly) As needed. 150 each 3    ACCU-CHEK FASTCLIX LANCETS MISC USE TO TEST BLOOD SUGAR FOUR TIMES DAILY BEFORE MEALS AND NIGHTLY 612 each 3    Blood Glucose Monitoring Suppl (ACCU-CHEK DERREK SMARTVIEW) W/DEVICE KIT 1 kit by Does not apply route 4 times daily (before meals and nightly) 1 kit 0     No current facility-administered medications for this visit. Occupation: disability for back/knees Physical demands include: n/a. Status: n/a    Exercise regimen: none    Hobbies: working around the house, washing dishes    Patient Goals: pain relief, return to prior activity, get back to normal    Contraindications/Precautions: none    OBJECTIVE:     Estimated body mass index is 33.89 kg/m² as calculated from the following:    Height as of 9/17/21: 5' 6\" (1.676 m). Weight as of 9/17/21: 210 lb (95.3 kg). Observations: well nourished female     Inspection: normal orthopedic exam         Gait: unsteady, ambulates with wheeled walker, ambulates with quad cane    Functional Strength:   Able to toe walk, heel walk, and squat. Range of Motion:    Trunk:    Flexion:  [] Normal   [x] Limited by 10 degrees   Extension:  [] Normal   [x] Limited by 10 degrees    Right Rotation: [] Normal   [x] Limited by 15 degrees   Left Rotation:  [] Normal   [x] Limited by 10 degrees   Right Side Bending: [] Normal   [x] Limited by 10 degrees   Left Side Bending: [] Normal   [x] Limited by 10 degrees      Lower Extremity:   Right:   [] Normal   [x] Limited by 20 degrees   Left:   [] Normal   [x] Limited by 10 degrees      Strength:     Trunk: 3-/5   R Hip: 3-/5   R LE: 4-/5   L LE: 4-/5    Palpation: Tender to palpation right lumbar paraspinal muscles and soft tissues. Sensation: N and T in B feet d/t diabetes    Special Tests:   [] Nerve Root Compression           Right []+ / [] -    Left []+ / [] -  [] Slump           Right []+ / [] -    Left []+ / [] -  [] FADIR          Right []+ / [] -    Left []+ / [] -  [] S-I Distraction          Right []+ / [] -    Left []+ / [] -     [] SLR           Right []+ / [] -    Left []+ / [] -     [] ALEYDA          Right []+ / [] -    Left []+ / [] -  [] S-I Compression          Right []+ / [] -    Left []+ / [] -   [] Other: []+ / [] -       Special Test Comments: Compression causes pain, not neurological symptoms. ASSESSMENT     Pt to benefit from skilled PT services in order to improve ROM, strength, functional mobility, endurance, and decrease pain in low back, R hip.       Outcome Measure:   Oswestry Low Back Disability Questionnaire 42% disability    Problems:   Pain 10/10 intermittent  ROM decreased as noted above   Strength decreased as noted above   Balance decreased in both standing and walking   Limitations with walking, stairs, standing, ADLs as noted above, use of right lower program  Method of Education: [x] Verbal  [x] Demo  [x] Written  Comprehension of Education:  [x] Verbalizes understanding. [x] Demonstrates understanding. [] Needs Review. [] Demonstrates / verbalizes understanding of HEP / [employer] previously given. Frequency:  1-2 days per week for 4-6 weeks    Patient understands diagnosis/prognosis and consents to treatment, plan and goals: [x] Yes    [] No     Thank you for the opportunity to work with your patient. If you have questions or comments, please contact me at [phone]; fax: [phone]. Electronically signed by: Lavinia Dejesus PT         Please sign Physician's Certification and return to: [address] [place] [id number]  Dept: [phone]  Dept Fax: [phone] Certification / Comments     Frequency/Duration 1-2 days per week for 4-6 weeks. Certification period from 9/24/2021  to 12/23/2021. I have reviewed the Plan of Care established for skilled therapy services and certify that the services are required and that they will be provided while the patient is under my care.     Physician's Comments/Revisions:               Physician's Printed Name:                                           [de-identified] Signature:                                                               Date:

## 2021-09-29 ENCOUNTER — TREATMENT (OUTPATIENT)
Dept: PHYSICAL THERAPY | Age: 76
End: 2021-09-29
Payer: COMMERCIAL

## 2021-09-29 DIAGNOSIS — M47.817 LUMBOSACRAL SPONDYLOSIS WITHOUT MYELOPATHY: Primary | ICD-10-CM

## 2021-09-29 PROCEDURE — 97530 THERAPEUTIC ACTIVITIES: CPT

## 2021-09-29 NOTE — PROGRESS NOTES
Physical Therapy Treatment Note    Date: 2021  Patient Name: Pearl Figueroa  : 1945   MRN: [id number]  DOInjury: Chronic   DOSx: None  Referring Provider:  Madelin Rapp MD  [address] [facility],  [address] [facility]         Medical Diagnosis:    Diagnosis Orders   1. Lumbosacral spondylosis without myelopathy         Outcome Measure:  Modified Oswestry 42% disability    S: pt reported doing given HEP but being sore after. O:  Time 1420- 1500     Visit 2 Repeat outcome measure at mid point and end.     Pain 5-6 /10     ROM      Modalities      MH + ES      Traction            Exercise      ALL EXERCISE DONE WITH DRAW-IN TECHNIQUE       Manual     Mobilization/Manipulation              THEREX     Supine Knee to Chest HEP     LTR  HEP     Supine Clams HEP     Pelvic Tilts HEP     Bridges HEP     Supine HS Stretch  HEP     Piriformis Stretch      Trunk Flexion      Trunk Extension      Quadratus Lumborum Stretch on Wall            Cat/Camel      Bird Dog      Prone Lumbar Extension      Prone Arm and Leg Raises      Prone Multifidus Strengthening       Crunches      Plank      Lateral Plank       Functional Activities To aid in reaching , pushing, pulling tasks at home     Nustep   L 5 x 5 min new   TA         ROWS: H      ROWS: M      ROWS: L      Punches      Triceps Ext Standing      Trunk Ext TB      Trunk Flex TB      Obliques - low      Obliques - high            SLR      Supine Abduction      Supine Marches      SL Clamshells      SL Abduction            Calf raises  2 x 10 reps  New    TA   Standing Marching 2 x 10 reps  New   TA   Standing Hip Abduction 2 x 10 reps   New   TA   Standing Hip Extension      Standing Hip Flexion       Squats      Stairs - FWD      Stairs - LAT            TG Squats      Leg Press       Gait     Marching Gait      Side Stepping             Weighted Activities      Lat Pull Down      Vertical Row      ROWS: H Green 2 x 10 reps seated   TA   ROWS: M Green 2 x 10 reps seated   TA   ROWS: L Green 2 x 10 reps seated   TA   Dead Lift      [facility] Dead Lift      [facility] Split Squat      Squats              A:  Tolerated well. Pt able to tolerate newly added ex's without increase in initial c/o soreness . Above added to written HEP.   P: Continue with rehab plan  Dominguez Hogan PTA    Treatment Charges: Mins Units   Initial Evaluation     Re-Evaluation     Ther Exercise         TE     Manual Therapy     MT     Ther Activities        TA 40 3   Gait Training          GT     Neuro Re-education NR     Modalities     Non-Billable Service Time     Other     Total Time/Units 40 3

## 2021-09-30 DIAGNOSIS — Z99.2 CONTROLLED TYPE 2 DIABETES MELLITUS WITH CHRONIC KIDNEY DISEASE ON CHRONIC DIALYSIS, WITH LONG-TERM CURRENT USE OF INSULIN (HCC): ICD-10-CM

## 2021-09-30 DIAGNOSIS — Z79.4 CONTROLLED TYPE 2 DIABETES MELLITUS WITH CHRONIC KIDNEY DISEASE ON CHRONIC DIALYSIS, WITH LONG-TERM CURRENT USE OF INSULIN (HCC): ICD-10-CM

## 2021-09-30 DIAGNOSIS — E53.8 B12 DEFICIENCY: ICD-10-CM

## 2021-09-30 DIAGNOSIS — J45.30 MILD PERSISTENT ASTHMA WITHOUT COMPLICATION: ICD-10-CM

## 2021-09-30 DIAGNOSIS — E11.22 CONTROLLED TYPE 2 DIABETES MELLITUS WITH CHRONIC KIDNEY DISEASE ON CHRONIC DIALYSIS, WITH LONG-TERM CURRENT USE OF INSULIN (HCC): ICD-10-CM

## 2021-09-30 DIAGNOSIS — M81.8 OTHER OSTEOPOROSIS WITHOUT CURRENT PATHOLOGICAL FRACTURE: ICD-10-CM

## 2021-09-30 DIAGNOSIS — E78.49 OTHER HYPERLIPIDEMIA: ICD-10-CM

## 2021-09-30 DIAGNOSIS — G25.81 RESTLESS LEG SYNDROME, CONTROLLED: ICD-10-CM

## 2021-09-30 DIAGNOSIS — E83.51 HYPOCALCEMIA: ICD-10-CM

## 2021-09-30 DIAGNOSIS — N18.6 CONTROLLED TYPE 2 DIABETES MELLITUS WITH CHRONIC KIDNEY DISEASE ON CHRONIC DIALYSIS, WITH LONG-TERM CURRENT USE OF INSULIN (HCC): ICD-10-CM

## 2021-09-30 DIAGNOSIS — G47.9 SLEEPING DIFFICULTY: ICD-10-CM

## 2021-09-30 NOTE — TELEPHONE ENCOUNTER
Lanie Mack had an appointment on Visit date not found and has a follow-up appointment on 10/11/2021      Patient changed her pharmacy.

## 2021-10-01 ENCOUNTER — TREATMENT (OUTPATIENT)
Dept: PHYSICAL THERAPY | Age: 76
End: 2021-10-01
Payer: COMMERCIAL

## 2021-10-01 DIAGNOSIS — M47.817 LUMBOSACRAL SPONDYLOSIS WITHOUT MYELOPATHY: Primary | ICD-10-CM

## 2021-10-01 PROCEDURE — 97530 THERAPEUTIC ACTIVITIES: CPT

## 2021-10-01 NOTE — PROGRESS NOTES
Physical Therapy Treatment Note    Date: 10/1/2021  Patient Name: Bette Boas  : 1945   MRN: [id number]  DOInjury: Chronic   DOSx: None  Referring Provider:  Marie Bailey MD  [address] [facility],  [address] [facility]         Medical Diagnosis:    Diagnosis Orders   1. Lumbosacral spondylosis without myelopathy         Outcome Measure:  Modified Oswestry 42% disability    S: pt reports still being a little sore after doing new ex's   O:  Time 8815-7492 am     Visit 3/ Repeat outcome measure at mid point and end.     Pain 5-6 /10     ROM      Modalities      MH + ES      Traction            Exercise      ALL EXERCISE DONE WITH DRAW-IN TECHNIQUE       Manual     Mobilization/Manipulation              THEREX     Supine Knee to Chest HEP     LTR  HEP     Supine Clams HEP     Pelvic Tilts HEP     Bridges HEP     Supine HS Stretch  HEP     Piriformis Stretch      Trunk Flexion      Trunk Extension      Quadratus Lumborum Stretch on Wall            Cat/Camel      Bird Dog      Prone Lumbar Extension      Prone Arm and Leg Raises      Prone Multifidus Strengthening       Crunches      Plank      Lateral Plank       Functional Activities To aid in reaching , pushing, pulling tasks at home     Nustep   L 5 x 5 min   TA         ROWS: H      ROWS: M      ROWS: L      Punches      Triceps Ext Standing      Trunk Ext TB      Trunk Flex TB      Obliques - low      Obliques - high            SLR      Supine Abduction      Supine Marches      SL Clamshells      SL Abduction            Calf raises  2 x 10 reps   Seated rest periods as needed    TA   Standing Marching 2 x 10 reps    TA   Standing Hip Abduction 2 x 10 reps     TA   Standing Hip Extension      Standing Hip Flexion       Squats      Stairs - FWD      Stairs - LAT            TG Squats      Leg Press       Gait     Marching Gait      Side Stepping             Weighted Activities      Lat Pull Down      Vertical Row  Pt given green t tubing for HEP 10/01/2021 ROWS: H Green 2 x 10 reps seated   TA   ROWS: M Green 2 x 10 reps seated   TA   ROWS: L Green 2 x 10 reps seated   TA   Skiing seated  Green 2 x 10 reps seated new       Dead Lift      [facility] Dead Lift      [facility] Split Squat      Squats              A:  Tolerated well. Pt able to tolerate all ex's ( newly added skiing with t tubing )  without increase in initial c/o soreness . Above added to written HEP.   P: Continue with rehab plan  Corrina Yusuf PTA    Treatment Charges: Mins Units   Initial Evaluation     Re-Evaluation     Ther Exercise         TE     Manual Therapy     MT     Ther Activities        TA 40 3   Gait Training          GT     Neuro Re-education NR     Modalities     Non-Billable Service Time     Other     Total Time/Units 40 3

## 2021-10-04 ENCOUNTER — TELEPHONE (OUTPATIENT)
Dept: PRIMARY CARE CLINIC | Age: 76
End: 2021-10-04

## 2021-10-04 NOTE — TELEPHONE ENCOUNTER
----- Message from Mary Jo Pagan sent at 9/30/2021  4:57 PM EDT -----  Subject: Message to Provider    QUESTIONS  Information for Provider? FOR [facility] CALLED SAYING THEY   ARE JUST WAITING APPROVAL FOR REFILL REQUEST WOULD LIKE A CALL BACK   [phone]  ---------------------------------------------------------------------------  --------------  Gifty Fearing INFO  What is the best way for the office to contact you? OK to leave message on   voicemail  Preferred Call Back Phone Number? [phone]  ---------------------------------------------------------------------------  --------------  SCRIPT ANSWERS  Relationship to Patient? Third Party  Representative Name?  [facility]

## 2021-10-05 RX ORDER — LANCETS
EACH MISCELLANEOUS
Qty: 612 EACH | Refills: 3 | Status: SHIPPED | OUTPATIENT
Start: 2021-10-05

## 2021-10-05 RX ORDER — UBIQUINOL 100 MG
1 CAPSULE ORAL
Qty: 200 EACH | Refills: 3 | Status: SHIPPED | OUTPATIENT
Start: 2021-10-05

## 2021-10-05 RX ORDER — TRAZODONE HYDROCHLORIDE 50 MG/1
50 TABLET ORAL NIGHTLY
Qty: 90 TABLET | Refills: 1 | Status: SHIPPED
Start: 2021-10-05 | End: 2022-01-28

## 2021-10-05 RX ORDER — MONTELUKAST SODIUM 10 MG/1
10 TABLET ORAL NIGHTLY
Qty: 90 TABLET | Refills: 0 | Status: SHIPPED
Start: 2021-10-05 | End: 2021-11-01

## 2021-10-05 RX ORDER — PHENOL 1.4 %
1 AEROSOL, SPRAY (ML) MUCOUS MEMBRANE 2 TIMES DAILY
Qty: 60 TABLET | Refills: 2 | Status: SHIPPED | OUTPATIENT
Start: 2021-10-05

## 2021-10-05 RX ORDER — FERRIC CITRATE 210 MG/1
TABLET, COATED ORAL
Qty: 270 TABLET | Refills: 0 | OUTPATIENT
Start: 2021-10-05

## 2021-10-05 RX ORDER — ROPINIROLE 2 MG/1
TABLET, FILM COATED ORAL
Qty: 90 TABLET | Refills: 1 | Status: SHIPPED
Start: 2021-10-05 | End: 2022-01-28

## 2021-10-05 RX ORDER — ATORVASTATIN CALCIUM 10 MG/1
TABLET, FILM COATED ORAL
Qty: 90 TABLET | Refills: 1 | Status: SHIPPED
Start: 2021-10-05 | End: 2022-01-28

## 2021-10-05 RX ORDER — INSULIN GLARGINE 100 [IU]/ML
30 INJECTION, SOLUTION SUBCUTANEOUS NIGHTLY
Qty: 5 PEN | Refills: 5 | Status: SHIPPED
Start: 2021-10-05 | End: 2022-03-01

## 2021-10-08 ENCOUNTER — TREATMENT (OUTPATIENT)
Dept: PHYSICAL THERAPY | Age: 76
End: 2021-10-08
Payer: COMMERCIAL

## 2021-10-08 DIAGNOSIS — M47.817 LUMBOSACRAL SPONDYLOSIS WITHOUT MYELOPATHY: Primary | ICD-10-CM

## 2021-10-08 PROCEDURE — 97530 THERAPEUTIC ACTIVITIES: CPT

## 2021-10-08 PROCEDURE — 97110 THERAPEUTIC EXERCISES: CPT

## 2021-10-08 NOTE — PROGRESS NOTES
Physical Therapy Treatment Note    Date: 10/8/2021  Patient Name: Ari Slaughter  : 1945   MRN: [id number]  DOInjury: Chronic   DOSx: None  Referring Provider:   Luis Dainel Acevedo MD  [address] [facility], [address] [facility]           Medical Diagnosis:    Diagnosis Orders   1. Lumbosacral spondylosis without myelopathy       Outcome Measure:  Modified Oswestry 42% disability    S: Patient reports some soreness from exercises. O:  Time 2035-1327 am     Visit 4/ Repeat outcome measure at mid point and end.     Pain 4/10     ROM      Modalities      MH + ES      Traction            Exercise      ALL EXERCISE DONE WITH DRAW-IN TECHNIQUE       Manual     Mobilization/Manipulation              THEREX     Supine Knee to Chest HEP     LTR  HEP     Supine Clams HEP     Pelvic Tilts HEP     Bridges HEP     Supine HS Stretch  HEP     Piriformis Stretch      Trunk Flexion      Trunk Extension      Quadratus Lumborum Stretch on Wall            Cat/Camel      Bird Dog      Prone Lumbar Extension      Prone Arm and Leg Raises      Prone Multifidus Strengthening       Crunches      Plank      Lateral Plank       Functional Activities To aid in reaching , pushing, pulling tasks at home     Nustep   L 5 x 5 min   TA         ROWS: H      ROWS: M      ROWS: L      Punches      Triceps Ext Standing      Trunk Ext TB      Trunk Flex TB      Obliques - low      Obliques - high            SLR      Supine Abduction      Supine Marches      SL Clamshells      SL Abduction            Calf raises  2 x 10 reps   Seated rest periods as needed    TA   Standing Marching 2 x 10 reps    TA   Standing Hip Abduction 2 x 10 reps     TA   Standing Hip Extension      Standing Hip Flexion       Squats 2 x 10 reps  TA   Stairs - FWD      Stairs - LAT            TG Squats      Leg Press       Gait     Marching Gait      Side Stepping             Weighted Activities      Lat Pull Down      Vertical Row  Pt given green tubing for HEP 10/01/2021 ROWS: H Green 2 x 10 reps seated   TA   ROWS: M Green 2 x 10 reps seated   TA   ROWS: L Green 2 x 10 reps seated   TA   Skiing seated  Green 2 x 10 reps seated      Dead Lift      [facility] Dead Lift      [facility] Split Squat      Squats            A: Tolerated well. Above added to written HEP.   P: Continue with rehab plan  Pierre Monroe PTA    Treatment Charges: Mins Units   Initial Evaluation     Re-Evaluation     Ther Exercise         TE 15 1   Manual Therapy     MT     Ther Activities        TA 15 1   Gait Training          GT     Neuro Re-education NR     Modalities     Non-Billable Service Time     Other     Total Time/Units 30 2

## 2021-10-11 ENCOUNTER — OFFICE VISIT (OUTPATIENT)
Dept: PRIMARY CARE CLINIC | Age: 76
End: 2021-10-11
Payer: COMMERCIAL

## 2021-10-11 VITALS
DIASTOLIC BLOOD PRESSURE: 82 MMHG | HEIGHT: 66 IN | TEMPERATURE: 97.4 F | WEIGHT: 215 LBS | OXYGEN SATURATION: 98 % | SYSTOLIC BLOOD PRESSURE: 132 MMHG | BODY MASS INDEX: 34.55 KG/M2 | HEART RATE: 94 BPM

## 2021-10-11 DIAGNOSIS — E11.22 CONTROLLED TYPE 2 DIABETES MELLITUS WITH CHRONIC KIDNEY DISEASE ON CHRONIC DIALYSIS, WITH LONG-TERM CURRENT USE OF INSULIN (HCC): Primary | ICD-10-CM

## 2021-10-11 DIAGNOSIS — Z99.2 CONTROLLED TYPE 2 DIABETES MELLITUS WITH CHRONIC KIDNEY DISEASE ON CHRONIC DIALYSIS, WITH LONG-TERM CURRENT USE OF INSULIN (HCC): Primary | ICD-10-CM

## 2021-10-11 DIAGNOSIS — Z79.4 CONTROLLED TYPE 2 DIABETES MELLITUS WITH CHRONIC KIDNEY DISEASE ON CHRONIC DIALYSIS, WITH LONG-TERM CURRENT USE OF INSULIN (HCC): Primary | ICD-10-CM

## 2021-10-11 DIAGNOSIS — N18.6 CONTROLLED TYPE 2 DIABETES MELLITUS WITH CHRONIC KIDNEY DISEASE ON CHRONIC DIALYSIS, WITH LONG-TERM CURRENT USE OF INSULIN (HCC): Primary | ICD-10-CM

## 2021-10-11 LAB — HBA1C MFR BLD: 7.2 %

## 2021-10-11 PROCEDURE — G8484 FLU IMMUNIZE NO ADMIN: HCPCS | Performed by: FAMILY MEDICINE

## 2021-10-11 PROCEDURE — 3017F COLORECTAL CA SCREEN DOC REV: CPT | Performed by: FAMILY MEDICINE

## 2021-10-11 PROCEDURE — 99213 OFFICE O/P EST LOW 20 MIN: CPT | Performed by: FAMILY MEDICINE

## 2021-10-11 PROCEDURE — 3051F HG A1C>EQUAL 7.0%<8.0%: CPT | Performed by: FAMILY MEDICINE

## 2021-10-11 PROCEDURE — G8427 DOCREV CUR MEDS BY ELIG CLIN: HCPCS | Performed by: FAMILY MEDICINE

## 2021-10-11 PROCEDURE — G8399 PT W/DXA RESULTS DOCUMENT: HCPCS | Performed by: FAMILY MEDICINE

## 2021-10-11 PROCEDURE — 1123F ACP DISCUSS/DSCN MKR DOCD: CPT | Performed by: FAMILY MEDICINE

## 2021-10-11 PROCEDURE — 1036F TOBACCO NON-USER: CPT | Performed by: FAMILY MEDICINE

## 2021-10-11 PROCEDURE — 4040F PNEUMOC VAC/ADMIN/RCVD: CPT | Performed by: FAMILY MEDICINE

## 2021-10-11 PROCEDURE — 2022F DILAT RTA XM EVC RTNOPTHY: CPT | Performed by: FAMILY MEDICINE

## 2021-10-11 PROCEDURE — 83036 HEMOGLOBIN GLYCOSYLATED A1C: CPT | Performed by: FAMILY MEDICINE

## 2021-10-11 PROCEDURE — 1090F PRES/ABSN URINE INCON ASSESS: CPT | Performed by: FAMILY MEDICINE

## 2021-10-11 PROCEDURE — G8417 CALC BMI ABV UP PARAM F/U: HCPCS | Performed by: FAMILY MEDICINE

## 2021-10-11 RX ORDER — INSULIN LISPRO 100 [IU]/ML
INJECTION, SOLUTION INTRAVENOUS; SUBCUTANEOUS
Qty: 5 PEN | Refills: 5 | Status: SHIPPED
Start: 2021-10-11 | End: 2022-08-16

## 2021-10-11 SDOH — ECONOMIC STABILITY: FOOD INSECURITY: WITHIN THE PAST 12 MONTHS, YOU WORRIED THAT YOUR FOOD WOULD RUN OUT BEFORE YOU GOT MONEY TO BUY MORE.: NEVER TRUE

## 2021-10-11 SDOH — ECONOMIC STABILITY: FOOD INSECURITY: WITHIN THE PAST 12 MONTHS, THE FOOD YOU BOUGHT JUST DIDN'T LAST AND YOU DIDN'T HAVE MONEY TO GET MORE.: NEVER TRUE

## 2021-10-11 ASSESSMENT — SOCIAL DETERMINANTS OF HEALTH (SDOH): HOW HARD IS IT FOR YOU TO PAY FOR THE VERY BASICS LIKE FOOD, HOUSING, MEDICAL CARE, AND HEATING?: NOT VERY HARD

## 2021-10-11 NOTE — PROGRESS NOTES
Subjective:  78 y.o. female who presents to the office today with chief complaint:  Chief Complaint   Patient presents with    Diabetes     A1C today is 7.2    Results     Labs done on 7/26/21    Other     Going to physical therapy 2 times a week. Anxiety- d/t sister being hospitalized. Finds herself becoming tearful very easily. Insulin-dependent diabetes: Patient currently taking 35 units of glargine at night and a sliding scale throughout the day. She states that her blood sugars run in an acceptable range- 120 to 150- without any lows below 80. She has been managing this well with an improved diet along with the medication. Chronic pain: [place] [facility]- now going to physical therapy with some improvement. Unable to afford salve prescribed. Hypertension: Was on midodrine previously, but began having elevated BP at dialysis. Started on amlodipine 5mg by nephrology. ESRD: Follows Dr. Donovan Hernandez. Dialysis T,R,Sat. Tolerating this well. Health Maintenance Due   Topic Date Due    Diabetic foot exam  Never done    Shingles Vaccine (1 of 2) Never done    Diabetic retinal exam  06/29/2021    Breast cancer screen  10/18/2021     Podiatrist: Dr. Natalio Caballero- last visit 2 months ago. Will fill out BARBARA. Cancer History: None. Family Cancer History: none. 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 General:  Fever, chills, diaphoresis, fatigue, malaise, night sweats, weight loss  Psychological:  Anxiety, disorientation, hallucinations. ENT:  Epistaxis, headaches, vertigo, visual changes.   Cardiovascular:  Chest pain, irregular heartbeats, palpitations, paroxysmal nocturnal dyspnea. Respiratory:  Shortness of breath, coughing, sputum production, hemoptysis, wheezing, orthopnea. Gastrointestinal:  Nausea, vomiting, diarrhea, heartburn, constipation, abdominal pain, hematemesis, hematochezia, melena, acholic stools  Genito-Urinary:  Dysuria, urgency, frequency, hematuria  Musculoskeletal:  Joint pain, joint stiffness, joint swelling, muscle pain  Neurology:  Headache, focal neurological deficits, weakness, numbness, paresthesia  Extremities:  Decreased ROM, peripheral edema, mottling      Objective:  Vitals:    10/11/21 1424   BP: 132/82   Pulse: 94   Temp: 97.4 °F (36.3 °C)   SpO2: 98%     Physical Exam  Vitals and nursing note reviewed. Constitutional:       General: She is not in acute distress. Appearance: She is well-developed. She is obese. She is not ill-appearing, toxic-appearing or diaphoretic. Comments: Using Rollator walker. [place] HENT:      Head: Normocephalic and atraumatic. Right Ear: External ear normal.      Left Ear: External ear normal.      Nose: Nose normal.   Eyes:      General:         Right eye: No discharge. Left eye: No discharge. Conjunctiva/sclera: Conjunctivae normal.      Pupils: Pupils are equal, round, and reactive to light. Cardiovascular:      Rate and Rhythm: Normal rate and regular rhythm. Heart sounds: Normal heart sounds. No murmur heard. No friction rub. No gallop. Pulmonary:      Effort: Pulmonary effort is normal. No respiratory distress. Breath sounds: No wheezing or rales. Abdominal:      General: Bowel sounds are normal. There is no distension. Palpations: Abdomen is soft. Tenderness: There is no abdominal tenderness. There is no guarding or rebound. Musculoskeletal:      Cervical back: Normal range of motion and neck supple. Lymphadenopathy:      Cervical: No cervical adenopathy. Neurological:      Mental Status: She is alert and oriented to person, place, and time.       Comments: Cranial nerves II through XII intact   Psychiatric:         Mood and Affect: Mood normal.         Behavior: Behavior normal.         Thought Content: Thought content normal.         Judgment: Judgment normal.             Assessment and Plan:  Vishnu Quinones was seen today for diabetes, results and other. Diagnoses and all orders for this visit:    Controlled type 2 diabetes mellitus with chronic kidney disease on chronic dialysis, with long-term current use of insulin ([facility])  -     POCT glycosylated hemoglobin (Hb A1C)  -     insulin lispro, 1 Unit Dial, (HUMALOG KWIKPEN) 100 UNIT/ML SOPN; Take 2 units for Blood sugar of 150, 4 units for 200, 6 units for 250, 8 units if 300. -     Comprehensive Metabolic Panel; Future  -     CBC; Future  -     TSH without Reflex; Future  -     Lipid Panel; Future        Anxiety-no treatment at this time. Continue to follow. Insulin-dependent diabetes: Well-controlled on current regimen. Continue. Collect CBC, CMP, TSH, lipid panel. Chronic pain: [place] [facility]-continue physical therapy. Hypertension: Well-controlled on current regimen. Continue. ESRD: Follows Dr. Ollie Kowalski. Dialysis T,R,Sat. Follow-up in 3 month. Patient may come in sooner if needed for medical concerns. Patient advised to call at any time to cancel or re-scheduleor for any questions/concerns. Please note that >15 minutes was spent face-to-face with the patient gathering history, performing physical exam, discussing findings, counseling the patient, and determining planforward. All questions and concerns addressed and answered.          Chrystal Kelly DO   10/20/21      10:24 AM

## 2021-10-13 ENCOUNTER — TREATMENT (OUTPATIENT)
Dept: PHYSICAL THERAPY | Age: 76
End: 2021-10-13
Payer: COMMERCIAL

## 2021-10-13 DIAGNOSIS — M47.817 LUMBOSACRAL SPONDYLOSIS WITHOUT MYELOPATHY: Primary | ICD-10-CM

## 2021-10-13 PROCEDURE — 97530 THERAPEUTIC ACTIVITIES: CPT

## 2021-10-13 NOTE — PROGRESS NOTES
Physical Therapy Treatment Note    Date: 10/13/2021  Patient Name: Maddison Moralez  : 1945   MRN: [id number]  DOInjury: Chronic   DOSx: None  Referring Provider:   Charles Sosa MD  [address] [facility], [address] [facility]           Medical Diagnosis:    Diagnosis Orders   1. Lumbosacral spondylosis without myelopathy       Outcome Measure:  Modified Oswestry 42% disability    S: Patient reports having more soreness/pain today    O:  Time 4101-0609  am     Visit  Repeat outcome measure at mid point and end.     Pain 8/10     ROM      Modalities      MH + ES      Traction            Exercise      ALL EXERCISE DONE WITH DRAW-IN TECHNIQUE       Manual     Mobilization/Manipulation              THEREX     Supine Knee to Chest HEP     LTR  HEP     Supine Clams HEP     Pelvic Tilts HEP     Bridges HEP     Supine HS Stretch  HEP     Piriformis Stretch      Trunk Flexion      Trunk Extension      Quadratus Lumborum Stretch on Wall            Cat/Camel      Bird Dog      Prone Lumbar Extension      Prone Arm and Leg Raises      Prone Multifidus Strengthening       Crunches      Plank      Lateral Plank       Functional Activities To aid in reaching , pushing, pulling tasks at home     Nustep   L 5 x 5 min   TA         ROWS: H      ROWS: M      ROWS: L      Punches      Triceps Ext Standing      Trunk Ext TB      Trunk Flex TB      Obliques - low      Obliques - high            SLR      Supine Abduction      Supine Marches      SL Clamshells      SL Abduction            Calf raises  2 x 10 reps   Seated rest periods as needed    TA   Standing Marching 2 x 10 reps    TA   Standing Hip Abduction 2 x 10 reps     TA   Standing Hip Extension      Standing Hip Flexion       Squats 2 x 10 reps  TA   Stairs - FWD      Stairs - LAT            TG Squats      Leg Press       Gait     Marching Gait      Side Stepping             Weighted Activities            Lat Pull Down Green  2 x 10 reps seated new      Vertical Row  Pt given green tubing for HEP 10/01/2021     ROWS: H Green 2 x 10 reps seated   TA   ROWS: M Green 2 x 10 reps seated   TA   ROWS: L Green 2 x 10 reps seated   TA   Skiing seated  Green 2 x 10 reps seated      Trunk flexion  Green 2 x 10 reps seated      Trunk ext       Dead Lift      [facility] Dead Lift      [facility] Split Squat      Squats            A: Tolerated well. Pt only able to tolerate listed time for all ex's . Fatigued by end of rx session. Above added to written HEP.   P: Continue with rehab plan  Corrina Yusuf PTA    Treatment Charges: Mins Units   Initial Evaluation     Re-Evaluation     Ther Exercise         TE     Manual Therapy     MT     Ther Activities        TA 30 2   Gait Training          GT     Neuro Re-education NR     Modalities     Non-Billable Service Time     Other     Total Time/Units 30 2

## 2021-10-15 ENCOUNTER — TELEPHONE (OUTPATIENT)
Dept: PHYSICAL THERAPY | Age: 76
End: 2021-10-15

## 2021-10-20 ENCOUNTER — TREATMENT (OUTPATIENT)
Dept: PHYSICAL THERAPY | Age: 76
End: 2021-10-20
Payer: COMMERCIAL

## 2021-10-20 DIAGNOSIS — M47.817 LUMBOSACRAL SPONDYLOSIS WITHOUT MYELOPATHY: Primary | ICD-10-CM

## 2021-10-20 PROCEDURE — 97530 THERAPEUTIC ACTIVITIES: CPT

## 2021-10-20 NOTE — PROGRESS NOTES
Physical Therapy Treatment Note    Date: 10/20/2021  Patient Name: Sandor Rivera  : 1945   MRN: [id number]  DOInjury: Chronic   DOSx: None  Referring Provider:   Melita Prieto MD  [address] [facility], [address] [facility]           Medical Diagnosis:    Diagnosis Orders   1. Lumbosacral spondylosis without myelopathy       Outcome Measure:  Modified Oswestry 42% disability   Midpoint score 10/20/2021 = 26%    S: Patient reports feeling better then last session today . O:  Time 8367-0799  am     Visit  regular billing  Repeat outcome measure at mid point and end.     Pain 5 /10     ROM      Modalities      MH + ES      Traction            Exercise      ALL EXERCISE DONE WITH DRAW-IN TECHNIQUE       Manual     Mobilization/Manipulation              THEREX     Supine Knee to Chest HEP     LTR  HEP     Supine Clams HEP     Pelvic Tilts HEP     Bridges HEP     Supine HS Stretch  HEP     Piriformis Stretch      Trunk Flexion      Trunk Extension      Quadratus Lumborum Stretch on Wall            Cat/Camel      Bird Dog      Prone Lumbar Extension      Prone Arm and Leg Raises      Prone Multifidus Strengthening       Crunches      Plank      Lateral Plank       Functional Activities To aid in reaching , pushing, pulling tasks at home     Nustep   L 5 x 5 min   TA         ROWS: H      ROWS: M      ROWS: L      Punches      Triceps Ext Standing      Trunk Ext TB      Trunk Flex TB      Obliques - low      Obliques - high            SLR      Supine Abduction      Supine Marches      SL Clamshells      SL Abduction            Calf raises  2 x 10 reps   Seated rest periods as needed    TA   Standing Marching 2 x 10 reps    TA   Standing Hip Abduction 2 x 10 reps     TA   Standing Hip Extension      Standing Hip Flexion       Squats 2 x 10 reps  TA   Stairs - FWD      Stairs - LAT            TG Squats      Leg Press       Gait     Marching Gait      Side Stepping             Weighted Activities            Lat Pull Down Green  2 x 10 reps        Vertical Row  Pt given green tubing for HEP 10/01/2021     ROWS: H Green 2 x 10 reps seated   TA   ROWS: M Green 2 x 10 reps seated   TA   ROWS: L Green 2 x 10 reps seated   TA   Skiing seated  Green 2 x 10 reps seated      Trunk flexion  Green 2 x 10 reps seated      Trunk ext  Green 2 x 10 reps seated      Dead Lift      [employer] Dead Lift      [facility] Split Squat      Squats            A: Tolerated well. Pt progressing with new lower Modified Oswestry score as listec above     Above added to written HEP.   P: Continue with rehab plan  Cristy Fore, PTA    Treatment Charges: Mins Units   Initial Evaluation     Re-Evaluation     Ther Exercise         TE     Manual Therapy     MT     Ther Activities        TA 40 3   Gait Training          GT     Neuro Re-education NR     Modalities     Non-Billable Service Time     Other     Total Time/Units 40 3

## 2021-10-21 ENCOUNTER — TELEPHONE (OUTPATIENT)
Dept: PRIMARY CARE CLINIC | Age: 76
End: 2021-10-21

## 2021-10-21 NOTE — TELEPHONE ENCOUNTER
[facility]/ [employer] called regarding insulin lispro, 1 unit dial, ([facility]). [facility] states they need a maximum daily dose for the RX.      Last seen 10/11/2021  Next appt 1/14/2022

## 2021-10-22 ENCOUNTER — TREATMENT (OUTPATIENT)
Dept: PHYSICAL THERAPY | Age: 76
End: 2021-10-22
Payer: COMMERCIAL

## 2021-10-22 DIAGNOSIS — M47.817 LUMBOSACRAL SPONDYLOSIS WITHOUT MYELOPATHY: Primary | ICD-10-CM

## 2021-10-22 PROCEDURE — 97530 THERAPEUTIC ACTIVITIES: CPT

## 2021-10-27 ENCOUNTER — TREATMENT (OUTPATIENT)
Dept: PHYSICAL THERAPY | Age: 76
End: 2021-10-27
Payer: COMMERCIAL

## 2021-10-27 DIAGNOSIS — M47.817 LUMBOSACRAL SPONDYLOSIS WITHOUT MYELOPATHY: Primary | ICD-10-CM

## 2021-10-27 PROCEDURE — 97530 THERAPEUTIC ACTIVITIES: CPT

## 2021-10-27 NOTE — PROGRESS NOTES
Physical Therapy Treatment Note    Date: 10/27/2021  Patient Name: Heidy Nelson  : 1945   MRN: [id number]  DOInjury: Chronic   DOSx: None  Referring Provider:   Jason Mullen MD  [address] [facility], [address] [facility] Diagnosis:    Diagnosis Orders   1. Lumbosacral spondylosis without myelopathy       Outcome Measure:  Modified Oswestry 42% disability   Midpoint score 10/20/2021 = 26%    S: Patient reports feeling about the same as last rx session. O:  Time 3921-0967 am     Visit  regular billing  Repeat outcome measure at mid point and end.     Pain 5 /10     ROM      Modalities      MH + ES      Traction            Exercise      ALL EXERCISE DONE WITH DRAW-IN TECHNIQUE       Manual     Mobilization/Manipulation              THEREX     Supine Knee to Chest HEP     LTR  HEP     Supine Clams HEP     Pelvic Tilts HEP     Bridges HEP     Supine HS Stretch  HEP     Piriformis Stretch      Trunk Flexion      Trunk Extension      Quadratus Lumborum Stretch on Wall            Cat/Camel      Bird Dog      Prone Lumbar Extension      Prone Arm and Leg Raises      Prone Multifidus Strengthening       Crunches      Plank      Lateral Plank       Functional Activities To aid in reaching , pushing, pulling tasks at home     Nustep   L 5 x 5 min   TA         ROWS: H      ROWS: M      ROWS: L      Punches      Triceps Ext Standing      Trunk Ext TB      Trunk Flex TB      Obliques - low      Obliques - high            SLR      Supine Abduction      Supine Marches      SL Clamshells      SL Abduction            LAQ's 2# 2 x 15 reps R/L            Calf raises  2 x 10 reps   Seated rest periods as needed    TA   Standing Marching 2 x 10 reps    TA   Standing Hip Abduction 2 x 10 reps     TA   Standing Hip Extension      Standing Hip Flexion       Squats 2 x 10 reps  TA   Stairs - FWD Pt declined      Stairs - LAT Pt declined            TG Squats      Leg Press       Gait     Marching Gait      Side Stepping             Weighted Activities            Lat Pull Down Green  2 x 15 reps        Vertical Row  Pt given green tubing for HEP 10/01/2021     ROWS: H Green 2 x 15 reps seated   TA   ROWS: M Green 2 x 15 reps seated   TA   ROWS: L Green 2 x 15 reps seated   TA   Skiing seated  Green 2 x 15 reps seated      Trunk flexion   Not performed     Trunk ext    Makes pt feel dizzy     Dead Lift      [employer] [place] Dead Lift      [facility] Split Squat      Squats            A: Tolerated well. No new significant changes noted this rx session, pt able to perform all ex's . Above added to written HEP.   P: Continue with rehab plan  Talon Greenberg PTA    Treatment Charges: Mins Units   Initial Evaluation     Re-Evaluation     Ther Exercise         TE     Manual Therapy     MT     Ther Activities        TA 40 3   Gait Training          GT     Neuro Re-education NR     Modalities     Non-Billable Service Time     Other     Total Time/Units 40 3

## 2021-10-29 ENCOUNTER — TREATMENT (OUTPATIENT)
Dept: PHYSICAL THERAPY | Age: 76
End: 2021-10-29
Payer: COMMERCIAL

## 2021-10-29 DIAGNOSIS — M47.817 LUMBOSACRAL SPONDYLOSIS WITHOUT MYELOPATHY: Primary | ICD-10-CM

## 2021-10-29 PROCEDURE — 97530 THERAPEUTIC ACTIVITIES: CPT

## 2021-10-29 NOTE — PROGRESS NOTES
Physical Therapy Treatment Note    Date: 10/29/2021  Patient Name: Ruba Harmon  : 1945   MRN: [id number]  DOInjury: Chronic   DOSx: None  Referring Provider:   Chey Gutierrez MD  [address] [facility], [address] [facility]           Medical Diagnosis:    Diagnosis Orders   1. Lumbosacral spondylosis without myelopathy       Outcome Measure:  Modified Oswestry 42% disability   Midpoint score 10/20/2021 = 26%    S: Patient reports feeling sore today due to weather . O:  Time 0920- 1000 am     Visit  regular billing  Repeat outcome measure at mid point and end.     Pain 5-6  /10     ROM      Modalities      MH + ES      Traction            Exercise      ALL EXERCISE DONE WITH DRAW-IN TECHNIQUE       Manual     Mobilization/Manipulation              THEREX     Supine Knee to Chest HEP     LTR  HEP     Supine Clams HEP     Pelvic Tilts HEP     Bridges HEP     Supine HS Stretch  HEP     Piriformis Stretch      Trunk Flexion      Trunk Extension      Quadratus Lumborum Stretch on Wall            Cat/Camel      Bird Dog      Prone Lumbar Extension      Prone Arm and Leg Raises      Prone Multifidus Strengthening       Crunches      Plank      Lateral Plank       Functional Activities To aid in reaching , pushing, pulling tasks at home     Nustep   L 5 x 5 min   TA         ROWS: H      ROWS: M      ROWS: L      Punches      Triceps Ext Standing      Trunk Ext TB      Trunk Flex TB      Obliques - low      Obliques - high            SLR      Supine Abduction      Supine Marches      SL Clamshells      SL Abduction            LAQ's 2# 2 x 15 reps R/L            Calf raises  2 x 10 reps   Seated rest periods as needed    TA   Standing Marching 2 x 10 reps    TA   Standing Hip Abduction 2 x 10 reps     TA   Standing Hip Extension      Standing Hip Flexion       Squats 2 x 10 reps  TA   Stairs - FWD Pt declined      Stairs - LAT Pt declined            TG Squats      Leg Press       Gait     Marching Gait      Side Stepping             Weighted Activities            Lat Pull Down Green  2 x 15 reps        Vertical Row  Pt given green tubing for HEP 10/01/2021     ROWS: H Green 2 x 15 reps seated   TA   ROWS: M Green 2 x 15 reps seated   TA   ROWS: L Green 2 x 15 reps seated   TA   Skiing seated  Green 2 x 15 reps seated      Trunk flexion   Not performed     Trunk ext    Makes pt feel dizzy     Dead Lift      [employer] [place] Dead Lift      [facility] Split Squat      Squats            A: Tolerated well. Pt  Needing frequent rest periods due to short term fatigue . Above added to written HEP.   P: Continue with rehab plan  Maribel Esquivel PTA    Treatment Charges: Mins Units   Initial Evaluation     Re-Evaluation     Ther Exercise         TE     Manual Therapy     MT     Ther Activities        TA 40 3   Gait Training          GT     Neuro Re-education NR     Modalities     Non-Billable Service Time     Other     Total Time/Units 40 3 stated

## 2021-11-03 ENCOUNTER — TREATMENT (OUTPATIENT)
Dept: PHYSICAL THERAPY | Age: 76
End: 2021-11-03
Payer: COMMERCIAL

## 2021-11-03 DIAGNOSIS — M47.817 LUMBOSACRAL SPONDYLOSIS WITHOUT MYELOPATHY: Primary | ICD-10-CM

## 2021-11-03 PROCEDURE — 97530 THERAPEUTIC ACTIVITIES: CPT

## 2021-11-03 NOTE — PROGRESS NOTES
Physical Therapy Treatment Note    Date: 11/3/2021  Patient Name: Bette Boas  : 1945   MRN: [id number]  DOInjury: Chronic   DOSx: None  Referring Provider:   Marie Bailey MD  [address] [facility], [address] [facility] Diagnosis:    Diagnosis Orders   1. Lumbosacral spondylosis without myelopathy       Outcome Measure:  Modified Oswestry 42% disability   Midpoint score 10/20/2021 = 26%    S: Patient feels 60% improved since start of therapy. O:  Time      Visit 10/12 regular billing  Repeat outcome measure at mid point and end.     Pain 5/10     ROM      Modalities      MH + ES      Traction            Exercise      ALL EXERCISE DONE WITH DRAW-IN TECHNIQUE       Manual     Mobilization/Manipulation              THEREX     Supine Knee to Chest HEP     LTR  HEP     Supine Clams HEP     Pelvic Tilts HEP     Bridges HEP     Supine HS Stretch  HEP     Piriformis Stretch      Trunk Flexion      Trunk Extension      Quadratus Lumborum Stretch on Wall            Cat/Camel      Bird Dog      Prone Lumbar Extension      Prone Arm and Leg Raises      Prone Multifidus Strengthening       Crunches      Plank      Lateral Plank       Functional Activities To aid in reaching , pushing, pulling tasks at home     Nustep   L5 x 5 min   TA         ROWS: H      ROWS: M      ROWS: L      Punches      Triceps Ext Standing      Trunk Ext TB      Trunk Flex TB      Obliques - low      Obliques - high            SLR      Supine Abduction      Supine Marches      SL Clamshells      SL Abduction            LAQ's 2# 2 x 15 reps R/L            Calf raises  2 x 15 reps   Seated rest periods as needed    TA   Standing Marching 2 x 15 reps    TA   Standing Hip Abduction 2 x 15 reps     TA   Standing Hip Extension      Standing Hip Flexion       Squats 2 x 15 reps  TA   Stairs - FWD Pt declined      Stairs - LAT Pt declined            TG Squats      Leg Press       Gait     Marching Gait      Side Stepping Weighted Activities            Lat Pull Down Green  2 x 15 reps        Vertical Row  Pt given green tubing for HEP 10/01/2021     ROWS: H Green 2 x 15 reps seated   TA   ROWS: M Green 2 x 15 reps seated   TA   ROWS: L Green 2 x 15 reps seated   TA   Skiing seated  Green 2 x 15 reps seated      Trunk flexion   Not performed     Trunk ext    Makes pt feel dizzy     Dead Lift      [employer] [place] Dead Lift      [facility] Split Squat      Squats            A: Tolerated well. Pt needing frequent rest periods due to short term fatigue. P: Continue with rehab plan. Patient will be discharged next week.    Jos Aldrich PTA    Treatment Charges: Mins Units   Initial Evaluation     Re-Evaluation     Ther Exercise         TE     Manual Therapy     MT     Ther Activities        TA 40 3   Gait Training          GT     Neuro Re-education NR     Modalities     Non-Billable Service Time     Other     Total Time/Units 40 3

## 2021-11-05 ENCOUNTER — TREATMENT (OUTPATIENT)
Dept: PHYSICAL THERAPY | Age: 76
End: 2021-11-05
Payer: COMMERCIAL

## 2021-11-05 DIAGNOSIS — M47.817 LUMBOSACRAL SPONDYLOSIS WITHOUT MYELOPATHY: Primary | ICD-10-CM

## 2021-11-05 PROCEDURE — 97530 THERAPEUTIC ACTIVITIES: CPT

## 2021-11-05 NOTE — PROGRESS NOTES
Physical Therapy Treatment Note    Date: 2021  Patient Name: Britni Garcia  : 1945   MRN: [id number]  DOInjury: Chronic   DOSx: None  Referring Provider:   Natalie Scherer MD  [address] [facility], [address] [facility] Diagnosis:    Diagnosis Orders   1. Lumbosacral spondylosis without myelopathy       Outcome Measure:  Modified Oswestry 42% disability   Midpoint score 10/20/2021 = 26%    S: Patient reports she feels better since the start of PT sessions   O:  Time 8364-3659     Visit  regular billing  Repeat outcome measure at mid point and end.     Pain 5/10     ROM      Modalities      MH + ES      Traction            Exercise      ALL EXERCISE DONE WITH DRAW-IN TECHNIQUE       Manual     Mobilization/Manipulation              THEREX     Supine Knee to Chest HEP     LTR  HEP     Supine Clams HEP     Pelvic Tilts HEP     Bridges HEP     Supine HS Stretch  HEP     Piriformis Stretch      Trunk Flexion      Trunk Extension      Quadratus Lumborum Stretch on Wall            Cat/Camel      Bird Dog      Prone Lumbar Extension      Prone Arm and Leg Raises      Prone Multifidus Strengthening       Crunches      Plank      Lateral Plank       Functional Activities To aid in reaching , pushing, pulling tasks at home     Nustep   L5 x 5 min   TA         ROWS: H      ROWS: M      ROWS: L      Punches      Triceps Ext Standing      Trunk Ext TB      Trunk Flex TB      Obliques - low      Obliques - high            SLR      Supine Abduction      Supine Marches      SL Clamshells      SL Abduction            LAQ's 2# 2 x 15 reps R/L            Calf raises  2 x 15 reps   Seated rest periods as needed    TA   Standing Marching 2 x 15 reps    TA   Standing Hip Abduction 2 x 15 reps     TA   Standing Hip Extension      Standing Hip Flexion       Squats 2 x 15 reps  TA   Stairs - FWD Pt declined      Stairs - LAT Pt declined            TG Squats      Leg Press       Gait     Marching Gait      Side Stepping             Weighted Activities            Lat Pull Down Green  2 x 15 reps        Vertical Row  Pt given green tubing for HEP 10/01/2021     ROWS: H Green 2 x 15 reps seated   TA   ROWS: M Green 2 x 15 reps seated   TA   ROWS: L Green 2 x 15 reps seated   TA   Skiing seated  Green 2 x 15 reps seated      Trunk flexion   Not performed     Trunk ext    Makes pt feel dizzy     Dead Lift      [employer] [place] Dead Lift      [facility] Split Squat      Squats            A: Tolerated well. Pt needing frequent rest periods due to short term fatigue. P: Continue with rehab plan. X 1 more rx session . Then discharge to contingue with given written HEP.    Andreina Jensen, PTA    Treatment Charges: Mins Units   Initial Evaluation     Re-Evaluation     Ther Exercise         TE     Manual Therapy     MT     Ther Activities        TA 40 3   Gait Training          GT     Neuro Re-education NR     Modalities     Non-Billable Service Time     Other     Total Time/Units 40 3

## 2021-11-10 ENCOUNTER — TREATMENT (OUTPATIENT)
Dept: PHYSICAL THERAPY | Age: 76
End: 2021-11-10
Payer: COMMERCIAL

## 2021-11-10 DIAGNOSIS — M47.817 LUMBOSACRAL SPONDYLOSIS WITHOUT MYELOPATHY: Primary | ICD-10-CM

## 2021-11-10 PROCEDURE — 97530 THERAPEUTIC ACTIVITIES: CPT

## 2021-11-10 NOTE — PROGRESS NOTES
Physical Therapy Treatment Note    Date: 11/10/2021  Patient Name: Olivia Brunner  : 1945   MRN: [id number]  DOInjury: Chronic   DOSx: None  Referring Provider:   Farhad Marcelo MD  [address] [facility], [address] [facility] Diagnosis:    Diagnosis Orders   1. Lumbosacral spondylosis without myelopathy       Outcome Measure:  Modified Oswestry 42% disability   Midpoint score 10/20/2021 = 26%    S: Patient reports she feels Pt has been helping and feeling okay. O:  Time 920-1000     Visit  regular billing  Repeat outcome measure at mid point and end.     Pain 5/10     ROM      Modalities      MH + ES      Traction            Exercise      ALL EXERCISE DONE WITH DRAW-IN TECHNIQUE       Manual     Mobilization/Manipulation              THEREX     Supine Knee to Chest HEP     LTR  HEP     Supine Clams HEP     Pelvic Tilts HEP     Bridges HEP     Supine HS Stretch  HEP     Piriformis Stretch      Trunk Flexion      Trunk Extension      Quadratus Lumborum Stretch on Wall            Cat/Camel      Bird Dog      Prone Lumbar Extension      Prone Arm and Leg Raises      Prone Multifidus Strengthening       Crunches      Plank      Lateral Plank       Functional Activities To aid in reaching , pushing, pulling tasks at home     Nustep   L5 x 5 min   TA         ROWS: H      ROWS: M      ROWS: L      Punches      Triceps Ext Standing      Trunk Ext TB      Trunk Flex TB      Obliques - low      Obliques - high            SLR      Supine Abduction      Supine Marches      SL Clamshells      SL Abduction            LAQ's 2# 2 x 15 reps R/L            Calf raises  2 x 15 reps   Seated rest periods as needed    TA   Standing Marching 2 x 15 reps    TA   Standing Hip Abduction 2 x 15 reps     TA   Standing Hip Extension      Standing Hip Flexion       Squats 2 x 15 reps  TA   Stairs - FWD Pt declined      Stairs - LAT Pt declined            TG Squats      Leg Press       Gait     Marching Gait      Side Stepping             Weighted Activities            Lat Pull Down Green  2 x 15 reps        Vertical Row  Pt given green tubing for HEP 10/01/2021     ROWS: H Green 2 x 15 reps seated   TA   ROWS: M Green 2 x 15 reps seated   TA   ROWS: L Green 2 x 15 reps seated   TA   Skiing seated  Green 2 x 15 reps seated      Trunk flexion   Not performed     Trunk ext    Makes pt feel dizzy     Dead Lift      [employer] [place] Dead Lift      [facility] Split Squat      Squats            A: Tolerated well. Pt needing frequent rest periods due to short term fatigue. P: Today was patient's last session and she is to continue with HEP.   Dante Love, PTA    Treatment Charges: Mins Units   Initial Evaluation     Re-Evaluation     Ther Exercise         TE     Manual Therapy     MT     Ther Activities        TA 40 3   Gait Training          GT     Neuro Re-education NR     Modalities     Non-Billable Service Time     Other     Total Time/Units 40 3

## 2021-11-29 DIAGNOSIS — E53.8 B12 DEFICIENCY: ICD-10-CM

## 2021-11-29 RX ORDER — CYANOCOBALAMIN (VITAMIN B-12) 1000 MCG
TABLET ORAL
Qty: 30 TABLET | Refills: 10 | OUTPATIENT
Start: 2021-11-29

## 2021-12-28 DIAGNOSIS — Z99.2 CONTROLLED TYPE 2 DIABETES MELLITUS WITH CHRONIC KIDNEY DISEASE ON CHRONIC DIALYSIS, WITH LONG-TERM CURRENT USE OF INSULIN (HCC): ICD-10-CM

## 2021-12-28 DIAGNOSIS — N18.6 CONTROLLED TYPE 2 DIABETES MELLITUS WITH CHRONIC KIDNEY DISEASE ON CHRONIC DIALYSIS, WITH LONG-TERM CURRENT USE OF INSULIN (HCC): ICD-10-CM

## 2021-12-28 DIAGNOSIS — E11.22 CONTROLLED TYPE 2 DIABETES MELLITUS WITH CHRONIC KIDNEY DISEASE ON CHRONIC DIALYSIS, WITH LONG-TERM CURRENT USE OF INSULIN (HCC): ICD-10-CM

## 2021-12-28 DIAGNOSIS — Z79.4 CONTROLLED TYPE 2 DIABETES MELLITUS WITH CHRONIC KIDNEY DISEASE ON CHRONIC DIALYSIS, WITH LONG-TERM CURRENT USE OF INSULIN (HCC): ICD-10-CM

## 2021-12-30 RX ORDER — PEN NEEDLE, DIABETIC 32GX 5/32"
NEEDLE, DISPOSABLE MISCELLANEOUS
Qty: 100 EACH | Refills: 2 | Status: SHIPPED
Start: 2021-12-30 | End: 2022-05-27

## 2022-01-27 DIAGNOSIS — G25.81 RESTLESS LEG SYNDROME, CONTROLLED: ICD-10-CM

## 2022-01-27 DIAGNOSIS — G47.9 SLEEPING DIFFICULTY: ICD-10-CM

## 2022-01-27 DIAGNOSIS — E78.49 OTHER HYPERLIPIDEMIA: ICD-10-CM

## 2022-01-28 RX ORDER — ROPINIROLE 2 MG/1
TABLET, FILM COATED ORAL
Qty: 30 TABLET | Refills: 10 | Status: SHIPPED | OUTPATIENT
Start: 2022-01-28

## 2022-01-28 RX ORDER — TRAZODONE HYDROCHLORIDE 50 MG/1
50 TABLET ORAL NIGHTLY
Qty: 30 TABLET | Refills: 10 | Status: ON HOLD
Start: 2022-01-28 | End: 2022-06-22 | Stop reason: HOSPADM

## 2022-01-28 RX ORDER — ATORVASTATIN CALCIUM 10 MG/1
TABLET, FILM COATED ORAL
Qty: 30 TABLET | Refills: 10 | Status: SHIPPED | OUTPATIENT
Start: 2022-01-28

## 2022-02-16 ENCOUNTER — OFFICE VISIT (OUTPATIENT)
Dept: PRIMARY CARE CLINIC | Age: 77
End: 2022-02-16
Payer: COMMERCIAL

## 2022-02-16 VITALS
WEIGHT: 208.2 LBS | SYSTOLIC BLOOD PRESSURE: 136 MMHG | TEMPERATURE: 97.9 F | OXYGEN SATURATION: 94 % | DIASTOLIC BLOOD PRESSURE: 94 MMHG | HEART RATE: 80 BPM | BODY MASS INDEX: 33.46 KG/M2 | HEIGHT: 66 IN

## 2022-02-16 DIAGNOSIS — Z99.2 CONTROLLED TYPE 2 DIABETES MELLITUS WITH CHRONIC KIDNEY DISEASE ON CHRONIC DIALYSIS, WITH LONG-TERM CURRENT USE OF INSULIN (HCC): Primary | ICD-10-CM

## 2022-02-16 DIAGNOSIS — N18.6 CONTROLLED TYPE 2 DIABETES MELLITUS WITH CHRONIC KIDNEY DISEASE ON CHRONIC DIALYSIS, WITH LONG-TERM CURRENT USE OF INSULIN (HCC): Primary | ICD-10-CM

## 2022-02-16 DIAGNOSIS — E11.22 CONTROLLED TYPE 2 DIABETES MELLITUS WITH CHRONIC KIDNEY DISEASE ON CHRONIC DIALYSIS, WITH LONG-TERM CURRENT USE OF INSULIN (HCC): Primary | ICD-10-CM

## 2022-02-16 DIAGNOSIS — Z79.4 CONTROLLED TYPE 2 DIABETES MELLITUS WITH CHRONIC KIDNEY DISEASE ON CHRONIC DIALYSIS, WITH LONG-TERM CURRENT USE OF INSULIN (HCC): Primary | ICD-10-CM

## 2022-02-16 LAB — HBA1C MFR BLD: 8.1 %

## 2022-02-16 PROCEDURE — G8427 DOCREV CUR MEDS BY ELIG CLIN: HCPCS | Performed by: FAMILY MEDICINE

## 2022-02-16 PROCEDURE — 99214 OFFICE O/P EST MOD 30 MIN: CPT | Performed by: FAMILY MEDICINE

## 2022-02-16 PROCEDURE — G8417 CALC BMI ABV UP PARAM F/U: HCPCS | Performed by: FAMILY MEDICINE

## 2022-02-16 PROCEDURE — 1090F PRES/ABSN URINE INCON ASSESS: CPT | Performed by: FAMILY MEDICINE

## 2022-02-16 PROCEDURE — G8399 PT W/DXA RESULTS DOCUMENT: HCPCS | Performed by: FAMILY MEDICINE

## 2022-02-16 PROCEDURE — 3052F HG A1C>EQUAL 8.0%<EQUAL 9.0%: CPT | Performed by: FAMILY MEDICINE

## 2022-02-16 PROCEDURE — 83036 HEMOGLOBIN GLYCOSYLATED A1C: CPT | Performed by: FAMILY MEDICINE

## 2022-02-16 PROCEDURE — 2022F DILAT RTA XM EVC RTNOPTHY: CPT | Performed by: FAMILY MEDICINE

## 2022-02-16 PROCEDURE — 4040F PNEUMOC VAC/ADMIN/RCVD: CPT | Performed by: FAMILY MEDICINE

## 2022-02-16 PROCEDURE — 1036F TOBACCO NON-USER: CPT | Performed by: FAMILY MEDICINE

## 2022-02-16 PROCEDURE — 1123F ACP DISCUSS/DSCN MKR DOCD: CPT | Performed by: FAMILY MEDICINE

## 2022-02-16 PROCEDURE — 3017F COLORECTAL CA SCREEN DOC REV: CPT | Performed by: FAMILY MEDICINE

## 2022-02-16 PROCEDURE — G8484 FLU IMMUNIZE NO ADMIN: HCPCS | Performed by: FAMILY MEDICINE

## 2022-02-16 ASSESSMENT — PATIENT HEALTH QUESTIONNAIRE - PHQ9
SUM OF ALL RESPONSES TO PHQ QUESTIONS 1-9: 0
1. LITTLE INTEREST OR PLEASURE IN DOING THINGS: 0
SUM OF ALL RESPONSES TO PHQ9 QUESTIONS 1 & 2: 0
SUM OF ALL RESPONSES TO PHQ QUESTIONS 1-9: 0
2. FEELING DOWN, DEPRESSED OR HOPELESS: 0

## 2022-02-16 NOTE — PROGRESS NOTES
Subjective:  78 y.o. female who presents to the office today with chief complaint:  Chief Complaint   Patient presents with    Diabetes     A1C today is 8.1     Anxiety- Overall improved. Currently on zoloft 10mg wellbutrin 150mg,  and trazodone 50mg. Does not feel she needs psychiatry. Insulin-dependent diabetes: Patient currently taking 35 units of glargine at night and a sliding scale throughout the day. She states that her blood sugars run in an acceptable range- 120 to 140- without any lows below 80. She has been managing this well with an improved diet along with the medication. Admits to eating more sweets than normal.     Hypertension: Has not taken midodrine in some time. Not on BP meds. ESRD: Follows Dr. Jaylen Jaramillo. Dialysis T,R,Sat. Tolerating this well. Hypothyroidism: Currently on synthroid 75mcg daily. COPD: Well controlled on advair. Has not used rescue inhaler in some time. Breathing is well controlled. Health Maintenance Due   Topic Date Due    Diabetic foot exam  Never done    Depression Monitoring  Never done    Shingles Vaccine (1 of 2) Never done    Diabetic retinal exam  06/29/2021    COVID-19 Vaccine (3 - Booster for [place] series) 09/15/2021    Annual Wellness Visit (AWV)  02/09/2022     Podiatrist: Dr. Linda Mcintyre- last visit 2 months ago. Will fill out BARBARA. Cancer History: None. Family Cancer History: none. 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 General:  Fever, chills, diaphoresis, fatigue, malaise, night sweats, weight loss  Psychological:  Anxiety, disorientation, hallucinations. ENT:  Epistaxis, headaches, vertigo, visual changes.   Cardiovascular:  Chest pain, irregular heartbeats, palpitations, paroxysmal nocturnal dyspnea. Respiratory:  Shortness of breath, coughing, sputum production, hemoptysis, wheezing, orthopnea. Gastrointestinal:  Nausea, vomiting, diarrhea, heartburn, constipation, abdominal pain, hematemesis, hematochezia, melena, acholic stools  Genito-Urinary:  Dysuria, urgency, frequency, hematuria  Musculoskeletal:  Joint pain, joint stiffness, joint swelling, muscle pain  Neurology:  Headache, focal neurological deficits, weakness, numbness, paresthesia  Extremities:  Decreased ROM, peripheral edema, mottling      Objective:  Vitals:    02/16/22 1410   BP: (!) 136/94   Pulse:    Temp:    SpO2:      Physical Exam  Vitals and nursing note reviewed. Constitutional:       General: She is not in acute distress. Appearance: She is well-developed. She is obese. She is not ill-appearing, toxic-appearing or diaphoretic. Comments: Using Rollator walker. Clement Fly HENT:      Head: Normocephalic and atraumatic. Right Ear: External ear normal.      Left Ear: External ear normal.      Nose: Nose normal.   Eyes:      General:         Right eye: No discharge. Left eye: No discharge. Conjunctiva/sclera: Conjunctivae normal.      Pupils: Pupils are equal, round, and reactive to light. Cardiovascular:      Rate and Rhythm: Normal rate and regular rhythm. Heart sounds: Normal heart sounds. No murmur heard. No friction rub. No gallop. Pulmonary:      Effort: Pulmonary effort is normal. No respiratory distress. Breath sounds: No wheezing or rales. Abdominal:      General: Bowel sounds are normal. There is no distension. Palpations: Abdomen is soft. Tenderness: There is no abdominal tenderness. There is no guarding or rebound. Musculoskeletal:      Cervical back: Normal range of motion and neck supple. Lymphadenopathy:      Cervical: No cervical adenopathy.    Neurological:      Mental Status: She is alert and oriented to person, place, and time. Comments: Cranial nerves II through XII intact   Psychiatric:         Mood and Affect: Mood normal.         Behavior: Behavior normal.         Thought Content: Thought content normal.         Judgment: Judgment normal.             Assessment and Plan:  Cynthia Rodriguez was seen today for diabetes. Diagnoses and all orders for this visit:    Controlled type 2 diabetes mellitus with chronic kidney disease on chronic dialysis, with long-term current use of insulin (HCC)  -     POCT glycosylated hemoglobin (Hb A1C)        Anxiety-no treatment at this time. Continue to follow. Insulin-dependent diabetes: Well-controlled on current regimen. Continue. Collect CBC, CMP, TSH, lipid panel. Hypertension: In acceptable range without medications. Continue. ESRD: Follows Dr. Mia Milton. Dialysis T,R,Sat. Follow-up in 3 month. Patient may come in sooner if needed for medical concerns. Patient advised to call at any time to cancel or re-scheduleor for any questions/concerns.         Agnes Kelly DO   2/16/22      2:19 PM

## 2022-02-28 DIAGNOSIS — E11.22 CONTROLLED TYPE 2 DIABETES MELLITUS WITH CHRONIC KIDNEY DISEASE ON CHRONIC DIALYSIS, WITH LONG-TERM CURRENT USE OF INSULIN (HCC): ICD-10-CM

## 2022-02-28 DIAGNOSIS — Z79.4 CONTROLLED TYPE 2 DIABETES MELLITUS WITH CHRONIC KIDNEY DISEASE ON CHRONIC DIALYSIS, WITH LONG-TERM CURRENT USE OF INSULIN (HCC): ICD-10-CM

## 2022-02-28 DIAGNOSIS — Z99.2 CONTROLLED TYPE 2 DIABETES MELLITUS WITH CHRONIC KIDNEY DISEASE ON CHRONIC DIALYSIS, WITH LONG-TERM CURRENT USE OF INSULIN (HCC): ICD-10-CM

## 2022-02-28 DIAGNOSIS — N18.6 CONTROLLED TYPE 2 DIABETES MELLITUS WITH CHRONIC KIDNEY DISEASE ON CHRONIC DIALYSIS, WITH LONG-TERM CURRENT USE OF INSULIN (HCC): ICD-10-CM

## 2022-03-01 RX ORDER — INSULIN GLARGINE 100 [IU]/ML
30 INJECTION, SOLUTION SUBCUTANEOUS NIGHTLY
Qty: 15 ML | Refills: 10 | Status: ON HOLD
Start: 2022-03-01 | End: 2022-06-22 | Stop reason: SDUPTHER

## 2022-04-22 ENCOUNTER — HOSPITAL ENCOUNTER (OUTPATIENT)
Age: 77
Discharge: HOME | End: 2022-04-22
Payer: COMMERCIAL

## 2022-04-22 DIAGNOSIS — N18.6 CONTROLLED TYPE 2 DIABETES MELLITUS WITH CHRONIC KIDNEY DISEASE ON CHRONIC DIALYSIS, WITH LONG-TERM CURRENT USE OF INSULIN (HCC): ICD-10-CM

## 2022-04-22 DIAGNOSIS — E11.22 CONTROLLED TYPE 2 DIABETES MELLITUS WITH CHRONIC KIDNEY DISEASE ON CHRONIC DIALYSIS, WITH LONG-TERM CURRENT USE OF INSULIN (HCC): ICD-10-CM

## 2022-04-22 DIAGNOSIS — Z99.2 CONTROLLED TYPE 2 DIABETES MELLITUS WITH CHRONIC KIDNEY DISEASE ON CHRONIC DIALYSIS, WITH LONG-TERM CURRENT USE OF INSULIN (HCC): ICD-10-CM

## 2022-04-22 DIAGNOSIS — Z79.4 CONTROLLED TYPE 2 DIABETES MELLITUS WITH CHRONIC KIDNEY DISEASE ON CHRONIC DIALYSIS, WITH LONG-TERM CURRENT USE OF INSULIN (HCC): ICD-10-CM

## 2022-04-22 LAB
ALBUMIN SERPL-MCNC: 3.8 G/DL (ref 3.5–5.2)
ALP BLD-CCNC: 90 U/L (ref 35–104)
ALT SERPL-CCNC: 9 U/L (ref 0–32)
ANION GAP SERPL CALCULATED.3IONS-SCNC: 11 MMOL/L (ref 7–16)
AST SERPL-CCNC: 12 U/L (ref 0–31)
BILIRUB SERPL-MCNC: 0.4 MG/DL (ref 0–1.2)
BUN BLDV-MCNC: 37 MG/DL (ref 6–23)
CALCIUM SERPL-MCNC: 9.2 MG/DL (ref 8.6–10.2)
CHLORIDE BLD-SCNC: 96 MMOL/L (ref 98–107)
CHOLESTEROL, TOTAL: 164 MG/DL (ref 0–199)
CO2: 31 MMOL/L (ref 22–29)
CREAT SERPL-MCNC: 4.4 MG/DL (ref 0.5–1)
GFR AFRICAN AMERICAN: 12
GFR NON-AFRICAN AMERICAN: 10 ML/MIN/1.73
GLUCOSE BLD-MCNC: 186 MG/DL (ref 74–99)
HCT VFR BLD CALC: 36.2 % (ref 34–48)
HDLC SERPL-MCNC: 43 MG/DL
HEMOGLOBIN: 11.7 G/DL (ref 11.5–15.5)
LDL CHOLESTEROL CALCULATED: 95 MG/DL (ref 0–99)
MCH RBC QN AUTO: 34.2 PG (ref 26–35)
MCHC RBC AUTO-ENTMCNC: 32.3 % (ref 32–34.5)
MCV RBC AUTO: 105.8 FL (ref 80–99.9)
PDW BLD-RTO: 13.7 FL (ref 11.5–15)
PLATELET # BLD: 179 E9/L (ref 130–450)
PMV BLD AUTO: 10.4 FL (ref 7–12)
POTASSIUM SERPL-SCNC: 6.1 MMOL/L (ref 3.5–5)
RBC # BLD: 3.42 E12/L (ref 3.5–5.5)
SODIUM BLD-SCNC: 138 MMOL/L (ref 132–146)
TOTAL PROTEIN: 6.8 G/DL (ref 6.4–8.3)
TRIGL SERPL-MCNC: 130 MG/DL (ref 0–149)
TSH SERPL DL<=0.05 MIU/L-ACNC: 6.57 UIU/ML (ref 0.27–4.2)
VLDLC SERPL CALC-MCNC: 26 MG/DL
WBC # BLD: 6.8 E9/L (ref 4.5–11.5)

## 2022-04-22 PROCEDURE — 80053 COMPREHEN METABOLIC PANEL: CPT

## 2022-04-22 PROCEDURE — 80061 LIPID PANEL: CPT

## 2022-04-22 PROCEDURE — 36415 COLL VENOUS BLD VENIPUNCTURE: CPT

## 2022-04-22 PROCEDURE — 84443 ASSAY THYROID STIM HORMONE: CPT

## 2022-04-22 PROCEDURE — 85027 COMPLETE CBC AUTOMATED: CPT

## 2022-05-13 ENCOUNTER — OFFICE VISIT (OUTPATIENT)
Dept: PRIMARY CARE CLINIC | Age: 77
End: 2022-05-13
Payer: COMMERCIAL

## 2022-05-13 VITALS
SYSTOLIC BLOOD PRESSURE: 138 MMHG | DIASTOLIC BLOOD PRESSURE: 74 MMHG | WEIGHT: 208.7 LBS | OXYGEN SATURATION: 93 % | HEART RATE: 76 BPM | HEIGHT: 66 IN | BODY MASS INDEX: 33.54 KG/M2 | TEMPERATURE: 97.5 F

## 2022-05-13 DIAGNOSIS — Z99.2 CONTROLLED TYPE 2 DIABETES MELLITUS WITH CHRONIC KIDNEY DISEASE ON CHRONIC DIALYSIS, WITH LONG-TERM CURRENT USE OF INSULIN (HCC): Primary | ICD-10-CM

## 2022-05-13 DIAGNOSIS — Z00.00 MEDICARE ANNUAL WELLNESS VISIT, SUBSEQUENT: ICD-10-CM

## 2022-05-13 DIAGNOSIS — E11.22 CONTROLLED TYPE 2 DIABETES MELLITUS WITH CHRONIC KIDNEY DISEASE ON CHRONIC DIALYSIS, WITH LONG-TERM CURRENT USE OF INSULIN (HCC): Primary | ICD-10-CM

## 2022-05-13 DIAGNOSIS — E03.9 HYPOTHYROIDISM, UNSPECIFIED TYPE: ICD-10-CM

## 2022-05-13 DIAGNOSIS — Z79.4 CONTROLLED TYPE 2 DIABETES MELLITUS WITH CHRONIC KIDNEY DISEASE ON CHRONIC DIALYSIS, WITH LONG-TERM CURRENT USE OF INSULIN (HCC): Primary | ICD-10-CM

## 2022-05-13 DIAGNOSIS — N18.6 CONTROLLED TYPE 2 DIABETES MELLITUS WITH CHRONIC KIDNEY DISEASE ON CHRONIC DIALYSIS, WITH LONG-TERM CURRENT USE OF INSULIN (HCC): Primary | ICD-10-CM

## 2022-05-13 PROCEDURE — 1123F ACP DISCUSS/DSCN MKR DOCD: CPT | Performed by: FAMILY MEDICINE

## 2022-05-13 PROCEDURE — 4040F PNEUMOC VAC/ADMIN/RCVD: CPT | Performed by: FAMILY MEDICINE

## 2022-05-13 PROCEDURE — G0439 PPPS, SUBSEQ VISIT: HCPCS | Performed by: FAMILY MEDICINE

## 2022-05-13 PROCEDURE — 3017F COLORECTAL CA SCREEN DOC REV: CPT | Performed by: FAMILY MEDICINE

## 2022-05-13 PROCEDURE — 3052F HG A1C>EQUAL 8.0%<EQUAL 9.0%: CPT | Performed by: FAMILY MEDICINE

## 2022-05-13 RX ORDER — PANTOPRAZOLE SODIUM 40 MG/1
TABLET, DELAYED RELEASE ORAL
Status: ON HOLD | COMMUNITY
Start: 2022-02-26 | End: 2022-06-22 | Stop reason: HOSPADM

## 2022-05-13 ASSESSMENT — COLUMBIA-SUICIDE SEVERITY RATING SCALE - C-SSRS
3. HAVE YOU BEEN THINKING ABOUT HOW YOU MIGHT KILL YOURSELF?: YES
2. HAVE YOU ACTUALLY HAD ANY THOUGHTS OF KILLING YOURSELF?: YES
4. HAVE YOU HAD THESE THOUGHTS AND HAD SOME INTENTION OF ACTING ON THEM?: NO
1. WITHIN THE PAST MONTH, HAVE YOU WISHED YOU WERE DEAD OR WISHED YOU COULD GO TO SLEEP AND NOT WAKE UP?: YES
5. HAVE YOU STARTED TO WORK OUT OR WORKED OUT THE DETAILS OF HOW TO KILL YOURSELF? DO YOU INTEND TO CARRY OUT THIS PLAN?: NO
6. HAVE YOU EVER DONE ANYTHING, STARTED TO DO ANYTHING, OR PREPARED TO DO ANYTHING TO END YOUR LIFE?: NO

## 2022-05-13 ASSESSMENT — PATIENT HEALTH QUESTIONNAIRE - PHQ9
5. POOR APPETITE OR OVEREATING: 1
6. FEELING BAD ABOUT YOURSELF - OR THAT YOU ARE A FAILURE OR HAVE LET YOURSELF OR YOUR FAMILY DOWN: 1
SUM OF ALL RESPONSES TO PHQ QUESTIONS 1-9: 12
8. MOVING OR SPEAKING SO SLOWLY THAT OTHER PEOPLE COULD HAVE NOTICED. OR THE OPPOSITE, BEING SO FIGETY OR RESTLESS THAT YOU HAVE BEEN MOVING AROUND A LOT MORE THAN USUAL: 0
SUM OF ALL RESPONSES TO PHQ QUESTIONS 1-9: 12
9. THOUGHTS THAT YOU WOULD BE BETTER OFF DEAD, OR OF HURTING YOURSELF: 1
3. TROUBLE FALLING OR STAYING ASLEEP: 3
2. FEELING DOWN, DEPRESSED OR HOPELESS: 1
10. IF YOU CHECKED OFF ANY PROBLEMS, HOW DIFFICULT HAVE THESE PROBLEMS MADE IT FOR YOU TO DO YOUR WORK, TAKE CARE OF THINGS AT HOME, OR GET ALONG WITH OTHER PEOPLE: 0
SUM OF ALL RESPONSES TO PHQ QUESTIONS 1-9: 11
SUM OF ALL RESPONSES TO PHQ9 QUESTIONS 1 & 2: 2
1. LITTLE INTEREST OR PLEASURE IN DOING THINGS: 1
4. FEELING TIRED OR HAVING LITTLE ENERGY: 3
SUM OF ALL RESPONSES TO PHQ QUESTIONS 1-9: 12
7. TROUBLE CONCENTRATING ON THINGS, SUCH AS READING THE NEWSPAPER OR WATCHING TELEVISION: 1

## 2022-05-13 ASSESSMENT — LIFESTYLE VARIABLES: HOW OFTEN DO YOU HAVE A DRINK CONTAINING ALCOHOL: NEVER

## 2022-05-13 NOTE — PATIENT INSTRUCTIONS
Eating Healthy Foods: Care Instructions  Your Care Instructions     Eating healthy foods can help lower your risk for disease. Healthy food gives you energy and keeps your heart strong, your brain active, your musclesworking, and your bones strong. A healthy diet includes a variety of foods from the basic food groups: grains, vegetables, fruits, milk and milk products, and meat and beans. Some people may eat more of their favorite foods from only one food group and, as a result, miss getting the nutrients they need. So, it is important to pay attention not only to what you eat but also to what you are missing from your diet. You caneat a healthy, balanced diet by making a few small changes. Follow-up care is a key part of your treatment and safety. Be sure to make and go to all appointments, and call your doctor if you are having problems. It's also a good idea to know your test results and keep alist of the medicines you take. How can you care for yourself at home? Look at what you eat   Keep a food diary for a week or two and record everything you eat or drink. Track the number of servings you eat from each food group.  For a balanced diet every day, eat a variety of:  ? 6 or more ounce-equivalents of grains, such as cereals, breads, crackers, rice, or pasta, every day. An ounce-equivalent is 1 slice of bread, 1 cup of ready-to-eat cereal, or ½ cup of cooked rice, cooked pasta, or cooked cereal.  ? 2½ cups of vegetables, especially:  - Dark-green vegetables such as broccoli and spinach.  - Orange vegetables such as carrots and sweet potatoes. - Dry beans (such as orellana and kidney beans) and peas (such as lentils). ? 2 cups of fresh, frozen, or canned fruit. A small apple or 1 banana or orange equals 1 cup. ? 3 cups of nonfat or low-fat milk, yogurt, or other milk products. ? 5½ ounces of meat and beans, such as chicken, fish, lean meat, beans, nuts, and seeds.  One egg, 1 tablespoon of peanut butter, ½ ounce nuts or seeds, or ¼ cup of cooked beans equals 1 ounce of meat.  Learn how to read food labels for serving sizes and ingredients. Fast-food and convenience-food meals often contain few or no fruits or vegetables. Make sure you eat some fruits and vegetables to make the meal more nutritious.  Look at your food diary. For each food group, add up what you have eaten and then divide the total by the number of days. This will give you an idea of how much you are eating from each food group. See if you can find some ways to change your diet to make it more healthy. Start small   Do not try to make dramatic changes to your diet all at once. You might feel that you are missing out on your favorite foods and then be more likely to fail.  Start slowly, and gradually change your habits. Try some of the following:  ? Use whole wheat bread instead of white bread. ? Use nonfat or low-fat milk instead of whole milk. ? Eat brown rice instead of white rice, and eat whole wheat pasta instead of white-flour pasta. ? Try low-fat cheeses and low-fat yogurt. ? Add more fruits and vegetables to meals and have them for snacks. ? Add lettuce, tomato, cucumber, and onion to sandwiches. ? Add fruit to yogurt and cereal.  Enjoy food   You can still eat your favorite foods. You just may need to eat less of them. If your favorite foods are high in fat, salt, and sugar, limit how often you eat them, but do not cut them out entirely.  Eat a wide variety of foods. Make healthy choices when eating out   The type of restaurant you choose can help you make healthy choices. Even fast-food chains are now offering more low-fat or healthier choices on the menu.  Choose smaller portions, or take half of your meal home.  When eating out, try:  ? A veggie pizza with a whole wheat crust or grilled chicken (instead of sausage or pepperoni).   ? Pasta with roasted vegetables, grilled chicken, or marinara sauce instead of cream sauce. ? A vegetable wrap or grilled chicken wrap. ? Broiled or poached food instead of fried or breaded items. Make healthy choices easy   Buy packaged, prewashed, ready-to-eat fresh vegetables and fruits, such as baby carrots, salad mixes, and chopped or shredded broccoli and cauliflower.  Buy packaged, presliced fruits, such as melon or pineapple.  Choose 100% fruit or vegetable juice instead of soda. Limit juice intake to 4 to 6 oz (½ to ¾ cup) a day.  Blend low-fat yogurt, fruit juice, and canned or frozen fruit to make a smoothie for breakfast or a snack. Where can you learn more? Go to https://[employer]. org and sign in to your [employer] account. Enter C221 in the [employer] box to learn more about \"Eating Healthy Foods: Care Instructions. \"     If you do not have an account, please click on the \"Sign Up Now\" link. Current as of: September 8, 2021               Content Version: 13.2  © 8184-6059 [employer]. Care instructions adapted under license by [facility] ([facility]). If you have questions about a medical condition or this instruction, always ask your healthcare professional. [address] any warranty or liability for your use of this information. Personalized Preventive Plan for Smith Serrano - 5/13/2022  Medicare offers a range of preventive health benefits. Some of the tests and screenings are paid in full while other may be subject to a deductible, co-insurance, and/or copay. Some of these benefits include a comprehensive review of your medical history including lifestyle, illnesses that may run in your family, and various assessments and screenings as appropriate. After reviewing your medical record and screening and assessments performed today your provider may have ordered immunizations, labs, imaging, and/or referrals for you.   A list of these orders (if applicable) as well as your Preventive Care list are included within your After Visit Summary for your review. Other Preventive Recommendations:    · A preventive eye exam performed by an eye specialist is recommended every 1-2 years to screen for glaucoma; cataracts, macular degeneration, and other eye disorders. · A preventive dental visit is recommended every 6 months. · Try to get at least 150 minutes of exercise per week or 10,000 steps per day on a pedometer . · Order or download the FREE \"Exercise & Physical Activity: Your Everyday Guide\" from [employer]. Call 6-812.269.5337 or search [employer] online. · You need 3717-2164 mg of calcium and 0195-5583 IU of vitamin D per day. It is possible to meet your calcium requirement with diet alone, but a vitamin D supplement is usually necessary to meet this goal.  · When exposed to the sun, use a sunscreen that protects against both UVA and UVB radiation with an SPF of 30 or greater. Reapply every 2 to 3 hours or after sweating, drying off with a towel, or swimming. · Always wear a seat belt when traveling in a car. Always wear a helmet when riding a bicycle or motorcycle.

## 2022-05-13 NOTE — PROGRESS NOTES
Medicare Annual Wellness Visit    Melissa Armendariz is here for Medicare AWV    Assessment & Plan   Controlled type 2 diabetes mellitus with chronic kidney disease on chronic dialysis, with long-term current use of insulin ([facility] [address]  Medicare annual wellness visit, subsequent      Recommendations for Preventive Services Due: see orders and patient instructions/AVS.  Recommended screening schedule for the next 5-10 years is provided to the patient in written form: see Patient Instructions/AVS.     Return for Medicare Annual Wellness Visit in 1 year. Subjective     Major depressive disorder: Very depressed about current health and living situations. Does not feel she can escape either. Not suicidal or homicidal. Has no plans on injuring self or others- doesn't feel capable of doing those things. Agreed to call psychiatrist.    Patient's complete Health Risk Assessment and screening values have been reviewed and are found in Flowsheets. The following problems were reviewed today and where indicated follow up appointments were made and/or referrals ordered.     Positive Risk Factor Screenings with Interventions:    Fall Risk:  Do you feel unsteady or are you worried about falling? : (!) yes  2 or more falls in past year?: no  Fall with injury in past year?: no     Fall Risk Interventions:    · Home safety tips provided     Depression:  PHQ-2 Score: 2  PHQ-9 Total Score: 12    Severity:1-4 = minimal depression, 5-9 = mild depression, 10-14 = moderate depression, 15-19 = moderately severe depression, 20-27 = severe depression    Depression Interventions:  · Counseling/psychotherapy referral ordered for further evaluation/treatment          General Health and ACP:  General  In general, how would you say your health is?: Fair  In the past 7 days, have you experienced any of the following: New or Increased Pain, New or Increased Fatigue, Loneliness, Social Isolation, Stress or Anger?: (!) Yes  Select all that apply: (!) New or Increased Fatigue,Loneliness,Social Isolation,Stress  Do you get the social and emotional support that you need?: (!) No  Do you have a Living Will?: (!) No    Advance Directives     Power of  Living Will ACP-Advance Directive ACP-Power of     Not on File [place] on 08/12/20 Filed [address] Risk Interventions:  · Given list of psychiatrists to call. Health Habits/Nutrition:     Physical Activity: Inactive    Days of Exercise per Week: 0 days    Minutes of Exercise per Session: 0 min     Have you lost any weight without trying in the past 3 months?: No  Body mass index: (!) 33.68  Have you seen the dentist within the past year?: (!) No    Health Habits/Nutrition Interventions:  · Nutritional issues:  educational materials for healthy, well-balanced diet provided  · Dental exam overdue:  patient encouraged to make appointment with his/her dentist    Hearing/Vision:  Do you or your family notice any trouble with your hearing that hasn't been managed with hearing aids?: (!) Yes  Do you have difficulty driving, watching TV, or doing any of your daily activities because of your eyesight?: No (Does not drive)  Have you had an eye exam within the past year?: Yes  No exam data present    Hearing/Vision Interventions:  · Hearing concerns:  patient declines any further evaluation/treatment for hearing issues     ADLs:  In the past 7 days, did you need help from others to perform any of the following everyday activities: Eating, dressing, grooming, bathing, toileting, or walking/balance?: No  In the past 7 days, did you need help from others to take care of any of the following: Laundry, housekeeping, banking/finances, shopping, telephone use, food preparation, transportation, or taking medications?: (!) Yes  Select all that apply: (!) Transportation,Laundry    ADL Interventions:  · transportation at baseline.            Objective   Vitals:    05/13/22 1101   BP: 138/74   Pulse: 76   Temp: 97.5 °F (36.4 °C)   TempSrc: Temporal   SpO2: 93%   Weight: 208 lb 11.2 oz (94.7 kg)   Height: 5' 6\" (1.676 m)      Body mass index is 33.69 kg/m². General Appearance: alert and oriented to person, place and time, well developed and well- nourished, in no acute distress  Skin: warm and dry, no rash or erythema  Head: normocephalic and atraumatic  Eyes: pupils equal, round, and reactive to light, extraocular eye movements intact, conjunctivae normal  ENT: tympanic membrane, external ear and ear canal normal bilaterally, nose without deformity, nasal mucosa and turbinates normal without polyps  Neck: supple and non-tender without mass, no thyromegaly or thyroid nodules, no cervical lymphadenopathy  Pulmonary/Chest: clear to auscultation bilaterally- no wheezes, rales or rhonchi, normal air movement, no respiratory distress  Cardiovascular: normal rate, regular rhythm, normal S1 and S2, no murmurs, rubs, clicks, or gallops, distal pulses intact, no carotid bruits  Abdomen: soft, non-tender, non-distended, normal bowel sounds, no masses or organomegaly  Extremities: no cyanosis, clubbing or edema. L arm with fistula. Musculoskeletal: normal range of motion, no joint swelling, deformity or tenderness  Neurologic: reflexes normal and symmetric, no cranial nerve deficit, gait, coordination and speech normal       Allergies   Allergen Reactions    Pcn [Penicillins] Hives    Sulfa Antibiotics Other (See Comments)     \"passed out with IV\"    Bactrim [Sulfamethoxazole-Trimethoprim] Hives     Prior to Visit Medications    Medication Sig Taking?  Authorizing Provider   pantoprazole (PROTONIX) 40 MG tablet  Yes Historical Provider, MD   LANTUS SOLOSTAR 100 UNIT/ML injection pen INJECT 30 UNITS INTO THE SKIN NIGHTLY Yes Ric Kelly, DO   traZODone (DESYREL) 50 MG tablet TAKE 1 TABLET BY MOUTH NIGHTLY Yes Ric Kelly, DO   rOPINIRole (REQUIP) 2 MG tablet TAKE 1 TABLET BY MOUTH EVERY NIGHT Yes Per RIVAS Leticia, DO   atorvastatin (LIPITOR) 10 MG tablet TAKE 1 TABLET BY MOUTH EVERY NIGHT Yes Ric Kelly, DO   BD PEN NEEDLE DERREK U/F 32G X 4 MM MISC USE AS DIRECTED WITH INSULIN PEN DAILY Yes Ric Kelly, DO   Cyanocobalamin (B-12) 1000 MCG TABS TAKE 1 TABLET BY MOUTH ONCE DAILY Yes Ric Kelly DO   calcium carbonate 1500 (600 Ca) MG TABS tablet TAKE 1 TABLET BY MOUTH TWICE DAILY Yes Ric Kelly DO   folic acid (FOLVITE) 1 MG tablet TAKE 1 TABLET BY MOUTH ONCE DAILY Yes Ric Kelly DO   montelukast (SINGULAIR) 10 MG tablet TAKE 1 TABLET BY MOUTH NIGHTLY Yes Ric Kelly DO   sertraline (ZOLOFT) 100 MG tablet TAKE 1 TABLET BY MOUTH EVERY NIGHT AT BEDTIME Yes Ric Kelly DO   insulin lispro, 1 Unit Dial, (HUMALOG KWIKPEN) 100 UNIT/ML SOPN Take 2 units for Blood sugar of 150, 4 units for 200, 6 units for 250, 8 units if 300.  Yes Ric Kelly DO   Accu-Chek FastClix Lancets MISC USE TO TEST BLOOD SUGAR FOUR TIMES DAILY BEFORE MEALS AND NIGHTLY Yes Ric Kelly DO   Alcohol Swabs (ALCOHOL PREP) 70 % PADS 1 each by Does not apply route 4 times daily (before meals and nightly) Yes Ric Kelly DO   calcium carbonate 600 MG TABS tablet Take 1 tablet by mouth 2 times daily Yes Ric Kelly DO   buPROPion (WELLBUTRIN XL) 150 MG extended release tablet TAKE 1 TABLET BY MOUTH EVERY DAY FOR 7 DAYS THEN 2 TABLETS BY MOUTH THEREAFTER Yes Historical Provider, MD   Ferric Citrate (AURYXIA) 1  MG(Fe) TABS Take 3 tablets by mouth Yes Historical Provider, MD   AURYXIA 1  MG(Fe) TABS TAKE 2 TABLETS BY MOUTH THREE TIMES DAILY WITH MEALS AND 1 TABLET WITH SNACKS   SWALLOW WHOLE, DO NOT CHEW OR CRUSH MEDICATION Yes Historical Provider, MD   SYNTHROID 75 MCG tablet Take 1 tablet by mouth Daily Yes Ric Kelly DO   famotidine (PEPCID) 40 MG tablet TAKE 1 TABLET BY MOUTH DAILY Yes Historical Provider, MD   albuterol providers/suppliers regularly involved in providing care):   Patient Care Team:  Yani Glaser DO as PCP - General (Family Medicine)  Yani Glaser [facility] PCP [facility]  Javier Coker MD as Consulting Physician (Cardiology)    Reviewed and updated this visit:  Tobacco  Allergies  Meds  Med Hx  Surg Hx  Soc Hx  Fam Hx                  Ric Kelly DO

## 2022-05-27 DIAGNOSIS — Z79.4 CONTROLLED TYPE 2 DIABETES MELLITUS WITH CHRONIC KIDNEY DISEASE ON CHRONIC DIALYSIS, WITH LONG-TERM CURRENT USE OF INSULIN (HCC): ICD-10-CM

## 2022-05-27 DIAGNOSIS — N18.6 CONTROLLED TYPE 2 DIABETES MELLITUS WITH CHRONIC KIDNEY DISEASE ON CHRONIC DIALYSIS, WITH LONG-TERM CURRENT USE OF INSULIN (HCC): ICD-10-CM

## 2022-05-27 DIAGNOSIS — Z99.2 CONTROLLED TYPE 2 DIABETES MELLITUS WITH CHRONIC KIDNEY DISEASE ON CHRONIC DIALYSIS, WITH LONG-TERM CURRENT USE OF INSULIN (HCC): ICD-10-CM

## 2022-05-27 DIAGNOSIS — E11.22 CONTROLLED TYPE 2 DIABETES MELLITUS WITH CHRONIC KIDNEY DISEASE ON CHRONIC DIALYSIS, WITH LONG-TERM CURRENT USE OF INSULIN (HCC): ICD-10-CM

## 2022-05-27 DIAGNOSIS — E53.8 B12 DEFICIENCY: ICD-10-CM

## 2022-05-27 RX ORDER — CYANOCOBALAMIN (VITAMIN B-12) 1000 MCG
TABLET ORAL
Qty: 30 TABLET | Refills: 10 | Status: ON HOLD
Start: 2022-05-27 | End: 2022-06-22 | Stop reason: HOSPADM

## 2022-05-27 RX ORDER — PEN NEEDLE, DIABETIC 32GX 5/32"
NEEDLE, DISPOSABLE MISCELLANEOUS
Qty: 100 EACH | Refills: 10 | Status: SHIPPED | OUTPATIENT
Start: 2022-05-27

## 2022-06-03 ENCOUNTER — HOSPITAL ENCOUNTER (OUTPATIENT)
Dept: CT IMAGING | Age: 77
Discharge: HOME | End: 2022-06-03
Payer: COMMERCIAL

## 2022-06-03 ENCOUNTER — HOSPITAL ENCOUNTER (OUTPATIENT)
Age: 77
Discharge: HOME | End: 2022-06-03
Payer: COMMERCIAL

## 2022-06-03 DIAGNOSIS — R19.00 ABDOMINAL MASS, UNSPECIFIED ABDOMINAL LOCATION: ICD-10-CM

## 2022-06-03 DIAGNOSIS — R10.84 ABDOMINAL PAIN, GENERALIZED: ICD-10-CM

## 2022-06-03 LAB
BUN BLDV-MCNC: 43 MG/DL (ref 6–23)
CREAT SERPL-MCNC: 5.3 MG/DL (ref 0.5–1)
GFR AFRICAN AMERICAN: 10
GFR NON-AFRICAN AMERICAN: 8 ML/MIN/1.73

## 2022-06-03 PROCEDURE — 84520 ASSAY OF UREA NITROGEN: CPT

## 2022-06-03 PROCEDURE — 6360000004 HC RX CONTRAST MEDICATION: Performed by: RADIOLOGY

## 2022-06-03 PROCEDURE — 82565 ASSAY OF CREATININE: CPT

## 2022-06-03 PROCEDURE — 74177 CT ABD & PELVIS W/CONTRAST: CPT

## 2022-06-03 PROCEDURE — 36415 COLL VENOUS BLD VENIPUNCTURE: CPT

## 2022-06-03 RX ADMIN — IOHEXOL 50 ML: 240 INJECTION, SOLUTION INTRATHECAL; INTRAVASCULAR; INTRAVENOUS; ORAL at 13:09

## 2022-06-03 RX ADMIN — IOPAMIDOL 75 ML: 755 INJECTION, SOLUTION INTRAVENOUS at 13:09

## 2022-06-17 ENCOUNTER — HOSPITAL ENCOUNTER (EMERGENCY)
Age: 77
Discharge: ANOTHER HOSPITAL | End: 2022-06-19
Attending: EMERGENCY MEDICINE
Payer: COMMERCIAL

## 2022-06-17 DIAGNOSIS — Z99.2 STAGE 5 CHRONIC KIDNEY DISEASE ON CHRONIC DIALYSIS (HCC): ICD-10-CM

## 2022-06-17 DIAGNOSIS — R45.851 SUICIDAL IDEATION: Primary | ICD-10-CM

## 2022-06-17 DIAGNOSIS — F32.A DEPRESSION, UNSPECIFIED DEPRESSION TYPE: ICD-10-CM

## 2022-06-17 DIAGNOSIS — N18.6 STAGE 5 CHRONIC KIDNEY DISEASE ON CHRONIC DIALYSIS (HCC): ICD-10-CM

## 2022-06-17 LAB
ACETAMINOPHEN LEVEL: <5 MCG/ML (ref 10–30)
ALBUMIN SERPL-MCNC: 3.8 G/DL (ref 3.5–5.2)
ALP BLD-CCNC: 78 U/L (ref 35–104)
ALT SERPL-CCNC: 7 U/L (ref 0–32)
AMPHETAMINE SCREEN, URINE: NOT DETECTED
ANION GAP SERPL CALCULATED.3IONS-SCNC: 10 MMOL/L (ref 7–16)
AST SERPL-CCNC: 13 U/L (ref 0–31)
BARBITURATE SCREEN URINE: NOT DETECTED
BASOPHILS ABSOLUTE: 0.07 E9/L (ref 0–0.2)
BASOPHILS RELATIVE PERCENT: 0.9 % (ref 0–2)
BENZODIAZEPINE SCREEN, URINE: NOT DETECTED
BILIRUB SERPL-MCNC: 0.3 MG/DL (ref 0–1.2)
BUN BLDV-MCNC: 43 MG/DL (ref 6–23)
CALCIUM SERPL-MCNC: 9 MG/DL (ref 8.6–10.2)
CANNABINOID SCREEN URINE: NOT DETECTED
CHLORIDE BLD-SCNC: 97 MMOL/L (ref 98–107)
CO2: 29 MMOL/L (ref 22–29)
COCAINE METABOLITE SCREEN URINE: NOT DETECTED
CREAT SERPL-MCNC: 4.5 MG/DL (ref 0.5–1)
EKG ATRIAL RATE: 70 BPM
EKG P-R INTERVAL: 160 MS
EKG Q-T INTERVAL: 436 MS
EKG QRS DURATION: 80 MS
EKG QTC CALCULATION (BAZETT): 470 MS
EKG R AXIS: -7 DEGREES
EKG T AXIS: 18 DEGREES
EKG VENTRICULAR RATE: 70 BPM
EOSINOPHILS ABSOLUTE: 0.27 E9/L (ref 0.05–0.5)
EOSINOPHILS RELATIVE PERCENT: 3.5 % (ref 0–6)
ETHANOL: <10 MG/DL (ref 0–0.08)
FENTANYL SCREEN, URINE: NOT DETECTED
GFR AFRICAN AMERICAN: 12
GFR NON-AFRICAN AMERICAN: 10 ML/MIN/1.73
GLUCOSE BLD-MCNC: 184 MG/DL (ref 74–99)
HCT VFR BLD CALC: 31.2 % (ref 34–48)
HEMOGLOBIN: 10 G/DL (ref 11.5–15.5)
LYMPHOCYTES ABSOLUTE: 0.84 E9/L (ref 1.5–4)
LYMPHOCYTES RELATIVE PERCENT: 10.5 % (ref 20–42)
Lab: NORMAL
MCH RBC QN AUTO: 34.1 PG (ref 26–35)
MCHC RBC AUTO-ENTMCNC: 32.1 % (ref 32–34.5)
MCV RBC AUTO: 106.5 FL (ref 80–99.9)
METHADONE SCREEN, URINE: NOT DETECTED
MONOCYTES ABSOLUTE: 0.76 E9/L (ref 0.1–0.95)
MONOCYTES RELATIVE PERCENT: 9.6 % (ref 2–12)
NEUTROPHILS ABSOLUTE: 5.7 E9/L (ref 1.8–7.3)
NEUTROPHILS RELATIVE PERCENT: 75.4 % (ref 43–80)
OPIATE SCREEN URINE: NOT DETECTED
OXYCODONE URINE: NOT DETECTED
PDW BLD-RTO: 14 FL (ref 11.5–15)
PHENCYCLIDINE SCREEN URINE: NOT DETECTED
PLATELET # BLD: 149 E9/L (ref 130–450)
PMV BLD AUTO: 10.4 FL (ref 7–12)
POTASSIUM REFLEX MAGNESIUM: 4.1 MMOL/L (ref 3.5–5)
RBC # BLD: 2.93 E12/L (ref 3.5–5.5)
SALICYLATE, SERUM: <0.3 MG/DL (ref 0–30)
SARS-COV-2, NAAT: NOT DETECTED
SODIUM BLD-SCNC: 136 MMOL/L (ref 132–146)
TOTAL PROTEIN: 6.4 G/DL (ref 6.4–8.3)
TRICYCLIC ANTIDEPRESSANTS SCREEN SERUM: NEGATIVE NG/ML
WBC # BLD: 7.6 E9/L (ref 4.5–11.5)

## 2022-06-17 PROCEDURE — 99285 EMERGENCY DEPT VISIT HI MDM: CPT

## 2022-06-17 PROCEDURE — 80053 COMPREHEN METABOLIC PANEL: CPT

## 2022-06-17 PROCEDURE — 80307 DRUG TEST PRSMV CHEM ANLYZR: CPT

## 2022-06-17 PROCEDURE — 80143 DRUG ASSAY ACETAMINOPHEN: CPT

## 2022-06-17 PROCEDURE — 87635 SARS-COV-2 COVID-19 AMP PRB: CPT

## 2022-06-17 PROCEDURE — 93005 ELECTROCARDIOGRAM TRACING: CPT | Performed by: EMERGENCY MEDICINE

## 2022-06-17 PROCEDURE — 85025 COMPLETE CBC W/AUTO DIFF WBC: CPT

## 2022-06-17 PROCEDURE — 82077 ASSAY SPEC XCP UR&BREATH IA: CPT

## 2022-06-17 PROCEDURE — 80179 DRUG ASSAY SALICYLATE: CPT

## 2022-06-17 PROCEDURE — 36415 COLL VENOUS BLD VENIPUNCTURE: CPT

## 2022-06-17 ASSESSMENT — ENCOUNTER SYMPTOMS
COLOR CHANGE: 0
VOMITING: 0
SHORTNESS OF BREATH: 0
NAUSEA: 0
COUGH: 0
ABDOMINAL PAIN: 0

## 2022-06-17 ASSESSMENT — PAIN - FUNCTIONAL ASSESSMENT: PAIN_FUNCTIONAL_ASSESSMENT: NONE - DENIES PAIN

## 2022-06-17 NOTE — ED PROVIDER NOTES
Patient brought in for evaluation. Patient has a longstanding history of depression. Lately her depression has been getting worse and she has had suicidal ideations. Currently has no plan. EMS state that they thought she planned on overdosing. Her PCP wanted her to be brought over here to be further evaluated. Patient is seeking help. No prior history of suicide attempt. Denies any illicit drug use. Occasional alcohol use. She is on medicines for depression and states that she has been compliant. She has not had any thing new added to her list and has not had any change of dosage. States that she is not eating or sleeping well recently. Symptoms are moderate in severity and have been gradually worsening. Symptoms have been persistent           Review of Systems   Constitutional: Positive for appetite change ( decreased). Negative for chills, diaphoresis, fatigue and fever. Eyes: Negative for visual disturbance. Respiratory: Negative for cough and shortness of breath. Cardiovascular: Negative for chest pain and palpitations. Gastrointestinal: Negative for abdominal pain, nausea and vomiting. Musculoskeletal: Negative for arthralgias and myalgias. Skin: Negative for color change and pallor. Neurological: Negative for dizziness and light-headedness. Psychiatric/Behavioral: Positive for sleep disturbance and suicidal ideas. Negative for confusion, hallucinations and self-injury. All other systems reviewed and are negative. Physical Exam  Vitals and nursing note reviewed. Constitutional:       General: She is not in acute distress. Appearance: She is well-developed. She is not diaphoretic. HENT:      Head: Normocephalic and atraumatic. Eyes:      Conjunctiva/sclera: Conjunctivae normal.   Cardiovascular:      Rate and Rhythm: Normal rate and regular rhythm. Heart sounds: Normal heart sounds. No murmur heard.       Pulmonary:      Effort: Pulmonary effort is normal. No respiratory distress. Breath sounds: Normal breath sounds. Abdominal:      General: Bowel sounds are normal.      Palpations: Abdomen is soft. Tenderness: There is no abdominal tenderness. Musculoskeletal:      Cervical back: Normal range of motion and neck supple. Skin:     General: Skin is warm and dry. Coloration: Skin is not pale. Neurological:      Mental Status: She is alert and oriented to person, place, and time. Psychiatric:         Mood and Affect: Mood normal.         Behavior: Behavior normal.         Thought Content: Thought content normal.          Procedures     MDM   Was referred here to the ED by her PCP for increasing depressive thoughts and suicidal ideations. Patient has no definite plan now but states she is more depressed than usual.  Her PCP wanted her to come in to be further evaluated. EKG Interpretation    Interpreted by emergency department physician    Rhythm: normal sinus   Rate: normal  Axis: left  Ectopy: none  Conduction: LVH by aVL criteria  ST Segments: no acute change  T Waves: no acute change  Q Waves: none    Clinical Impression: no acute changes    Washington DO Albin     ED Course as of 06/17/22 1747   Fri Jun 17, 2022   1632 Was notified by the nurse that the patient does not produce urine. She has dialysis dependent. She has dialysis on Tuesday Thursday and Saturday. She has not missed any appointments.  [MS]      ED Course User Index  [MS] Washington Albin DO       --------------------------------------------- PAST HISTORY ---------------------------------------------  Past Medical History:  has a past medical history of Anemia, Arrhythmia, Arthritis, Asthma, Chronic kidney disease, Depression, Hemodialysis patient ([facility] [address] Hyperlipidemia, Hypothyroid, Sleep apnea, Type II or unspecified type diabetes mellitus without mention of complication, not stated as uncontrolled, and 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 [facility]). Past Surgical History:  has a past surgical history that includes hernia repair; Gastric bypass surgery (2004); knee surgery (2009); Foot surgery (Left, 2012,2013); Hand surgery (Right, 2012); Dialysis fistula creation (Left, 03/02/2018); pr revise av fistula,w/o thrombectomy (Left, 5/23/2018); Cholecystectomy (2004); Carpal tunnel release (Left, 8/12/2020); Tonsillectomy; eye surgery (Right, 2010); and Carpal tunnel release (Right, 10/7/2020). Social History:  reports that she has never smoked. She has never used smokeless tobacco. She reports current alcohol use. She reports that she does not use drugs. Family History: family history includes Emphysema in her mother; No Known Problems in her father. The patients home medications have been reviewed.     Allergies: Pcn [penicillins], Sulfa antibiotics, and Bactrim [sulfamethoxazole-trimethoprim]    -------------------------------------------------- RESULTS -------------------------------------------------    LABS:  Results for orders placed or performed during the hospital encounter of 06/17/22   CBC with Auto Differential   Result Value Ref Range    WBC 7.6 4.5 - 11.5 E9/L    RBC 2.93 (L) 3.50 - 5.50 E12/L    Hemoglobin 10.0 (L) 11.5 - 15.5 g/dL    Hematocrit 31.2 (L) 34.0 - 48.0 %    .5 (H) 80.0 - 99.9 fL    MCH 34.1 26.0 - 35.0 pg    MCHC 32.1 32.0 - 34.5 %    RDW 14.0 11.5 - 15.0 fL    Platelets 230 925 - 935 E9/L    MPV 10.4 7.0 - 12.0 fL    Neutrophils % 75.4 43.0 - 80.0 %    Lymphocytes % 10.5 (L) 20.0 - 42.0 %    Monocytes % 9.6 2.0 - 12.0 %    Eosinophils % 3.5 0.0 - 6.0 %    Basophils % 0.9 0.0 - 2.0 %    Neutrophils Absolute 5.70 1.80 - 7.30 E9/L    Lymphocytes Absolute 0.84 (L) 1.50 - 4.00 E9/L    Monocytes Absolute 0.76 0.10 - 0.95 E9/L    Eosinophils Absolute 0.27 0.05 - 0.50 E9/L    Basophils Absolute 0.07 0.00 - 0.20 E9/L   Comprehensive Metabolic Panel w/ Reflex to MG   Result Value Ref Range    Sodium 136 132 - 146 mmol/L    Potassium reflex Magnesium 4.1 3.5 - 5.0 mmol/L    Chloride 97 (L) 98 - 107 mmol/L    CO2 29 22 - 29 mmol/L    Anion Gap 10 7 - 16 mmol/L    Glucose 184 (H) 74 - 99 mg/dL    BUN 43 (H) 6 - 23 mg/dL    CREATININE 4.5 (H) 0.5 - 1.0 mg/dL    GFR Non-African American 10 >=60 mL/min/1.73    GFR African American 12     Calcium 9.0 8.6 - 10.2 mg/dL    Total Protein 6.4 6.4 - 8.3 g/dL    Albumin 3.8 3.5 - 5.2 g/dL    Total Bilirubin 0.3 0.0 - 1.2 mg/dL    Alkaline Phosphatase 78 35 - 104 U/L    ALT 7 0 - 32 U/L    AST 13 0 - 31 U/L   Serum Drug Screen   Result Value Ref Range    Ethanol Lvl <10 mg/dL    Acetaminophen Level <5.0 (L) 10.0 - 13.7 mcg/mL    Salicylate, Serum <2.9 0.0 - 30.0 mg/dL    TCA Scrn NEGATIVE Cutoff:300 ng/mL   EKG 12 Lead   Result Value Ref Range    Ventricular Rate 70 BPM    Atrial Rate 70 BPM    P-R Interval 160 ms    QRS Duration 80 ms    Q-T Interval 436 ms    QTc Calculation (Bazett) 470 ms    R Axis -7 degrees    T Axis 18 degrees       RADIOLOGY:  No orders to display       ------------------------- NURSING NOTES AND VITALS REVIEWED ---------------------------  Date / Time Roomed:  6/17/2022 12:28 PM  ED Bed Assignment:  07/07    The nursing notes within the ED encounter and vital signs as below have been reviewed. Patient Vitals for the past 24 hrs:   BP Temp Temp src Pulse Resp SpO2 Height Weight   06/17/22 1239 (!) 173/86 98 °F (36.7 °C) Oral 76 18 98 % 5' 6\" (1.676 m) 210 lb (95.3 kg)       Oxygen Saturation Interpretation: Normal    ------------------------------------------ PROGRESS NOTES ------------------------------------------  Re-evaluation(s):  1228  Patient resting in bed in no distress. No complaints at this time. Counseling:  I have spoken with the patient and discussed todays results, in addition to providing specific details for the plan of care and counseling regarding the diagnosis and prognosis.   Their questions are answered at this time and they are agreeable with the plan of admission.    --------------------------------- ADDITIONAL PROVIDER NOTES ---------------------------------  Consultations: [facility] will be consulted and disposition will be as per them. This patient's ED course included: a personal history and physicial examination and re-evaluation prior to disposition    This patient has remained hemodynamically stable during their ED course. Diagnosis:  1. Suicidal ideation    2. Depression, unspecified depression type    3. Stage 5 chronic kidney disease on chronic dialysis ([facility] [address]        Disposition:  Patient's disposition: Admit to mental health unit - medically cleared for admission  Patient's condition is good.          Luz Maria Agudelo DO  06/17/22 5344

## 2022-06-17 NOTE — ED NOTES
Assumed care of patient at this time. Pt. States they were sent in from [facility] for thoughts of suicide. Pt. States they have not been taking their medications recently because they are depressed. Pt. States they are also a dialysis patient and get dialysis on Tuesday, Thursday, and Saturday. Constant observer at bedside.      Ebony Mcneal RN  06/17/22 5677

## 2022-06-17 NOTE — ED NOTES
Unable to provide urine specimen at this time due to dialysis. Dr. Eduar Tarango was notified.      Shey Oleary, ARIELLE  09/78/[birth year] 5487

## 2022-06-17 NOTE — ED NOTES
[facility] is aware that pt needs assessed    Awaiting BARBARA and pink slip     Marcela Baez, CARMEN  06/17/22 3955

## 2022-06-17 NOTE — ED NOTES
Ligature risks removed from room. Pt. Educated on purpose of constant observer.   Pt.'s clothing, earrings, cell phone, and purse, bagged and placed in locker #2     Donna Mcadams RN  06/17/22 0410

## 2022-06-18 VITALS
TEMPERATURE: 98.4 F | WEIGHT: 210 LBS | DIASTOLIC BLOOD PRESSURE: 67 MMHG | OXYGEN SATURATION: 96 % | SYSTOLIC BLOOD PRESSURE: 132 MMHG | HEART RATE: 76 BPM | HEIGHT: 66 IN | BODY MASS INDEX: 33.75 KG/M2 | RESPIRATION RATE: 18 BRPM

## 2022-06-18 LAB — METER GLUCOSE: 179 MG/DL (ref 74–99)

## 2022-06-18 PROCEDURE — 82962 GLUCOSE BLOOD TEST: CPT

## 2022-06-18 PROCEDURE — 6370000000 HC RX 637 (ALT 250 FOR IP): Performed by: INTERNAL MEDICINE

## 2022-06-18 RX ORDER — LIDOCAINE AND PRILOCAINE 25; 25 MG/G; MG/G
CREAM TOPICAL ONCE
Status: COMPLETED | OUTPATIENT
Start: 2022-06-18 | End: 2022-06-18

## 2022-06-18 RX ADMIN — LIDOCAINE AND PRILOCAINE: 25; 25 CREAM TOPICAL at 11:49

## 2022-06-18 NOTE — ED NOTES
Pt accepted by dr Metz/ Jeffrey Braga NP to [facility]. Diagnosis MDD. Please ensure original pinkslip/volunary admission is sent with pts soft chart. Hope W to contact primary care RN with further admission details.       Bren Soriano RN  06/18/22 1954

## 2022-06-18 NOTE — ED NOTES
Reviewed patient for admission on [facility] with Dr Beatrice Chirinos who is not accepting at this time.      Sofía Sanches RN  06/18/22 7535

## 2022-06-18 NOTE — ED NOTES
Co at bedside continuously, awaiting bed placement at [facility] facility.       Graciela Grubbs RN  06/18/22 1912

## 2022-06-18 NOTE — FLOWSHEET NOTE
06/18/22 1756   Vital Signs   BP (!) 113/58   Temp 98.4 °F (36.9 °C)   Heart Rate 63   Resp 18   Pain Assessment   Pain Assessment None - Denies Pain   Post-Hemodialysis Assessment   Post-Treatment Procedures Blood returned; Access bleeding time < 10 minutes   Machine Disinfection Process Acid/Vinegar Clean;Bleach; Exterior Machine Disinfection   Rinseback Volume (ml) 300 ml   Total Liters Processed (l/min) 77.3 l/min   Dialyzer Clearance Lightly streaked   Duration of Treatment (minutes) 210 minutes   Hemodialysis Intake (ml) 300 ml   Hemodialysis Output (ml) 2300 ml   NET Removed (ml) 2000   Tolerated Treatment Good   Bilateral Breath Sounds Clear;Diminished   Edema Generalized   Edema Generalized +1   Time Off 1756

## 2022-06-18 NOTE — ED NOTES
Discussed case with Dr Metz/ Ede,Requesting pt to receive dialysis before represented for [facility] to [facility], RN  06/18/22 7148

## 2022-06-18 NOTE — ED NOTES
Pt resting comfortably in bed. Constant observer at bedside.       Salinas Guthrie RN  06/17/22 4881

## 2022-06-18 NOTE — ED NOTES
Pt. Currently being screened by JUHI, constant observer remains at bedside.      Jillian Nelson RN  06/17/22 2030

## 2022-06-18 NOTE — ED NOTES
Pt back in room from dialysis, CO at bedside. [place] ([facility]) sandwich provided with coffee.  Pleasant and cooperative     [facility]  06/18/22 1916

## 2022-06-18 NOTE — ED NOTES
Dunia Hussein RN pt accepted by doctor to [facility] and will be arriving after dialysis is copmplete at [facility] per 's request. Pt will go to [address]       [facility], [place]  06/18/22 [address] [place] [address]  06/18/22 3629

## 2022-06-18 NOTE — ED NOTES
Call from [facility] ED, pt is returned from dialysis, pt admitted to [address] by [address] Ryley Oneal, will be going to [address], [facility] advise [facility] will be [facility]. Jhonny Krishnan in admitting notified of admission. Call to [facility] at [facility] who reports Physicians ETA is approximately 90 minutes to 2 hours.       [address] [place] [address], [facility], [place]  06/18/22 3100

## 2022-06-18 NOTE — ED NOTES
Informed by [facility] AN AFFILIATE OF [facility] RN that patient was declined by Dr. Sydnee Reilly, Patient has been referred to Blayne Rivers at the [address] [place] [address] for inpatient admission for safety and stabilization     KRAIG Murillo, [place]  06/18/22 0122

## 2022-06-18 NOTE — ED NOTES
I called and spoke to Jonathan Chase, RN who advised that pt is scheduled for dialysis at 1200 today. I advised Jonathan Chase to call the [facility] AN AFFILIATE OF [facility] at [phone] to proceed with psych admission, once pt has received dialysis.     DO NOT SEND PT UNTIL BED IS ASSIGNED     Dion Monterroso, [facility]  06/18/22 [address] [facility] [place], [facility]  06/18/22 5432

## 2022-06-18 NOTE — CONSULTS
Nephrology Consult Note  [facility]     Reason for Consult:  ESRD  Requesting Physician:  Dr Zak Cox   Date of Service: 6/18/2022   Chief Complaint:  SI   History Obtained From:  Patient, medical record      History of Present Ilness:      Ms Rissa John is a 78year old female who we are consulted on for evaluation and management of ESRD    She is seen and examined in the ER. She presented to the hospital on 6-17 with suicidal ideation. She states she has no plans to commit suicide at this time. She denies chest pain, palpitations, sob, abd pain, n/v, d/c.  Good appetite. She states that she has not taken any medications in two months. She has ESRD and receives HD TTS via LUE AVF. No issues reported with dialysis.   Last treatment was 6-16 as an outpatient         Past Medical History:        Diagnosis Date    Anemia     Arrhythmia     AF    Arthritis     RA    Asthma     Chronic kidney disease     Depression     Hemodialysis patient ([facility] [address]     T-Th-Sat    Hyperlipidemia     Hypothyroid     Sleep apnea     no CPAP    Type II or unspecified type diabetes mellitus without mention of complication, not stated as uncontrolled     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 ([facility] [address] 6/15/2017       Past Surgical History:        Procedure Laterality Date    CARPAL TUNNEL RELEASE Left 8/12/2020    LEFT CARPAL TUNNEL RELEASE performed by Alfredo Garcia MD at [facility] Right 10/7/2020    RIGHT CARPAL TUNNEL RELEASE performed by Alfredo Garcia MD at [address] [place] [address]   2004    DIALYSIS FISTULA CREATION Left 03/02/2018    AV fistula arm/Dr. Mosher Govern    EYE SURGERY Right 2010    cataract    FOOT SURGERY Left 2012,2013    pin put in, then pin removed   Megan  2004    HAND SURGERY Right 2012    ligament was cut    HERNIA REPAIR      KNEE SURGERY  2009    R knee left side    MI REVISE AV FISTULA,W/O THROMBECTOMY Left 5/23/2018    SUPERFICIALIZATION AV FISTULA - LEFT UPPER ARM performed by Jennifer Rangel MD at [facility]         Current Medications:    Current Facility-Administered Medications: lidocaine-prilocaine (EMLA) cream, , Topical, Once    Allergies:  Pcn [penicillins], Sulfa antibiotics, and Bactrim [sulfamethoxazole-trimethoprim]    Social History:    No tobacco, no EtOH    Family History:   No kidney disease     Review of Systems:   Pertinent positives stated above in HPI. All other systems were reviewed and were negative. Physical exam:   Constitutional:  VITALS:  BP (!) 170/77   Pulse 72   Temp 98.1 °F (36.7 °C) (Oral)   Resp 18   Ht 5' 6\" (1.676 m)   Wt 210 lb (95.3 kg)   SpO2 98%   BMI 33.89 kg/m²     Gen: alert, awake, no acute distress  HEENT: atraumatic/normocephalic  Neck: no jvd  Lungs: clear to ascultation bilaterally, equal lung expansion  CV: RRR, no murmurs  Abdomen: soft, nontender, nondistended, normoactive bowel sounds  Extremitiy: no edema  : no CVA tenderness  Skin: no rash, tugor wnl  Neuro: no focal deficits, AOX3  Psych: cooperative and appropriate      Data:       Last 3 CMP:    Recent Labs     06/17/22  1316      K 4.1   CL 97*   CO2 29   BUN 43*   CREATININE 4.5*   GLUCOSE 184*   CALCIUM 9.0   PROT 6.4   LABALBU 3.8   BILITOT 0.3   ALKPHOS 78   AST 13   ALT 7         Last 3 Glucose:     Recent Labs     06/17/22  1316   GLUCOSE 184*         Last 3 POC Glucose:     No results for input(s): POCGLU in the last 72 hours. Last 3 CK, CKMB, Troponin  No results for input(s): CKTOTAL, CKMB, TROPONINI in the last 72 hours.       Last 3 CBC:  Recent Labs     06/17/22  1316   WBC 7.6   RBC 2.93*   HGB 10.0*   HCT 31.2*   .5*   MCH 34.1   MCHC 32.1   RDW 14.0      MPV 10.4       Last 3 Hepatic Function Panel:    Recent Labs     06/17/22  1316   ALKPHOS 78   ALT 7   AST 13   PROT 6.4   BILITOT 0.3   LABALBU 3.8       Albumin:  Recent Labs 06/17/22  1316   LABALBU 3.8       Calcium:  Recent Labs     06/17/22  1316   CALCIUM 9.0       Ionized Calcium:  No results for input(s): IONCA in the last 72 hours. Magnesium:    No results for input(s): MG in the last 72 hours. ABGs:  No results for input(s): PHART, PO2ART, JDI8ZXZ, RHJ6IRG, BEART, V5QKGEXQ in the last 72 hours. Lactic Acid:  No results for input(s): LACTA in the last 72 hours. Last 3 Amylase:  No results for input(s): AMYLASE in the last 72 hours. Last 3 Lipase:  No results for input(s): LIPASE in the last 72 hours. Last 3 BNP:  No results for input(s): BNP in the last 72 hours. Assessment/Plan      1. ESRD  HD TTS via LUE AVF    Will plan dialysis today for clearance and volume removal      2. HTN with CKDV  Blood pressure elevated  Follow response with dialysis  Of note, she has not taken any medications in two months    3. Volume overload  Well controlled  UF as tolerated to EDW    4. Anemia  With CKD  No reported blood loss  Hgb at goal for ESRD  Follow Hgb    5. Hyperphosphatemia  Had been prescribed binders as outpatient  States she has not taken medications in two months  Last PO4 as outpatient at goal  Follow PO4 and give binders as needed    6.  Suicidal ideation  Denied SI during my evaluation   Per psychiatry       Of note, no labs from today at the time of this note       Thank you for the opportunity to participate in the care of Ms Elliot Rodriges     ______________________________      Ady Herrera MD  6/18/2022  8:27 AM

## 2022-06-18 NOTE — ED NOTES
Behavioral Health Crisis Assessment    ASSESSMENT DONE VIA TELE HEALTH      Chief Complaint: \"I stopped taking my medications and I don't want to live. \"    Mental Status Exam: Pt is alert and oriented x3,  Appearance unkempt, speech clear and low in tone, eye contact is appropriate, affect flat, mood depressed, behavior cooperative and withdrawn, demeanor withdrawn, denies auditory and visual hallucinations, denies homicidal ideations, endorses suicidal ideations, insight and judgement are poor. Legal Status  [] Voluntary:  [x] Involuntary, Issued by: ED doctor    Gender  [] Male [x] Female [] Transgender  [] Other    Sexual Orientation    [x] Heterosexual [] Homosexual [] Bisexual [] Other    Brief Clinical Summary: Pt is a 78year old female presenting to the ED for suicidal ideations and psychiatric evaluation. Pt states her depression has been getting worse and she has been thinking about dying. Pt states she has no plan, but she has stopped taking life sustaining medications for her thyroid, diabetes, stomach issues and her kidneys. Pt also reports she stopped taking her psychiatric medications as well. Pt follows with [facility] in [facility]. Pt had her first counseling appointment at [facility] today but was then sent to ED to have psychiatric evaluation. Pt states she hasn't had a counselor in over ten years. Pt reports no other previous suicide attempts and has had no admissions to inpatient psych. Pt denies alcohol or drug use. Pt has been on dialysis for 4 years and she said \"it is becoming too much to handle. \" Pt denies homicidal ideations and does not have access to weapons. Collateral Information: none    Risk Factors:   Chronic health issues  Mental Health Diagnosis- Major Depressive Disorder  Stopped taking life sustaining medications   Limited family/friend support  Lack of self-care  Recent conflict with family/friends  Off prescribed Psych meds    Protective Factors:   Outpatient provider  Initiated this ER visit  Help-seeking behavior  Safe and stable housing  Has access to essential needs  Has a guardian  No access to weapons       Suicidal Ideations:   [x] Reports:    [] Past [x] Present  failure to thrive and states she wants to die. [] Denies    Suicide Attempts:  [x] Reports:  Pt stopped taking life sustaining medications  [] Denies    C-SSRS Screening Completed by RN: Current Suicide Risk:  [] No Risk [x] Low [] Moderate [] High    Homicidal Ideations  [] Reports:   [] Past [] Present   [x] Denies     Self Injurious/Self Mutilation Behaviors:   [x] Reports:    [] Past [x] Present  Pt stopped taking life sustaining medications  [] Denies    Hallucinations/Delusions   [] Reports:   [x] Denies     Substance Use/Alcohol Use/Addiction:   [] Reports:   [x] Denies   [x] SBIRT Screen Complete. Current or Past Substance Abuse Treatment  [] Yes, When and Where:  [x] No    Current or Past Mental Health Treatment:  [x] Yes, When and Where: Pt currently goes to [facility] for counseling and medication management  [] No    Legal Issues:  []  Yes (Specify)  [x]  No    Access to Weapons:  []  Yes (Specify)  [x]  No    Trauma History  [x] Reports: being on dialysis for 4 years, went through a rough divorce  [] Denies     Living Situation: lives with sister and nephew    Employment: graduated high school     Education Level: graduated    Violence Risk Screenin. Have you ever thought about hurting someone? [x]  No  []  Yes (Ask the questions listed below)   When?  Did you follow through with the thoughts? [x] No     [] Yes- When and what happened? 2.  Have you ever threatened anyone? [x]  No  []  Yes (Ask the questions listed below)   When and what happened?  Have you ever threatened someone with a gun, knife or other weapon? [x]  No  []  Yes - When and what happened? 2. Have you ever had an order of protection taken out against you? []  Yes [x]  No  3.  Have you ever been arrested due to violence? []  Yes [x]  No  4. Have you ever been cruel to animals?  []  Yes [x]  No    After consideration of C-SSRS screening results, C-SSRS assessments, and this professional's assessment the patient's overall suicide risk assessed to be:  [] No Risk  [] Low   [] Moderate   [x] High     [x] Discussed current suicide risk, protective and risk factors with RN and ED Physician     Disposition   [] Home:   [] Outpatient Provider:   [] Crisis Unit:   [x] Inpatient Psychiatric Unit:  [] Other:                     Marco Ruelas  06/17/22 8673

## 2022-06-19 ENCOUNTER — HOSPITAL ENCOUNTER (INPATIENT)
Age: 77
LOS: 3 days | Discharge: HOME | DRG: 885 | End: 2022-06-22
Attending: PSYCHIATRY & NEUROLOGY | Admitting: PSYCHIATRY & NEUROLOGY
Payer: COMMERCIAL

## 2022-06-19 DIAGNOSIS — E11.22 CONTROLLED TYPE 2 DIABETES MELLITUS WITH CHRONIC KIDNEY DISEASE ON CHRONIC DIALYSIS, WITH LONG-TERM CURRENT USE OF INSULIN (HCC): ICD-10-CM

## 2022-06-19 DIAGNOSIS — N18.6 CONTROLLED TYPE 2 DIABETES MELLITUS WITH CHRONIC KIDNEY DISEASE ON CHRONIC DIALYSIS, WITH LONG-TERM CURRENT USE OF INSULIN (HCC): ICD-10-CM

## 2022-06-19 DIAGNOSIS — Z79.4 CONTROLLED TYPE 2 DIABETES MELLITUS WITH CHRONIC KIDNEY DISEASE ON CHRONIC DIALYSIS, WITH LONG-TERM CURRENT USE OF INSULIN (HCC): ICD-10-CM

## 2022-06-19 DIAGNOSIS — Z99.2 CONTROLLED TYPE 2 DIABETES MELLITUS WITH CHRONIC KIDNEY DISEASE ON CHRONIC DIALYSIS, WITH LONG-TERM CURRENT USE OF INSULIN (HCC): ICD-10-CM

## 2022-06-19 PROBLEM — F32.A DEPRESSION WITH SUICIDAL IDEATION: Status: ACTIVE | Noted: 2022-06-19

## 2022-06-19 PROBLEM — R45.851 DEPRESSION WITH SUICIDAL IDEATION: Status: ACTIVE | Noted: 2022-06-19

## 2022-06-19 LAB
HBA1C MFR BLD: 8 % (ref 4–5.6)
METER GLUCOSE: 199 MG/DL (ref 74–99)
METER GLUCOSE: 212 MG/DL (ref 74–99)
METER GLUCOSE: 223 MG/DL (ref 74–99)
METER GLUCOSE: 338 MG/DL (ref 74–99)
METER GLUCOSE: 80 MG/DL (ref 74–99)
T4 FREE: 0.79 NG/DL (ref 0.93–1.7)
TSH SERPL DL<=0.05 MIU/L-ACNC: 9.53 UIU/ML (ref 0.27–4.2)

## 2022-06-19 PROCEDURE — S5553 INSULIN LONG ACTING 5 U: HCPCS | Performed by: FAMILY MEDICINE

## 2022-06-19 PROCEDURE — 36415 COLL VENOUS BLD VENIPUNCTURE: CPT

## 2022-06-19 PROCEDURE — 84439 ASSAY OF FREE THYROXINE: CPT

## 2022-06-19 PROCEDURE — 80074 ACUTE HEPATITIS PANEL: CPT

## 2022-06-19 PROCEDURE — 90792 PSYCH DIAG EVAL W/MED SRVCS: CPT | Performed by: PSYCHIATRY & NEUROLOGY

## 2022-06-19 PROCEDURE — 6370000000 HC RX 637 (ALT 250 FOR IP): Performed by: PSYCHIATRY & NEUROLOGY

## 2022-06-19 PROCEDURE — 6370000000 HC RX 637 (ALT 250 FOR IP): Performed by: FAMILY MEDICINE

## 2022-06-19 PROCEDURE — 84443 ASSAY THYROID STIM HORMONE: CPT

## 2022-06-19 PROCEDURE — 83036 HEMOGLOBIN GLYCOSYLATED A1C: CPT

## 2022-06-19 PROCEDURE — 1240000000 HC EMOTIONAL WELLNESS R&B

## 2022-06-19 PROCEDURE — 82962 GLUCOSE BLOOD TEST: CPT

## 2022-06-19 PROCEDURE — 86706 HEP B SURFACE ANTIBODY: CPT

## 2022-06-19 RX ORDER — LANOLIN ALCOHOL/MO/W.PET/CERES
3 CREAM (GRAM) TOPICAL NIGHTLY
Status: DISCONTINUED | OUTPATIENT
Start: 2022-06-19 | End: 2022-06-22 | Stop reason: HOSPADM

## 2022-06-19 RX ORDER — INSULIN GLARGINE-YFGN 100 [IU]/ML
20 INJECTION, SOLUTION SUBCUTANEOUS NIGHTLY
Status: DISCONTINUED | OUTPATIENT
Start: 2022-06-19 | End: 2022-06-20

## 2022-06-19 RX ORDER — DEXTROSE MONOHYDRATE 50 MG/ML
100 INJECTION, SOLUTION INTRAVENOUS PRN
Status: DISCONTINUED | OUTPATIENT
Start: 2022-06-19 | End: 2022-06-22 | Stop reason: HOSPADM

## 2022-06-19 RX ORDER — INSULIN GLARGINE-YFGN 100 [IU]/ML
15 INJECTION, SOLUTION SUBCUTANEOUS NIGHTLY
Status: DISCONTINUED | OUTPATIENT
Start: 2022-06-19 | End: 2022-06-19

## 2022-06-19 RX ORDER — INSULIN LISPRO 100 [IU]/ML
0-12 INJECTION, SOLUTION INTRAVENOUS; SUBCUTANEOUS
Status: DISCONTINUED | OUTPATIENT
Start: 2022-06-19 | End: 2022-06-22 | Stop reason: HOSPADM

## 2022-06-19 RX ORDER — HYDROXYZINE PAMOATE 25 MG/1
50 CAPSULE ORAL 3 TIMES DAILY PRN
Status: DISCONTINUED | OUTPATIENT
Start: 2022-06-19 | End: 2022-06-22 | Stop reason: HOSPADM

## 2022-06-19 RX ORDER — INSULIN LISPRO 100 [IU]/ML
0-6 INJECTION, SOLUTION INTRAVENOUS; SUBCUTANEOUS NIGHTLY
Status: DISCONTINUED | OUTPATIENT
Start: 2022-06-19 | End: 2022-06-22 | Stop reason: HOSPADM

## 2022-06-19 RX ORDER — HALOPERIDOL 2 MG/1
3 TABLET ORAL EVERY 6 HOURS PRN
Status: DISCONTINUED | OUTPATIENT
Start: 2022-06-19 | End: 2022-06-22 | Stop reason: HOSPADM

## 2022-06-19 RX ORDER — HALOPERIDOL 5 MG/ML
3 INJECTION INTRAMUSCULAR EVERY 6 HOURS PRN
Status: DISCONTINUED | OUTPATIENT
Start: 2022-06-19 | End: 2022-06-22 | Stop reason: HOSPADM

## 2022-06-19 RX ORDER — ACETAMINOPHEN 325 MG/1
650 TABLET ORAL EVERY 4 HOURS PRN
Status: DISCONTINUED | OUTPATIENT
Start: 2022-06-19 | End: 2022-06-22 | Stop reason: HOSPADM

## 2022-06-19 RX ORDER — MAGNESIUM HYDROXIDE/ALUMINUM HYDROXICE/SIMETHICONE 120; 1200; 1200 MG/30ML; MG/30ML; MG/30ML
30 SUSPENSION ORAL PRN
Status: DISCONTINUED | OUTPATIENT
Start: 2022-06-19 | End: 2022-06-22 | Stop reason: HOSPADM

## 2022-06-19 RX ADMIN — SERTRALINE 50 MG: 50 TABLET, FILM COATED ORAL at 14:07

## 2022-06-19 RX ADMIN — INSULIN LISPRO 2 UNITS: 100 INJECTION, SOLUTION INTRAVENOUS; SUBCUTANEOUS at 21:07

## 2022-06-19 RX ADMIN — INSULIN GLARGINE-YFGN 20 UNITS: 100 INJECTION, SOLUTION SUBCUTANEOUS at 21:08

## 2022-06-19 RX ADMIN — INSULIN LISPRO 4 UNITS: 100 INJECTION, SOLUTION INTRAVENOUS; SUBCUTANEOUS at 14:08

## 2022-06-19 RX ADMIN — LEVOTHYROXINE SODIUM 75 MCG: 0.03 TABLET ORAL at 14:07

## 2022-06-19 ASSESSMENT — PATIENT HEALTH QUESTIONNAIRE - PHQ9: SUM OF ALL RESPONSES TO PHQ QUESTIONS 1-9: 11

## 2022-06-19 ASSESSMENT — SLEEP AND FATIGUE QUESTIONNAIRES
DO YOU USE A SLEEP AID: NO
SLEEP PATTERN: DIFFICULTY FALLING ASLEEP;DISTURBED/INTERRUPTED SLEEP;RESTLESSNESS
SLEEP PATTERN: DIFFICULTY FALLING ASLEEP;RESTLESSNESS;DISTURBED/INTERRUPTED SLEEP;INSOMNIA
AVERAGE NUMBER OF SLEEP HOURS: 2
DO YOU HAVE DIFFICULTY SLEEPING: YES
AVERAGE NUMBER OF SLEEP HOURS: 2
DO YOU USE A SLEEP AID: YES
DO YOU HAVE DIFFICULTY SLEEPING: YES

## 2022-06-19 ASSESSMENT — PAIN DESCRIPTION - LOCATION
LOCATION: GENERALIZED
LOCATION: BACK;KNEE;SHOULDER

## 2022-06-19 ASSESSMENT — PAIN SCALES - GENERAL
PAINLEVEL_OUTOF10: 8
PAINLEVEL_OUTOF10: 7

## 2022-06-19 ASSESSMENT — LIFESTYLE VARIABLES
HOW MANY STANDARD DRINKS CONTAINING ALCOHOL DO YOU HAVE ON A TYPICAL DAY: 1 OR 2
HOW OFTEN DO YOU HAVE A DRINK CONTAINING ALCOHOL: NEVER

## 2022-06-19 ASSESSMENT — PAIN DESCRIPTION - PAIN TYPE: TYPE: CHRONIC PAIN

## 2022-06-19 NOTE — CONSULTS
Nephrology Consult Note  Patient's Name: Alen Ferrer  1:42 PM  6/19/2022        Reason for Consult:  ESRD, HD dependent TTS  Requesting Physician:  Michael Guerrero DO    Chief Complaint:  Depression , suicidal ideation  History Obtained From:  patient and EHR    History of Present Ilness:    Alen Ferrer is a 78 y.o. female with a history of ESRD HD dependent TTS [address] She initially presented to [facility] on 617 with complaints of depression and suicidal ideation. Patient states that she had stopped taking most of her medications. She denies headaches, no blurry vision, no double vision no chest pain, she was briefly admitted there. Subsequently transferred to the behavioral health unit here for further management. She was seen by our service( Dr Kyree Bush ) prior to transfer.     Past Medical History:   Diagnosis Date    Anemia     Arrhythmia     AF    Arthritis     RA    Asthma     Chronic kidney disease     Depression     Hemodialysis patient ([facility]     T-Th-Sat    Hyperlipidemia     Hypothyroid     Sleep apnea     no CPAP    Type II or unspecified type diabetes mellitus without mention of complication, not stated as uncontrolled     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 ([facility] 6/15/2017       Past Surgical History:   Procedure Laterality Date    CARPAL TUNNEL RELEASE Left 8/12/2020    LEFT CARPAL TUNNEL RELEASE performed by Demetris Pedraza MD at [facility] Right 10/7/2020    RIGHT CARPAL TUNNEL RELEASE performed by Demetris Pedraza MD at [address] [place] [address]   2004    DIALYSIS FISTULA CREATION Left 03/02/2018    AV fistula arm/Dr. Omar Holman    EYE SURGERY Right 2010    cataract    FOOT SURGERY Left 2012,2013    pin put in, then pin removed   Megan Guallpa    HAND SURGERY Right 2012    ligament was cut   [address]  2009    R knee left side    [place] REVISE AV FISTULA,W/O THROMBECTOMY Left 5/23/2018    SUPERFICIALIZATION AV FISTULA - LEFT UPPER ARM performed by Thiago Tello MD at [facility]         Family History   Problem Relation Age of Onset    Emphysema Mother     No Known Problems Father         reports that she has never smoked. She has never used smokeless tobacco. She reports current alcohol use. She reports that she does not use drugs. Allergies:  Pcn [penicillins], Sulfa antibiotics, and Bactrim [sulfamethoxazole-trimethoprim]    Current Medications:    acetaminophen (TYLENOL) tablet 650 mg, Q4H PRN  melatonin tablet 3 mg, Nightly  aluminum & magnesium hydroxide-simethicone (MAALOX) 200-200-20 MG/5ML suspension 30 mL, PRN  hydrOXYzine pamoate (VISTARIL) capsule 50 mg, TID PRN  haloperidol (HALDOL) tablet 3 mg, Q6H PRN   Or  haloperidol lactate (HALDOL) injection 3 mg, Q6H PRN  magnesium hydroxide (MILK OF MAGNESIA) 400 MG/5ML suspension 30 mL, Daily PRN  sertraline (ZOLOFT) tablet 50 mg, Daily  insulin glargine-yfgn (SEMGLEE-YFGN) injection vial 15 Units, Nightly  insulin lispro (HUMALOG) injection vial 0-12 Units, TID WC  insulin lispro (HUMALOG) injection vial 0-6 Units, Nightly  glucose chewable tablet 16 g, PRN  dextrose bolus 10% 125 mL, PRN   Or  dextrose bolus 10% 250 mL, PRN  glucagon (rDNA) injection 1 mg, PRN  dextrose 5 % solution, PRN        Review of Systems:   Pertinent items are noted in HPI. Physical exam:  Vitals:    06/19/22 0759   BP: (!) 162/83   Pulse: 63   Resp: 18   Temp: 98.7 °F (37.1 °C)   SpO2: 96%          General: No distress. Alert. Eyes: PERRL. No sclera icterus. No conjunctival injection. ENT: No discharge. Pharynx clear. Neck: Trachea midline. Normal thyroid. Lungs: No accessory muscle use. No crackles. No wheezing. No rhonchi. CV: Regular rate. Regular rhythm. No murmur or rub. .   Abd: Non-tender. Non-distended. No masses. No organmegaly. Normal bowel sounds. Skin: Warm and dry.  No nodule on exposed extremities. No rash on exposed extremities. Ext: No cyanosis, clubbing, edema; NORMA AVF good thrill and bruit  Neuro: Awake. Follows commands. Positive pupils/gag/corneals. Normal pain response. Data:   Labs:  Lab Results   Component Value Date     06/17/2022     04/22/2022     07/26/2021    K 4.1 06/17/2022    K 6.1 (H) 04/22/2022    K 4.5 07/26/2021    CL 97 (L) 06/17/2022    CO2 29 06/17/2022    CO2 31 (H) 04/22/2022    CO2 29 07/26/2021    CREATININE 4.5 (H) 06/17/2022    CREATININE 5.3 (H) 06/03/2022    CREATININE 4.4 (H) 04/22/2022    BUN 43 (H) 06/17/2022    BUN 43 (H) 06/03/2022    BUN 37 (H) 04/22/2022    GLUCOSE 184 (H) 06/17/2022    GLUCOSE 186 (H) 04/22/2022    GLUCOSE 144 (H) 07/26/2021    PHOS 4.8 (H) 12/28/2020    PHOS 6.0 (H) 12/26/2020    PHOS 4.6 (H) 12/25/2020    WBC 7.6 06/17/2022    WBC 6.8 04/22/2022    WBC 7.2 07/26/2021    HGB 10.0 (L) 06/17/2022    HGB 11.7 04/22/2022    HGB 11.6 07/26/2021    HCT 31.2 (L) 06/17/2022    HCT 36.2 04/22/2022    HCT 35.9 07/26/2021    .5 (H) 06/17/2022     06/17/2022         Imaging:  No results found. Assessment/Plans    1. Depression with suicidal ideation  Denies SI at this time    2.. ESRD  HD TTS via LUE AVF   next HD planned for 6/21        3. HTN with CKDV  Blood pressure elevated  Follow response with dialysis  Patient states that she has not required antihypertensives  Will monitor blood pressure with dialysis     3. Volume overload, mild  Well controlled  UF as tolerated to EDW     4.  Anemia  With CKD  No reported blood loss  Follow Hgb    Will follow   Thanks    You Culver MD  1:42 PM  6/19/2022

## 2022-06-19 NOTE — ED NOTES
Pt assisted to JUAN Moore, cooperative     Nika Block, [address] [place] [address]  06/18/22 2003

## 2022-06-19 NOTE — PROGRESS NOTES
Hospital Medicine  Consult History & Physical        Reason for consult: Diabetes, hyperglycemia    Date of Service: Pt seen/examined in consultation on 6/19/2022    History Of Present Illness:    Ms. Leigh Gonzales, a 78y.o. year old female  who  has a past medical history of Anemia, Arrhythmia, Arthritis, Asthma, Chronic kidney disease, Depression, Hemodialysis patient ([facility] Hyperlipidemia, Hypothyroid, Sleep apnea, Type II or unspecified type diabetes mellitus without mention of complication, not stated as uncontrolled, and 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 ([facility] She presented due to suicidal ideations with underlying depression. [address] [place] psychiatry behavior units. When I did see as she was oriented to person place and time. She did mention she stopped taking her medications for the last 2 months or more and she cannot give a reason as to why. She did mention she used to be on insulin 30 units and also with sliding scale. Denies any nausea or belly pain chest pain.     Past Medical History:        Diagnosis Date    Anemia     Arrhythmia     AF    Arthritis     RA    Asthma     Chronic kidney disease     Depression     Hemodialysis patient ([facility]     T-Th-Sat    Hyperlipidemia     Hypothyroid     Sleep apnea     no CPAP    Type II or unspecified type diabetes mellitus without mention of complication, not stated as uncontrolled     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 ([facility] 6/15/2017       Past Surgical History:        Procedure Laterality Date    CARPAL TUNNEL RELEASE Left 8/12/2020    LEFT CARPAL TUNNEL RELEASE performed by Dayana Mina MD at [facility] Right 10/7/2020    RIGHT CARPAL TUNNEL RELEASE performed by Dayana Mina MD at [address] [place] [address]   2004    DIALYSIS FISTULA CREATION Left 03/02/2018    AV fistula arm/Dr. [facility]    EYE SURGERY Right 2010 cataract    FOOT SURGERY Left 2012,2013    pin put in, then pin removed   [facility]  2004    HAND SURGERY Right 2012    ligament was cut    HERNIA REPAIR      KNEE SURGERY  2009    R knee left side    [place] REVISE AV FISTULA,W/O THROMBECTOMY Left 5/23/2018    SUPERFICIALIZATION AV FISTULA - LEFT UPPER ARM performed by Eulalia Meneses MD at [facility]         Medications Prior to Admission:    Prior to Admission medications    Medication Sig Start Date End Date Taking?  Authorizing Provider   BD PEN NEEDLE DERREK U/F 32G X 4 MM MISC USE AS DIRECTED WITH INSULIN PEN DAILY 5/27/22   Mariia Kelly, DO   Cyanocobalamin (B-12) 1000 MCG TABS TAKE 1 TABLET BY MOUTH ONCE DAILY 5/27/22   Geo Kelly, DO   pantoprazole (PROTONIX) 40 MG tablet  2/26/22   Historical Provider, MD   LANTUS SOLOSTAR 100 UNIT/ML injection pen INJECT 30 UNITS INTO THE SKIN NIGHTLY 3/1/22   Geo Kelly, DO   traZODone (DESYREL) 50 MG tablet TAKE 1 TABLET BY MOUTH NIGHTLY 1/28/22   Ric Kelly, DO   rOPINIRole (REQUIP) 2 MG tablet TAKE 1 TABLET BY MOUTH EVERY NIGHT 1/28/22   Ric Kelly DO   atorvastatin (LIPITOR) 10 MG tablet TAKE 1 TABLET BY MOUTH EVERY NIGHT 1/28/22   Ric Kelly DO   calcium carbonate 1500 (600 Ca) MG TABS tablet TAKE 1 TABLET BY MOUTH TWICE DAILY 11/1/21   Ric Kelly DO   folic acid (FOLVITE) 1 MG tablet TAKE 1 TABLET BY MOUTH ONCE DAILY 11/1/21   Ric Kelly DO   montelukast (SINGULAIR) 10 MG tablet TAKE 1 TABLET BY MOUTH NIGHTLY 11/1/21   Ric Kelly DO   sertraline (ZOLOFT) 100 MG tablet TAKE 1 TABLET BY MOUTH EVERY NIGHT AT BEDTIME 11/1/21   Ric Kelly, DO   insulin lispro, 1 Unit Dial, (HUMALOG KWIKPEN) 100 UNIT/ML SOPN Take 2 units for Blood sugar of 150, 4 units for 200, 6 units for 250, 8 units if 300. 10/11/21   Ric Kelly, DO   Accu-Chek FastClix Lancets MISC USE TO TEST BLOOD SUGAR FOUR TIMES DAILY BEFORE MEALS AND NIGHTLY 10/5/21   Jin Kelly, DO   Alcohol Swabs (ALCOHOL PREP) 70 % PADS 1 each by Does not apply route 4 times daily (before meals and nightly) 10/5/21   Jin Kelly, DO   calcium carbonate 600 MG TABS tablet Take 1 tablet by mouth 2 times daily 10/5/21   Gayla Kelly DO   buPROPion (WELLBUTRIN XL) 150 MG extended release tablet TAKE 1 TABLET BY MOUTH EVERY DAY FOR 7 DAYS THEN 2 TABLETS BY MOUTH THEREAFTER 8/16/21   Historical Provider, MD   Ferric Citrate (AURYXIA) 1  MG(Fe) TABS Take 3 tablets by mouth 9/14/21   Historical Provider, MD   AURYXIA 1  MG(Fe) TABS TAKE 2 TABLETS BY MOUTH THREE TIMES DAILY WITH MEALS AND 1 TABLET WITH SNACKS   SWALLOW WHOLE, DO NOT CHEW OR CRUSH MEDICATION 7/16/21   Historical Provider, MD   SYNTHROID 75 MCG tablet Take 1 tablet by mouth Daily 5/10/21   Gayla Kelly DO   famotidine (PEPCID) 40 MG tablet TAKE 1 TABLET BY MOUTH DAILY 1/8/21   Historical Provider, MD   albuterol sulfate  (90 Base) MCG/ACT inhaler Inhale 1 puff into the lungs every 4 hours as needed for Wheezing 10/21/20   Ric Kelly DO   fluticasone-salmeterol (ADVAIR DISKUS) 100-50 MCG/DOSE diskus inhaler Inhale 1 puff into the lungs 2 times daily Rinse mouth after use. 10/21/20   Jin Kelly, DO   Elastic Bandages & Supports (WRIST SPLINT LEFT/RIGHT) MISC 1 each by Does not apply route daily as needed (numbness) 1/17/20   Courtney Carlos, DO   Misc. Devices (BARIATRIC ROLLATOR) MISC 1 Device by Does not apply route continuous prn (Walking) 1/3/20   Courtney Carlos DO   Insulin Pen Needle (B-D ULTRAFINE III SHORT PEN) 31G X 8 MM MISC Inject 1 each into the skin daily 7/29/19   Courtney Carlos DO   nitroGLYCERIN (NITROSTAT) 0.4 MG SL tablet Place 1 tablet under the tongue every 5 minutes as needed for Chest pain up to max of 3 total doses. If no relief, call 911.   Patient not taking: Reported on 6/17/2022 7/29/19 Murtaza Bryant, DO   Methoxy PEG-Epoetin Beta (MIRCERA) 50 MCG/0.3ML SOSY Inject 50 mcg as directed every 14 days Last Dose: 4/4/2019  Next Dose: 6/13/2019    Historical Provider, MD   lidocaine-prilocaine (EMLA) 2.5-2.5 % cream Apply 1 each topically three times a week Tuesday, Thursday, Saturday    Historical Provider, MD   iron sucrose (VENOFER) 20 MG/ML injection Infuse 50 mg intravenously once a week Last Dose: 6/4/ 2020 at dialysis    Historical Provider, MD ZHANG Complex-C-Folic Acid (TOD-RENETTA) TABS Take 1 tablet by mouth daily Rx    Historical Provider, MD   midodrine (PROAMATINE) 5 MG tablet Take 5 mg by mouth three times a week Tuesday, Thursday, Saturday    Historical Provider, MD   glucose blood VI test strips (ACCU-CHEK SMARTVIEW) strip 1 each by In Vitro route 4 times daily (before meals and nightly) As needed. 6/15/17   Delia Blanco DO   Blood Glucose Monitoring Suppl (ACCU-CHEK DERREK SMARTVIEW) W/DEVICE KIT 1 kit by Does not apply route 4 times daily (before meals and nightly) 3/29/17   Delia Blanco DO       Allergies:  Pcn [penicillins], Sulfa antibiotics, and Bactrim [sulfamethoxazole-trimethoprim]    Social History:      TOBACCO:   reports that she has never smoked. She has never used smokeless tobacco.  ETOH:   reports current alcohol use. Family History:      Reviewed in detail and negative for DM, CAD, Cancer, CVA. Positive as follows:        Problem Relation Age of Onset    Emphysema Mother     No Known Problems Father        REVIEW OF SYSTEMS:   Pertinent positives as noted in the HPI. All other systems reviewed and negative. PHYSICAL EXAM:  BP (!) 162/83   Pulse 63   Temp 98.7 °F (37.1 °C) (Temporal)   Resp 18   Ht 5' 6\" (1.676 m)   Wt 210 lb (95.3 kg)   SpO2 96%   BMI 33.89 kg/m²   General appearance: No apparent distress, appears stated age and cooperative. HEENT: Normal cephalic, atraumatic without obvious deformity. Pupils equal, round, and reactive to light.   Extra ocular muscles intact. Conjunctivae/corneas clear. Neck: Supple, with full range of motion. No jugular venous distention. Trachea midline. Respiratory:  Normal respiratory effort. Clear to auscultation, bilaterally without crackles or rhonchi  Cardiovascular: normal S1 S2, regular rate, regular rhythm with normal S1/S2 , with no murmurs  Abdomen: Soft, non-tender, non-distended with normal bowel sounds. Musculoskeletal: No clubbing, cyanosis or edema bilaterally. Skin: Skin color, texture, turgor normal.  No rashes or lesions. Neurologic:  Neurovascularly intact without any focal sensory/motor deficits. Cranial nerves: II-XII intact, grossly non-focal.  Psychiatric: Alert and oriented, thought content appropriate, normal insight    Labs:     CBC:   Recent Labs     06/17/22  1316   WBC 7.6   RBC 2.93*   HGB 10.0*   HCT 31.2*   .5*   RDW 14.0        BMP:   Recent Labs     06/17/22  1316      K 4.1   CL 97*   CO2 29   BUN 43*   CREATININE 4.5*     LFT:  Recent Labs     06/17/22  1316   PROT 6.4   ALKPHOS 78   ALT 7   AST 13   BILITOT 0.3     CE:  No results for input(s): Ross Beery in the last 72 hours. PT/INR: No results for input(s): INR, APTT in the last 72 hours. BNP: No results for input(s): BNP in the last 72 hours. Hgb A1C:   Lab Results   Component Value Date    LABA1C 8.0 (H) 06/19/2022     No results found for: EAG  ESR:   Lab Results   Component Value Date    SEDRATE 35 (H) 01/13/2020     CRP:   Lab Results   Component Value Date    CRP 0.4 01/13/2020     D Dimer: No results found for: DDIMER  Folate and B12:   Lab Results   Component Value Date    ZEKWDBRR75 1151 (H) 12/25/2020   ,   Lab Results   Component Value Date    FOLATE 3.8 (L) 12/25/2020     Lactic Acid:   Lab Results   Component Value Date    LACTA 1.0 12/21/2020     Thyroid Studies:   Lab Results   Component Value Date    TSH 6.570 (H) 04/22/2022         ASSESSMENT:  1. Insulin-dependent diabetes  2. Noncompliance  3. Hypothyroidism  4. ESRD  5. Depression with suicidal ideation    PLAN:  1. Concerning diabetes, blood sugars in the 190s-low 200s, for now resume insulin at 20 units nightly and add sliding scale. A1c 8.0. Review of her meds she was on 30 units nightly of Lantus. Monitor blood sugar closely and adjust regimen based on blood sugar. 2. Check TSH and Free T4. Resume synthroid. 3. Nephrology consulted for dialysis  We will continue to follow  Rest of management per psychiatry team.    Diet: ADULT DIET; Regular; 3 carb choices (45 gm/meal); Low Fat/Low Chol/High Fiber/FANY; Low Sodium (2 gm); Low Potassium (Less than 3000 mg/day); 1200 ml  Thank you for allowing us to participate in this patient's care.    +++++++++++++++++++++++++++++++++++++++++++++++++  Hawa Burks MD  [facility] Emergency Department Physician - [address] [place] [address]  +++++++++++++++++++++++++++++++++++++++++++++++++  NOTE: This report was transcribed using voice recognition software. Every effort was made to ensure accuracy; however, inadvertent computerized transcription errors may be present.

## 2022-06-19 NOTE — PROGRESS NOTES
[facility]  Admission Note     Admission Type:   Admission Type: Involuntary    Reason for admission:  Reason for Admission: \"Because I told them that I was suicidal but I would never do it. And then I told them that I haven't taken my medicine in a couple of months. \"    PATIENT STRENGTHS:       Patient Strengths and Limitations:       Addictive Behavior:   Addictive Behavior  In the Past 3 Months, Have You Felt or Has Someone Told You That You Have a Problem With  : Eating (too much/too little),Excessive fluid intake    Medical Problems:   Past Medical History:   Diagnosis Date    Anemia     Arrhythmia     AF    Arthritis     RA    Asthma     Chronic kidney disease     Depression     Hemodialysis patient ([facility]     T-Th-Sat    Hyperlipidemia     Hypothyroid     Sleep apnea     no CPAP    Type II or unspecified type diabetes mellitus without mention of complication, not stated as uncontrolled     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 ([facility] 6/15/2017       Status EXAM:  Mental Status and Behavioral Exam  Normal: No  Level of Assistance: Independent/Self  Facial Expression: Sad  Affect: Congruent  Level of Consciousness: Alert  Frequency of Checks: 4 times per hour, close  Mood:Normal: No  Mood: Depressed,Sad  Motor Activity:Normal: No  Motor Activity: Decreased  Eye Contact: Good  Observed Behavior: Cooperative  Sexual Misconduct History: Current - no  Preception: [place] to person,[place] to time,[place] to place,[place] to situation  Attention:Normal: No  Attention: Distractible  Thought Processes: Circumstantial  Thought Content:Normal: No  Thought Content: Preoccupations  Depression Symptoms: Change in energy level,Isolative,Loss of interest  Anxiety Symptoms: Generalized  Leatha Symptoms: No problems reported or observed.   Hallucinations: None  Delusions: No  Memory:Normal: No  Memory: Poor recent  Insight and Judgment: No  Insight and Judgment: Poor judgment    Tobacco Screening:  Practical Counseling, on admission, jovani X, if applicable and completed (first 3 are required if patient doesn't refuse):             (x )  Recognizing danger situations (included triggers and roadblocks)                    (x )  Coping skills (new ways to manage stress, exercise, relaxation techniques, changing routine, distraction)                                                           ( )  Basic information about quitting (benefits of quitting, techniques in how to quit, available resources  ( ) Referral for counseling faxed to [facility]                                           ( ) Patient refused counseling  ( x) Patient has not smoked in the last 30 days    Metabolic Screening:    Lab Results   Component Value Date    LABA1C 8.1 02/16/2022       Lab Results   Component Value Date    CHOL 164 04/22/2022    CHOL 177 07/26/2021    CHOL 123 12/22/2020    CHOL 175 11/09/2020    CHOL 146 03/23/2018    CHOL 197 08/22/2017    CHOL 170 05/24/2016    CHOL 152 12/23/2014    CHOL 149 06/17/2014    CHOL 152 05/20/2014    CHOL 147 05/20/2014    CHOL 147 02/28/2014    CHOL 139 01/30/2014    CHOL 208 (H) 01/02/2014    CHOL 89 07/24/2013    CHOL 122 01/09/2013    CHOL 121 08/13/2012    CHOL 104 07/17/2012    CHOL 98 06/01/2012    CHOL 172 05/18/2012     Lab Results   Component Value Date    TRIG 130 04/22/2022    TRIG 143 07/26/2021    TRIG 119 12/22/2020    TRIG 229 (H) 11/09/2020    TRIG 109 03/23/2018    TRIG 164 (H) 08/22/2017    TRIG 137 05/24/2016    TRIG 117 12/23/2014    TRIG 168 (H) 06/17/2014    TRIG 146 05/20/2014    TRIG 144 05/20/2014    TRIG 105 02/28/2014    TRIG 120 01/30/2014    TRIG 102 01/02/2014    TRIG 73 07/24/2013    TRIG 149 01/09/2013    TRIG 149 08/13/2012    TRIG 116 07/17/2012    TRIG 133 06/01/2012    TRIG 139 05/18/2012     Lab Results   Component Value Date    HDL 43 04/22/2022    HDL 51 07/26/2021    HDL 40 12/22/2020    HDL 40 11/09/2020 HDL 60 09/25/2019    HDL 53 03/23/2018    HDL 39 08/22/2017    HDL 51 05/24/2016    HDL 52 12/23/2014    HDL 41 06/17/2014    HDL 40 05/20/2014    HDL 39 05/20/2014    HDL 44 02/28/2014    HDL 43 01/30/2014    HDL 48 01/02/2014    HDL 35.0 (A) 07/24/2013    HDL 34.0 (A) 01/09/2013    HDL 35.0 (A) 08/13/2012    HDL 35.0 (A) 07/17/2012    HDL 35.0 (A) 06/01/2012    HDL 41.0 05/18/2012     No components found for: LDLCAL  Lab Results   Component Value Date    LABVLDL 26 04/22/2022    LABVLDL 29 07/26/2021    LABVLDL 24 12/22/2020    LABVLDL 46 11/09/2020    LABVLDL 25 09/25/2019    LABVLDL 22 03/23/2018    LABVLDL 33 08/22/2017    LABVLDL 27 05/24/2016    LABVLDL 23 12/23/2014    LABVLDL 34 06/17/2014    LABVLDL 29 05/20/2014    LABVLDL 29 05/20/2014         Body mass index is 33.89 kg/m². BP Readings from Last 2 Encounters:   06/19/22 99/63   06/18/22 132/67           Pt admitted with followings belongings:  Dental Appliances: Uppers,Lowers  Vision - Corrective Lenses: Eyeglasses  Hearing Aid: None  Jewelry: Earrings  Body Piercings Removed: Yes  Clothing: Dress,Footwear,Undergarments  Other Valuables: Money,Wallet,Purse,Personal Toiletries     Patient's home medications were N/A. Patient oriented to surroundings and program expectations and copy of patient rights given. Received admission packet:  YES. Consents reviewed, signed YES. Patient verbalize understanding:  YES.   Patient education on precautions: Memo Pollack RN

## 2022-06-19 NOTE — PROGRESS NOTES
Consulted Dr. Aivva Noonan. Farhad (nephrologist) for management of ESRD at [id number].  Doctor states that he will see patient

## 2022-06-19 NOTE — PROGRESS NOTES
78 yr old female transfer from [address] to [facility]) psych unit with an admitting Dx of major depressive disorder with suicidal ideations. Patient is A+Ox4. Patient is calm, presents with a pleasant demeanor, and is cooperative with staff. Sad, flat affect, slightly brightened with conversation. R sided weakness, and ambulates with the assistance of a cane. Patient has been experiencing increased depression, and having SI without a plan. It's reported that patient stopped taking life sustaining medication for diabetes, stomach issues, and kidneys. Patient receives dialysis treatment every Tuesday, Thursday, and Saturday for chronic kidney failure. Fistula to LUE in place. Medical comobidities include: diabetes mellitus II, asthma, end stage renal disease, sleep apnea, asthma, rheumatoid arthritis, mixed hyperlipidemia, paroxysmal atrial fibrillation, and hypotension. Patient has received psych treatment for counseling and medication management in the past at the [facility] in [place]. Patient is Covid negative. QTC-470. No behaviors observed. Patient was oriented to the unit, administered a turkey sandwich and fluids. All forms have been explained, agreed upon, and signed including BARBARA form for patient's sister. Staff will offer support and interventions per request and as required.

## 2022-06-19 NOTE — PROGRESS NOTES
[facility]  Initial Interdisciplinary Treatment Plan NOTE    Review Date & Time: 06/19/2022 0900    Patient was in treatment team    Admission Type:   Admission Type: Involuntary    Reason for admission:  Reason for Admission: \"Because I told them that I was suicidal but I would never do it. And then I told them that I haven't taken my medicine in a couple of months. \"      Estimated Length of Stay Update:  1-3  Estimated Discharge Date Update: 06/21/2022    EDUCATION:   Learner Progress Toward Treatment Goals: Reviewed results and recommendations of this team    Method: Small group    Outcome: Verbalized understanding    PATIENT GOALS: None at this time. PLAN/TREATMENT RECOMMENDATIONS UPDATE: Encourage patient to attend and participate in groups. Take medications as prescribed. GOALS UPDATE:   Time frame for Short-Term Goals: Prior to discharge.     Montrell Ayala RN

## 2022-06-19 NOTE — PLAN OF CARE
Patient A&Ox4. Pt denies SI, HI, hallucinations anxiety and depression. Patient appears sad and is cooperative with congruent affect. Pt is intermittently visible on the unit and is isolative to self. Pt is med. & meal compliant. Pt with even and unlabored breathing, no signs of acute physical distress noted. Will continue to monitor and offer support as needed. Problem: Self Harm/Suicidality  Goal: Will have no self-injury during hospital stay  Description: INTERVENTIONS:  1. Q 30 MINUTES: Routine safety checks  2. Q SHIFT & PRN: Assess risk to determine if routine checks are adequate to maintain patient safety  6/19/2022 1639 by Phares Dandy, RN  Outcome: Progressing     Problem: Depression  Goal: Will be euthymic at discharge  Description: INTERVENTIONS:  1. Administer medication as ordered  2. Provide emotional support via 1:1 interaction with staff  3. Encourage involvement in milieu/groups/activities  4. Monitor for social isolation  6/19/2022 1639 by Phares Dandy, RN  Outcome: Progressing     Problem: Behavior  Goal: Pt/Family maintain appropriate behavior and adhere to behavioral management agreement, if implemented  Description: INTERVENTIONS:  1. Assess patient/family's coping skills and  non-compliant behavior (including use of illegal substances)  2. Notify security of behavior or suspected illegal substances which indicate the need for search of the patient and/or belongings  3. Encourage verbalization of thoughts and concerns in a socially appropriate manner  4. Utilize positive, consistent limit setting strategies supporting safety of patient, staff and others  5. Encourage participation in the decision making process about the behavioral management agreement  6. Implement a Health Care Agreement if patient meets criteria  7. If a patient's behavior jeopardizes the safety of the patient, staff, or others refer to organization policy.  If a visitor's behavior poses a threat to safety call refer to organization policy.   8. Initiate consult with , Psychosocial CNS, Spiritual Care as appropriate  6/19/2022 1639 by Alaina Zambrano RN  Outcome: Progressing

## 2022-06-19 NOTE — H&P
Department of Psychiatry  History and Physical - Adult     CHIEF COMPLAINT: Suicidal ideations    History obtained from:     Patient was seen after discussing with the treatment team and reviewing the chart\        HISTORY OF PRESENT ILLNESS:      The patient is a 78 y.o. female with significant past history of diabetes mellitus, hypothyroidism chronic kidney disease on dialysis (Tues, thurs, Saturday), depressive disorder presented to the hospital complaining of suicidal ideations. Reported that her depression has been getting worse for several months in which she wants to die. [facility] and told them that she was having suicidal ideations that brought her to the ER to get evaluated. Reports that she stopped taking all of her medications including her Zoloft and Wellbutrin and all her medical medications as well. Patient says she is compliant with dialysis however. She says that she feels sad and hopeless and does not want to live anymore. She denied any significant suicidal plan however. Says \"I am too scared to do it\". History of prior suicide attempts or self-injurious behaviors she denies any prior psychiatric hospitalizations. Patient says that recent stressor is that she feels more and more isolative and has to deal with all of her medical issues. Patient says she lives with her sister and nephew who are supportive. Patient says all she does is stay in her bedroom and watch TV because she does not like to go outside. Patient reports that her family has noted that she has been more withdrawn and guarded and isolative and does not want to do anything. Says that her family is worried about her. She denies any hypomanic or manic episodes currently or ever in the past.  She denies any auditory or visual hallucinations or paranoia. She denies any significant memory issues. She was alert and oriented x4 and examination. She denies any alcohol or drug use.   Also denies any access to weapons or homicidal ideations. When I talked to her today she denied any current suicidal ideations however she did appear depressed and sad. That she is willing to restart her medications. Was mostly calm and cooperative she was a fair historian. Past psychiatric history: Denies any prior suicide attempts or self-injurious behaviors denies any previous psychiatric hospitalizations. She is prescribed Zoloft and Wellbutrin however she stopped taking all her medications several weeks ago including her medical medications. She follows up at [facility]    Past medical history:  diabetes mellitus, hypothyroidism chronic kidney disease on dialysis (Tues, thurs, Saturday)    Family history: denies any family history of suicide    Legal history: denies     Substance abuse history: denies     Social history: She reports she currently lives at home with her younger sister and nephew. Says that they are supportive of her. 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 She did finish high school. Patient has been  twice and is . She does not have any children patient denied history of abuse or trauma.   She denies any access to guns      Medications Prior to Admission:   Medications Prior to Admission: BD PEN NEEDLE DERREK U/F 32G X 4 MM MISC, USE AS DIRECTED WITH INSULIN PEN DAILY  Cyanocobalamin (B-12) 1000 MCG TABS, TAKE 1 TABLET BY MOUTH ONCE DAILY  pantoprazole (PROTONIX) 40 MG tablet,   LANTUS SOLOSTAR 100 UNIT/ML injection pen, INJECT 30 UNITS INTO THE SKIN NIGHTLY  traZODone (DESYREL) 50 MG tablet, TAKE 1 TABLET BY MOUTH NIGHTLY  rOPINIRole (REQUIP) 2 MG tablet, TAKE 1 TABLET BY MOUTH EVERY NIGHT  atorvastatin (LIPITOR) 10 MG tablet, TAKE 1 TABLET BY MOUTH EVERY NIGHT  calcium carbonate 1500 (600 Ca) MG TABS tablet, TAKE 1 TABLET BY MOUTH TWICE DAILY  folic acid (FOLVITE) 1 MG tablet, TAKE 1 TABLET BY MOUTH ONCE DAILY  montelukast (SINGULAIR) 10 MG tablet, TAKE 1 TABLET BY MOUTH NIGHTLY  sertraline (ZOLOFT) 100 MG tablet, TAKE 1 TABLET BY MOUTH EVERY NIGHT AT BEDTIME  insulin lispro, 1 Unit Dial, (HUMALOG KWIKPEN) 100 UNIT/ML SOPN, Take 2 units for Blood sugar of 150, 4 units for 200, 6 units for 250, 8 units if 300. Accu-Chek FastClix Lancets MISC, USE TO TEST BLOOD SUGAR FOUR TIMES DAILY BEFORE MEALS AND NIGHTLY  Alcohol Swabs (ALCOHOL PREP) 70 % PADS, 1 each by Does not apply route 4 times daily (before meals and nightly)  calcium carbonate 600 MG TABS tablet, Take 1 tablet by mouth 2 times daily  buPROPion (WELLBUTRIN XL) 150 MG extended release tablet, TAKE 1 TABLET BY MOUTH EVERY DAY FOR 7 DAYS THEN 2 TABLETS BY MOUTH THEREAFTER  Ferric Citrate (AURYXIA) 1  MG(Fe) TABS, Take 3 tablets by mouth  AURYXIA 1  MG(Fe) TABS, TAKE 2 TABLETS BY MOUTH THREE TIMES DAILY WITH MEALS AND 1 TABLET WITH SNACKS   SWALLOW WHOLE, DO NOT CHEW OR CRUSH MEDICATION  SYNTHROID 75 MCG tablet, Take 1 tablet by mouth Daily  famotidine (PEPCID) 40 MG tablet, TAKE 1 TABLET BY MOUTH DAILY  albuterol sulfate  (90 Base) MCG/ACT inhaler, Inhale 1 puff into the lungs every 4 hours as needed for Wheezing  fluticasone-salmeterol (ADVAIR DISKUS) 100-50 MCG/DOSE diskus inhaler, Inhale 1 puff into the lungs 2 times daily Rinse mouth after use. Elastic Bandages & Supports (WRIST SPLINT LEFT/RIGHT) MISC, 1 each by Does not apply route daily as needed (numbness)  Misc. Devices (BARIATRIC ROLLATOR) MISC, 1 Device by Does not apply route continuous prn (Walking)  Insulin Pen Needle (B-D ULTRAFINE III SHORT PEN) 31G X 8 MM MISC, Inject 1 each into the skin daily  nitroGLYCERIN (NITROSTAT) 0.4 MG SL tablet, Place 1 tablet under the tongue every 5 minutes as needed for Chest pain up to max of 3 total doses. If no relief, call 911.  (Patient not taking: Reported on 6/17/2022)  Methoxy PEG-Epoetin Beta (MIRCERA) 50 MCG/0.3ML SOSY, Inject 50 mcg as directed every 14 days Last Dose: 4/4/2019 Next Dose: 6/13/2019  lidocaine-prilocaine (EMLA) 2.5-2.5 % cream, Apply 1 each topically three times a week Tuesday, Thursday, Saturday  iron sucrose (VENOFER) 20 MG/ML injection, Infuse 50 mg intravenously once a week Last Dose: 6/4/ 2020 at dialysis  B Complex-C-Folic Acid (TOD-RENETTA) TABS, Take 1 tablet by mouth daily Rx  midodrine (PROAMATINE) 5 MG tablet, Take 5 mg by mouth three times a week Tuesday, Thursday, Saturday  glucose blood VI test strips (ACCU-CHEK SMARTVIEW) strip, 1 each by In Vitro route 4 times daily (before meals and nightly) As needed.   Blood Glucose Monitoring Suppl (ACCU-CHEK DERREK SMARTVIEW) W/DEVICE KIT, 1 kit by Does not apply route 4 times daily (before meals and nightly)      Past Medical History:        Diagnosis Date    Anemia     Arrhythmia     AF    Arthritis     RA    Asthma     Chronic kidney disease     Depression     [facility]od[facility]     T-Th-Sat    Hyperlipidemia     Hypothyroid     Sleep apnea     no CPAP    Type II or unspecified type diabetes mellitus without mention of complication, not stated as uncontrolled     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 ([facility] [address] 6/15/2017       Past Surgical History:        Procedure Laterality Date    CARPAL TUNNEL RELEASE Left 8/12/2020    LEFT CARPAL TUNNEL RELEASE performed by Rivka Hermosillo MD at [facility] Right 10/7/2020    RIGHT CARPAL TUNNEL RELEASE performed by Rivka Hermosillo MD at [address]  2004    DIALYSIS FISTULA CREATION Left 03/02/2018    AV fistula arm/Dr. Loida Drummond    EYE SURGERY Right 2010    cataract    FOOT SURGERY Left 2012,2013    pin put in, then pin [facility]   [facility]  [facility]    HAND SURGERY Right 2012    ligament was cut   [address]  2009    R knee left side    WI REVISE AV FISTULA,W/O THROMBECTOMY Left 5/23/2018    SUPERFICIALIZATION AV FISTULA - LEFT UPPER ARM performed by Leela Amaya MD at [facility] TONSILLECTOMY         Allergies:   Pcn [penicillins], Sulfa antibiotics, and Bactrim [sulfamethoxazole-trimethoprim]    Family History  Family History   Problem Relation Age of Onset    Emphysema Mother     No Known Problems Father          EXAMINATION:    REVIEW OF SYSTEMS:    ROS:  [x] All negative/unchanged except if checked.  Explain positive(checked items) below:  [] Constitutional  [] Eyes  [] Ear/Nose/Mouth/Throat  [] Respiratory  [] CV  [] GI  []   [] Musculoskeletal  [] Skin/Breast  [] Neurological  [] Endocrine  [] Heme/Lymph  [] Allergic/Immunologic    Explanation:     Vitals:  BP (!) 162/83   Pulse 63   Temp 98.7 °F (37.1 °C) (Temporal)   Resp 18   Ht 5' 6\" (1.676 m)   Wt 210 lb (95.3 kg)   SpO2 96%   BMI 33.89 kg/m²      Neurologic Exam:  Muscle Strength & Tone: normal  Gait: Sitting, did not assess  Involuntary Movements: No    Mental Status Examination:    Appearance: discheveled, age appropriate, Obese  Behavior: calm, cooperative, fair eye contact  Mood: depressed  Affect: congruent, sad  Thought process: linear  Thougth content: Denies suicidal ideation, homicidal ideations, paranoia  Perceptual distrubance: Denies Auditory, visual hallucinations  Insight: Fair  Judgment: Fair  Cognition: alert and oriented x4      DIAGNOSIS:  Major Depressive disorder, recurrent severe without psychotic features    LABS: REVIEWED TODAY:  Recent Labs     06/17/22  1316   WBC 7.6   HGB 10.0*        Recent Labs     06/17/22  1316      K 4.1   CL 97*   CO2 29   BUN 43*   CREATININE 4.5*   GLUCOSE 184*     Recent Labs     06/17/22  1316   BILITOT 0.3   ALKPHOS 78   AST 13   ALT 7     Lab Results   Component Value Date    LABAMPH NOT DETECTED 06/17/2022    BARBSCNU NOT DETECTED 06/17/2022    LABBENZ NOT DETECTED 06/17/2022    LABMETH NOT DETECTED 06/17/2022    OPIATESCREENURINE NOT DETECTED 06/17/2022    PHENCYCLIDINESCREENURINE NOT DETECTED 06/17/2022    ETOH <10 06/17/2022     Lab Results   Component Value Date    TSH 6.570 04/22/2022     No results found for: LITHIUM  No results found for: VALPROATE, CBMZ  No results found for: LITHIUM, VALPROATE    FURTHER LABS ORDERED :      Radiology   CT ABDOMEN PELVIS W IV CONTRAST Additional Contrast? Oral    Result Date: 6/3/2022  EXAMINATION: CT OF THE ABDOMEN AND PELVIS WITH CONTRAST 6/3/2022 12:59 pm TECHNIQUE: CT of the abdomen and pelvis was performed with the administration of intravenous contrast. Multiplanar reformatted images are provided for review. Automated exposure control, iterative reconstruction, and/or weight based adjustment of the mA/kV was utilized to reduce the radiation dose to as low as reasonably achievable. COMPARISON: None. HISTORY: ORDERING SYSTEM PROVIDED HISTORY: Abdominal mass, unspecified abdominal location TECHNOLOGIST PROVIDED HISTORY: Additional Contrast?->Oral STAT Creatinine as needed:->No FINDINGS: Lower Chest: Lung bases are clear. Organs: Liver without focal lesion. Gallbladder surgically absent. Pancreas unremarkable. Spleen demonstrates multiple low-attenuation areas scattered throughout potential subcentimeter cysts versus hemangiomas with a dominant area along the medial aspect which measures up to 1.9 cm adrenals without nodule. Kidneys are atrophic without suspicious renal lesion or hydronephrosis. GI/Bowel: Postsurgical changes proximal gastric region of apparent bypass. No focal thickening or disproportion dilatation of bowel. No inflammatory findings. Appendix visualized unremarkable. Moderate colonic stool burden. Pelvis: Calcified fibroid uterus with calcified pedunculated aspects extending off the fundus and left superolateral margin. No bulky pelvic adenopathy or free fluid Peritoneum/Retroperitoneum: No bulky retroperitoneal adenopathy.  No suspicious peritoneal or mesenteric process Vasculature: Grossly normal caliber of abdominal aorta and vasculature Bones/Soft Tissues: No acute osseous or soft tissue findings. Small fluid density in the anterior ventral abdominal wall could be postsurgical 6 cm x 1 cm     Postsurgical changes proximal gastric region of gastric bypass without mechanical obstructive process or inflammatory findings of bowel clearly evident. Fibroid uterus with somewhat pedunculated areas of calcified areas. Multiple cystic appearing lesions in the spleen measuring up to 19 mm remaining are subcentimeter could represent cyst versus hemangioma somewhat indeterminate. Follow-up may be considered with MRI. EKG: TRACING REVIEWED    TREATMENT PLAN:    Collateral Information:  Will obtain collateral information from the family or friends. Will obtain medical records as appropriate from out patient providers  Will consult the hospitalist for a physical exam to rule out any co-morbid physical condition. Home medication Reconciled       Restart  Zoloft 50 mg daily  She is also supposed to be on Wellbutrin however she has not been taking it; consider restarting this or her low-dose Abilify for adjunct if MoCA is normal     *She does dialysis Tuesday Thursday and Saturday    Discussed with the patient risk, benefit, alternative and common side effects for the  proposed medication treatment. Patient is consenting to the treatment. Psychotherapy:   Encourage participation in milieu and group therapy  Individual therapy as needed              Behavioral Services  Medicare Certification Upon Admission    I certify that this patient's inpatient psychiatric hospital admission is medically necessary for:    [x] (1) Treatment which could reasonably be expected to improve this patient's condition,       [x] (2) Or for diagnostic study;     AND     [x](2) The inpatient psychiatric services are provided while the individual is under the care of a physician and are included in the individualized plan of care.     Estimated length of stay/service 3-7 days    Plan for post-hospital care  outpt tx, case management     Electronically signed by Bushra Girard MD on 6/19/2022 at 11:16 AM

## 2022-06-19 NOTE — CARE COORDINATION
Biopsychosocial Assessment Note    Social work met with patient to complete the biopsychosocial assessment and C-SSRS. Chief Complaint: \"I was talking to a counselor and I don't know what I said but I was taken out on a stretcher to the hospital. I told him I was having suicidal thoughts but would never do it. I am too afraid to botch it up and be worse off. I did stop taking my meds a few months ago\"    Mental Status Exam: Patient oriented x3. Patient presents as flat, depressed, and cooperative. Patient has fair eye contact and poor insight. Patient denies current SI/HI/AVH    Clinical Summary: Patient admitted due to decompensation evidenced by increased depression, failure to thrive, and passive SI. Patient is reported to have stopped taking her medical medications several months ago. Patient minimizes severity of action but is preoccupied with her dialysis appointment for Tuesday. Patient denies hx of previous admission, SI/SA or self injurious behaviors. Patient states she is living with her sister and nephew and plans to return. Patient states she had her first appointment with [facility]. Patient denies substance/ ETOH use. Patient denies trauma or abuse.      Risk Factors: non medication compliance, limited supports, isolating behaviors    Protective Factors: stable housing, connected to provider, family supports    Gender  [] Male [x] Female [] Transgender  [] Other    Sexual Orientation    [x] Heterosexual [] Homosexual [] Bisexual [] Other    Suicidal Ideation  [x] Past [] Present [] Denies     C-SSRS Screening Completed: Current Suicide Risk:  [x] No Risk  [] Low [] Moderate [] High    Homicidal Ideation  [] Past [] Present [x] Denies     Hallucinations/Delusions (Specify type)  [] Reports [x] Denies     Current or Past Mental Health Treatment:  [x] Yes, When and Where:  [address] [place] [address]  [] No    Substance Use/Alcohol Use/Addiction  [] Reports [x] Denies     Tobacco Use (within the last 6 months)  [] Reports [x] Denies     Trauma History  [] Reports [x] Denies     Self Injurious/Self Mutilation Behaviors:   [] Reports:    [] Past [] Present   [x] Denies    Legal History:  []  Yes (Specify)    [x] No    Collateral Contact (BARBARA signed)  Name:  Heather Rowland  Relationship: sister  Number: [phone]    Collateral Information:      Access to Weapons per Collateral Contact: [] Reports [] Denies     After consideration of C-SSRS screening results, C-SSRS assessments, and this professional's assessment the patient's overall suicide risk assessed to be:  [] None   [x] Low   [] Moderate   [] High     [] Discussed current suicide risk, protective and risk factors with RN and NP/Psychiatrist.    Discharge Plan:  [x] Home: with sister and nephew  [] Shelter:  [] Crisis Unit:  [] Substance Abuse Rehab:  [] Nursing Facility:  [] Other (Specify):     Follow up Provider: [facility]

## 2022-06-19 NOTE — PLAN OF CARE
Problem: Pain  Goal: Verbalizes/displays adequate comfort level or baseline comfort level  Outcome: Progressing     Problem: Self Harm/Suicidality  Goal: Will have no self-injury during hospital stay  Description: INTERVENTIONS:  1. Q 30 MINUTES: Routine safety checks  2. Q SHIFT & PRN: Assess risk to determine if routine checks are adequate to maintain patient safety  Outcome: Progressing     Problem: Depression  Goal: Will be euthymic at discharge  Description: INTERVENTIONS:  1. Administer medication as ordered  2. Provide emotional support via 1:1 interaction with staff  3. Encourage involvement in milieu/groups/activities  4. Monitor for social isolation  Outcome: Progressing     Problem: Behavior  Goal: Pt/Family maintain appropriate behavior and adhere to behavioral management agreement, if implemented  Description: INTERVENTIONS:  1. Assess patient/family's coping skills and  non-compliant behavior (including use of illegal substances)  2. Notify security of behavior or suspected illegal substances which indicate the need for search of the patient and/or belongings  3. Encourage verbalization of thoughts and concerns in a socially appropriate manner  4. Utilize positive, consistent limit setting strategies supporting safety of patient, staff and others  5. Encourage participation in the decision making process about the behavioral management agreement  6. Implement a Health Care Agreement if patient meets criteria  7. If a patient's behavior jeopardizes the safety of the patient, staff, or others refer to organization policy. If a visitor's behavior poses a threat to safety call refer to organization policy.   8. Initiate consult with , Psychosocial CNS, Spiritual Care as appropriate  Outcome: Progressing

## 2022-06-20 PROBLEM — R45.851 DEPRESSION WITH SUICIDAL IDEATION: Status: RESOLVED | Noted: 2022-06-19 | Resolved: 2022-06-20

## 2022-06-20 PROBLEM — F33.2 SEVERE EPISODE OF RECURRENT MAJOR DEPRESSIVE DISORDER, WITHOUT PSYCHOTIC FEATURES (HCC): Status: ACTIVE | Noted: 2022-06-20

## 2022-06-20 PROBLEM — F32.A DEPRESSION WITH SUICIDAL IDEATION: Status: RESOLVED | Noted: 2022-06-19 | Resolved: 2022-06-20

## 2022-06-20 LAB
HAV IGM SER IA-ACNC: NORMAL
HBV SURFACE AB TITR SER: REACTIVE {TITER}
HEPATITIS B CORE IGM ANTIBODY: NORMAL
HEPATITIS B SURFACE ANTIGEN INTERPRETATION: NORMAL
HEPATITIS C ANTIBODY INTERPRETATION: NORMAL
METER GLUCOSE: 142 MG/DL (ref 74–99)
METER GLUCOSE: 154 MG/DL (ref 74–99)
METER GLUCOSE: 176 MG/DL (ref 74–99)
METER GLUCOSE: 201 MG/DL (ref 74–99)
SARS-COV-2, NAAT: NOT DETECTED

## 2022-06-20 PROCEDURE — 6370000000 HC RX 637 (ALT 250 FOR IP): Performed by: PSYCHIATRY & NEUROLOGY

## 2022-06-20 PROCEDURE — 99231 SBSQ HOSP IP/OBS SF/LOW 25: CPT | Performed by: NURSE PRACTITIONER

## 2022-06-20 PROCEDURE — 6370000000 HC RX 637 (ALT 250 FOR IP): Performed by: FAMILY MEDICINE

## 2022-06-20 PROCEDURE — 82962 GLUCOSE BLOOD TEST: CPT

## 2022-06-20 PROCEDURE — 87635 SARS-COV-2 COVID-19 AMP PRB: CPT

## 2022-06-20 PROCEDURE — S5553 INSULIN LONG ACTING 5 U: HCPCS | Performed by: FAMILY MEDICINE

## 2022-06-20 PROCEDURE — 1240000000 HC EMOTIONAL WELLNESS R&B

## 2022-06-20 RX ORDER — INSULIN GLARGINE-YFGN 100 [IU]/ML
25 INJECTION, SOLUTION SUBCUTANEOUS NIGHTLY
Status: DISCONTINUED | OUTPATIENT
Start: 2022-06-20 | End: 2022-06-21

## 2022-06-20 RX ADMIN — HYDROXYZINE PAMOATE 50 MG: 25 CAPSULE ORAL at 20:34

## 2022-06-20 RX ADMIN — INSULIN GLARGINE-YFGN 25 UNITS: 100 INJECTION, SOLUTION SUBCUTANEOUS at 20:37

## 2022-06-20 RX ADMIN — SERTRALINE 50 MG: 50 TABLET, FILM COATED ORAL at 08:36

## 2022-06-20 RX ADMIN — LEVOTHYROXINE SODIUM 75 MCG: 0.03 TABLET ORAL at 08:36

## 2022-06-20 RX ADMIN — INSULIN LISPRO 1 UNITS: 100 INJECTION, SOLUTION INTRAVENOUS; SUBCUTANEOUS at 20:38

## 2022-06-20 RX ADMIN — INSULIN LISPRO 2 UNITS: 100 INJECTION, SOLUTION INTRAVENOUS; SUBCUTANEOUS at 12:49

## 2022-06-20 RX ADMIN — Medication 3 MG: at 20:34

## 2022-06-20 RX ADMIN — INSULIN LISPRO 2 UNITS: 100 INJECTION, SOLUTION INTRAVENOUS; SUBCUTANEOUS at 17:55

## 2022-06-20 RX ADMIN — INSULIN LISPRO 4 UNITS: 100 INJECTION, SOLUTION INTRAVENOUS; SUBCUTANEOUS at 08:34

## 2022-06-20 ASSESSMENT — PAIN DESCRIPTION - LOCATION: LOCATION: GENERALIZED

## 2022-06-20 ASSESSMENT — PAIN SCALES - GENERAL
PAINLEVEL_OUTOF10: 6
PAINLEVEL_OUTOF10: 0
PAINLEVEL_OUTOF10: 0

## 2022-06-20 ASSESSMENT — PAIN DESCRIPTION - PAIN TYPE: TYPE: CHRONIC PAIN

## 2022-06-20 NOTE — CARE COORDINATION
AKASH received a voicemail from Abbey Johnson, dialysis social worker [phone] who requested to be updated on discharge plan and current status of this pt. Avi Lee stated that she has been cancelling the scheduling for this pt to get to dialysis. SW returned phone call to Avi Lee to inform her that the discharge date is currently unknown at this time and she will be updated when a discharge date is known and pt will be returning back to dialysis. Avi Lee stated the the pt has been battling depression and her PCP was not prescribing medications that were helping. Pt's PCP prescribed the pt Trazodone Zoloft and Wellbutrin. Pt has been stressed and they have not been able to get into a day care, pt feels isolated at home and she is living with her sister and her adult son and they are all living off her income in a impoverished home. [facility] she expressed suicidal ideations and she was sent to [place] [address] and was then brought to this hospital. Avi Lee stated that the pt feels family would be homeless without her, sister is staying that she thinks the pt has early dementia she is not taking medication she is forgetting things and needs reminders to do things. Avi Lee stated that the pt last filled her  Phosphorous medication in July 2021, and she knows that this medication is expensive but it is important for the pt to take her medication. Pt has been coming to dialysis treatment and has not missed any treatment. Avi Lee stated that the pt has no previous psychiatric admissions and no previous mental health concerns. A lot of pt's depression stated when a close friend passed away, and sister almost passed away in Fall, was on vent then went to rehab. Avi Lee stated that the pt is typically happy, pleasant and cooperative.

## 2022-06-20 NOTE — PLAN OF CARE
Problem: Pain  Goal: Verbalizes/displays adequate comfort level or baseline comfort level  Outcome: Progressing     Problem: Safety - Adult  Goal: Free from fall injury  Outcome: Progressing     Problem: Self Harm/Suicidality  Goal: Will have no self-injury during hospital stay  Description: INTERVENTIONS:  1. Q 15 MINUTES: Routine safety checks  2. Q SHIFT & PRN: Assess risk to determine if routine checks are adequate to maintain patient safety  Outcome: Progressing

## 2022-06-20 NOTE — PROGRESS NOTES
Attended morning community meeting. Updated on staffing and daily expectations. Shared goal for the day as to learn to take my meds.

## 2022-06-20 NOTE — PLAN OF CARE
Problem: Pain  Goal: Verbalizes/displays adequate comfort level or baseline comfort level  Outcome: Progressing     Problem: Depression  Goal: Will be euthymic at discharge  Description: INTERVENTIONS:  1. Administer medication as ordered  2. Provide emotional support via 1:1 interaction with staff  3. Encourage involvement in milieu/groups/activities  4. Monitor for social isolation  6/20/2022 0041 by Duarte Gray RN  Outcome: Progressing  6/19/2022 1639 by Erika Coyle RN  Outcome: Progressing     Problem: Behavior  Goal: Pt/Family maintain appropriate behavior and adhere to behavioral management agreement, if implemented  Description: INTERVENTIONS:  1. Assess patient/family's coping skills and  non-compliant behavior (including use of illegal substances)  2. Notify security of behavior or suspected illegal substances which indicate the need for search of the patient and/or belongings  3. Encourage verbalization of thoughts and concerns in a socially appropriate manner  4. Utilize positive, consistent limit setting strategies supporting safety of patient, staff and others  5. Encourage participation in the decision making process about the behavioral management agreement  6. Implement a Health Care Agreement if patient meets criteria  7. If a patient's behavior jeopardizes the safety of the patient, staff, or others refer to organization policy. If a visitor's behavior poses a threat to safety call refer to organization policy.   8. Initiate consult with , Psychosocial CNS, Spiritual Care as appropriate  6/20/2022 0041 by Duarte Gray RN  Outcome: Progressing  6/19/2022 1639 by Erika Coyle RN  Outcome: Progressing

## 2022-06-20 NOTE — PROGRESS NOTES
No behaviors observed during night shift, no prn's administered at this time. No signs/symptoms of distress or discomfort noted. Patient in bed resting with eyes closed. Will continue to monitor and support.  Q 15 minute observation checks maintained Any Depression?: No Retinoid Dermatitis Normal Treatment: I recommended more frequent application of Cetaphil or CeraVe to the areas of dermatitis. Is Xerosis Present?: Yes - Normal Treatment Patient Weight (Optional But Required For Cumulative Dose-Numbers And Decimals Only): 130 Add Associated Diagnosis When Managing Medication Side Effects: Yes Myalgia Monitoring: I explained this is common when taking isotretinoin. If this worsens they will contact us. Counseling Text: I reviewed the side effect in detail. Patient should get monthly blood tests, not donate blood, not drive at night if vision affected, and not share medication. Depression Monitoring: Patient going to pcp tomorrow What Is The Patient's Gender: Female Months Of Therapy Completed: 2 Hypertriglyceridemia Treatment: I explained this is common when taking isotretinoin. If this worsens they will contact us. They may try OTC ibuprofen. Cheilitis Aggressive Treatment: I recommended application of Vaseline or Aquaphor numerous times a day (as often as every hour) and before going to bed. I also prescribed a topical steroid for twice daily use. Detail Level: Zone Cheilitis Normal Treatment: I recommended application of Vaseline or Aquaphor numerous times a day (as often as every hour) and before going to bed. Xerosis Aggressive Treatment: I recommended application of Cetaphil or CeraVe numerous times a day going to bed to all dry areas. I also prescribed a topical steroid for twice daily use. Ipledge Number (Optional): [id number] Hypertriglyceridemia Monitoring: I explained this is common when taking isotretinoin. We will monitor closely. Male Completion Statement: After discussing his treatment course we decided to discontinue isotretinoin therapy at this time. He shouldn't donate blood for one month after the last dose. He should call with any new symptoms of depression. Female Completion Statement: After discussing her treatment course we decided to discontinue isotretinoin therapy at this time. I explained that she would need to continue her birth control methods for at least one month after the last dosage. She should also get a pregnancy test one month after the last dose. She shouldn't donate blood for one month after the last dose. She should call with any new symptoms of depression. Female Pregnancy Counseling Text: Female patients should also be on two forms of birth control while taking this medication and for one month after their last dose. Retinoid Dermatitis Aggressive Treatment: I recommended more frequent application of Cetaphil or CeraVe to the areas of dermatitis. I also prescribed a topical steroid for twice daily use until the dermatitis resolves. Pounds Preamble Statement (Weight Entered In Details Tab): Reported Weight in pounds: Weight Units: pounds Headache Monitoring: I recommended monitoring the headaches for now. There is no evidence of increased intracranial pressure. They were instructed to call if the headaches are worsening. Nosebleeds Normal Treatment: I explained this is common when taking isotretinoin. I recommended saline mist in each nostril multiple times a day. If this worsens they will contact us. Dosing Month 2 (Required For Cumulative Dosing): 30mg BID Xerosis Aggressive Treatment: I recommended application of Cetaphil or CeraVe numerous times a day and before going to bed to all dry areas. I also prescribed a topical steroid for twice daily use. Dosing Month 1 (Required For Cumulative Dosing): 30mg Daily Kilograms Preamble Statement (Weight Entered In Details Tab): Reported Weight in kilograms: Xerosis Normal Treatment: I recommended application of Cetaphil or CeraVe numerous times a day and before going to bed to all dry areas. Xerosis Normal Treatment: I recommended application of Cetaphil or CeraVe numerous times a day going to bed to all dry areas.

## 2022-06-20 NOTE — PROGRESS NOTES
Patient A+Ox4, denies SI, HI, and internal stimulation. Patient rates depression 5/10 and depression 2/10. Patient is calm, pleasant, and observed resting in bed. Patient is cooperative with staff and compliant with the medication regimen. Demonstrates good eye contact when communicating. Patient states to fell calmer and states that she is sleeping better. No behaviors observed. Patient will continue to work toward discharge goals which includes medication compliance and group participation. Staff will continue to offer individual support and interventions per request and as required.

## 2022-06-20 NOTE — PROGRESS NOTES
Patient A&Ox4. Pt denies SI, HI, hallucinations, anxiety and depression. Patient is pleasant and cooperative with a blunted affect that brightens with conversation. Pt is intermittently visible on the unit, attends group and is social with . Pt is med. & meal compliant. Pt with even and unlabored breathing, no signs of acute physical distress noted. Will continue to monitor and offer support as needed.

## 2022-06-20 NOTE — CARE COORDINATION
AKASH spoke with pt's sister, Skui Chadwick. Suki Chadwick states that her only concern for this pt is her depression. However, Suki Chadwick states that this pt was not taking her medication, and feels she will be better after re-starting her meds. Pt is able to return home with her at discharge. Pt's friend will provide transportation. No access to weapons per Suki Chadwick. SW will continue to assist as needed.

## 2022-06-20 NOTE — GROUP NOTE
Group Therapy Note    Date: 6/20/2022    Group Start Time: 1100  Group End Time: 1140  Group Topic: Psychotherapy    [facility] AC[facility] 2    KRAIG Wilks, [facility]        Group Therapy Note    Attendees: 6         Patient's Goal:  To increase social interaction and improve relationships with others. Notes: Pt was attentive in group and was able to identify an agenda. Pt was also able to verbalize relating to others within the group. Status After Intervention:  Improved    Participation Level:  Active Listener and Interactive    Participation Quality: Appropriate, Attentive, Sharing and Supportive      Speech:  normal      Thought Process/Content: Logical  Linear      Affective Functioning: Congruent      Mood: anxious      Level of consciousness:  Alert, Oriented x4 and Attentive      Response to Learning: Able to verbalize current knowledge/experience, Able to verbalize/acknowledge new learning, Able to retain information and Capable of insight      Endings: None Reported    Modes of Intervention: Support, Socialization and Exploration      Discipline Responsible: /Counselor      Signature:  KRAIG Wilks, [facility]

## 2022-06-20 NOTE — PROGRESS NOTES
BEHAVIORAL HEALTH FOLLOW-UP NOTE     6/20/2022     Patient was seen and examined in person, Chart reviewed   Patient's case discussed with staff/team    Chief Complaint: \"I feel safe here\"     Interim History:     Patient is attending groups, has slightly blunt affect, brightens with conversation. She is denying any suicidal or homicidal ideations, intent or plan. Denies any suicidal or homicidal ideations intent or plan. She is tolerating the Zoloft well.  Patient receives dialysis Tuesday, Thursday and Saturday      Appetite:   [x] Normal/Unchanged  [] Increased  [] Decreased      Sleep:       [x] Normal/Unchanged  [] Fair       [] Poor              Energy:    [x] Normal/Unchanged  [] Increased  [] Decreased        SI [] Present  [x] Absent    HI  []Present  [x] Absent     Aggression:  [] yes  [x] no    Patient is [x] able  [] unable to CONTRACT FOR SAFETY     PAST MEDICAL/PSYCHIATRIC HISTORY:   Past Medical History:   Diagnosis Date    Anemia     Arrhythmia     AF    Arthritis     RA    Asthma     Chronic kidney disease     Depression     Hemodialysis patient ([facility]     T-Th-Sat    Hyperlipidemia     Hypothyroid     Sleep apnea     no CPAP    Type II or unspecified type diabetes mellitus without mention of complication, not stated as uncontrolled     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 ([facility] 6/15/2017       FAMILY/SOCIAL HISTORY:  Family History   Problem Relation Age of Onset    Emphysema Mother     No Known Problems Father      Social History     Socioeconomic History    Marital status:      Spouse name: Not on file    Number of children: Not on file    Years of education: Not on file    Highest education level: Not on file   Occupational History    Not on file   Tobacco Use    Smoking status: Never Smoker    Smokeless tobacco: Never Used   Vaping Use    Vaping Use: Never used   Substance and Sexual Activity    Alcohol use: Yes     Comment: on holidays    Drug use: No    Sexual activity: Not Currently   Other Topics Concern    Not on file   Social History Narrative    Not on file     Social Determinants of Health     Financial Resource Strain: Low Risk     Difficulty of Paying Living Expenses: Not very hard   Food Insecurity: No Food Insecurity    Worried About Running Out of Food in the Last Year: Never true    Felicity of Food in the Last Year: Never true   Transportation Needs:     Lack of Transportation (Medical): Not on file    Lack of Transportation (Non-Medical): Not on file   Physical Activity: Inactive    Days of Exercise per Week: 0 days    Minutes of Exercise per Session: 0 min   Stress:     Feeling of Stress : Not on file   Social Connections:     Frequency of Communication with Friends and Family: Not on file    Frequency of Social Gatherings with Friends and Family: Not on file    Attends Bahai Services: Not on file    Active Member of Clubs or Organizations: Not on file    Attends Club or Organization Meetings: Not on file    Marital Status: Not on file   Intimate Partner Violence:     Fear of Current or Ex-Partner: Not on file    Emotionally Abused: Not on file    Physically Abused: Not on file    Sexually Abused: Not on file   Housing Stability:     Unable to Pay for Housing in the Last Year: Not on file    Number of [place] in the Last Year: Not on file    Unstable Housing in the Last Year: Not on file           ROS:  [x] All negative/unchanged except if checked.  Explain positive(checked items) below:  [] Constitutional  [] Eyes  [] Ear/Nose/Mouth/Throat  [] Respiratory  [] CV  [] GI  []   [] Musculoskeletal  [] Skin/Breast  [] Neurological  [] Endocrine  [] Heme/Lymph  [] Allergic/Immunologic    Explanation:     MEDICATIONS:    Current Facility-Administered Medications:     acetaminophen (TYLENOL) tablet 650 mg, 650 mg, Oral, Q4H PRN, Gary Lee MD    melatonin tablet 3 mg, 3 mg, Oral, Nightly, Hilaria Escobar MD    aluminum & magnesium hydroxide-simethicone (MAALOX) 200-200-20 MG/5ML suspension 30 mL, 30 mL, Oral, PRN, Hilaria Escobar MD    hydrOXYzine pamoate (VISTARIL) capsule 50 mg, 50 mg, Oral, TID PRN, Hilaria Escobar MD    haloperidol (HALDOL) tablet 3 mg, 3 mg, Oral, Q6H PRN **OR** haloperidol lactate (HALDOL) injection 3 mg, 3 mg, IntraMUSCular, Q6H PRN, Hilaria Escobar MD    magnesium hydroxide (MILK OF MAGNESIA) 400 MG/5ML suspension 30 mL, 30 mL, Oral, Daily PRN, Hilaria Escobar MD    sertraline (ZOLOFT) tablet 50 mg, 50 mg, Oral, Daily, Deanna Motley MD, 50 mg at 06/20/22 0836    insulin lispro (HUMALOG) injection vial 0-12 Units, 0-12 Units, SubCUTAneous, TID WC, Amy Lozano MD, 4 Units at 06/20/22 0834    insulin lispro (HUMALOG) injection vial 0-6 Units, 0-6 Units, SubCUTAneous, Nightly, Amy Lozano MD, 2 Units at 06/19/22 2107    glucose chewable tablet 16 g, 4 tablet, Oral, PRN, Amy Lozano MD    dextrose bolus 10% 125 mL, 125 mL, IntraVENous, PRN **OR** dextrose bolus 10% 250 mL, 250 mL, IntraVENous, PRN, Amy Lozano MD    glucagon (rDNA) injection 1 mg, 1 mg, IntraMUSCular, PRN, Amy Lozano MD    dextrose 5 % solution, 100 mL/hr, IntraVENous, PRN, Amy Lozano MD    levothyroxine (SYNTHROID) tablet 75 mcg, 75 mcg, Oral, Daily, Amy Lozano MD, 75 mcg at 06/20/22 0836    insulin glargine-yfgn (SEMGLEE-YFGN) injection vial 20 Units, 20 Units, SubCUTAneous, Nightly, Amy Lozano MD, 20 Units at 06/19/22 2108      Examination:  /69   Pulse 64   Temp 97.4 °F (36.3 °C) (Temporal)   Resp 18   Ht 5' 6\" (1.676 m)   Wt 208 lb 1.6 oz (94.4 kg)   SpO2 95%   BMI 33.59 kg/m²   Gait - steady  Medication side effects(SE): none reported     Mental Status Examination:    Level of consciousness:  within normal limits   Appearance:  fair grooming and fair hygiene  Behavior/Motor:  no abnormalities noted  Attitude toward examiner:  cooperative  Speech:  normal rate and normal volume   Mood: depressed  Affect:  blunted  Thought processes:  linear and goal directed   Thought content:  Devoid of auditory or visual hallucinations, devoid of suicidal homicidal ideations intent or plan. Cognition:  oriented to person, place, and time   Concentration intact  Insight poor   Judgement poor     ASSESSMENT:   Patient symptoms are:  [] Well controlled  [] Improving  [] Worsening  [x] No change      Diagnosis:   Principal Problem:    Severe episode of recurrent major depressive disorder, without psychotic features ([facility]  Resolved Problems:    * No resolved hospital problems. *      LABS:    Recent Labs     06/17/22  1316   WBC 7.6   HGB 10.0*        Recent Labs     06/17/22  1316      K 4.1   CL 97*   CO2 29   BUN 43*   CREATININE 4.5*   GLUCOSE 184*     Recent Labs     06/17/22  1316   BILITOT 0.3   ALKPHOS 78   AST 13   ALT 7     Lab Results   Component Value Date    LABAMPH NOT DETECTED 06/17/2022    BARBSCNU NOT DETECTED 06/17/2022    LABBENZ NOT DETECTED 06/17/2022    LABMETH NOT DETECTED 06/17/2022    OPIATESCREENURINE NOT DETECTED 06/17/2022    PHENCYCLIDINESCREENURINE NOT DETECTED 06/17/2022    ETOH <10 06/17/2022     Lab Results   Component Value Date    TSH 9.530 06/19/2022     No results found for: LITHIUM  No results found for: VALPROATE, CBMZ        Treatment Plan:  Reviewed current Medications with the patient. Risks, benefits, side effects, drug-to-drug interactions and alternatives to treatment were discussed. Collateral information:   CD evaluation  Encourage patient to attend group and other milieu activities.   Discharge planning discussed with the patient and treatment team.    PSYCHOTHERAPY/COUNSELING:  [x] Therapeutic interview  [x] Supportive  [] CBT  [] Ongoing  [] Other    [x] Patient continues to need, on a daily basis, active treatment furnished directly by or requiring the supervision of inpatient psychiatric personnel      Anticipated Length of stay: 3 - 5 days based on stability             Electronically signed by KACIE Bertrand CNP on 6/20/2022 at 11:48 AM

## 2022-06-20 NOTE — PROGRESS NOTES
Hospitalist Progress Note      SYNOPSIS: Patient admitted on 2022 for Severe episode of recurrent major depressive disorder, without psychotic features ([facility]      SUBJECTIVE:    Patient seen and examined  Records reviewed. Ms. Sharri Estrada, a 78y.o. 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 She presented due to suicidal ideations with underlying depression. [address] psychiatry behavior units. When I did see as she was oriented to person place and time. She did mention she stopped taking her medications for the last 2 months or more and she cannot give a reason as to why. She did mention she used to be on insulin 30 units and also with sliding scale. Denies any nausea or belly pain chest pain. We are consulted for diabetic management and also hypothyroidism. Insulin has been resumed and also her Synthroid as patient had not been taking her medications. Nephrology is following for dialysis. Stable overnight. No other overnight issues reported. Temp (24hrs), Av.6 °F (36.4 °C), Min:97.4 °F (36.3 °C), Max:97.7 °F (36.5 °C)    DIET: ADULT DIET; Regular; 3 carb choices (45 gm/meal); Low Fat/Low Chol/High Fiber/FANY; Low Sodium (2 gm); Low Potassium (Less than 3000 mg/day); 1200 ml  CODE: Full Code  No intake or output data in the 24 hours ending 22 1257    OBJECTIVE:    /69   Pulse 64   Temp 97.4 °F (36.3 °C) (Temporal)   Resp 18   Ht 5' 6\" (1.676 m)   Wt 208 lb 1.6 oz (94.4 kg)   SpO2 95%   BMI 33.59 kg/m²     General appearance: No apparent distress, appears stated age and cooperative. HEENT:  Conjunctivae/corneas clear. Neck: Supple. No jugular venous distention. Respiratory: Clear to auscultation bilaterally, normal respiratory effort  Cardiovascular: Regular rate rhythm, normal S1-S2  Abdomen: Soft, nontender, nondistended  Musculoskeletal: No clubbing, cyanosis, no bilateral lower extremity edema. Brisk capillary refill. Skin:  No rashes  on visible skin  Neurologic: awake, alert and following commands         ASSESSMENT:  1. Insulin-dependent diabetes  2. Noncompliance  3. Hypothyroidism  4. ESRD  5. Depression with suicidal ideation    Plan  1. At this time continue current insulin regimen but adjust insulin to 25 units. Continue Synthroid 75 mcg TSH elevated and free T4 on the low side as patient stopped taking her medications. Would not adjust Synthroid dose. Repeat TSH in 6 to 8 weeks. Dialysis per nephrology. Rest of management per psychiatry. Patient's roommate in the hospital did test positive for COVID-19. Follow-up on COVID testing. DISPOSITION:     Medications:  REVIEWED DAILY    Infusion Medications    dextrose       Scheduled Medications    insulin glargine  25 Units SubCUTAneous Nightly    melatonin  3 mg Oral Nightly    sertraline  50 mg Oral Daily    insulin lispro  0-12 Units SubCUTAneous TID WC    insulin lispro  0-6 Units SubCUTAneous Nightly    levothyroxine  75 mcg Oral Daily     PRN Meds: acetaminophen, aluminum & magnesium hydroxide-simethicone, hydrOXYzine pamoate, haloperidol **OR** haloperidol lactate, magnesium hydroxide, glucose, dextrose bolus **OR** dextrose bolus, glucagon (rDNA), dextrose    Labs:     Recent Labs     06/17/22  1316   WBC 7.6   HGB 10.0*   HCT 31.2*          Recent Labs     06/17/22  1316      K 4.1   CL 97*   CO2 29   BUN 43*   CREATININE 4.5*   CALCIUM 9.0       Recent Labs     06/17/22  1316   PROT 6.4   ALKPHOS 78   ALT 7   AST 13   BILITOT 0.3       No results for input(s): INR in the last 72 hours.     No results for input(s): Radha Dixon in the last 72 hours. Chronic labs:    Lab Results   Component Value Date    CHOL 164 04/22/2022    TRIG 130 04/22/2022    HDL 43 04/22/2022    LDLCALC 95 04/22/2022    TSH 9.530 (H) 06/19/2022    INR 1.0 05/23/2018    LABA1C 8.0 (H) 06/19/2022       Radiology: REVIEWED DAILY    +++++++++++++++++++++++++++++++++++++++++++++++++  Marcel Jennings MD  [facility] Physician - [address] [place] [address]  +++++++++++++++++++++++++++++++++++++++++++++++++  NOTE: This report was transcribed using voice recognition software. Every effort was made to ensure accuracy; however, inadvertent computerized transcription errors may be present.

## 2022-06-21 LAB
METER GLUCOSE: 118 MG/DL (ref 74–99)
METER GLUCOSE: 122 MG/DL (ref 74–99)
METER GLUCOSE: 154 MG/DL (ref 74–99)
METER GLUCOSE: 64 MG/DL (ref 74–99)

## 2022-06-21 PROCEDURE — 90935 HEMODIALYSIS ONE EVALUATION: CPT | Performed by: INTERNAL MEDICINE

## 2022-06-21 PROCEDURE — S5553 INSULIN LONG ACTING 5 U: HCPCS | Performed by: NURSE PRACTITIONER

## 2022-06-21 PROCEDURE — 82962 GLUCOSE BLOOD TEST: CPT

## 2022-06-21 PROCEDURE — 6370000000 HC RX 637 (ALT 250 FOR IP): Performed by: NURSE PRACTITIONER

## 2022-06-21 PROCEDURE — 6370000000 HC RX 637 (ALT 250 FOR IP): Performed by: PSYCHIATRY & NEUROLOGY

## 2022-06-21 PROCEDURE — 6370000000 HC RX 637 (ALT 250 FOR IP): Performed by: FAMILY MEDICINE

## 2022-06-21 PROCEDURE — 5A1D70Z PERFORMANCE OF URINARY FILTRATION, INTERMITTENT, LESS THAN 6 HOURS PER DAY: ICD-10-PCS | Performed by: PSYCHIATRY & NEUROLOGY

## 2022-06-21 PROCEDURE — 1240000000 HC EMOTIONAL WELLNESS R&B

## 2022-06-21 RX ORDER — INSULIN GLARGINE-YFGN 100 [IU]/ML
22 INJECTION, SOLUTION SUBCUTANEOUS NIGHTLY
Status: DISCONTINUED | OUTPATIENT
Start: 2022-06-21 | End: 2022-06-22 | Stop reason: HOSPADM

## 2022-06-21 RX ORDER — LIDOCAINE AND PRILOCAINE 25; 25 MG/G; MG/G
CREAM TOPICAL PRN
Status: DISCONTINUED | OUTPATIENT
Start: 2022-06-21 | End: 2022-06-22 | Stop reason: HOSPADM

## 2022-06-21 RX ADMIN — INSULIN LISPRO 2 UNITS: 100 INJECTION, SOLUTION INTRAVENOUS; SUBCUTANEOUS at 18:02

## 2022-06-21 RX ADMIN — HYDROXYZINE PAMOATE 50 MG: 25 CAPSULE ORAL at 21:39

## 2022-06-21 RX ADMIN — Medication 3 MG: at 21:39

## 2022-06-21 RX ADMIN — INSULIN GLARGINE-YFGN 22 UNITS: 100 INJECTION, SOLUTION SUBCUTANEOUS at 21:40

## 2022-06-21 RX ADMIN — LEVOTHYROXINE SODIUM 75 MCG: 0.03 TABLET ORAL at 05:49

## 2022-06-21 RX ADMIN — SERTRALINE 50 MG: 50 TABLET, FILM COATED ORAL at 11:38

## 2022-06-21 RX ADMIN — Medication 16 G: at 11:34

## 2022-06-21 NOTE — PROGRESS NOTES
BEHAVIORAL HEALTH FOLLOW-UP NOTE     6/21/2022     Patient was seen and examined in person, Chart reviewed   Patient's case discussed with staff/team    Chief Complaint:     Interim History:     Patient is off the unit this morning at dialysis. Staff report she has been attending groups,  She is denying any suicidal or homicidal ideations, intent or plan. Denies any suicidal or homicidal ideations intent or plan. She is tolerating the Zoloft well.  Patient receives dialysis Tuesday, Thursday and Saturday      Appetite:   [x] Normal/Unchanged  [] Increased  [] Decreased      Sleep:       [x] Normal/Unchanged  [] Fair       [] Poor              Energy:    [x] Normal/Unchanged  [] Increased  [] Decreased        SI [] Present  [x] Absent    HI  []Present  [x] Absent     Aggression:  [] yes  [x] no    Patient is [x] able  [] unable to CONTRACT FOR SAFETY     PAST MEDICAL/PSYCHIATRIC HISTORY:   Past Medical History:   Diagnosis Date    Anemia     Arrhythmia     AF    Arthritis     RA    Asthma     Chronic kidney disease     Depression     Hemodialysis patient ([facility]     T-Th-Sat    Hyperlipidemia     Hypothyroid     Sleep apnea     no CPAP    Type II or unspecified type diabetes mellitus without mention of complication, not stated as uncontrolled     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 ([facility] 6/15/2017       FAMILY/SOCIAL HISTORY:  Family History   Problem Relation Age of Onset    Emphysema Mother     No Known Problems Father      Social History     Socioeconomic History    Marital status:      Spouse name: Not on file    Number of children: Not on file    Years of education: Not on file    Highest education level: Not on file   Occupational History    Not on file   Tobacco Use    Smoking status: Never Smoker    Smokeless tobacco: Never Used   Vaping Use    Vaping Use: Never used   Substance and Sexual Activity    Alcohol use: Yes     Comment: on holidays    Drug use: No    Sexual activity: Not Currently   Other Topics Concern    Not on file   Social History Narrative    Not on file     Social Determinants of Health     Financial Resource Strain: Low Risk     Difficulty of Paying Living Expenses: Not very hard   Food Insecurity: No Food Insecurity    Worried About Running Out of Food in the Last Year: Never true    Felicity of Food in the Last Year: Never true   Transportation Needs:     Lack of Transportation (Medical): Not on file    Lack of Transportation (Non-Medical): Not on file   Physical Activity: Inactive    Days of Exercise per Week: 0 days    Minutes of Exercise per Session: 0 min   Stress:     Feeling of Stress : Not on file   Social Connections:     Frequency of Communication with Friends and Family: Not on file    Frequency of Social Gatherings with Friends and Family: Not on file    Attends Spiritism Services: Not on file    Active Member of Clubs or Organizations: Not on file    Attends Club or Organization Meetings: Not on file    Marital Status: Not on file   Intimate Partner Violence:     Fear of Current or Ex-Partner: Not on file    Emotionally Abused: Not on file    Physically Abused: Not on file    Sexually Abused: Not on file   Housing Stability:     Unable to Pay for Housing in the Last Year: Not on file    Number of [place] in the Last Year: Not on file    Unstable Housing in the Last Year: Not on file           ROS:  [x] All negative/unchanged except if checked.  Explain positive(checked items) below:  [] Constitutional  [] Eyes  [] Ear/Nose/Mouth/Throat  [] Respiratory  [] CV  [] GI  []   [] Musculoskeletal  [] Skin/Breast  [] Neurological  [] Endocrine  [] Heme/Lymph  [] Allergic/Immunologic    Explanation:     MEDICATIONS:    Current Facility-Administered Medications:     insulin glargine-yfgn (SEMGLEE-YFGN) injection vial 25 Units, 25 Units, SubCUTAneous, Nightly, Dominguez Mireles MD, 25 Units at 06/20/22 2037    acetaminophen (TYLENOL) tablet 650 mg, 650 mg, Oral, Q4H PRN, Carla Ernandez MD    melatonin tablet 3 mg, 3 mg, Oral, Nightly, Carla Ernandez MD, 3 mg at 06/20/22 2034    aluminum & magnesium hydroxide-simethicone (MAALOX) 200-200-20 MG/5ML suspension 30 mL, 30 mL, Oral, PRN, Carla Ernandez MD    hydrOXYzine pamoate (VISTARIL) capsule 50 mg, 50 mg, Oral, TID PRN, Carla Ernandez MD, 50 mg at 06/20/22 2034    haloperidol (HALDOL) tablet 3 mg, 3 mg, Oral, Q6H PRN **OR** haloperidol lactate (HALDOL) injection 3 mg, 3 mg, IntraMUSCular, Q6H PRN, Carla Ernandez MD    magnesium hydroxide (MILK OF MAGNESIA) 400 MG/5ML suspension 30 mL, 30 mL, Oral, Daily PRN, Carla Ernandez MD    sertraline (ZOLOFT) tablet 50 mg, 50 mg, Oral, Daily, Shira Ye MD, 50 mg at 06/20/22 0836    insulin lispro (HUMALOG) injection vial 0-12 Units, 0-12 Units, SubCUTAneous, TID WC, Marcel Jennings MD, 2 Units at 06/20/22 1755    insulin lispro (HUMALOG) injection vial 0-6 Units, 0-6 Units, SubCUTAneous, Nightly, Marcel Jennings MD, 1 Units at 06/20/22 2038    glucose chewable tablet 16 g, 4 tablet, Oral, PRN, Marcel Jennings MD    dextrose bolus 10% 125 mL, 125 mL, IntraVENous, PRN **OR** dextrose bolus 10% 250 mL, 250 mL, IntraVENous, PRN, Marcel Jennings MD    glucagon (rDNA) injection 1 mg, 1 mg, IntraMUSCular, PRN, Marcel Jennings MD    dextrose 5 % solution, 100 mL/hr, IntraVENous, PRN, Marcel Jennings MD    levothyroxine (SYNTHROID) tablet 75 mcg, 75 mcg, Oral, Daily, Marcel Jennings MD, 75 mcg at 06/21/22 0549      Examination:  /70   Pulse 59   Temp (!) 96.2 °F (35.7 °C)   Resp 18   Ht 5' 6\" (1.676 m)   Wt 208 lb 1.6 oz (94.4 kg)   SpO2 96%   BMI 33.59 kg/m²   Gait - steady  Medication side effects(SE): none reported     Mental Status Examination:    Level of consciousness:  within normal limits   Appearance:  fair grooming and fair hygiene  Behavior/Motor:  no abnormalities noted  Attitude toward examiner:  cooperative  Speech:  normal rate and normal volume   Mood: depressed  Affect:  blunted  Thought processes:  linear and goal directed   Thought content:  Devoid of auditory or visual hallucinations, devoid of suicidal homicidal ideations intent or plan. Cognition:  oriented to person, place, and time   Concentration intact  Insight fair  Judgement fair    ASSESSMENT:   Patient symptoms are:  [x] Well controlled  [x] Improving  [] Worsening  [] No change      Diagnosis:   Principal Problem:    Severe episode of recurrent major depressive disorder, without psychotic features ([facility]  Resolved Problems:    * No resolved hospital problems. *      LABS:    No results for input(s): WBC, HGB, PLT in the last 72 hours. No results for input(s): NA, K, CL, CO2, BUN, CREATININE, GLUCOSE in the last 72 hours. No results for input(s): BILITOT, ALKPHOS, AST, ALT in the last 72 hours. Lab Results   Component Value Date    LABAMPH NOT DETECTED 06/17/2022    BARBSCNU NOT DETECTED 06/17/2022    LABBENZ NOT DETECTED 06/17/2022    LABMETH NOT DETECTED 06/17/2022    OPIATESCREENURINE NOT DETECTED 06/17/2022    PHENCYCLIDINESCREENURINE NOT DETECTED 06/17/2022    ETOH <10 06/17/2022     Lab Results   Component Value Date    TSH 9.530 06/19/2022     No results found for: LITHIUM  No results found for: VALPROATE, CBMZ        Treatment Plan:  Reviewed current Medications with the patient. Risks, benefits, side effects, drug-to-drug interactions and alternatives to treatment were discussed. Collateral information:   CD evaluation  Encourage patient to attend group and other milieu activities.   Discharge planning discussed with the patient and treatment team.    PSYCHOTHERAPY/COUNSELING:  [x] Therapeutic interview  [x] Supportive  [] CBT  [] Ongoing  [] Other    [x] Patient continues to need, on a daily basis, active treatment furnished directly by or requiring the supervision of inpatient psychiatric personnel      Anticipated Length of stay: 3 - 5 days based on stability             Electronically signed by KACIE Cardenas CNP on 6/21/2022 at 11:20 AM

## 2022-06-21 NOTE — PROGRESS NOTES
[facility]  Nephrology Progress Note    Patient's Name: Ivey Collet    History of Present Illness from 6/19 Consult Note:    Sathya Dumont is a 78 y.o. female with a history of ESRD HD dependent TTS [address] She initially presented to [facility] on 617 with complaints of depression and suicidal ideation. Patient states that she had stopped taking most of her medications. She denies headaches, no blurry vision, no double vision no chest pain, she was briefly admitted there. Subsequently transferred to the behavioral health unit here for further management. She was seen by our service( Dr Lizzie Prieto ) prior to transfer. \"    Subjective:    6/21: Patient was seen and examined on HD. She reports that she feels \"pretty good. \"  She denies any current chest pain or shortness of breath. No abdominal pain or nausea.     PMH:    Past Medical History:   Diagnosis Date    Anemia     Arrhythmia     AF    Arthritis     RA    Asthma     Chronic kidney disease     Depression     Hemodialysis patient ([address]     T-Th-Sat    Hyperlipidemia     Hypothyroid     Sleep apnea     no CPAP    Type II or unspecified type diabetes mellitus without mention of complication, not stated as uncontrolled     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 ([address] 6/15/2017       Patient Active Problem List   Diagnosis    Controlled type 2 diabetes mellitus with chronic kidney disease on chronic dialysis, with long-term current use of insulin ([address]    Mixed hyperlipidemia    Hypothyroidism    Essential hypertension    Sleep apnea    Osteoporosis    Macrocytic anemia with vitamin B12 deficiency    ESRD on dialysis ([address]    Rheumatoid arthritis ([address]    Restless leg syndrome, controlled    Depression    Shortness of breath    End stage renal disease (HCC)    Paroxysmal atrial fibrillation (HCC)    Hypotension    Chest pain    Lumbosacral spondylosis without myelopathy    Severe episode of recurrent major depressive disorder, without psychotic features ([facility])       Meds:     insulin glargine  25 Units SubCUTAneous Nightly    melatonin  3 mg Oral Nightly    sertraline  50 mg Oral Daily    insulin lispro  0-12 Units SubCUTAneous TID WC    insulin lispro  0-6 Units SubCUTAneous Nightly    levothyroxine  75 mcg Oral Daily        dextrose         Meds prn:     acetaminophen, aluminum & magnesium hydroxide-simethicone, hydrOXYzine pamoate, haloperidol **OR** haloperidol lactate, magnesium hydroxide, glucose, dextrose bolus **OR** dextrose bolus, glucagon (rDNA), dextrose    Meds prior to admission:     No current facility-administered medications on file prior to encounter.      Current Outpatient Medications on File Prior to Encounter   Medication Sig Dispense Refill    BD PEN NEEDLE DERREK U/F 32G X 4 MM MISC USE AS DIRECTED WITH INSULIN PEN DAILY 100 each 10    Cyanocobalamin (B-12) 1000 MCG TABS TAKE 1 TABLET BY MOUTH ONCE DAILY 30 tablet 10    pantoprazole (PROTONIX) 40 MG tablet       LANTUS SOLOSTAR 100 UNIT/ML injection pen INJECT 30 UNITS INTO THE SKIN NIGHTLY 15 mL 10    traZODone (DESYREL) 50 MG tablet TAKE 1 TABLET BY MOUTH NIGHTLY 30 tablet 10    rOPINIRole (REQUIP) 2 MG tablet TAKE 1 TABLET BY MOUTH EVERY NIGHT 30 tablet 10    atorvastatin (LIPITOR) 10 MG tablet TAKE 1 TABLET BY MOUTH EVERY NIGHT 30 tablet 10    calcium carbonate 1500 (600 Ca) MG TABS tablet TAKE 1 TABLET BY MOUTH TWICE DAILY 236 tablet 1    folic acid (FOLVITE) 1 MG tablet TAKE 1 TABLET BY MOUTH ONCE DAILY 90 tablet 1    montelukast (SINGULAIR) 10 MG tablet TAKE 1 TABLET BY MOUTH NIGHTLY 90 tablet 1    sertraline (ZOLOFT) 100 MG tablet TAKE 1 TABLET BY MOUTH EVERY NIGHT AT BEDTIME 90 tablet 1    insulin lispro, 1 Unit Dial, (HUMALOG KWIKPEN) 100 UNIT/ML SOPN Take 2 units for Blood sugar of 150, 4 units for 200, 6 units for 250, 8 units if 300. 5 pen 5    Accu-Chek FastClix Lancets MISC USE TO TEST BLOOD SUGAR FOUR TIMES DAILY BEFORE MEALS AND NIGHTLY 612 each 3    Alcohol Swabs (ALCOHOL PREP) 70 % PADS 1 each by Does not apply route 4 times daily (before meals and nightly) 200 each 3    calcium carbonate 600 MG TABS tablet Take 1 tablet by mouth 2 times daily 60 tablet 2    buPROPion (WELLBUTRIN XL) 150 MG extended release tablet TAKE 1 TABLET BY MOUTH EVERY DAY FOR 7 DAYS THEN 2 TABLETS BY MOUTH THEREAFTER      Ferric Citrate (AURYXIA) 1  MG(Fe) TABS Take 3 tablets by mouth      AURYXIA 1  MG(Fe) TABS TAKE 2 TABLETS BY MOUTH THREE TIMES DAILY WITH MEALS AND 1 TABLET WITH SNACKS   SWALLOW WHOLE, DO NOT CHEW OR CRUSH MEDICATION      SYNTHROID 75 MCG tablet Take 1 tablet by mouth Daily 90 tablet 0    famotidine (PEPCID) 40 MG tablet TAKE 1 TABLET BY MOUTH DAILY      albuterol sulfate  (90 Base) MCG/ACT inhaler Inhale 1 puff into the lungs every 4 hours as needed for Wheezing 1 Inhaler 3    fluticasone-salmeterol (ADVAIR DISKUS) 100-50 MCG/DOSE diskus inhaler Inhale 1 puff into the lungs 2 times daily Rinse mouth after use. 1 Inhaler 5    Elastic Bandages & Supports (WRIST SPLINT LEFT/RIGHT) MISC 1 each by Does not apply route daily as needed (numbness) 1 each 0    Misc. Devices (BARIATRIC ROLLATOR) MISC 1 Device by Does not apply route continuous prn (Walking) 1 each 0    Insulin Pen Needle (B-D ULTRAFINE III SHORT PEN) 31G X 8 MM MISC Inject 1 each into the skin daily 100 each 5    nitroGLYCERIN (NITROSTAT) 0.4 MG SL tablet Place 1 tablet under the tongue every 5 minutes as needed for Chest pain up to max of 3 total doses. If no relief, call 911.  (Patient not taking: Reported on 6/17/2022) 25 tablet 3    Methoxy PEG-Epoetin Beta (MIRCERA) 50 MCG/0.3ML SOSY Inject 50 mcg as directed every 14 days Last Dose: 4/4/2019  Next Dose: 6/13/2019      lidocaine-prilocaine (EMLA) 2.5-2.5 % cream Apply 1 each topically three times a week Tuesday, Thursday, Saturday      iron sucrose (VENOFER) 20 MG/ML injection Infuse 50 mg intravenously once a week Last Dose: 6/4/ 2020 at dialysis      B Complex-C-Folic Acid (TOD-RENETTA) TABS Take 1 tablet by mouth daily Rx      midodrine (PROAMATINE) 5 MG tablet Take 5 mg by mouth three times a week Tuesday, Thursday, Saturday      glucose blood VI test strips (ACCU-CHEK SMARTVIEW) strip 1 each by In Vitro route 4 times daily (before meals and nightly) As needed. 150 each 3    Blood Glucose Monitoring Suppl (ACCU-CHEK DERREK SMARTVIEW) W/DEVICE KIT 1 kit by Does not apply route 4 times daily (before meals and nightly) 1 kit 0       Allergies:    Pcn [penicillins], Sulfa antibiotics, and Bactrim [sulfamethoxazole-trimethoprim]    Social History:     reports that she has never smoked. She has never used smokeless tobacco. She reports current alcohol use. She reports that she does not use drugs. Family History:         Problem Relation Age of Onset    Emphysema Mother     No Known Problems Father        Physical Exam:      Patient Vitals for the past 24 hrs:   BP Temp Temp src Pulse Resp SpO2 Weight   06/21/22 0934 132/74 -- -- 63 -- -- --   06/21/22 0904 129/66 -- -- 52 -- -- --   06/21/22 0834 (!) 114/59 -- -- 53 -- -- --   06/21/22 0804 134/73 -- -- 58 -- -- --   06/21/22 0734 (!) 145/69 -- -- 54 -- -- --   06/21/22 0705 (!) 143/76 -- -- 56 -- -- --   06/21/22 0635 (!) 91/43 -- -- 60 -- -- --   06/21/22 0630 (!) 114/57 97.5 °F (36.4 °C) -- 60 -- -- --   06/20/22 2015 (!) 148/73 97.8 °F (36.6 °C) Temporal 64 16 96 % --   06/20/22 0957 -- -- -- -- -- -- 208 lb 1.6 oz (94.4 kg)       No intake or output data in the 24 hours ending 06/21/22 0963    General: Awake, alert, no acute distress  Neck: No JVD noted  Lungs: Clear bilaterally upper, diminished to the bases bilaterally. Unlabored  CV: Regular rate and rhythm. No rub  Abd: Soft, nontender, nondistended. Active bowel sounds  Skin: Warm and dry.   No rash on exposed extremities  Ext: No edema ; left AV fistula  Neuro: Awake, answers questions appropriately    Data:    No results for input(s): WBC, HGB, HCT, MCV, PLT in the last 72 hours. No results for input(s): NA, K, CL, CO2, GLU, CREATININE, BUN, LABGLOM, GLUCOSE, CALCIUM, PHOS, MG in the last 72 hours. Vit D, 25-Hydroxy   Date Value Ref Range Status   08/22/2017 49 30 - 100 ng/mL Final     Comment:     <20 ng/mL. ........... Angela Adolfo Deficient  20-30 ng/mL. ......... Angela Adolfo Insufficient   ng/mL. ........ Angela Adolfo Sufficient  >100 ng/mL. .......... Angela Adolfo Toxic         PTH   Date Value Ref Range Status   08/22/2017 74 (H) 15 - 65 pg/mL Final       No results for input(s): ALT, AST, ALKPHOS, BILITOT, BILIDIR in the last 72 hours. No results for input(s): LABALBU in the last 72 hours. Ferritin   Date Value Ref Range Status   02/15/2021 999 ng/mL Final     Comment:     FERRITIN Reference Ranges:  Adult Males   20 - 60 years:    30 - 400 ng/mL  Adult females 16 - 61 years:    15 - 150 ng/mL  Adults greater than 60 years:   no established reference range  Pediatrics:                     no established reference range       Iron   Date Value Ref Range Status   02/15/2021 30 (L) 37 - 145 mcg/dL Final     TIBC   Date Value Ref Range Status   02/15/2021 140 (L) 250 - 450 mcg/dL Final       Vitamin B-12   Date Value Ref Range Status   12/25/2020 1151 (H) 211 - 946 pg/mL Final       Folate   Date Value Ref Range Status   12/25/2020 3.8 (L) 4.8 - 24.2 ng/mL Final       Lab Results   Component Value Date    COLORU Yellow 12/17/2018    NITRU Negative 12/17/2018    GLUCOSEU Negative 12/17/2018    KETUA Negative 12/17/2018    UROBILINOGEN 0.2 12/17/2018    BILIRUBINUR Negative 12/17/2018    BILIRUBINUR moderate 04/13/2018       No results found for: TONY, CREURRAN, MACREATRATIO, OSMOU    No components found for: URIC    No results found for: LIPIDPAN    Assessment and Plans:    1. ESRD on HD  OP [address] TTS first shift  Via left AV fistula  Continue HD TTS    2.   Hypertension  BP goal<140/90  BP at goal  Monitor with HD    3.   Anemia  Hemoglobin target 10-12  Hemoglobin 10 on 6/17-at target  No JESSE as hemoglobin at target  Monitor H&H    4.  Depression  With suicidal ideation  Currently in behavioral health unit  Management per psychiatry    KACIE Palomino - CNP   Pt seen and examined on hd  Tolerating hd  No complaints today  Continue therapy for depression  Rosa Mccartney MD

## 2022-06-21 NOTE — FLOWSHEET NOTE
06/21/22 1035   Vital Signs   /70   Temp (!) 96.2 °F (35.7 °C)   Heart Rate 59   Resp 18   Post-Hemodialysis Assessment   Post-Treatment Procedures Blood returned; Access bleeding time < 10 minutes   Machine Disinfection Process Exterior Machine Disinfection   Rinseback Volume (ml) 300 ml   Total Liters Processed (l/min) 90.3 l/min   Dialyzer Clearance Lightly streaked   Duration of Treatment (minutes) 240 minutes   Heparin amount administered during treatment (units) 0 units   Hemodialysis Intake (ml) 300 ml   Hemodialysis Output (ml) 1500 ml   NET Removed (ml) 1200   Tolerated Treatment Good   Patient Response to Treatment tolerated well, profile B, refill noted, 1200cc fluid removal   Bilateral Breath Sounds Clear   Edema None   Physician Notified No   Patient Disposition Return to room

## 2022-06-21 NOTE — PLAN OF CARE
Problem: Chronic Conditions and Co-morbidities  Goal: Patient's chronic conditions and co-morbidity symptoms are monitored and maintained or improved  Outcome: Progressing     Problem: Pain  Goal: Verbalizes/displays adequate comfort level or baseline comfort level  6/20/2022 2228 by Deuce Dunham RN  Outcome: Progressing  6/20/2022 1720 by Deuce Dunham RN  Outcome: Progressing     Problem: Safety - Adult  Goal: Free from fall injury  6/20/2022 2228 by Deuce Dunham RN  Outcome: Progressing  6/20/2022 1720 by Deuce Dunham RN  Outcome: Progressing     Problem: Self Harm/Suicidality  Goal: Will have no self-injury during hospital stay  Description: INTERVENTIONS:  1. Q 15 MINUTES: Routine safety checks  2. Q SHIFT & PRN: Assess risk to determine if routine checks are adequate to maintain patient safety  Outcome: Progressing

## 2022-06-21 NOTE — PROGRESS NOTES
Hospitalist Progress Note      Chart reviewed. Our service was consulted for management of diabetes. She is insulin-dependent and reports that she is on 30 units of Lantus nightly with a sliding scale with meals, however, she has been noncompliant with this outpatient. She is currently on Lantus 25 units nightly with medium dose sliding scale. Glycemic control appropriate. We will continue to follow peripherally. Non-billable rounding. Thanks for allowing us to participate in the care of Jeanamrie Hagan. We will continue to follow along peripherally.  Please do not hesitate to contact us if needed.  +++++++++++++++++++++++++++++++++++++++++++++++++  Barry Silverman [address] [place] [address]

## 2022-06-21 NOTE — PROGRESS NOTES
Message sent via perfect serve to Dr. Kalia Patel regarding patient request to have EMLA cream or some type of lidocaine cream ordered for dialysis tomorrow. Dr. Kalia Patel stated that he would order it if available.

## 2022-06-21 NOTE — CARE COORDINATION
AKASH attempted to meet with this pt for a daily check in. Pt is at dialysis at this time. AKASH will try again at a later time.

## 2022-06-21 NOTE — PROGRESS NOTES
Patient has been out on the unit, pleasant, calm, and cooperative. Patient denies all and denies anxiety/depression. Patient's only concern is that she receive her EMLA cream or similar tomorrow for dialysis so the \"needle doesn't hurt so bad. \" Patient appears flat but brightens with conversation. Patient is encouraged to continue to work towards discharge goal by complying with medications, attending groups and to seek staff if feelings are overwhelming. Environmental rounds completed per unit policy to maintain safety of everyone on the unit. Staff will offer support and interventions as requested or required.

## 2022-06-21 NOTE — PROGRESS NOTES
[facility]  Day 3 Interdisciplinary Treatment Plan NOTE    Review Date & Time: 06/21/2022 0900    Patient was in treatment team    Estimated Length of Stay Update:  3-5  Estimated Discharge Date Update: 06/22/2022    EDUCATION:   Learner Progress Toward Treatment Goals: Reviewed results and recommendations of this team    Method: Small group    Outcome: Verbalized understanding    PATIENT GOALS: None at this time. PLAN/TREATMENT RECOMMENDATIONS UPDATE: Encourage patient to attend and participate in groups. Take medications as prescribed. GOALS UPDATE:   Time frame for Short-Term Goals: Prior to discharge.       Saulo Lopez RN

## 2022-06-21 NOTE — PLAN OF CARE
Problem: Chronic Conditions and Co-morbidities  Goal: Patient's chronic conditions and co-morbidity symptoms are monitored and maintained or improved  Outcome: Progressing     Problem: Pain  Goal: Verbalizes/displays adequate comfort level or baseline comfort level  Outcome: Progressing     Problem: Safety - Adult  Goal: Free from fall injury  Outcome: Progressing     Problem: Self Harm/Suicidality  Goal: Will have no self-injury during hospital stay  Description: INTERVENTIONS:  1. Q 30 MINUTES: Routine safety checks  2. Q SHIFT & PRN: Assess risk to determine if routine checks are adequate to maintain patient safety  Outcome: Progressing     Problem: Depression  Goal: Will be euthymic at discharge  Description: INTERVENTIONS:  1. Administer medication as ordered  2. Provide emotional support via 1:1 interaction with staff  3. Encourage involvement in milieu/groups/activities  4. Monitor for social isolation  Outcome: Progressing     Problem: Behavior  Goal: Pt/Family maintain appropriate behavior and adhere to behavioral management agreement, if implemented  Description: INTERVENTIONS:  1. Assess patient/family's coping skills and  non-compliant behavior (including use of illegal substances)  2. Notify security of behavior or suspected illegal substances which indicate the need for search of the patient and/or belongings  3. Encourage verbalization of thoughts and concerns in a socially appropriate manner  4. Utilize positive, consistent limit setting strategies supporting safety of patient, staff and others  5. Encourage participation in the decision making process about the behavioral management agreement  6. Implement a Health Care Agreement if patient meets criteria  7. If a patient's behavior jeopardizes the safety of the patient, staff, or others refer to organization policy. If a visitor's behavior poses a threat to safety call refer to organization policy.   8. Initiate consult with , Psychosocial CNS, Spiritual Care as appropriate  Outcome: Progressing    Patient denies suicidal ideations, homicidal ideations, and hallucinations. Patient is medication compliant and in control of her behavior.

## 2022-06-22 VITALS
SYSTOLIC BLOOD PRESSURE: 107 MMHG | OXYGEN SATURATION: 98 % | HEIGHT: 66 IN | RESPIRATION RATE: 16 BRPM | WEIGHT: 208.1 LBS | DIASTOLIC BLOOD PRESSURE: 52 MMHG | HEART RATE: 66 BPM | BODY MASS INDEX: 33.44 KG/M2 | TEMPERATURE: 97.2 F

## 2022-06-22 LAB
METER GLUCOSE: 119 MG/DL (ref 74–99)
METER GLUCOSE: 136 MG/DL (ref 74–99)
METER GLUCOSE: 97 MG/DL (ref 74–99)

## 2022-06-22 PROCEDURE — 99239 HOSP IP/OBS DSCHRG MGMT >30: CPT | Performed by: NURSE PRACTITIONER

## 2022-06-22 PROCEDURE — 6370000000 HC RX 637 (ALT 250 FOR IP): Performed by: INTERNAL MEDICINE

## 2022-06-22 PROCEDURE — 6370000000 HC RX 637 (ALT 250 FOR IP): Performed by: FAMILY MEDICINE

## 2022-06-22 PROCEDURE — 82962 GLUCOSE BLOOD TEST: CPT

## 2022-06-22 PROCEDURE — 6370000000 HC RX 637 (ALT 250 FOR IP): Performed by: PSYCHIATRY & NEUROLOGY

## 2022-06-22 RX ORDER — HYDROXYZINE PAMOATE 50 MG/1
50 CAPSULE ORAL 3 TIMES DAILY PRN
Qty: 21 CAPSULE | Refills: 0 | Status: SHIPPED | OUTPATIENT
Start: 2022-06-22 | End: 2022-06-29

## 2022-06-22 RX ORDER — LEVOTHYROXINE SODIUM 0.07 MG/1
75 TABLET ORAL DAILY
Qty: 30 TABLET | Refills: 0 | Status: SHIPPED | OUTPATIENT
Start: 2022-06-23

## 2022-06-22 RX ORDER — CALCIUM CARBONATE 200(500)MG
500 TABLET,CHEWABLE ORAL 2 TIMES DAILY
Status: DISCONTINUED | OUTPATIENT
Start: 2022-06-22 | End: 2022-06-22 | Stop reason: HOSPADM

## 2022-06-22 RX ORDER — CHOLECALCIFEROL (VITAMIN D3) 10 MCG
1 TABLET ORAL DAILY
Status: DISCONTINUED | OUTPATIENT
Start: 2022-06-22 | End: 2022-06-22 | Stop reason: HOSPADM

## 2022-06-22 RX ORDER — INSULIN GLARGINE 100 [IU]/ML
22 INJECTION, SOLUTION SUBCUTANEOUS NIGHTLY
Qty: 15 ML | Refills: 0 | Status: SHIPPED | OUTPATIENT
Start: 2022-06-22 | End: 2022-10-21

## 2022-06-22 RX ORDER — ATORVASTATIN CALCIUM 10 MG/1
10 TABLET, FILM COATED ORAL NIGHTLY
Status: DISCONTINUED | OUTPATIENT
Start: 2022-06-22 | End: 2022-06-22 | Stop reason: HOSPADM

## 2022-06-22 RX ORDER — MONTELUKAST SODIUM 10 MG/1
10 TABLET ORAL NIGHTLY
Status: DISCONTINUED | OUTPATIENT
Start: 2022-06-22 | End: 2022-06-22 | Stop reason: HOSPADM

## 2022-06-22 RX ORDER — LEVOTHYROXINE SODIUM 0.05 MG/1
100 TABLET ORAL DAILY
Status: DISCONTINUED | OUTPATIENT
Start: 2022-06-23 | End: 2022-06-22 | Stop reason: HOSPADM

## 2022-06-22 RX ADMIN — CALCIUM CARBONATE 500 MG: 500 TABLET, CHEWABLE ORAL at 13:20

## 2022-06-22 RX ADMIN — SERTRALINE 50 MG: 50 TABLET, FILM COATED ORAL at 08:50

## 2022-06-22 RX ADMIN — LEVOTHYROXINE SODIUM 75 MCG: 0.03 TABLET ORAL at 06:25

## 2022-06-22 RX ADMIN — ACETAMINOPHEN 650 MG: 325 TABLET, FILM COATED ORAL at 10:00

## 2022-06-22 RX ADMIN — NEPHROCAP 1 MG: 1 CAP ORAL at 13:20

## 2022-06-22 ASSESSMENT — PAIN SCALES - GENERAL: PAINLEVEL_OUTOF10: 8

## 2022-06-22 ASSESSMENT — PAIN DESCRIPTION - DESCRIPTORS: DESCRIPTORS: DULL;ACHING

## 2022-06-22 ASSESSMENT — PAIN DESCRIPTION - LOCATION: LOCATION: HEAD

## 2022-06-22 NOTE — PROGRESS NOTES
[facility]  Discharge Note    Pt discharged with followings belongings:   Dental Appliances: Uppers,Lowers  Vision - Corrective Lenses: Eyeglasses  Hearing Aid: None  Jewelry: Earrings  Body Piercings Removed: Yes  Clothing: Dress,Footwear,Undergarments  Other Valuables: [address] sent home with yes or returned to patient. Patient education on aftercare instructions: Yes  Information reviewed with and copy provided to  Patient at 2:12 PM .Patient verbalize understanding of AVS:  Yes. Status EXAM upon discharge:  Mental Status and Behavioral Exam  Normal: Yes  Level of Assistance: Independent/Self  Facial Expression: Brightened  Affect: Appropriate  Level of Consciousness: Alert  Frequency of Checks: 4 times per hour, close  Mood:Normal: Yes  Mood: Anhedonia  Motor Activity:Normal: Yes  Motor Activity: Decreased  Eye Contact: Good  Observed Behavior: Cooperative,Friendly  Sexual Misconduct History: Current - no  Preception: [place] to person,[place] to time,[place] to place,[place] to situation  Attention:Normal: No  Attention: Distractible  Thought Processes: Circumstantial  Thought Content:Normal: Yes (organized and relevant)  Thought Content: Preoccupations  Depression Symptoms: No problems reported or observed. Anxiety Symptoms: Generalized  [place] Symptoms: No problems reported or observed.   Hallucinations: None  Delusions: No  Memory:Normal: No  Memory: Poor recent  Insight and Judgment: Yes  Insight and Judgment: Poor judgment,Poor insight (improved)      Metabolic Screening:    Lab Results   Component Value Date    LABA1C 8.0 (H) 06/19/2022       Lab Results   Component Value Date    CHOL 164 04/22/2022    CHOL 177 07/26/2021    CHOL 123 12/22/2020    CHOL 175 11/09/2020    CHOL 146 03/23/2018    CHOL 197 08/22/2017    CHOL 170 05/24/2016    CHOL 152 12/23/2014    CHOL 149 06/17/2014    CHOL 152 05/20/2014    CHOL 147 05/20/2014    CHOL 147 02/28/2014 CHOL 139 01/30/2014    CHOL 208 (H) 01/02/2014    CHOL 89 07/24/2013    CHOL 122 01/09/2013    CHOL 121 08/13/2012    CHOL 104 07/17/2012    CHOL 98 06/01/2012    CHOL 172 05/18/2012     Lab Results   Component Value Date    TRIG 130 04/22/2022    TRIG 143 07/26/2021    TRIG 119 12/22/2020    TRIG 229 (H) 11/09/2020    TRIG 109 03/23/2018    TRIG 164 (H) 08/22/2017    TRIG 137 05/24/2016    TRIG 117 12/23/2014    TRIG 168 (H) 06/17/2014    TRIG 146 05/20/2014    TRIG 144 05/20/2014    TRIG 105 02/28/2014    TRIG 120 01/30/2014    TRIG 102 01/02/2014    TRIG 73 07/24/2013    TRIG 149 01/09/2013    TRIG 149 08/13/2012    TRIG 116 07/17/2012    TRIG 133 06/01/2012    TRIG 139 05/18/2012     Lab Results   Component Value Date    HDL 43 04/22/2022    HDL 51 07/26/2021    HDL 40 12/22/2020    HDL 40 11/09/2020    HDL 60 09/25/2019    HDL 53 03/23/2018    HDL 39 08/22/2017    HDL 51 05/24/2016    HDL 52 12/23/2014    HDL 41 06/17/2014    HDL 40 05/20/2014    HDL 39 05/20/2014    HDL 44 02/28/2014    HDL 43 01/30/2014    HDL 48 01/02/2014    HDL 35.0 (A) 07/24/2013    HDL 34.0 (A) 01/09/2013    HDL 35.0 (A) 08/13/2012    HDL 35.0 (A) 07/17/2012    HDL 35.0 (A) 06/01/2012    HDL 41.0 05/18/2012     No components found for: LDLCAL  Lab Results   Component Value Date    LABVLDL 26 04/22/2022    LABVLDL 29 07/26/2021    LABVLDL 24 12/22/2020    LABVLDL 46 11/09/2020    LABVLDL 25 09/25/2019    LABVLDL 22 03/23/2018    LABVLDL 33 08/22/2017    LABVLDL 27 05/24/2016    LABVLDL 23 12/23/2014    LABVLDL 34 06/17/2014    LABVLDL 29 05/20/2014    LABVLDL 29 05/20/2014       Cadence Meier RN

## 2022-06-22 NOTE — PLAN OF CARE
Problem: Chronic Conditions and Co-morbidities  Goal: Patient's chronic conditions and co-morbidity symptoms are monitored and maintained or improved  6/22/2022 1019 by Malathi Barragan RN  Outcome: Adequate for Discharge     Problem: Pain  Goal: Verbalizes/displays adequate comfort level or baseline comfort level  6/22/2022 1019 by Malathi Barragan RN  Outcome: Adequate for Discharge     Problem: Safety - Adult  Goal: Free from fall injury  6/22/2022 1019 by Malathi Barragan RN  Outcome: Adequate for Discharge     Problem: Self Harm/Suicidality  Goal: Will have no self-injury during hospital stay  Description: INTERVENTIONS:  1. Q 30 MINUTES: Routine safety checks  2. Q SHIFT & PRN: Assess risk to determine if routine checks are adequate to maintain patient safety  6/22/2022 1019 by Malathi Barragan RN  Outcome: Adequate for Discharge     Problem: Depression  Goal: Will be euthymic at discharge  Description: INTERVENTIONS:  1. Administer medication as ordered  2. Provide emotional support via 1:1 interaction with staff  3. Encourage involvement in milieu/groups/activities  4. Monitor for social isolation  Outcome: Adequate for Discharge     Problem: Behavior  Goal: Pt/Family maintain appropriate behavior and adhere to behavioral management agreement, if implemented  Description: INTERVENTIONS:  1. Assess patient/family's coping skills and  non-compliant behavior (including use of illegal substances)  2. Notify security of behavior or suspected illegal substances which indicate the need for search of the patient and/or belongings  3. Encourage verbalization of thoughts and concerns in a socially appropriate manner  4. Utilize positive, consistent limit setting strategies supporting safety of patient, staff and others  5. Encourage participation in the decision making process about the behavioral management agreement  6. Implement a Health Care Agreement if patient meets criteria  7.  If a patient's behavior jeopardizes the safety of the patient, staff, or others refer to organization policy. If a visitor's behavior poses a threat to safety call refer to organization policy.   8. Initiate consult with , Psychosocial CNS, Spiritual Care as appropriate  Outcome: Adequate for Discharge     Problem: ABCDS Injury Assessment  Goal: Absence of physical injury  Recent Flowsheet Documentation  Taken 6/22/2022 1018 by Jamil Kovacs RN  Absence of Physical Injury: Implement safety measures based on patient assessment

## 2022-06-22 NOTE — GROUP NOTE
Group Therapy Note    Date: 6/22/2022    Group Start Time: 0358  Group End Time: 8737  Group Topic: Psychoeducation    [facility]    KRAIG Hines, CARMEN        Group Therapy Note    Attendees: 7         Patient's Goal:  Pt will be able to understand what forgiveness is vs what it is not. Notes:  Pt made connections and participated in group. Status After Intervention:  Improved    Participation Level:  Active Listener and Interactive    Participation Quality: Appropriate, Attentive, Sharing and Supportive      Speech:  normal      Thought Process/Content: Logical  Linear      Affective Functioning: Congruent      Mood: depressed      Level of consciousness:  Alert, Oriented x4 and Attentive      Response to Learning: Able to verbalize current knowledge/experience      Endings: None Reported    Modes of Intervention: Education, Support, Socialization and Exploration      Discipline Responsible: /Counselor      Signature:  KRAIG Hines LSW

## 2022-06-22 NOTE — CARE COORDINATION
AKASH called [facility] and spoke with  Chel. Chel states that they will put this pt back on their list and will pick her up for her next dialysis treatment. AKASH verbalized understanding. AKASH called [facility] and notified them that this pt will be discharged today. Pt will continue treatment as usual tomorrow.

## 2022-06-22 NOTE — CARE COORDINATION
AKASH met with this pt to discuss discharge. Pt was observed walking from the dinning room into her room. Pt states that she is ready to be discharged today. Pt is bright and cooperative with this . Eye-contact is good. Pt appears to be linear and logical. Pt is currently denying SI/ HI/ hallucinations/ delusions. Pt states that she will return home with her sister. Pt's friend will provide transportation. Pt will continue to follow up with [facility] at discharge.     In order to ensure appropriate transition and discharge planning is in place, the following documents have been transmitted to [facility], as the new outpatient provider:     The d/c diagnosis was transmitted to the next care provider   The reason for hospitalization was transmitted to the next care provider   The d/c medications (dosage and indication) were transmitted to the next care provider    The continuing care plan was transmitted to the next care provider

## 2022-06-22 NOTE — CARE COORDINATION
SW spoke with pt's APS , Cecilia Ohara. Cecilia Ohara given an update on pt's case. Cecilia Ohara will continue to follow this pt in the community. Cecilia Ohara is aware this pt is being discharged today. No concerns voiced.

## 2022-06-22 NOTE — BH NOTE
Pt denies SI HI and hallucinations. Pt is pleasant, bright with conversation, appropriate with staff and peers. No aggression or behaviors. Denies depression and anxiety \"right now\". Pt is medication compliant. Pt is eating provided meals. Attending and participating in groups. Expresses needs appropriately. Out on the unit social with select. We will continue to provide support and comfort for the patient.

## 2022-06-22 NOTE — DISCHARGE SUMMARY
DISCHARGE SUMMARY      Patient ID:  Sharri Estrada  [id number]  78 y.o.  1945    Admit date: 6/19/2022    Discharge date and time: 6/22/2022    Admitting Physician: Tacos Hall MD     Discharge Physician: Dr Risa Rico MD    Discharge Diagnoses:   Patient Active Problem List   Diagnosis    Controlled type 2 diabetes mellitus with chronic kidney disease on chronic dialysis, with long-term current use of insulin ([facility]    Mixed hyperlipidemia    Hypothyroidism    Essential hypertension    Sleep apnea    Osteoporosis    Macrocytic anemia with vitamin B12 deficiency    [facility]    Rheumatoid arthritis ([facility]    Restless leg syndrome, controlled    Depression    Shortness of breath    End stage renal disease (HCC)    Paroxysmal atrial fibrillation (HCC)    Hypotension    Chest pain    Lumbosacral spondylosis without myelopathy    Severe episode of recurrent major depressive disorder, without psychotic features ([facility]       Admission Condition: poor    Discharged Condition: stable    Admission Circumstance:      The patient is a 78 y.o. female with significant past history of diabetes mellitus, hypothyroidism chronic kidney disease on dialysis (Tues, thurs, Saturday), depressive disorder presented to the hospital complaining of suicidal ideations. Reported that her depression has been getting worse for several months in which she wants to die. Went to the [facility] where she follows up and told them that she was having suicidal ideations that brought her to the ER to get evaluated. Reports that she stopped taking all of her medications including her Zoloft and Wellbutrin and all her medical medications as well.         PAST MEDICAL/PSYCHIATRIC HISTORY:   Past Medical History:   Diagnosis Date    Anemia     Arrhythmia     AF    Arthritis     RA    Asthma     Chronic kidney disease     Depression     Hemodialysis patient ([facility]     T-Th-Sat    Hyperlipidemia     Hypothyroid  Sleep apnea     no CPAP    Type II or unspecified type diabetes mellitus without mention of complication, not stated as uncontrolled     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 6/15/2017       FAMILY/SOCIAL HISTORY:  Family History   Problem Relation Age of Onset    Emphysema Mother     No Known Problems Father      Social History     Socioeconomic History    Marital status:      Spouse name: Not on file    Number of children: Not on file    Years of education: Not on file    Highest education level: Not on file   Occupational History    Not on file   Tobacco Use    Smoking status: Never Smoker    Smokeless tobacco: Never Used   Vaping Use    Vaping Use: Never used   Substance and Sexual Activity    Alcohol use: Yes     Comment: on holidays    Drug use: No    Sexual activity: Not Currently   Other Topics Concern    Not on file   Social History Narrative    Not on file     Social Determinants of Health     Financial Resource Strain: Low Risk     Difficulty of Paying Living Expenses: Not very hard   Food Insecurity: No Food Insecurity    Worried About Running Out of Food in the Last Year: Never true    Felicity of Food in the Last Year: Never true   Transportation Needs:     Lack of Transportation (Medical): Not on file    Lack of Transportation (Non-Medical):  Not on file   Physical Activity: Inactive    Days of Exercise per Week: 0 days    Minutes of Exercise per Session: 0 min   Stress:     Feeling of Stress : Not on file   Social Connections:     Frequency of Communication with Friends and Family: Not on file    Frequency of Social Gatherings with Friends and Family: Not on file    Attends Judaism Services: Not on file    Active Member of Clubs or Organizations: Not on file    Attends Club or Organization Meetings: Not on file    Marital Status: Not on file   Intimate Partner Violence:     Fear of Current or Ex-Partner: Not on file    Emotionally Abused: Not on file    Physically Abused: Not on file    Sexually Abused: Not on file   Housing Stability:     Unable to Pay for Housing in the Last Year: Not on file    Number of [place] in the Last Year: Not on file    Unstable Housing in the Last Year: Not on file       MEDICATIONS:    Current Facility-Administered Medications:     insulin glargine-yfgn (SEMGLEE-YFGN) injection vial 22 Units, 22 Units, SubCUTAneous, Nightly, KACIE Harrington - NP, 22 Units at 06/21/22 2140    lidocaine-prilocaine (EMLA) cream, , Topical, PRN, Diego Wiggins APRN - CNP    acetaminophen (TYLENOL) tablet 650 mg, 650 mg, Oral, Q4H PRN, Stephania Varela MD    melatonin tablet 3 mg, 3 mg, Oral, Nightly, Stephania Varela MD, 3 mg at 06/21/22 2139    aluminum & magnesium hydroxide-simethicone (MAALOX) 200-200-20 MG/5ML suspension 30 mL, 30 mL, Oral, PRN, Stephania Varela MD    hydrOXYzine pamoate (VISTARIL) capsule 50 mg, 50 mg, Oral, TID PRN, Stephania Varela MD, 50 mg at 06/21/22 2139    haloperidol (HALDOL) tablet 3 mg, 3 mg, Oral, Q6H PRN **OR** haloperidol lactate (HALDOL) injection 3 mg, 3 mg, IntraMUSCular, Q6H PRN, Stephania Varela MD    magnesium hydroxide (MILK OF MAGNESIA) 400 MG/5ML suspension 30 mL, 30 mL, Oral, Daily PRN, Stephania Varela MD    sertraline (ZOLOFT) tablet 50 mg, 50 mg, Oral, Daily, Elie Nieto MD, 50 mg at 06/22/22 0850    insulin lispro (HUMALOG) injection vial 0-12 Units, 0-12 Units, SubCUTAneous, TID WC, Clotilde Unger MD, 2 Units at 06/21/22 1802    insulin lispro (HUMALOG) injection vial 0-6 Units, 0-6 Units, SubCUTAneous, Nightly, Clotilde Unger MD, 1 Units at 06/20/22 2038    glucose chewable tablet 16 g, 4 tablet, Oral, PRN, Clotilde Unger MD, 16 g at 06/21/22 1134    dextrose bolus 10% 125 mL, 125 mL, IntraVENous, PRN **OR** dextrose bolus 10% 250 mL, 250 mL, IntraVENous, PRN, Clotilde Unger MD    glucagon (rDNA) injection 1 mg, 1 mg, IntraMUSCular, PRN, Carson Gutierrez MD    dextrose 5 % solution, 100 mL/hr, IntraVENous, PRN, Carson Gutierrez MD    levothyroxine (SYNTHROID) tablet 75 mcg, 75 mcg, Oral, Daily, Carson Gutierrez MD, 75 mcg at 06/22/22 5268    Examination:  /65   Pulse 69   Temp 98.2 °F (36.8 °C) (Temporal)   Resp 18   Ht 5' 6\" (1.676 m)   Wt 208 lb 1.6 oz (94.4 kg)   SpO2 98%   BMI 33.59 kg/m²   Gait - steady    HOSPITAL COURSE[de-identified]  Following admission to the hospital, patient had a complete physical exam and blood work up, which she was medically cleared and admitted to [facility] for [facility] evaluation and stabilization. The patient was monitored closely with suicide and appropriate precautions. She was started on Zoloft 50 mg daily continue on her home medications and she was followed by medical due to requiring dialysis. She had no major medical events while on the unit. She stabilized quickly. She was encouraged to participate in group and other milieu activity and started to feel better with this combination of treatment. There has been significant progress in the improvement of symptoms since admission. The patient has been an active participant in his treatment, and discharge planning. The patient was able to spontaneously describe with richness of detail their future plans and goals. The patient was no longer suicidal, homicidal, psychotic or manic. She received the required treatment with medication, participated in group milieu, remained engaged in unit activities and learn appropriate coping skills. She was seen to be watching television playing games and socializing with peers and using the phone. There were no mention or gestures of self-harm or harm to others. Her mental status returned to baseline. The treatment team believes patient has obtained maximum benefit out of this hospitalization and does not meet criteria for inpatient hospitalization anymore. However she will continue to benefit from outpatient follow-up and treatment to maintain stability. Collateral information has been obtained and reconciled and there are no concerns about her safety. She has no access to guns or weapons. She appreciates the help that she received here. This patient no longer meets criteria for inpatient hospitalization. She was discharged home to her family in psychiatrically stable condition. Appetite:  [x] Normal  [] Increased  [] Decreased    Sleep:       [x] Normal  [] Fair       [] Poor            Energy:    [x] Normal  [] Increased  [] Decreased     SI [] Present  [x] Absent  HI  []Present  [x] Absent   Aggression:  [] yes  [] no  Patient is [x] able  [] unable to CONTRACT FOR SAFETY   Medication side effects(SE):  [x] None(Psych. Meds.) [] Other      Mental Status Examination on discharge:    Level of consciousness:  within normal limits   Appearance:  well-appearing  Behavior/Motor:  no abnormalities noted  Attitude toward examiner:  attentive and good eye contact  Speech:  spontaneous, normal rate and normal volume   Mood: euthymic  Affect:  mood congruent  Thought processes:  linear and goal directed   Thought content: The patient is devoid of suicidal or homicidal ideation intent or plan. Devoid of auditory or visual hallucinations or other perceptual disturbances, there are no overt or covert signs of psychosis or paranoia. There are no neurovegetative signs of depression.   Cognition:  oriented to person, place, and time   Concentration intact  Memory intact  Insight good   Judgement fair   Fund of Knowledge adequate      ASSESSMENT:  Patient symptoms are:  [x] Well controlled  [x] Improving  [] Worsening  [] No change    Reason for more than one antipsychotic:  [x] N/A  [] 3 Failed Monotherapy attempts (Drugs tried:)  [] Crossover to a new antipsychotic  [] Taper to Monotherapy from Polypharmacy  [] Augmentation of clozapine therapy due to treatment resistance to single therapy    Diagnosis:  Principal Problem:    Severe episode of recurrent major depressive disorder, without psychotic features ([facility]  Resolved Problems:    * No resolved hospital problems. *      LABS:    No results for input(s): WBC, HGB, PLT in the last 72 hours. No results for input(s): NA, K, CL, CO2, BUN, CREATININE, GLUCOSE in the last 72 hours. No results for input(s): BILITOT, ALKPHOS, AST, ALT in the last 72 hours. Lab Results   Component Value Date    LABAMPH NOT DETECTED 06/17/2022    BARBSCNU NOT DETECTED 06/17/2022    LABBENZ NOT DETECTED 06/17/2022    LABMETH NOT DETECTED 06/17/2022    OPIATESCREENURINE NOT DETECTED 06/17/2022    PHENCYCLIDINESCREENURINE NOT DETECTED 06/17/2022    ETOH <10 06/17/2022     Lab Results   Component Value Date    TSH 9.530 06/19/2022     No results found for: LITHIUM  No results found for: VALPROATE, CBMZ    RISK ASSESSMENT AT DISCHARGE: Low risk for suicide and homicide. Treatment Plan:  The patient's diagnosis, treatment plan, medication management were formulated after patient was seen directly by the attending physician and myself and all relevant documentation was reviewed. Risk, benefit, side effects, possible outcomes of the medication and alternatives discussed with the patient and the patient demonstrated understanding. The patient was also educated that the outcome of treatment will depend on the medication compliance as directed by the prescribers along with regular follow-up, compliance with the labs and other work-up, as clinically indicated. Risks, benefits, side effects, drug-to-drug interactions and alternatives to treatment were discussed. Encourage patient to attend outpatient follow up appointment and therapy, patient follow-up appointments in the schedule prior to discharge. Patient was advised to call the outpatient provider, visit the nearest ED or call 911 if symptoms are not manageable.      Patient's family member was contacted prior to the discharge, patient has been discharged home with her family and psychiatrically stable condition. Medication List      START taking these medications    hydrOXYzine pamoate 50 MG capsule  Commonly known as: VISTARIL  Take 1 capsule by mouth 3 times daily as needed for Anxiety        CHANGE how you take these medications    sertraline 50 MG tablet  Commonly known as: ZOLOFT  Take 1 tablet by mouth daily  Start taking on: June 23, 2022  What changed:   · medication strength  · how much to take  · how to take this  · when to take this  · additional instructions        CONTINUE taking these medications    Accu-Chek FastClix Lancets Misc  USE TO TEST BLOOD SUGAR FOUR TIMES DAILY BEFORE MEALS AND NIGHTLY     Accu-Chek Kristen SmartView w/Device Kit  1 kit by Does not apply route 4 times daily (before meals and nightly)     Alcohol Prep 70 % Pads  1 each by Does not apply route 4 times daily (before meals and nightly)     Bariatric Rollator Misc  1 Device by Does not apply route continuous prn (Walking)     * Insulin Pen Needle 31G X 8 MM Misc  Commonly known as: B-D ULTRAFINE III SHORT PEN  Inject 1 each into the skin daily     * BD Pen Needle Kristen U/F 32G X 4 MM Misc  Generic drug: Insulin Pen Needle  USE AS DIRECTED WITH INSULIN PEN DAILY     Wrist Splint Left/Right Misc  1 each by Does not apply route daily as needed (numbness)         * This list has 2 medication(s) that are the same as other medications prescribed for you. Read the directions carefully, and ask your doctor or other care provider to review them with you.             STOP taking these medications    traZODone 50 MG tablet  Commonly known as: DESYREL        ASK your doctor about these medications    atorvastatin 10 MG tablet  Commonly known as: LIPITOR  TAKE 1 TABLET BY MOUTH EVERY NIGHT     * Auryxia 1  MG(Fe) Tabs tablet  Generic drug: ferric citrate     * Auryxia 1  MG(Fe) Tabs tablet  Generic drug: ferric citrate     B-12 1000 MCG Tabs  TAKE 1 TABLET BY MOUTH ONCE DAILY     blood glucose test strips strip  Commonly known as: Accu-Chek SmartView  1 each by In Vitro route 4 times daily (before meals and nightly) As needed. * calcium carbonate 600 MG Tabs tablet  Take 1 tablet by mouth 2 times daily     * calcium carbonate 1500 (600 Ca) MG Tabs tablet  TAKE 1 TABLET BY MOUTH TWICE DAILY     famotidine 40 MG tablet  Commonly known as: PEPCID     folic acid 1 MG tablet  Commonly known as: FOLVITE  TAKE 1 TABLET BY MOUTH ONCE DAILY     insulin lispro (1 Unit Dial) 100 UNIT/ML Sopn  Commonly known as: HumaLOG KwikPen  Take 2 units for Blood sugar of 150, 4 units for 200, 6 units for 250, 8 units if 300. Lantus SoloStar 100 UNIT/ML injection pen  Generic drug: insulin glargine  INJECT 30 UNITS INTO THE SKIN NIGHTLY     midodrine 5 MG tablet  Commonly known as: PROAMATINE     montelukast 10 MG tablet  Commonly known as: SINGULAIR  TAKE 1 TABLET BY MOUTH NIGHTLY     pantoprazole 40 MG tablet  Commonly known as: PROTONIX     brodie-mario Tabs     rOPINIRole 2 MG tablet  Commonly known as: REQUIP  TAKE 1 TABLET BY MOUTH EVERY NIGHT         * This list has 4 medication(s) that are the same as other medications prescribed for you. Read the directions carefully, and ask your doctor or other care provider to review them with you. Where to Get Your Medications      These medications were sent to [facility] [address] [address]    Phone: [phone]   · hydrOXYzine pamoate 50 MG capsule  · sertraline 50 MG tablet     NOTE: This report was transcribed using voice recognition software. Every effort was made to ensure accuracy; however, inadvertent computerized transcription errors may be present.       TIME SPEND - 35 MINUTES TO COMPLETE THE EVALUATION, DISCHARGE SUMMARY, MEDICATION RECONCILIATION AND FOLLOW UP CARE Signed:  Paul Friedman, KACIE - CNP  6/22/2022  9:14 AM

## 2022-06-22 NOTE — PROGRESS NOTES
Hospitalist Progress Note      Chart reviewed. Our service was consulted for management of diabetes. She is insulin-dependent and reports that she is on 30 units of Lantus nightly with a sliding scale with meals, however, she has been noncompliant with this outpatient. She is currently on Lantus 22 units nightly with medium dose sliding scale. Glycemic control appropriate. Discharge order noted. Discharge medications reviewed and updated as appropriate Non-billable rounding. Thanks for allowing us to participate in the care of Mike Ramos. We will sign off.  Please do not hesitate to contact us if needed.  +++++++++++++++++++++++++++++++++++++++++++++++++  Drinda Romberg, [address] [place] [address]

## 2022-06-22 NOTE — PROGRESS NOTES
Patient resting in bed with no voiced complaints. Pt in NAD, respirations are even and unlabored. Will continue to monitor closely and will intervene as needed.

## 2022-06-22 NOTE — PROGRESS NOTES
Attended morning community meeting. Updated on staffing and daily expectations. Shared goal for the day as to feel good.

## 2022-06-22 NOTE — PLAN OF CARE
Patient in day room for most of the evening. Patient rating anxiety a 10/10 will give PRN Vistaril per order. Patient denies all SI/HI/Hallucinations. Patient pleasant, blunt and flat. Pt has a NORMA AV fistula. No blood pressures in left arm. Will continue to monitor closely and will intervene as needed.       Problem: Chronic Conditions and Co-morbidities  Goal: Patient's chronic conditions and co-morbidity symptoms are monitored and maintained or improved  6/21/2022 2250 by Nikki Gallegos RN  Outcome: Progressing     Problem: Pain  Goal: Verbalizes/displays adequate comfort level or baseline comfort level  6/21/2022 2250 by Nikki Gallegos RN  Outcome: Progressing     Problem: Safety - Adult  Goal: Free from fall injury  6/21/2022 2250 by Nikki Gallegos RN  Outcome: Progressing

## 2022-06-26 DIAGNOSIS — J45.30 MILD PERSISTENT ASTHMA WITHOUT COMPLICATION: ICD-10-CM

## 2022-06-27 RX ORDER — FOLIC ACID 1 MG/1
TABLET ORAL
Qty: 30 TABLET | Refills: 10 | OUTPATIENT
Start: 2022-06-27

## 2022-06-27 RX ORDER — SERTRALINE HYDROCHLORIDE 100 MG/1
TABLET, FILM COATED ORAL
Qty: 30 TABLET | Refills: 10 | OUTPATIENT
Start: 2022-06-27

## 2022-06-28 RX ORDER — MONTELUKAST SODIUM 10 MG/1
10 TABLET ORAL NIGHTLY
Qty: 90 TABLET | Refills: 1 | Status: SHIPPED | OUTPATIENT
Start: 2022-06-28

## 2022-07-15 ENCOUNTER — OFFICE VISIT (OUTPATIENT)
Dept: PRIMARY CARE CLINIC | Age: 77
End: 2022-07-15
Payer: COMMERCIAL

## 2022-07-15 VITALS
HEIGHT: 66 IN | TEMPERATURE: 97.8 F | OXYGEN SATURATION: 96 % | WEIGHT: 208.6 LBS | BODY MASS INDEX: 33.52 KG/M2 | HEART RATE: 74 BPM | SYSTOLIC BLOOD PRESSURE: 162 MMHG | DIASTOLIC BLOOD PRESSURE: 82 MMHG

## 2022-07-15 DIAGNOSIS — Z99.2 CONTROLLED TYPE 2 DIABETES MELLITUS WITH CHRONIC KIDNEY DISEASE ON CHRONIC DIALYSIS, WITH LONG-TERM CURRENT USE OF INSULIN (HCC): Primary | ICD-10-CM

## 2022-07-15 DIAGNOSIS — E11.22 CONTROLLED TYPE 2 DIABETES MELLITUS WITH CHRONIC KIDNEY DISEASE ON CHRONIC DIALYSIS, WITH LONG-TERM CURRENT USE OF INSULIN (HCC): Primary | ICD-10-CM

## 2022-07-15 DIAGNOSIS — Z79.4 CONTROLLED TYPE 2 DIABETES MELLITUS WITH CHRONIC KIDNEY DISEASE ON CHRONIC DIALYSIS, WITH LONG-TERM CURRENT USE OF INSULIN (HCC): Primary | ICD-10-CM

## 2022-07-15 DIAGNOSIS — N18.6 CONTROLLED TYPE 2 DIABETES MELLITUS WITH CHRONIC KIDNEY DISEASE ON CHRONIC DIALYSIS, WITH LONG-TERM CURRENT USE OF INSULIN (HCC): Primary | ICD-10-CM

## 2022-07-15 LAB — HBA1C MFR BLD: 7.1 %

## 2022-07-15 PROCEDURE — 1036F TOBACCO NON-USER: CPT | Performed by: FAMILY MEDICINE

## 2022-07-15 PROCEDURE — 1111F DSCHRG MED/CURRENT MED MERGE: CPT | Performed by: FAMILY MEDICINE

## 2022-07-15 PROCEDURE — 3017F COLORECTAL CA SCREEN DOC REV: CPT | Performed by: FAMILY MEDICINE

## 2022-07-15 PROCEDURE — 1090F PRES/ABSN URINE INCON ASSESS: CPT | Performed by: FAMILY MEDICINE

## 2022-07-15 PROCEDURE — 3051F HG A1C>EQUAL 7.0%<8.0%: CPT | Performed by: FAMILY MEDICINE

## 2022-07-15 PROCEDURE — 83036 HEMOGLOBIN GLYCOSYLATED A1C: CPT | Performed by: FAMILY MEDICINE

## 2022-07-15 PROCEDURE — G8417 CALC BMI ABV UP PARAM F/U: HCPCS | Performed by: FAMILY MEDICINE

## 2022-07-15 PROCEDURE — 2022F DILAT RTA XM EVC RTNOPTHY: CPT | Performed by: FAMILY MEDICINE

## 2022-07-15 PROCEDURE — 99213 OFFICE O/P EST LOW 20 MIN: CPT | Performed by: FAMILY MEDICINE

## 2022-07-15 PROCEDURE — 1123F ACP DISCUSS/DSCN MKR DOCD: CPT | Performed by: FAMILY MEDICINE

## 2022-07-15 PROCEDURE — G8399 PT W/DXA RESULTS DOCUMENT: HCPCS | Performed by: FAMILY MEDICINE

## 2022-07-15 PROCEDURE — G8427 DOCREV CUR MEDS BY ELIG CLIN: HCPCS | Performed by: FAMILY MEDICINE

## 2022-07-15 NOTE — PROGRESS NOTES
paroxysmal nocturnal dyspnea. Respiratory:  Shortness of breath, coughing, sputum production, hemoptysis, wheezing, orthopnea. Gastrointestinal:  Nausea, vomiting, diarrhea, heartburn, constipation, abdominal pain, hematemesis, hematochezia, melena, acholic stools  Genito-Urinary:  Dysuria, urgency, frequency, hematuria  Musculoskeletal:  Joint pain, joint stiffness, joint swelling, muscle pain  Neurology:  Headache, focal neurological deficits, weakness, numbness, paresthesia  Extremities:  Decreased ROM, peripheral edema, mottling      Objective:  Vitals:    07/15/22 1107   BP: (!) 162/82   Pulse:    Temp:    SpO2:      Physical Exam  Vitals and nursing note reviewed. Constitutional:       General: She is not in acute distress. Appearance: She is well-developed. She is obese. She is not ill-appearing, toxic-appearing or diaphoretic. Comments: Using Rollator walker. Crystal Rodríguez HENT:      Head: Normocephalic and atraumatic. Right Ear: External ear normal.      Left Ear: External ear normal.      Nose: Nose normal.   Eyes:      General:         Right eye: No discharge. Left eye: No discharge. Conjunctiva/sclera: Conjunctivae normal.      Pupils: Pupils are equal, round, and reactive to light. Cardiovascular:      Rate and Rhythm: Normal rate and regular rhythm. Heart sounds: Normal heart sounds. No murmur heard. No friction rub. No gallop. Pulmonary:      Effort: Pulmonary effort is normal. No respiratory distress. Breath sounds: No wheezing or rales. Abdominal:      General: Bowel sounds are normal. There is no distension. Palpations: Abdomen is soft. Tenderness: There is no abdominal tenderness. There is no guarding or rebound. Musculoskeletal:      Cervical back: Normal range of motion and neck supple. Lymphadenopathy:      Cervical: No cervical adenopathy. Neurological:      Mental Status: She is alert and oriented to person, place, and time. Comments: Cranial nerves II through XII intact   Psychiatric:         Mood and Affect: Mood normal.         Behavior: Behavior normal.         Thought Content: Thought content normal.         Judgment: Judgment normal.           Assessment and Plan:  Femi Bowen was seen today for diabetes. Diagnoses and all orders for this visit:    Controlled type 2 diabetes mellitus with chronic kidney disease on chronic dialysis, with long-term current use of insulin ([address]  -     POCT glycosylated hemoglobin (Hb A1C)      Depression and anxiety: Continue to follow at [facility]. Mood is stable today. Patient does not demonstrate any depression. Insulin-dependent diabetes: A1c in acceptable range. Continue current regimen. Hypertension: BP elevated today, however, typically in good range. No change in regimen. ESRD: Continue to follow Dr. Christiana Todd. Dialysis T,R,Sat. Hypothyroidism: Continue current dose of Synthroid. COPD: Continue Advair. Follow-up in 3 month. Patient may come in sooner if needed for medical concerns. Patient advised to call at any time to cancel or re-scheduleor for any questions/concerns.         Destiny Kelly DO   7/15/22      11:13 AM

## 2022-07-22 RX ORDER — SERTRALINE HYDROCHLORIDE 100 MG/1
TABLET, FILM COATED ORAL
Qty: 90 TABLET | Refills: 1 | Status: SHIPPED | OUTPATIENT
Start: 2022-07-22

## 2022-07-22 RX ORDER — FOLIC ACID 1 MG/1
TABLET ORAL
Qty: 90 TABLET | Refills: 1 | Status: SHIPPED | OUTPATIENT
Start: 2022-07-22

## 2022-08-05 ENCOUNTER — HOSPITAL ENCOUNTER (OUTPATIENT)
Age: 77
Discharge: HOME | End: 2022-08-05
Payer: COMMERCIAL

## 2022-08-05 LAB
ALBUMIN SERPL-MCNC: 4 G/DL (ref 3.5–5.2)
ALP BLD-CCNC: 96 U/L (ref 35–104)
ALT SERPL-CCNC: 9 U/L (ref 0–32)
ANION GAP SERPL CALCULATED.3IONS-SCNC: 9 MMOL/L (ref 7–16)
AST SERPL-CCNC: 16 U/L (ref 0–31)
BASOPHILS ABSOLUTE: 0.02 E9/L (ref 0–0.2)
BASOPHILS RELATIVE PERCENT: 0.3 % (ref 0–2)
BILIRUB SERPL-MCNC: 0.5 MG/DL (ref 0–1.2)
BUN BLDV-MCNC: 26 MG/DL (ref 6–23)
CALCIUM SERPL-MCNC: 8.6 MG/DL (ref 8.6–10.2)
CHLORIDE BLD-SCNC: 95 MMOL/L (ref 98–107)
CHOLESTEROL, FASTING: 146 MG/DL (ref 0–199)
CO2: 34 MMOL/L (ref 22–29)
CREAT SERPL-MCNC: 4.5 MG/DL (ref 0.5–1)
EOSINOPHILS ABSOLUTE: 0.18 E9/L (ref 0.05–0.5)
EOSINOPHILS RELATIVE PERCENT: 3 % (ref 0–6)
GFR AFRICAN AMERICAN: 12
GFR NON-AFRICAN AMERICAN: 10 ML/MIN/1.73
GLUCOSE FASTING: 218 MG/DL (ref 74–99)
HBA1C MFR BLD: 7.2 % (ref 4–5.6)
HCT VFR BLD CALC: 38.1 % (ref 34–48)
HDLC SERPL-MCNC: 63 MG/DL
HEMOGLOBIN: 11.9 G/DL (ref 11.5–15.5)
IMMATURE GRANULOCYTES #: 0.02 E9/L
IMMATURE GRANULOCYTES %: 0.3 % (ref 0–5)
LDL CHOLESTEROL CALCULATED: 64 MG/DL (ref 0–99)
LYMPHOCYTES ABSOLUTE: 1.25 E9/L (ref 1.5–4)
LYMPHOCYTES RELATIVE PERCENT: 21 % (ref 20–42)
MCH RBC QN AUTO: 32.2 PG (ref 26–35)
MCHC RBC AUTO-ENTMCNC: 31.2 % (ref 32–34.5)
MCV RBC AUTO: 103.3 FL (ref 80–99.9)
MONOCYTES ABSOLUTE: 0.58 E9/L (ref 0.1–0.95)
MONOCYTES RELATIVE PERCENT: 9.7 % (ref 2–12)
NEUTROPHILS ABSOLUTE: 3.9 E9/L (ref 1.8–7.3)
NEUTROPHILS RELATIVE PERCENT: 65.7 % (ref 43–80)
PDW BLD-RTO: 13.5 FL (ref 11.5–15)
PLATELET # BLD: 149 E9/L (ref 130–450)
PMV BLD AUTO: 10 FL (ref 7–12)
POTASSIUM SERPL-SCNC: 4.1 MMOL/L (ref 3.5–5)
RBC # BLD: 3.69 E12/L (ref 3.5–5.5)
SODIUM BLD-SCNC: 138 MMOL/L (ref 132–146)
T4 TOTAL: 4.3 MCG/DL (ref 4.5–11.7)
TOTAL PROTEIN: 6.7 G/DL (ref 6.4–8.3)
TRIGLYCERIDE, FASTING: 95 MG/DL (ref 0–149)
TSH SERPL DL<=0.05 MIU/L-ACNC: 5.68 UIU/ML (ref 0.27–4.2)
VITAMIN D 25-HYDROXY: 43 NG/ML (ref 30–100)
VLDLC SERPL CALC-MCNC: 19 MG/DL
WBC # BLD: 6 E9/L (ref 4.5–11.5)

## 2022-08-05 PROCEDURE — 83036 HEMOGLOBIN GLYCOSYLATED A1C: CPT

## 2022-08-05 PROCEDURE — 80061 LIPID PANEL: CPT

## 2022-08-05 PROCEDURE — 85025 COMPLETE CBC W/AUTO DIFF WBC: CPT

## 2022-08-05 PROCEDURE — 82607 VITAMIN B-12: CPT

## 2022-08-05 PROCEDURE — 80053 COMPREHEN METABOLIC PANEL: CPT

## 2022-08-05 PROCEDURE — 82306 VITAMIN D 25 HYDROXY: CPT

## 2022-08-05 PROCEDURE — 84443 ASSAY THYROID STIM HORMONE: CPT

## 2022-08-05 PROCEDURE — 84436 ASSAY OF TOTAL THYROXINE: CPT

## 2022-08-05 PROCEDURE — 84480 ASSAY TRIIODOTHYRONINE (T3): CPT

## 2022-08-05 PROCEDURE — 36415 COLL VENOUS BLD VENIPUNCTURE: CPT

## 2022-08-06 LAB
T3 TOTAL: 72.88 NG/DL (ref 80–200)
VITAMIN B-12: >2000 PG/ML (ref 211–946)

## 2022-08-16 DIAGNOSIS — Z99.2 CONTROLLED TYPE 2 DIABETES MELLITUS WITH CHRONIC KIDNEY DISEASE ON CHRONIC DIALYSIS, WITH LONG-TERM CURRENT USE OF INSULIN (HCC): ICD-10-CM

## 2022-08-16 DIAGNOSIS — Z79.4 CONTROLLED TYPE 2 DIABETES MELLITUS WITH CHRONIC KIDNEY DISEASE ON CHRONIC DIALYSIS, WITH LONG-TERM CURRENT USE OF INSULIN (HCC): ICD-10-CM

## 2022-08-16 DIAGNOSIS — E11.22 CONTROLLED TYPE 2 DIABETES MELLITUS WITH CHRONIC KIDNEY DISEASE ON CHRONIC DIALYSIS, WITH LONG-TERM CURRENT USE OF INSULIN (HCC): ICD-10-CM

## 2022-08-16 DIAGNOSIS — N18.6 CONTROLLED TYPE 2 DIABETES MELLITUS WITH CHRONIC KIDNEY DISEASE ON CHRONIC DIALYSIS, WITH LONG-TERM CURRENT USE OF INSULIN (HCC): ICD-10-CM

## 2022-08-16 RX ORDER — INSULIN LISPRO 100 [IU]/ML
INJECTION, SOLUTION INTRAVENOUS; SUBCUTANEOUS
Qty: 15 ML | Refills: 10 | Status: SHIPPED | OUTPATIENT
Start: 2022-08-16

## 2022-10-21 ENCOUNTER — OFFICE VISIT (OUTPATIENT)
Dept: PRIMARY CARE CLINIC | Age: 77
End: 2022-10-21
Payer: COMMERCIAL

## 2022-10-21 VITALS
HEIGHT: 66 IN | BODY MASS INDEX: 33.11 KG/M2 | OXYGEN SATURATION: 95 % | TEMPERATURE: 97.2 F | WEIGHT: 206 LBS | DIASTOLIC BLOOD PRESSURE: 78 MMHG | SYSTOLIC BLOOD PRESSURE: 130 MMHG | HEART RATE: 75 BPM

## 2022-10-21 DIAGNOSIS — E11.9 TYPE 2 DIABETES MELLITUS WITHOUT COMPLICATION, WITH LONG-TERM CURRENT USE OF INSULIN (HCC): Primary | ICD-10-CM

## 2022-10-21 DIAGNOSIS — F32.89 OTHER DEPRESSION: ICD-10-CM

## 2022-10-21 DIAGNOSIS — R07.81 RIB PAIN ON RIGHT SIDE: ICD-10-CM

## 2022-10-21 DIAGNOSIS — Z79.4 TYPE 2 DIABETES MELLITUS WITHOUT COMPLICATION, WITH LONG-TERM CURRENT USE OF INSULIN (HCC): Primary | ICD-10-CM

## 2022-10-21 LAB — HBA1C MFR BLD: 9.3 %

## 2022-10-21 PROCEDURE — 1090F PRES/ABSN URINE INCON ASSESS: CPT | Performed by: FAMILY MEDICINE

## 2022-10-21 PROCEDURE — 99214 OFFICE O/P EST MOD 30 MIN: CPT | Performed by: FAMILY MEDICINE

## 2022-10-21 PROCEDURE — 3017F COLORECTAL CA SCREEN DOC REV: CPT | Performed by: FAMILY MEDICINE

## 2022-10-21 PROCEDURE — 2022F DILAT RTA XM EVC RTNOPTHY: CPT | Performed by: FAMILY MEDICINE

## 2022-10-21 PROCEDURE — G8417 CALC BMI ABV UP PARAM F/U: HCPCS | Performed by: FAMILY MEDICINE

## 2022-10-21 PROCEDURE — 1036F TOBACCO NON-USER: CPT | Performed by: FAMILY MEDICINE

## 2022-10-21 PROCEDURE — 1123F ACP DISCUSS/DSCN MKR DOCD: CPT | Performed by: FAMILY MEDICINE

## 2022-10-21 PROCEDURE — G8399 PT W/DXA RESULTS DOCUMENT: HCPCS | Performed by: FAMILY MEDICINE

## 2022-10-21 PROCEDURE — G8484 FLU IMMUNIZE NO ADMIN: HCPCS | Performed by: FAMILY MEDICINE

## 2022-10-21 PROCEDURE — 3046F HEMOGLOBIN A1C LEVEL >9.0%: CPT | Performed by: FAMILY MEDICINE

## 2022-10-21 PROCEDURE — G8428 CUR MEDS NOT DOCUMENT: HCPCS | Performed by: FAMILY MEDICINE

## 2022-10-21 RX ORDER — INSULIN GLARGINE 100 [IU]/ML
35 INJECTION, SOLUTION SUBCUTANEOUS DAILY
Qty: 15 ML | Refills: 0 | Status: SHIPPED | OUTPATIENT
Start: 2022-10-21

## 2022-10-21 RX ORDER — BLOOD SUGAR DIAGNOSTIC
1 STRIP MISCELLANEOUS
Qty: 150 EACH | Refills: 3 | Status: SHIPPED | OUTPATIENT
Start: 2022-10-21

## 2022-10-21 RX ORDER — BLOOD-GLUCOSE METER
1 EACH MISCELLANEOUS
Qty: 1 KIT | Refills: 0 | Status: SHIPPED | OUTPATIENT
Start: 2022-10-21

## 2022-10-21 RX ORDER — LIDOCAINE 50 MG/G
3 PATCH TOPICAL EVERY 24 HOURS
Qty: 90 PATCH | Refills: 0 | Status: SHIPPED | OUTPATIENT
Start: 2022-10-21

## 2022-10-21 SDOH — ECONOMIC STABILITY: FOOD INSECURITY: WITHIN THE PAST 12 MONTHS, YOU WORRIED THAT YOUR FOOD WOULD RUN OUT BEFORE YOU GOT MONEY TO BUY MORE.: NEVER TRUE

## 2022-10-21 SDOH — ECONOMIC STABILITY: FOOD INSECURITY: WITHIN THE PAST 12 MONTHS, THE FOOD YOU BOUGHT JUST DIDN'T LAST AND YOU DIDN'T HAVE MONEY TO GET MORE.: NEVER TRUE

## 2022-10-21 ASSESSMENT — SOCIAL DETERMINANTS OF HEALTH (SDOH): HOW HARD IS IT FOR YOU TO PAY FOR THE VERY BASICS LIKE FOOD, HOUSING, MEDICAL CARE, AND HEATING?: NOT HARD AT ALL

## 2022-10-21 NOTE — PROGRESS NOTES
Subjective:  78 y.o. female who presents to the office today with chief complaint:  Chief Complaint   Patient presents with    Diabetes     3 month follow up. Patient states she hasnt been taking insulin at night now for a few weeks hemoglobin a1c in office 9.3     Depression and anxiety: Currently on zoloft 50mg daily- released from the [facility]. Uncontrolled Insulin-dependent diabetes: Patient currently taking 35 units of glargine at night and a sliding scale throughout the day. Has not taken her insulin in several weeks d/t both forgetting and not having a glucometer. Following diabetic diet. Rib pain: R side. Ongoing for the last several weeks. Has not tried any meds for it. Health Maintenance Due   Topic Date Due    Diabetic foot exam  Never done    Shingles vaccine (1 of 2) Never done    Diabetic retinal exam  06/29/2021    COVID-19 Vaccine (4 - Booster for Dre Roers series) 10/25/2022     Podiatrist: Dr. Hernán Tadeo- last visit 2 months ago. Will fill out BARBARA. Cancer History: None. Family Cancer History: none. 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 General:  Fever, chills, diaphoresis, fatigue, malaise, night sweats, weight loss, Weakness- generalized  Psychological:  Anxiety, disorientation, hallucinations. ENT:  Epistaxis, headaches, vertigo, visual changes. Cardiovascular:  Chest pain, irregular heartbeats, palpitations, paroxysmal nocturnal dyspnea. Respiratory:  Shortness of breath, coughing, sputum production, hemoptysis, wheezing, orthopnea.   Gastrointestinal:  Nausea, vomiting, diarrhea, heartburn, constipation, abdominal pain, hematemesis, hematochezia, melena, acholic stools  Genito-Urinary:  Dysuria, urgency, frequency, hematuria  Musculoskeletal:  Joint pain- B knees, joint stiffness, joint swelling, muscle pain  Neurology:  Headache, focal neurological deficits, weakness, numbness, paresthesia  Extremities:  Decreased ROM, peripheral edema, mottling      Objective:  Vitals:    10/21/22 1117   BP: 130/78   Pulse:    Temp:    SpO2:      Physical Exam  Vitals and nursing note reviewed. Constitutional:       General: She is not in acute distress. Appearance: She is well-developed. She is obese. She is not ill-appearing, toxic-appearing or diaphoretic. Comments: Using Rolling walker. HENT:      Head: Normocephalic and atraumatic. Right Ear: External ear normal.      Left Ear: External ear normal.      Nose: Nose normal.   Eyes:      General:         Right eye: No discharge. Left eye: No discharge. Conjunctiva/sclera: Conjunctivae normal.      Pupils: Pupils are equal, round, and reactive to light. Comments: L eye exotropia   Cardiovascular:      Rate and Rhythm: Normal rate and regular rhythm. Heart sounds: Normal heart sounds. No murmur heard. No friction rub. No gallop. Pulmonary:      Effort: Pulmonary effort is normal. No respiratory distress. Breath sounds: No wheezing or rales. Abdominal:      General: Bowel sounds are normal. There is no distension. Palpations: Abdomen is soft. Tenderness: There is no abdominal tenderness. There is no guarding or rebound. Musculoskeletal:      Cervical back: Normal range of motion and neck supple. Comments: Tender to palpate Ribs 8-10 on the R laterally. Lymphadenopathy:      Cervical: No cervical adenopathy. Neurological:      Mental Status: She is alert and oriented to person, place, and time. Comments: Cranial nerves II through XII intact   Psychiatric:         Mood and Affect: Mood normal.         Behavior: Behavior normal.         Thought Content:  Thought content normal.         Judgment: Judgment normal.           Assessment and Plan:  Vitor Jackson was seen today for diabetes. Diagnoses and all orders for this visit:    Type 2 diabetes mellitus without complication, with long-term current use of insulin ([facility])  -     POCT glycosylated hemoglobin (Hb A1C)      Depression and anxiety: Pt will call [facility] to see if she can continue to follow w counselor. If not, she will call another counselor to set up appointments. Insulin-dependent diabetes: Restart insulin. Prescribed new glucometer. Glargine at 35U- refilled. Start taking glargine in AM so pt can remember. Rib pain: Lidoderm patches prescribed. Follow-up in 3 month. Patient may come in sooner if needed for medical concerns. Patient advised to call at any time to cancel or re-scheduleor for any questions/concerns.         Heather Kelly,    10/21/22      11:22 AM

## 2022-11-13 DIAGNOSIS — Z79.4 TYPE 2 DIABETES MELLITUS WITHOUT COMPLICATION, WITH LONG-TERM CURRENT USE OF INSULIN (HCC): ICD-10-CM

## 2022-11-13 DIAGNOSIS — E11.9 TYPE 2 DIABETES MELLITUS WITHOUT COMPLICATION, WITH LONG-TERM CURRENT USE OF INSULIN (HCC): ICD-10-CM

## 2022-11-13 DIAGNOSIS — J45.30 MILD PERSISTENT ASTHMA WITHOUT COMPLICATION: ICD-10-CM

## 2022-11-14 DIAGNOSIS — R07.81 RIB PAIN ON RIGHT SIDE: ICD-10-CM

## 2022-11-14 RX ORDER — INSULIN GLARGINE 100 [IU]/ML
INJECTION, SOLUTION SUBCUTANEOUS
Qty: 15 ML | Refills: 5 | Status: SHIPPED | OUTPATIENT
Start: 2022-11-14

## 2022-11-14 RX ORDER — LIDOCAINE 50 MG/G
3 PATCH TOPICAL EVERY 24 HOURS
Refills: 10 | OUTPATIENT
Start: 2022-11-14

## 2022-11-14 RX ORDER — MONTELUKAST SODIUM 10 MG/1
10 TABLET ORAL NIGHTLY
Qty: 30 TABLET | Refills: 5 | Status: SHIPPED | OUTPATIENT
Start: 2022-11-14

## 2022-11-14 RX ORDER — LIDOCAINE 50 MG/G
3 PATCH TOPICAL EVERY 24 HOURS
Qty: 30 PATCH | Refills: 5 | Status: SHIPPED | OUTPATIENT
Start: 2022-11-14

## 2022-11-27 NOTE — ED NOTES
Patient states she just became suddenly weak and fatigued, some shortness of breath. Patient has no other complaints nor pain / discomfort at this time. Will continue to monitor. Patient does have fistula left upper arm. Unable to void at this time.       Frankie Garcia RN  12/17/18 [facility] Sarah Diaz RN  12/17/18 4435
Report to oncoming RN, no questions nor concerns at this time     Srikanth Jorge RN  12/17/18 6730
The patient is a 89y Female complaining of

## 2022-12-13 DIAGNOSIS — G25.81 RESTLESS LEG SYNDROME, CONTROLLED: ICD-10-CM

## 2022-12-13 DIAGNOSIS — R07.81 RIB PAIN ON RIGHT SIDE: ICD-10-CM

## 2022-12-13 DIAGNOSIS — E78.49 OTHER HYPERLIPIDEMIA: ICD-10-CM

## 2022-12-13 RX ORDER — FOLIC ACID 1 MG/1
TABLET ORAL
Qty: 30 TABLET | Refills: 10 | Status: SHIPPED | OUTPATIENT
Start: 2022-12-13

## 2022-12-13 RX ORDER — TRAZODONE HYDROCHLORIDE 50 MG/1
TABLET ORAL
Qty: 30 TABLET | Refills: 10 | Status: SHIPPED | OUTPATIENT
Start: 2022-12-13

## 2022-12-13 RX ORDER — ATORVASTATIN CALCIUM 10 MG/1
TABLET, FILM COATED ORAL
Qty: 30 TABLET | Refills: 10 | Status: SHIPPED | OUTPATIENT
Start: 2022-12-13

## 2022-12-13 RX ORDER — LIDOCAINE 50 MG/G
PATCH TOPICAL
Qty: 90 PATCH | Refills: 10 | Status: SHIPPED | OUTPATIENT
Start: 2022-12-13

## 2022-12-13 RX ORDER — ROPINIROLE 2 MG/1
TABLET, FILM COATED ORAL
Qty: 30 TABLET | Refills: 10 | Status: SHIPPED | OUTPATIENT
Start: 2022-12-13

## 2022-12-13 RX ORDER — SERTRALINE HYDROCHLORIDE 100 MG/1
TABLET, FILM COATED ORAL
Qty: 30 TABLET | Refills: 10 | Status: SHIPPED | OUTPATIENT
Start: 2022-12-13

## 2023-01-24 ENCOUNTER — TELEPHONE (OUTPATIENT)
Dept: PRIMARY CARE CLINIC | Age: 78
End: 2023-01-24

## 2023-01-24 NOTE — TELEPHONE ENCOUNTER
PC from the charge nurse at [address] calling to inform that they were checking quarterly and monthly labs    They wanted to inform patient's blood glucose was in the 400s and her A1C was 11.2    After talking she finally admitted that she hadn't been taking her insulin    They wanted to inform in case you wanted to recheck at her upcoming appointment

## 2023-01-25 NOTE — TELEPHONE ENCOUNTER
Per Dr. Guanako Wagner, Please have pt restart taking her insulin. Spoke with pt to notifying her to restart taking her insulin.   Pt voiced understanding and is reminded of f/u appt on 1/30/23 @ 1:30pm.

## 2023-01-30 ENCOUNTER — OFFICE VISIT (OUTPATIENT)
Dept: PRIMARY CARE CLINIC | Age: 78
End: 2023-01-30
Payer: COMMERCIAL

## 2023-01-30 VITALS
BODY MASS INDEX: 33.75 KG/M2 | SYSTOLIC BLOOD PRESSURE: 144 MMHG | OXYGEN SATURATION: 98 % | HEART RATE: 80 BPM | TEMPERATURE: 97 F | WEIGHT: 210 LBS | RESPIRATION RATE: 18 BRPM | DIASTOLIC BLOOD PRESSURE: 86 MMHG | HEIGHT: 66 IN

## 2023-01-30 DIAGNOSIS — E11.9 TYPE 2 DIABETES MELLITUS WITHOUT COMPLICATION, WITH LONG-TERM CURRENT USE OF INSULIN (HCC): Primary | ICD-10-CM

## 2023-01-30 DIAGNOSIS — Z79.4 TYPE 2 DIABETES MELLITUS WITHOUT COMPLICATION, WITH LONG-TERM CURRENT USE OF INSULIN (HCC): Primary | ICD-10-CM

## 2023-01-30 LAB — HBA1C MFR BLD: 10.3 %

## 2023-01-30 PROCEDURE — 3079F DIAST BP 80-89 MM HG: CPT | Performed by: FAMILY MEDICINE

## 2023-01-30 PROCEDURE — 1123F ACP DISCUSS/DSCN MKR DOCD: CPT | Performed by: FAMILY MEDICINE

## 2023-01-30 PROCEDURE — 3046F HEMOGLOBIN A1C LEVEL >9.0%: CPT | Performed by: FAMILY MEDICINE

## 2023-01-30 PROCEDURE — G8399 PT W/DXA RESULTS DOCUMENT: HCPCS | Performed by: FAMILY MEDICINE

## 2023-01-30 PROCEDURE — G8484 FLU IMMUNIZE NO ADMIN: HCPCS | Performed by: FAMILY MEDICINE

## 2023-01-30 PROCEDURE — 1036F TOBACCO NON-USER: CPT | Performed by: FAMILY MEDICINE

## 2023-01-30 PROCEDURE — 3077F SYST BP >= 140 MM HG: CPT | Performed by: FAMILY MEDICINE

## 2023-01-30 PROCEDURE — G8417 CALC BMI ABV UP PARAM F/U: HCPCS | Performed by: FAMILY MEDICINE

## 2023-01-30 PROCEDURE — G8427 DOCREV CUR MEDS BY ELIG CLIN: HCPCS | Performed by: FAMILY MEDICINE

## 2023-01-30 PROCEDURE — 1090F PRES/ABSN URINE INCON ASSESS: CPT | Performed by: FAMILY MEDICINE

## 2023-01-30 PROCEDURE — 99214 OFFICE O/P EST MOD 30 MIN: CPT | Performed by: FAMILY MEDICINE

## 2023-01-30 PROCEDURE — 83036 HEMOGLOBIN GLYCOSYLATED A1C: CPT | Performed by: FAMILY MEDICINE

## 2023-01-30 ASSESSMENT — PATIENT HEALTH QUESTIONNAIRE - PHQ9
1. LITTLE INTEREST OR PLEASURE IN DOING THINGS: 1
10. IF YOU CHECKED OFF ANY PROBLEMS, HOW DIFFICULT HAVE THESE PROBLEMS MADE IT FOR YOU TO DO YOUR WORK, TAKE CARE OF THINGS AT HOME, OR GET ALONG WITH OTHER PEOPLE: 1
4. FEELING TIRED OR HAVING LITTLE ENERGY: 3
SUM OF ALL RESPONSES TO PHQ QUESTIONS 1-9: 9
6. FEELING BAD ABOUT YOURSELF - OR THAT YOU ARE A FAILURE OR HAVE LET YOURSELF OR YOUR FAMILY DOWN: 0
9. THOUGHTS THAT YOU WOULD BE BETTER OFF DEAD, OR OF HURTING YOURSELF: 1
SUM OF ALL RESPONSES TO PHQ QUESTIONS 1-9: 9
7. TROUBLE CONCENTRATING ON THINGS, SUCH AS READING THE NEWSPAPER OR WATCHING TELEVISION: 0
SUM OF ALL RESPONSES TO PHQ9 QUESTIONS 1 & 2: 1
3. TROUBLE FALLING OR STAYING ASLEEP: 3
2. FEELING DOWN, DEPRESSED OR HOPELESS: 0
5. POOR APPETITE OR OVEREATING: 1
SUM OF ALL RESPONSES TO PHQ QUESTIONS 1-9: 9
SUM OF ALL RESPONSES TO PHQ QUESTIONS 1-9: 8
8. MOVING OR SPEAKING SO SLOWLY THAT OTHER PEOPLE COULD HAVE NOTICED. OR THE OPPOSITE, BEING SO FIGETY OR RESTLESS THAT YOU HAVE BEEN MOVING AROUND A LOT MORE THAN USUAL: 0

## 2023-01-30 ASSESSMENT — COLUMBIA-SUICIDE SEVERITY RATING SCALE - C-SSRS
2. HAVE YOU ACTUALLY HAD ANY THOUGHTS OF KILLING YOURSELF?: NO
1. WITHIN THE PAST MONTH, HAVE YOU WISHED YOU WERE DEAD OR WISHED YOU COULD GO TO SLEEP AND NOT WAKE UP?: YES
6. HAVE YOU EVER DONE ANYTHING, STARTED TO DO ANYTHING, OR PREPARED TO DO ANYTHING TO END YOUR LIFE?: NO

## 2023-01-30 NOTE — PROGRESS NOTES
Subjective:  70 y.o. female who presents to the office today with chief complaint:  Chief Complaint   Patient presents with    Diabetes     Depression and anxiety: Currently on zoloft 50mg daily- released from the [facility] and does not feel she needs psychiatry at this time. Denies SI/HI. Uncontrolled Insulin-dependent diabetes: Pt stopped taking insulin for a period of time, but restarted over the last week; taking glargine 35U in the morning. States she forgot to take it in the evening. Using SSI. Since restarting, she has had sugars running in a good range. Bilateral knee pain: This is a chronic issue. Patient request to see orthopedic surgery for this. Discussed treatments prior to referral.  She agreed to gueraes first.  She refuses any sort of injection into her knees. She understands that her A1c must be under better control to consider any surgical intervention in the future. Health Maintenance Due   Topic Date Due    Shingles vaccine (1 of 2) Never done    COVID-19 Vaccine (5 - Booster for Moderna series) 10/25/2022     Podiatrist: Dr. Carlyn Sharp- last visit 2 months ago. Will fill out BARBARA. Cancer History: None. Family Cancer History: none. 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 General:  Fever, chills, diaphoresis, fatigue, malaise, night sweats, weight loss, Weakness- generalized  Psychological:  Anxiety, disorientation, hallucinations. ENT:  Epistaxis, headaches, vertigo, visual changes. Cardiovascular:  Chest pain, irregular heartbeats, palpitations, paroxysmal nocturnal dyspnea.   Respiratory:  Shortness of breath, coughing, sputum production, hemoptysis, wheezing, orthopnea. Gastrointestinal:  Nausea, vomiting, diarrhea, heartburn, constipation, abdominal pain, hematemesis, hematochezia, melena, acholic stools  Genito-Urinary:  Dysuria, urgency, frequency, hematuria  Musculoskeletal:  Joint pain- B knees, joint stiffness, joint swelling, muscle pain  Neurology:  Headache, focal neurological deficits, weakness, numbness, paresthesia  Extremities:  Decreased ROM, peripheral edema, mottling      Objective:  Vitals:    01/30/23 1325   BP: (!) 144/86   Pulse: 80   Resp: 18   Temp: 97 °F (36.1 °C)   SpO2: 98%     Physical Exam  Vitals and nursing note reviewed. Constitutional:       General: She is not in acute distress. Appearance: She is well-developed. She is obese. She is not ill-appearing, toxic-appearing or diaphoretic. Comments: Using Rolling walker. HENT:      Head: Normocephalic and atraumatic. Right Ear: External ear normal.      Left Ear: External ear normal.      Nose: Nose normal.   Eyes:      General:         Right eye: No discharge. Left eye: No discharge. Conjunctiva/sclera: Conjunctivae normal.      Pupils: Pupils are equal, round, and reactive to light. Comments: L eye exotropia   Cardiovascular:      Rate and Rhythm: Normal rate and regular rhythm. Heart sounds: Normal heart sounds. No murmur heard. No friction rub. No gallop. Pulmonary:      Effort: Pulmonary effort is normal. No respiratory distress. Breath sounds: No wheezing or rales. Abdominal:      General: Bowel sounds are normal. There is no distension. Palpations: Abdomen is soft. Tenderness: There is no abdominal tenderness. There is no guarding or rebound. Musculoskeletal:      Cervical back: Normal range of motion and neck supple. Comments: Crepitus B knees w full ROM. Lymphadenopathy:      Cervical: No cervical adenopathy. Neurological:      Mental Status: She is alert and oriented to person, place, and time.       Comments: Cranial nerves II through XII intact   Psychiatric:         Mood and Affect: Mood normal.         Behavior: Behavior normal.         Thought Content: Thought content normal.         Judgment: Judgment normal.           Assessment and Plan:  Darrion Green was seen today for diabetes. Diagnoses and all orders for this visit:    Type 2 diabetes mellitus without complication, with long-term current use of insulin ([facility])  -     POCT glycosylated hemoglobin (Hb A1C)      Depression and anxiety: Continue current regimen. Adamantly refuses psychiatry. Not suicidal or homicidal    Insulin-dependent diabetes: Continue taking insulin as prescribed. Recheck A1c at next month's visit    Bilateral knee OA: Patient will use salves between now and next visit. Will need better control of her A1c prior to referral to orthopedic surgery. Follow-up in 1 month    Patient may come in sooner if needed for medical concerns. Patient advised to call at any time to cancel or re-scheduleor for any questions/concerns.         Cassandra Kelly,    1/30/23      1:44 PM

## 2023-02-27 ENCOUNTER — OFFICE VISIT (OUTPATIENT)
Dept: PRIMARY CARE CLINIC | Age: 78
End: 2023-02-27

## 2023-02-27 VITALS
BODY MASS INDEX: 33.59 KG/M2 | DIASTOLIC BLOOD PRESSURE: 86 MMHG | OXYGEN SATURATION: 98 % | TEMPERATURE: 97.2 F | WEIGHT: 209 LBS | SYSTOLIC BLOOD PRESSURE: 128 MMHG | RESPIRATION RATE: 18 BRPM | HEART RATE: 83 BPM | HEIGHT: 66 IN

## 2023-02-27 DIAGNOSIS — E11.9 TYPE 2 DIABETES MELLITUS WITHOUT COMPLICATION, WITH LONG-TERM CURRENT USE OF INSULIN (HCC): Primary | ICD-10-CM

## 2023-02-27 DIAGNOSIS — Z79.4 TYPE 2 DIABETES MELLITUS WITHOUT COMPLICATION, WITH LONG-TERM CURRENT USE OF INSULIN (HCC): Primary | ICD-10-CM

## 2023-02-27 DIAGNOSIS — R73.09 ELEVATED HEMOGLOBIN A1C: ICD-10-CM

## 2023-02-27 DIAGNOSIS — F51.01 PRIMARY INSOMNIA: ICD-10-CM

## 2023-02-27 DIAGNOSIS — M17.0 BILATERAL PRIMARY OSTEOARTHRITIS OF KNEE: ICD-10-CM

## 2023-02-27 LAB — HBA1C MFR BLD: 9.5 %

## 2023-02-27 RX ORDER — TRAZODONE HYDROCHLORIDE 100 MG/1
TABLET ORAL
Qty: 90 TABLET | Refills: 1 | Status: SHIPPED | OUTPATIENT
Start: 2023-02-27

## 2023-02-27 SDOH — ECONOMIC STABILITY: FOOD INSECURITY: WITHIN THE PAST 12 MONTHS, YOU WORRIED THAT YOUR FOOD WOULD RUN OUT BEFORE YOU GOT MONEY TO BUY MORE.: NEVER TRUE

## 2023-02-27 SDOH — ECONOMIC STABILITY: INCOME INSECURITY: HOW HARD IS IT FOR YOU TO PAY FOR THE VERY BASICS LIKE FOOD, HOUSING, MEDICAL CARE, AND HEATING?: NOT HARD AT ALL

## 2023-02-27 SDOH — ECONOMIC STABILITY: HOUSING INSECURITY
IN THE LAST 12 MONTHS, WAS THERE A TIME WHEN YOU DID NOT HAVE A STEADY PLACE TO SLEEP OR SLEPT IN A SHELTER (INCLUDING NOW)?: NO

## 2023-02-27 SDOH — ECONOMIC STABILITY: FOOD INSECURITY: WITHIN THE PAST 12 MONTHS, THE FOOD YOU BOUGHT JUST DIDN'T LAST AND YOU DIDN'T HAVE MONEY TO GET MORE.: NEVER TRUE

## 2023-02-27 NOTE — PROGRESS NOTES
Subjective:  70 y.o. female who presents to the office today with chief complaint:  Chief Complaint   Patient presents with    Diabetes     A1C on 1/30 was 10.3     Uncontrolled Insulin-dependent diabetes: Taking glargine 35U in the morning. States she forgets to take it in the evening. Using SSI. Sugars running 90 to 130 in the AM. A1c significantly improved from a month ago. Insomnia: Trazodone 50mg without improvement. Agrees to attempting trazodone 100mg. Lies awake for long periods of time. Tries not to nap; needs to occasionally as she wakes at 3:30 AM for dialysis. Agrees to attempt trazodone 100mg at hs.     Knee OA: No improvement w salves. R knee pain worse than L. Unable to take NSAIDs. Would like referral to Dr. Pb Dick. Health Maintenance Due   Topic Date Due    Shingles vaccine (1 of 2) Never done    COVID-19 Vaccine (5 - Booster for Moderna series) 10/25/2022     Podiatrist: Dr. Samy Sheppard- last visit 2 months ago. Will fill out BARBARA. Cancer History: None. Family Cancer History: none. 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 General:  Fever, chills, diaphoresis, fatigue, malaise, night sweats, weight loss,   Psychological:  Anxiety, disorientation, hallucinations. ENT:  Epistaxis, headaches, vertigo, visual changes. Cardiovascular:  Chest pain, irregular heartbeats, palpitations, paroxysmal nocturnal dyspnea. Respiratory:  Shortness of breath, coughing, sputum production, hemoptysis, wheezing, orthopnea.   Gastrointestinal:  Nausea, vomiting, diarrhea, heartburn, constipation, abdominal pain, hematemesis, hematochezia, melena, acholic stools  Genito-Urinary:  Dysuria, urgency, frequency, hematuria  Musculoskeletal:  Joint pain- B knees, joint stiffness, joint swelling, muscle pain  Neurology:  Headache, focal neurological deficits, weakness, numbness, paresthesia  Extremities:  Decreased ROM, peripheral edema, mottling      Objective:  Vitals:    02/27/23 1240   BP: 128/86   Pulse: 83   Resp: 18   Temp: 97.2 °F (36.2 °C)   SpO2: 98%     Physical Exam  Vitals and nursing note reviewed. Constitutional:       General: She is not in acute distress. Appearance: She is well-developed. She is obese. She is not ill-appearing, toxic-appearing or diaphoretic. Comments: Using Rolling walker. HENT:      Head: Normocephalic and atraumatic. Right Ear: External ear normal.      Left Ear: External ear normal.      Nose: Nose normal.   Eyes:      General:         Right eye: No discharge. Left eye: No discharge. Conjunctiva/sclera: Conjunctivae normal.      Pupils: Pupils are equal, round, and reactive to light. Comments: L eye exotropia   Cardiovascular:      Rate and Rhythm: Normal rate and regular rhythm. Heart sounds: Normal heart sounds. No murmur heard. No friction rub. No gallop. Pulmonary:      Effort: Pulmonary effort is normal. No respiratory distress. Breath sounds: No wheezing or rales. Abdominal:      General: Bowel sounds are normal. There is no distension. Palpations: Abdomen is soft. Tenderness: There is no abdominal tenderness. There is no guarding or rebound. Musculoskeletal:      Cervical back: Normal range of motion and neck supple. Comments: Crepitus B knees w full ROM. Lymphadenopathy:      Cervical: No cervical adenopathy. Neurological:      Mental Status: She is alert and oriented to person, place, and time. Comments: Cranial nerves II through XII intact   Psychiatric:         Mood and Affect: Mood normal.         Behavior: Behavior normal.         Thought Content:  Thought content normal.         Judgment: Judgment normal.           Assessment and Plan:  Sam Olguin was seen today for diabetes. Diagnoses and all orders for this visit:    Type 2 diabetes mellitus without complication, with long-term current use of insulin ([facility])  -     POCT glycosylated hemoglobin (Hb A1C)    Elevated hemoglobin A1c  -     POCT glycosylated hemoglobin (Hb A1C)      Insulin-dependent diabetes: Continue taking insulin as prescribed. Recheck A1c at next  visit    B Knee OA: Referral sent to Dr. Ivy Morales at pt's request.     Insomnia: increase trazodone to 100mg qhs. Follow-up in 1 month    Patient may come in sooner if needed for medical concerns. Patient advised to call at any time to cancel or re-scheduleor for any questions/concerns.         Chuyita Kelly,    2/27/23      12:53 PM

## 2023-02-28 ENCOUNTER — TELEPHONE (OUTPATIENT)
Dept: PRIMARY CARE CLINIC | Age: 78
End: 2023-02-28

## 2023-02-28 DIAGNOSIS — M17.0 PRIMARY OSTEOARTHRITIS OF BOTH KNEES: Primary | ICD-10-CM

## 2023-02-28 NOTE — TELEPHONE ENCOUNTER
Appt scheduled with Dr. Char Cuenca on Tuesday, March 7, 2023 @ 2pm.  Stated pt will need to have xrays done prior to appt. Asked if an order can be placed for pt to have xray of knees done. Please advise.

## 2023-02-28 NOTE — TELEPHONE ENCOUNTER
Left message for pt on voicemail notifying her of appt with Dr. Beckie Garcia and to have xrays done prior to appt.

## 2023-03-09 RX ORDER — LANOLIN ALCOHOL/MO/W.PET/CERES
CREAM (GRAM) TOPICAL
Qty: 30 TABLET | Refills: 10 | Status: SHIPPED | OUTPATIENT
Start: 2023-03-09

## 2023-06-02 ENCOUNTER — OFFICE VISIT (OUTPATIENT)
Dept: PRIMARY CARE CLINIC | Age: 78
End: 2023-06-02
Payer: COMMERCIAL

## 2023-06-02 VITALS
BODY MASS INDEX: 32.82 KG/M2 | OXYGEN SATURATION: 95 % | SYSTOLIC BLOOD PRESSURE: 136 MMHG | TEMPERATURE: 98.2 F | WEIGHT: 204.2 LBS | DIASTOLIC BLOOD PRESSURE: 82 MMHG | HEART RATE: 79 BPM | HEIGHT: 66 IN

## 2023-06-02 DIAGNOSIS — E11.22 CONTROLLED TYPE 2 DIABETES MELLITUS WITH CHRONIC KIDNEY DISEASE ON CHRONIC DIALYSIS, WITH LONG-TERM CURRENT USE OF INSULIN (HCC): Primary | ICD-10-CM

## 2023-06-02 DIAGNOSIS — N18.6 CONTROLLED TYPE 2 DIABETES MELLITUS WITH CHRONIC KIDNEY DISEASE ON CHRONIC DIALYSIS, WITH LONG-TERM CURRENT USE OF INSULIN (HCC): Primary | ICD-10-CM

## 2023-06-02 DIAGNOSIS — Z00.00 MEDICARE ANNUAL WELLNESS VISIT, SUBSEQUENT: ICD-10-CM

## 2023-06-02 DIAGNOSIS — Z79.4 CONTROLLED TYPE 2 DIABETES MELLITUS WITH CHRONIC KIDNEY DISEASE ON CHRONIC DIALYSIS, WITH LONG-TERM CURRENT USE OF INSULIN (HCC): Primary | ICD-10-CM

## 2023-06-02 DIAGNOSIS — Z99.2 CONTROLLED TYPE 2 DIABETES MELLITUS WITH CHRONIC KIDNEY DISEASE ON CHRONIC DIALYSIS, WITH LONG-TERM CURRENT USE OF INSULIN (HCC): Primary | ICD-10-CM

## 2023-06-02 LAB — HBA1C MFR BLD: 8.9 %

## 2023-06-02 PROCEDURE — G0439 PPPS, SUBSEQ VISIT: HCPCS | Performed by: FAMILY MEDICINE

## 2023-06-02 PROCEDURE — 3052F HG A1C>EQUAL 8.0%<EQUAL 9.0%: CPT | Performed by: FAMILY MEDICINE

## 2023-06-02 PROCEDURE — 1123F ACP DISCUSS/DSCN MKR DOCD: CPT | Performed by: FAMILY MEDICINE

## 2023-06-02 PROCEDURE — 3074F SYST BP LT 130 MM HG: CPT | Performed by: FAMILY MEDICINE

## 2023-06-02 PROCEDURE — 3078F DIAST BP <80 MM HG: CPT | Performed by: FAMILY MEDICINE

## 2023-06-02 PROCEDURE — 83037 HB GLYCOSYLATED A1C HOME DEV: CPT | Performed by: FAMILY MEDICINE

## 2023-06-02 RX ORDER — PANTOPRAZOLE SODIUM 40 MG/1
1 TABLET, DELAYED RELEASE ORAL DAILY
COMMUNITY
Start: 2023-04-07

## 2023-06-02 SDOH — ECONOMIC STABILITY: FOOD INSECURITY: WITHIN THE PAST 12 MONTHS, YOU WORRIED THAT YOUR FOOD WOULD RUN OUT BEFORE YOU GOT MONEY TO BUY MORE.: NEVER TRUE

## 2023-06-02 SDOH — ECONOMIC STABILITY: FOOD INSECURITY: WITHIN THE PAST 12 MONTHS, THE FOOD YOU BOUGHT JUST DIDN'T LAST AND YOU DIDN'T HAVE MONEY TO GET MORE.: NEVER TRUE

## 2023-06-02 SDOH — ECONOMIC STABILITY: INCOME INSECURITY: HOW HARD IS IT FOR YOU TO PAY FOR THE VERY BASICS LIKE FOOD, HOUSING, MEDICAL CARE, AND HEATING?: NOT HARD AT ALL

## 2023-06-02 ASSESSMENT — LIFESTYLE VARIABLES
HOW MANY STANDARD DRINKS CONTAINING ALCOHOL DO YOU HAVE ON A TYPICAL DAY: PATIENT DOES NOT DRINK
HOW OFTEN DO YOU HAVE A DRINK CONTAINING ALCOHOL: NEVER

## 2023-06-02 ASSESSMENT — PATIENT HEALTH QUESTIONNAIRE - PHQ9
SUM OF ALL RESPONSES TO PHQ QUESTIONS 1-9: 9
10. IF YOU CHECKED OFF ANY PROBLEMS, HOW DIFFICULT HAVE THESE PROBLEMS MADE IT FOR YOU TO DO YOUR WORK, TAKE CARE OF THINGS AT HOME, OR GET ALONG WITH OTHER PEOPLE: 1
SUM OF ALL RESPONSES TO PHQ QUESTIONS 1-9: 10
8. MOVING OR SPEAKING SO SLOWLY THAT OTHER PEOPLE COULD HAVE NOTICED. OR THE OPPOSITE, BEING SO FIGETY OR RESTLESS THAT YOU HAVE BEEN MOVING AROUND A LOT MORE THAN USUAL: 0
2. FEELING DOWN, DEPRESSED OR HOPELESS: 1
6. FEELING BAD ABOUT YOURSELF - OR THAT YOU ARE A FAILURE OR HAVE LET YOURSELF OR YOUR FAMILY DOWN: 0
1. LITTLE INTEREST OR PLEASURE IN DOING THINGS: 1
4. FEELING TIRED OR HAVING LITTLE ENERGY: 3
7. TROUBLE CONCENTRATING ON THINGS, SUCH AS READING THE NEWSPAPER OR WATCHING TELEVISION: 0
3. TROUBLE FALLING OR STAYING ASLEEP: 3
SUM OF ALL RESPONSES TO PHQ9 QUESTIONS 1 & 2: 2
5. POOR APPETITE OR OVEREATING: 1
9. THOUGHTS THAT YOU WOULD BE BETTER OFF DEAD, OR OF HURTING YOURSELF: 1
SUM OF ALL RESPONSES TO PHQ QUESTIONS 1-9: 10
SUM OF ALL RESPONSES TO PHQ QUESTIONS 1-9: 10

## 2023-06-02 ASSESSMENT — COLUMBIA-SUICIDE SEVERITY RATING SCALE - C-SSRS
1. WITHIN THE PAST MONTH, HAVE YOU WISHED YOU WERE DEAD OR WISHED YOU COULD GO TO SLEEP AND NOT WAKE UP?: YES
2. HAVE YOU ACTUALLY HAD ANY THOUGHTS OF KILLING YOURSELF?: NO
6. HAVE YOU EVER DONE ANYTHING, STARTED TO DO ANYTHING, OR PREPARED TO DO ANYTHING TO END YOUR LIFE?: NO

## 2023-06-30 DIAGNOSIS — E03.9 ACQUIRED HYPOTHYROIDISM: Primary | ICD-10-CM

## 2023-06-30 RX ORDER — LEVOTHYROXINE SODIUM 88 UG/1
88 TABLET ORAL DAILY
Qty: 90 TABLET | Refills: 1 | Status: SHIPPED | OUTPATIENT
Start: 2023-06-30

## 2023-07-06 DIAGNOSIS — N18.6 CONTROLLED TYPE 2 DIABETES MELLITUS WITH CHRONIC KIDNEY DISEASE ON CHRONIC DIALYSIS, WITH LONG-TERM CURRENT USE OF INSULIN (HCC): ICD-10-CM

## 2023-07-06 DIAGNOSIS — E11.22 CONTROLLED TYPE 2 DIABETES MELLITUS WITH CHRONIC KIDNEY DISEASE ON CHRONIC DIALYSIS, WITH LONG-TERM CURRENT USE OF INSULIN (HCC): ICD-10-CM

## 2023-07-06 DIAGNOSIS — Z79.4 CONTROLLED TYPE 2 DIABETES MELLITUS WITH CHRONIC KIDNEY DISEASE ON CHRONIC DIALYSIS, WITH LONG-TERM CURRENT USE OF INSULIN (HCC): ICD-10-CM

## 2023-07-06 DIAGNOSIS — Z99.2 CONTROLLED TYPE 2 DIABETES MELLITUS WITH CHRONIC KIDNEY DISEASE ON CHRONIC DIALYSIS, WITH LONG-TERM CURRENT USE OF INSULIN (HCC): ICD-10-CM

## 2023-07-07 RX ORDER — INSULIN LISPRO 100 [IU]/ML
INJECTION, SOLUTION INTRAVENOUS; SUBCUTANEOUS
Qty: 15 ML | Refills: 10 | Status: SHIPPED | OUTPATIENT
Start: 2023-07-07

## 2023-08-04 ENCOUNTER — TELEPHONE (OUTPATIENT)
Dept: VASCULAR SURGERY | Age: 78
End: 2023-08-04

## 2023-08-04 NOTE — TELEPHONE ENCOUNTER
Received referral from [facility]  for slow access flow AVF, left message for patient to schedule appointment robert Salinas.

## 2023-08-28 ENCOUNTER — TELEPHONE (OUTPATIENT)
Dept: PRIMARY CARE CLINIC | Age: 78
End: 2023-08-28

## 2023-08-28 NOTE — TELEPHONE ENCOUNTER
PC to patient to schedule 3 month follow up for DM2    Left VM for patient to contact the office to schedule    Patient to transfer care to Dino Finch PA-C upon Dr. Yuniel Tabares departure

## 2023-09-23 ENCOUNTER — HOSPITAL ENCOUNTER (EMERGENCY)
Age: 78
Discharge: HOME | End: 2023-09-23
Attending: EMERGENCY MEDICINE
Payer: COMMERCIAL

## 2023-09-23 ENCOUNTER — APPOINTMENT (OUTPATIENT)
Dept: ULTRASOUND IMAGING | Age: 78
End: 2023-09-23
Payer: COMMERCIAL

## 2023-09-23 VITALS
SYSTOLIC BLOOD PRESSURE: 163 MMHG | DIASTOLIC BLOOD PRESSURE: 95 MMHG | BODY MASS INDEX: 33.89 KG/M2 | HEART RATE: 79 BPM | RESPIRATION RATE: 18 BRPM | OXYGEN SATURATION: 95 % | TEMPERATURE: 97.9 F | WEIGHT: 210 LBS

## 2023-09-23 DIAGNOSIS — N18.30 CHRONIC RENAL FAILURE, STAGE 3 (MODERATE), UNSPECIFIED WHETHER STAGE 3A OR 3B CKD (HCC): ICD-10-CM

## 2023-09-23 DIAGNOSIS — R60.9 PERIPHERAL EDEMA: Primary | ICD-10-CM

## 2023-09-23 DIAGNOSIS — T14.8XXA BRUISE: ICD-10-CM

## 2023-09-23 LAB
ANION GAP SERPL CALCULATED.3IONS-SCNC: 9 MMOL/L (ref 7–16)
BASOPHILS # BLD: 0.02 K/UL (ref 0–0.2)
BASOPHILS NFR BLD: 0 % (ref 0–2)
BUN SERPL-MCNC: 23 MG/DL (ref 6–23)
CALCIUM SERPL-MCNC: 8.2 MG/DL (ref 8.6–10.2)
CHLORIDE SERPL-SCNC: 97 MMOL/L (ref 98–107)
CO2 SERPL-SCNC: 33 MMOL/L (ref 22–29)
CREAT SERPL-MCNC: 2.9 MG/DL (ref 0.5–1)
EOSINOPHIL # BLD: 0.19 K/UL (ref 0.05–0.5)
EOSINOPHILS RELATIVE PERCENT: 3 % (ref 0–6)
ERYTHROCYTE [DISTWIDTH] IN BLOOD BY AUTOMATED COUNT: 13.9 % (ref 11.5–15)
GFR SERPL CREATININE-BSD FRML MDRD: 16 ML/MIN/1.73M2
GLUCOSE SERPL-MCNC: 221 MG/DL (ref 74–99)
HCT VFR BLD AUTO: 30.3 % (ref 34–48)
HGB BLD-MCNC: 9.4 G/DL (ref 11.5–15.5)
IMM GRANULOCYTES # BLD AUTO: 0.04 K/UL (ref 0–0.58)
IMM GRANULOCYTES NFR BLD: 1 % (ref 0–5)
LYMPHOCYTES NFR BLD: 0.97 K/UL (ref 1.5–4)
LYMPHOCYTES RELATIVE PERCENT: 15 % (ref 20–42)
MCH RBC QN AUTO: 31.3 PG (ref 26–35)
MCHC RBC AUTO-ENTMCNC: 31 G/DL (ref 32–34.5)
MCV RBC AUTO: 101 FL (ref 80–99.9)
MONOCYTES NFR BLD: 0.56 K/UL (ref 0.1–0.95)
MONOCYTES NFR BLD: 9 % (ref 2–12)
NEUTROPHILS NFR BLD: 72 % (ref 43–80)
NEUTS SEG NFR BLD: 4.52 K/UL (ref 1.8–7.3)
PLATELET # BLD AUTO: 152 K/UL (ref 130–450)
PMV BLD AUTO: 10.3 FL (ref 7–12)
POTASSIUM SERPL-SCNC: 4.6 MMOL/L (ref 3.5–5)
RBC # BLD AUTO: 3 M/UL (ref 3.5–5.5)
SODIUM SERPL-SCNC: 139 MMOL/L (ref 132–146)
WBC OTHER # BLD: 6.3 K/UL (ref 4.5–11.5)

## 2023-09-23 PROCEDURE — 93971 EXTREMITY STUDY: CPT

## 2023-09-23 PROCEDURE — 80048 BASIC METABOLIC PNL TOTAL CA: CPT

## 2023-09-23 PROCEDURE — 99284 EMERGENCY DEPT VISIT MOD MDM: CPT

## 2023-09-23 PROCEDURE — 85025 COMPLETE CBC W/AUTO DIFF WBC: CPT

## 2023-09-23 ASSESSMENT — ENCOUNTER SYMPTOMS
VOMITING: 0
CHEST TIGHTNESS: 0
BACK PAIN: 0
WHEEZING: 0
NAUSEA: 0
ABDOMINAL PAIN: 0
COUGH: 0
SHORTNESS OF BREATH: 0
DIARRHEA: 0
SORE THROAT: 0

## 2023-09-23 ASSESSMENT — PATIENT HEALTH QUESTIONNAIRE - PHQ9
1. LITTLE INTEREST OR PLEASURE IN DOING THINGS: 0
SUM OF ALL RESPONSES TO PHQ QUESTIONS 1-9: 0
SUM OF ALL RESPONSES TO PHQ9 QUESTIONS 1 & 2: 0
2. FEELING DOWN, DEPRESSED OR HOPELESS: 0

## 2023-09-23 ASSESSMENT — PAIN DESCRIPTION - ORIENTATION: ORIENTATION: LEFT

## 2023-09-23 ASSESSMENT — LIFESTYLE VARIABLES: HOW OFTEN DO YOU HAVE A DRINK CONTAINING ALCOHOL: NEVER

## 2023-09-23 ASSESSMENT — PAIN - FUNCTIONAL ASSESSMENT: PAIN_FUNCTIONAL_ASSESSMENT: 0-10

## 2023-09-23 ASSESSMENT — PAIN DESCRIPTION - LOCATION: LOCATION: ARM

## 2023-09-23 ASSESSMENT — PAIN SCALES - GENERAL: PAINLEVEL_OUTOF10: 8

## 2023-09-23 NOTE — ED PROVIDER NOTES
Chief complaint:  Left arm swelling, pain    HPI history provided by the patient  Patient presents here with swelling and pain to the left arm starting on Tuesday. She has a dialysis AV fistula in that left arm and they have still been able to use it this week including today when she had a full session. She still has some mild swelling in the arm with a little bit of bruising behind the arm in the triceps area and is here for evaluation. Denies chest pain, palpitations or shortness of breath and denies fevers, sweats or chills. No redness or focal swelling overlying the graft area. Review of Systems   Constitutional:  Negative for chills, diaphoresis, fatigue and fever. HENT:  Negative for congestion and sore throat. Respiratory:  Negative for cough, chest tightness, shortness of breath and wheezing. Cardiovascular:  Negative for chest pain, palpitations and leg swelling. Gastrointestinal:  Negative for abdominal pain, diarrhea, nausea and vomiting. Genitourinary:  Negative for flank pain. Musculoskeletal:  Positive for myalgias. Negative for arthralgias, back pain, gait problem, joint swelling, neck pain and neck stiffness. Skin:  Negative for rash and wound. Neurological:  Negative for dizziness, seizures, syncope, weakness, light-headedness, numbness and headaches. All other systems reviewed and are negative. Physical Exam  Vitals and nursing note reviewed. Constitutional:       General: She is awake. She is not in acute distress. Appearance: She is well-developed. She is not ill-appearing, toxic-appearing or diaphoretic. HENT:      Head: Normocephalic and atraumatic. Eyes:      General: No scleral icterus. Pupils: Pupils are equal, round, and reactive to light. Cardiovascular:      Rate and Rhythm: Normal rate and regular rhythm. Heart sounds: Normal heart sounds. No murmur heard. Pulmonary:      Effort: Pulmonary effort is normal. No respiratory distress. immediate return here for re evaluation. They will followup with primary care by calling their office tomorrow. --------------------------------- ADDITIONAL PROVIDER NOTES ---------------------------------  At this time the patient is without objective evidence of an acute process requiring hospitalization or inpatient management. They have remained hemodynamically stable throughout their entire ED visit and are stable for discharge with outpatient follow-up. The plan has been discussed in detail and they are aware of the specific conditions for emergent return, as well as the importance of follow-up. New Prescriptions    No medications on file       Diagnosis:  1. Peripheral edema    2. Chronic renal failure, stage 3 (moderate), unspecified whether stage 3a or 3b CKD ([address])    3. Bruise        Disposition:  Patient's disposition: Discharge to home  Patient's condition is stable.          [address] [facility],   09/23/23 4200

## 2023-09-29 ENCOUNTER — TELEPHONE (OUTPATIENT)
Dept: PRIMARY CARE CLINIC | Age: 78
End: 2023-09-29

## 2023-09-29 NOTE — TELEPHONE ENCOUNTER
Second attempt to reach patient to schedule an appointment. Patient to be seen every 3 months for care (DM2). Patient has not returned phone calls to the office for scheduling. Unsure if she has transferred care.     Left another voice mail for patient to contact the office to schedule an appointment as soon as possible

## 2023-09-29 NOTE — TELEPHONE ENCOUNTER
Patient called back to schedule appointment    Patient unable to come in on 10/4 or 10/6 due to transport issues.   Appointment scheduled for 10/13

## 2023-09-29 NOTE — TELEPHONE ENCOUNTER
PC to patient's emergency contact to follow up. She states patient is fine and is wanting to establish care with Amari Weir. States that the patient does not answer her phone nor does she check her voice mails. States that she will have patient contact the office to schedule.     She is aware that she should be seen soon for diabetic care and medication refills

## 2023-10-02 ENCOUNTER — TELEPHONE (OUTPATIENT)
Dept: PRIMARY CARE CLINIC | Age: 78
End: 2023-10-02

## 2023-10-02 NOTE — TELEPHONE ENCOUNTER
----- Message from Donna Ybarra sent at 9/29/2023 12:40 PM EDT -----  Subject: Appointment Request    Reason for Call: New Patient/New to Provider Appointment needed: Routine   (Patient Request) No Script    QUESTIONS    Reason for appointment request? Available appointments did not meet   patient need     Additional Information for Provider? The patient called the office back in   regard to scheduling an appointment for medication refills and establish   care, no answer after holding.  The patient is requesting a call back and   states she will be available today.  ---------------------------------------------------------------------------  --------------  [address]  [phone]; OK to leave message on voicemail  ---------------------------------------------------------------------------  --------------  SCRIPT ANSWERS

## 2023-10-02 NOTE — TELEPHONE ENCOUNTER
PC to pt, spoke with pts sister. Stated pt is ok with appt on 10/13/23, pt is ok on medications, but doesn't believe pt has enough meds until appt 10/13/23. Will need refills.

## 2023-10-03 DIAGNOSIS — E78.49 OTHER HYPERLIPIDEMIA: ICD-10-CM

## 2023-10-03 RX ORDER — ATORVASTATIN CALCIUM 10 MG/1
10 TABLET, FILM COATED ORAL NIGHTLY
Qty: 90 TABLET | Refills: 2 | Status: SHIPPED | OUTPATIENT
Start: 2023-10-03

## 2023-10-10 ENCOUNTER — OFFICE VISIT (OUTPATIENT)
Dept: VASCULAR SURGERY | Age: 78
End: 2023-10-10
Payer: COMMERCIAL

## 2023-10-10 VITALS — HEIGHT: 66 IN | BODY MASS INDEX: 33.75 KG/M2 | WEIGHT: 210 LBS

## 2023-10-10 DIAGNOSIS — Z99.2 ESRD ON DIALYSIS (HCC): Primary | ICD-10-CM

## 2023-10-10 DIAGNOSIS — N18.6 ESRD ON DIALYSIS (HCC): Primary | ICD-10-CM

## 2023-10-10 PROCEDURE — 1036F TOBACCO NON-USER: CPT

## 2023-10-10 PROCEDURE — G8484 FLU IMMUNIZE NO ADMIN: HCPCS

## 2023-10-10 PROCEDURE — 1090F PRES/ABSN URINE INCON ASSESS: CPT

## 2023-10-10 PROCEDURE — G8417 CALC BMI ABV UP PARAM F/U: HCPCS

## 2023-10-10 PROCEDURE — G8427 DOCREV CUR MEDS BY ELIG CLIN: HCPCS

## 2023-10-10 PROCEDURE — 1123F ACP DISCUSS/DSCN MKR DOCD: CPT

## 2023-10-10 PROCEDURE — 99203 OFFICE O/P NEW LOW 30 MIN: CPT

## 2023-10-10 PROCEDURE — G8399 PT W/DXA RESULTS DOCUMENT: HCPCS

## 2023-10-10 RX ORDER — BUDESONIDE AND FORMOTEROL FUMARATE DIHYDRATE 160; 4.5 UG/1; UG/1
2 AEROSOL RESPIRATORY (INHALATION) 2 TIMES DAILY
COMMUNITY

## 2023-10-10 RX ORDER — ERGOCALCIFEROL 1.25 MG/1
50000 CAPSULE ORAL WEEKLY
COMMUNITY

## 2023-10-10 NOTE — PROGRESS NOTES
Vascular Surgery Outpatient Consultation      Chief Complaint   Patient presents with    Consultation     New pt. Left arm slow access flow       Reason for Consult:  Hx AVF, left arm swelling, decreased flows    Requesting Physician:  Dr. Sandor Rao:                The patient is a 70 y.o. female who is referred for evaluation of her previously placed hemodialysis access. Pt had a left brachiocephalic fistula placed by Dr. Thiago Ortiz in 2018. She states she has had multiple procedures done to the fistula at the access center prior to them closing. She has not had anything done since that time. She is now having left arm swelling and low flows per the dialysis center.   She dialyzes T, TH, S.     Past Medical History:        Diagnosis Date    Anemia     Arrhythmia     AF    Arthritis     RA    Asthma     Chronic kidney disease     Depression     Hemodialysis patient ([address])     T-Th-Sat    Hyperlipidemia     Hypothyroid     Sleep apnea     no CPAP    Type II or unspecified type diabetes mellitus without mention of complication, not stated as uncontrolled     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 6/15/2017     Past Surgical History:        Procedure Laterality Date    CARPAL TUNNEL RELEASE Left 8/12/2020    LEFT CARPAL TUNNEL RELEASE performed by Joycelyn Angel MD at [facility] Right 10/7/2020    RIGHT CARPAL TUNNEL RELEASE performed by Joycelyn Angel MD at [address] [place] [address]  2004    DIALYSIS FISTULA CREATION Left 03/02/2018    AV fistula arm/Dr. Thiago Ortiz    EYE SURGERY Right 2010    cataract    FOOT SURGERY Left 2012,2013    pin put in, then pin removed    GASTRIC BYPASS SURGERY  2004    HAND SURGERY Right 2012    ligament was cut    HERNIA REPAIR      KNEE SURGERY  2009    R knee left side    GA REVJ OPN ARVEN FSTL W/O [address] [facility],[address] DIAL GRF Left 5/23/2018    SUPERFICIALIZATION AV FISTULA - LEFT UPPER ARM performed by Oliverio Younger

## 2023-10-16 ENCOUNTER — TELEPHONE (OUTPATIENT)
Dept: PRIMARY CARE CLINIC | Age: 78
End: 2023-10-16

## 2023-10-16 NOTE — TELEPHONE ENCOUNTER
----- Message from Yosef Lemons sent at 10/16/2023  1:32 PM EDT -----  Subject: Appointment Request    Reason for Call: New Patient/New to Provider Appointment needed: New   Patient Request Appointment    QUESTIONS    Reason for appointment request? Available appointments did not meet   patient need     Additional Information for Provider? Patient is needing to get in this   month or the beginning of November to get established and to get refills.    Please  ---------------------------------------------------------------------------  --------------  Solis PAEZ  [phone]; OK to leave message on voicemail  ---------------------------------------------------------------------------  --------------  SCRIPT ANSWERS

## 2023-11-01 ENCOUNTER — OFFICE VISIT (OUTPATIENT)
Dept: PRIMARY CARE CLINIC | Age: 78
End: 2023-11-01
Payer: COMMERCIAL

## 2023-11-01 VITALS
SYSTOLIC BLOOD PRESSURE: 128 MMHG | BODY MASS INDEX: 33.89 KG/M2 | WEIGHT: 210.9 LBS | DIASTOLIC BLOOD PRESSURE: 78 MMHG | HEIGHT: 66 IN | TEMPERATURE: 97.2 F | OXYGEN SATURATION: 92 % | HEART RATE: 72 BPM

## 2023-11-01 DIAGNOSIS — F41.9 ANXIETY AND DEPRESSION: ICD-10-CM

## 2023-11-01 DIAGNOSIS — E78.5 HYPERLIPIDEMIA, UNSPECIFIED HYPERLIPIDEMIA TYPE: ICD-10-CM

## 2023-11-01 DIAGNOSIS — F32.A ANXIETY AND DEPRESSION: ICD-10-CM

## 2023-11-01 DIAGNOSIS — E11.22 TYPE 2 DIABETES MELLITUS WITH CHRONIC KIDNEY DISEASE ON CHRONIC DIALYSIS, WITH LONG-TERM CURRENT USE OF INSULIN (HCC): ICD-10-CM

## 2023-11-01 DIAGNOSIS — N18.6 TYPE 2 DIABETES MELLITUS WITH CHRONIC KIDNEY DISEASE ON CHRONIC DIALYSIS, WITH LONG-TERM CURRENT USE OF INSULIN (HCC): ICD-10-CM

## 2023-11-01 DIAGNOSIS — N18.6 ESRD ON DIALYSIS (HCC): Primary | ICD-10-CM

## 2023-11-01 DIAGNOSIS — E03.9 HYPOTHYROIDISM, UNSPECIFIED TYPE: ICD-10-CM

## 2023-11-01 DIAGNOSIS — G25.81 RESTLESS LEG SYNDROME, CONTROLLED: ICD-10-CM

## 2023-11-01 DIAGNOSIS — J44.9 CHRONIC OBSTRUCTIVE PULMONARY DISEASE, UNSPECIFIED COPD TYPE (HCC): ICD-10-CM

## 2023-11-01 DIAGNOSIS — Z79.4 TYPE 2 DIABETES MELLITUS WITH CHRONIC KIDNEY DISEASE ON CHRONIC DIALYSIS, WITH LONG-TERM CURRENT USE OF INSULIN (HCC): ICD-10-CM

## 2023-11-01 DIAGNOSIS — Z23 NEED FOR PROPHYLACTIC VACCINATION AND INOCULATION AGAINST VARICELLA: ICD-10-CM

## 2023-11-01 DIAGNOSIS — F51.01 PRIMARY INSOMNIA: ICD-10-CM

## 2023-11-01 DIAGNOSIS — Z99.2 TYPE 2 DIABETES MELLITUS WITH CHRONIC KIDNEY DISEASE ON CHRONIC DIALYSIS, WITH LONG-TERM CURRENT USE OF INSULIN (HCC): ICD-10-CM

## 2023-11-01 DIAGNOSIS — Z99.2 ESRD ON DIALYSIS (HCC): Primary | ICD-10-CM

## 2023-11-01 LAB
HBA1C MFR BLD: 8.7 %
TSH SERPL DL<=0.05 MIU/L-ACNC: 5.86 UIU/ML (ref 0.27–4.2)

## 2023-11-01 PROCEDURE — G8417 CALC BMI ABV UP PARAM F/U: HCPCS

## 2023-11-01 PROCEDURE — 1036F TOBACCO NON-USER: CPT

## 2023-11-01 PROCEDURE — G8399 PT W/DXA RESULTS DOCUMENT: HCPCS

## 2023-11-01 PROCEDURE — 83036 HEMOGLOBIN GLYCOSYLATED A1C: CPT

## 2023-11-01 PROCEDURE — 3074F SYST BP LT 130 MM HG: CPT

## 2023-11-01 PROCEDURE — 99214 OFFICE O/P EST MOD 30 MIN: CPT

## 2023-11-01 PROCEDURE — 36415 COLL VENOUS BLD VENIPUNCTURE: CPT

## 2023-11-01 PROCEDURE — G8482 FLU IMMUNIZE ORDER/ADMIN: HCPCS

## 2023-11-01 PROCEDURE — 1123F ACP DISCUSS/DSCN MKR DOCD: CPT

## 2023-11-01 PROCEDURE — G8427 DOCREV CUR MEDS BY ELIG CLIN: HCPCS

## 2023-11-01 PROCEDURE — 3078F DIAST BP <80 MM HG: CPT

## 2023-11-01 PROCEDURE — 1090F PRES/ABSN URINE INCON ASSESS: CPT

## 2023-11-01 PROCEDURE — 3052F HG A1C>EQUAL 8.0%<EQUAL 9.0%: CPT

## 2023-11-01 PROCEDURE — 3023F SPIROM DOC REV: CPT

## 2023-11-01 RX ORDER — BUDESONIDE AND FORMOTEROL FUMARATE DIHYDRATE 160; 4.5 UG/1; UG/1
2 AEROSOL RESPIRATORY (INHALATION) 2 TIMES DAILY
Qty: 10.2 G | Refills: 5 | Status: SHIPPED | OUTPATIENT
Start: 2023-11-01

## 2023-11-01 RX ORDER — ROPINIROLE 2 MG/1
2 TABLET, FILM COATED ORAL NIGHTLY
Qty: 30 TABLET | Refills: 10 | Status: SHIPPED | OUTPATIENT
Start: 2023-11-01

## 2023-11-01 RX ORDER — FEXOFENADINE HCL 180 MG/1
180 TABLET ORAL DAILY
COMMUNITY

## 2023-11-01 RX ORDER — FOLIC ACID 1 MG/1
1000 TABLET ORAL DAILY
Qty: 30 TABLET | Refills: 10 | Status: SHIPPED | OUTPATIENT
Start: 2023-11-01

## 2023-11-01 RX ORDER — BLOOD-GLUCOSE METER
1 KIT MISCELLANEOUS DAILY
Qty: 1 KIT | Refills: 0 | Status: SHIPPED | OUTPATIENT
Start: 2023-11-01

## 2023-11-01 RX ORDER — FLUTICASONE PROPIONATE AND SALMETEROL 100; 50 UG/1; UG/1
1 POWDER RESPIRATORY (INHALATION) EVERY 12 HOURS
COMMUNITY
End: 2023-11-01 | Stop reason: SDUPTHER

## 2023-11-01 RX ORDER — TRAZODONE HYDROCHLORIDE 50 MG/1
50 TABLET ORAL NIGHTLY
Qty: 30 TABLET | Refills: 10 | Status: SHIPPED | OUTPATIENT
Start: 2023-11-01

## 2023-11-01 RX ORDER — FLUTICASONE PROPIONATE AND SALMETEROL 100; 50 UG/1; UG/1
1 POWDER RESPIRATORY (INHALATION) EVERY 12 HOURS
Qty: 14 EACH | Refills: 5 | Status: SHIPPED | OUTPATIENT
Start: 2023-11-01

## 2023-11-01 ASSESSMENT — ENCOUNTER SYMPTOMS
SHORTNESS OF BREATH: 0
ABDOMINAL PAIN: 0
DIARRHEA: 0
CONSTIPATION: 0
PHOTOPHOBIA: 0
SORE THROAT: 0
RHINORRHEA: 0
WHEEZING: 0
VOMITING: 0
BACK PAIN: 0
COUGH: 0

## 2023-11-08 ENCOUNTER — HOSPITAL ENCOUNTER (OUTPATIENT)
Age: 78
Setting detail: OUTPATIENT SURGERY
Discharge: HOME | End: 2023-11-08
Attending: SURGERY | Admitting: SURGERY
Payer: COMMERCIAL

## 2023-11-08 VITALS
RESPIRATION RATE: 20 BRPM | SYSTOLIC BLOOD PRESSURE: 160 MMHG | OXYGEN SATURATION: 94 % | DIASTOLIC BLOOD PRESSURE: 88 MMHG | HEIGHT: 66 IN | TEMPERATURE: 97.1 F | BODY MASS INDEX: 33.75 KG/M2 | HEART RATE: 80 BPM | WEIGHT: 210 LBS

## 2023-11-08 DIAGNOSIS — E03.9 HYPOTHYROIDISM, UNSPECIFIED TYPE: Primary | ICD-10-CM

## 2023-11-08 DIAGNOSIS — N18.6 END STAGE RENAL DISEASE (HCC): ICD-10-CM

## 2023-11-08 PROBLEM — Z99.2 ENCOUNTER REGARDING VASCULAR ACCESS FOR DIALYSIS FOR END-STAGE RENAL DISEASE (HCC): Status: ACTIVE | Noted: 2023-11-08

## 2023-11-08 LAB — ECHO BSA: 2.11 M2

## 2023-11-08 PROCEDURE — C1725 CATH, TRANSLUMIN NON-LASER: HCPCS | Performed by: SURGERY

## 2023-11-08 PROCEDURE — 7100000010 HC PHASE II RECOVERY - FIRST 15 MIN: Performed by: SURGERY

## 2023-11-08 PROCEDURE — 36907 BALO ANGIOP CTR DIALYSIS SEG: CPT | Performed by: SURGERY

## 2023-11-08 PROCEDURE — 36901 INTRO CATH DIALYSIS CIRCUIT: CPT | Performed by: SURGERY

## 2023-11-08 PROCEDURE — C1769 GUIDE WIRE: HCPCS | Performed by: SURGERY

## 2023-11-08 PROCEDURE — C1894 INTRO/SHEATH, NON-LASER: HCPCS | Performed by: SURGERY

## 2023-11-08 PROCEDURE — 2709999900 HC NON-CHARGEABLE SUPPLY: Performed by: SURGERY

## 2023-11-08 PROCEDURE — 2500000003 HC RX 250 WO HCPCS: Performed by: SURGERY

## 2023-11-08 RX ORDER — LEVOTHYROXINE SODIUM 0.1 MG/1
100 TABLET ORAL DAILY
Qty: 90 TABLET | Refills: 0 | Status: SHIPPED | OUTPATIENT
Start: 2023-11-08

## 2023-11-08 RX ORDER — SODIUM CHLORIDE 0.9 % (FLUSH) 0.9 %
5-40 SYRINGE (ML) INJECTION EVERY 12 HOURS SCHEDULED
Status: DISCONTINUED | OUTPATIENT
Start: 2023-11-08 | End: 2023-11-08 | Stop reason: HOSPADM

## 2023-11-08 RX ORDER — SODIUM CHLORIDE 9 MG/ML
INJECTION, SOLUTION INTRAVENOUS PRN
Status: DISCONTINUED | OUTPATIENT
Start: 2023-11-08 | End: 2023-11-08 | Stop reason: HOSPADM

## 2023-11-08 RX ORDER — SODIUM CHLORIDE 0.9 % (FLUSH) 0.9 %
5-40 SYRINGE (ML) INJECTION PRN
Status: DISCONTINUED | OUTPATIENT
Start: 2023-11-08 | End: 2023-11-08 | Stop reason: HOSPADM

## 2023-11-08 NOTE — H&P
non-distended    LABS:    Lab Results   Component Value Date    WBC 6.3 09/23/2023    HGB 9.4 (L) 09/23/2023    HCT 30.3 (L) 09/23/2023     09/23/2023    PROTIME 11.8 05/23/2018    INR 1.0 05/23/2018    APTT 29.6 05/23/2018    K 4.6 09/23/2023    BUN 23 09/23/2023    CREATININE 2.9 (H) 09/23/2023

## 2023-11-08 NOTE — PROGRESS NOTES
Fistulogram performed today. Angioplasty of cephalic vein and axillary veins with a 10 mm [place] balloon. See cardiology report for details.

## 2023-11-08 NOTE — DISCHARGE INSTRUCTIONS
[place] ambulatory encounter  URGENT CARE OFFICE VISIT    SUBJECTIVE:  Samra Santillan is a 24 year old female who presented requesting evaluation for  Has a bruise on upper thing would like checked out.    Review of systems:    A review of systems was performed and findings relevant to these symptoms are included in the HPI.     PAST HISTORIES:    ALLERGIES:   Allergen Reactions   • Dairy Digestive   (Food Or Med) GI UPSET   • Gluten Meal   (Food Or Med) GI UPSET       Current Outpatient Medications   Medication Sig Dispense Refill   • desvenlafaxine (PRISTIQ) 50 MG 24 hr tablet Take 1 tablet by mouth daily. 30 tablet 1     No current facility-administered medications for this visit.        Past Medical History:   Diagnosis Date   • Acne    • Anxiety, generalized 05/26/2020   • Irregular menses 2/2014     No past surgical history on file.  Social History     Tobacco Use   • Smoking status: Never Smoker   • Smokeless tobacco: Never Used   Substance Use Topics   • Alcohol use: No   • Drug use: No       OBJECTIVE:  Physical Exam:    Visit Vitals  /60 (BP Location: LUE - Left upper extremity, Patient Position: Sitting, Cuff Size: Regular)   Pulse 81   Temp 97.7 °F (36.5 °C) (Tympanic)   Wt 49 kg   LMP 12/05/2020 (Exact Date)   SpO2 100%        CONSTITUTIONAL: Well-hydrated, well-nourished female who appears to be in no acute distress. Awake, alert and cooperative.  ABDOMEN: Soft and nontender. No masses. No liver or spleen enlargement. Bowel sounds normal.   MUSCULOSKELETAL: bi edema   SKIN: Warm, dry, intact without rash or lesion.bruise upper thigh, query petiche no other lesions  NEUROLOGIC: Alert and oriented x3.   PSYCHIATRIC:  Speech and behavior appropriate. Normal mood and affect.    DIAGNOSTICS:    Lab Services on 12/11/2020   Component Date Value Ref Range Status   • WBC 12/11/2020 5.8  4.2 - 11.0 K/mcL Final   • RBC 12/11/2020 3.90* 4.00 - 5.20 mil/mcL Final   • HGB 12/11/2020 11.6* 12.0 - 15.5  doctor will discuss the findings and suggest appropriate treatment options. In some cases, the results can indicate an immediate need for surgery. Schedule a follow-up appointment as directed by your doctor. Call Your Doctor If Any of the Following Occurs   Signs of infection, including fever and chills    Redness, swelling, increasing pain, excessive bleeding, or any discharge from the injection site    Extreme sweating, nausea, or vomiting    Extreme pain, including chest pain    Arm feels cold, turns white or blue, or becomes numb or tingly    Difficulty breathing    Any problems with your speech or vision    Facial weakness      In case of an emergency, call 911 immediately. g/dL Final   • HCT 12/11/2020 35.3* 36.0 - 46.5 % Final   • MCV 12/11/2020 90.5  78.0 - 100.0 fl Final   • MCH 12/11/2020 29.7  26.0 - 34.0 pg Final   • MCHC 12/11/2020 32.9  32.0 - 36.5 g/dL Final   • RDW-CV 12/11/2020 12.9  11.0 - 15.0 % Final   • PLT 12/11/2020 310  140 - 450 K/mcL Final   • Neutrophil, Percent 12/11/2020 63  % Final   • Lymphocytes, Percent 12/11/2020 30  % Final   • Mono, Percent 12/11/2020 5  % Final   • Eosinophils, Percent 12/11/2020 1  % Final   • Basophils, Percent 12/11/2020 1  % Final   • Absolute Neutrophils 12/11/2020 3.6  1.8 - 7.7 K/mcL Final   • Absolute Lymphocytes 12/11/2020 1.7  1.0 - 4.8 K/mcL Final   • Absolute Monocytes 12/11/2020 0.3  0.3 - 0.9 K/mcL Final   • Absolute Eosinophils  12/11/2020 0.1  0.0 - 0.5 K/mcL Final   • Absolute Basophils 12/11/2020 0.0  0.0 - 0.3 K/mcL Final   • RDW-SD 12/11/2020 41.7  39.0 - 50.0 fL Final          URGENT CARE COURSE:    [facility]    Assessment:   1. Bruise      2- low hemaglobin-11.6      PLAN:  Orders Placed This Encounter   • CBC with Automated Differential         FOLLOW-UP:   Has appt with pcp in 4 days    PATIENT INSTRUCTIONS:     Add iron and vitamins. Keep appt with pcp    The patient was advised to follow up with primary physician or to recheck with the urgent care clinic sooner if symptoms get worse or if new symptoms appear.    The patient indicated understanding of the diagnosis and agreed with the plan of care.

## 2024-01-02 RX ORDER — LANOLIN ALCOHOL/MO/W.PET/CERES
1000 CREAM (GRAM) TOPICAL DAILY
Qty: 30 TABLET | Refills: 1 | Status: SHIPPED | OUTPATIENT
Start: 2024-01-02

## 2024-01-29 DIAGNOSIS — J45.30 MILD PERSISTENT ASTHMA WITHOUT COMPLICATION: ICD-10-CM

## 2024-01-29 RX ORDER — LANOLIN ALCOHOL/MO/W.PET/CERES
1000 CREAM (GRAM) TOPICAL DAILY
Qty: 30 TABLET | Refills: 1 | Status: SHIPPED | OUTPATIENT
Start: 2024-01-29

## 2024-01-29 RX ORDER — MONTELUKAST SODIUM 10 MG/1
10 TABLET ORAL NIGHTLY
Qty: 30 TABLET | Refills: 10 | Status: SHIPPED | OUTPATIENT
Start: 2024-01-29

## 2024-02-08 ENCOUNTER — TELEPHONE (OUTPATIENT)
Dept: PRIMARY CARE CLINIC | Age: 79
End: 2024-02-08

## 2024-02-08 NOTE — TELEPHONE ENCOUNTER
PC to patient to remind of appointment    Patient states that she is needing to cancel again due to lack of transportation. States that she has to give a 2 weeks notice.    Reminded patient that she should call to schedule the transportation once her appointment is made as some places need a 1 month notice.  Also, reminded patient that she is meant to be seen every 3 months for diabetic care    Patient acknowledged understanding and will call today to confirm she is able to get transportation

## 2024-03-08 ENCOUNTER — APPOINTMENT (OUTPATIENT)
Dept: CT IMAGING | Age: 79
DRG: 004 | End: 2024-03-08
Payer: COMMERCIAL

## 2024-03-08 ENCOUNTER — APPOINTMENT (OUTPATIENT)
Dept: GENERAL RADIOLOGY | Age: 79
DRG: 004 | End: 2024-03-08
Payer: COMMERCIAL

## 2024-03-08 ENCOUNTER — HOSPITAL ENCOUNTER (EMERGENCY)
Age: 79
Discharge: HOME | DRG: 004 | End: 2024-03-09
Attending: EMERGENCY MEDICINE
Payer: COMMERCIAL

## 2024-03-08 DIAGNOSIS — S39.012A BACK STRAIN, INITIAL ENCOUNTER: Primary | ICD-10-CM

## 2024-03-08 DIAGNOSIS — J44.9 CHRONIC OBSTRUCTIVE PULMONARY DISEASE, UNSPECIFIED COPD TYPE (HCC): ICD-10-CM

## 2024-03-08 LAB
ALBUMIN SERPL-MCNC: 3.4 G/DL (ref 3.5–5.2)
ALP SERPL-CCNC: 94 U/L (ref 35–104)
ALT SERPL-CCNC: <5 U/L (ref 0–32)
ANION GAP SERPL CALCULATED.3IONS-SCNC: 13 MMOL/L (ref 7–16)
AST SERPL-CCNC: 9 U/L (ref 0–31)
BASOPHILS # BLD: 0 K/UL (ref 0–0.2)
BASOPHILS NFR BLD: 0 % (ref 0–2)
BILIRUB SERPL-MCNC: 0.5 MG/DL (ref 0–1.2)
BUN SERPL-MCNC: 42 MG/DL (ref 6–23)
CALCIUM SERPL-MCNC: 8.9 MG/DL (ref 8.6–10.2)
CHLORIDE SERPL-SCNC: 90 MMOL/L (ref 98–107)
CO2 SERPL-SCNC: 28 MMOL/L (ref 22–29)
CREAT SERPL-MCNC: 4.7 MG/DL (ref 0.5–1)
EOSINOPHIL # BLD: 0 K/UL (ref 0.05–0.5)
EOSINOPHILS RELATIVE PERCENT: 0 % (ref 0–6)
ERYTHROCYTE [DISTWIDTH] IN BLOOD BY AUTOMATED COUNT: 14.1 % (ref 11.5–15)
GFR SERPL CREATININE-BSD FRML MDRD: 9 ML/MIN/1.73M2
GLUCOSE SERPL-MCNC: 200 MG/DL (ref 74–99)
HCT VFR BLD AUTO: 36.9 % (ref 34–48)
HGB BLD-MCNC: 11.5 G/DL (ref 11.5–15.5)
LYMPHOCYTES NFR BLD: 0.15 K/UL (ref 1.5–4)
LYMPHOCYTES RELATIVE PERCENT: 2 % (ref 20–42)
MCH RBC QN AUTO: 30.7 PG (ref 26–35)
MCHC RBC AUTO-ENTMCNC: 31.2 G/DL (ref 32–34.5)
MCV RBC AUTO: 98.4 FL (ref 80–99.9)
MONOCYTES NFR BLD: 0.38 K/UL (ref 0.1–0.95)
MONOCYTES NFR BLD: 5 % (ref 2–12)
NEUTROPHILS NFR BLD: 93 % (ref 43–80)
NEUTS SEG NFR BLD: 6.98 K/UL (ref 1.8–7.3)
PLATELET, FLUORESCENCE: 138 K/UL (ref 130–450)
PMV BLD AUTO: 10.5 FL (ref 7–12)
POTASSIUM SERPL-SCNC: 4.4 MMOL/L (ref 3.5–5)
PROT SERPL-MCNC: 7 G/DL (ref 6.4–8.3)
RBC # BLD AUTO: 3.75 M/UL (ref 3.5–5.5)
RBC # BLD: ABNORMAL 10*6/UL
RBC # BLD: ABNORMAL 10*6/UL
SARS-COV-2 RDRP RESP QL NAA+PROBE: NOT DETECTED
SODIUM SERPL-SCNC: 131 MMOL/L (ref 132–146)
SPECIMEN DESCRIPTION: NORMAL
TROPONIN I SERPL HS-MCNC: 90 NG/L (ref 0–9)
WBC OTHER # BLD: 7.5 K/UL (ref 4.5–11.5)

## 2024-03-08 PROCEDURE — 73552 X-RAY EXAM OF FEMUR 2/>: CPT

## 2024-03-08 PROCEDURE — 80053 COMPREHEN METABOLIC PANEL: CPT

## 2024-03-08 PROCEDURE — 93005 ELECTROCARDIOGRAM TRACING: CPT

## 2024-03-08 PROCEDURE — 96374 THER/PROPH/DIAG INJ IV PUSH: CPT

## 2024-03-08 PROCEDURE — 6360000002 HC RX W HCPCS

## 2024-03-08 PROCEDURE — 72131 CT LUMBAR SPINE W/O DYE: CPT

## 2024-03-08 PROCEDURE — 99285 EMERGENCY DEPT VISIT HI MDM: CPT

## 2024-03-08 PROCEDURE — 73590 X-RAY EXAM OF LOWER LEG: CPT

## 2024-03-08 PROCEDURE — 85025 COMPLETE CBC W/AUTO DIFF WBC: CPT

## 2024-03-08 PROCEDURE — 71045 X-RAY EXAM CHEST 1 VIEW: CPT

## 2024-03-08 PROCEDURE — 87635 SARS-COV-2 COVID-19 AMP PRB: CPT

## 2024-03-08 PROCEDURE — 84484 ASSAY OF TROPONIN QUANT: CPT

## 2024-03-08 RX ORDER — MORPHINE SULFATE 2 MG/ML
2 INJECTION, SOLUTION INTRAMUSCULAR; INTRAVENOUS
Status: COMPLETED | OUTPATIENT
Start: 2024-03-08 | End: 2024-03-08

## 2024-03-08 RX ADMIN — MORPHINE SULFATE 2 MG: 2 INJECTION, SOLUTION INTRAMUSCULAR; INTRAVENOUS at 22:19

## 2024-03-08 ASSESSMENT — PAIN - FUNCTIONAL ASSESSMENT: PAIN_FUNCTIONAL_ASSESSMENT: 0-10

## 2024-03-08 ASSESSMENT — PAIN DESCRIPTION - LOCATION: LOCATION: BACK

## 2024-03-08 ASSESSMENT — LIFESTYLE VARIABLES
HOW OFTEN DO YOU HAVE A DRINK CONTAINING ALCOHOL: NEVER
HOW MANY STANDARD DRINKS CONTAINING ALCOHOL DO YOU HAVE ON A TYPICAL DAY: PATIENT DOES NOT DRINK

## 2024-03-08 ASSESSMENT — PAIN SCALES - GENERAL: PAINLEVEL_OUTOF10: 10

## 2024-03-09 VITALS
DIASTOLIC BLOOD PRESSURE: 65 MMHG | HEART RATE: 78 BPM | RESPIRATION RATE: 20 BRPM | SYSTOLIC BLOOD PRESSURE: 148 MMHG | WEIGHT: 210 LBS | BODY MASS INDEX: 33.75 KG/M2 | OXYGEN SATURATION: 96 % | TEMPERATURE: 98.2 F | HEIGHT: 66 IN

## 2024-03-09 LAB
EKG ATRIAL RATE: 127 BPM
EKG Q-T INTERVAL: 334 MS
EKG QRS DURATION: 90 MS
EKG QTC CALCULATION (BAZETT): 482 MS
EKG R AXIS: -12 DEGREES
EKG T AXIS: 129 DEGREES
EKG VENTRICULAR RATE: 125 BPM
TROPONIN I SERPL HS-MCNC: 90 NG/L (ref 0–9)

## 2024-03-09 PROCEDURE — 84484 ASSAY OF TROPONIN QUANT: CPT

## 2024-03-09 PROCEDURE — 6370000000 HC RX 637 (ALT 250 FOR IP)

## 2024-03-09 PROCEDURE — 93010 ELECTROCARDIOGRAM REPORT: CPT | Performed by: INTERNAL MEDICINE

## 2024-03-09 RX ORDER — IPRATROPIUM BROMIDE AND ALBUTEROL SULFATE 2.5; .5 MG/3ML; MG/3ML
1 SOLUTION RESPIRATORY (INHALATION) ONCE
Status: COMPLETED | OUTPATIENT
Start: 2024-03-09 | End: 2024-03-09

## 2024-03-09 RX ADMIN — IPRATROPIUM BROMIDE AND ALBUTEROL SULFATE 1 DOSE: 2.5; .5 SOLUTION RESPIRATORY (INHALATION) at 00:35

## 2024-03-09 ASSESSMENT — PAIN - FUNCTIONAL ASSESSMENT: PAIN_FUNCTIONAL_ASSESSMENT: NONE - DENIES PAIN

## 2024-03-09 NOTE — ED PROVIDER NOTES
[facility] EMERGENCY DEPARTMENT  EMERGENCY DEPARTMENT ENCOUNTER        Pt Name: Ainsley Morris  MRN: [id number]  Birthdate 1945  Date of evaluation: 3/8/2024  Provider: Katie Mustafa DO  PCP: Grey Chua PA-C  Note Started: 9:00 PM EST 3/8/24    CHIEF COMPLAINT       Chief Complaint   Patient presents with    Back Pain     Had fall out of bed 3 days ago. Radiates down the right leg. Pain 10/10       HISTORY OF PRESENT ILLNESS: 1 or more Elements   History From: Patient    Limitations to history : None  Social Determinants : None    Ainsley Morris is a 79 y.o. female who presents for back pain.  Patient states that she fell out of bed 3 days ago and has had back pain since then.  She states that she always gets pain after she falls out of bed.  She localizes it to the right lumbar region.  She denies any numbness or tingling.  Denies any perineal numbness.  Denies loss of bladder or bowels.  Denies a history of IV drug use.  She also complains of right leg pain.  She has been able to walk since the fall.  Denies any fever, chills, n/v, headache, dizziness, vision changes, neck tenderness or stiffness, weakness, cp, palpitations, sob, cough, abd pain, dysuria, hematuria, diarrhea, constipation, bloody stools.    Nursing Notes were all reviewed and agreed with or any disagreements were addressed in the HPI.    ROS:   Pertinent positives and negatives are stated within HPI, all other systems reviewed and are negative.      --------------------------------------------- PAST HISTORY ---------------------------------------------  Past Medical History:  has a past medical history of Anemia, Arrhythmia, Arthritis, Asthma, Chronic kidney disease, COPD (chronic obstructive pulmonary disease) (HCC), Depression, Hemodialysis patient (HCC), Hyperlipidemia, Hypothyroid, Sleep apnea, Type II or unspecified type diabetes mellitus without mention of complication, not stated as

## 2024-03-10 ENCOUNTER — APPOINTMENT (OUTPATIENT)
Dept: CT IMAGING | Age: 79
DRG: 004 | End: 2024-03-10
Payer: COMMERCIAL

## 2024-03-10 ENCOUNTER — APPOINTMENT (OUTPATIENT)
Dept: GENERAL RADIOLOGY | Age: 79
DRG: 004 | End: 2024-03-10
Payer: COMMERCIAL

## 2024-03-10 ENCOUNTER — HOSPITAL ENCOUNTER (INPATIENT)
Age: 79
LOS: 19 days | Discharge: NURSING, REHAB OR LONG-TERM CARE | DRG: 004 | End: 2024-03-29
Attending: EMERGENCY MEDICINE | Admitting: INTERNAL MEDICINE
Payer: COMMERCIAL

## 2024-03-10 DIAGNOSIS — R62.7 FAILURE TO THRIVE IN ADULT: ICD-10-CM

## 2024-03-10 DIAGNOSIS — N18.6 END STAGE RENAL DISEASE ON DIALYSIS (HCC): ICD-10-CM

## 2024-03-10 DIAGNOSIS — J96.01 ACUTE RESPIRATORY FAILURE WITH HYPOXIA (HCC): ICD-10-CM

## 2024-03-10 DIAGNOSIS — I95.81 POSTPROCEDURAL HYPOTENSION: ICD-10-CM

## 2024-03-10 DIAGNOSIS — I73.1 BUERGER DISEASE (HCC): ICD-10-CM

## 2024-03-10 DIAGNOSIS — I73.9 PERIPHERAL ARTERIAL DISEASE (HCC): ICD-10-CM

## 2024-03-10 DIAGNOSIS — G93.41 ACUTE METABOLIC ENCEPHALOPATHY: ICD-10-CM

## 2024-03-10 DIAGNOSIS — R06.02 SHORTNESS OF BREATH: ICD-10-CM

## 2024-03-10 DIAGNOSIS — E87.5 HYPERKALEMIA: Primary | ICD-10-CM

## 2024-03-10 DIAGNOSIS — J96.21 ACUTE ON CHRONIC RESPIRATORY FAILURE WITH HYPOXIA AND HYPERCAPNIA (HCC): ICD-10-CM

## 2024-03-10 DIAGNOSIS — D72.829 LEUKOCYTOSIS, UNSPECIFIED TYPE: ICD-10-CM

## 2024-03-10 DIAGNOSIS — E03.4 HYPOTHYROIDISM DUE TO ACQUIRED ATROPHY OF THYROID: ICD-10-CM

## 2024-03-10 DIAGNOSIS — N18.6 END STAGE RENAL DISEASE (HCC): ICD-10-CM

## 2024-03-10 DIAGNOSIS — Z99.2 ENCOUNTER FOR DIALYSIS (HCC): ICD-10-CM

## 2024-03-10 DIAGNOSIS — J96.22 ACUTE ON CHRONIC RESPIRATORY FAILURE WITH HYPOXIA AND HYPERCAPNIA (HCC): ICD-10-CM

## 2024-03-10 DIAGNOSIS — Z99.2 END STAGE RENAL DISEASE ON DIALYSIS (HCC): ICD-10-CM

## 2024-03-10 DIAGNOSIS — J96.00 ACUTE RESPIRATORY FAILURE, UNSPECIFIED WHETHER WITH HYPOXIA OR HYPERCAPNIA (HCC): ICD-10-CM

## 2024-03-10 LAB
ALBUMIN SERPL-MCNC: 3.5 G/DL (ref 3.5–5.2)
ALP SERPL-CCNC: 148 U/L (ref 35–104)
ALT SERPL-CCNC: 106 U/L (ref 0–32)
AMMONIA PLAS-SCNC: 20 UMOL/L (ref 11–51)
ANION GAP SERPL CALCULATED.3IONS-SCNC: 20 MMOL/L (ref 7–16)
ANION GAP SERPL CALCULATED.3IONS-SCNC: 25 MMOL/L (ref 7–16)
AST SERPL-CCNC: 296 U/L (ref 0–31)
BASOPHILS # BLD: 0.01 K/UL (ref 0–0.2)
BASOPHILS NFR BLD: 0 % (ref 0–2)
BILIRUB SERPL-MCNC: 1.6 MG/DL (ref 0–1.2)
BUN SERPL-MCNC: 78 MG/DL (ref 6–23)
BUN SERPL-MCNC: 78 MG/DL (ref 6–23)
CALCIUM SERPL-MCNC: 8.1 MG/DL (ref 8.6–10.2)
CALCIUM SERPL-MCNC: 8.9 MG/DL (ref 8.6–10.2)
CHLORIDE SERPL-SCNC: 89 MMOL/L (ref 98–107)
CHLORIDE SERPL-SCNC: 92 MMOL/L (ref 98–107)
CK SERPL-CCNC: 360 U/L (ref 20–180)
CO2 SERPL-SCNC: 19 MMOL/L (ref 22–29)
CO2 SERPL-SCNC: 21 MMOL/L (ref 22–29)
CREAT SERPL-MCNC: 6.9 MG/DL (ref 0.5–1)
CREAT SERPL-MCNC: 7.1 MG/DL (ref 0.5–1)
CRITICAL ACTION: NORMAL
CRITICAL NOTIFICATION DATE/TIME: NORMAL
CRITICAL NOTIFICATION: NORMAL
CRITICAL VALUE READ BACK: YES
EOSINOPHIL # BLD: 0.01 K/UL (ref 0.05–0.5)
EOSINOPHILS RELATIVE PERCENT: 0 % (ref 0–6)
ERYTHROCYTE [DISTWIDTH] IN BLOOD BY AUTOMATED COUNT: 14.6 % (ref 11.5–15)
FIO2: 50
FLOW RATE: 5
GFR SERPL CREATININE-BSD FRML MDRD: 6 ML/MIN/1.73M2
GFR SERPL CREATININE-BSD FRML MDRD: 6 ML/MIN/1.73M2
GLUCOSE BLD-MCNC: 126 MG/DL (ref 74–99)
GLUCOSE SERPL-MCNC: 123 MG/DL (ref 74–99)
GLUCOSE SERPL-MCNC: 139 MG/DL (ref 74–99)
HCT VFR BLD AUTO: 39.8 % (ref 34–48)
HGB BLD-MCNC: 12 G/DL (ref 11.5–15.5)
IMM GRANULOCYTES # BLD AUTO: 0.08 K/UL (ref 0–0.58)
IMM GRANULOCYTES NFR BLD: 1 % (ref 0–5)
INSPIRATORY PRESSURE: 14
LACTATE BLDV-SCNC: 3.5 MMOL/L (ref 0.5–1.9)
LACTATE BLDV-SCNC: 7.1 MMOL/L (ref 0.5–1.9)
LYMPHOCYTES NFR BLD: 0.35 K/UL (ref 1.5–4)
LYMPHOCYTES RELATIVE PERCENT: 3 % (ref 20–42)
MAGNESIUM SERPL-MCNC: 2.3 MG/DL (ref 1.6–2.6)
MCH RBC QN AUTO: 30.4 PG (ref 26–35)
MCHC RBC AUTO-ENTMCNC: 30.2 G/DL (ref 32–34.5)
MCV RBC AUTO: 100.8 FL (ref 80–99.9)
MONOCYTES NFR BLD: 0.31 K/UL (ref 0.1–0.95)
MONOCYTES NFR BLD: 3 % (ref 2–12)
NEGATIVE BASE EXCESS, ART: 5.6 MMOL/L
NEGATIVE BASE EXCESS, ART: 8.1 MMOL/L
NEUTROPHILS NFR BLD: 94 % (ref 43–80)
NEUTS SEG NFR BLD: 11.17 K/UL (ref 1.8–7.3)
O2 DELIVERY DEVICE: ABNORMAL
O2 DELIVERY DEVICE: ABNORMAL
PEEP: 6
PHOSPHATE SERPL-MCNC: 10.8 MG/DL (ref 2.5–4.5)
PLATELET CONFIRMATION: NORMAL
PLATELET, FLUORESCENCE: 127 K/UL (ref 130–450)
PMV BLD AUTO: 11.4 FL (ref 7–12)
POC HCO3: 22.1 MMOL/L (ref 22–26)
POC HCO3: 22.6 MMOL/L (ref 22–26)
POC O2 SATURATION: 62.3 % (ref 92–98.5)
POC O2 SATURATION: 98.1 % (ref 92–98.5)
POC PCO2: 51.6 MM HG (ref 35–45)
POC PCO2: 71.8 MM HG (ref 35–45)
POC PH: 7.11 (ref 7.35–7.45)
POC PH: 7.24 (ref 7.35–7.45)
POC PO2: 126.4 MM HG (ref 60–80)
POC PO2: 44.4 MM HG (ref 60–80)
POTASSIUM SERPL-SCNC: 5.2 MMOL/L (ref 3.5–5)
POTASSIUM SERPL-SCNC: 6.1 MMOL/L (ref 3.5–5)
PROT SERPL-MCNC: 7.1 G/DL (ref 6.4–8.3)
RBC # BLD AUTO: 3.95 M/UL (ref 3.5–5.5)
RBC # BLD: ABNORMAL 10*6/UL
RBC # BLD: ABNORMAL 10*6/UL
SODIUM SERPL-SCNC: 133 MMOL/L (ref 132–146)
SODIUM SERPL-SCNC: 133 MMOL/L (ref 132–146)
TROPONIN I SERPL HS-MCNC: 130 NG/L (ref 0–9)
TROPONIN I SERPL HS-MCNC: 133 NG/L (ref 0–9)
WBC OTHER # BLD: 11.9 K/UL (ref 4.5–11.5)

## 2024-03-10 PROCEDURE — 83605 ASSAY OF LACTIC ACID: CPT

## 2024-03-10 PROCEDURE — 82550 ASSAY OF CK (CPK): CPT

## 2024-03-10 PROCEDURE — 2060000000 HC ICU INTERMEDIATE R&B

## 2024-03-10 PROCEDURE — 2500000003 HC RX 250 WO HCPCS

## 2024-03-10 PROCEDURE — 90935 HEMODIALYSIS ONE EVALUATION: CPT

## 2024-03-10 PROCEDURE — 0BH17EZ INSERTION OF ENDOTRACHEAL AIRWAY INTO TRACHEA, VIA NATURAL OR ARTIFICIAL OPENING: ICD-10-PCS | Performed by: INTERNAL MEDICINE

## 2024-03-10 PROCEDURE — 96375 TX/PRO/DX INJ NEW DRUG ADDON: CPT

## 2024-03-10 PROCEDURE — 82140 ASSAY OF AMMONIA: CPT

## 2024-03-10 PROCEDURE — 84484 ASSAY OF TROPONIN QUANT: CPT

## 2024-03-10 PROCEDURE — 2580000003 HC RX 258

## 2024-03-10 PROCEDURE — 94660 CPAP INITIATION&MGMT: CPT

## 2024-03-10 PROCEDURE — 2580000003 HC RX 258: Performed by: EMERGENCY MEDICINE

## 2024-03-10 PROCEDURE — 5A09457 ASSISTANCE WITH RESPIRATORY VENTILATION, 24-96 CONSECUTIVE HOURS, CONTINUOUS POSITIVE AIRWAY PRESSURE: ICD-10-PCS | Performed by: INTERNAL MEDICINE

## 2024-03-10 PROCEDURE — 71275 CT ANGIOGRAPHY CHEST: CPT

## 2024-03-10 PROCEDURE — 99285 EMERGENCY DEPT VISIT HI MDM: CPT

## 2024-03-10 PROCEDURE — 6370000000 HC RX 637 (ALT 250 FOR IP)

## 2024-03-10 PROCEDURE — 82803 BLOOD GASES ANY COMBINATION: CPT

## 2024-03-10 PROCEDURE — 5A1D70Z PERFORMANCE OF URINARY FILTRATION, INTERMITTENT, LESS THAN 6 HOURS PER DAY: ICD-10-PCS | Performed by: INTERNAL MEDICINE

## 2024-03-10 PROCEDURE — 84100 ASSAY OF PHOSPHORUS: CPT

## 2024-03-10 PROCEDURE — 87040 BLOOD CULTURE FOR BACTERIA: CPT

## 2024-03-10 PROCEDURE — 82962 GLUCOSE BLOOD TEST: CPT

## 2024-03-10 PROCEDURE — 6360000004 HC RX CONTRAST MEDICATION: Performed by: RADIOLOGY

## 2024-03-10 PROCEDURE — 6360000002 HC RX W HCPCS

## 2024-03-10 PROCEDURE — 93005 ELECTROCARDIOGRAM TRACING: CPT

## 2024-03-10 PROCEDURE — 71045 X-RAY EXAM CHEST 1 VIEW: CPT

## 2024-03-10 PROCEDURE — 3E033XZ INTRODUCTION OF VASOPRESSOR INTO PERIPHERAL VEIN, PERCUTANEOUS APPROACH: ICD-10-PCS | Performed by: INTERNAL MEDICINE

## 2024-03-10 PROCEDURE — 80048 BASIC METABOLIC PNL TOTAL CA: CPT

## 2024-03-10 PROCEDURE — 80053 COMPREHEN METABOLIC PANEL: CPT

## 2024-03-10 PROCEDURE — 87154 CUL TYP ID BLD PTHGN 6+ TRGT: CPT

## 2024-03-10 PROCEDURE — 85025 COMPLETE CBC W/AUTO DIFF WBC: CPT

## 2024-03-10 PROCEDURE — 99223 1ST HOSP IP/OBS HIGH 75: CPT | Performed by: INTERNAL MEDICINE

## 2024-03-10 PROCEDURE — 70450 CT HEAD/BRAIN W/O DYE: CPT

## 2024-03-10 PROCEDURE — 83735 ASSAY OF MAGNESIUM: CPT

## 2024-03-10 PROCEDURE — 74177 CT ABD & PELVIS W/CONTRAST: CPT

## 2024-03-10 PROCEDURE — 96374 THER/PROPH/DIAG INJ IV PUSH: CPT

## 2024-03-10 RX ORDER — MIDODRINE HYDROCHLORIDE 5 MG/1
5 TABLET ORAL ONCE
Status: COMPLETED | OUTPATIENT
Start: 2024-03-10 | End: 2024-03-10

## 2024-03-10 RX ORDER — GLUCAGON 1 MG/ML
1 KIT INJECTION PRN
Status: DISCONTINUED | OUTPATIENT
Start: 2024-03-10 | End: 2024-03-11 | Stop reason: SDUPTHER

## 2024-03-10 RX ORDER — 0.9 % SODIUM CHLORIDE 0.9 %
500 INTRAVENOUS SOLUTION INTRAVENOUS ONCE
Status: COMPLETED | OUTPATIENT
Start: 2024-03-10 | End: 2024-03-10

## 2024-03-10 RX ORDER — CALCIUM GLUCONATE 94 MG/ML
1000 INJECTION, SOLUTION INTRAVENOUS ONCE
Status: DISCONTINUED | OUTPATIENT
Start: 2024-03-10 | End: 2024-03-10

## 2024-03-10 RX ORDER — CALCIUM GLUCONATE 20 MG/ML
1000 INJECTION, SOLUTION INTRAVENOUS ONCE
Status: COMPLETED | OUTPATIENT
Start: 2024-03-10 | End: 2024-03-10

## 2024-03-10 RX ORDER — FERRIC CITRATE 210 MG/1
210 TABLET, COATED ORAL PRN
Status: ON HOLD | COMMUNITY
End: 2024-03-29 | Stop reason: HOSPADM

## 2024-03-10 RX ORDER — DEXTROSE MONOHYDRATE 100 MG/ML
INJECTION, SOLUTION INTRAVENOUS CONTINUOUS PRN
Status: DISCONTINUED | OUTPATIENT
Start: 2024-03-10 | End: 2024-03-11 | Stop reason: SDUPTHER

## 2024-03-10 RX ADMIN — CEFEPIME 1000 MG: 1 INJECTION, POWDER, FOR SOLUTION INTRAMUSCULAR; INTRAVENOUS at 19:39

## 2024-03-10 RX ADMIN — SODIUM CHLORIDE 500 ML: 9 INJECTION, SOLUTION INTRAVENOUS at 20:08

## 2024-03-10 RX ADMIN — IOPAMIDOL 70 ML: 755 INJECTION, SOLUTION INTRAVENOUS at 15:47

## 2024-03-10 RX ADMIN — SODIUM BICARBONATE 50 MEQ: 84 INJECTION, SOLUTION INTRAVENOUS at 16:55

## 2024-03-10 RX ADMIN — INSULIN HUMAN 10 UNITS: 100 INJECTION, SOLUTION PARENTERAL at 16:49

## 2024-03-10 RX ADMIN — MIDODRINE HYDROCHLORIDE 5 MG: 5 TABLET ORAL at 21:04

## 2024-03-10 RX ADMIN — DEXTROSE MONOHYDRATE 250 ML: 100 INJECTION, SOLUTION INTRAVENOUS at 16:46

## 2024-03-10 RX ADMIN — CALCIUM GLUCONATE 1000 MG: 20 INJECTION, SOLUTION INTRAVENOUS at 17:03

## 2024-03-10 NOTE — ED PROVIDER NOTES
[facility] EMERGENCY DEPARTMENT  EMERGENCY DEPARTMENT ENCOUNTER        Pt Name: Ainsley Morris  MRN: [id number]  Birthdate 1945  Date of evaluation: 3/10/2024  Provider: Isra Mas DO  PCP: Grey Chua PA-C  Note Started: 3:05 PM EDT 3/10/24    CHIEF COMPLAINT       Chief Complaint   Patient presents with    Altered Mental Status     Ems report pt was c/c Friday, missed dialysis on Friday, home in poor condition-dog poop everywhere, 79 on RA, hypotension        HISTORY OF PRESENT ILLNESS: 1 or more Elements   History From: Patient and EMS      Ainsley Morris is a 79 y.o. female who presents to the emergency room for evaluation of altered mental status.  Patient was found at home in a poor condition.  Patient is alert to self and year.  Patient does not know what happened.  Patient is unable to provide any history.  Patient is moving all 4 extremities there are no focal findings.  Patient states that she has back and leg pain however she states she has a history of back pain.  Patient states she fell 3 days ago.  Patient was seen in the emergency department for this fall.    Review of Systems   Constitutional:  Negative for appetite change, chills, fatigue and fever.   HENT:  Negative for congestion, rhinorrhea and sore throat.    Eyes:  Negative for photophobia and visual disturbance.   Respiratory:  Positive for shortness of breath. Negative for cough and chest tightness.    Cardiovascular:  Negative for chest pain and palpitations.   Gastrointestinal:  Negative for abdominal pain, diarrhea, nausea and vomiting.   Endocrine: Negative for polydipsia and polyuria.   Genitourinary:  Negative for dysuria.   Musculoskeletal:  Negative for back pain and neck pain.   Skin:  Negative for rash.   Neurological:  Negative for dizziness and headaches.   Psychiatric/Behavioral:  Positive for confusion.          Nursing Notes were all reviewed and agreed with or any disagreements were  spine.  Reports associated shortness of breath, chills.   On arrival patient is fatigued, somnolent, arouses to voice, follows commands.  Oriented x 3.  Lungs diminished on right side.  Abdomen soft, nondistended, nontender.  Bilateral upper extremities 5 out of 5 strength.  Bilateral lower extremities 2 out of 5 strength.  Extremities are cool.  Sensation intact to light touch. [SS]      ED Course User Index  [SS] Sonia Esparza MD            Chronic Conditions:   Past Medical History:   Diagnosis Date    Anemia     Arrhythmia     AF    Arthritis     RA    Asthma     Chronic kidney disease     COPD (chronic obstructive pulmonary disease) ([facility])     Depression     Hemodialysis patient ([facility])     T-Th-Sat    Hyperlipidemia     Hypothyroid     Sleep apnea     no CPAP    Type II or unspecified type diabetes mellitus without mention of complication, not stated as uncontrolled     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 06/15/2017         Records Reviewed: Note from 3/8/2024 for patient's baseline mental status.    CONSULTS: (Who and What was discussed)  IP CONSULT TO NEPHROLOGY spoke to Dr. Gaitan with nephrology for recommendations.  Spoke to Dr. Anthony who is the admitting provider.  He states that this patient will be admitted.    FINAL IMPRESSION      1. Altered mental status, unspecified altered mental status type    2. Encounter for dialysis ([facility])    3. Hyperkalemia    4. End stage renal disease on dialysis ([facility])    5. Leukocytosis, unspecified type          DISPOSITION/PLAN     DISPOSITION Admitted 03/10/2024 06:30:36 PM      PATIENT REFERRED TO:  No follow-up provider specified.    DISCHARGE MEDICATIONS:  New Prescriptions    No medications on file            (Please note that portions of this note were completed with a voice recognition program.  Efforts were made to edit the dictations but occasionally words are mis-transcribed.)    Isra Mas DO (electronically

## 2024-03-10 NOTE — ED NOTES
Name: Ainsley Morris  : 1945  MRN: [id number]    Date: 3/10/2024    Benefits of immediately proceeding with Radiology exam outweigh the risks and therefore the following is being waived:      [] Pregnancy test    [] Protocol for Iodine allergy    [] MRI questionnaire    [x] BUN/Creatinine        MD Isaias Nicholas Shilp, MD  03/10/24 [id number]

## 2024-03-11 ENCOUNTER — APPOINTMENT (OUTPATIENT)
Dept: GENERAL RADIOLOGY | Age: 79
DRG: 004 | End: 2024-03-11
Payer: COMMERCIAL

## 2024-03-11 ENCOUNTER — APPOINTMENT (OUTPATIENT)
Dept: INTERVENTIONAL RADIOLOGY/VASCULAR | Age: 79
DRG: 004 | End: 2024-03-11
Payer: COMMERCIAL

## 2024-03-11 PROBLEM — E87.5 HYPERKALEMIA: Status: ACTIVE | Noted: 2024-03-11

## 2024-03-11 LAB
AADO2: 151.7 MMHG
AADO2: 52 MMHG
ANION GAP SERPL CALCULATED.3IONS-SCNC: 18 MMOL/L (ref 7–16)
B.E.: -5.1 MMOL/L (ref -3–3)
B.E.: -8.1 MMOL/L (ref -3–3)
BUN SERPL-MCNC: 75 MG/DL (ref 6–23)
CALCIUM SERPL-MCNC: 7.5 MG/DL (ref 8.6–10.2)
CHLORIDE SERPL-SCNC: 95 MMOL/L (ref 98–107)
CO2 SERPL-SCNC: 21 MMOL/L (ref 22–29)
COHB: 1.1 % (ref 0–1.5)
COHB: 1.2 % (ref 0–1.5)
CREAT SERPL-MCNC: 6 MG/DL (ref 0.5–1)
CRITICAL ACTION: NORMAL
CRITICAL ACTION: NORMAL
CRITICAL NOTIFICATION DATE/TIME: NORMAL
CRITICAL NOTIFICATION DATE/TIME: NORMAL
CRITICAL NOTIFICATION: NORMAL
CRITICAL NOTIFICATION: NORMAL
CRITICAL VALUE READ BACK: YES
CRITICAL VALUE READ BACK: YES
CRITICAL: ABNORMAL
CRITICAL: ABNORMAL
DATE ANALYZED: ABNORMAL
DATE ANALYZED: ABNORMAL
DATE OF COLLECTION: ABNORMAL
DATE OF COLLECTION: ABNORMAL
EKG ATRIAL RATE: 100 BPM
EKG P-R INTERVAL: 150 MS
EKG Q-T INTERVAL: 378 MS
EKG QRS DURATION: 94 MS
EKG QTC CALCULATION (BAZETT): 487 MS
EKG R AXIS: -24 DEGREES
EKG T AXIS: 96 DEGREES
EKG VENTRICULAR RATE: 100 BPM
FIO2: 35
FIO2: 35 %
FIO2: 45
FIO2: 45 %
GFR SERPL CREATININE-BSD FRML MDRD: 7 ML/MIN/1.73M2
GLUCOSE BLD-MCNC: 142 MG/DL (ref 74–99)
GLUCOSE BLD-MCNC: 147 MG/DL (ref 74–99)
GLUCOSE BLD-MCNC: 160 MG/DL (ref 74–99)
GLUCOSE SERPL-MCNC: 149 MG/DL (ref 74–99)
HCO3: 20.9 MMOL/L (ref 22–26)
HCO3: 23.3 MMOL/L (ref 22–26)
HHB: 2.7 % (ref 0–5)
HHB: 5.1 % (ref 0–5)
INR PPP: 2
INSPIRATORY PRESSURE: 12
LAB: ABNORMAL
LAB: ABNORMAL
LACTATE BLDV-SCNC: 1.8 MMOL/L (ref 0.5–2.2)
Lab: 1204
Lab: 455
METHB: 0.3 % (ref 0–1.5)
METHB: 0.3 % (ref 0–1.5)
MODE: ABNORMAL
MODE: ABNORMAL
MODE: AC
NEGATIVE BASE EXCESS, ART: 5.5 MMOL/L
NEGATIVE BASE EXCESS, ART: 8.6 MMOL/L
O2 CONTENT: 14.4 ML/DL
O2 CONTENT: 15.3 ML/DL
O2 DELIVERY DEVICE: ABNORMAL
O2 SATURATION: 94.8 % (ref 92–98.5)
O2 SATURATION: 97.3 % (ref 92–98.5)
O2HB: 93.4 % (ref 94–97)
O2HB: 95.9 % (ref 94–97)
OPERATOR ID: 514
OPERATOR ID: ABNORMAL
PATIENT TEMP: 37 C
PATIENT TEMP: 37 C
PCO2: 59.7 MMHG (ref 35–45)
PCO2: 60.4 MMHG (ref 35–45)
PEEP/CPAP: 10 CMH2O
PEEP: 5
PEEP: 6
PFO2: 1.98 MMHG/%
PFO2: 3.41 MMHG/%
PH BLOOD GAS: 7.16 (ref 7.35–7.45)
PH BLOOD GAS: 7.21 (ref 7.35–7.45)
PIP: 14 CMH2O
PO2: 119.4 MMHG (ref 75–100)
PO2: 89.2 MMHG (ref 75–100)
POC HCO3: 19.5 MMOL/L (ref 22–26)
POC HCO3: 23.7 MMOL/L (ref 22–26)
POC O2 SATURATION: 92.8 % (ref 92–98.5)
POC O2 SATURATION: 98.5 % (ref 92–98.5)
POC PCO2: 49.9 MM HG (ref 35–45)
POC PCO2: 62.6 MM HG (ref 35–45)
POC PH: 7.19 (ref 7.35–7.45)
POC PH: 7.2 (ref 7.35–7.45)
POC PO2: 145 MM HG (ref 60–80)
POC PO2: 81.4 MM HG (ref 60–80)
POTASSIUM SERPL-SCNC: 5.8 MMOL/L (ref 3.5–5)
PROTHROMBIN TIME: 22.4 SEC (ref 9.3–12.4)
RI(T): 0.44
RI(T): 1.7
SET RATE, POC: 16
SODIUM SERPL-SCNC: 134 MMOL/L (ref 132–146)
SOURCE, BLOOD GAS: ABNORMAL
SOURCE, BLOOD GAS: ABNORMAL
T4 FREE SERPL-MCNC: 0.6 NG/DL (ref 0.9–1.7)
THB: 10.9 G/DL (ref 11.5–16.5)
THB: 11.2 G/DL (ref 11.5–16.5)
TIDAL VOLUME: 400
TIME ANALYZED: 1209
TIME ANALYZED: 455
TSH SERPL DL<=0.05 MIU/L-ACNC: 2.21 UIU/ML (ref 0.27–4.2)

## 2024-03-11 PROCEDURE — 6360000002 HC RX W HCPCS: Performed by: INTERNAL MEDICINE

## 2024-03-11 PROCEDURE — 82962 GLUCOSE BLOOD TEST: CPT

## 2024-03-11 PROCEDURE — 90935 HEMODIALYSIS ONE EVALUATION: CPT

## 2024-03-11 PROCEDURE — 31500 INSERT EMERGENCY AIRWAY: CPT | Performed by: INTERNAL MEDICINE

## 2024-03-11 PROCEDURE — 5A1955Z RESPIRATORY VENTILATION, GREATER THAN 96 CONSECUTIVE HOURS: ICD-10-PCS | Performed by: INTERNAL MEDICINE

## 2024-03-11 PROCEDURE — 84439 ASSAY OF FREE THYROXINE: CPT

## 2024-03-11 PROCEDURE — 2580000003 HC RX 258

## 2024-03-11 PROCEDURE — 6370000000 HC RX 637 (ALT 250 FOR IP): Performed by: INTERNAL MEDICINE

## 2024-03-11 PROCEDURE — 94660 CPAP INITIATION&MGMT: CPT

## 2024-03-11 PROCEDURE — P9047 ALBUMIN (HUMAN), 25%, 50ML: HCPCS | Performed by: INTERNAL MEDICINE

## 2024-03-11 PROCEDURE — 87070 CULTURE OTHR SPECIMN AEROBIC: CPT

## 2024-03-11 PROCEDURE — 71045 X-RAY EXAM CHEST 1 VIEW: CPT

## 2024-03-11 PROCEDURE — 31500 INSERT EMERGENCY AIRWAY: CPT

## 2024-03-11 PROCEDURE — 2500000003 HC RX 250 WO HCPCS: Performed by: INTERNAL MEDICINE

## 2024-03-11 PROCEDURE — 80048 BASIC METABOLIC PNL TOTAL CA: CPT

## 2024-03-11 PROCEDURE — 83605 ASSAY OF LACTIC ACID: CPT

## 2024-03-11 PROCEDURE — A4216 STERILE WATER/SALINE, 10 ML: HCPCS

## 2024-03-11 PROCEDURE — 76937 US GUIDE VASCULAR ACCESS: CPT

## 2024-03-11 PROCEDURE — 89220 SPUTUM SPECIMEN COLLECTION: CPT

## 2024-03-11 PROCEDURE — 2500000003 HC RX 250 WO HCPCS

## 2024-03-11 PROCEDURE — 99232 SBSQ HOSP IP/OBS MODERATE 35: CPT | Performed by: INTERNAL MEDICINE

## 2024-03-11 PROCEDURE — 87081 CULTURE SCREEN ONLY: CPT

## 2024-03-11 PROCEDURE — 87205 SMEAR GRAM STAIN: CPT

## 2024-03-11 PROCEDURE — 99291 CRITICAL CARE FIRST HOUR: CPT | Performed by: INTERNAL MEDICINE

## 2024-03-11 PROCEDURE — 36600 WITHDRAWAL OF ARTERIAL BLOOD: CPT

## 2024-03-11 PROCEDURE — 2709999900 IR FLUORO GUIDED CVA DEVICE PLMT/REPLACE/REMOVAL

## 2024-03-11 PROCEDURE — 84443 ASSAY THYROID STIM HORMONE: CPT

## 2024-03-11 PROCEDURE — 74018 RADEX ABDOMEN 1 VIEW: CPT

## 2024-03-11 PROCEDURE — 02HV33Z INSERTION OF INFUSION DEVICE INTO SUPERIOR VENA CAVA, PERCUTANEOUS APPROACH: ICD-10-PCS | Performed by: INTERNAL MEDICINE

## 2024-03-11 PROCEDURE — 82805 BLOOD GASES W/O2 SATURATION: CPT

## 2024-03-11 PROCEDURE — 2580000003 HC RX 258: Performed by: INTERNAL MEDICINE

## 2024-03-11 PROCEDURE — 77001 FLUOROGUIDE FOR VEIN DEVICE: CPT

## 2024-03-11 PROCEDURE — 2000000000 HC ICU R&B

## 2024-03-11 PROCEDURE — 36556 INSERT NON-TUNNEL CV CATH: CPT

## 2024-03-11 PROCEDURE — 93010 ELECTROCARDIOGRAM REPORT: CPT | Performed by: INTERNAL MEDICINE

## 2024-03-11 PROCEDURE — 82803 BLOOD GASES ANY COMBINATION: CPT

## 2024-03-11 PROCEDURE — 6360000002 HC RX W HCPCS

## 2024-03-11 PROCEDURE — 85610 PROTHROMBIN TIME: CPT

## 2024-03-11 PROCEDURE — 94002 VENT MGMT INPAT INIT DAY: CPT

## 2024-03-11 PROCEDURE — C9113 INJ PANTOPRAZOLE SODIUM, VIA: HCPCS

## 2024-03-11 PROCEDURE — 36415 COLL VENOUS BLD VENIPUNCTURE: CPT

## 2024-03-11 RX ORDER — SODIUM CHLORIDE 0.9 % (FLUSH) 0.9 %
5-40 SYRINGE (ML) INJECTION EVERY 12 HOURS SCHEDULED
Status: DISCONTINUED | OUTPATIENT
Start: 2024-03-11 | End: 2024-03-12

## 2024-03-11 RX ORDER — INSULIN LISPRO 100 [IU]/ML
0-4 INJECTION, SOLUTION INTRAVENOUS; SUBCUTANEOUS EVERY 6 HOURS SCHEDULED
Status: DISCONTINUED | OUTPATIENT
Start: 2024-03-11 | End: 2024-03-14

## 2024-03-11 RX ORDER — POLYETHYLENE GLYCOL 3350 17 G/17G
17 POWDER, FOR SOLUTION ORAL DAILY PRN
Status: DISCONTINUED | OUTPATIENT
Start: 2024-03-11 | End: 2024-03-30 | Stop reason: HOSPADM

## 2024-03-11 RX ORDER — SODIUM CHLORIDE 0.9 % (FLUSH) 0.9 %
10 SYRINGE (ML) INJECTION EVERY 12 HOURS SCHEDULED
Status: DISCONTINUED | OUTPATIENT
Start: 2024-03-11 | End: 2024-03-30 | Stop reason: HOSPADM

## 2024-03-11 RX ORDER — SODIUM CHLORIDE 9 MG/ML
INJECTION, SOLUTION INTRAVENOUS PRN
Status: DISCONTINUED | OUTPATIENT
Start: 2024-03-11 | End: 2024-03-11 | Stop reason: SDUPTHER

## 2024-03-11 RX ORDER — HEPARIN 100 UNIT/ML
3 SYRINGE INTRAVENOUS EVERY 12 HOURS SCHEDULED
Status: DISCONTINUED | OUTPATIENT
Start: 2024-03-11 | End: 2024-03-12

## 2024-03-11 RX ORDER — MIDAZOLAM HYDROCHLORIDE 1 MG/ML
5 INJECTION INTRAMUSCULAR; INTRAVENOUS ONCE
Status: COMPLETED | OUTPATIENT
Start: 2024-03-11 | End: 2024-03-11

## 2024-03-11 RX ORDER — INSULIN LISPRO 100 [IU]/ML
0-4 INJECTION, SOLUTION INTRAVENOUS; SUBCUTANEOUS NIGHTLY
Status: DISCONTINUED | OUTPATIENT
Start: 2024-03-11 | End: 2024-03-11

## 2024-03-11 RX ORDER — SODIUM CHLORIDE 0.9 % (FLUSH) 0.9 %
10 SYRINGE (ML) INJECTION PRN
Status: DISCONTINUED | OUTPATIENT
Start: 2024-03-11 | End: 2024-03-30 | Stop reason: HOSPADM

## 2024-03-11 RX ORDER — 0.9 % SODIUM CHLORIDE 0.9 %
500 INTRAVENOUS SOLUTION INTRAVENOUS ONCE
Status: COMPLETED | OUTPATIENT
Start: 2024-03-11 | End: 2024-03-11

## 2024-03-11 RX ORDER — ETOMIDATE 2 MG/ML
10 INJECTION INTRAVENOUS ONCE
Status: COMPLETED | OUTPATIENT
Start: 2024-03-11 | End: 2024-03-11

## 2024-03-11 RX ORDER — SODIUM CHLORIDE 0.9 % (FLUSH) 0.9 %
5-40 SYRINGE (ML) INJECTION PRN
Status: DISCONTINUED | OUTPATIENT
Start: 2024-03-11 | End: 2024-03-12

## 2024-03-11 RX ORDER — HEPARIN 100 UNIT/ML
3 SYRINGE INTRAVENOUS PRN
Status: DISCONTINUED | OUTPATIENT
Start: 2024-03-11 | End: 2024-03-12

## 2024-03-11 RX ORDER — HEPARIN 100 UNIT/ML
1 SYRINGE INTRAVENOUS PRN
Status: DISCONTINUED | OUTPATIENT
Start: 2024-03-11 | End: 2024-03-12

## 2024-03-11 RX ORDER — LIDOCAINE HYDROCHLORIDE 10 MG/ML
5 INJECTION, SOLUTION EPIDURAL; INFILTRATION; INTRACAUDAL; PERINEURAL ONCE
Status: DISCONTINUED | OUTPATIENT
Start: 2024-03-11 | End: 2024-03-12

## 2024-03-11 RX ORDER — MIDODRINE HYDROCHLORIDE 5 MG/1
5 TABLET ORAL
Status: DISCONTINUED | OUTPATIENT
Start: 2024-03-11 | End: 2024-03-12

## 2024-03-11 RX ORDER — ACETAMINOPHEN 325 MG/1
650 TABLET ORAL EVERY 6 HOURS PRN
Status: DISCONTINUED | OUTPATIENT
Start: 2024-03-11 | End: 2024-03-30 | Stop reason: HOSPADM

## 2024-03-11 RX ORDER — HEPARIN 100 UNIT/ML
1 SYRINGE INTRAVENOUS EVERY 12 HOURS SCHEDULED
Status: DISCONTINUED | OUTPATIENT
Start: 2024-03-11 | End: 2024-03-12

## 2024-03-11 RX ORDER — NOREPINEPHRINE BITARTRATE 0.06 MG/ML
1-100 INJECTION, SOLUTION INTRAVENOUS CONTINUOUS
Status: DISCONTINUED | OUTPATIENT
Start: 2024-03-11 | End: 2024-03-15

## 2024-03-11 RX ORDER — 0.9 % SODIUM CHLORIDE 0.9 %
250 INTRAVENOUS SOLUTION INTRAVENOUS ONCE
Status: COMPLETED | OUTPATIENT
Start: 2024-03-11 | End: 2024-03-11

## 2024-03-11 RX ORDER — INSULIN LISPRO 100 [IU]/ML
0-4 INJECTION, SOLUTION INTRAVENOUS; SUBCUTANEOUS
Status: DISCONTINUED | OUTPATIENT
Start: 2024-03-11 | End: 2024-03-11

## 2024-03-11 RX ORDER — PROPOFOL 10 MG/ML
5-50 INJECTION, EMULSION INTRAVENOUS CONTINUOUS
Status: DISCONTINUED | OUTPATIENT
Start: 2024-03-11 | End: 2024-03-18

## 2024-03-11 RX ORDER — SODIUM CHLORIDE 9 MG/ML
INJECTION, SOLUTION INTRAVENOUS PRN
Status: DISCONTINUED | OUTPATIENT
Start: 2024-03-11 | End: 2024-03-30 | Stop reason: HOSPADM

## 2024-03-11 RX ORDER — ONDANSETRON 2 MG/ML
4 INJECTION INTRAMUSCULAR; INTRAVENOUS EVERY 6 HOURS PRN
Status: DISCONTINUED | OUTPATIENT
Start: 2024-03-11 | End: 2024-03-30 | Stop reason: HOSPADM

## 2024-03-11 RX ORDER — ALBUMIN (HUMAN) 12.5 G/50ML
25 SOLUTION INTRAVENOUS ONCE
Status: COMPLETED | OUTPATIENT
Start: 2024-03-11 | End: 2024-03-11

## 2024-03-11 RX ORDER — ALBUMIN (HUMAN) 12.5 G/50ML
25 SOLUTION INTRAVENOUS
Status: COMPLETED | OUTPATIENT
Start: 2024-03-11 | End: 2024-03-11

## 2024-03-11 RX ORDER — DEXTROSE MONOHYDRATE 100 MG/ML
INJECTION, SOLUTION INTRAVENOUS CONTINUOUS PRN
Status: DISCONTINUED | OUTPATIENT
Start: 2024-03-11 | End: 2024-03-30 | Stop reason: HOSPADM

## 2024-03-11 RX ORDER — GLUCAGON 1 MG/ML
1 KIT INJECTION PRN
Status: DISCONTINUED | OUTPATIENT
Start: 2024-03-11 | End: 2024-03-30 | Stop reason: HOSPADM

## 2024-03-11 RX ORDER — PROMETHAZINE HYDROCHLORIDE 25 MG/1
12.5 TABLET ORAL EVERY 6 HOURS PRN
Status: DISCONTINUED | OUTPATIENT
Start: 2024-03-11 | End: 2024-03-30 | Stop reason: HOSPADM

## 2024-03-11 RX ORDER — ACETAMINOPHEN 650 MG/1
650 SUPPOSITORY RECTAL EVERY 6 HOURS PRN
Status: DISCONTINUED | OUTPATIENT
Start: 2024-03-11 | End: 2024-03-30 | Stop reason: HOSPADM

## 2024-03-11 RX ADMIN — VANCOMYCIN HYDROCHLORIDE 1750 MG: 10 INJECTION, POWDER, LYOPHILIZED, FOR SOLUTION INTRAVENOUS at 03:46

## 2024-03-11 RX ADMIN — SODIUM CHLORIDE, PRESERVATIVE FREE 10 ML: 5 INJECTION INTRAVENOUS at 20:43

## 2024-03-11 RX ADMIN — Medication 5 MCG/MIN: at 04:39

## 2024-03-11 RX ADMIN — MIDODRINE HYDROCHLORIDE 5 MG: 5 TABLET ORAL at 20:43

## 2024-03-11 RX ADMIN — Medication 10 ML: at 08:14

## 2024-03-11 RX ADMIN — ALBUMIN (HUMAN) 25 G: 0.25 INJECTION, SOLUTION INTRAVENOUS at 13:07

## 2024-03-11 RX ADMIN — HYDROCORTISONE SODIUM SUCCINATE 100 MG: 100 INJECTION, POWDER, FOR SOLUTION INTRAMUSCULAR; INTRAVENOUS at 12:19

## 2024-03-11 RX ADMIN — Medication 40 MCG/MIN: at 21:46

## 2024-03-11 RX ADMIN — MIDAZOLAM HYDROCHLORIDE 4 MG: 1 INJECTION, SOLUTION INTRAMUSCULAR; INTRAVENOUS at 12:26

## 2024-03-11 RX ADMIN — Medication 40 MCG/MIN: at 15:29

## 2024-03-11 RX ADMIN — ALBUMIN (HUMAN) 25 G: 0.25 INJECTION, SOLUTION INTRAVENOUS at 15:56

## 2024-03-11 RX ADMIN — PANTOPRAZOLE SODIUM 40 MG: 40 INJECTION, POWDER, FOR SOLUTION INTRAVENOUS at 19:35

## 2024-03-11 RX ADMIN — Medication 25 MEQ: at 13:31

## 2024-03-11 RX ADMIN — SODIUM BICARBONATE 25 MEQ: 84 INJECTION INTRAVENOUS at 13:31

## 2024-03-11 RX ADMIN — SODIUM CHLORIDE 500 ML: 9 INJECTION, SOLUTION INTRAVENOUS at 00:38

## 2024-03-11 RX ADMIN — Medication 10 ML: at 19:35

## 2024-03-11 RX ADMIN — VANCOMYCIN HYDROCHLORIDE 750 MG: 5 INJECTION, POWDER, LYOPHILIZED, FOR SOLUTION INTRAVENOUS at 19:38

## 2024-03-11 RX ADMIN — PROPOFOL 10 MCG/KG/MIN: 10 INJECTION, EMULSION INTRAVENOUS at 12:48

## 2024-03-11 RX ADMIN — MIDODRINE HYDROCHLORIDE 5 MG: 5 TABLET ORAL at 13:30

## 2024-03-11 RX ADMIN — SODIUM CHLORIDE 250 ML: 9 INJECTION, SOLUTION INTRAVENOUS at 11:00

## 2024-03-11 RX ADMIN — SODIUM CHLORIDE, PRESERVATIVE FREE 10 ML: 5 INJECTION INTRAVENOUS at 20:38

## 2024-03-11 RX ADMIN — CEFEPIME 1000 MG: 1 INJECTION, POWDER, FOR SOLUTION INTRAMUSCULAR; INTRAVENOUS at 19:43

## 2024-03-11 RX ADMIN — ETOMIDATE 10 MG: 20 INJECTION, SOLUTION INTRAVENOUS at 12:27

## 2024-03-11 RX ADMIN — HYDROCORTISONE SODIUM SUCCINATE 100 MG: 100 INJECTION, POWDER, FOR SOLUTION INTRAMUSCULAR; INTRAVENOUS at 00:35

## 2024-03-11 RX ADMIN — HYDROCORTISONE SODIUM SUCCINATE 100 MG: 100 INJECTION, POWDER, FOR SOLUTION INTRAMUSCULAR; INTRAVENOUS at 20:43

## 2024-03-11 RX ADMIN — SODIUM CHLORIDE 250 ML: 9 INJECTION, SOLUTION INTRAVENOUS at 12:49

## 2024-03-11 RX ADMIN — HYDROCORTISONE SODIUM SUCCINATE 100 MG: 100 INJECTION, POWDER, FOR SOLUTION INTRAMUSCULAR; INTRAVENOUS at 07:07

## 2024-03-11 RX ADMIN — PROPOFOL 20 MCG/KG/MIN: 10 INJECTION, EMULSION INTRAVENOUS at 19:49

## 2024-03-11 RX ADMIN — ALBUMIN (HUMAN) 25 G: 0.25 INJECTION, SOLUTION INTRAVENOUS at 10:59

## 2024-03-11 RX ADMIN — SODIUM CHLORIDE, PRESERVATIVE FREE 10 ML: 5 INJECTION INTRAVENOUS at 08:13

## 2024-03-11 ASSESSMENT — PAIN SCALES - GENERAL
PAINLEVEL_OUTOF10: 0

## 2024-03-11 ASSESSMENT — PULMONARY FUNCTION TESTS
PIF_VALUE: 21
PIF_VALUE: 31
PIF_VALUE: 6
PIF_VALUE: 21
PIF_VALUE: 28
PIF_VALUE: 22
PIF_VALUE: 22
PIF_VALUE: 21
PIF_VALUE: 22
PIF_VALUE: 6
PIF_VALUE: 23
PIF_VALUE: 6
PIF_VALUE: 27
PIF_VALUE: 26

## 2024-03-11 NOTE — PLAN OF CARE
Problem: Safety - Adult  Goal: Free from fall injury  Outcome: Progressing  Note: Area clear of hazards, bed locked and in lowest position, bed alarm. Plan of care on going.

## 2024-03-11 NOTE — PROGRESS NOTES
uncontrolled, and 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. presented with AMS, Hypotension, Missed HD  for last few days prior to arrival to the hospital.  She missed her HD and found to be in a poor living condition at home with dog feces. She was found to be hypotensive with  hypercapnia and placed on BIPAP. Nephrology consulted for fluid overload with hyperkalemia and was given HD but could no finish up the session due to fistula malfunction/bleeding. Poor historian, confused and lethargic, no additional history could be obtained.       Metabolic encephalopathy due to sepsis/septic shock with hypotension leukocytosis empiric antibiotics as blood cultures start to show Klebsiella.  Supportive care in the ICU's restarting home midodrine.  Critical care ID and renal are on consult.  Steroids  End-stage renal disease missed hemodialysis thus has volume overload and hyperkalemia thus getting hemodialysis per nephrology  Respiratory failure with hypoxia and hypercapnia possible acute exacerbation COPD status post hydrocortisone DuoNebs.  Ventilated.  solucortef  Former smoker she should not restart  Hypothyroidism thyroid replacement.  Tsh wnl  Left forearm AV fistula her malfunctioning  DVT prophylaxis: Heparin  Full code.  Disposition: To be determined      NOTE: This report was transcribed using voice recognition software. Every effort was made to ensure accuracy; however, inadvertent computerized transcription errors may be present.     Electronically signed by DONNA SADLER MD on 3/11/2024 at 3:10 PM

## 2024-03-11 NOTE — PROGRESS NOTES
Pharmacy Consultation Note  (Antibiotic Dosing and Monitoring)    Initial consult date: 04/11/2024  Consulting physician/provider: Kylee  Drug: Vancomycin  Indication: Sepsis    Age/  Gender Height Weight IBW  Allergy Information   79 y.o./female 167.6 cm (5' 6\") (per chart) 95.2 kg (209 lb 14.1 oz)     Ideal body weight: 59.3 kg (130 lb 11.7 oz)  Adjusted ideal body weight: 74.3 kg (163 lb 12.8 oz)   Pcn [penicillins], Sulfa antibiotics, and Bactrim [sulfamethoxazole-trimethoprim]      Renal Function:  Recent Labs     03/08/24  2214 03/10/24  1500 03/10/24  1902   BUN 42* 78* 78*   CREATININE 4.7* 7.1* 6.9*       Intake/Output Summary (Last 24 hours) at 3/11/2024 0440  Last data filed at 3/11/2024 0007  Gross per 24 hour   Intake 300 ml   Output 324 ml   Net -24 ml       Vancomycin Monitoring:  Trough:  No results for input(s): \"VANCOTROUGH\" in the last 72 hours.  Random:  No results for input(s): \"VANCORANDOM\" in the last 72 hours.    Vancomycin Administration Times:  Recent vancomycin administrations                     vancomycin (VANCOCIN) 1,750 mg in sodium chloride 0.9 % 500 mL IVPB (mg) 1,750 mg New Bag 03/11/24 0346                    Assessment:  Patient is a 79 y.o. female who has been initiated on vancomycin  Estimated Creatinine Clearance: 8 mL/min (A) (based on SCr of 6.9 mg/dL (H)).  To dose vancomycin, pharmacy will be utilizing dosing based off of levels because of patient's renal impairment/insufficiency    Plan:  Will check vancomycin levels when appropriate  Will continue to monitor renal function   Pharmacy to follow      Richard Mendez RPH 3/11/2024 4:40 AM    SEB: [phone]  SEY: [phone]  SJW: [phone]

## 2024-03-11 NOTE — PROGRESS NOTES
Patient came down to special procedures for temporary dialysis catheter placement.      Procedure was explained and questions were answered.  Consent signed.  Patient was educated about the amount of radiation used with today's procedure.   Patient prepped secured and draped.     Emotional support given     1418 - Procedure start VS 86/60 95 19 93% on the ventilator, supine      1427 - Procedure end /89 94 20 94% on the ventilator, supine     Temporary dialysis catheter to right IJ. Catheter sutured in place. Tegaderm with CHG gel applied. CDI     nurse to nurse given to Vita MCLAUGHLIN, ICU nurse in specials with patient, nurse notified of above information    Patient transported back to the floor with ICU nurse and respiratory

## 2024-03-11 NOTE — PROGRESS NOTES
4 Eyes Skin Assessment     NAME:  Ainsley Morris  YOB: 1945  MEDICAL RECORD NUMBER:  [id number]    The patient is being assessed for  Admission    I agree that at least one RN has performed a thorough Head to Toe Skin Assessment on the patient. ALL assessment sites listed below have been assessed.      Areas assessed by both nurses:    Head, Face, Ears, Shoulders, Back, Chest, Arms, Elbows, Hands, Sacrum. Buttock, Coccyx, Ischium, Legs. Feet and Heels, and Under Medical Devices         Red bilateral heels blanchable   Buttocks pink blanchable   Groin / abd excoriation   Bruising left flank   Bruising left hip   Toes dusky   Healed scab on abd fold  Skin tear left forearm  Vascular discoloration BLE        Does the Patient have a Wound? No noted wound(s)       Porter Prevention initiated by RN: Yes  Wound Care Orders initiated by RN: No    Pressure Injury (Stage 3,4, Unstageable, DTI, NWPT, and Complex wounds) if present, place Wound referral order by RN under : No    New Ostomies, if present place, Ostomy referral order under : No     Nurse 1 eSignature: Electronically signed by ADRI CHAVEZ RN on 3/11/24 at 4:30 AM EDT    **SHARE this note so that the co-signing nurse can place an eSignature**    Nurse 2 eSignature: Electronically signed by Sally Aguilar RN on 3/11/24 at 7:45 PM EDT

## 2024-03-11 NOTE — FLOWSHEET NOTE
03/11/24 0007   Vital Signs   BP (!) 112/46   Temp 98.4 °F (36.9 °C)   Pulse 85   Respirations 22   Weight - Scale 95.2 kg (209 lb 14.1 oz)   Weight Method Bed scale   Percent Weight Change 0   Pain Assessment   Pain Assessment Face, Legs, Activity, Cry, and Consolability (FLACC)   Pain Level 0   Post-Hemodialysis Assessment   Post-Treatment Procedures Blood returned;Access bleeding time > 10 minutes   Rinseback Volume (ml) 300 ml   Blood Volume Processed (Liters) 22.4 L   Duration of Treatment (minutes) 57 minutes   Hemodialysis Intake (ml) 300 ml   Hemodialysis Output (ml) 324 ml   NET Removed (ml) 24   Tolerated Treatment Poor   Patient Response to Treatment Access infiltrated at arterial catheter site. line stabilized and venous catheter flushed, Machine transitioned to recirc phase. attempted x 2 to reestablish site access superior to infiltration  w/o effect in 20 minute time span. Infiltrate site approx 2.5 cm in diameter. Dr. Enrique appraised to status and current K+ level of 5.1. new order to terminate procedure and apply ice to site. will attempt to dialyze pt. 3/11/24   Bilateral Breath Sounds Diminished   Patient Disposition Return to room

## 2024-03-11 NOTE — PROGRESS NOTES
Pharmacy Consultation Note  (Antibiotic Dosing and Monitoring)    Initial consult date: 04/11/2024  Consulting physician/provider: Kylee  Drug: Vancomycin  Indication: Sepsis    Age/  Gender Height Weight IBW  Allergy Information   79 y.o./female 167.6 cm (5' 6\") (per chart) 95.2 kg (209 lb 14.1 oz)     Ideal body weight: 59.3 kg (130 lb 11.7 oz)  Adjusted ideal body weight: 74.3 kg (163 lb 12.8 oz)   Pcn [penicillins], Sulfa antibiotics, and Bactrim [sulfamethoxazole-trimethoprim]      Renal Function:  Recent Labs     03/10/24  1500 03/10/24  1902 03/11/24  0500   BUN 78* 78* 75*   CREATININE 7.1* 6.9* 6.0*       Intake/Output Summary (Last 24 hours) at 3/11/2024 1823  Last data filed at 3/11/2024 0814  Gross per 24 hour   Intake 320 ml   Output 324 ml   Net -4 ml       Vancomycin Monitoring:  Trough:  No results for input(s): \"VANCOTROUGH\" in the last 72 hours.  Random:  No results for input(s): \"VANCORANDOM\" in the last 72 hours.    Vancomycin Administration Times:  Recent vancomycin administrations                     vancomycin (VANCOCIN) 1,750 mg in sodium chloride 0.9 % 500 mL IVPB (mg) 1,750 mg New Bag 03/11/24 0346               Assessment:  Patient is a 79 y.o. female who has been initiated on vancomycin  Estimated Creatinine Clearance: 9 mL/min (A) (based on SCr of 6 mg/dL (H)).  To dose vancomycin, pharmacy will be utilizing dosing based off of levels because of patient's renal impairment/insufficiency  3/11: Temporary HD catheter placed by IR today; HD started at 1544 and to complete 3.5 hours of treatment.      Plan:  Give vancomycin 750 mg IV once today  Will check vancomycin level in AM in case additional HD is needed 3/12  Dose by levels; HD schedule MWF; but pt has missed several treatments-follow nephrology notes for additional HD treatments.  Will continue to monitor renal function   Pharmacy to follow    Chantel Duque PharmD.  3/11/2024 6:23 PM    FRANCINEW: [phone]

## 2024-03-11 NOTE — H&P
[facility] Hospitalist Group   HISTORY AND PHYSICAL EXAM      AUTHOR: Fina Pichardo MD PATIENT NAME: Ainsley Morris   PCP: Grey Chau PA-C  MRN: [id number], : 1945       CHIEF COMPLAINT / REASON FOR ADMISSION: AMS, Hypotension, Missed HD  HPI:   This is a 79 y.o. female  has a past medical history of Anemia, Arrhythmia, Arthritis, Asthma, Chronic kidney disease, COPD (chronic obstructive pulmonary disease) ([facility]), Depression, Hemodialysis patient ([facility]), Hyperlipidemia, Hypothyroid, Sleep apnea, Type II or unspecified type diabetes mellitus without mention of complication, not stated as uncontrolled, and 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. presented with AMS, Hypotension, Missed HD  for last few days prior to arrival to the hospital.  She missed her HD and found to be in a poor living condition at home with dog feces. She was found to be hypotensive with  hypercapnia and placed on BIPAP. Nephrology consulted for fluid overload with hyperkalemia and was given HD but could no finish up the session due to fistula malfunction/bleeding. Poor historian, confused and lethargic, no additional history could be obtained.   ROS:  Unable to obtain because of AMS    PMH:  Past Medical History:   Diagnosis Date    Anemia     Arrhythmia     AF    Arthritis     RA    Asthma     Chronic kidney disease     COPD (chronic obstructive pulmonary disease) ([facility])     Depression     Hemodialysis patient ([facility])     T-Th-Sat    Hyperlipidemia     Hypothyroid     Sleep apnea     no CPAP    Type II or unspecified type diabetes mellitus without mention of complication, not stated as uncontrolled     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 06/15/2017       Surgical History:  Past Surgical History:   Procedure Laterality Date    CARPAL TUNNEL RELEASE Left 2020    LEFT CARPAL TUNNEL RELEASE performed by Sandor Tierney MD at [facility] OR     artifacts there no indications for acute pulmonary embolus although some areas are difficult to be evaluated. There is no aneurysm formation dissection of thoracic aorta. Diameter for the ascending aorta 4 cm.  Diameter for the main pulmonary 4.2 cm. Cardiac area is a normal size.  LV inner diameter: 4 cm.  RV inner diameter: 5.2 cm.  RV/LV ratio: 1.23, increased it could indicate some is stranding of the RV.  Please correlate clinically.  The prominent diameter of the pulmonary artery can indicate chronic pulmonary arterial hypertension. There is no mediastinal masses or adenopathy.. Areas of subsegmental atelectasis are seen both lower lobes more likely on chronic basis.  The are improved from previous infiltrates and consolidation that were seen on the study December 2020, they can be residual from previous inflammatory process. There is no pleural effusions. Upper abdominal structures are not fully covered on this study.  What is visualized appear unremarkable.  Patient had previous gastric bypass procedure.  There are fluid and air contents seen in the secluded of the stomach which can indicate a communication between the gastric pouch and the secluded stomach.     1.  Study difficult to be interpreted due motion artifacts.  In the areas not seen Hussein affected by motion artifacts there is no indication for acute pulmonary embolus. 2.  Prominent diameter for the central pulmonary artery up to 4.2 cm which can indicated a pattern of chronic pulmonary hypertension.  Please correlate clinically. 3.  There is also some increase in the RV/LV ratio up to 1.23 which can be an indication for straining of the right ventricle.  Please correlate clinically. 4.  No aneurysm formation or dissection thoracic aorta although the aorta has upper borderline diameter in the range of 4 cm. 5.  Areas of linear subsegmental atelectasis seen predominant towards the lower lung bases these more likely post inflammatory residual

## 2024-03-11 NOTE — FLOWSHEET NOTE
Pt returned from IR with RIJ neck temp HD line placed; HD  and tested; orders verified & labs reviewed; line placement verified via fluoroscopy; HD initiated without complication at 15:44.

## 2024-03-11 NOTE — CONSULTS
Department of Internal Medicine  Nephrology Attending Consult Note    SUBJECTIVE:  We are following this patient for end-stage renal failure .     Patient is a 79-year-old female well-known to us through her outpatient hemodialysis at [facility].  She had apparently been in the emergency department on 3/8 for complaints of a fall.  She then represented to the emergency department on 3/10 from home with altered mental status.  Per EMS account she was found in poor condition with poor environmental conditions as well.  Additionally she was found to be hypotensive and hypercapnic was placed on BiPAP.  She had missed several days of dialysis and was noted to have hyperkalemia with potassium of 6.1 mg/dL on initial check.  Hemodialysis was attempted overnight with excess infiltration and inability to complete treatment.  Patient was then medically treated for hyperkalemia.  She was seen and examined in the intensive care unit where she was transferred after RRT yesterday for hypotension.  At the time my visit she was on 35 mcg of Levophed with systolic blood pressure in the 80s.  Her left upper arm AV fistula has no thrill nor bruit that I could appreciate.  Discussed with nurse at the bedside caring for the patient.  Plan is for reevaluation of respiratory status with ABGs and possible intubation if not improving.  Patient will then be transported to the interventional radiology suite where she will have placement of temporary dialysis catheter and initiation of hemodialysis thereafter.  Patient was wearing BiPAP at the time of my visit she was unable to provide any meaningful history.  Patient with poor vascular access and need for PICC line which was discussed with Dr. Enrique and karla for this to be placed.    PROBLEM LIST:    Patient Active Problem List   Diagnosis    Controlled type 2 diabetes mellitus with chronic kidney disease on chronic dialysis, with long-term current use of insulin (HCC)    Mixed

## 2024-03-11 NOTE — PROGRESS NOTES
Took over for fellow RT. Remained with ventilator patient in Special Procedures until procedures were completed. Transported her back up to ICU on [place] portable ventilator. No problems encountered. Total Time: 85 minutes.

## 2024-03-11 NOTE — FLOWSHEET NOTE
Attempted to run dialysis today; pt's NORMA AVF no bruit/thrill; notified Dr. Enrique; order for temporary dialysis line to be paced STAT and run dialysis after placement. Vita [facility] ICU primary updated.

## 2024-03-11 NOTE — CONSULTS
Pulmonary/Critical Care Consult Note    CHIEF COMPLAINT:  CLL bacteremia, septic shock, respiratory and metabolic acidosis, chronic kidney disease on dialysis, nonfunctioning left AV fistula, COPD, alveolar hypoventilation, obesity, diabetes mellitus type 2, history of hypothyroidism    HISTORY OF PRESENT ILLNESS: The patient apparently missed her last several dialysis and was found at home in a disheveled condition with dog feces on the floor.    The patient was found to be hypotensive, hypercapnic, was placed on BiPAP, and was attempted to be dialyzed but because of poor dialysis access (AV fistula not patent enough), she was only dialyzed for about 25 minutes.  The patient was then transferred to the intensive care unit.    In the meantime, blood cultures have been noted to be positive for Klebsiella.  The patient is currently receiving vancomycin and cefepime in reduced doses being monitored by pharmacy.    I asked the patient earlier whether she was a cigarette smoker and she told me that she used to smoke for many years but stopped several years ago.    The patient is receiving a fluid bolus and 25 g of albumin because of hypotension requiring norepinephrine infusion.  Will initiate hydrocortisone 100 mg IV every 8 hours.  The patient may require intubation    ALLERGY:  Pcn [penicillins], Sulfa antibiotics, and Bactrim [sulfamethoxazole-trimethoprim]    FAMILY HISTORY:  Family History   Problem Relation Age of Onset    Emphysema Mother     No Known Problems Father        SOCIAL HISTORY:  Social History     Socioeconomic History    Marital status:      Spouse name: Not on file    Number of children: Not on file    Years of education: Not on file    Highest education level: Not on file   Occupational History    Not on file   Tobacco Use    Smoking status: Never    Smokeless tobacco: Never   Vaping Use    Vaping Use: Never used   Substance and Sexual Activity    Alcohol use: Yes     Comment: on holidays

## 2024-03-11 NOTE — PROCEDURES
Ainsley Morris is a 79 y.o. female patient.  1. Hyperkalemia    2. Encounter for dialysis ([facility])    3. End stage renal disease on dialysis ([facility])    4. Leukocytosis, unspecified type    5. Acute on chronic respiratory failure with hypoxia and hypercapnia ([facility])    6. Acute metabolic encephalopathy      Past Medical History:   Diagnosis Date    Anemia     Arrhythmia     AF    Arthritis     RA    Asthma     Chronic kidney disease     COPD (chronic obstructive pulmonary disease) (HCC)     Depression     Hemodialysis patient ([facility])     T-Th-Sat    Hyperlipidemia     Hypothyroid     Sleep apnea     no CPAP    Type II or unspecified type diabetes mellitus without mention of complication, not stated as uncontrolled     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 06/15/2017     Blood pressure (!) 107/47, pulse 99, temperature (!) 39.2 °F (4 °C), resp. rate (!) 32, height 1.676 m (5' 6\"), weight 96.8 kg (213 lb 6.4 oz), SpO2 100 %, not currently breastfeeding.    Intubation    Date/Time: 3/11/2024 12:41 PM    Performed by: Cheng Payne MD  Authorized by: Cheng Payne MD  Consent: The procedure was performed in an emergent situation. Verbal consent obtained. Written consent not obtained.  Risks and benefits: risks, benefits and alternatives were discussed  Consent given by: power of  and patient  Patient understanding: patient states understanding of the procedure being performed  Patient consent: the patient's understanding of the procedure matches consent given  Procedure consent: procedure consent matches procedure scheduled  Relevant documents: relevant documents present and verified  Test results: test results available and properly labeled  Imaging studies: imaging studies available  Required items: required blood products, implants, devices, and special equipment available  Patient identity confirmed: arm band and verbally with patient  Time out: Immediately prior to

## 2024-03-12 ENCOUNTER — APPOINTMENT (OUTPATIENT)
Dept: GENERAL RADIOLOGY | Age: 79
DRG: 004 | End: 2024-03-12
Payer: COMMERCIAL

## 2024-03-12 LAB
AADO2: 74.7 MMHG
ALBUMIN SERPL-MCNC: 3.1 G/DL (ref 3.5–5.2)
ALP SERPL-CCNC: 147 U/L (ref 35–104)
ALT SERPL-CCNC: 403 U/L (ref 0–32)
ANION GAP SERPL CALCULATED.3IONS-SCNC: 18 MMOL/L (ref 7–16)
ANION GAP SERPL CALCULATED.3IONS-SCNC: 20 MMOL/L (ref 7–16)
AST SERPL-CCNC: 691 U/L (ref 0–31)
B.E.: -7.4 MMOL/L (ref -3–3)
BASOPHILS # BLD: 0 K/UL (ref 0–0.2)
BASOPHILS NFR BLD: 0 % (ref 0–2)
BILIRUB SERPL-MCNC: 1.4 MG/DL (ref 0–1.2)
BUN SERPL-MCNC: 40 MG/DL (ref 6–23)
BUN SERPL-MCNC: 47 MG/DL (ref 6–23)
CALCIUM SERPL-MCNC: 8.5 MG/DL (ref 8.6–10.2)
CALCIUM SERPL-MCNC: 8.5 MG/DL (ref 8.6–10.2)
CHLORIDE SERPL-SCNC: 96 MMOL/L (ref 98–107)
CHLORIDE SERPL-SCNC: 96 MMOL/L (ref 98–107)
CO2 SERPL-SCNC: 18 MMOL/L (ref 22–29)
CO2 SERPL-SCNC: 19 MMOL/L (ref 22–29)
COHB: 1.1 % (ref 0–1.5)
CREAT SERPL-MCNC: 3.5 MG/DL (ref 0.5–1)
CREAT SERPL-MCNC: 3.7 MG/DL (ref 0.5–1)
CRITICAL: ABNORMAL
DATE ANALYZED: ABNORMAL
DATE LAST DOSE: NORMAL
DATE OF COLLECTION: ABNORMAL
EKG ATRIAL RATE: 110 BPM
EKG Q-T INTERVAL: 326 MS
EKG QRS DURATION: 88 MS
EKG QTC CALCULATION (BAZETT): 456 MS
EKG R AXIS: -24 DEGREES
EKG T AXIS: 157 DEGREES
EKG VENTRICULAR RATE: 118 BPM
EOSINOPHIL # BLD: 0.15 K/UL (ref 0.05–0.5)
EOSINOPHILS RELATIVE PERCENT: 1 % (ref 0–6)
ERYTHROCYTE [DISTWIDTH] IN BLOOD BY AUTOMATED COUNT: 14.8 % (ref 11.5–15)
FIO2: 30 %
GFR SERPL CREATININE-BSD FRML MDRD: 12 ML/MIN/1.73M2
GFR SERPL CREATININE-BSD FRML MDRD: 13 ML/MIN/1.73M2
GLUCOSE BLD-MCNC: 153 MG/DL (ref 74–99)
GLUCOSE BLD-MCNC: 195 MG/DL (ref 74–99)
GLUCOSE BLD-MCNC: 220 MG/DL (ref 74–99)
GLUCOSE SERPL-MCNC: 169 MG/DL (ref 74–99)
GLUCOSE SERPL-MCNC: 195 MG/DL (ref 74–99)
HCO3: 18 MMOL/L (ref 22–26)
HCT VFR BLD AUTO: 29.8 % (ref 34–48)
HGB BLD-MCNC: 9.7 G/DL (ref 11.5–15.5)
HHB: 3.7 % (ref 0–5)
LAB: ABNORMAL
LACTATE BLDV-SCNC: 1.7 MMOL/L (ref 0.5–2.2)
LYMPHOCYTES NFR BLD: 0.44 K/UL (ref 1.5–4)
LYMPHOCYTES RELATIVE PERCENT: 3 % (ref 20–42)
Lab: 430
MAGNESIUM SERPL-MCNC: 2.1 MG/DL (ref 1.6–2.6)
MCH RBC QN AUTO: 31.3 PG (ref 26–35)
MCHC RBC AUTO-ENTMCNC: 32.6 G/DL (ref 32–34.5)
MCV RBC AUTO: 96.1 FL (ref 80–99.9)
METHB: 0.3 % (ref 0–1.5)
MODE: AC
MONOCYTES NFR BLD: 0.44 K/UL (ref 0.1–0.95)
MONOCYTES NFR BLD: 3 % (ref 2–12)
NEUTROPHILS NFR BLD: 94 % (ref 43–80)
NEUTS SEG NFR BLD: 14.87 K/UL (ref 1.8–7.3)
O2 CONTENT: 13.9 ML/DL
O2 SATURATION: 96.2 % (ref 92–98.5)
O2HB: 94.9 % (ref 94–97)
OPERATOR ID: ABNORMAL
PATIENT TEMP: 37 C
PCO2: 36 MMHG (ref 35–45)
PEEP/CPAP: 5 CMH2O
PFO2: 2.98 MMHG/%
PH BLOOD GAS: 7.32 (ref 7.35–7.45)
PHOSPHATE SERPL-MCNC: 5.6 MG/DL (ref 2.5–4.5)
PLATELET CONFIRMATION: NORMAL
PLATELET, FLUORESCENCE: 85 K/UL (ref 130–450)
PMV BLD AUTO: 11.5 FL (ref 7–12)
PO2: 89.4 MMHG (ref 75–100)
POTASSIUM SERPL-SCNC: 4.8 MMOL/L (ref 3.5–5)
POTASSIUM SERPL-SCNC: 4.8 MMOL/L (ref 3.5–5)
PROCALCITONIN SERPL-MCNC: 21.32 NG/ML (ref 0–0.08)
PROT SERPL-MCNC: 5.6 G/DL (ref 6.4–8.3)
RBC # BLD AUTO: 3.1 M/UL (ref 3.5–5.5)
RBC # BLD: ABNORMAL 10*6/UL
RI(T): 0.84
RR MECHANICAL: 18 B/MIN
SODIUM SERPL-SCNC: 133 MMOL/L (ref 132–146)
SODIUM SERPL-SCNC: 134 MMOL/L (ref 132–146)
SOURCE, BLOOD GAS: ABNORMAL
THB: 10.3 G/DL (ref 11.5–16.5)
TIME ANALYZED: 438
TME LAST DOSE: NORMAL H
VANCOMYCIN DOSE: NORMAL MG
VANCOMYCIN SERPL-MCNC: 19.5 UG/ML (ref 5–40)
VT MECHANICAL: 400 ML
WBC # BLD: ABNORMAL 10*3/UL
WBC OTHER # BLD: 15.9 K/UL (ref 4.5–11.5)

## 2024-03-12 PROCEDURE — 83735 ASSAY OF MAGNESIUM: CPT

## 2024-03-12 PROCEDURE — 94640 AIRWAY INHALATION TREATMENT: CPT

## 2024-03-12 PROCEDURE — 82805 BLOOD GASES W/O2 SATURATION: CPT

## 2024-03-12 PROCEDURE — 80048 BASIC METABOLIC PNL TOTAL CA: CPT

## 2024-03-12 PROCEDURE — 99232 SBSQ HOSP IP/OBS MODERATE 35: CPT | Performed by: INTERNAL MEDICINE

## 2024-03-12 PROCEDURE — 6360000002 HC RX W HCPCS

## 2024-03-12 PROCEDURE — C9113 INJ PANTOPRAZOLE SODIUM, VIA: HCPCS

## 2024-03-12 PROCEDURE — 6370000000 HC RX 637 (ALT 250 FOR IP): Performed by: INTERNAL MEDICINE

## 2024-03-12 PROCEDURE — 83605 ASSAY OF LACTIC ACID: CPT

## 2024-03-12 PROCEDURE — P9047 ALBUMIN (HUMAN), 25%, 50ML: HCPCS | Performed by: INTERNAL MEDICINE

## 2024-03-12 PROCEDURE — 84100 ASSAY OF PHOSPHORUS: CPT

## 2024-03-12 PROCEDURE — 93010 ELECTROCARDIOGRAM REPORT: CPT | Performed by: INTERNAL MEDICINE

## 2024-03-12 PROCEDURE — 6360000002 HC RX W HCPCS: Performed by: INTERNAL MEDICINE

## 2024-03-12 PROCEDURE — 51798 US URINE CAPACITY MEASURE: CPT

## 2024-03-12 PROCEDURE — 93005 ELECTROCARDIOGRAM TRACING: CPT | Performed by: INTERNAL MEDICINE

## 2024-03-12 PROCEDURE — 2580000003 HC RX 258: Performed by: INTERNAL MEDICINE

## 2024-03-12 PROCEDURE — 94003 VENT MGMT INPAT SUBQ DAY: CPT

## 2024-03-12 PROCEDURE — 80053 COMPREHEN METABOLIC PANEL: CPT

## 2024-03-12 PROCEDURE — 36592 COLLECT BLOOD FROM PICC: CPT

## 2024-03-12 PROCEDURE — 85025 COMPLETE CBC W/AUTO DIFF WBC: CPT

## 2024-03-12 PROCEDURE — 2500000003 HC RX 250 WO HCPCS: Performed by: INTERNAL MEDICINE

## 2024-03-12 PROCEDURE — 80202 ASSAY OF VANCOMYCIN: CPT

## 2024-03-12 PROCEDURE — 71045 X-RAY EXAM CHEST 1 VIEW: CPT

## 2024-03-12 PROCEDURE — 2580000003 HC RX 258

## 2024-03-12 PROCEDURE — 84145 PROCALCITONIN (PCT): CPT

## 2024-03-12 PROCEDURE — 99291 CRITICAL CARE FIRST HOUR: CPT | Performed by: INTERNAL MEDICINE

## 2024-03-12 PROCEDURE — A4216 STERILE WATER/SALINE, 10 ML: HCPCS

## 2024-03-12 PROCEDURE — 82962 GLUCOSE BLOOD TEST: CPT

## 2024-03-12 PROCEDURE — 2000000000 HC ICU R&B

## 2024-03-12 RX ORDER — 0.9 % SODIUM CHLORIDE 0.9 %
100 INTRAVENOUS SOLUTION INTRAVENOUS ONCE
Status: COMPLETED | OUTPATIENT
Start: 2024-03-12 | End: 2024-03-12

## 2024-03-12 RX ORDER — SODIUM CHLORIDE 9 MG/ML
INJECTION, SOLUTION INTRAVENOUS CONTINUOUS
Status: DISCONTINUED | OUTPATIENT
Start: 2024-03-12 | End: 2024-03-22

## 2024-03-12 RX ORDER — MIDODRINE HYDROCHLORIDE 5 MG/1
10 TABLET ORAL
Status: DISCONTINUED | OUTPATIENT
Start: 2024-03-12 | End: 2024-03-15

## 2024-03-12 RX ORDER — IPRATROPIUM BROMIDE AND ALBUTEROL SULFATE 2.5; .5 MG/3ML; MG/3ML
1 SOLUTION RESPIRATORY (INHALATION)
Status: DISCONTINUED | OUTPATIENT
Start: 2024-03-12 | End: 2024-03-30 | Stop reason: HOSPADM

## 2024-03-12 RX ORDER — ALBUMIN (HUMAN) 12.5 G/50ML
25 SOLUTION INTRAVENOUS ONCE
Status: COMPLETED | OUTPATIENT
Start: 2024-03-12 | End: 2024-03-12

## 2024-03-12 RX ORDER — HEPARIN SODIUM 5000 [USP'U]/ML
5000 INJECTION, SOLUTION INTRAVENOUS; SUBCUTANEOUS EVERY 8 HOURS SCHEDULED
Status: DISCONTINUED | OUTPATIENT
Start: 2024-03-12 | End: 2024-03-14

## 2024-03-12 RX ADMIN — MIDODRINE HYDROCHLORIDE 10 MG: 5 TABLET ORAL at 10:53

## 2024-03-12 RX ADMIN — HYDROCORTISONE SODIUM SUCCINATE 100 MG: 100 INJECTION, POWDER, FOR SOLUTION INTRAMUSCULAR; INTRAVENOUS at 05:40

## 2024-03-12 RX ADMIN — HEPARIN SODIUM 5000 UNITS: 5000 INJECTION INTRAVENOUS; SUBCUTANEOUS at 23:41

## 2024-03-12 RX ADMIN — PROPOFOL 20 MCG/KG/MIN: 10 INJECTION, EMULSION INTRAVENOUS at 17:17

## 2024-03-12 RX ADMIN — IPRATROPIUM BROMIDE AND ALBUTEROL SULFATE 1 DOSE: .5; 2.5 SOLUTION RESPIRATORY (INHALATION) at 22:00

## 2024-03-12 RX ADMIN — Medication 10 ML: at 08:32

## 2024-03-12 RX ADMIN — SODIUM CHLORIDE: 9 INJECTION, SOLUTION INTRAVENOUS at 08:00

## 2024-03-12 RX ADMIN — MIDODRINE HYDROCHLORIDE 10 MG: 5 TABLET ORAL at 17:11

## 2024-03-12 RX ADMIN — Medication 30 MCG/MIN: at 06:57

## 2024-03-12 RX ADMIN — SODIUM CHLORIDE, PRESERVATIVE FREE 10 ML: 5 INJECTION INTRAVENOUS at 10:19

## 2024-03-12 RX ADMIN — PANTOPRAZOLE SODIUM 40 MG: 40 INJECTION, POWDER, FOR SOLUTION INTRAVENOUS at 08:32

## 2024-03-12 RX ADMIN — CEFEPIME 1000 MG: 1 INJECTION, POWDER, FOR SOLUTION INTRAMUSCULAR; INTRAVENOUS at 20:30

## 2024-03-12 RX ADMIN — Medication 10 ML: at 23:42

## 2024-03-12 RX ADMIN — HYDROCORTISONE SODIUM SUCCINATE 50 MG: 100 INJECTION, POWDER, FOR SOLUTION INTRAMUSCULAR; INTRAVENOUS at 23:41

## 2024-03-12 RX ADMIN — SODIUM CHLORIDE 100 ML: 9 INJECTION, SOLUTION INTRAVENOUS at 07:51

## 2024-03-12 RX ADMIN — PROPOFOL 20 MCG/KG/MIN: 10 INJECTION, EMULSION INTRAVENOUS at 04:45

## 2024-03-12 RX ADMIN — HEPARIN 300 UNITS: 100 SYRINGE at 09:00

## 2024-03-12 RX ADMIN — INSULIN LISPRO 1 UNITS: 100 INJECTION, SOLUTION INTRAVENOUS; SUBCUTANEOUS at 17:19

## 2024-03-12 RX ADMIN — PROPOFOL 20 MCG/KG/MIN: 10 INJECTION, EMULSION INTRAVENOUS at 11:18

## 2024-03-12 RX ADMIN — SODIUM CHLORIDE: 9 INJECTION, SOLUTION INTRAVENOUS at 20:25

## 2024-03-12 RX ADMIN — ALBUMIN (HUMAN) 25 G: 0.25 INJECTION, SOLUTION INTRAVENOUS at 07:57

## 2024-03-12 RX ADMIN — HYDROCORTISONE SODIUM SUCCINATE 100 MG: 100 INJECTION, POWDER, FOR SOLUTION INTRAMUSCULAR; INTRAVENOUS at 10:53

## 2024-03-12 RX ADMIN — MIDODRINE HYDROCHLORIDE 10 MG: 5 TABLET ORAL at 08:32

## 2024-03-12 RX ADMIN — HEPARIN SODIUM 5000 UNITS: 5000 INJECTION INTRAVENOUS; SUBCUTANEOUS at 14:39

## 2024-03-12 RX ADMIN — SODIUM CHLORIDE, PRESERVATIVE FREE 10 ML: 5 INJECTION INTRAVENOUS at 08:35

## 2024-03-12 RX ADMIN — IPRATROPIUM BROMIDE AND ALBUTEROL SULFATE 1 DOSE: .5; 2.5 SOLUTION RESPIRATORY (INHALATION) at 16:28

## 2024-03-12 ASSESSMENT — PULMONARY FUNCTION TESTS
PIF_VALUE: 23
PIF_VALUE: 24
PIF_VALUE: 27
PIF_VALUE: 30
PIF_VALUE: 22
PIF_VALUE: 28
PIF_VALUE: 27
PIF_VALUE: 23
PIF_VALUE: 22
PIF_VALUE: 24
PIF_VALUE: 21
PIF_VALUE: 34
PIF_VALUE: 22
PIF_VALUE: 24
PIF_VALUE: 27
PIF_VALUE: 35
PIF_VALUE: 27
PIF_VALUE: 23
PIF_VALUE: 24
PIF_VALUE: 32
PIF_VALUE: 22
PIF_VALUE: 21
PIF_VALUE: 23
PIF_VALUE: 23
PIF_VALUE: 22
PIF_VALUE: 25
PIF_VALUE: 25
PIF_VALUE: 34
PIF_VALUE: 23
PIF_VALUE: 37
PIF_VALUE: 26
PIF_VALUE: 22
PIF_VALUE: 23
PIF_VALUE: 30
PIF_VALUE: 24
PIF_VALUE: 26
PIF_VALUE: 23
PIF_VALUE: 24
PIF_VALUE: 26
PIF_VALUE: 28
PIF_VALUE: 26
PIF_VALUE: 23
PIF_VALUE: 25
PIF_VALUE: 29
PIF_VALUE: 22
PIF_VALUE: 24
PIF_VALUE: 24
PIF_VALUE: 22
PIF_VALUE: 24
PIF_VALUE: 27
PIF_VALUE: 22
PIF_VALUE: 21
PIF_VALUE: 37

## 2024-03-12 ASSESSMENT — PAIN SCALES - GENERAL
PAINLEVEL_OUTOF10: 0

## 2024-03-12 NOTE — PLAN OF CARE
Problem: Safety - Adult  Goal: Free from fall injury  3/11/2024 2010 by Galindo Pulido RN  Outcome: Progressing  3/11/2024 1424 by Vita Mora RN  Outcome: Progressing     Problem: Pain  Goal: Verbalizes/displays adequate comfort level or baseline comfort level  3/11/2024 2010 by Galindo Pulido RN  Outcome: Progressing  Flowsheets (Taken 3/11/2024 1600)  Verbalizes/displays adequate comfort level or baseline comfort level: Assess pain using appropriate pain scale  3/11/2024 1424 by Vita Mora RN  Outcome: Progressing     Problem: Skin/Tissue Integrity  Goal: Absence of new skin breakdown  Description: 1.  Monitor for areas of redness and/or skin breakdown  2.  Assess vascular access sites hourly  3.  Every 4-6 hours minimum:  Change oxygen saturation probe site  4.  Every 4-6 hours:  If on nasal continuous positive airway pressure, respiratory therapy assess nares and determine need for appliance change or resting period.  3/11/2024 2010 by Galindo Pulido RN  Outcome: Progressing  3/11/2024 1424 by Vita Mora RN  Outcome: Not Progressing     Problem: Safety - Medical Restraint  Goal: Remains free of injury from restraints (Restraint for Interference with Medical Device)  Description: INTERVENTIONS:  1. Determine that other, less restrictive measures have been tried or would not be effective before applying the restraint  2. Evaluate the patient's condition at the time of restraint application  3. Inform patient/family regarding the reason for restraint  4. Q2H: Monitor safety, psychosocial status, comfort, nutrition and hydration  3/11/2024 2010 by Galindo Pulido RN  Outcome: Progressing  3/11/2024 1424 by Vita Mroa RN  Outcome: Progressing  Flowsheets  Taken 3/11/2024 1400  Remains free of injury from restraints (restraint for interference with medical device): Every 2 hours: Monitor safety, psychosocial status, comfort, nutrition and hydration  Taken 3/11/2024

## 2024-03-12 NOTE — PROGRESS NOTES
Admitting Date and Time: 3/10/2024  2:37 PM  Admit Dx: Hyperkalemia [E87.5]  Encounter for dialysis (HCC) [Z99.2]  End stage renal disease on dialysis (HCC) [N18.6, Z99.2]  Acute respiratory failure with hypoxia (HCC) [J96.01]  Acute on chronic respiratory failure with hypoxia and hypercapnia (HCC) [J96.21, J96.22]  Leukocytosis, unspecified type [D72.829]  Acute metabolic encephalopathy [G93.41]    Subjective:    Sedated and ventilated    ROS: denies fever, chills, cp, sob, n/v, HA unless stated above.     midodrine  10 mg Oral TID WC    sodium chloride flush  10 mL IntraVENous 2 times per day    cefepime  1,000 mg IntraVENous Q24H    vancomycin (VANCOCIN) intermittent dosing (placeholder)   Other RX Placeholder    lidocaine  5 mL IntraDERmal Once    sodium chloride flush  5-40 mL IntraVENous 2 times per day    heparin flush  1 mL IntraVENous 2 times per day    lidocaine PF  5 mL IntraDERmal Once    sodium chloride flush  5-40 mL IntraVENous 2 times per day    heparin flush  3 mL IntraVENous 2 times per day    hydrocortisone sodium succinate PF  100 mg IntraVENous Q8H    insulin lispro  0-4 Units SubCUTAneous 4 times per day    pantoprazole (PROTONIX) 40 mg in sodium chloride (PF) 0.9 % 10 mL injection  40 mg IntraVENous Daily     sodium chloride flush, 10 mL, PRN  promethazine, 12.5 mg, Q6H PRN   Or  ondansetron, 4 mg, Q6H PRN  polyethylene glycol, 17 g, Daily PRN  acetaminophen, 650 mg, Q6H PRN   Or  acetaminophen, 650 mg, Q6H PRN  glucose, 4 tablet, PRN  dextrose bolus, 125 mL, PRN   Or  dextrose bolus, 250 mL, PRN  glucagon (rDNA), 1 mg, PRN  dextrose, , Continuous PRN  sodium chloride flush, 5-40 mL, PRN  heparin flush, 1 mL, PRN  sodium chloride flush, 5-40 mL, PRN  sodium chloride, , PRN  heparin flush, 3 mL, PRN         Objective:    BP (!) 161/64   Pulse (!) 105   Temp 99.2 °F (37.3 °C) (Axillary)   Resp 22   Ht 1.676 m (5' 6\") Comment: per chart  Wt 98.1 kg (216 lb 3 oz)   SpO2 99%   BMI 34.89

## 2024-03-12 NOTE — FLOWSHEET NOTE
03/11/24 1930   Vital Signs   BP (!) 141/59   Temp 98.2 °F (36.8 °C)   Pulse (!) 129   Respirations 18   SpO2 99 %   Weight - Scale 96 kg (211 lb 10.3 oz)   Weight Method Bed scale   Percent Weight Change -0.82   Pain Assessment   Pain Assessment Critical Care Pain Observation Tool (CPOT)   Pain Level 0   Post-Hemodialysis Assessment   Post-Treatment Procedures Blood returned;Catheter capped, clamped with Citrate x 2 ports   Machine Disinfection Process Acid/Vinegar Clean;Heat Disinfect;Exterior Machine Disinfection   Rinseback Volume (ml) 300 ml   Blood Volume Processed (Liters) 71 L   Dialyzer Clearance Lightly streaked   Duration of Treatment (minutes) 210 minutes   Heparin Amount Administered During Treatment (mL) 0 mL   Hemodialysis Intake (ml) 300 ml   Hemodialysis Output (ml) 1200 ml   NET Removed (ml) 900   Tolerated Treatment Fair   Interventions Taken Ultrafiltration goal decreased   Patient Response to Treatment Pt remains on levophed gtt; had to decrease UF goal to net 900 mL; pt received SPA 25 GM IV at start of HD and ICU RN increased levophed gtt; treatment terminated per [facility] p&p; all blood rinsed back; RIJ neck temp HD catheter closed with citrate per [facility] p&p; net UF 0.9 L; BVP 71 L; A profile entire treatment; pt became tachycardic last 6 minutes on treatment and remains with 's   Bilateral Breath Sounds Diminished   Edema Generalized;Right upper extremity;Left upper extremity;Right lower extremity;Left lower extremity   Edema Generalized +1;Non-pitting   RUE Edema +1   LUE Edema +1   RLE Edema +2;Pitting   LLE Edema +2;Pitting   Physician Notified No   Time Off 1920   Patient Disposition Remain in ICU/ED   Observations & Evaluations   Level of Consciousness 2   Heart Rhythm Irregular   Respiratory Quality/Effort Unlabored   FiO2  35 %   O2 Device Ventilator   Skin Condition/Temp Dry;Warm   Abdomen Inspection Rotund;Soft   Bowel Sounds (All Quadrants) Present   Comments Remains sedated on

## 2024-03-12 NOTE — ACP (ADVANCE CARE PLANNING)
Advance Care Planning   Healthcare Decision Maker:    Primary Decision Maker: Isaura Starr - Brother/Sister - [phone]      Today we documented Decision Maker(s) consistent with Legal Next of Kin hierarchy.           Electronically signed by Tyra Monroe RN -BC on 3/12/2024 at 7:49 AM

## 2024-03-12 NOTE — PROGRESS NOTES
Pulmonary/Critical Care Progress Note    We are following patient for Klebsiella bacteremia, , septic shock on fluids and pressors (lower requirement for norepinephrine today compared to yesterday), COPD, CKD requiring dialysis,, failed left AV fistula upper extremity    SUBJECTIVE:  The patient required intubation yesterday for sepsis and impaired mental status.  Today, she is much more comfortable.  The chest x-ray shows clearing infiltrates and her pressor requirements have come down quite a bit to the point where she is on 10 mcg/min of norepinephrine along with normal saline at 75 cc/h.  There is no reason currently to have her on bicarbonate although her bicarbonate is slightly lower and she does have an anion gap acidosis of 13-15.    The patient will need tube feedings and cefepime for her Klebsiella.  I believe we can discontinue vancomycin.    Dialysis is currently planned for tomorrow since her pressor requirements are still present.  We can also decrease her hydrocortisone to 50 mg every 8 hours.    In 1 to 2 days, we will consider sedation vacation and spontaneous weaning trial in order to get her off the ventilator as long as she is off pressors.    MEDICATIONS:   midodrine  10 mg Oral TID WC    sodium chloride flush  10 mL IntraVENous 2 times per day    cefepime  1,000 mg IntraVENous Q24H    vancomycin (VANCOCIN) intermittent dosing (placeholder)   Other RX Placeholder    sodium chloride flush  5-40 mL IntraVENous 2 times per day    heparin flush  1 mL IntraVENous 2 times per day    sodium chloride flush  5-40 mL IntraVENous 2 times per day    heparin flush  3 mL IntraVENous 2 times per day    hydrocortisone sodium succinate PF  100 mg IntraVENous Q8H    insulin lispro  0-4 Units SubCUTAneous 4 times per day    pantoprazole (PROTONIX) 40 mg in sodium chloride (PF) 0.9 % 10 mL injection  40 mg IntraVENous Daily      sodium chloride 75 mL/hr at 03/12/24 0800    norepinephrine 10 mcg/min (03/12/24 1112)     dextrose      sodium chloride      propofol 20 mcg/kg/min (03/12/24 1118)    ketamine (KETALAR) 1,250 mg in sodium chloride 0.9 % 250 mL infusion       sodium chloride flush, promethazine **OR** ondansetron, polyethylene glycol, acetaminophen **OR** acetaminophen, glucose, dextrose bolus **OR** dextrose bolus, glucagon (rDNA), dextrose, sodium chloride flush, heparin flush, sodium chloride flush, sodium chloride, heparin flush      REVIEW OF SYSTEMS:  Patient is sedated and cannot answer questions regarding review of systems    OBJECTIVE:  Vitals:    03/12/24 1000   BP: (!) 161/64   Pulse: (!) 105   Resp: 22   Temp:    SpO2:      FiO2 : 30 %  O2 Flow Rate (L/min): 5 L/min  O2 Device: Ventilator    PHYSICAL EXAM:  Constitutional: No fever, chills, diaphoresis  Skin: No skin rash, no skin breakdown  HEENT: Endotracheal tube secured at lips  Neck: No JVD, lymphadenopathy, thyromegaly  Cardiovascular: S1, S2 irregular and looks like atrial fibrillation which I believe is new from this morning  Respiratory: Clear to auscultation bilaterally  Gastrointestinal: Soft, mildly obese, no hepatosplenomegaly  Genitourinary: No CVA tenderness  Extremities: No clubbing, cyanosis, or edema  Neurological: Sedated  Psychological: Sedated    LABS:  WBC   Date Value Ref Range Status   03/12/2024 15.9 (H) 4.5 - 11.5 k/uL Final   03/10/2024 11.9 (H) 4.5 - 11.5 k/uL Final   03/08/2024 7.5 4.5 - 11.5 k/uL Final     Hemoglobin   Date Value Ref Range Status   03/12/2024 9.7 (L) 11.5 - 15.5 g/dL Final   03/10/2024 12.0 11.5 - 15.5 g/dL Final   03/08/2024 11.5 11.5 - 15.5 g/dL Final     Hematocrit   Date Value Ref Range Status   03/12/2024 29.8 (L) 34.0 - 48.0 % Final   03/10/2024 39.8 34.0 - 48.0 % Final   03/08/2024 36.9 34.0 - 48.0 % Final     MCV   Date Value Ref Range Status   03/12/2024 96.1 80.0 - 99.9 fL Final   03/10/2024 100.8 (H) 80.0 - 99.9 fL Final   03/08/2024 98.4 80.0 - 99.9 fL Final     Platelets   Date Value Ref Range

## 2024-03-12 NOTE — PROGRESS NOTES
Pharmacy Consultation Note  (Antibiotic Dosing and Monitoring)    Vancomycin has been discontinued; pharmacy will sign-off.  Please reconsult if needed.    Thank you,  Dominique Crowell, PharmD, BCPS 3/12/2024 12:25 PM

## 2024-03-12 NOTE — CONSULTS
Comprehensive Nutrition Assessment    Type and Reason for Visit:  Initial, Consult (TF recommendations)    Nutrition Recommendations/Plan:   Continue NPO  Tube Feeding recommendations:    Renal (Nepro with Carb steady): Initiate at 10 mls/hr increasing 10 mls q6 hrs until goal of 25mls/hr x 24 hours with fwf 30mls q4 hours with protein modulars TID Provides: 1,770 kcals (300 from protein modulars, 310 from propofol) 126 grams of protein and 616 mls/water meeting 100% of calorie needs at this time.      Malnutrition Assessment:  Malnutrition Status:  No malnutrition (03/12/24 1534)    Context:  Chronic Illness     Findings of the 6 clinical characteristics of malnutrition:  Energy Intake:  Mild decrease in energy intake (Comment) (poor po intake prior to admission)  Weight Loss:  Unable to assess (no wt hx in emr)     Body Fat Loss:  No significant body fat loss     Muscle Mass Loss:  No significant muscle mass loss    Fluid Accumulation:  Mild Extremities   Strength:  Not Performed    Nutrition Assessment:    Pt admit for AMS, missed dialysis treatments, respiratory failure w/ hypoxia, hyperkalemia, septic shock Kleibsella (unknown etiology.) PMHx: Anemia, Arrythmia, Arthritis, Asthma, CKD on dialysis, COPD, Hypothyroid, HLD, Sleep apnea, T2DM. Pt intubated and sedated (propofol), pressors x1. Pt was to have dialysis but there was a fistula malfunction, next HD dependent upon pressor needs. Pt will try to be weaned 1-2  days. WIll provide tube feeding recommendations, continue inpaitent monitoring f/up per policy.    Nutrition Related Findings:    Intubate and sedated, Tmax 99.1, MAP 72, 7.31 minute/volume, elevate BUN/Creatinine, Anion gap 18, GFR 12, Ca+ 8.5, PH 7.199, ALK PHOS 147, , , Bilirubin 1.4, , Hgb 9.7, Hct 29.8 Wound Type: None       Current Nutrition Intake & Therapies:    Average Meal Intake: NPO  Average Supplements Intake: NPO  Current Tube Feeding (TF) Orders:  Feeding

## 2024-03-12 NOTE — PLAN OF CARE
Problem: Safety - Medical Restraint  Goal: Remains free of injury from restraints (Restraint for Interference with Medical Device)  Description: INTERVENTIONS:  1. Determine that other, less restrictive measures have been tried or would not be effective before applying the restraint  2. Evaluate the patient's condition at the time of restraint application  3. Inform patient/family regarding the reason for restraint  4. Q2H: Monitor safety, psychosocial status, comfort, nutrition and hydration  3/12/2024 0536 by Sally Aguilar RN  Outcome: Progressing  Flowsheets  Taken 3/12/2024 0400  Remains free of injury from restraints (restraint for interference with medical device): Every 2 hours: Monitor safety, psychosocial status, comfort, nutrition and hydration  Taken 3/12/2024 0200  Remains free of injury from restraints (restraint for interference with medical device): Every 2 hours: Monitor safety, psychosocial status, comfort, nutrition and hydration  Taken 3/12/2024 0022  Remains free of injury from restraints (restraint for interference with medical device): Every 2 hours: Monitor safety, psychosocial status, comfort, nutrition and hydration  Taken 3/12/2024 0000  Remains free of injury from restraints (restraint for interference with medical device): Every 2 hours: Monitor safety, psychosocial status, comfort, nutrition and hydration

## 2024-03-12 NOTE — PLAN OF CARE
Problem: Safety - Adult  Goal: Free from fall injury  3/12/2024 0535 by Sally Aguilar, RN  Outcome: Progressing

## 2024-03-12 NOTE — PROGRESS NOTES
Department of Internal Medicine  Nephrology Attending Progress Note    SUBJECTIVE:  We are following this patient for end-stage renal failure .     3/12/24-patient seen and examined in the intensive care unit.  Patient is currently intubated on sedation.  She continues to require pressors at the time of my visit    PROBLEM LIST:    Patient Active Problem List   Diagnosis    Controlled type 2 diabetes mellitus with chronic kidney disease on chronic dialysis, with long-term current use of insulin ([facility])    Mixed hyperlipidemia    Hypothyroidism    Essential hypertension    Sleep apnea    Osteoporosis    Macrocytic anemia with vitamin B12 deficiency    ESRD on dialysis ([facility])    Rheumatoid arthritis ([facility])    Restless leg syndrome, controlled    Depression    Shortness of breath    End stage renal disease ([facility])    Paroxysmal atrial fibrillation ([facility])    Hypotension    Chest pain    Lumbosacral spondylosis without myelopathy    Severe episode of recurrent major depressive disorder, without psychotic features ([facility])    Encounter regarding vascular access for dialysis for end-stage renal disease ([facility])    Acute respiratory failure with hypoxia ([facility])    Hyperkalemia        PAST MEDICAL HISTORY:    Past Medical History:   Diagnosis Date    Anemia     Arrhythmia     AF    Arthritis     RA    Asthma     Chronic kidney disease     COPD (chronic obstructive pulmonary disease) ([facility])     Depression     Hemodialysis patient ([facility])     T-Th-Sat    History of blood transfusion     Hyperlipidemia     Hypothyroid     Sleep apnea     no CPAP    Type II or unspecified type diabetes mellitus without mention of complication, not stated as uncontrolled     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 06/15/2017       MEDS (scheduled):   midodrine  10 mg Oral TID WC    ipratropium 0.5 mg-albuterol 2.5 mg  1 Dose Inhalation Q4H RT    heparin (porcine)  5,000 Units SubCUTAneous 3 times per day    hydrocortisone

## 2024-03-13 ENCOUNTER — APPOINTMENT (OUTPATIENT)
Dept: GENERAL RADIOLOGY | Age: 79
DRG: 004 | End: 2024-03-13
Payer: COMMERCIAL

## 2024-03-13 LAB
AADO2: 74.4 MMHG
ACB COMPLEX DNA BLD POS QL NAA+NON-PROBE: NOT DETECTED
ALBUMIN SERPL-MCNC: 2.7 G/DL (ref 3.5–5.2)
ALP SERPL-CCNC: 133 U/L (ref 35–104)
ALT SERPL-CCNC: 458 U/L (ref 0–32)
ANION GAP SERPL CALCULATED.3IONS-SCNC: 18 MMOL/L (ref 7–16)
AST SERPL-CCNC: 604 U/L (ref 0–31)
B FRAGILIS DNA BLD POS QL NAA+NON-PROBE: NOT DETECTED
B.E.: -8.3 MMOL/L (ref -3–3)
BILIRUB SERPL-MCNC: 1 MG/DL (ref 0–1.2)
BIOFIRE TEST COMMENT: ABNORMAL
BLACTX-M ISLT/SPM QL: NOT DETECTED
BLAIMP ISLT/SPM QL: NOT DETECTED
BLAKPC ISLT/SPM QL: NOT DETECTED
BLAOXA-48-LIKE ISLT/SPM QL: NOT DETECTED
BLAVIM ISLT/SPM QL: NOT DETECTED
BUN SERPL-MCNC: 69 MG/DL (ref 6–23)
C ALBICANS DNA BLD POS QL NAA+NON-PROBE: NOT DETECTED
C AURIS DNA BLD POS QL NAA+NON-PROBE: NOT DETECTED
C GATTII+NEOFOR DNA BLD POS QL NAA+N-PRB: NOT DETECTED
C GLABRATA DNA BLD POS QL NAA+NON-PROBE: NOT DETECTED
C KRUSEI DNA BLD POS QL NAA+NON-PROBE: NOT DETECTED
C PARAP DNA BLD POS QL NAA+NON-PROBE: NOT DETECTED
C TROPICLS DNA BLD POS QL NAA+NON-PROBE: NOT DETECTED
CALCIUM SERPL-MCNC: 8.2 MG/DL (ref 8.6–10.2)
CHLORIDE SERPL-SCNC: 97 MMOL/L (ref 98–107)
CO2 SERPL-SCNC: 17 MMOL/L (ref 22–29)
COHB: 1.2 % (ref 0–1.5)
COLISTIN RES MCR-1 ISLT/SPM QL: NOT DETECTED
CREAT SERPL-MCNC: 4.2 MG/DL (ref 0.5–1)
CRITICAL: ABNORMAL
DATE ANALYZED: ABNORMAL
DATE OF COLLECTION: ABNORMAL
E CLOAC COMP DNA BLD POS NAA+NON-PROBE: NOT DETECTED
E COLI DNA BLD POS QL NAA+NON-PROBE: NOT DETECTED
E FAECALIS DNA BLD POS QL NAA+NON-PROBE: NOT DETECTED
E FAECIUM DNA BLD POS QL NAA+NON-PROBE: NOT DETECTED
ENTEROBACTERALES DNA BLD POS NAA+N-PRB: DETECTED
ERYTHROCYTE [DISTWIDTH] IN BLOOD BY AUTOMATED COUNT: 14.6 % (ref 11.5–15)
FIO2: 30 %
GFR SERPL CREATININE-BSD FRML MDRD: 10 ML/MIN/1.73M2
GLUCOSE BLD-MCNC: 253 MG/DL (ref 74–99)
GLUCOSE BLD-MCNC: 280 MG/DL (ref 74–99)
GLUCOSE BLD-MCNC: 309 MG/DL (ref 74–99)
GLUCOSE SERPL-MCNC: 317 MG/DL (ref 74–99)
GP B STREP DNA BLD POS QL NAA+NON-PROBE: NOT DETECTED
HAEM INFLU DNA BLD POS QL NAA+NON-PROBE: NOT DETECTED
HCO3: 17.1 MMOL/L (ref 22–26)
HCT VFR BLD AUTO: 26.8 % (ref 34–48)
HGB BLD-MCNC: 8.5 G/DL (ref 11.5–15.5)
HHB: 4.1 % (ref 0–5)
K OXYTOCA DNA BLD POS QL NAA+NON-PROBE: NOT DETECTED
KLEBSIELLA SP DNA BLD POS QL NAA+NON-PRB: DETECTED
KLEBSIELLA SP DNA BLD POS QL NAA+NON-PRB: NOT DETECTED
L MONOCYTOG DNA BLD POS QL NAA+NON-PROBE: NOT DETECTED
LAB: ABNORMAL
Lab: 415
MAGNESIUM SERPL-MCNC: 2.1 MG/DL (ref 1.6–2.6)
MCH RBC QN AUTO: 30.7 PG (ref 26–35)
MCHC RBC AUTO-ENTMCNC: 31.7 G/DL (ref 32–34.5)
MCV RBC AUTO: 96.8 FL (ref 80–99.9)
METHB: 0.3 % (ref 0–1.5)
MICROORGANISM SPEC CULT: ABNORMAL
MICROORGANISM SPEC CULT: NORMAL
MICROORGANISM/AGENT SPEC: ABNORMAL
MICROORGANISM/AGENT SPEC: ABNORMAL
MICROORGANISM/AGENT SPEC: NORMAL
MODE: ABNORMAL
N MEN DNA BLD POS QL NAA+NON-PROBE: NOT DETECTED
O2 CONTENT: 12.1 ML/DL
O2 SATURATION: 95.8 % (ref 92–98.5)
O2HB: 94.4 % (ref 94–97)
OPERATOR ID: 2323
P AERUGINOSA DNA BLD POS NAA+NON-PROBE: NOT DETECTED
PATIENT TEMP: 37 C
PCO2: 34.5 MMHG (ref 35–45)
PEEP/CPAP: 5 CMH2O
PFO2: 3.05 MMHG/%
PH BLOOD GAS: 7.31 (ref 7.35–7.45)
PHOSPHATE SERPL-MCNC: 6.9 MG/DL (ref 2.5–4.5)
PLATELET # BLD AUTO: 55 K/UL (ref 130–450)
PLATELET CONFIRMATION: NORMAL
PMV BLD AUTO: 12.4 FL (ref 7–12)
PO2: 91.5 MMHG (ref 75–100)
POTASSIUM SERPL-SCNC: 4.8 MMOL/L (ref 3.5–5)
PROT SERPL-MCNC: 4.7 G/DL (ref 6.4–8.3)
PROTEUS SP DNA BLD POS QL NAA+NON-PROBE: NOT DETECTED
RBC # BLD AUTO: 2.77 M/UL (ref 3.5–5.5)
RESISTANT GENE NDM BY PCR: NOT DETECTED
RI(T): 0.81
RR MECHANICAL: 18 B/MIN
S AUREUS DNA BLD POS QL NAA+NON-PROBE: NOT DETECTED
S AUREUS+CONS DNA BLD POS NAA+NON-PROBE: NOT DETECTED
S EPIDERMIDIS DNA BLD POS QL NAA+NON-PRB: NOT DETECTED
S LUGDUNENSIS DNA BLD POS QL NAA+NON-PRB: NOT DETECTED
S MALTOPHILIA DNA BLD POS QL NAA+NON-PRB: NOT DETECTED
S MARCESCENS DNA BLD POS NAA+NON-PROBE: NOT DETECTED
S PNEUM DNA BLD POS QL NAA+NON-PROBE: NOT DETECTED
S PYO DNA BLD POS QL NAA+NON-PROBE: NOT DETECTED
SALMONELLA DNA BLD POS QL NAA+NON-PROBE: NOT DETECTED
SERVICE CMNT-IMP: ABNORMAL
SERVICE CMNT-IMP: ABNORMAL
SODIUM SERPL-SCNC: 132 MMOL/L (ref 132–146)
SOURCE, BLOOD GAS: ABNORMAL
SPECIMEN DESCRIPTION: ABNORMAL
SPECIMEN DESCRIPTION: NORMAL
STREPTOCOCCUS DNA BLD POS NAA+NON-PROBE: NOT DETECTED
THB: 9 G/DL (ref 11.5–16.5)
TIME ANALYZED: 418
VT MECHANICAL: 400 ML
WBC OTHER # BLD: 16.3 K/UL (ref 4.5–11.5)

## 2024-03-13 PROCEDURE — 82962 GLUCOSE BLOOD TEST: CPT

## 2024-03-13 PROCEDURE — 6370000000 HC RX 637 (ALT 250 FOR IP): Performed by: INTERNAL MEDICINE

## 2024-03-13 PROCEDURE — 99291 CRITICAL CARE FIRST HOUR: CPT | Performed by: INTERNAL MEDICINE

## 2024-03-13 PROCEDURE — 83735 ASSAY OF MAGNESIUM: CPT

## 2024-03-13 PROCEDURE — 82805 BLOOD GASES W/O2 SATURATION: CPT

## 2024-03-13 PROCEDURE — 6360000002 HC RX W HCPCS: Performed by: INTERNAL MEDICINE

## 2024-03-13 PROCEDURE — 85027 COMPLETE CBC AUTOMATED: CPT

## 2024-03-13 PROCEDURE — 6360000002 HC RX W HCPCS

## 2024-03-13 PROCEDURE — 94003 VENT MGMT INPAT SUBQ DAY: CPT

## 2024-03-13 PROCEDURE — 80053 COMPREHEN METABOLIC PANEL: CPT

## 2024-03-13 PROCEDURE — 2580000003 HC RX 258: Performed by: INTERNAL MEDICINE

## 2024-03-13 PROCEDURE — 84100 ASSAY OF PHOSPHORUS: CPT

## 2024-03-13 PROCEDURE — A4216 STERILE WATER/SALINE, 10 ML: HCPCS

## 2024-03-13 PROCEDURE — 99232 SBSQ HOSP IP/OBS MODERATE 35: CPT | Performed by: INTERNAL MEDICINE

## 2024-03-13 PROCEDURE — 36592 COLLECT BLOOD FROM PICC: CPT

## 2024-03-13 PROCEDURE — 90935 HEMODIALYSIS ONE EVALUATION: CPT

## 2024-03-13 PROCEDURE — 94640 AIRWAY INHALATION TREATMENT: CPT

## 2024-03-13 PROCEDURE — 71045 X-RAY EXAM CHEST 1 VIEW: CPT

## 2024-03-13 PROCEDURE — 2000000000 HC ICU R&B

## 2024-03-13 PROCEDURE — 6360000002 HC RX W HCPCS: Performed by: NURSE PRACTITIONER

## 2024-03-13 PROCEDURE — C9113 INJ PANTOPRAZOLE SODIUM, VIA: HCPCS

## 2024-03-13 PROCEDURE — 2580000003 HC RX 258

## 2024-03-13 RX ORDER — BUDESONIDE 0.5 MG/2ML
0.5 INHALANT ORAL
Status: DISCONTINUED | OUTPATIENT
Start: 2024-03-13 | End: 2024-03-28

## 2024-03-13 RX ADMIN — IPRATROPIUM BROMIDE AND ALBUTEROL SULFATE 1 DOSE: .5; 2.5 SOLUTION RESPIRATORY (INHALATION) at 13:23

## 2024-03-13 RX ADMIN — HYDROCORTISONE SODIUM SUCCINATE 50 MG: 100 INJECTION, POWDER, FOR SOLUTION INTRAMUSCULAR; INTRAVENOUS at 20:55

## 2024-03-13 RX ADMIN — IPRATROPIUM BROMIDE AND ALBUTEROL SULFATE 1 DOSE: .5; 2.5 SOLUTION RESPIRATORY (INHALATION) at 09:26

## 2024-03-13 RX ADMIN — MIDODRINE HYDROCHLORIDE 10 MG: 5 TABLET ORAL at 12:29

## 2024-03-13 RX ADMIN — IPRATROPIUM BROMIDE AND ALBUTEROL SULFATE 1 DOSE: .5; 2.5 SOLUTION RESPIRATORY (INHALATION) at 17:22

## 2024-03-13 RX ADMIN — INSULIN LISPRO 2 UNITS: 100 INJECTION, SOLUTION INTRAVENOUS; SUBCUTANEOUS at 00:55

## 2024-03-13 RX ADMIN — HYDROCORTISONE SODIUM SUCCINATE 50 MG: 100 INJECTION, POWDER, FOR SOLUTION INTRAMUSCULAR; INTRAVENOUS at 05:28

## 2024-03-13 RX ADMIN — PROPOFOL 20 MCG/KG/MIN: 10 INJECTION, EMULSION INTRAVENOUS at 07:47

## 2024-03-13 RX ADMIN — HEPARIN SODIUM 5000 UNITS: 5000 INJECTION INTRAVENOUS; SUBCUTANEOUS at 14:34

## 2024-03-13 RX ADMIN — IPRATROPIUM BROMIDE AND ALBUTEROL SULFATE 1 DOSE: .5; 2.5 SOLUTION RESPIRATORY (INHALATION) at 21:10

## 2024-03-13 RX ADMIN — MIDODRINE HYDROCHLORIDE 10 MG: 5 TABLET ORAL at 08:27

## 2024-03-13 RX ADMIN — MUPIROCIN: 20 OINTMENT TOPICAL at 20:54

## 2024-03-13 RX ADMIN — PROPOFOL 20 MCG/KG/MIN: 10 INJECTION, EMULSION INTRAVENOUS at 01:24

## 2024-03-13 RX ADMIN — HEPARIN SODIUM 5000 UNITS: 5000 INJECTION INTRAVENOUS; SUBCUTANEOUS at 05:28

## 2024-03-13 RX ADMIN — SODIUM CHLORIDE: 9 INJECTION, SOLUTION INTRAVENOUS at 14:35

## 2024-03-13 RX ADMIN — MUPIROCIN: 20 OINTMENT TOPICAL at 13:34

## 2024-03-13 RX ADMIN — HYDROCORTISONE SODIUM SUCCINATE 50 MG: 100 INJECTION, POWDER, FOR SOLUTION INTRAMUSCULAR; INTRAVENOUS at 12:29

## 2024-03-13 RX ADMIN — INSULIN LISPRO 2 UNITS: 100 INJECTION, SOLUTION INTRAVENOUS; SUBCUTANEOUS at 12:30

## 2024-03-13 RX ADMIN — Medication 10 ML: at 08:32

## 2024-03-13 RX ADMIN — BUDESONIDE INHALATION 500 MCG: 0.5 SUSPENSION RESPIRATORY (INHALATION) at 17:22

## 2024-03-13 RX ADMIN — MIDODRINE HYDROCHLORIDE 10 MG: 5 TABLET ORAL at 17:25

## 2024-03-13 RX ADMIN — CEFEPIME 1000 MG: 1 INJECTION, POWDER, FOR SOLUTION INTRAMUSCULAR; INTRAVENOUS at 21:02

## 2024-03-13 RX ADMIN — INSULIN LISPRO 3 UNITS: 100 INJECTION, SOLUTION INTRAVENOUS; SUBCUTANEOUS at 05:32

## 2024-03-13 RX ADMIN — PANTOPRAZOLE SODIUM 40 MG: 40 INJECTION, POWDER, FOR SOLUTION INTRAVENOUS at 08:27

## 2024-03-13 RX ADMIN — IPRATROPIUM BROMIDE AND ALBUTEROL SULFATE 1 DOSE: .5; 2.5 SOLUTION RESPIRATORY (INHALATION) at 04:49

## 2024-03-13 RX ADMIN — EPOETIN ALFA-EPBX 2000 UNITS: 2000 INJECTION, SOLUTION INTRAVENOUS; SUBCUTANEOUS at 18:27

## 2024-03-13 RX ADMIN — IPRATROPIUM BROMIDE AND ALBUTEROL SULFATE 1 DOSE: .5; 2.5 SOLUTION RESPIRATORY (INHALATION) at 01:45

## 2024-03-13 RX ADMIN — PROPOFOL 15 MCG/KG/MIN: 10 INJECTION, EMULSION INTRAVENOUS at 17:26

## 2024-03-13 RX ADMIN — INSULIN LISPRO 3 UNITS: 100 INJECTION, SOLUTION INTRAVENOUS; SUBCUTANEOUS at 18:05

## 2024-03-13 RX ADMIN — HEPARIN SODIUM 5000 UNITS: 5000 INJECTION INTRAVENOUS; SUBCUTANEOUS at 21:38

## 2024-03-13 ASSESSMENT — PULMONARY FUNCTION TESTS
PIF_VALUE: 25
PIF_VALUE: 37
PIF_VALUE: 23
PIF_VALUE: 19
PIF_VALUE: 19
PIF_VALUE: 18
PIF_VALUE: 18
PIF_VALUE: 36
PIF_VALUE: 22
PIF_VALUE: 22
PIF_VALUE: 24
PIF_VALUE: 36
PIF_VALUE: 24
PIF_VALUE: 28
PIF_VALUE: 21
PIF_VALUE: 23
PIF_VALUE: 19
PIF_VALUE: 23
PIF_VALUE: 36
PIF_VALUE: 27
PIF_VALUE: 34
PIF_VALUE: 25
PIF_VALUE: 24
PIF_VALUE: 31
PIF_VALUE: 39
PIF_VALUE: 23
PIF_VALUE: 25
PIF_VALUE: 33
PIF_VALUE: 43
PIF_VALUE: 24
PIF_VALUE: 23
PIF_VALUE: 19
PIF_VALUE: 24
PIF_VALUE: 26
PIF_VALUE: 36
PIF_VALUE: 22
PIF_VALUE: 33
PIF_VALUE: 28
PIF_VALUE: 38
PIF_VALUE: 23
PIF_VALUE: 25
PIF_VALUE: 50
PIF_VALUE: 18
PIF_VALUE: 41
PIF_VALUE: 25
PIF_VALUE: 22
PIF_VALUE: 18
PIF_VALUE: 24
PIF_VALUE: 19
PIF_VALUE: 18
PIF_VALUE: 18
PIF_VALUE: 23
PIF_VALUE: 27
PIF_VALUE: 24

## 2024-03-13 ASSESSMENT — PAIN SCALES - GENERAL
PAINLEVEL_OUTOF10: 0

## 2024-03-13 NOTE — PROGRESS NOTES
3/13/2024 1106  Last data filed at 3/13/2024 0800  Gross per 24 hour   Intake 1198.35 ml   Output --   Net 1198.35 ml       Wt Readings from Last 3 Encounters:   03/13/24 99.1 kg (218 lb 8 oz)   03/08/24 95.3 kg (210 lb)   11/08/23 95.3 kg (210 lb)       Constitutional: Patient is intubated and sedated  Head: normocephalic, atraumatic  Cardiovascular: S1-S2 no S3  Respiratory:  Clear upper diminished bases  Gastrointestinal: soft, nontender, nondistended  Ext: Bilateral lower extremities cool with discoloration of toes, no edema  Neuro: Sedated  Skin: dry, no rash      DATA:      Recent Labs     03/10/24  1500 03/12/24  0446 03/13/24 0417   WBC 11.9* 15.9* 16.3*   HGB 12.0 9.7* 8.5*   HCT 39.8 29.8* 26.8*   .8* 96.1 96.8   PLT  --   --  55*     Recent Labs     03/12/24  0446 03/12/24  0950 03/13/24 0417    133 132   K 4.8 4.8 4.8   CL 96* 96* 97*   CO2 18* 19* 17*   BUN 40* 47* 69*   CREATININE 3.5* 3.7* 4.2*   LABGLOM 13* 12* 10*   GLUCOSE 169* 195* 317*   CALCIUM 8.5* 8.5* 8.2*     Recent Labs     03/10/24  1500 03/12/24  0446 03/13/24 0417   * 403* 458*   * 691* 604*   ALKPHOS 148* 147* 133*   BILITOT 1.6* 1.4* 1.0     Lab Results   Component Value Date    LABALBU 2.7 (L) 03/13/2024    LABALBU 3.1 (L) 03/12/2024    LABALBU 3.5 03/10/2024       Iron studies:  Lab Results   Component Value Date    FERRITIN 999 02/15/2021    IRON 30 (L) 02/15/2021    TIBC 140 (L) 02/15/2021     Vitamin B-12   Date Value Ref Range Status   08/05/2022 >2000 (H) 211 - 946 pg/mL Final     Folate   Date Value Ref Range Status   12/25/2020 3.8 (L) 4.8 - 24.2 ng/mL Final       Bone disease:  Lab Results   Component Value Date    MG 2.1 03/13/2024    PHOS 6.9 (H) 03/13/2024     Vit D, 25-Hydroxy   Date Value Ref Range Status   08/05/2022 43 30 - 100 ng/mL Final     Comment:     <20 ng/mL.............Deficient  20-30 ng/mL...........Insufficient   ng/mL..........Sufficient  >100 ng/mL............Toxic    greater than 10.0 g/dL  Will initiate JESSE with hemodialysis q. MWF    7.  Left upper extremity access malfunction-  Likely in the setting of profound hypotension  Will continue to follow as patient required placement of temporary dialysis catheter.      KACIE Tyson CNP     Patient seen and examined on dialysis  Dialysis prescription reviewed.  Patient remains intubated and on mechanical ventilation.  On pressor support..   No family member is present at the bedside.  Chart reviewed.  I had a face to face encounter with the patient.   Agree with exam.    Agree with  formulation, assessment and plan as outlined above by KACIE Tyson CNP   and directed by me.   Addition and corrections were done as deemed appropriate.   My exam and plan include:     Continue current treatment as outlined above.                 Paras Enrique MD  Nephrology        Electronically signed by Paras Enrique MD on 3/13/2024 at 12:31 PM

## 2024-03-13 NOTE — PLAN OF CARE
Problem: Safety - Adult  Goal: Free from fall injury  Outcome: Progressing     Problem: Safety - Medical Restraint  Goal: Remains free of injury from restraints (Restraint for Interference with Medical Device)  Description: INTERVENTIONS:  1. Determine that other, less restrictive measures have been tried or would not be effective before applying the restraint  2. Evaluate the patient's condition at the time of restraint application  3. Inform patient/family regarding the reason for restraint  4. Q2H: Monitor safety, psychosocial status, comfort, nutrition and hydration  Recent Flowsheet Documentation  Taken 3/13/2024 0600 by Sally Aguilar RN  Remains free of injury from restraints (restraint for interference with medical device): Every 2 hours: Monitor safety, psychosocial status, comfort, nutrition and hydration  Taken 3/13/2024 0400 by Sally Aguilar RN  Remains free of injury from restraints (restraint for interference with medical device): Every 2 hours: Monitor safety, psychosocial status, comfort, nutrition and hydration  Taken 3/13/2024 0200 by Sally Aguilar RN  Remains free of injury from restraints (restraint for interference with medical device): Every 2 hours: Monitor safety, psychosocial status, comfort, nutrition and hydration  Taken 3/13/2024 0000 by Sally Aguilar RN  Remains free of injury from restraints (restraint for interference with medical device): Every 2 hours: Monitor safety, psychosocial status, comfort, nutrition and hydration  Taken 3/12/2024 2200 by Sally Aguilar RN  Remains free of injury from restraints (restraint for interference with medical device): Every 2 hours: Monitor safety, psychosocial status, comfort, nutrition and hydration  Taken 3/12/2024 2000 by Sally Aguilar RN  Remains free of injury from restraints (restraint for interference with medical device): Every 2 hours: Monitor safety, psychosocial status, comfort, nutrition and hydration

## 2024-03-13 NOTE — PLAN OF CARE
Problem: Safety - Adult  Goal: Free from fall injury  3/13/2024 1734 by Ashley Bridges RN  Outcome: Progressing     Problem: Discharge Planning  Goal: Discharge to home or other facility with appropriate resources  3/13/2024 1734 by Ashley Bridges RN  Outcome: Progressing  Flowsheets (Taken 3/13/2024 1600)  Discharge to home or other facility with appropriate resources: Identify barriers to discharge with patient and caregiver     Problem: Pain  Goal: Verbalizes/displays adequate comfort level or baseline comfort level  3/13/2024 1734 by Ashley Bridges RN  Outcome: Progressing  Flowsheets (Taken 3/13/2024 1600)  Verbalizes/displays adequate comfort level or baseline comfort level: Assess pain using appropriate pain scale     Problem: Confusion  Goal: Confusion, delirium, dementia, or psychosis is improved or at baseline  Description: INTERVENTIONS:  1. Assess for possible contributors to thought disturbance, including medications, impaired vision or hearing, underlying metabolic abnormalities, dehydration, psychiatric diagnoses, and notify attending LIP  2. [place] high risk fall precautions, as indicated  3. Provide frequent short contacts to provide reality reorientation, refocusing and direction  4. Decrease environmental stimuli, including noise as appropriate  5. Monitor and intervene to maintain adequate nutrition, hydration, elimination, sleep and activity  6. If unable to ensure safety without constant attention obtain sitter and review sitter guidelines with assigned personnel  7. Initiate Psychosocial CNS and Spiritual Care consult, as indicated  3/13/2024 1734 by Ashley Bridges RN  Outcome: Progressing  Flowsheets (Taken 3/13/2024 1600)  Effect of thought disturbance (confusion, delirium, dementia, or psychosis) are managed with adequate functional status: Assess for contributors to thought disturbance, including medications, impaired vision or hearing, underlying metabolic abnormalities, dehydration,    Problem: Respiratory - Adult  Goal: Achieves optimal ventilation and oxygenation  3/13/2024 1734 by Ashley Bridges RN  Outcome: Progressing  Flowsheets (Taken 3/13/2024 1600)  Achieves optimal ventilation and oxygenation: Assess for changes in respiratory status     Problem: Skin/Tissue Integrity - Adult  Goal: Skin integrity remains intact  3/13/2024 1734 by Ashley Bridges RN  Outcome: Progressing  Flowsheets (Taken 3/13/2024 1600)  Skin Integrity Remains Intact: Monitor for areas of redness and/or skin breakdown     Problem: Musculoskeletal - Adult  Goal: Maintain proper alignment of affected body part  3/13/2024 1734 by Ashley Bridges RN  Outcome: Progressing  Flowsheets (Taken 3/13/2024 1600)  Maintain proper alignment of affected body part: Support and protect limb and body alignment per provider's orders     Problem: Musculoskeletal - Adult  Goal: Return ADL status to a safe level of function  3/13/2024 1734 by Ashley Bridges RN  Outcome: Progressing  Flowsheets (Taken 3/13/2024 1600)  Return ADL Status to a Safe Level of Function: Administer medication as ordered     Problem: Infection - Adult  Goal: Absence of infection at discharge  3/13/2024 1734 by Ashley Bridges RN  Outcome: Progressing  Flowsheets (Taken 3/13/2024 1600)  Absence of infection at discharge: Assess and monitor for signs and symptoms of infection     Problem: Infection - Adult  Goal: Absence of infection during hospitalization  3/13/2024 1734 by Ashley Bridges RN  Outcome: Progressing  Flowsheets (Taken 3/13/2024 1600)  Absence of infection during hospitalization: Assess and monitor for signs and symptoms of infection     Problem: Metabolic/Fluid and Electrolytes - Adult  Goal: Hemodynamic stability and optimal renal function maintained  3/13/2024 1734 by Ashley Bridges RN  Outcome: Progressing  Flowsheets (Taken 3/13/2024 1600)  Hemodynamic stability and optimal renal function maintained: Monitor labs and assess for signs and symptoms of

## 2024-03-13 NOTE — PLAN OF CARE
Problem: Safety - Adult  Goal: Free from fall injury  3/13/2024 1122 by Sujey Layton, RN  Outcome: Progressing  3/13/2024 0613 by Sally Aguilar, RN  Outcome: Progressing     Problem: Discharge Planning  Goal: Discharge to home or other facility with appropriate resources  Outcome: Progressing     Problem: Pain  Goal: Verbalizes/displays adequate comfort level or baseline comfort level  Outcome: Progressing     Problem: Skin/Tissue Integrity  Goal: Absence of new skin breakdown  Description: 1.  Monitor for areas of redness and/or skin breakdown  2.  Assess vascular access sites hourly  3.  Every 4-6 hours minimum:  Change oxygen saturation probe site  4.  Every 4-6 hours:  If on nasal continuous positive airway pressure, respiratory therapy assess nares and determine need for appliance change or resting period.  Outcome: Progressing     Problem: Confusion  Goal: Confusion, delirium, dementia, or psychosis is improved or at baseline  Description: INTERVENTIONS:  1. Assess for possible contributors to thought disturbance, including medications, impaired vision or hearing, underlying metabolic abnormalities, dehydration, psychiatric diagnoses, and notify attending LIP  2. [place] high risk fall precautions, as indicated  3. Provide frequent short contacts to provide reality reorientation, refocusing and direction  4. Decrease environmental stimuli, including noise as appropriate  5. Monitor and intervene to maintain adequate nutrition, hydration, elimination, sleep and activity  6. If unable to ensure safety without constant attention obtain sitter and review sitter guidelines with assigned personnel  7. Initiate Psychosocial CNS and Spiritual Care consult, as indicated  Outcome: Progressing     Problem: Safety - Medical Restraint  Goal: Remains free of injury from restraints (Restraint for Interference with Medical Device)  Description: INTERVENTIONS:  1. Determine that other, less restrictive measures

## 2024-03-13 NOTE — PROGRESS NOTES
Admitting Date and Time: 3/10/2024  2:37 PM  Admit Dx: Hyperkalemia [E87.5]  Encounter for dialysis (HCC) [Z99.2]  End stage renal disease on dialysis (HCC) [N18.6, Z99.2]  Acute respiratory failure with hypoxia (HCC) [J96.01]  Acute on chronic respiratory failure with hypoxia and hypercapnia (HCC) [J96.21, J96.22]  Leukocytosis, unspecified type [D72.829]  Acute metabolic encephalopathy [G93.41]    Subjective:    Sedated and ventilated    ROS: denies fever, chills, cp, sob, n/v, HA unless stated above.     epoetin andrzej-epbx  2,000 Units SubCUTAneous Once per day on Mon Wed Fri    mupirocin   Each Nostril BID    budesonide  0.5 mg Nebulization BID RT    midodrine  10 mg Oral TID WC    ipratropium 0.5 mg-albuterol 2.5 mg  1 Dose Inhalation Q4H RT    heparin (porcine)  5,000 Units SubCUTAneous 3 times per day    hydrocortisone sodium succinate PF  50 mg IntraVENous Q8H    sodium chloride flush  10 mL IntraVENous 2 times per day    cefepime  1,000 mg IntraVENous Q24H    insulin lispro  0-4 Units SubCUTAneous 4 times per day    pantoprazole (PROTONIX) 40 mg in sodium chloride (PF) 0.9 % 10 mL injection  40 mg IntraVENous Daily     sodium chloride flush, 10 mL, PRN  promethazine, 12.5 mg, Q6H PRN   Or  ondansetron, 4 mg, Q6H PRN  polyethylene glycol, 17 g, Daily PRN  acetaminophen, 650 mg, Q6H PRN   Or  acetaminophen, 650 mg, Q6H PRN  glucose, 4 tablet, PRN  dextrose bolus, 125 mL, PRN   Or  dextrose bolus, 250 mL, PRN  glucagon (rDNA), 1 mg, PRN  dextrose, , Continuous PRN  sodium chloride, , PRN         Objective:    /89   Pulse 88   Temp 97.6 °F (36.4 °C) (Axillary)   Resp 17   Ht 1.676 m (5' 5.98\")   Wt 99.1 kg (218 lb 8 oz)   SpO2 99%   BMI 35.28 kg/m²   Patient is sedated and ventilated  Normocephalic, without obvious abnormality, atraumatic, external ears without lesions,   Neck  cervical lymphadenopathy with limited exam for motion due to ET tube  Heart has a regular rate and rhythm no murmur  Lungs  Hyperkalemia  Resolved Problems:    * No resolved hospital problems. *      Plan: History of present illness from History and Physical:  Per dr LEOS on 3/10;   This is a 79 y.o. female  has a past medical history of Anemia, Arrhythmia, Arthritis, Asthma, Chronic kidney disease, COPD (chronic obstructive pulmonary disease) (HCC), Depression, Hemodialysis patient (HCC), Hyperlipidemia, Hypothyroid, Sleep apnea, Type II or unspecified type diabetes mellitus without mention of complication, not stated as uncontrolled, and 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. presented with AMS, Hypotension, Missed HD  for last few days prior to arrival to the hospital.  She missed her HD and found to be in a poor living condition at home with dog feces. She was found to be hypotensive with  hypercapnia and placed on BIPAP. Nephrology consulted for fluid overload with hyperkalemia and was given HD but could no finish up the session due to fistula malfunction/bleeding. Poor historian, confused and lethargic, no additional history could be obtained.       Metabolic encephalopathy due to sepsis/septic shock with hypotension leukocytosis empiric antibiotics as blood cultures start to show Klebsiella.  Supportive care in the ICU's restarting home midodrine. 3/12: weaned levophed this am. 3/13: weaned off Critical care ID and renal are on consult.  Steroids  End-stage renal disease missed hemodialysis thus has volume overload and hyperkalemia thus getting hemodialysis per nephrology done again 3/13  Respiratory failure with hypoxia and hypercapnia possible acute exacerbation COPD status post hydrocortisone DuoNebs.  Ventilated.  solucortef  Former smoker she should not restart  Hypothyroidism thyroid replacement.  Tsh wnl  Left forearm AV fistula her malfunctioning  DVT prophylaxis: Heparin  Full code.  Disposition: To be determined      NOTE: This report was transcribed using voice recognition software.

## 2024-03-13 NOTE — PROGRESS NOTES
Pulmonary/Critical Care Progress Note    We are following patient for Klebsiella bacteremia, septic shock (weaning norepinephrine), CKD on dialysis, severe COPD with exacerbation, failed left AV fistula upper extremity now with temporary dialysis catheter in right internal jugular vein    SUBJECTIVE:  The patient remains on the ventilator sedated and nearly off pressors, currently on propofol.    She is receiving intravenous steroids for septic shock but these can probably be stopped tomorrow.  She is also on cefepime for Klebsiella bacteremia along with DVT/PE prophylaxis using heparin and GI bleed prophylaxis using pantoprazole.    We anticipate weaning the norepinephrine off this afternoon/this evening and placing the patient on weaning protocol tomorrow.  She will continue her IV steroids for now since it is also functioning as a treatment for underlying COPD along with aerosolized bronchodilators and inhaled steroids which will be added shortly.    The patient is tolerating tube feeds without difficulty and was dialyzed earlier today.    MEDICATIONS:   epoetin andrzej-epbx  2,000 Units SubCUTAneous Once per day on Mon Wed Fri    mupirocin   Each Nostril BID    midodrine  10 mg Oral TID WC    ipratropium 0.5 mg-albuterol 2.5 mg  1 Dose Inhalation Q4H RT    heparin (porcine)  5,000 Units SubCUTAneous 3 times per day    hydrocortisone sodium succinate PF  50 mg IntraVENous Q8H    sodium chloride flush  10 mL IntraVENous 2 times per day    cefepime  1,000 mg IntraVENous Q24H    insulin lispro  0-4 Units SubCUTAneous 4 times per day    pantoprazole (PROTONIX) 40 mg in sodium chloride (PF) 0.9 % 10 mL injection  40 mg IntraVENous Daily      sodium chloride 50 mL/hr at 03/13/24 1435    norepinephrine 1 mcg/min (03/13/24 1250)    dextrose      sodium chloride      propofol 20 mcg/kg/min (03/13/24 0747)     sodium chloride flush, promethazine **OR** ondansetron, polyethylene glycol, acetaminophen **OR** acetaminophen,  mmol/L Final   03/12/2024 134 132 - 146 mmol/L Final     Potassium   Date Value Ref Range Status   03/13/2024 4.8 3.5 - 5.0 mmol/L Final   03/12/2024 4.8 3.5 - 5.0 mmol/L Final   03/12/2024 4.8 3.5 - 5.0 mmol/L Final     Potassium reflex Magnesium   Date Value Ref Range Status   06/17/2022 4.1 3.5 - 5.0 mmol/L Final   12/21/2020 4.8 3.5 - 5.0 mmol/L Final   06/11/2019 3.4 (L) 3.5 - 5.0 mmol/L Final     Chloride   Date Value Ref Range Status   03/13/2024 97 (L) 98 - 107 mmol/L Final   03/12/2024 96 (L) 98 - 107 mmol/L Final   03/12/2024 96 (L) 98 - 107 mmol/L Final     CO2   Date Value Ref Range Status   03/13/2024 17 (L) 22 - 29 mmol/L Final   03/12/2024 19 (L) 22 - 29 mmol/L Final   03/12/2024 18 (L) 22 - 29 mmol/L Final     BUN   Date Value Ref Range Status   03/13/2024 69 (H) 6 - 23 mg/dL Final   03/12/2024 47 (H) 6 - 23 mg/dL Final   03/12/2024 40 (H) 6 - 23 mg/dL Final     Creatinine   Date Value Ref Range Status   03/13/2024 4.2 (H) 0.50 - 1.00 mg/dL Final   03/12/2024 3.7 (H) 0.50 - 1.00 mg/dL Final   03/12/2024 3.5 (H) 0.50 - 1.00 mg/dL Final     Glucose   Date Value Ref Range Status   03/13/2024 317 (H) 74 - 99 mg/dL Final   03/12/2024 195 (H) 74 - 99 mg/dL Final   03/12/2024 169 (H) 74 - 99 mg/dL Final   06/01/2012 135 (H) 70 - 110 mg/dL Final   05/18/2012 155 (H) 70 - 110 mg/dL Final     Calcium   Date Value Ref Range Status   03/13/2024 8.2 (L) 8.6 - 10.2 mg/dL Final   03/12/2024 8.5 (L) 8.6 - 10.2 mg/dL Final   03/12/2024 8.5 (L) 8.6 - 10.2 mg/dL Final     Total Protein   Date Value Ref Range Status   03/13/2024 4.7 (L) 6.4 - 8.3 g/dL Final   03/12/2024 5.6 (L) 6.4 - 8.3 g/dL Final   03/10/2024 7.1 6.4 - 8.3 g/dL Final     Albumin   Date Value Ref Range Status   03/13/2024 2.7 (L) 3.5 - 5.2 g/dL Final   03/12/2024 3.1 (L) 3.5 - 5.2 g/dL Final   03/10/2024 3.5 3.5 - 5.2 g/dL Final   06/01/2012 3.9 3.2 - 4.8 g/dL Final   05/18/2012 4.0 3.2 - 4.8 g/dL Final     Total Bilirubin   Date Value Ref Range Status

## 2024-03-13 NOTE — DIALYSIS
3 hours of HD completed as ordered. No fluid removed. Levo increased times 2 for hypotension. Pt tolerated fair.

## 2024-03-14 ENCOUNTER — APPOINTMENT (OUTPATIENT)
Dept: GENERAL RADIOLOGY | Age: 79
DRG: 004 | End: 2024-03-14
Payer: COMMERCIAL

## 2024-03-14 LAB
AADO2: 69.1 MMHG
ALBUMIN SERPL-MCNC: 2.7 G/DL (ref 3.5–5.2)
ALP SERPL-CCNC: 141 U/L (ref 35–104)
ALT SERPL-CCNC: 462 U/L (ref 0–32)
ANION GAP SERPL CALCULATED.3IONS-SCNC: 11 MMOL/L (ref 7–16)
AST SERPL-CCNC: 418 U/L (ref 0–31)
B.E.: -1.3 MMOL/L (ref -3–3)
BILIRUB SERPL-MCNC: 0.8 MG/DL (ref 0–1.2)
BUN SERPL-MCNC: 53 MG/DL (ref 6–23)
CALCIUM SERPL-MCNC: 8.2 MG/DL (ref 8.6–10.2)
CHLORIDE SERPL-SCNC: 95 MMOL/L (ref 98–107)
CO2 SERPL-SCNC: 26 MMOL/L (ref 22–29)
COHB: 1.2 % (ref 0–1.5)
CREAT SERPL-MCNC: 2.9 MG/DL (ref 0.5–1)
CRITICAL: ABNORMAL
DATE ANALYZED: ABNORMAL
DATE OF COLLECTION: ABNORMAL
ERYTHROCYTE [DISTWIDTH] IN BLOOD BY AUTOMATED COUNT: 14.3 % (ref 11.5–15)
FIO2: 30 %
GFR SERPL CREATININE-BSD FRML MDRD: 16 ML/MIN/1.73M2
GLUCOSE BLD-MCNC: 276 MG/DL (ref 74–99)
GLUCOSE BLD-MCNC: 351 MG/DL (ref 74–99)
GLUCOSE BLD-MCNC: 395 MG/DL (ref 74–99)
GLUCOSE BLD-MCNC: 441 MG/DL (ref 74–99)
GLUCOSE SERPL-MCNC: 381 MG/DL (ref 74–99)
HCO3: 22.7 MMOL/L (ref 22–26)
HCT VFR BLD AUTO: 25.4 % (ref 34–48)
HGB BLD-MCNC: 8.6 G/DL (ref 11.5–15.5)
HHB: 3 % (ref 0–5)
LAB: ABNORMAL
Lab: 435
MAGNESIUM SERPL-MCNC: 1.9 MG/DL (ref 1.6–2.6)
MCH RBC QN AUTO: 30.8 PG (ref 26–35)
MCHC RBC AUTO-ENTMCNC: 33.9 G/DL (ref 32–34.5)
MCV RBC AUTO: 91 FL (ref 80–99.9)
METHB: 0.3 % (ref 0–1.5)
MODE: AC
O2 CONTENT: 12.5 ML/DL
O2 SATURATION: 97 % (ref 92–98.5)
O2HB: 95.5 % (ref 94–97)
OPERATOR ID: 5523
PATIENT TEMP: 37 C
PCO2: 35.2 MMHG (ref 35–45)
PEEP/CPAP: 5 CMH2O
PFO2: 3.2 MMHG/%
PH BLOOD GAS: 7.43 (ref 7.35–7.45)
PHOSPHATE SERPL-MCNC: 4.5 MG/DL (ref 2.5–4.5)
PLATELET CONFIRMATION: NORMAL
PLATELET, FLUORESCENCE: 40 K/UL (ref 130–450)
PMV BLD AUTO: 11.3 FL (ref 7–12)
PO2: 95.9 MMHG (ref 75–100)
POTASSIUM SERPL-SCNC: 3.7 MMOL/L (ref 3.5–5)
PROT SERPL-MCNC: 4.7 G/DL (ref 6.4–8.3)
RBC # BLD AUTO: 2.79 M/UL (ref 3.5–5.5)
RI(T): 0.72
RR MECHANICAL: 18 B/MIN
SODIUM SERPL-SCNC: 132 MMOL/L (ref 132–146)
SOURCE, BLOOD GAS: ABNORMAL
THB: 9.2 G/DL (ref 11.5–16.5)
TIME ANALYZED: 442
VT MECHANICAL: 40 ML
WBC OTHER # BLD: 11.2 K/UL (ref 4.5–11.5)

## 2024-03-14 PROCEDURE — 83735 ASSAY OF MAGNESIUM: CPT

## 2024-03-14 PROCEDURE — 80053 COMPREHEN METABOLIC PANEL: CPT

## 2024-03-14 PROCEDURE — 84100 ASSAY OF PHOSPHORUS: CPT

## 2024-03-14 PROCEDURE — 82962 GLUCOSE BLOOD TEST: CPT

## 2024-03-14 PROCEDURE — 6360000002 HC RX W HCPCS: Performed by: INTERNAL MEDICINE

## 2024-03-14 PROCEDURE — 2580000003 HC RX 258: Performed by: INTERNAL MEDICINE

## 2024-03-14 PROCEDURE — C9113 INJ PANTOPRAZOLE SODIUM, VIA: HCPCS

## 2024-03-14 PROCEDURE — 94640 AIRWAY INHALATION TREATMENT: CPT

## 2024-03-14 PROCEDURE — A4216 STERILE WATER/SALINE, 10 ML: HCPCS

## 2024-03-14 PROCEDURE — 2000000000 HC ICU R&B

## 2024-03-14 PROCEDURE — 82805 BLOOD GASES W/O2 SATURATION: CPT

## 2024-03-14 PROCEDURE — C9113 INJ PANTOPRAZOLE SODIUM, VIA: HCPCS | Performed by: INTERNAL MEDICINE

## 2024-03-14 PROCEDURE — 6370000000 HC RX 637 (ALT 250 FOR IP): Performed by: INTERNAL MEDICINE

## 2024-03-14 PROCEDURE — 85027 COMPLETE CBC AUTOMATED: CPT

## 2024-03-14 PROCEDURE — 71045 X-RAY EXAM CHEST 1 VIEW: CPT

## 2024-03-14 PROCEDURE — 36592 COLLECT BLOOD FROM PICC: CPT

## 2024-03-14 PROCEDURE — 99291 CRITICAL CARE FIRST HOUR: CPT | Performed by: INTERNAL MEDICINE

## 2024-03-14 PROCEDURE — 6360000002 HC RX W HCPCS

## 2024-03-14 PROCEDURE — 99232 SBSQ HOSP IP/OBS MODERATE 35: CPT | Performed by: INTERNAL MEDICINE

## 2024-03-14 PROCEDURE — 2580000003 HC RX 258

## 2024-03-14 PROCEDURE — 2700000000 HC OXYGEN THERAPY PER DAY

## 2024-03-14 PROCEDURE — 94003 VENT MGMT INPAT SUBQ DAY: CPT

## 2024-03-14 RX ORDER — INSULIN LISPRO 100 [IU]/ML
0-16 INJECTION, SOLUTION INTRAVENOUS; SUBCUTANEOUS EVERY 4 HOURS
Status: DISCONTINUED | OUTPATIENT
Start: 2024-03-14 | End: 2024-03-25

## 2024-03-14 RX ORDER — INSULIN GLARGINE 100 [IU]/ML
25 INJECTION, SOLUTION SUBCUTANEOUS DAILY
Status: DISCONTINUED | OUTPATIENT
Start: 2024-03-14 | End: 2024-03-21

## 2024-03-14 RX ORDER — PREDNISONE 5 MG/1
5 TABLET ORAL 2 TIMES DAILY
Status: DISCONTINUED | OUTPATIENT
Start: 2024-03-14 | End: 2024-03-23

## 2024-03-14 RX ADMIN — Medication 10 ML: at 08:54

## 2024-03-14 RX ADMIN — MUPIROCIN: 20 OINTMENT TOPICAL at 08:45

## 2024-03-14 RX ADMIN — INSULIN LISPRO 16 UNITS: 100 INJECTION, SOLUTION INTRAVENOUS; SUBCUTANEOUS at 17:55

## 2024-03-14 RX ADMIN — INSULIN LISPRO 8 UNITS: 100 INJECTION, SOLUTION INTRAVENOUS; SUBCUTANEOUS at 20:37

## 2024-03-14 RX ADMIN — INSULIN LISPRO 4 UNITS: 100 INJECTION, SOLUTION INTRAVENOUS; SUBCUTANEOUS at 06:18

## 2024-03-14 RX ADMIN — SODIUM CHLORIDE, PRESERVATIVE FREE 40 MG: 5 INJECTION INTRAVENOUS at 20:31

## 2024-03-14 RX ADMIN — IPRATROPIUM BROMIDE AND ALBUTEROL SULFATE 1 DOSE: .5; 2.5 SOLUTION RESPIRATORY (INHALATION) at 09:17

## 2024-03-14 RX ADMIN — IPRATROPIUM BROMIDE AND ALBUTEROL SULFATE 1 DOSE: .5; 2.5 SOLUTION RESPIRATORY (INHALATION) at 14:41

## 2024-03-14 RX ADMIN — APIXABAN 2.5 MG: 2.5 TABLET, FILM COATED ORAL at 12:18

## 2024-03-14 RX ADMIN — SODIUM CHLORIDE: 9 INJECTION, SOLUTION INTRAVENOUS at 03:21

## 2024-03-14 RX ADMIN — MIDODRINE HYDROCHLORIDE 10 MG: 5 TABLET ORAL at 16:36

## 2024-03-14 RX ADMIN — IPRATROPIUM BROMIDE AND ALBUTEROL SULFATE 1 DOSE: .5; 2.5 SOLUTION RESPIRATORY (INHALATION) at 18:40

## 2024-03-14 RX ADMIN — MIDODRINE HYDROCHLORIDE 10 MG: 5 TABLET ORAL at 08:45

## 2024-03-14 RX ADMIN — INSULIN GLARGINE 25 UNITS: 100 INJECTION, SOLUTION SUBCUTANEOUS at 12:20

## 2024-03-14 RX ADMIN — MUPIROCIN: 20 OINTMENT TOPICAL at 20:31

## 2024-03-14 RX ADMIN — Medication 10 ML: at 20:31

## 2024-03-14 RX ADMIN — MIDODRINE HYDROCHLORIDE 10 MG: 5 TABLET ORAL at 12:18

## 2024-03-14 RX ADMIN — IPRATROPIUM BROMIDE AND ALBUTEROL SULFATE 1 DOSE: .5; 2.5 SOLUTION RESPIRATORY (INHALATION) at 21:56

## 2024-03-14 RX ADMIN — IPRATROPIUM BROMIDE AND ALBUTEROL SULFATE 1 DOSE: .5; 2.5 SOLUTION RESPIRATORY (INHALATION) at 01:14

## 2024-03-14 RX ADMIN — HYDROCORTISONE SODIUM SUCCINATE 50 MG: 100 INJECTION, POWDER, FOR SOLUTION INTRAMUSCULAR; INTRAVENOUS at 05:00

## 2024-03-14 RX ADMIN — PANTOPRAZOLE SODIUM 40 MG: 40 INJECTION, POWDER, FOR SOLUTION INTRAVENOUS at 08:45

## 2024-03-14 RX ADMIN — INSULIN LISPRO 16 UNITS: 100 INJECTION, SOLUTION INTRAVENOUS; SUBCUTANEOUS at 12:18

## 2024-03-14 RX ADMIN — BUDESONIDE INHALATION 500 MCG: 0.5 SUSPENSION RESPIRATORY (INHALATION) at 18:40

## 2024-03-14 RX ADMIN — PREDNISONE 5 MG: 5 TABLET ORAL at 20:31

## 2024-03-14 RX ADMIN — PROPOFOL 15 MCG/KG/MIN: 10 INJECTION, EMULSION INTRAVENOUS at 03:21

## 2024-03-14 RX ADMIN — INSULIN LISPRO 4 UNITS: 100 INJECTION, SOLUTION INTRAVENOUS; SUBCUTANEOUS at 01:06

## 2024-03-14 RX ADMIN — IPRATROPIUM BROMIDE AND ALBUTEROL SULFATE 1 DOSE: .5; 2.5 SOLUTION RESPIRATORY (INHALATION) at 06:37

## 2024-03-14 RX ADMIN — CEFEPIME 1000 MG: 1 INJECTION, POWDER, FOR SOLUTION INTRAMUSCULAR; INTRAVENOUS at 20:36

## 2024-03-14 RX ADMIN — BUDESONIDE INHALATION 500 MCG: 0.5 SUSPENSION RESPIRATORY (INHALATION) at 06:38

## 2024-03-14 RX ADMIN — HEPARIN SODIUM 5000 UNITS: 5000 INJECTION INTRAVENOUS; SUBCUTANEOUS at 06:19

## 2024-03-14 ASSESSMENT — PULMONARY FUNCTION TESTS
PIF_VALUE: 21
PIF_VALUE: 21
PIF_VALUE: 35
PIF_VALUE: 19
PIF_VALUE: 19
PIF_VALUE: 23
PIF_VALUE: 23
PIF_VALUE: 22
PIF_VALUE: 22
PIF_VALUE: 19
PIF_VALUE: 22
PIF_VALUE: 22
PIF_VALUE: 20
PIF_VALUE: 19
PIF_VALUE: 19
PIF_VALUE: 25
PIF_VALUE: 20
PIF_VALUE: 25
PIF_VALUE: 21
PIF_VALUE: 19
PIF_VALUE: 22
PIF_VALUE: 24
PIF_VALUE: 22
PIF_VALUE: 22
PIF_VALUE: 20
PIF_VALUE: 23
PIF_VALUE: 29
PIF_VALUE: 19
PIF_VALUE: 21
PIF_VALUE: 19
PIF_VALUE: 20
PIF_VALUE: 22
PIF_VALUE: 24
PIF_VALUE: 23
PIF_VALUE: 21
PIF_VALUE: 20
PIF_VALUE: 23
PIF_VALUE: 22

## 2024-03-14 ASSESSMENT — PAIN SCALES - GENERAL
PAINLEVEL_OUTOF10: 0

## 2024-03-14 NOTE — PROGRESS NOTES
Admitting Date and Time: 3/10/2024  2:37 PM  Admit Dx: Hyperkalemia [E87.5]  Encounter for dialysis (HCC) [Z99.2]  End stage renal disease on dialysis (HCC) [N18.6, Z99.2]  Acute respiratory failure with hypoxia (HCC) [J96.01]  Acute on chronic respiratory failure with hypoxia and hypercapnia (HCC) [J96.21, J96.22]  Leukocytosis, unspecified type [D72.829]  Acute metabolic encephalopathy [G93.41]    Subjective:    Sedated and ventilated    ROS: denies fever, chills, cp, sob, n/v, HA unless stated above.     insulin glargine  25 Units SubCUTAneous Daily    insulin lispro  0-16 Units SubCUTAneous Q4H    apixaban  2.5 mg Oral BID    predniSONE  5 mg Oral BID    pantoprazole (PROTONIX) 40 mg in sodium chloride (PF) 0.9 % 10 mL injection  40 mg IntraVENous Q12H    epoetin andrzej-epbx  2,000 Units SubCUTAneous Once per day on Mon Wed Fri    mupirocin   Each Nostril BID    budesonide  0.5 mg Nebulization BID RT    midodrine  10 mg Oral TID WC    ipratropium 0.5 mg-albuterol 2.5 mg  1 Dose Inhalation Q4H RT    sodium chloride flush  10 mL IntraVENous 2 times per day    cefepime  1,000 mg IntraVENous Q24H     sodium chloride flush, 10 mL, PRN  promethazine, 12.5 mg, Q6H PRN   Or  ondansetron, 4 mg, Q6H PRN  polyethylene glycol, 17 g, Daily PRN  acetaminophen, 650 mg, Q6H PRN   Or  acetaminophen, 650 mg, Q6H PRN  glucose, 4 tablet, PRN  dextrose bolus, 125 mL, PRN   Or  dextrose bolus, 250 mL, PRN  glucagon (rDNA), 1 mg, PRN  dextrose, , Continuous PRN  sodium chloride, , PRN         Objective:    BP (!) 99/45   Pulse 74   Temp 97 °F (36.1 °C) (Axillary)   Resp 19   Ht 1.676 m (5' 5.98\")   Wt 91.4 kg (201 lb 8 oz)   SpO2 98%   BMI 32.54 kg/m²   Patient is sedated and ventilated  Normocephalic, without obvious abnormality, atraumatic, external ears without lesions,   Neck  cervical lymphadenopathy with limited exam for motion due to ET tube  Heart has a regular rate and rhythm no murmur  Lungs are clear to auscultation  bilaterally with equal movements with the additional sounds of transmitted airway sounds from the ventilator.  Abdomen is soft nontender nondistended no rebound or guarding.   edema good peripheral perfusion.  No significant rashes or new skin lesions.  Affect and neuro exam limited since she is sedated on the ventilator        Recent Labs     03/12/24  0950 03/13/24 0417 03/14/24 0445    132 132   K 4.8 4.8 3.7   CL 96* 97* 95*   CO2 19* 17* 26   BUN 47* 69* 53*   CREATININE 3.7* 4.2* 2.9*   GLUCOSE 195* 317* 381*   CALCIUM 8.5* 8.2* 8.2*         Recent Labs     03/12/24 0446 03/13/24 0417 03/14/24 0445   ALKPHOS 147* 133* 141*   PROT 5.6* 4.7* 4.7*   LABALBU 3.1* 2.7* 2.7*   BILITOT 1.4* 1.0 0.8   * 604* 418*   * 458* 462*         Recent Labs     03/12/24 0446 03/13/24 0417 03/14/24 0445   WBC 15.9* 16.3* 11.2   RBC 3.10* 2.77* 2.79*   HGB 9.7* 8.5* 8.6*   HCT 29.8* 26.8* 25.4*   MCV 96.1 96.8 91.0   MCH 31.3 30.7 30.8   MCHC 32.6 31.7* 33.9   RDW 14.8 14.6 14.3   PLT  --  55*  --    MPV 11.5 12.4* 11.3             Radiology:   XR CHEST PORTABLE   Final Result   1. Lines and tubes in satisfactory position.   2. Cardiomegaly with patchy airspace disease in the lower lung fields   bilaterally and bilateral pleural effusions.         XR CHEST PORTABLE   Final Result   1. Endotracheal tube is at the level of the mini and can be retracted   approximately 3 cm with repeat radiograph.   2. Bibasilar opacities suggestive of stable subsegmental atelectasis.   3. Stable mild pulmonary vascular congestion.         XR CHEST PORTABLE   Final Result   Stable appearance of the chest 03/12/2024.         XR CHEST PORTABLE   Final Result   1. Interval placement of a deep line catheter on the right tip in the SVC.   2. Some improvement seen in the aeration of the left lung base. No change in   the small bilateral pleural effusions.         IR ULTRASOUND GUIDANCE VASCULAR ACCESS   Final Result

## 2024-03-14 NOTE — PLAN OF CARE
Problem: Safety - Adult  Goal: Free from fall injury  3/14/2024 1044 by Sujey Layton, RN  Outcome: Progressing  3/14/2024 0458 by Sally Aguilar, RN  Outcome: Progressing     Problem: Discharge Planning  Goal: Discharge to home or other facility with appropriate resources  Outcome: Progressing     Problem: Pain  Goal: Verbalizes/displays adequate comfort level or baseline comfort level  Outcome: Progressing     Problem: Skin/Tissue Integrity  Goal: Absence of new skin breakdown  Description: 1.  Monitor for areas of redness and/or skin breakdown  2.  Assess vascular access sites hourly  3.  Every 4-6 hours minimum:  Change oxygen saturation probe site  4.  Every 4-6 hours:  If on nasal continuous positive airway pressure, respiratory therapy assess nares and determine need for appliance change or resting period.  Outcome: Progressing     Problem: Confusion  Goal: Confusion, delirium, dementia, or psychosis is improved or at baseline  Description: INTERVENTIONS:  1. Assess for possible contributors to thought disturbance, including medications, impaired vision or hearing, underlying metabolic abnormalities, dehydration, psychiatric diagnoses, and notify attending LIP  2. [place] high risk fall precautions, as indicated  3. Provide frequent short contacts to provide reality reorientation, refocusing and direction  4. Decrease environmental stimuli, including noise as appropriate  5. Monitor and intervene to maintain adequate nutrition, hydration, elimination, sleep and activity  6. If unable to ensure safety without constant attention obtain sitter and review sitter guidelines with assigned personnel  7. Initiate Psychosocial CNS and Spiritual Care consult, as indicated  Outcome: Progressing     Problem: Safety - Medical Restraint  Goal: Remains free of injury from restraints (Restraint for Interference with Medical Device)  Description: INTERVENTIONS:  1. Determine that other, less restrictive measures  have been tried or would not be effective before applying the restraint  2. Evaluate the patient's condition at the time of restraint application  3. Inform patient/family regarding the reason for restraint  4. Q2H: Monitor safety, psychosocial status, comfort, nutrition and hydration  3/14/2024 1044 by Sujey Layton RN  Outcome: Progressing  Flowsheets  Taken 3/14/2024 1000  Remains free of injury from restraints (restraint for interference with medical device): Every 2 hours: Monitor safety, psychosocial status, comfort, nutrition and hydration  Taken 3/14/2024 0847  Remains free of injury from restraints (restraint for interference with medical device): Every 2 hours: Monitor safety, psychosocial status, comfort, nutrition and hydration  Taken 3/14/2024 0800  Remains free of injury from restraints (restraint for interference with medical device): Every 2 hours: Monitor safety, psychosocial status, comfort, nutrition and hydration  3/14/2024 0458 by Sally Aguilar RN  Outcome: Progressing  Flowsheets  Taken 3/14/2024 0400 by Sally Aguilar RN  Remains free of injury from restraints (restraint for interference with medical device): Every 2 hours: Monitor safety, psychosocial status, comfort, nutrition and hydration  Taken 3/14/2024 0200 by Sally Aguilar RN  Remains free of injury from restraints (restraint for interference with medical device): Every 2 hours: Monitor safety, psychosocial status, comfort, nutrition and hydration  Taken 3/14/2024 0000 by Sally Aguilar RN  Remains free of injury from restraints (restraint for interference with medical device): Every 2 hours: Monitor safety, psychosocial status, comfort, nutrition and hydration  Taken 3/13/2024 2200 by Sally Aguilar RN  Remains free of injury from restraints (restraint for interference with medical device): Every 2 hours: Monitor safety, psychosocial status, comfort, nutrition and hydration  Taken 3/13/2024 2000 by Lauren

## 2024-03-14 NOTE — PROGRESS NOTES
Pulmonary/Critical Care Progress Note    We are following patient for pneumonia, Klebsiella bacteremia, septic shock (norepinephrine stopped yesterday) CKD on dialysis, failed left AV fistula severe COPD with exacerbation    SUBJECTIVE:  The patient has been off sedation for approximately 2 to 2-1/2 hours but is still lethargic.  This is probably secondary to the patient's renal failure and inability to metabolize sedation (propofol).  Chest x-ray shows some loss of volume in the right lower lobe which suggest that the patient may require bronchoscopy secondary to secretion retention prior to an attempted extubating.  However, arterial blood gases are quite acceptable.    It is probably best that we plan a bronchoscopy for tomorrow and then proceed with an additional spontaneous breathing trial.  We will try to leave her off the sedation is much as possible.  The patient is tolerating tube feedings without difficulty.  Blood sugars have been quite high probably secondary to the hydrocortisone.  We will stop this except for small dose of oral steroids.    Additionally, the endotracheal tube is just at the mini.  This needs to be retracted 2 to 3 cm.  Respiratory therapy is being notified.  White blood cell count is coming down nicely.        MEDICATIONS:   insulin glargine  25 Units SubCUTAneous Daily    insulin lispro  0-16 Units SubCUTAneous Q4H    epoetin andrzej-epbx  2,000 Units SubCUTAneous Once per day on Mon Wed Fri    mupirocin   Each Nostril BID    budesonide  0.5 mg Nebulization BID RT    midodrine  10 mg Oral TID WC    ipratropium 0.5 mg-albuterol 2.5 mg  1 Dose Inhalation Q4H RT    heparin (porcine)  5,000 Units SubCUTAneous 3 times per day    hydrocortisone sodium succinate PF  50 mg IntraVENous Q8H    sodium chloride flush  10 mL IntraVENous 2 times per day    cefepime  1,000 mg IntraVENous Q24H    pantoprazole (PROTONIX) 40 mg in sodium chloride (PF) 0.9 % 10 mL injection  40 mg IntraVENous Daily         This patient has a high probability of sudden, clinically significant deterioration, which requires the highest level of physician preparedness to intervene urgently.  I managed/supervised life or organ supporting interventions that required frequent physician assessment.   I devoted my full attention to the direct care of this patient for the amount of time indicated below.  Time I spent with the family or surrogate(s) is included only if the patient was incapable of providing the necessary information or participating in medical decisions - Time devoted to teaching and to any procedures I billed separately is not included.    My time caring for this patient including history, physical examination, and medical management and evaluation added up to greater than 50% of the total time spent with this patient, including shared/split visits, should this note reflect this event.      CRITICAL CARE TIME:  36 minutes    Electronically signed by Cheng Payne MD on 3/14/2024 at 11:42 AM

## 2024-03-14 NOTE — PLAN OF CARE
Problem: Safety - Adult  Goal: Free from fall injury  3/14/2024 0458 by Sally Aguilar RN  Outcome: Progressing     Problem: Safety - Medical Restraint  Goal: Remains free of injury from restraints (Restraint for Interference with Medical Device)  Description: INTERVENTIONS:  1. Determine that other, less restrictive measures have been tried or would not be effective before applying the restraint  2. Evaluate the patient's condition at the time of restraint application  3. Inform patient/family regarding the reason for restraint  4. Q2H: Monitor safety, psychosocial status, comfort, nutrition and hydration  3/14/2024 0458 by Sally Aguilar RN  Outcome: Progressing  Flowsheets  Taken 3/14/2024 0400 by Sally Aguilar RN  Remains free of injury from restraints (restraint for interference with medical device): Every 2 hours: Monitor safety, psychosocial status, comfort, nutrition and hydration  Taken 3/14/2024 0200 by Sally Aguilar RN  Remains free of injury from restraints (restraint for interference with medical device): Every 2 hours: Monitor safety, psychosocial status, comfort, nutrition and hydration  Taken 3/14/2024 0000 by Sally Aguilar RN  Remains free of injury from restraints (restraint for interference with medical device): Every 2 hours: Monitor safety, psychosocial status, comfort, nutrition and hydration  Taken 3/13/2024 2200 by Sally Aguilar RN  Remains free of injury from restraints (restraint for interference with medical device): Every 2 hours: Monitor safety, psychosocial status, comfort, nutrition and hydration  Taken 3/13/2024 2000 by Sally Aguilar RN  Remains free of injury from restraints (restraint for interference with medical device): Every 2 hours: Monitor safety, psychosocial status, comfort, nutrition and hydration  Taken 3/13/2024 1800 by Ashley Bridges RN  Remains free of injury from restraints (restraint for interference with medical device): Every 2  hours: Monitor safety, psychosocial status, comfort, nutrition and hydration

## 2024-03-14 NOTE — PROGRESS NOTES
Department of Internal Medicine  Nephrology Attending Progress Note    SUBJECTIVE:  We are following this patient for end-stage renal failure .     3/14/24-patient seen and examined in the intensive care unit.  Patient is currently intubated sedation is off for breathing trial.  Patient has been weaned off Levophed at the time my visit.  She is minimally interactive despite being off sedation.  Discussed with nursing at the bedside      PROBLEM LIST:    Patient Active Problem List   Diagnosis    Controlled type 2 diabetes mellitus with chronic kidney disease on chronic dialysis, with long-term current use of insulin ([facility])    Mixed hyperlipidemia    Hypothyroidism    Essential hypertension    Sleep apnea    Osteoporosis    Macrocytic anemia with vitamin B12 deficiency    ESRD on dialysis ([facility])    Rheumatoid arthritis ([facility])    Restless leg syndrome, controlled    Depression    Shortness of breath    End stage renal disease ([facility])    Paroxysmal atrial fibrillation ([facility])    Hypotension    Chest pain    Lumbosacral spondylosis without myelopathy    Severe episode of recurrent major depressive disorder, without psychotic features ([facility])    Encounter regarding vascular access for dialysis for end-stage renal disease ([facility])    Acute respiratory failure with hypoxia ([facility])    Hyperkalemia        PAST MEDICAL HISTORY:    Past Medical History:   Diagnosis Date    Anemia     Arrhythmia     AF    Arthritis     RA    Asthma     Chronic kidney disease     COPD (chronic obstructive pulmonary disease) ([facility])     Depression     Hemodialysis patient ([facility])     T-Th-Sat    History of blood transfusion     Hyperlipidemia     Hypothyroid     Sleep apnea     no CPAP    Type II or unspecified type diabetes mellitus without mention of complication, not stated as uncontrolled     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 06/15/2017       MEDS (scheduled):   insulin glargine  25 Units SubCUTAneous Daily       03/14/24 0200 (!) 80/58 -- -- 80 18 96 % --   03/14/24 0130 126/65 -- -- 79 18 96 % --   03/14/24 0116 -- -- -- 81 18 96 % --   03/14/24 0114 -- -- -- 83 18 96 % --   03/14/24 0100 107/61 -- -- 82 18 96 % --   03/14/24 0030 (!) 108/39 -- -- 77 18 96 % --   03/14/24 0000 (!) 123/35 98 °F (36.7 °C) Axillary 83 18 95 % --   03/13/24 2330 117/60 -- -- 84 18 95 % --   03/13/24 2300 (!) 123/47 -- -- 87 18 94 % --   03/13/24 2230 (!) 106/47 -- -- 85 18 94 % --   03/13/24 2200 (!) 136/58 -- -- 87 19 94 % --   03/13/24 2130 (!) 115/45 -- -- 85 18 93 % --   03/13/24 2111 -- -- -- 85 18 94 % --   03/13/24 2110 -- -- -- 85 18 95 % --   03/13/24 2100 (!) 140/64 -- -- 89 19 95 % --   03/13/24 2030 (!) 133/41 -- -- 89 18 94 % --   03/13/24 2000 (!) 124/51 98.2 °F (36.8 °C) Axillary 87 19 94 % --   03/13/24 1900 (!) 128/59 -- -- 95 19 95 % --   03/13/24 1850 -- -- -- 90 21 95 % --   03/13/24 1800 (!) 155/70 -- -- 86 18 99 % --   03/13/24 1700 113/62 -- -- 83 18 94 % --   03/13/24 1600 127/89 97.6 °F (36.4 °C) Axillary 88 17 99 % --   03/13/24 1500 (!) 137/50 -- -- 83 18 95 % --   03/13/24 1400 108/61 -- -- 90 18 95 % --   03/13/24 1315 91/70 -- -- 81 19 98 % --   03/13/24 1300 (!) 118/36 -- -- 83 21 98 % --   03/13/24 1245 (!) 119/97 -- -- 89 20 99 % --   03/13/24 1230 (!) 135/102 -- -- 95 21 99 % --   03/13/24 1215 127/62 -- -- 87 22 98 % --   03/13/24 1200 (!) 62/44 96.8 °F (36 °C) Axillary 84 19 99 % --          Intake/Output Summary (Last 24 hours) at 3/14/2024 1124  Last data filed at 3/14/2024 1036  Gross per 24 hour   Intake 2956.39 ml   Output --   Net 2956.39 ml       Wt Readings from Last 3 Encounters:   03/14/24 91.4 kg (201 lb 8 oz)   03/08/24 95.3 kg (210 lb)   11/08/23 95.3 kg (210 lb)       Constitutional: Patient is intubated   Head: normocephalic, atraumatic  Cardiovascular: S1-S2 no S3  Respiratory:  Clear upper diminished bases  Gastrointestinal: soft, nontender, nondistended  Ext: Bilateral lower extremities

## 2024-03-15 ENCOUNTER — APPOINTMENT (OUTPATIENT)
Dept: CT IMAGING | Age: 79
DRG: 004 | End: 2024-03-15
Payer: COMMERCIAL

## 2024-03-15 ENCOUNTER — APPOINTMENT (OUTPATIENT)
Dept: GENERAL RADIOLOGY | Age: 79
DRG: 004 | End: 2024-03-15
Payer: COMMERCIAL

## 2024-03-15 LAB
AADO2: 84.7 MMHG
ALBUMIN SERPL-MCNC: 2.6 G/DL (ref 3.5–5.2)
ALP SERPL-CCNC: 127 U/L (ref 35–104)
ALT SERPL-CCNC: 323 U/L (ref 0–32)
ANION GAP SERPL CALCULATED.3IONS-SCNC: 11 MMOL/L (ref 7–16)
AST SERPL-CCNC: 139 U/L (ref 0–31)
B.E.: -0.2 MMOL/L (ref -3–3)
BILIRUB SERPL-MCNC: 0.7 MG/DL (ref 0–1.2)
BUN SERPL-MCNC: 70 MG/DL (ref 6–23)
CALCIUM SERPL-MCNC: 8.2 MG/DL (ref 8.6–10.2)
CHLORIDE SERPL-SCNC: 94 MMOL/L (ref 98–107)
CO2 SERPL-SCNC: 26 MMOL/L (ref 22–29)
COHB: 1.6 % (ref 0–1.5)
CREAT SERPL-MCNC: 3.6 MG/DL (ref 0.5–1)
CRITICAL: ABNORMAL
DATE ANALYZED: ABNORMAL
DATE OF COLLECTION: ABNORMAL
ERYTHROCYTE [DISTWIDTH] IN BLOOD BY AUTOMATED COUNT: 14.1 % (ref 11.5–15)
FIO2: 30 %
GFR SERPL CREATININE-BSD FRML MDRD: 12 ML/MIN/1.73M2
GLUCOSE BLD-MCNC: 102 MG/DL (ref 74–99)
GLUCOSE BLD-MCNC: 142 MG/DL (ref 74–99)
GLUCOSE BLD-MCNC: 148 MG/DL (ref 74–99)
GLUCOSE BLD-MCNC: 181 MG/DL (ref 74–99)
GLUCOSE BLD-MCNC: 222 MG/DL (ref 74–99)
GLUCOSE BLD-MCNC: 267 MG/DL (ref 74–99)
GLUCOSE SERPL-MCNC: 224 MG/DL (ref 74–99)
HCO3: 23.7 MMOL/L (ref 22–26)
HCT VFR BLD AUTO: 24.9 % (ref 34–48)
HGB BLD-MCNC: 8.5 G/DL (ref 11.5–15.5)
HHB: 4.2 % (ref 0–5)
LAB: ABNORMAL
Lab: 426
MAGNESIUM SERPL-MCNC: 1.9 MG/DL (ref 1.6–2.6)
MCH RBC QN AUTO: 30.7 PG (ref 26–35)
MCHC RBC AUTO-ENTMCNC: 34.1 G/DL (ref 32–34.5)
MCV RBC AUTO: 89.9 FL (ref 80–99.9)
METHB: 0.3 % (ref 0–1.5)
MODE: AC
O2 CONTENT: 12.4 ML/DL
O2 SATURATION: 95.7 % (ref 92–98.5)
O2HB: 93.9 % (ref 94–97)
OPERATOR ID: ABNORMAL
PATIENT TEMP: 37 C
PCO2: 35.5 MMHG (ref 35–45)
PEEP/CPAP: 5 CMH2O
PFO2: 2.67 MMHG/%
PH BLOOD GAS: 7.44 (ref 7.35–7.45)
PHOSPHATE SERPL-MCNC: 3.8 MG/DL (ref 2.5–4.5)
PLATELET CONFIRMATION: NORMAL
PLATELET, FLUORESCENCE: 39 K/UL (ref 130–450)
PMV BLD AUTO: 12.6 FL (ref 7–12)
PO2: 80 MMHG (ref 75–100)
POTASSIUM SERPL-SCNC: 3.5 MMOL/L (ref 3.5–5)
PROT SERPL-MCNC: 4.6 G/DL (ref 6.4–8.3)
RBC # BLD AUTO: 2.77 M/UL (ref 3.5–5.5)
RI(T): 1.06
RR MECHANICAL: 18 B/MIN
SODIUM SERPL-SCNC: 131 MMOL/L (ref 132–146)
SOURCE, BLOOD GAS: ABNORMAL
THB: 9.3 G/DL (ref 11.5–16.5)
TIME ANALYZED: 429
VT MECHANICAL: 400 ML
WBC OTHER # BLD: 16 K/UL (ref 4.5–11.5)

## 2024-03-15 PROCEDURE — 99232 SBSQ HOSP IP/OBS MODERATE 35: CPT | Performed by: INTERNAL MEDICINE

## 2024-03-15 PROCEDURE — 6360000002 HC RX W HCPCS

## 2024-03-15 PROCEDURE — 80053 COMPREHEN METABOLIC PANEL: CPT

## 2024-03-15 PROCEDURE — 6360000002 HC RX W HCPCS: Performed by: INTERNAL MEDICINE

## 2024-03-15 PROCEDURE — P9047 ALBUMIN (HUMAN), 25%, 50ML: HCPCS | Performed by: INTERNAL MEDICINE

## 2024-03-15 PROCEDURE — 87205 SMEAR GRAM STAIN: CPT

## 2024-03-15 PROCEDURE — 83735 ASSAY OF MAGNESIUM: CPT

## 2024-03-15 PROCEDURE — 31645 BRNCHSC W/THER ASPIR 1ST: CPT | Performed by: INTERNAL MEDICINE

## 2024-03-15 PROCEDURE — 82962 GLUCOSE BLOOD TEST: CPT

## 2024-03-15 PROCEDURE — 84100 ASSAY OF PHOSPHORUS: CPT

## 2024-03-15 PROCEDURE — 2580000003 HC RX 258

## 2024-03-15 PROCEDURE — 87181 SC STD AGAR DILUTION PER AGT: CPT

## 2024-03-15 PROCEDURE — 2580000003 HC RX 258: Performed by: INTERNAL MEDICINE

## 2024-03-15 PROCEDURE — 2500000003 HC RX 250 WO HCPCS: Performed by: INTERNAL MEDICINE

## 2024-03-15 PROCEDURE — 70450 CT HEAD/BRAIN W/O DYE: CPT

## 2024-03-15 PROCEDURE — 6370000000 HC RX 637 (ALT 250 FOR IP)

## 2024-03-15 PROCEDURE — 94003 VENT MGMT INPAT SUBQ DAY: CPT

## 2024-03-15 PROCEDURE — 6370000000 HC RX 637 (ALT 250 FOR IP): Performed by: INTERNAL MEDICINE

## 2024-03-15 PROCEDURE — A4216 STERILE WATER/SALINE, 10 ML: HCPCS | Performed by: INTERNAL MEDICINE

## 2024-03-15 PROCEDURE — C9113 INJ PANTOPRAZOLE SODIUM, VIA: HCPCS | Performed by: INTERNAL MEDICINE

## 2024-03-15 PROCEDURE — 6360000002 HC RX W HCPCS: Performed by: NURSE PRACTITIONER

## 2024-03-15 PROCEDURE — 94640 AIRWAY INHALATION TREATMENT: CPT

## 2024-03-15 PROCEDURE — 2000000000 HC ICU R&B

## 2024-03-15 PROCEDURE — 71045 X-RAY EXAM CHEST 1 VIEW: CPT

## 2024-03-15 PROCEDURE — 99291 CRITICAL CARE FIRST HOUR: CPT | Performed by: INTERNAL MEDICINE

## 2024-03-15 PROCEDURE — 87102 FUNGUS ISOLATION CULTURE: CPT

## 2024-03-15 PROCEDURE — 3609027000 HC BRONCHOSCOPY: Performed by: INTERNAL MEDICINE

## 2024-03-15 PROCEDURE — 82805 BLOOD GASES W/O2 SATURATION: CPT

## 2024-03-15 PROCEDURE — 88112 CYTOPATH CELL ENHANCE TECH: CPT

## 2024-03-15 PROCEDURE — 87070 CULTURE OTHR SPECIMN AEROBIC: CPT

## 2024-03-15 PROCEDURE — 36600 WITHDRAWAL OF ARTERIAL BLOOD: CPT

## 2024-03-15 PROCEDURE — 87106 FUNGI IDENTIFICATION YEAST: CPT

## 2024-03-15 PROCEDURE — 2709999900 HC NON-CHARGEABLE SUPPLY: Performed by: INTERNAL MEDICINE

## 2024-03-15 PROCEDURE — 36592 COLLECT BLOOD FROM PICC: CPT

## 2024-03-15 PROCEDURE — 85027 COMPLETE CBC AUTOMATED: CPT

## 2024-03-15 RX ORDER — MIDAZOLAM HYDROCHLORIDE 1 MG/ML
INJECTION INTRAMUSCULAR; INTRAVENOUS
Status: COMPLETED
Start: 2024-03-15 | End: 2024-03-15

## 2024-03-15 RX ORDER — MIDODRINE HYDROCHLORIDE 5 MG/1
15 TABLET ORAL
Status: DISCONTINUED | OUTPATIENT
Start: 2024-03-15 | End: 2024-03-15

## 2024-03-15 RX ORDER — MAGNESIUM SULFATE 1 G/100ML
1000 INJECTION INTRAVENOUS ONCE
Status: COMPLETED | OUTPATIENT
Start: 2024-03-15 | End: 2024-03-15

## 2024-03-15 RX ORDER — MIDODRINE HYDROCHLORIDE 5 MG/1
15 TABLET ORAL EVERY 8 HOURS
Status: DISCONTINUED | OUTPATIENT
Start: 2024-03-15 | End: 2024-03-30 | Stop reason: HOSPADM

## 2024-03-15 RX ORDER — ALBUMIN (HUMAN) 12.5 G/50ML
25 SOLUTION INTRAVENOUS ONCE
Status: COMPLETED | OUTPATIENT
Start: 2024-03-15 | End: 2024-03-15

## 2024-03-15 RX ORDER — MIDAZOLAM HYDROCHLORIDE 1 MG/ML
2 INJECTION INTRAMUSCULAR; INTRAVENOUS ONCE
Status: COMPLETED | OUTPATIENT
Start: 2024-03-15 | End: 2024-03-15

## 2024-03-15 RX ORDER — LIDOCAINE HYDROCHLORIDE 40 MG/ML
INJECTION, SOLUTION RETROBULBAR PRN
Status: DISCONTINUED | OUTPATIENT
Start: 2024-03-15 | End: 2024-03-15 | Stop reason: ALTCHOICE

## 2024-03-15 RX ORDER — MIDODRINE HYDROCHLORIDE 5 MG/1
5 TABLET ORAL ONCE
Status: COMPLETED | OUTPATIENT
Start: 2024-03-15 | End: 2024-03-15

## 2024-03-15 RX ORDER — NOREPINEPHRINE BITARTRATE 0.06 MG/ML
1-100 INJECTION, SOLUTION INTRAVENOUS CONTINUOUS
Status: DISCONTINUED | OUTPATIENT
Start: 2024-03-15 | End: 2024-03-18

## 2024-03-15 RX ADMIN — INSULIN LISPRO 4 UNITS: 100 INJECTION, SOLUTION INTRAVENOUS; SUBCUTANEOUS at 04:21

## 2024-03-15 RX ADMIN — MIDODRINE HYDROCHLORIDE 5 MG: 5 TABLET ORAL at 00:10

## 2024-03-15 RX ADMIN — PREDNISONE 5 MG: 5 TABLET ORAL at 20:20

## 2024-03-15 RX ADMIN — Medication 5 MCG/MIN: at 09:06

## 2024-03-15 RX ADMIN — CEFEPIME 1000 MG: 1 INJECTION, POWDER, FOR SOLUTION INTRAMUSCULAR; INTRAVENOUS at 20:22

## 2024-03-15 RX ADMIN — IPRATROPIUM BROMIDE AND ALBUTEROL SULFATE 1 DOSE: .5; 2.5 SOLUTION RESPIRATORY (INHALATION) at 22:20

## 2024-03-15 RX ADMIN — SODIUM CHLORIDE: 9 INJECTION, SOLUTION INTRAVENOUS at 06:33

## 2024-03-15 RX ADMIN — MUPIROCIN: 20 OINTMENT TOPICAL at 20:25

## 2024-03-15 RX ADMIN — IPRATROPIUM BROMIDE AND ALBUTEROL SULFATE 1 DOSE: .5; 2.5 SOLUTION RESPIRATORY (INHALATION) at 18:27

## 2024-03-15 RX ADMIN — Medication 10 ML: at 20:21

## 2024-03-15 RX ADMIN — PHENYLEPHRINE HYDROCHLORIDE 15 MCG/MIN: 10 INJECTION INTRAVENOUS at 03:00

## 2024-03-15 RX ADMIN — PREDNISONE 5 MG: 5 TABLET ORAL at 12:11

## 2024-03-15 RX ADMIN — EPOETIN ALFA-EPBX 2000 UNITS: 2000 INJECTION, SOLUTION INTRAVENOUS; SUBCUTANEOUS at 17:52

## 2024-03-15 RX ADMIN — BUDESONIDE INHALATION 500 MCG: 0.5 SUSPENSION RESPIRATORY (INHALATION) at 04:25

## 2024-03-15 RX ADMIN — IPRATROPIUM BROMIDE AND ALBUTEROL SULFATE 1 DOSE: .5; 2.5 SOLUTION RESPIRATORY (INHALATION) at 09:29

## 2024-03-15 RX ADMIN — BUDESONIDE INHALATION 500 MCG: 0.5 SUSPENSION RESPIRATORY (INHALATION) at 18:27

## 2024-03-15 RX ADMIN — MIDAZOLAM: 1 INJECTION INTRAMUSCULAR; INTRAVENOUS at 13:25

## 2024-03-15 RX ADMIN — IPRATROPIUM BROMIDE AND ALBUTEROL SULFATE 1 DOSE: .5; 2.5 SOLUTION RESPIRATORY (INHALATION) at 04:25

## 2024-03-15 RX ADMIN — IPRATROPIUM BROMIDE AND ALBUTEROL SULFATE 1 DOSE: .5; 2.5 SOLUTION RESPIRATORY (INHALATION) at 01:24

## 2024-03-15 RX ADMIN — INSULIN LISPRO 4 UNITS: 100 INJECTION, SOLUTION INTRAVENOUS; SUBCUTANEOUS at 23:36

## 2024-03-15 RX ADMIN — Medication 10 ML: at 08:30

## 2024-03-15 RX ADMIN — INSULIN LISPRO 8 UNITS: 100 INJECTION, SOLUTION INTRAVENOUS; SUBCUTANEOUS at 00:06

## 2024-03-15 RX ADMIN — MAGNESIUM SULFATE IN DEXTROSE 1000 MG: 10 INJECTION, SOLUTION INTRAVENOUS at 04:48

## 2024-03-15 RX ADMIN — MIDODRINE HYDROCHLORIDE 15 MG: 5 TABLET ORAL at 06:34

## 2024-03-15 RX ADMIN — SODIUM CHLORIDE, PRESERVATIVE FREE 40 MG: 5 INJECTION INTRAVENOUS at 20:21

## 2024-03-15 RX ADMIN — SODIUM CHLORIDE, PRESERVATIVE FREE 40 MG: 5 INJECTION INTRAVENOUS at 12:11

## 2024-03-15 RX ADMIN — MIDODRINE HYDROCHLORIDE 15 MG: 5 TABLET ORAL at 22:23

## 2024-03-15 RX ADMIN — MIDODRINE HYDROCHLORIDE 15 MG: 5 TABLET ORAL at 16:07

## 2024-03-15 RX ADMIN — ALBUMIN (HUMAN) 25 G: 0.25 INJECTION, SOLUTION INTRAVENOUS at 09:11

## 2024-03-15 RX ADMIN — IPRATROPIUM BROMIDE AND ALBUTEROL SULFATE 1 DOSE: .5; 2.5 SOLUTION RESPIRATORY (INHALATION) at 14:33

## 2024-03-15 RX ADMIN — MIDAZOLAM HYDROCHLORIDE 2 MG: 1 INJECTION, SOLUTION INTRAMUSCULAR; INTRAVENOUS at 13:25

## 2024-03-15 RX ADMIN — MUPIROCIN: 20 OINTMENT TOPICAL at 09:11

## 2024-03-15 ASSESSMENT — PULMONARY FUNCTION TESTS
PIF_VALUE: 22
PIF_VALUE: 18
PIF_VALUE: 23
PIF_VALUE: 17
PIF_VALUE: 23
PIF_VALUE: 22
PIF_VALUE: 23
PIF_VALUE: 18
PIF_VALUE: 21
PIF_VALUE: 17
PIF_VALUE: 20
PIF_VALUE: 18
PIF_VALUE: 23
PIF_VALUE: 21
PIF_VALUE: 32
PIF_VALUE: 20
PIF_VALUE: 19
PIF_VALUE: 22
PIF_VALUE: 23
PIF_VALUE: 23
PIF_VALUE: 20
PIF_VALUE: 39
PIF_VALUE: 37
PIF_VALUE: 35
PIF_VALUE: 20
PIF_VALUE: 23
PIF_VALUE: 22
PIF_VALUE: 15
PIF_VALUE: 23
PIF_VALUE: 23
PIF_VALUE: 17
PIF_VALUE: 23
PIF_VALUE: 16
PIF_VALUE: 20
PIF_VALUE: 50
PIF_VALUE: 32
PIF_VALUE: 17
PIF_VALUE: 39
PIF_VALUE: 24
PIF_VALUE: 22
PIF_VALUE: 20
PIF_VALUE: 20
PIF_VALUE: 21
PIF_VALUE: 39
PIF_VALUE: 20
PIF_VALUE: 35
PIF_VALUE: 24
PIF_VALUE: 14
PIF_VALUE: 23
PIF_VALUE: 19
PIF_VALUE: 20
PIF_VALUE: 17
PIF_VALUE: 20
PIF_VALUE: 22
PIF_VALUE: 23
PIF_VALUE: 21
PIF_VALUE: 39
PIF_VALUE: 22
PIF_VALUE: 17
PIF_VALUE: 17
PIF_VALUE: 23
PIF_VALUE: 17

## 2024-03-15 ASSESSMENT — PAIN SCALES - GENERAL
PAINLEVEL_OUTOF10: 0

## 2024-03-15 NOTE — PROGRESS NOTES
Transported patient from ICU to CT scan and back again using [employer] portable ventilator. No problems encountered. Total time 35 minutes.

## 2024-03-15 NOTE — PROGRESS NOTES
Bronchoscopy completed at this time. Versed 2mg IV administered prior to per order, VS monitored throughout the teams procedure. Levophed needed restarted, as anticipated. Patient tolerated well, vent settings returned to pre-procedure settings.

## 2024-03-15 NOTE — PROGRESS NOTES
Admitting Date and Time: 3/10/2024  2:37 PM  Admit Dx: Hyperkalemia [E87.5]  Encounter for dialysis (HCC) [Z99.2]  End stage renal disease on dialysis (HCC) [N18.6, Z99.2]  Acute respiratory failure with hypoxia (HCC) [J96.01]  Acute on chronic respiratory failure with hypoxia and hypercapnia (HCC) [J96.21, J96.22]  Leukocytosis, unspecified type [D72.829]  Acute metabolic encephalopathy [G93.41]    Subjective:    Sedated and ventilated    ROS: denies fever, chills, cp, sob, n/v, HA unless stated above.     midodrine  15 mg Oral q8h    insulin glargine  25 Units SubCUTAneous Daily    insulin lispro  0-16 Units SubCUTAneous Q4H    [Held by provider] apixaban  2.5 mg Oral BID    predniSONE  5 mg Oral BID    pantoprazole (PROTONIX) 40 mg in sodium chloride (PF) 0.9 % 10 mL injection  40 mg IntraVENous Q12H    epoetin andrzej-epbx  2,000 Units SubCUTAneous Once per day on Mon Wed Fri    mupirocin   Each Nostril BID    budesonide  0.5 mg Nebulization BID RT    ipratropium 0.5 mg-albuterol 2.5 mg  1 Dose Inhalation Q4H RT    sodium chloride flush  10 mL IntraVENous 2 times per day    cefepime  1,000 mg IntraVENous Q24H     sodium chloride flush, 10 mL, PRN  promethazine, 12.5 mg, Q6H PRN   Or  ondansetron, 4 mg, Q6H PRN  polyethylene glycol, 17 g, Daily PRN  acetaminophen, 650 mg, Q6H PRN   Or  acetaminophen, 650 mg, Q6H PRN  glucose, 4 tablet, PRN  dextrose bolus, 125 mL, PRN   Or  dextrose bolus, 250 mL, PRN  glucagon (rDNA), 1 mg, PRN  dextrose, , Continuous PRN  sodium chloride, , PRN         Objective:    BP (!) 86/51   Pulse 80   Temp 98.4 °F (36.9 °C) (Axillary)   Resp 18   Ht 1.676 m (5' 5.98\")   Wt 102.2 kg (225 lb 5 oz)   SpO2 95%   BMI 36.38 kg/m²   Patient is sedated and ventilated  Normocephalic, without obvious abnormality, atraumatic, external ears without lesions,   Neck  cervical lymphadenopathy with limited exam for motion due to ET tube  Heart has a regular rate and rhythm no murmur  Lungs are clear    Final Result   1.  No acute process in abdomen or pelvis.      2.  Atrophy of both kidneys suggesting chronic medical renal disease.      3.  Stable loculated fluid collection associated with ventral abdominal wall   musculature which may represent a stable postoperative seroma.  This lesion   is similar in size compared with prior from [facility], 2022.         XR CHEST 1 VIEW   Final Result   1. Central bronchial wall thickening and chronic interstitial coarsening.   2. Low lung volumes.   3. Stable right hemidiaphragm eventration.   4. ASVD, cardiomegaly.         CT HEAD WO CONTRAST   Final Result   No evidence of acute intracranial hemorrhage or mass effect.         XR CHEST PORTABLE    (Results Pending)       Assessment:  Principal Problem:    Acute respiratory failure with hypoxia (HCC)  Active Problems:    Hyperkalemia  Resolved Problems:    * No resolved hospital problems. *      Plan: History of present illness from History and Physical:  Per dr LEOS on 3/10;   This is a 79 y.o. female  has a past medical history of Anemia, Arrhythmia, Arthritis, Asthma, Chronic kidney disease, COPD (chronic obstructive pulmonary disease) ([facility]), Depression, Hemodialysis patient ([facility]), Hyperlipidemia, Hypothyroid, Sleep apnea, Type II or unspecified type diabetes mellitus without mention of complication, not stated as uncontrolled, and 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. presented with AMS, Hypotension, Missed HD  for last few days prior to arrival to the hospital.  She missed her HD and found to be in a poor living condition at home with dog feces. She was found to be hypotensive with  hypercapnia and placed on BIPAP. Nephrology consulted for fluid overload with hyperkalemia and was given HD but could no finish up the session due to fistula malfunction/bleeding. Poor historian, confused and lethargic, no additional history could be obtained.     Metabolic encephalopathy due to

## 2024-03-15 NOTE — PROCEDURES
Procedure note: Fiberoptic bronchoscopy with removal of secretions      Reason for bronchoscopy: Concern for endobronchial obstruction by secretions and other questionable lesions        Description of procedure:    After a timeout was taken, patient was given 2 mg of midazolam and the FiO2 was increased to 100% without PEEP to avoid the possibility of barotrauma/pneumothorax.    The 4.9 mm videobronchoscope was then placed via the bronchoscope adapter into the proximal distal trachea.  Thick tenacious secretions were removed from multiple airways including the right mainstem bronchus, right upper lobe, right middle lobe, right lower lobe, left mainstem bronchus, left upper lobe, and left lower lobe.    Several areas of polypoid appearing mucosa were noted but these did not seem consistent with malignancy.    In a subsegment of the lateral segment of the left lower lobe, a smooth polypoid projection from the wall was seen.  Due to the patient's thrombocytopenia, it was elected not to biopsy this lesion because of its benign appearance no risks of bleeding.    Approximately 20 cc of thick tenacious white-yellow secretions were removed and sent to the laboratory for Gram stain, culture and sensitivity, and cytology.  The patient tolerated the procedure well and will will be returned to her prior ventilator settings after period of equilibration comprising approximately 15 minutes.

## 2024-03-15 NOTE — FLOWSHEET NOTE
03/15/24 1230   Vital Signs   BP (!) 150/80   Temp 98.6 °F (37 °C)   Pulse 93   Respirations 23   Weight - Scale 102.2 kg (225 lb 5 oz)   Percent Weight Change -1.22   Post-Hemodialysis Assessment   Post-Treatment Procedures Blood returned;Catheter capped, clamped with Citrate x 2 ports   Machine Disinfection Process Acid/Vinegar Clean;Heat Disinfect   Rinseback Volume (ml) 300 ml   Blood Volume Processed (Liters) 74.4 L   Dialyzer Clearance Lightly streaked   Duration of Treatment (minutes) 210 minutes   Heparin Amount Administered During Treatment (mL) 0 mL   Hemodialysis Intake (ml) 500 ml   Hemodialysis Output (ml) 1800 ml   NET Removed (ml) 1300   Tolerated Treatment Good   Patient Response to Treatment Tolerated tx well   Bilateral Breath Sounds Diminished

## 2024-03-15 NOTE — PROGRESS NOTES
Encounters:   03/15/24 102.2 kg (225 lb 5 oz)   03/08/24 95.3 kg (210 lb)   11/08/23 95.3 kg (210 lb)       Constitutional: Patient is intubated off sedation  Head: normocephalic, atraumatic  Cardiovascular: S1-S2 no S3  Respiratory:  Clear upper diminished bases  Gastrointestinal: soft, nontender, nondistended  Ext: Bilateral lower extremities cool with discoloration of toes, no edema  Neuro: Somnolent off sedation  Skin: dry, no rash      DATA:      Recent Labs     03/13/24  0417 03/14/24  0445 03/15/24  0323   WBC 16.3* 11.2 16.0*   HGB 8.5* 8.6* 8.5*   HCT 26.8* 25.4* 24.9*   MCV 96.8 91.0 89.9   PLT 55*  --   --      Recent Labs     03/13/24  0417 03/14/24  0445 03/15/24  0323    132 131*   K 4.8 3.7 3.5   CL 97* 95* 94*   CO2 17* 26 26   BUN 69* 53* 70*   CREATININE 4.2* 2.9* 3.6*   LABGLOM 10* 16* 12*   GLUCOSE 317* 381* 224*   CALCIUM 8.2* 8.2* 8.2*     Recent Labs     03/13/24  0417 03/14/24  0445 03/15/24  0323   * 462* 323*   * 418* 139*   ALKPHOS 133* 141* 127*   BILITOT 1.0 0.8 0.7     Lab Results   Component Value Date    LABALBU 2.6 (L) 03/15/2024    LABALBU 2.7 (L) 03/14/2024    LABALBU 2.7 (L) 03/13/2024       Iron studies:  Lab Results   Component Value Date    FERRITIN 999 02/15/2021    IRON 30 (L) 02/15/2021    TIBC 140 (L) 02/15/2021     Vitamin B-12   Date Value Ref Range Status   08/05/2022 >2000 (H) 211 - 946 pg/mL Final     Folate   Date Value Ref Range Status   12/25/2020 3.8 (L) 4.8 - 24.2 ng/mL Final       Bone disease:  Lab Results   Component Value Date    MG 1.9 03/15/2024    PHOS 3.8 03/15/2024     Vit D, 25-Hydroxy   Date Value Ref Range Status   08/05/2022 43 30 - 100 ng/mL Final     Comment:     <20 ng/mL.............Deficient  20-30 ng/mL...........Insufficient   ng/mL..........Sufficient  >100 ng/mL............Toxic       PTH   Date Value Ref Range Status   08/22/2017 74 (H) 15 - 65 pg/mL Final       No components found for: \"URIC\"    Lab Results  profound hypotension  Will continue to follow as patient required placement of temporary dialysis catheter.  Will likely need placement of tunneled dialysis catheter eventually      KACIE Tyson CNP      Patient seen and examined on dialysis.  Dialysis prescription reviewed.  Discussed with dialysis nurse.  Patient had to be restarted on low-dose pressor support during dialysis although she had been weaned off earlier..   No family member is present at the bedside.  Chart reviewed.  I had a face to face encounter with the patient.   Agree with exam.    Agree with  formulation, assessment and plan as outlined above by KACIE Tyson CNP   and directed by me.   Addition and corrections were done as deemed appropriate.   My exam and plan include:     Continue current treatment as outlined above.                 Paras Enrique MD  Nephrology        Electronically signed by Paras Enrique MD on 3/15/2024 at 2:04 PM

## 2024-03-15 NOTE — PLAN OF CARE
restraints (restraint for interference with medical device): Every 2 hours: Monitor safety, psychosocial status, comfort, nutrition and hydration  Taken 3/14/2024 2000 by Elda Grey RN  Remains free of injury from restraints (restraint for interference with medical device): Every 2 hours: Monitor safety, psychosocial status, comfort, nutrition and hydration  Taken 3/14/2024 1800 by Sujey Layton RN  Remains free of injury from restraints (restraint for interference with medical device): Every 2 hours: Monitor safety, psychosocial status, comfort, nutrition and hydration  Taken 3/14/2024 1600 by Sujey Layton RN  Remains free of injury from restraints (restraint for interference with medical device): Every 2 hours: Monitor safety, psychosocial status, comfort, nutrition and hydration  Taken 3/14/2024 1400 by Sujey Layton RN  Remains free of injury from restraints (restraint for interference with medical device): Every 2 hours: Monitor safety, psychosocial status, comfort, nutrition and hydration  Taken 3/14/2024 1200 by Sujey Layton RN  Remains free of injury from restraints (restraint for interference with medical device): Every 2 hours: Monitor safety, psychosocial status, comfort, nutrition and hydration     Problem: Chronic Conditions and Co-morbidities  Goal: Patient's chronic conditions and co-morbidity symptoms are monitored and maintained or improved  Outcome: Progressing     Problem: ABCDS Injury Assessment  Goal: Absence of physical injury  3/15/2024 0937 by Sujey Layton RN  Outcome: Progressing  3/15/2024 0017 by Elda Grey RN  Outcome: Progressing     Problem: Respiratory - Adult  Goal: Achieves optimal ventilation and oxygenation  Outcome: Progressing     Problem: Skin/Tissue Integrity - Adult  Goal: Skin integrity remains intact  3/15/2024 0937 by Sujey Layton RN  Outcome: Progressing  3/15/2024 0017 by Elda Grey RN  Outcome: Progressing

## 2024-03-15 NOTE — PROGRESS NOTES
3.5 - 5.2 g/dL Final   03/13/2024 2.7 (L) 3.5 - 5.2 g/dL Final   06/01/2012 3.9 3.2 - 4.8 g/dL Final   05/18/2012 4.0 3.2 - 4.8 g/dL Final     Total Bilirubin   Date Value Ref Range Status   03/15/2024 0.7 0.0 - 1.2 mg/dL Final   03/14/2024 0.8 0.0 - 1.2 mg/dL Final   03/13/2024 1.0 0.0 - 1.2 mg/dL Final     Alkaline Phosphatase   Date Value Ref Range Status   03/15/2024 127 (H) 35 - 104 U/L Final   03/14/2024 141 (H) 35 - 104 U/L Final   03/13/2024 133 (H) 35 - 104 U/L Final     AST   Date Value Ref Range Status   03/15/2024 139 (H) 0 - 31 U/L Final   03/14/2024 418 (H) 0 - 31 U/L Final   03/13/2024 604 (H) 0 - 31 U/L Final     ALT   Date Value Ref Range Status   03/15/2024 323 (H) 0 - 32 U/L Final   03/14/2024 462 (H) 0 - 32 U/L Final   03/13/2024 458 (H) 0 - 32 U/L Final     Est, Glom Filt Rate   Date Value Ref Range Status   03/15/2024 12 (L) >60 mL/min/1.73m2 Final     Comment:           These results are not intended for use in patients <18 years of age.        eGFR results are calculated without a race factor using the 2021 CKD-EPI equation.  Careful clinical correlation is recommended, particularly when comparing to results   calculated using previous equations.  The CKD-EPI equation is less accurate in patients with extremes of muscle mass, extra-renal   metabolism of creatine, excessive creatine ingestion, or following therapy that affects   renal tubular secretion.     03/14/2024 16 (L) >60 mL/min/1.73m2 Final     Comment:           These results are not intended for use in patients <18 years of age.        eGFR results are calculated without a race factor using the 2021 CKD-EPI equation.  Careful clinical correlation is recommended, particularly when comparing to results   calculated using previous equations.  The CKD-EPI equation is less accurate in patients with extremes of muscle mass, extra-renal   metabolism of creatine, excessive creatine ingestion, or following therapy that affects   renal tubular  opportunity to ask questions about the proposed management plans and to have those questions answered.     This patient has a high probability of sudden, clinically significant deterioration, which requires the highest level of physician preparedness to intervene urgently.  I managed/supervised life or organ supporting interventions that required frequent physician assessment.   I devoted my full attention to the direct care of this patient for the amount of time indicated below.  Time I spent with the family or surrogate(s) is included only if the patient was incapable of providing the necessary information or participating in medical decisions - Time devoted to teaching and to any procedures I billed separately is not included.    My time caring for this patient including history, physical examination, and medical management and evaluation added up to greater than 50% of the total time spent with this patient, including shared/split visits, should this note reflect this event.      CRITICAL CARE TIME:  34 minutes    Electronically signed by Cheng Payne MD on 3/15/2024 at 2:37 PM

## 2024-03-15 NOTE — PLAN OF CARE
Problem: Safety - Adult  Goal: Free from fall injury  3/15/2024 0017 by Elda Grey RN  Outcome: Progressing  Problem: Skin/Tissue Integrity  Goal: Absence of new skin breakdown  Description: 1.  Monitor for areas of redness and/or skin breakdown  2.  Assess vascular access sites hourly  3.  Every 4-6 hours minimum:  Change oxygen saturation probe site  4.  Every 4-6 hours:  If on nasal continuous positive airway pressure, respiratory therapy assess nares and determine need for appliance change or resting period.  3/15/2024 0017 by Elda Grey RN  Outcome: Progressing     Problem: Safety - Medical Restraint  Goal: Remains free of injury from restraints (Restraint for Interference with Medical Device)  Description: INTERVENTIONS:  1. Determine that other, less restrictive measures have been tried or would not be effective before applying the restraint  2. Evaluate the patient's condition at the time of restraint application  3. Inform patient/family regarding the reason for restraint  4. Q2H: Monitor safety, psychosocial status, comfort, nutrition and hydration  3/15/2024 0017 by Elda Grey RN  Outcome: Progressing  Flowsheets  Taken 3/15/2024 0000 by Elda Grey RN  Remains free of injury from restraints (restraint for interference with medical device): Every 2 hours: Monitor safety, psychosocial status, comfort, nutrition and hydration  Taken 3/14/2024 2200 by Elda Grey RN  Remains free of injury from restraints (restraint for interference with medical device): Every 2 hours: Monitor safety, psychosocial status, comfort, nutrition and hydration  Taken 3/14/2024 2000 by Elda Grey RN  Problem: ABCDS Injury Assessment  Goal: Absence of physical injury  3/15/2024 0017 by Elda Grey RN  Outcome: Progressing  Problem: Skin/Tissue Integrity - Adult  Goal: Skin integrity remains intact  3/15/2024 0017 by Elda Grey RN  Outcome: Progressing

## 2024-03-16 ENCOUNTER — APPOINTMENT (OUTPATIENT)
Dept: GENERAL RADIOLOGY | Age: 79
DRG: 004 | End: 2024-03-16
Payer: COMMERCIAL

## 2024-03-16 LAB
AADO2: 97 MMHG
ALBUMIN SERPL-MCNC: 2.6 G/DL (ref 3.5–5.2)
ALP SERPL-CCNC: 112 U/L (ref 35–104)
ALT SERPL-CCNC: 189 U/L (ref 0–32)
ANION GAP SERPL CALCULATED.3IONS-SCNC: 12 MMOL/L (ref 7–16)
AST SERPL-CCNC: 64 U/L (ref 0–31)
B.E.: 2.5 MMOL/L (ref -3–3)
BILIRUB SERPL-MCNC: 0.9 MG/DL (ref 0–1.2)
BUN SERPL-MCNC: 46 MG/DL (ref 6–23)
CALCIUM SERPL-MCNC: 7.7 MG/DL (ref 8.6–10.2)
CHLORIDE SERPL-SCNC: 90 MMOL/L (ref 98–107)
CO2 SERPL-SCNC: 27 MMOL/L (ref 22–29)
COHB: 1.9 % (ref 0–1.5)
CREAT SERPL-MCNC: 2.8 MG/DL (ref 0.5–1)
CRITICAL: ABNORMAL
DATE ANALYZED: ABNORMAL
DATE OF COLLECTION: ABNORMAL
ERYTHROCYTE [DISTWIDTH] IN BLOOD BY AUTOMATED COUNT: 14.6 % (ref 11.5–15)
FIO2: 30 %
GFR SERPL CREATININE-BSD FRML MDRD: 17 ML/MIN/1.73M2
GLUCOSE BLD-MCNC: 211 MG/DL (ref 74–99)
GLUCOSE BLD-MCNC: 220 MG/DL (ref 74–99)
GLUCOSE BLD-MCNC: 240 MG/DL (ref 74–99)
GLUCOSE BLD-MCNC: 248 MG/DL (ref 74–99)
GLUCOSE SERPL-MCNC: 235 MG/DL (ref 74–99)
HCO3: 26.1 MMOL/L (ref 22–26)
HCT VFR BLD AUTO: 23.5 % (ref 34–48)
HGB BLD-MCNC: 8 G/DL (ref 11.5–15.5)
HHB: 6.3 % (ref 0–5)
LAB: ABNORMAL
Lab: 436
MAGNESIUM SERPL-MCNC: 1.9 MG/DL (ref 1.6–2.6)
MCH RBC QN AUTO: 30.5 PG (ref 26–35)
MCHC RBC AUTO-ENTMCNC: 34 G/DL (ref 32–34.5)
MCV RBC AUTO: 89.7 FL (ref 80–99.9)
METHB: 0.3 % (ref 0–1.5)
MODE: AC
O2 CONTENT: 11.4 ML/DL
O2 SATURATION: 93.6 % (ref 92–98.5)
O2HB: 91.5 % (ref 94–97)
OPERATOR ID: ABNORMAL
PATIENT TEMP: 37 C
PCO2: 35.9 MMHG (ref 35–45)
PEEP/CPAP: 5 CMH2O
PFO2: 2.24 MMHG/%
PH BLOOD GAS: 7.48 (ref 7.35–7.45)
PHOSPHATE SERPL-MCNC: 3.8 MG/DL (ref 2.5–4.5)
PLATELET # BLD AUTO: 47 K/UL (ref 130–450)
PLATELET CONFIRMATION: NORMAL
PMV BLD AUTO: 13 FL (ref 7–12)
PO2: 67.2 MMHG (ref 75–100)
POTASSIUM SERPL-SCNC: 3.9 MMOL/L (ref 3.5–5)
PROT SERPL-MCNC: 4.7 G/DL (ref 6.4–8.3)
RBC # BLD AUTO: 2.62 M/UL (ref 3.5–5.5)
RI(T): 1.44
RR MECHANICAL: 18 B/MIN
SODIUM SERPL-SCNC: 129 MMOL/L (ref 132–146)
SOURCE, BLOOD GAS: ABNORMAL
THB: 8.8 G/DL (ref 11.5–16.5)
TIME ANALYZED: 439
VT MECHANICAL: 400 ML
WBC OTHER # BLD: 11.6 K/UL (ref 4.5–11.5)

## 2024-03-16 PROCEDURE — 71045 X-RAY EXAM CHEST 1 VIEW: CPT

## 2024-03-16 PROCEDURE — 6370000000 HC RX 637 (ALT 250 FOR IP): Performed by: INTERNAL MEDICINE

## 2024-03-16 PROCEDURE — 6360000002 HC RX W HCPCS: Performed by: INTERNAL MEDICINE

## 2024-03-16 PROCEDURE — 36600 WITHDRAWAL OF ARTERIAL BLOOD: CPT

## 2024-03-16 PROCEDURE — 6360000002 HC RX W HCPCS

## 2024-03-16 PROCEDURE — A4216 STERILE WATER/SALINE, 10 ML: HCPCS | Performed by: INTERNAL MEDICINE

## 2024-03-16 PROCEDURE — 94640 AIRWAY INHALATION TREATMENT: CPT

## 2024-03-16 PROCEDURE — 84100 ASSAY OF PHOSPHORUS: CPT

## 2024-03-16 PROCEDURE — 94003 VENT MGMT INPAT SUBQ DAY: CPT

## 2024-03-16 PROCEDURE — 99291 CRITICAL CARE FIRST HOUR: CPT | Performed by: INTERNAL MEDICINE

## 2024-03-16 PROCEDURE — 82805 BLOOD GASES W/O2 SATURATION: CPT

## 2024-03-16 PROCEDURE — 83735 ASSAY OF MAGNESIUM: CPT

## 2024-03-16 PROCEDURE — 80053 COMPREHEN METABOLIC PANEL: CPT

## 2024-03-16 PROCEDURE — 85027 COMPLETE CBC AUTOMATED: CPT

## 2024-03-16 PROCEDURE — 82962 GLUCOSE BLOOD TEST: CPT

## 2024-03-16 PROCEDURE — 99232 SBSQ HOSP IP/OBS MODERATE 35: CPT | Performed by: INTERNAL MEDICINE

## 2024-03-16 PROCEDURE — 2000000000 HC ICU R&B

## 2024-03-16 PROCEDURE — 36592 COLLECT BLOOD FROM PICC: CPT

## 2024-03-16 PROCEDURE — 99222 1ST HOSP IP/OBS MODERATE 55: CPT | Performed by: NURSE PRACTITIONER

## 2024-03-16 PROCEDURE — 2580000003 HC RX 258: Performed by: INTERNAL MEDICINE

## 2024-03-16 PROCEDURE — C9113 INJ PANTOPRAZOLE SODIUM, VIA: HCPCS | Performed by: INTERNAL MEDICINE

## 2024-03-16 RX ORDER — MAGNESIUM SULFATE 1 G/100ML
1000 INJECTION INTRAVENOUS ONCE
Status: COMPLETED | OUTPATIENT
Start: 2024-03-16 | End: 2024-03-16

## 2024-03-16 RX ADMIN — IPRATROPIUM BROMIDE AND ALBUTEROL SULFATE 1 DOSE: .5; 2.5 SOLUTION RESPIRATORY (INHALATION) at 17:14

## 2024-03-16 RX ADMIN — IPRATROPIUM BROMIDE AND ALBUTEROL SULFATE 1 DOSE: .5; 2.5 SOLUTION RESPIRATORY (INHALATION) at 09:20

## 2024-03-16 RX ADMIN — INSULIN LISPRO 4 UNITS: 100 INJECTION, SOLUTION INTRAVENOUS; SUBCUTANEOUS at 04:47

## 2024-03-16 RX ADMIN — MIDODRINE HYDROCHLORIDE 15 MG: 5 TABLET ORAL at 22:29

## 2024-03-16 RX ADMIN — PREDNISONE 5 MG: 5 TABLET ORAL at 09:14

## 2024-03-16 RX ADMIN — BUDESONIDE INHALATION 500 MCG: 0.5 SUSPENSION RESPIRATORY (INHALATION) at 17:14

## 2024-03-16 RX ADMIN — IPRATROPIUM BROMIDE AND ALBUTEROL SULFATE 1 DOSE: .5; 2.5 SOLUTION RESPIRATORY (INHALATION) at 05:30

## 2024-03-16 RX ADMIN — PREDNISONE 5 MG: 5 TABLET ORAL at 20:25

## 2024-03-16 RX ADMIN — INSULIN LISPRO 4 UNITS: 100 INJECTION, SOLUTION INTRAVENOUS; SUBCUTANEOUS at 20:24

## 2024-03-16 RX ADMIN — IPRATROPIUM BROMIDE AND ALBUTEROL SULFATE 1 DOSE: .5; 2.5 SOLUTION RESPIRATORY (INHALATION) at 01:40

## 2024-03-16 RX ADMIN — INSULIN LISPRO 4 UNITS: 100 INJECTION, SOLUTION INTRAVENOUS; SUBCUTANEOUS at 16:57

## 2024-03-16 RX ADMIN — BUDESONIDE INHALATION 500 MCG: 0.5 SUSPENSION RESPIRATORY (INHALATION) at 05:30

## 2024-03-16 RX ADMIN — MUPIROCIN: 20 OINTMENT TOPICAL at 20:23

## 2024-03-16 RX ADMIN — MAGNESIUM SULFATE IN DEXTROSE 1000 MG: 10 INJECTION, SOLUTION INTRAVENOUS at 06:36

## 2024-03-16 RX ADMIN — INSULIN LISPRO 4 UNITS: 100 INJECTION, SOLUTION INTRAVENOUS; SUBCUTANEOUS at 12:19

## 2024-03-16 RX ADMIN — MIDODRINE HYDROCHLORIDE 15 MG: 5 TABLET ORAL at 04:47

## 2024-03-16 RX ADMIN — CEFEPIME 1000 MG: 1 INJECTION, POWDER, FOR SOLUTION INTRAMUSCULAR; INTRAVENOUS at 20:28

## 2024-03-16 RX ADMIN — Medication 10 ML: at 20:25

## 2024-03-16 RX ADMIN — IPRATROPIUM BROMIDE AND ALBUTEROL SULFATE 1 DOSE: .5; 2.5 SOLUTION RESPIRATORY (INHALATION) at 21:29

## 2024-03-16 RX ADMIN — INSULIN LISPRO 4 UNITS: 100 INJECTION, SOLUTION INTRAVENOUS; SUBCUTANEOUS at 09:03

## 2024-03-16 RX ADMIN — IPRATROPIUM BROMIDE AND ALBUTEROL SULFATE 1 DOSE: .5; 2.5 SOLUTION RESPIRATORY (INHALATION) at 14:05

## 2024-03-16 RX ADMIN — MUPIROCIN: 20 OINTMENT TOPICAL at 09:25

## 2024-03-16 RX ADMIN — MIDODRINE HYDROCHLORIDE 15 MG: 5 TABLET ORAL at 15:05

## 2024-03-16 RX ADMIN — SODIUM CHLORIDE: 9 INJECTION, SOLUTION INTRAVENOUS at 16:29

## 2024-03-16 RX ADMIN — INSULIN GLARGINE 25 UNITS: 100 INJECTION, SOLUTION SUBCUTANEOUS at 09:14

## 2024-03-16 RX ADMIN — Medication 10 ML: at 09:28

## 2024-03-16 RX ADMIN — SODIUM CHLORIDE, PRESERVATIVE FREE 40 MG: 5 INJECTION INTRAVENOUS at 20:25

## 2024-03-16 RX ADMIN — SODIUM CHLORIDE, PRESERVATIVE FREE 40 MG: 5 INJECTION INTRAVENOUS at 09:14

## 2024-03-16 ASSESSMENT — PULMONARY FUNCTION TESTS
PIF_VALUE: 21
PIF_VALUE: 22
PIF_VALUE: 24
PIF_VALUE: 23
PIF_VALUE: 23
PIF_VALUE: 11
PIF_VALUE: 11
PIF_VALUE: 25
PIF_VALUE: 23
PIF_VALUE: 20
PIF_VALUE: 21
PIF_VALUE: 23
PIF_VALUE: 23
PIF_VALUE: 21
PIF_VALUE: 23
PIF_VALUE: 21
PIF_VALUE: 21
PIF_VALUE: 23
PIF_VALUE: 23
PIF_VALUE: 25
PIF_VALUE: 23
PIF_VALUE: 22
PIF_VALUE: 23
PIF_VALUE: 11
PIF_VALUE: 19
PIF_VALUE: 22
PIF_VALUE: 20

## 2024-03-16 ASSESSMENT — PAIN SCALES - GENERAL
PAINLEVEL_OUTOF10: 0

## 2024-03-16 NOTE — PLAN OF CARE
Problem: Safety - Adult  Goal: Free from fall injury  Outcome: Progressing     Problem: Discharge Planning  Goal: Discharge to home or other facility with appropriate resources  Outcome: Progressing     Problem: Pain  Goal: Verbalizes/displays adequate comfort level or baseline comfort level  Outcome: Progressing     Problem: Skin/Tissue Integrity  Goal: Absence of new skin breakdown  Description: 1.  Monitor for areas of redness and/or skin breakdown  2.  Assess vascular access sites hourly  3.  Every 4-6 hours minimum:  Change oxygen saturation probe site  4.  Every 4-6 hours:  If on nasal continuous positive airway pressure, respiratory therapy assess nares and determine need for appliance change or resting period.  3/16/2024 1235 by Sujey Layton, RN  Outcome: Progressing  3/16/2024 0010 by Elda Grey RN  Outcome: Progressing     Problem: Confusion  Goal: Confusion, delirium, dementia, or psychosis is improved or at baseline  Description: INTERVENTIONS:  1. Assess for possible contributors to thought disturbance, including medications, impaired vision or hearing, underlying metabolic abnormalities, dehydration, psychiatric diagnoses, and notify attending LIP  2. [place] high risk fall precautions, as indicated  3. Provide frequent short contacts to provide reality reorientation, refocusing and direction  4. Decrease environmental stimuli, including noise as appropriate  5. Monitor and intervene to maintain adequate nutrition, hydration, elimination, sleep and activity  6. If unable to ensure safety without constant attention obtain sitter and review sitter guidelines with assigned personnel  7. Initiate Psychosocial CNS and Spiritual Care consult, as indicated  Outcome: Progressing     Problem: Safety - Medical Restraint  Goal: Remains free of injury from restraints (Restraint for Interference with Medical Device)  Description: INTERVENTIONS:  1. Determine that other, less restrictive measures

## 2024-03-16 NOTE — PROGRESS NOTES
[facility]  Nephrology Progress Note    SUBJECTIVE:  We are following this patient for end-stage renal failure .     3/16/24-patient seen and examined in the intensive care unit.  Patient is intubated. She is awake and follows commands. Events of last 24 hours noted. Discussed case with nursing staff. Weaning pressor support.       PROBLEM LIST:    Patient Active Problem List   Diagnosis    Controlled type 2 diabetes mellitus with chronic kidney disease on chronic dialysis, with long-term current use of insulin ([facility])    Mixed hyperlipidemia    Hypothyroidism    Essential hypertension    Sleep apnea    Osteoporosis    Macrocytic anemia with vitamin B12 deficiency    ESRD on dialysis ([facility])    Rheumatoid arthritis ([facility])    Restless leg syndrome, controlled    Depression    Shortness of breath    End stage renal disease ([facility])    Paroxysmal atrial fibrillation ([facility])    Hypotension    Chest pain    Lumbosacral spondylosis without myelopathy    Severe episode of recurrent major depressive disorder, without psychotic features ([facility])    Encounter regarding vascular access for dialysis for end-stage renal disease ([facility])    Acute respiratory failure with hypoxia ([facility])    Hyperkalemia        PAST MEDICAL HISTORY:    Past Medical History:   Diagnosis Date    Anemia     Arrhythmia     AF    Arthritis     RA    Asthma     Chronic kidney disease     COPD (chronic obstructive pulmonary disease) ([facility])     Depression     Hemodialysis patient ([facility])     T-Th-Sat    History of blood transfusion     Hyperlipidemia     Hypothyroid     Sleep apnea     no CPAP    Type II or unspecified type diabetes mellitus without mention of complication, not stated as uncontrolled     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 06/15/2017       MEDS (scheduled):   midodrine  15 mg Oral q8h    insulin glargine  25 Units SubCUTAneous Daily    insulin lispro  0-16 Units SubCUTAneous Q4H    [Held by provider] apixaban

## 2024-03-16 NOTE — PROGRESS NOTES
Admitting Date and Time: 3/10/2024  2:37 PM  Admit Dx: Hyperkalemia [E87.5]  Encounter for dialysis (HCC) [Z99.2]  End stage renal disease on dialysis (HCC) [N18.6, Z99.2]  Acute respiratory failure with hypoxia (HCC) [J96.01]  Acute on chronic respiratory failure with hypoxia and hypercapnia (HCC) [J96.21, J96.22]  Leukocytosis, unspecified type [D72.829]  Acute metabolic encephalopathy [G93.41]    Subjective:    ventilated    ROS: denies fever, chills, cp, sob, n/v, HA unless stated above.     midodrine  15 mg Oral q8h    insulin glargine  25 Units SubCUTAneous Daily    insulin lispro  0-16 Units SubCUTAneous Q4H    [Held by provider] apixaban  2.5 mg Oral BID    predniSONE  5 mg Oral BID    pantoprazole (PROTONIX) 40 mg in sodium chloride (PF) 0.9 % 10 mL injection  40 mg IntraVENous Q12H    epoetin andrzej-epbx  2,000 Units SubCUTAneous Once per day on Mon Wed Fri    mupirocin   Each Nostril BID    budesonide  0.5 mg Nebulization BID RT    ipratropium 0.5 mg-albuterol 2.5 mg  1 Dose Inhalation Q4H RT    sodium chloride flush  10 mL IntraVENous 2 times per day    cefepime  1,000 mg IntraVENous Q24H     sodium chloride flush, 10 mL, PRN  promethazine, 12.5 mg, Q6H PRN   Or  ondansetron, 4 mg, Q6H PRN  polyethylene glycol, 17 g, Daily PRN  acetaminophen, 650 mg, Q6H PRN   Or  acetaminophen, 650 mg, Q6H PRN  glucose, 4 tablet, PRN  dextrose bolus, 125 mL, PRN   Or  dextrose bolus, 250 mL, PRN  glucagon (rDNA), 1 mg, PRN  dextrose, , Continuous PRN  sodium chloride, , PRN         Objective:    BP (!) 83/28   Pulse 79   Temp 97.8 °F (36.6 °C) (Axillary)   Resp 18   Ht 1.676 m (5' 5.98\")   Wt 102.5 kg (226 lb)   SpO2 97%   BMI 36.50 kg/m²   Patient is sedated and ventilated  Normocephalic, without obvious abnormality, atraumatic, external ears without lesions,   Neck  cervical lymphadenopathy with limited exam for motion due to ET tube  Heart has a regular rate and rhythm no murmur  Lungs are clear to auscultation  bilaterally with equal movements with the additional sounds of transmitted airway sounds from the ventilator.  Abdomen is soft nontender nondistended no rebound or guarding.   edema good peripheral perfusion.  Toes are mottled  Affect and neuro exam limited since she is sedated on the ventilator        Recent Labs     03/14/24  0445 03/15/24  0323 03/16/24  0343    131* 129*   K 3.7 3.5 3.9   CL 95* 94* 90*   CO2 26 26 27   BUN 53* 70* 46*   CREATININE 2.9* 3.6* 2.8*   GLUCOSE 381* 224* 235*   CALCIUM 8.2* 8.2* 7.7*         Recent Labs     03/14/24  0445 03/15/24  0323 03/16/24  0343   ALKPHOS 141* 127* 112*   PROT 4.7* 4.6* 4.7*   LABALBU 2.7* 2.6* 2.6*   BILITOT 0.8 0.7 0.9   * 139* 64*   * 323* 189*         Recent Labs     03/14/24  0445 03/15/24  0323 03/16/24  0343   WBC 11.2 16.0* 11.6*   RBC 2.79* 2.77* 2.62*   HGB 8.6* 8.5* 8.0*   HCT 25.4* 24.9* 23.5*   MCV 91.0 89.9 89.7   MCH 30.8 30.7 30.5   MCHC 33.9 34.1 34.0   RDW 14.3 14.1 14.6   PLT  --   --  47*   MPV 11.3 12.6* 13.0*             Radiology:   XR CHEST PORTABLE   Final Result   Worsening of the pulmonary vascularity in the interval with increase seen in   size of the bilateral pleural effusions right greater than left.  Increasing   markings in the lung bases suggesting atelectatic change.         CT HEAD WO CONTRAST   Final Result   No acute intracranial abnormality.         XR CHEST PORTABLE   Final Result   Lung volumes are low with stable mild increased markings in lung bases and   small bilateral pleural effusions.         XR CHEST PORTABLE   Final Result   1. Lines and tubes in satisfactory position.   2. Cardiomegaly with patchy airspace disease in the lower lung fields   bilaterally and bilateral pleural effusions.         XR CHEST PORTABLE   Final Result   1. Endotracheal tube is at the level of the mini and can be retracted   approximately 3 cm with repeat radiograph.   2. Bibasilar opacities suggestive of stable

## 2024-03-16 NOTE — PLAN OF CARE
Problem: Skin/Tissue Integrity  Goal: Absence of new skin breakdown  Description: 1.  Monitor for areas of redness and/or skin breakdown  2.  Assess vascular access sites hourly  3.  Every 4-6 hours minimum:  Change oxygen saturation probe site  4.  Every 4-6 hours:  If on nasal continuous positive airway pressure, respiratory therapy assess nares and determine need for appliance change or resting period.  Outcome: Progressing  Problem: Safety - Medical Restraint  Goal: Remains free of injury from restraints (Restraint for Interference with Medical Device)  Description: INTERVENTIONS:  1. Determine that other, less restrictive measures have been tried or would not be effective before applying the restraint  2. Evaluate the patient's condition at the time of restraint application  3. Inform patient/family regarding the reason for restraint  4. Q2H: Monitor safety, psychosocial status, comfort, nutrition and hydration  Outcome: Progressing  Flowsheets  Taken 3/16/2024 0000 by Elda Grey RN  Remains free of injury from restraints (restraint for interference with medical device): Every 2 hours: Monitor safety, psychosocial status, comfort, nutrition and hydration  Taken 3/15/2024 2200 by Elda Grey RN  Remains free of injury from restraints (restraint for interference with medical device): Every 2 hours: Monitor safety, psychosocial status, comfort, nutrition and hydration  Taken 3/15/2024 2000 by Elda Grey RN  Problem: ABCDS Injury Assessment  Goal: Absence of physical injury  Outcome: Progressing     Problem: Skin/Tissue Integrity - Adult  Goal: Skin integrity remains intact  Outcome: Progressing

## 2024-03-16 NOTE — CONSULTS
Palliative Care Department  [phone]  Palliative Care Initial Consult  Provider Keyur Gutierrez, APRN - CNP     Ainsley Morris  [id number]  Hospital Day: 7  Date of Initial Consult: 3/16/2024  Referring Provider: Javy Leal MD   Palliative Medicine was consulted for assistance with: Goals of Care, End Stage Disease    HPI:   Ainsley Morris is a 79 y.o. with a medical history of hyperlipidemia, hypothyroidism, COPD, asthma, sleep apnea, CKD, DM, anemia, arthritis who was admitted on 3/10/2024 from home with a CHIEF COMPLAINT of altered mental status, hypotension, and missed hemodialysis.  Patient was found to be in a poor living condition at home.  Patient also found to be hypotensive with hypercapnia and placed on BiPAP initially.  She is currently intubated and in the ICU.  Palliative medicine consulted for goals of care and end-stage disease.    ASSESSMENT/PLAN:     Pertinent Hospital Diagnoses     Acute respiratory failure  Bacteremia  New onset atrial fibrillation  Encephalopathy  CKD      Palliative Care Encounter / Counseling Regarding Goals of Care  Please see detailed goals of care discussion as below  At this time, Ainsley Morris, Does Not have capacity for medical decision-making.  Capacity is time limited and situation/question specific  During encounter, sister Delarosa, was surrogate medical decision-maker  Outcome of goals of care meeting:   Continue full code  Continue current management  Code status Full Code  Advanced Directives:  POA or living will in [facility]  Surrogate/Legal NOK:  sister Orr, [phone]    Spiritual assessment: no spiritual distress identified  Bereavement and grief: to be determined  Referrals to: none today  SUBJECTIVE:     Current medical issues leading to Palliative Medicine involvement include   Active Hospital Problems    Diagnosis Date Noted    Hyperkalemia [E87.5] 03/11/2024    Acute respiratory failure with hypoxia (HCC) [J96.01] 03/10/2024

## 2024-03-16 NOTE — PROGRESS NOTES
Patient's VT decreasing to 231ml with RR of 28 on spontaneous trial; will switch patient back to A/C mode to rest at this time and retry trial tomorrow

## 2024-03-16 NOTE — PROGRESS NOTES
daily 11/1/23   Grey Chua PA-C   budesonide-formoterol (SYMBICORT) 160-4.5 MCG/ACT AERO Inhale 2 puffs into the lungs 2 times daily 11/1/23   Grey Chua PA-C   fluticasone-salmeterol (ADVAIR) 100-50 MCG/ACT AEPB diskus inhaler Inhale 1 puff into the lungs in the morning and 1 puff in the evening. 11/1/23   Grey Chua PA-C   atorvastatin (LIPITOR) 10 MG tablet Take 1 tablet by mouth nightly 10/3/23   Grey Chua PA-C   insulin lispro, 1 Unit Dial, (HUMALOG/ADMELOG) 100 UNIT/ML SOPN INJECT SUBCUTANEOUSLY WITH MEALS THREE TIMES DAILY. 2 UNITS FOR BLOOD SUGAR , 4 UNITS , 6 UNITS , 8 UNITS . MAX DAILY DOSE 10 UNITS 7/7/23   Ric Kelly DO   pantoprazole (PROTONIX) 40 MG tablet Take 1 tablet by mouth daily 4/7/23   Meri Larios MD LANTUS SOLOSTAR 100 UNIT/ML injection pen INJECT 35 UNITS UNDER INTO THE SKIN DAILY 4/10/23   Ric Kelly DO   Ferric Citrate (AURYXIA) 1  MG(Fe) TABS Take 2 tablets by mouth 3 times daily (with meals)    Meri Larios MD   B Complex-C-Folic Acid (TOD-RENETTA) TABS Take 1 tablet by mouth daily Rx    Meri Larios MD   midodrine (PROAMATINE) 5 MG tablet Take 1 tablet by mouth as needed Tuesday, Thursday, Saturday    Meri Larios MD     Scheduled Meds:   midodrine  15 mg Oral q8h    insulin glargine  25 Units SubCUTAneous Daily    insulin lispro  0-16 Units SubCUTAneous Q4H    [Held by provider] apixaban  2.5 mg Oral BID    predniSONE  5 mg Oral BID    pantoprazole (PROTONIX) 40 mg in sodium chloride (PF) 0.9 % 10 mL injection  40 mg IntraVENous Q12H    epoetin andrzej-epbx  2,000 Units SubCUTAneous Once per day on Mon Wed Fri    mupirocin   Each Nostril BID    budesonide  0.5 mg Nebulization BID RT    ipratropium 0.5 mg-albuterol 2.5 mg  1 Dose Inhalation Q4H RT    sodium chloride flush  10 mL IntraVENous 2 times per day    cefepime  1,000 mg IntraVENous Q24H     Continuous Infusions:    documentation time.   Electronically signed by Javy Leal MD on 3/16/2024 at 11:50 AM

## 2024-03-16 NOTE — PROGRESS NOTES
Comprehensive Nutrition Assessment    Type and Reason for Visit:  Reassess    Nutrition Recommendations/Plan:   Continue NPO  Continue Current TF:    Renal (Nepro with Carb steady): Initiate at 10 mls/hr increasing 10 mls q6 hrs until goal of 25mls/hr x 24 hours with fwf 30mls q4 hours with protein modulars TID Provides: 1,460 kcals (300 from protein modulars) 126 grams of protein and 616 mls/water.    Tube Feed at current rate of 25mls/hr provides 1,460 kcals as propofol was stopped. Modified tube feeding calculated, however d/t MAP of 35, tube feeding to remain @25mls/hr x24 hours until MAP increases then increase to 29mls/hr.      Malnutrition Assessment:  Malnutrition Status:  No malnutrition (03/12/24 1534)    Context:  Chronic Illness     Findings of the 6 clinical characteristics of malnutrition:  Energy Intake:  Mild decrease in energy intake (Comment) (poor po intake prior to admission)  Weight Loss:  Unable to assess (no wt hx in emr)     Body Fat Loss:  No significant body fat loss     Muscle Mass Loss:  No significant muscle mass loss    Fluid Accumulation:  Mild Extremities   Strength:  Not Performed    Nutrition Assessment:    Pt admit for AMS, missed dialysis treatments, respiratory failure w/ hypoxia, hyperkalemia, septic shock Kleibsella (unknown etiology.) PMHx: Anemia, Arrythmia, Arthritis, Asthma, CKD on dialysis, COPD, Hypothyroid, HLD, Sleep apnea, T2DM. Pt intubated and sedated (propofol), pressors x1. Pt was to have dialysis but there was a fistula malfunction, next HD dependent upon pressor needs. Pt will try to be weaned 1-2 days. WIll provide tube feeding recommendations, continue inpaitent monitoring f/up per policy.  03/16/24: F/up: Pt off propofol, off pressors x2 days, MAP 35, will re-calculate Tube Feeding however d/t MAP score do not increase above current rate at 25mls/hr, once MAP improves slowly increase to new goal rate.    Nutrition Related Findings:    Intubated, Tmax 98.7,

## 2024-03-17 ENCOUNTER — APPOINTMENT (OUTPATIENT)
Dept: GENERAL RADIOLOGY | Age: 79
DRG: 004 | End: 2024-03-17
Payer: COMMERCIAL

## 2024-03-17 LAB
AADO2: 96.8 MMHG
ALBUMIN SERPL-MCNC: 2.4 G/DL (ref 3.5–5.2)
ALP SERPL-CCNC: 128 U/L (ref 35–104)
ALT SERPL-CCNC: 117 U/L (ref 0–32)
ANION GAP SERPL CALCULATED.3IONS-SCNC: 14 MMOL/L (ref 7–16)
AST SERPL-CCNC: 29 U/L (ref 0–31)
B.E.: -0.8 MMOL/L (ref -3–3)
BILIRUB SERPL-MCNC: 0.8 MG/DL (ref 0–1.2)
BUN SERPL-MCNC: 59 MG/DL (ref 6–23)
CALCIUM SERPL-MCNC: 8.2 MG/DL (ref 8.6–10.2)
CHLORIDE SERPL-SCNC: 91 MMOL/L (ref 98–107)
CO2 SERPL-SCNC: 25 MMOL/L (ref 22–29)
COHB: 2.4 % (ref 0–1.5)
CREAT SERPL-MCNC: 3.5 MG/DL (ref 0.5–1)
CRITICAL: ABNORMAL
DATE ANALYZED: ABNORMAL
DATE OF COLLECTION: ABNORMAL
ERYTHROCYTE [DISTWIDTH] IN BLOOD BY AUTOMATED COUNT: 14.9 % (ref 11.5–15)
FIO2: 30 %
GFR SERPL CREATININE-BSD FRML MDRD: 13 ML/MIN/1.73M2
GLUCOSE BLD-MCNC: 180 MG/DL (ref 74–99)
GLUCOSE BLD-MCNC: 196 MG/DL (ref 74–99)
GLUCOSE BLD-MCNC: 201 MG/DL (ref 74–99)
GLUCOSE BLD-MCNC: 238 MG/DL (ref 74–99)
GLUCOSE BLD-MCNC: 257 MG/DL (ref 74–99)
GLUCOSE BLD-MCNC: 266 MG/DL (ref 74–99)
GLUCOSE SERPL-MCNC: 225 MG/DL (ref 74–99)
HCO3: 22.8 MMOL/L (ref 22–26)
HCT VFR BLD AUTO: 24.3 % (ref 34–48)
HGB BLD-MCNC: 8.1 G/DL (ref 11.5–15.5)
HHB: 5.9 % (ref 0–5)
LAB: ABNORMAL
Lab: 420
MAGNESIUM SERPL-MCNC: 2.2 MG/DL (ref 1.6–2.6)
MCH RBC QN AUTO: 30.5 PG (ref 26–35)
MCHC RBC AUTO-ENTMCNC: 33.3 G/DL (ref 32–34.5)
MCV RBC AUTO: 91.4 FL (ref 80–99.9)
METHB: 0.3 % (ref 0–1.5)
MICROORGANISM SPEC CULT: NORMAL
MICROORGANISM/AGENT SPEC: NORMAL
MODE: AC
O2 CONTENT: 11.3 ML/DL
O2 SATURATION: 93.9 % (ref 92–98.5)
O2HB: 91.4 % (ref 94–97)
OPERATOR ID: 2323
PATIENT TEMP: 37 C
PCO2: 33.6 MMHG (ref 35–45)
PEEP/CPAP: 5 CMH2O
PFO2: 2.34 MMHG/%
PH BLOOD GAS: 7.45 (ref 7.35–7.45)
PHOSPHATE SERPL-MCNC: 4.8 MG/DL (ref 2.5–4.5)
PLATELET # BLD AUTO: 69 K/UL (ref 130–450)
PLATELET CONFIRMATION: NORMAL
PMV BLD AUTO: 12.9 FL (ref 7–12)
PO2: 70.1 MMHG (ref 75–100)
POTASSIUM SERPL-SCNC: 4.1 MMOL/L (ref 3.5–5)
PROT SERPL-MCNC: 4.8 G/DL (ref 6.4–8.3)
RBC # BLD AUTO: 2.66 M/UL (ref 3.5–5.5)
RI(T): 1.38
RR MECHANICAL: 18 B/MIN
SODIUM SERPL-SCNC: 130 MMOL/L (ref 132–146)
SOURCE, BLOOD GAS: ABNORMAL
SPECIMEN DESCRIPTION: NORMAL
THB: 8.7 G/DL (ref 11.5–16.5)
TIME ANALYZED: 430
VT MECHANICAL: 400 ML
WBC OTHER # BLD: 13.9 K/UL (ref 4.5–11.5)

## 2024-03-17 PROCEDURE — 94640 AIRWAY INHALATION TREATMENT: CPT

## 2024-03-17 PROCEDURE — 6360000002 HC RX W HCPCS: Performed by: INTERNAL MEDICINE

## 2024-03-17 PROCEDURE — 84100 ASSAY OF PHOSPHORUS: CPT

## 2024-03-17 PROCEDURE — 82962 GLUCOSE BLOOD TEST: CPT

## 2024-03-17 PROCEDURE — 2580000003 HC RX 258: Performed by: INTERNAL MEDICINE

## 2024-03-17 PROCEDURE — C9113 INJ PANTOPRAZOLE SODIUM, VIA: HCPCS | Performed by: INTERNAL MEDICINE

## 2024-03-17 PROCEDURE — 71045 X-RAY EXAM CHEST 1 VIEW: CPT

## 2024-03-17 PROCEDURE — 2000000000 HC ICU R&B

## 2024-03-17 PROCEDURE — A4216 STERILE WATER/SALINE, 10 ML: HCPCS | Performed by: INTERNAL MEDICINE

## 2024-03-17 PROCEDURE — 99291 CRITICAL CARE FIRST HOUR: CPT | Performed by: INTERNAL MEDICINE

## 2024-03-17 PROCEDURE — 82805 BLOOD GASES W/O2 SATURATION: CPT

## 2024-03-17 PROCEDURE — 36592 COLLECT BLOOD FROM PICC: CPT

## 2024-03-17 PROCEDURE — 6370000000 HC RX 637 (ALT 250 FOR IP): Performed by: INTERNAL MEDICINE

## 2024-03-17 PROCEDURE — 85027 COMPLETE CBC AUTOMATED: CPT

## 2024-03-17 PROCEDURE — 83735 ASSAY OF MAGNESIUM: CPT

## 2024-03-17 PROCEDURE — 94003 VENT MGMT INPAT SUBQ DAY: CPT

## 2024-03-17 PROCEDURE — 99232 SBSQ HOSP IP/OBS MODERATE 35: CPT | Performed by: INTERNAL MEDICINE

## 2024-03-17 PROCEDURE — 80053 COMPREHEN METABOLIC PANEL: CPT

## 2024-03-17 RX ORDER — SCOLOPAMINE TRANSDERMAL SYSTEM 1 MG/1
1 PATCH, EXTENDED RELEASE TRANSDERMAL
Status: DISCONTINUED | OUTPATIENT
Start: 2024-03-17 | End: 2024-03-30 | Stop reason: HOSPADM

## 2024-03-17 RX ADMIN — MUPIROCIN: 20 OINTMENT TOPICAL at 20:11

## 2024-03-17 RX ADMIN — MIDODRINE HYDROCHLORIDE 15 MG: 5 TABLET ORAL at 15:13

## 2024-03-17 RX ADMIN — INSULIN LISPRO 4 UNITS: 100 INJECTION, SOLUTION INTRAVENOUS; SUBCUTANEOUS at 12:10

## 2024-03-17 RX ADMIN — Medication 10 ML: at 08:39

## 2024-03-17 RX ADMIN — IPRATROPIUM BROMIDE AND ALBUTEROL SULFATE 1 DOSE: .5; 2.5 SOLUTION RESPIRATORY (INHALATION) at 21:27

## 2024-03-17 RX ADMIN — IPRATROPIUM BROMIDE AND ALBUTEROL SULFATE 1 DOSE: .5; 2.5 SOLUTION RESPIRATORY (INHALATION) at 01:19

## 2024-03-17 RX ADMIN — SODIUM CHLORIDE, PRESERVATIVE FREE 40 MG: 5 INJECTION INTRAVENOUS at 20:09

## 2024-03-17 RX ADMIN — IPRATROPIUM BROMIDE AND ALBUTEROL SULFATE 1 DOSE: .5; 2.5 SOLUTION RESPIRATORY (INHALATION) at 12:36

## 2024-03-17 RX ADMIN — INSULIN LISPRO 4 UNITS: 100 INJECTION, SOLUTION INTRAVENOUS; SUBCUTANEOUS at 20:08

## 2024-03-17 RX ADMIN — CEFEPIME 1000 MG: 1 INJECTION, POWDER, FOR SOLUTION INTRAMUSCULAR; INTRAVENOUS at 20:15

## 2024-03-17 RX ADMIN — MIDODRINE HYDROCHLORIDE 15 MG: 5 TABLET ORAL at 05:24

## 2024-03-17 RX ADMIN — PREDNISONE 5 MG: 5 TABLET ORAL at 08:35

## 2024-03-17 RX ADMIN — INSULIN GLARGINE 25 UNITS: 100 INJECTION, SOLUTION SUBCUTANEOUS at 08:33

## 2024-03-17 RX ADMIN — IPRATROPIUM BROMIDE AND ALBUTEROL SULFATE 1 DOSE: .5; 2.5 SOLUTION RESPIRATORY (INHALATION) at 16:56

## 2024-03-17 RX ADMIN — BUDESONIDE INHALATION 500 MCG: 0.5 SUSPENSION RESPIRATORY (INHALATION) at 16:56

## 2024-03-17 RX ADMIN — INSULIN LISPRO 8 UNITS: 100 INJECTION, SOLUTION INTRAVENOUS; SUBCUTANEOUS at 05:23

## 2024-03-17 RX ADMIN — PREDNISONE 5 MG: 5 TABLET ORAL at 20:09

## 2024-03-17 RX ADMIN — IPRATROPIUM BROMIDE AND ALBUTEROL SULFATE 1 DOSE: .5; 2.5 SOLUTION RESPIRATORY (INHALATION) at 08:33

## 2024-03-17 RX ADMIN — MIDODRINE HYDROCHLORIDE 15 MG: 5 TABLET ORAL at 22:15

## 2024-03-17 RX ADMIN — MUPIROCIN: 20 OINTMENT TOPICAL at 08:30

## 2024-03-17 RX ADMIN — SODIUM CHLORIDE, PRESERVATIVE FREE 40 MG: 5 INJECTION INTRAVENOUS at 08:32

## 2024-03-17 RX ADMIN — IPRATROPIUM BROMIDE AND ALBUTEROL SULFATE 1 DOSE: .5; 2.5 SOLUTION RESPIRATORY (INHALATION) at 04:08

## 2024-03-17 RX ADMIN — BUDESONIDE INHALATION 500 MCG: 0.5 SUSPENSION RESPIRATORY (INHALATION) at 04:08

## 2024-03-17 RX ADMIN — INSULIN LISPRO 4 UNITS: 100 INJECTION, SOLUTION INTRAVENOUS; SUBCUTANEOUS at 08:33

## 2024-03-17 RX ADMIN — Medication 10 ML: at 20:09

## 2024-03-17 ASSESSMENT — PULMONARY FUNCTION TESTS
PIF_VALUE: 22
PIF_VALUE: 23
PIF_VALUE: 26
PIF_VALUE: 25
PIF_VALUE: 20
PIF_VALUE: 23
PIF_VALUE: 23
PIF_VALUE: 20
PIF_VALUE: 20
PIF_VALUE: 26
PIF_VALUE: 22
PIF_VALUE: 20
PIF_VALUE: 30
PIF_VALUE: 22
PIF_VALUE: 23
PIF_VALUE: 29
PIF_VALUE: 23
PIF_VALUE: 23
PIF_VALUE: 28
PIF_VALUE: 20
PIF_VALUE: 25
PIF_VALUE: 22
PIF_VALUE: 23
PIF_VALUE: 24
PIF_VALUE: 22
PIF_VALUE: 28
PIF_VALUE: 25
PIF_VALUE: 25

## 2024-03-17 ASSESSMENT — PAIN SCALES - GENERAL
PAINLEVEL_OUTOF10: 0

## 2024-03-17 NOTE — PROGRESS NOTES
[facility]  Nephrology Progress Note    SUBJECTIVE:  We are following this patient for end-stage renal failure .     3/17/24-patient seen and examined in the intensive care unit.  Patient is intubated.  No acute events overnight.  She was undergoing a spontaneous breathing trial when I saw the patient.  She was for possible extubation though had increased secretions noted, hence extubation postponed.      PROBLEM LIST:    Patient Active Problem List   Diagnosis    Controlled type 2 diabetes mellitus with chronic kidney disease on chronic dialysis, with long-term current use of insulin ([facility])    Mixed hyperlipidemia    Hypothyroidism    Essential hypertension    Sleep apnea    Osteoporosis    Macrocytic anemia with vitamin B12 deficiency    ESRD on dialysis ([facility])    Rheumatoid arthritis ([facility])    Restless leg syndrome, controlled    Depression    Shortness of breath    End stage renal disease ([facility])    Paroxysmal atrial fibrillation ([facility])    Hypotension    Chest pain    Lumbosacral spondylosis without myelopathy    Severe episode of recurrent major depressive disorder, without psychotic features ([facility])    Encounter regarding vascular access for dialysis for end-stage renal disease ([facility])    Acute respiratory failure with hypoxia ([facility])    Hyperkalemia        PAST MEDICAL HISTORY:    Past Medical History:   Diagnosis Date    Anemia     Arrhythmia     AF    Arthritis     RA    Asthma     Chronic kidney disease     COPD (chronic obstructive pulmonary disease) ([facility])     Depression     Hemodialysis patient ([facility])     T-Th-Sat    History of blood transfusion     Hyperlipidemia     Hypothyroid     Sleep apnea     no CPAP    Type II or unspecified type diabetes mellitus without mention of complication, not stated as uncontrolled     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 06/15/2017       MEDS (scheduled):   scopolamine  1 patch TransDERmal Q72H    midodrine  15 mg Oral q8h    insulin  mmol/L  Dialysate adjusted as needed    3.  Hypotension likely in the setting of sepsis-  Weaning pressor support as tolerated    4.  Metabolic acidosis with adequate respiratory compensation.  Recent ABG 7.479/35.9/67.2/26.1  Metabolic acidosis resolved, CO2 25      5.  Secondary hyperparathyroidism of renal origin-  Phosphorus upon presentation was 10.8 mg/dL   Phosphorus checked this morning was 4.8 mg/dL  As patient has been intubated and is currently n.p.o.     Plan to dialyze tomorrow      6.  Anemia with chronic kidney disease stage V/ESRD-  Hemoglobin  goal of greater than 10.0 g/dL currently below goal --> 8.1 g/dL this AM  Continue on JESSE  q. MWF    7.  Left upper extremity access malfunction-  Likely in the setting of profound hypotension  Will continue to follow as patient required placement of temporary dialysis catheter.  Will likely need placement of tunneled dialysis catheter eventually    For hemodialysis tomorrow  No changes today      Deep Cast MD        Electronically signed by Deep Cast MD on 3/17/2024 at 3:15 PM

## 2024-03-17 NOTE — PROGRESS NOTES
PULMONARY  CRITICAL CARE   SERVICE DAILY PROGRESS  NOTE     3/17/2024   Hospital  LOS: 7 days      Admit date- 3/10/2024       Initially admitted for -   Altered Mental Status (Ems report pt was c/c Friday, missed dialysis on Friday, home in poor condition-dog poop everywhere, 77 on RA, hypotension )       Subjective:      Remains intubated, not on any sedation this morning  Secretions seem to have increased this am compared to yesterday   Hemodynamically stable off Levophed for about 3 days now  Seems very weak and deconditioned  Overnight uneventful    Review of Systems      Unable to obtain secondary to mental status. Pt is intubated and sedated                      Allergies:  Allergies   Allergen Reactions    Pcn [Penicillins] Hives    Sulfa Antibiotics Other (See Comments)     \"passed out with IV\"    Bactrim [Sulfamethoxazole-Trimethoprim] Hives     Home Meds:  Prior to Admission medications    Medication Sig Start Date End Date Taking? Authorizing Provider   ferric citrate (AURYXIA) 1  MG(Fe) TABS tablet Take 1 tablet by mouth as needed (with Snacks)   Yes Provider, MD Meri   montelukast (SINGULAIR) 10 MG tablet Take 1 tablet by mouth nightly 1/29/24   Grey Chua PA-C   vitamin B-12 (CYANOCOBALAMIN) 1000 MCG tablet Take 1 tablet by mouth daily 1/29/24   Grey Chua PA-C   levothyroxine (SYNTHROID) 100 MCG tablet Take 1 tablet by mouth daily 11/8/23   Grey Chua PA-C   vitamin D (CHOLECALCIFEROL) 25 MCG (1000 UT) TABS tablet Take 1 tablet by mouth daily    ProviderMeri MD   fexofenadine (ALLEGRA) 180 MG tablet Take 1 tablet by mouth daily    Meri Larios MD   rOPINIRole (REQUIP) 2 MG tablet Take 1 tablet by mouth nightly 11/1/23   Grey Chua PA-C   calcium carbonate 1500 (600 Ca) MG TABS tablet Take 1 tablet by mouth 2 times daily 11/1/23   Grey Chua PA-C   traZODone (DESYREL) 50 MG tablet Take 1 tablet by mouth nightly 11/1/23   Grey Chua  have spent a total critical care time of 32 minutes in the care of this critically ill patient excluding any other separately billable procedures but including  Clinical evaluation, decision making , coordination of care, Consultations and documentation time.   Electronically signed by Javy Leal MD on 3/17/2024 at 9:31 AM

## 2024-03-17 NOTE — PLAN OF CARE
Problem: Safety - Adult  Goal: Free from fall injury  3/17/2024 1200 by Sujey Layton RN  Outcome: Progressing  3/17/2024 0615 by Akosua Pollock RN  Outcome: Progressing     Problem: Discharge Planning  Goal: Discharge to home or other facility with appropriate resources  Outcome: Progressing     Problem: Pain  Goal: Verbalizes/displays adequate comfort level or baseline comfort level  3/17/2024 1200 by Sujey Layton RN  Outcome: Progressing  3/17/2024 0615 by Akosua Pollock RN  Outcome: Progressing     Problem: Skin/Tissue Integrity  Goal: Absence of new skin breakdown  Description: 1.  Monitor for areas of redness and/or skin breakdown  2.  Assess vascular access sites hourly  3.  Every 4-6 hours minimum:  Change oxygen saturation probe site  4.  Every 4-6 hours:  If on nasal continuous positive airway pressure, respiratory therapy assess nares and determine need for appliance change or resting period.  Outcome: Progressing     Problem: Confusion  Goal: Confusion, delirium, dementia, or psychosis is improved or at baseline  Description: INTERVENTIONS:  1. Assess for possible contributors to thought disturbance, including medications, impaired vision or hearing, underlying metabolic abnormalities, dehydration, psychiatric diagnoses, and notify attending LIP  2. [place] high risk fall precautions, as indicated  3. Provide frequent short contacts to provide reality reorientation, refocusing and direction  4. Decrease environmental stimuli, including noise as appropriate  5. Monitor and intervene to maintain adequate nutrition, hydration, elimination, sleep and activity  6. If unable to ensure safety without constant attention obtain sitter and review sitter guidelines with assigned personnel  7. Initiate Psychosocial CNS and Spiritual Care consult, as indicated  Outcome: Progressing     Problem: Safety - Medical Restraint  Goal: Remains free of injury from restraints (Restraint for Interference with

## 2024-03-17 NOTE — PROGRESS NOTES
Admitting Date and Time: 3/10/2024  2:37 PM  Admit Dx: Hyperkalemia [E87.5]  Encounter for dialysis (HCC) [Z99.2]  End stage renal disease on dialysis (HCC) [N18.6, Z99.2]  Acute respiratory failure with hypoxia (HCC) [J96.01]  Acute on chronic respiratory failure with hypoxia and hypercapnia (HCC) [J96.21, J96.22]  Leukocytosis, unspecified type [D72.829]  Acute metabolic encephalopathy [G93.41]    Subjective:    Ventilated. Increased secretions     scopolamine  1 patch TransDERmal Q72H    midodrine  15 mg Oral q8h    insulin glargine  25 Units SubCUTAneous Daily    insulin lispro  0-16 Units SubCUTAneous Q4H    [Held by provider] apixaban  2.5 mg Oral BID    predniSONE  5 mg Oral BID    pantoprazole (PROTONIX) 40 mg in sodium chloride (PF) 0.9 % 10 mL injection  40 mg IntraVENous Q12H    epoetin andrzej-epbx  2,000 Units SubCUTAneous Once per day on Mon Wed Fri    mupirocin   Each Nostril BID    budesonide  0.5 mg Nebulization BID RT    ipratropium 0.5 mg-albuterol 2.5 mg  1 Dose Inhalation Q4H RT    sodium chloride flush  10 mL IntraVENous 2 times per day    cefepime  1,000 mg IntraVENous Q24H     sodium chloride flush, 10 mL, PRN  promethazine, 12.5 mg, Q6H PRN   Or  ondansetron, 4 mg, Q6H PRN  polyethylene glycol, 17 g, Daily PRN  acetaminophen, 650 mg, Q6H PRN   Or  acetaminophen, 650 mg, Q6H PRN  glucose, 4 tablet, PRN  dextrose bolus, 125 mL, PRN   Or  dextrose bolus, 250 mL, PRN  glucagon (rDNA), 1 mg, PRN  dextrose, , Continuous PRN  sodium chloride, , PRN         Objective:    BP (!) 109/57   Pulse 67   Temp 98.4 °F (36.9 °C) (Axillary)   Resp 18   Ht 1.676 m (5' 5.98\")   Wt 107.4 kg (236 lb 12.8 oz)   SpO2 95%   BMI 38.24 kg/m²   Patient is awake and ventilated.  Good eye contact briefly  Normocephalic, without obvious abnormality, atraumatic, external ears without lesions,   Neck  cervical lymphadenopathy with limited exam for motion due to ET tube  Heart has a regular rate and rhythm no

## 2024-03-17 NOTE — PLAN OF CARE
Problem: Safety - Adult  Goal: Free from fall injury  Outcome: Progressing     Problem: Pain  Goal: Verbalizes/displays adequate comfort level or baseline comfort level  Outcome: Progressing     Problem: Nutrition Deficit:  Goal: Optimize nutritional status  3/16/2024 1652 by Denise Cole RD, LD  Outcome: Progressing  Flowsheets (Taken 3/16/2024 1626)  Nutrient intake appropriate for improving, restoring, or maintaining nutritional needs:   Assess nutritional status and recommend course of action   Monitor oral intake, labs, and treatment plans   Recommend appropriate diets, oral nutritional supplements, and vitamin/mineral supplements   Recommend, monitor, and adjust tube feedings and TPN/PPN based on assessed needs

## 2024-03-18 ENCOUNTER — APPOINTMENT (OUTPATIENT)
Dept: GENERAL RADIOLOGY | Age: 79
DRG: 004 | End: 2024-03-18
Payer: COMMERCIAL

## 2024-03-18 ENCOUNTER — APPOINTMENT (OUTPATIENT)
Age: 79
DRG: 004 | End: 2024-03-18
Attending: INTERNAL MEDICINE
Payer: COMMERCIAL

## 2024-03-18 LAB
AADO2: 82.9 MMHG
ALBUMIN SERPL-MCNC: 2.2 G/DL (ref 3.5–5.2)
ALP SERPL-CCNC: 112 U/L (ref 35–104)
ALT SERPL-CCNC: 81 U/L (ref 0–32)
ANION GAP SERPL CALCULATED.3IONS-SCNC: 15 MMOL/L (ref 7–16)
AST SERPL-CCNC: 20 U/L (ref 0–31)
B.E.: 0.5 MMOL/L (ref -3–3)
BILIRUB SERPL-MCNC: 0.6 MG/DL (ref 0–1.2)
BUN SERPL-MCNC: 67 MG/DL (ref 6–23)
CALCIUM SERPL-MCNC: 7.8 MG/DL (ref 8.6–10.2)
CHLORIDE SERPL-SCNC: 91 MMOL/L (ref 98–107)
CO2 SERPL-SCNC: 23 MMOL/L (ref 22–29)
COHB: 1.5 % (ref 0–1.5)
CREAT SERPL-MCNC: 4 MG/DL (ref 0.5–1)
CRITICAL: ABNORMAL
DATE ANALYZED: ABNORMAL
DATE OF COLLECTION: ABNORMAL
ECHO AV AREA PEAK VELOCITY: 0.7 CM2
ECHO AV AREA VTI: 0.8 CM2
ECHO AV AREA/BSA PEAK VELOCITY: 0.3 CM2/M2
ECHO AV AREA/BSA VTI: 0.4 CM2/M2
ECHO AV CUSP MM: 1.2 CM
ECHO AV MEAN GRADIENT: 18 MMHG
ECHO AV MEAN VELOCITY: 2 M/S
ECHO AV PEAK GRADIENT: 26 MMHG
ECHO AV PEAK VELOCITY: 2.6 M/S
ECHO AV VELOCITY RATIO: 0.19
ECHO AV VTI: 47 CM
ECHO BSA: 2.21 M2
ECHO EST RA PRESSURE: 8 MMHG
ECHO LA DIAMETER INDEX: 2.03 CM/M2
ECHO LA DIAMETER: 4.3 CM
ECHO LA VOL A-L A2C: 124 ML (ref 22–52)
ECHO LA VOL A-L A4C: 139 ML (ref 22–52)
ECHO LA VOL MOD A2C: 114 ML (ref 22–52)
ECHO LA VOL MOD A4C: 132 ML (ref 22–52)
ECHO LA VOLUME AREA LENGTH: 136 ML
ECHO LA VOLUME INDEX A-L A2C: 58 ML/M2 (ref 16–34)
ECHO LA VOLUME INDEX A-L A4C: 66 ML/M2 (ref 16–34)
ECHO LA VOLUME INDEX AREA LENGTH: 64 ML/M2 (ref 16–34)
ECHO LA VOLUME INDEX MOD A2C: 54 ML/M2 (ref 16–34)
ECHO LA VOLUME INDEX MOD A4C: 62 ML/M2 (ref 16–34)
ECHO LV FRACTIONAL SHORTENING: 32 % (ref 28–44)
ECHO LV INTERNAL DIMENSION DIASTOLE INDEX: 1.46 CM/M2
ECHO LV INTERNAL DIMENSION DIASTOLIC: 3.1 CM (ref 3.9–5.3)
ECHO LV INTERNAL DIMENSION SYSTOLIC INDEX: 0.99 CM/M2
ECHO LV INTERNAL DIMENSION SYSTOLIC: 2.1 CM
ECHO LV ISOVOLUMETRIC RELAXATION TIME (IVRT): 83 MS
ECHO LV IVSD: 1.8 CM (ref 0.6–0.9)
ECHO LV MASS 2D: 204.3 G (ref 67–162)
ECHO LV MASS INDEX 2D: 96.4 G/M2 (ref 43–95)
ECHO LV POSTERIOR WALL DIASTOLIC: 1.6 CM (ref 0.6–0.9)
ECHO LV RELATIVE WALL THICKNESS RATIO: 1.03
ECHO LVOT AREA: 3.5 CM2
ECHO LVOT AV VTI INDEX: 0.23
ECHO LVOT DIAM: 2.1 CM
ECHO LVOT MEAN GRADIENT: 1 MMHG
ECHO LVOT PEAK GRADIENT: 1 MMHG
ECHO LVOT PEAK VELOCITY: 0.5 M/S
ECHO LVOT STROKE VOLUME INDEX: 17.8 ML/M2
ECHO LVOT SV: 37.7 ML
ECHO LVOT VTI: 10.9 CM
ECHO MV A VELOCITY: 0.39 M/S
ECHO MV AREA PHT: 3.7 CM2
ECHO MV AREA VTI: 1.9 CM2
ECHO MV E DECELERATION TIME (DT): 204.5 MS
ECHO MV E VELOCITY: 1.06 M/S
ECHO MV E/A RATIO: 2.72
ECHO MV LVOT VTI INDEX: 1.83
ECHO MV MAX VELOCITY: 1.1 M/S
ECHO MV MEAN GRADIENT: 2 MMHG
ECHO MV MEAN VELOCITY: 0.6 M/S
ECHO MV PEAK GRADIENT: 5 MMHG
ECHO MV PRESSURE HALF TIME (PHT): 60.2 MS
ECHO MV VTI: 20 CM
ECHO PV MAX VELOCITY: 0.9 M/S
ECHO PV MEAN GRADIENT: 2 MMHG
ECHO PV MEAN VELOCITY: 0.7 M/S
ECHO PV PEAK GRADIENT: 3 MMHG
ECHO PV VTI: 14.9 CM
ECHO RIGHT VENTRICULAR SYSTOLIC PRESSURE (RVSP): 42 MMHG
ECHO RV INTERNAL DIMENSION: 3.2 CM
ECHO TV REGURGITANT MAX VELOCITY: 2.93 M/S
ECHO TV REGURGITANT PEAK GRADIENT: 34 MMHG
ERYTHROCYTE [DISTWIDTH] IN BLOOD BY AUTOMATED COUNT: 15.3 % (ref 11.5–15)
FIO2: 30 %
GFR SERPL CREATININE-BSD FRML MDRD: 11 ML/MIN/1.73M2
GLUCOSE BLD-MCNC: 115 MG/DL (ref 74–99)
GLUCOSE BLD-MCNC: 164 MG/DL (ref 74–99)
GLUCOSE BLD-MCNC: 167 MG/DL (ref 74–99)
GLUCOSE BLD-MCNC: 178 MG/DL (ref 74–99)
GLUCOSE BLD-MCNC: 203 MG/DL (ref 74–99)
GLUCOSE BLD-MCNC: 221 MG/DL (ref 74–99)
GLUCOSE SERPL-MCNC: 165 MG/DL (ref 74–99)
HCO3: 24.1 MMOL/L (ref 22–26)
HCT VFR BLD AUTO: 24.9 % (ref 34–48)
HGB BLD-MCNC: 8 G/DL (ref 11.5–15.5)
HHB: 4 % (ref 0–5)
LAB: ABNORMAL
Lab: 421
MAGNESIUM SERPL-MCNC: 2.3 MG/DL (ref 1.6–2.6)
MCH RBC QN AUTO: 30 PG (ref 26–35)
MCHC RBC AUTO-ENTMCNC: 32.1 G/DL (ref 32–34.5)
MCV RBC AUTO: 93.3 FL (ref 80–99.9)
METHB: 0.3 % (ref 0–1.5)
MODE: AC
O2 CONTENT: 12.2 ML/DL
O2 SATURATION: 95.9 % (ref 92–98.5)
O2HB: 94.2 % (ref 94–97)
OPERATOR ID: 2323
PATIENT TEMP: 37 C
PCO2: 34.3 MMHG (ref 35–45)
PEEP/CPAP: 5 CMH2O
PFO2: 2.77 MMHG/%
PH BLOOD GAS: 7.46 (ref 7.35–7.45)
PHOSPHATE SERPL-MCNC: 5.6 MG/DL (ref 2.5–4.5)
PLATELET # BLD AUTO: 95 K/UL (ref 130–450)
PLATELET CONFIRMATION: NORMAL
PMV BLD AUTO: 11.8 FL (ref 7–12)
PO2: 83.2 MMHG (ref 75–100)
POTASSIUM SERPL-SCNC: 4.2 MMOL/L (ref 3.5–5)
PROT SERPL-MCNC: 5.1 G/DL (ref 6.4–8.3)
RBC # BLD AUTO: 2.67 M/UL (ref 3.5–5.5)
RI(T): 1
RR MECHANICAL: 18 B/MIN
SODIUM SERPL-SCNC: 129 MMOL/L (ref 132–146)
SOURCE, BLOOD GAS: ABNORMAL
THB: 9.1 G/DL (ref 11.5–16.5)
TIME ANALYZED: 424
VT MECHANICAL: 400 ML
WBC OTHER # BLD: 12.1 K/UL (ref 4.5–11.5)

## 2024-03-18 PROCEDURE — 84100 ASSAY OF PHOSPHORUS: CPT

## 2024-03-18 PROCEDURE — 80053 COMPREHEN METABOLIC PANEL: CPT

## 2024-03-18 PROCEDURE — 99291 CRITICAL CARE FIRST HOUR: CPT | Performed by: INTERNAL MEDICINE

## 2024-03-18 PROCEDURE — 2580000003 HC RX 258: Performed by: INTERNAL MEDICINE

## 2024-03-18 PROCEDURE — 82962 GLUCOSE BLOOD TEST: CPT

## 2024-03-18 PROCEDURE — C9113 INJ PANTOPRAZOLE SODIUM, VIA: HCPCS | Performed by: INTERNAL MEDICINE

## 2024-03-18 PROCEDURE — 6360000002 HC RX W HCPCS: Performed by: INTERNAL MEDICINE

## 2024-03-18 PROCEDURE — 6360000002 HC RX W HCPCS: Performed by: NURSE PRACTITIONER

## 2024-03-18 PROCEDURE — 2500000003 HC RX 250 WO HCPCS: Performed by: INTERNAL MEDICINE

## 2024-03-18 PROCEDURE — 85027 COMPLETE CBC AUTOMATED: CPT

## 2024-03-18 PROCEDURE — 93306 TTE W/DOPPLER COMPLETE: CPT | Performed by: INTERNAL MEDICINE

## 2024-03-18 PROCEDURE — 82805 BLOOD GASES W/O2 SATURATION: CPT

## 2024-03-18 PROCEDURE — 83735 ASSAY OF MAGNESIUM: CPT

## 2024-03-18 PROCEDURE — 94640 AIRWAY INHALATION TREATMENT: CPT

## 2024-03-18 PROCEDURE — 5A1955Z RESPIRATORY VENTILATION, GREATER THAN 96 CONSECUTIVE HOURS: ICD-10-PCS | Performed by: INTERNAL MEDICINE

## 2024-03-18 PROCEDURE — 6370000000 HC RX 637 (ALT 250 FOR IP): Performed by: INTERNAL MEDICINE

## 2024-03-18 PROCEDURE — A4216 STERILE WATER/SALINE, 10 ML: HCPCS | Performed by: INTERNAL MEDICINE

## 2024-03-18 PROCEDURE — 94003 VENT MGMT INPAT SUBQ DAY: CPT

## 2024-03-18 PROCEDURE — C8929 TTE W OR WO FOL WCON,DOPPLER: HCPCS

## 2024-03-18 PROCEDURE — 71045 X-RAY EXAM CHEST 1 VIEW: CPT

## 2024-03-18 PROCEDURE — 36600 WITHDRAWAL OF ARTERIAL BLOOD: CPT

## 2024-03-18 PROCEDURE — 99231 SBSQ HOSP IP/OBS SF/LOW 25: CPT | Performed by: NURSE PRACTITIONER

## 2024-03-18 PROCEDURE — 2000000000 HC ICU R&B

## 2024-03-18 PROCEDURE — 99232 SBSQ HOSP IP/OBS MODERATE 35: CPT | Performed by: INTERNAL MEDICINE

## 2024-03-18 PROCEDURE — 36592 COLLECT BLOOD FROM PICC: CPT

## 2024-03-18 PROCEDURE — 90935 HEMODIALYSIS ONE EVALUATION: CPT

## 2024-03-18 RX ORDER — ANTICOAGULANT SODIUM CITRATE SOLUTION 4 G/100ML
SOLUTION INTRAVENOUS ONCE
Status: COMPLETED | OUTPATIENT
Start: 2024-03-18 | End: 2024-03-18

## 2024-03-18 RX ADMIN — MIDODRINE HYDROCHLORIDE 15 MG: 5 TABLET ORAL at 06:57

## 2024-03-18 RX ADMIN — IPRATROPIUM BROMIDE AND ALBUTEROL SULFATE 1 DOSE: .5; 2.5 SOLUTION RESPIRATORY (INHALATION) at 04:06

## 2024-03-18 RX ADMIN — MUPIROCIN: 20 OINTMENT TOPICAL at 10:22

## 2024-03-18 RX ADMIN — EPOETIN ALFA-EPBX 2000 UNITS: 2000 INJECTION, SOLUTION INTRAVENOUS; SUBCUTANEOUS at 20:35

## 2024-03-18 RX ADMIN — IPRATROPIUM BROMIDE AND ALBUTEROL SULFATE 1 DOSE: .5; 2.5 SOLUTION RESPIRATORY (INHALATION) at 00:38

## 2024-03-18 RX ADMIN — SODIUM CHLORIDE, PRESERVATIVE FREE 40 MG: 5 INJECTION INTRAVENOUS at 21:43

## 2024-03-18 RX ADMIN — IPRATROPIUM BROMIDE AND ALBUTEROL SULFATE 1 DOSE: .5; 2.5 SOLUTION RESPIRATORY (INHALATION) at 16:01

## 2024-03-18 RX ADMIN — ANTICOAGULANT SODIUM CITRATE SOLUTION: 4 SOLUTION INTRAVENOUS at 21:47

## 2024-03-18 RX ADMIN — PREDNISONE 5 MG: 5 TABLET ORAL at 10:21

## 2024-03-18 RX ADMIN — INSULIN LISPRO 4 UNITS: 100 INJECTION, SOLUTION INTRAVENOUS; SUBCUTANEOUS at 10:27

## 2024-03-18 RX ADMIN — CEFEPIME 1000 MG: 1 INJECTION, POWDER, FOR SOLUTION INTRAMUSCULAR; INTRAVENOUS at 21:36

## 2024-03-18 RX ADMIN — IPRATROPIUM BROMIDE AND ALBUTEROL SULFATE 1 DOSE: .5; 2.5 SOLUTION RESPIRATORY (INHALATION) at 20:11

## 2024-03-18 RX ADMIN — BUDESONIDE INHALATION 500 MCG: 0.5 SUSPENSION RESPIRATORY (INHALATION) at 04:06

## 2024-03-18 RX ADMIN — PREDNISONE 5 MG: 5 TABLET ORAL at 21:47

## 2024-03-18 RX ADMIN — INSULIN LISPRO 4 UNITS: 100 INJECTION, SOLUTION INTRAVENOUS; SUBCUTANEOUS at 00:13

## 2024-03-18 RX ADMIN — IPRATROPIUM BROMIDE AND ALBUTEROL SULFATE 1 DOSE: .5; 2.5 SOLUTION RESPIRATORY (INHALATION) at 12:21

## 2024-03-18 RX ADMIN — MIDODRINE HYDROCHLORIDE 15 MG: 5 TABLET ORAL at 21:47

## 2024-03-18 RX ADMIN — SODIUM CHLORIDE: 9 INJECTION, SOLUTION INTRAVENOUS at 03:46

## 2024-03-18 RX ADMIN — Medication 10 ML: at 10:20

## 2024-03-18 RX ADMIN — SODIUM CHLORIDE, PRESERVATIVE FREE 40 MG: 5 INJECTION INTRAVENOUS at 10:21

## 2024-03-18 RX ADMIN — BUDESONIDE INHALATION 500 MCG: 0.5 SUSPENSION RESPIRATORY (INHALATION) at 16:01

## 2024-03-18 RX ADMIN — INSULIN GLARGINE 25 UNITS: 100 INJECTION, SOLUTION SUBCUTANEOUS at 10:25

## 2024-03-18 RX ADMIN — IPRATROPIUM BROMIDE AND ALBUTEROL SULFATE 1 DOSE: .5; 2.5 SOLUTION RESPIRATORY (INHALATION) at 08:18

## 2024-03-18 RX ADMIN — MIDODRINE HYDROCHLORIDE 15 MG: 5 TABLET ORAL at 15:43

## 2024-03-18 RX ADMIN — MUPIROCIN: 20 OINTMENT TOPICAL at 21:45

## 2024-03-18 RX ADMIN — Medication 10 ML: at 23:25

## 2024-03-18 ASSESSMENT — PULMONARY FUNCTION TESTS
PIF_VALUE: 38
PIF_VALUE: 27
PIF_VALUE: 28
PIF_VALUE: 27
PIF_VALUE: 25
PIF_VALUE: 22
PIF_VALUE: 22
PIF_VALUE: 27
PIF_VALUE: 25
PIF_VALUE: 29
PIF_VALUE: 11
PIF_VALUE: 31
PIF_VALUE: 27
PIF_VALUE: 23
PIF_VALUE: 27
PIF_VALUE: 25
PIF_VALUE: 29
PIF_VALUE: 28
PIF_VALUE: 28
PIF_VALUE: 23
PIF_VALUE: 24
PIF_VALUE: 22
PIF_VALUE: 27
PIF_VALUE: 22
PIF_VALUE: 22
PIF_VALUE: 11
PIF_VALUE: 30
PIF_VALUE: 23
PIF_VALUE: 22
PIF_VALUE: 11
PIF_VALUE: 27
PIF_VALUE: 23
PIF_VALUE: 22

## 2024-03-18 ASSESSMENT — PAIN SCALES - GENERAL
PAINLEVEL_OUTOF10: 0

## 2024-03-18 NOTE — PLAN OF CARE
Problem: Safety - Adult  Goal: Free from fall injury  Outcome: Progressing     Problem: Discharge Planning  Goal: Discharge to home or other facility with appropriate resources  Outcome: Progressing     Problem: Pain  Goal: Verbalizes/displays adequate comfort level or baseline comfort level  Outcome: Progressing     Problem: Skin/Tissue Integrity  Goal: Absence of new skin breakdown  Description: 1.  Monitor for areas of redness and/or skin breakdown  2.  Assess vascular access sites hourly  3.  Every 4-6 hours minimum:  Change oxygen saturation probe site  4.  Every 4-6 hours:  If on nasal continuous positive airway pressure, respiratory therapy assess nares and determine need for appliance change or resting period.  Outcome: Progressing     Problem: Confusion  Goal: Confusion, delirium, dementia, or psychosis is improved or at baseline  Description: INTERVENTIONS:  1. Assess for possible contributors to thought disturbance, including medications, impaired vision or hearing, underlying metabolic abnormalities, dehydration, psychiatric diagnoses, and notify attending LIP  2. [place] high risk fall precautions, as indicated  3. Provide frequent short contacts to provide reality reorientation, refocusing and direction  4. Decrease environmental stimuli, including noise as appropriate  5. Monitor and intervene to maintain adequate nutrition, hydration, elimination, sleep and activity  6. If unable to ensure safety without constant attention obtain sitter and review sitter guidelines with assigned personnel  7. Initiate Psychosocial CNS and Spiritual Care consult, as indicated  Outcome: Progressing     Problem: Safety - Medical Restraint  Goal: Remains free of injury from restraints (Restraint for Interference with Medical Device)  Description: INTERVENTIONS:  1. Determine that other, less restrictive measures have been tried or would not be effective before applying the restraint  2. Evaluate the patient's

## 2024-03-18 NOTE — PROGRESS NOTES
[facility]  Nephrology Progress Note    SUBJECTIVE:  We are following this patient for end-stage renal failure .     3/18/24: Seen and examined in the ICU setting.  No acute issues overnight.  Remains intubated but now off sedation.  He will follow simple commands.  Remains hemodynamically stable off Levophed.  No family present at the time of my exam.    MEDS (scheduled):   cefepime  1,000 mg IntraVENous Q24H    apixaban  2.5 mg Oral BID    scopolamine  1 patch TransDERmal Q72H    midodrine  15 mg Oral q8h    insulin glargine  25 Units SubCUTAneous Daily    insulin lispro  0-16 Units SubCUTAneous Q4H    predniSONE  5 mg Oral BID    pantoprazole (PROTONIX) 40 mg in sodium chloride (PF) 0.9 % 10 mL injection  40 mg IntraVENous Q12H    epoetin andrzej-epbx  2,000 Units SubCUTAneous Once per day on Mon Wed Fri    mupirocin   Each Nostril BID    budesonide  0.5 mg Nebulization BID RT    ipratropium 0.5 mg-albuterol 2.5 mg  1 Dose Inhalation Q4H RT    sodium chloride flush  10 mL IntraVENous 2 times per day       MEDS (infusions):   sodium chloride 30 mL/hr at 03/18/24 0346    dextrose      sodium chloride         ADULT TUBE FEEDING; Orogastric; Renal Formula; Continuous; 10; Yes; 10; Q 6 hours; 29; 30; Q 4 hours; Protein, Other/Comment; Four doses per day: 2 at breakfast, 1 at lunch, 1 at dinner; 3 Doses; TID      PHYSICAL EXAM:      Patient Vitals for the past 24 hrs:   BP Temp Temp src Pulse Resp SpO2 Height Weight   03/18/24 1200 -- 99 °F (37.2 °C) Axillary 73 18 94 % -- --   03/18/24 1121 -- -- -- -- -- -- 1.676 m (5' 5.98\") --   03/18/24 1100 106/67 -- -- 87 28 92 % -- --   03/18/24 1000 118/83 -- -- 79 (!) 31 92 % -- --   03/18/24 0900 (!) 143/59 -- -- 76 18 98 % -- --   03/18/24 0800 (!) 140/74 98.8 °F (37.1 °C) Axillary 67 18 97 % -- --   03/18/24 0700 125/70 -- -- 71 18 98 % -- --   03/18/24 0600 (!) 117/90 -- -- 78 18 98 % -- --   03/18/24 0500 118/84 -- -- 73 19 98 % -- --   03/18/24 0404 -- -- -- 72 18 98

## 2024-03-18 NOTE — PLAN OF CARE
Problem: Pain  Goal: Verbalizes/displays adequate comfort level or baseline comfort level  3/18/2024 0144 by Nasrin Hills RN  Outcome: Progressing     Problem: Confusion  Goal: Confusion, delirium, dementia, or psychosis is improved or at baseline  Description: INTERVENTIONS:  1. Assess for possible contributors to thought disturbance, including medications, impaired vision or hearing, underlying metabolic abnormalities, dehydration, psychiatric diagnoses, and notify attending LIP  2. [place] high risk fall precautions, as indicated  3. Provide frequent short contacts to provide reality reorientation, refocusing and direction  4. Decrease environmental stimuli, including noise as appropriate  5. Monitor and intervene to maintain adequate nutrition, hydration, elimination, sleep and activity  6. If unable to ensure safety without constant attention obtain sitter and review sitter guidelines with assigned personnel  7. Initiate Psychosocial CNS and Spiritual Care consult, as indicated  3/18/2024 0144 by Nasrin Hills RN  Outcome: Progressing     Problem: Respiratory - Adult  Goal: Achieves optimal ventilation and oxygenation  3/18/2024 0144 by Nasrin Hills RN  Outcome: Progressing     Problem: Safety - Medical Restraint  Goal: Remains free of injury from restraints (Restraint for Interference with Medical Device)  Description: INTERVENTIONS:  1. Determine that other, less restrictive measures have been tried or would not be effective before applying the restraint  2. Evaluate the patient's condition at the time of restraint application  3. Inform patient/family regarding the reason for restraint  4. Q2H: Monitor safety, psychosocial status, comfort, nutrition and hydration  3/18/2024 0144 by Nasrin Hills RN  Outcome: Progressing  Flowsheets  Taken 3/18/2024 0000 by Nasrin Hills RN  Remains free of injury from restraints (restraint for interference with medical device): Every 2 hours: Monitor

## 2024-03-18 NOTE — PROGRESS NOTES
Admitting Date and Time: 3/10/2024  2:37 PM  Admit Dx: Hyperkalemia [E87.5]  Encounter for dialysis (HCC) [Z99.2]  End stage renal disease on dialysis (HCC) [N18.6, Z99.2]  Acute respiratory failure with hypoxia (HCC) [J96.01]  Acute on chronic respiratory failure with hypoxia and hypercapnia (HCC) [J96.21, J96.22]  Leukocytosis, unspecified type [D72.829]  Acute metabolic encephalopathy [G93.41]    Subjective:  3/18 , pt is intubated off sedation follows commands , opens her eyes , seems comfortable      cefepime  1,000 mg IntraVENous Q24H    apixaban  2.5 mg Oral BID    scopolamine  1 patch TransDERmal Q72H    midodrine  15 mg Oral q8h    insulin glargine  25 Units SubCUTAneous Daily    insulin lispro  0-16 Units SubCUTAneous Q4H    predniSONE  5 mg Oral BID    pantoprazole (PROTONIX) 40 mg in sodium chloride (PF) 0.9 % 10 mL injection  40 mg IntraVENous Q12H    epoetin andrzej-epbx  2,000 Units SubCUTAneous Once per day on Mon Wed Fri    mupirocin   Each Nostril BID    budesonide  0.5 mg Nebulization BID RT    ipratropium 0.5 mg-albuterol 2.5 mg  1 Dose Inhalation Q4H RT    sodium chloride flush  10 mL IntraVENous 2 times per day     sodium chloride flush, 10 mL, PRN  promethazine, 12.5 mg, Q6H PRN   Or  ondansetron, 4 mg, Q6H PRN  polyethylene glycol, 17 g, Daily PRN  acetaminophen, 650 mg, Q6H PRN   Or  acetaminophen, 650 mg, Q6H PRN  glucose, 4 tablet, PRN  dextrose bolus, 125 mL, PRN   Or  dextrose bolus, 250 mL, PRN  glucagon (rDNA), 1 mg, PRN  dextrose, , Continuous PRN  sodium chloride, , PRN         Objective:    /67   Pulse 73   Temp 99 °F (37.2 °C) (Axillary)   Resp 18   Ht 1.676 m (5' 5.98\")   Wt 107 kg (235 lb 12.8 oz)   SpO2 94%   BMI 38.08 kg/m²   Patient is awake and ventilated.  Good eye contact briefly, and follows command   Normocephalic, without obvious abnormality, atraumatic,   Neck  limited exam for motion due to ET tube  Heart : RRR  no murmur  Lungs : coarse ronchi bilateral  to the hospital.  She missed her HD and found to be in a poor living condition at home with dog feces. She was found to be hypotensive with  hypercapnia and placed on BIPAP. Nephrology consulted for fluid overload with hyperkalemia and was given HD but could no finish up the session due to fistula malfunction/bleeding. Poor historian, confused and lethargic, no additional history could be obtained.     Metabolic encephalopathy due to sepsis/septic shock with Klebsiella bacteremia causing hypotension leukocytosis empiric antibiotics as blood cultures start to show Klebsiella.  Supportive care in the ICU's restarting home midodrine. 3/12: weaned levophed this am. 3/13: weaned off Critical care ID and renal are on consult.  Steroids/ prednisone PO .  3/14 weaned off sedation  Bronchoscopy on 3/15: polypoid appearing mucosa not biopsied due to #7, going for bronchoscopy again and biopsy  today 3/18   3/15: stopped pressors continuing midrodrine  3/16: breathing trial went well  3/17: If scopolamine doesn't help enough with secretions then pulmonary  plans to scope  3/18 going for bronchoscopy and biopsy today   End-stage renal disease missed hemodialysis thus has volume overload and hyperkalemia thus getting hemodialysis per nephrology  Respiratory failure with hypoxia and hypercapnia possible acute exacerbation COPD status post hydrocortisone DuoNebs.  Ventilated.  On prednisone now  Vent per pulmonary  Former smoker she should not restart  Hypothyroidism thyroid replacement.  Tsh wnl  Left forearm AV fistula  malfunctioning thus line placed for HD  Thrombocytopenia: monitor. Improved, so going for bronchoscopy today and restart eliquis if ok with pulmonology   DVT prophylaxis: apixaban held due to #7  Full code.  Disposition: To be determined      NOTE: This report was transcribed using voice recognition software. Every effort was made to ensure accuracy; however, inadvertent computerized transcription errors may be

## 2024-03-18 NOTE — PROGRESS NOTES
PULMONARY  CRITICAL CARE   SERVICE DAILY PROGRESS  NOTE     3/18/2024   Hospital  LOS: 8 days      Admit date- 3/10/2024       Initially admitted for -   Altered Mental Status (Ems report pt was c/c Friday, missed dialysis on Friday, home in poor condition-dog poop everywhere, 79 on RA, hypotension )       Subjective:      Remains intubated, not on any sedation this morning, follows commands , awake .   Secretions have been better after scopolamine patch   Hemodynamically stable off Levophed for about 3 days now  Seems very weak and deconditioned  Overnight uneventful    Review of Systems      Unable to obtain secondary to mental status. Pt is intubated and sedated                      Allergies:  Allergies   Allergen Reactions    Pcn [Penicillins] Hives    Sulfa Antibiotics Other (See Comments)     \"passed out with IV\"    Bactrim [Sulfamethoxazole-Trimethoprim] Hives     Home Meds:  Prior to Admission medications    Medication Sig Start Date End Date Taking? Authorizing Provider   ferric citrate (AURYXIA) 1  MG(Fe) TABS tablet Take 1 tablet by mouth as needed (with Snacks)   Yes Meri Larios MD   montelukast (SINGULAIR) 10 MG tablet Take 1 tablet by mouth nightly 1/29/24   Grey Chua PA-C   vitamin B-12 (CYANOCOBALAMIN) 1000 MCG tablet Take 1 tablet by mouth daily 1/29/24   Grey Chua PA-C   levothyroxine (SYNTHROID) 100 MCG tablet Take 1 tablet by mouth daily 11/8/23   Grey Chua PA-C   vitamin D (CHOLECALCIFEROL) 25 MCG (1000 UT) TABS tablet Take 1 tablet by mouth daily    ProviderMeri MD   fexofenadine (ALLEGRA) 180 MG tablet Take 1 tablet by mouth daily    Meri Larios MD   rOPINIRole (REQUIP) 2 MG tablet Take 1 tablet by mouth nightly 11/1/23   Grey Chua PA-C   calcium carbonate 1500 (600 Ca) MG TABS tablet Take 1 tablet by mouth 2 times daily 11/1/23   Grey Chua PA-C   traZODone (DESYREL) 50 MG tablet Take 1 tablet by mouth nightly 11/1/23    8.0*   HCT 23.5* 24.3* 24.9*   PLT 47* 69* 95*       BMP:    Recent Labs     03/16/24 0343 03/17/24 0437 03/18/24 0434   * 130* 129*   K 3.9 4.1 4.2   CL 90* 91* 91*   CO2 27 25 23   BUN 46* 59* 67*   CREATININE 2.8* 3.5* 4.0*   GLUCOSE 235* 225* 165*   CALCIUM 7.7* 8.2* 7.8*   MG 1.9 2.2 2.3   PHOS 3.8 4.8* 5.6*       HEPATIC:   Recent Labs     03/16/24 0343 03/17/24 0437 03/18/24 0434   AST 64* 29 20   * 117* 81*   BILITOT 0.9 0.8 0.6   ALKPHOS 112* 128* 112*       BLOOD GAS:   Recent Labs     03/17/24  0420 03/18/24 0421   PH 7.450 7.464*   PCO2 33.6* 34.3*   PO2 70.1* 83.2   HCO3 22.8 24.1   O2SAT 93.9 95.9         Radiology and available imaging -   Personally reviewed    ASSESSMENT AND PLAN:    Encephalopathy-was found unresponsive with pulse at home initially.  She is pathologically noncompliant with medications and recommendations  Acute respiratory failure requiring intubation and mechanical ventilation  Klebsiella bacteremia, likely source UTI, possible Klebsiella pneumonia  History of tobacco abuse with possible COPD with acute exacerbation  CKD on intermittent hemodialysis  Severe deconditioning and debility  New onset A-fib, has been persistent  Clinical cor pulmonale    -Pending  echocardiogram  -Continue spontaneous breathing trials as tolerated, appears very weak and has a lot of secretions.  - Scopolamine patch   - Therapeutic bronch later today for pulmonary toilet with a hope to liberate by tomorrow am   -Continue midodrine  -Nephrology following, receiving intermittent PRN iHD , planned for a session today   -Continue systemic steroids for possible acute COPD exacerbation.  - Palliative care following   - Continue cefepime , followup c/s       Prophylaxis:  Stress ulcer: [x] PPI Agent [] H2RA [] Sucralfate [] Other:   VTE: [] Enoxaparin  [x] SC Heparin  [] SCD    I have spent a total critical care time of 33 minutes in the care of this critically ill patient excluding any

## 2024-03-18 NOTE — PROGRESS NOTES
Comprehensive Nutrition Assessment    Type and Reason for Visit:  Reassess    Nutrition Recommendations/Plan:   Continue NPO  Stop Current TF Nepro @ 25mls/hr x 24 hours  3.   New Tube Feeding Recommendation:    Renal (Nepro w/ carbsteady) @25 mls increasing to goal of 29mls/hr x 24 hours, additional 30mls q4 hours  and four protein modulars provides: 1,639 kcals, 156 grams of protein, 688 mls H2O meeting 100% of calorie and protein goals per day. Continue to monitor renal function and adjust TF appropriately.        Malnutrition Assessment:  Malnutrition Status:  No malnutrition (03/12/24 1534)    Context:  Chronic Illness     Findings of the 6 clinical characteristics of malnutrition:  Energy Intake:  Mild decrease in energy intake (Comment) (poor po intake prior to admission)  Weight Loss:  Unable to assess (no wt hx in emr)     Body Fat Loss:  No significant body fat loss     Muscle Mass Loss:  No significant muscle mass loss    Fluid Accumulation:  Mild Extremities   Strength:  Not Performed    Nutrition Assessment:    Pt admit for AMS, missed dialysis treatments, respiratory failure w/ hypoxia, hyperkalemia, septic shock Kleibsella (unknown etiology.) PMHx: Anemia, Arrythmia, Arthritis, Asthma, CKD on dialysis, COPD, Hypothyroid, HLD, Sleep apnea, T2DM. Pt intubated and sedated (propofol), pressors x1. Pt was to have dialysis but there was a fistula malfunction, next HD dependent upon pressor needs. Pt will try to be weaned 1-2 days. WIll provide tube feeding recommendations, continue inpaitent monitoring f/up per policy. 03/16/24: F/up: Pt off propofol, off pressors x2 days, MAP 35, will re-calculate Tube Feeding however d/t MAP score do not increase above current rate at 25mls/hr, once MAP improves slowly increase to new goal rate. of 29mls/hr x24 hours, will f/up on 03/18/24 to determine MAP.         03/18/24: F/up: Pt off all sedation and pressors x3 days, remains intubated. Spontaneous breathing trial  failed 03/17.  Will adjust Tube Feeding to reflect these changes. Pt for hemodialysis today. Pt remains weak and lethargic, continue inpatient monitoring, f/up per policy.    Nutrition Related Findings:    Intubated, I/O +9,130, 7.4 L/Minute O2, MAP 95, TMAX 98.5, abd wdl, bowel sounds x 4 active, BLUE edema +1 pitting, BLLE edema +1 pitting, PH 7.464, Na+ 129, elevated BUN/Creatinine, GFR 11, Ca+ 7.8, Phosphorous 5.6, ALK PHOS 112, ALT 81, Hgb 8.0, hct 24.9, OG tube in place running 25mls/hr continuous, Wound Type: Multiple, Open Wounds (Left medial abdomen, Coccyx, Right pre-tibial)       Current Nutrition Intake & Therapies:    Average Meal Intake: NPO  Average Supplements Intake: NPO  Current Tube Feeding (TF) Orders:  Feeding Route: Orogastric  Formula: Renal Formula  Schedule: Continuous  Feeding Regimen: Nepro @29mls/hr x 24 hours  Additives/Modulars: Protein  Water Flushes: 30 mls q4 hours  Current TF & Flush Orders Provides:    Goal TF & Flush Orders Provides: 1,693 kcals (400 from protein modulars) 156 grams of protein, 688 mls H20    Anthropometric Measures:  Height: 167.6 cm (5' 5.98\")  Ideal Body Weight (IBW): 130 lbs (59 kg)    Admission Body Weight: 98.1 kg (216 lb 4.3 oz)  Current Body Weight: 107 kg (235 lb 14.3 oz) (03/18/24), 181.5 % IBW. Weight Source: Bed Scale  Current BMI (kg/m2): 38.1  Usual Body Weight: 104.3 kg (230 lb) (03/16/24)  % Weight Change (Calculated): 2.6  Weight Adjustment For: No Adjustment           BMI Categories: Obese Class 2 (BMI 35.0 -39.9)    Estimated Daily Nutrient Needs:  Energy Requirements Based On: Formula  Weight Used for Energy Requirements: Admission  Energy (kcal/day): 1600 -1700 kcals/kg ([facility] 2010 BMI >30, age 60+)  Weight Used for Protein Requirements: Ideal  Protein (g/day): 147-159 g/day ([place] Protein >= 2.5 g/kg IBW)  Method Used for Fluid Requirements: 1 ml/kcal  Fluid (ml/day): per  critical care team    Nutrition

## 2024-03-18 NOTE — PROGRESS NOTES
Palliative Care Department  [phone]  Palliative Care Progress Note  Provider Akosua Dahl, APRN - CNP     Ainsley Morris  [id number]  Hospital Day: 9  Date of Initial Consult: 3/16/2024  Referring Provider: Javy Leal MD   Palliative Medicine was consulted for assistance with: Goals of Care, End Stage Disease    HPI:   Ainsley Morris is a 79 y.o. with a medical history of hyperlipidemia, hypothyroidism, COPD, asthma, sleep apnea, CKD, DM, anemia, arthritis who was admitted on 3/10/2024 from home with a CHIEF COMPLAINT of altered mental status, hypotension, and missed hemodialysis.  Patient was found to be in a poor living condition at home.  Patient also found to be hypotensive with hypercapnia and placed on BiPAP initially.  She is currently intubated and in the ICU.  Palliative medicine consulted for goals of care and end-stage disease.    ASSESSMENT/PLAN:     Pertinent Hospital Diagnoses     Acute respiratory failure  Bacteremia  New onset atrial fibrillation  Encephalopathy  CKD      Palliative Care Encounter / Counseling Regarding Goals of Care  Please see detailed goals of care discussion as below  At this time, Ainsley Morris, Does Not have capacity for medical decision-making.  Capacity is time limited and situation/question specific  During encounter, sister Delarosa, was surrogate medical decision-maker  Outcome of goals of care meeting:   Continue full code  Continue current management  Code status Full Code  Advanced Directives:  POA or living will in [facility]  Surrogate/Legal NOK:  sister Orr, [phone]    Spiritual assessment: no spiritual distress identified  Bereavement and grief: to be determined  Referrals to: none today  SUBJECTIVE:     Current medical issues leading to Palliative Medicine involvement include   Active Hospital Problems    Diagnosis Date Noted    Hyperkalemia [E87.5] 03/11/2024    Acute respiratory failure with hypoxia (HCC) [J96.01] 03/10/2024       Details

## 2024-03-18 NOTE — PROGRESS NOTES
SBT perfomed for 2 hours with peep 5, ps 5, and fio2 30%. Toleration was fair, vt around 250, rr 28, rsbi . Dr. Lamb saw patient. Commands not followed. Placed back on previous settings.

## 2024-03-19 PROBLEM — Z51.5 PALLIATIVE CARE ENCOUNTER: Status: ACTIVE | Noted: 2024-03-19

## 2024-03-19 LAB
ALBUMIN SERPL-MCNC: 2.4 G/DL (ref 3.5–5.2)
ALP SERPL-CCNC: 107 U/L (ref 35–104)
ALT SERPL-CCNC: 60 U/L (ref 0–32)
ANION GAP SERPL CALCULATED.3IONS-SCNC: 11 MMOL/L (ref 7–16)
AST SERPL-CCNC: 22 U/L (ref 0–31)
BASOPHILS # BLD: 0.01 K/UL (ref 0–0.2)
BASOPHILS NFR BLD: 0 % (ref 0–2)
BILIRUB SERPL-MCNC: 0.6 MG/DL (ref 0–1.2)
BUN SERPL-MCNC: 32 MG/DL (ref 6–23)
CALCIUM SERPL-MCNC: 7.5 MG/DL (ref 8.6–10.2)
CHLORIDE SERPL-SCNC: 100 MMOL/L (ref 98–107)
CO2 SERPL-SCNC: 26 MMOL/L (ref 22–29)
CREAT SERPL-MCNC: 2.6 MG/DL (ref 0.5–1)
EOSINOPHIL # BLD: 0.02 K/UL (ref 0.05–0.5)
EOSINOPHILS RELATIVE PERCENT: 0 % (ref 0–6)
ERYTHROCYTE [DISTWIDTH] IN BLOOD BY AUTOMATED COUNT: 15.7 % (ref 11.5–15)
GFR SERPL CREATININE-BSD FRML MDRD: 19 ML/MIN/1.73M2
GLUCOSE BLD-MCNC: 101 MG/DL (ref 74–99)
GLUCOSE BLD-MCNC: 104 MG/DL (ref 74–99)
GLUCOSE BLD-MCNC: 109 MG/DL (ref 74–99)
GLUCOSE BLD-MCNC: 147 MG/DL (ref 74–99)
GLUCOSE BLD-MCNC: 151 MG/DL (ref 74–99)
GLUCOSE BLD-MCNC: 156 MG/DL (ref 74–99)
GLUCOSE BLD-MCNC: 53 MG/DL (ref 74–99)
GLUCOSE BLD-MCNC: 67 MG/DL (ref 74–99)
GLUCOSE BLD-MCNC: 78 MG/DL (ref 74–99)
GLUCOSE SERPL-MCNC: 143 MG/DL (ref 74–99)
HCT VFR BLD AUTO: 23.9 % (ref 34–48)
HGB BLD-MCNC: 7.9 G/DL (ref 11.5–15.5)
IMM GRANULOCYTES # BLD AUTO: 0.25 K/UL (ref 0–0.58)
IMM GRANULOCYTES NFR BLD: 2 % (ref 0–5)
LYMPHOCYTES NFR BLD: 0.68 K/UL (ref 1.5–4)
LYMPHOCYTES RELATIVE PERCENT: 7 % (ref 20–42)
MAGNESIUM SERPL-MCNC: 1.9 MG/DL (ref 1.6–2.6)
MCH RBC QN AUTO: 30.7 PG (ref 26–35)
MCHC RBC AUTO-ENTMCNC: 33.1 G/DL (ref 32–34.5)
MCV RBC AUTO: 93 FL (ref 80–99.9)
MONOCYTES NFR BLD: 0.75 K/UL (ref 0.1–0.95)
MONOCYTES NFR BLD: 7 % (ref 2–12)
NEUTROPHILS NFR BLD: 84 % (ref 43–80)
NEUTS SEG NFR BLD: 8.65 K/UL (ref 1.8–7.3)
NON-GYN CYTOLOGY REPORT: NORMAL
PHOSPHATE SERPL-MCNC: 3.9 MG/DL (ref 2.5–4.5)
PLATELET, FLUORESCENCE: 113 K/UL (ref 130–450)
PMV BLD AUTO: 11.9 FL (ref 7–12)
POTASSIUM SERPL-SCNC: 3.9 MMOL/L (ref 3.5–5)
PROT SERPL-MCNC: 5.2 G/DL (ref 6.4–8.3)
RBC # BLD AUTO: 2.57 M/UL (ref 3.5–5.5)
SODIUM SERPL-SCNC: 137 MMOL/L (ref 132–146)
WBC OTHER # BLD: 10.4 K/UL (ref 4.5–11.5)

## 2024-03-19 PROCEDURE — 82962 GLUCOSE BLOOD TEST: CPT

## 2024-03-19 PROCEDURE — 94003 VENT MGMT INPAT SUBQ DAY: CPT

## 2024-03-19 PROCEDURE — 6360000002 HC RX W HCPCS: Performed by: INTERNAL MEDICINE

## 2024-03-19 PROCEDURE — 94640 AIRWAY INHALATION TREATMENT: CPT

## 2024-03-19 PROCEDURE — 87070 CULTURE OTHR SPECIMN AEROBIC: CPT

## 2024-03-19 PROCEDURE — 99291 CRITICAL CARE FIRST HOUR: CPT | Performed by: INTERNAL MEDICINE

## 2024-03-19 PROCEDURE — 6360000002 HC RX W HCPCS

## 2024-03-19 PROCEDURE — A4216 STERILE WATER/SALINE, 10 ML: HCPCS | Performed by: INTERNAL MEDICINE

## 2024-03-19 PROCEDURE — 85025 COMPLETE CBC W/AUTO DIFF WBC: CPT

## 2024-03-19 PROCEDURE — 2580000003 HC RX 258: Performed by: INTERNAL MEDICINE

## 2024-03-19 PROCEDURE — 87077 CULTURE AEROBIC IDENTIFY: CPT

## 2024-03-19 PROCEDURE — 36592 COLLECT BLOOD FROM PICC: CPT

## 2024-03-19 PROCEDURE — 99233 SBSQ HOSP IP/OBS HIGH 50: CPT | Performed by: INTERNAL MEDICINE

## 2024-03-19 PROCEDURE — 80053 COMPREHEN METABOLIC PANEL: CPT

## 2024-03-19 PROCEDURE — 31645 BRNCHSC W/THER ASPIR 1ST: CPT | Performed by: INTERNAL MEDICINE

## 2024-03-19 PROCEDURE — 6370000000 HC RX 637 (ALT 250 FOR IP): Performed by: INTERNAL MEDICINE

## 2024-03-19 PROCEDURE — 0BJ08ZZ INSPECTION OF TRACHEOBRONCHIAL TREE, VIA NATURAL OR ARTIFICIAL OPENING ENDOSCOPIC: ICD-10-PCS | Performed by: INTERNAL MEDICINE

## 2024-03-19 PROCEDURE — 84100 ASSAY OF PHOSPHORUS: CPT

## 2024-03-19 PROCEDURE — 2500000003 HC RX 250 WO HCPCS: Performed by: INTERNAL MEDICINE

## 2024-03-19 PROCEDURE — C9113 INJ PANTOPRAZOLE SODIUM, VIA: HCPCS | Performed by: INTERNAL MEDICINE

## 2024-03-19 PROCEDURE — 2000000000 HC ICU R&B

## 2024-03-19 PROCEDURE — 2720000010 HC SURG SUPPLY STERILE

## 2024-03-19 PROCEDURE — 99231 SBSQ HOSP IP/OBS SF/LOW 25: CPT | Performed by: NURSE PRACTITIONER

## 2024-03-19 PROCEDURE — 87205 SMEAR GRAM STAIN: CPT

## 2024-03-19 PROCEDURE — 83735 ASSAY OF MAGNESIUM: CPT

## 2024-03-19 RX ORDER — MAGNESIUM SULFATE 1 G/100ML
1000 INJECTION INTRAVENOUS ONCE
Status: COMPLETED | OUTPATIENT
Start: 2024-03-19 | End: 2024-03-19

## 2024-03-19 RX ORDER — PROPOFOL 10 MG/ML
100 INJECTION, EMULSION INTRAVENOUS ONCE
Status: COMPLETED | OUTPATIENT
Start: 2024-03-19 | End: 2024-03-19

## 2024-03-19 RX ORDER — LIDOCAINE HYDROCHLORIDE 20 MG/ML
20 INJECTION, SOLUTION INFILTRATION; PERINEURAL ONCE
Status: COMPLETED | OUTPATIENT
Start: 2024-03-19 | End: 2024-03-19

## 2024-03-19 RX ADMIN — IPRATROPIUM BROMIDE AND ALBUTEROL SULFATE 1 DOSE: .5; 2.5 SOLUTION RESPIRATORY (INHALATION) at 13:16

## 2024-03-19 RX ADMIN — INSULIN GLARGINE 25 UNITS: 100 INJECTION, SOLUTION SUBCUTANEOUS at 09:02

## 2024-03-19 RX ADMIN — MAGNESIUM SULFATE IN DEXTROSE 1000 MG: 10 INJECTION, SOLUTION INTRAVENOUS at 06:49

## 2024-03-19 RX ADMIN — IPRATROPIUM BROMIDE AND ALBUTEROL SULFATE 1 DOSE: .5; 2.5 SOLUTION RESPIRATORY (INHALATION) at 01:24

## 2024-03-19 RX ADMIN — PREDNISONE 5 MG: 5 TABLET ORAL at 09:15

## 2024-03-19 RX ADMIN — DEXTROSE MONOHYDRATE 125 ML: 100 INJECTION, SOLUTION INTRAVENOUS at 20:10

## 2024-03-19 RX ADMIN — BUDESONIDE INHALATION 500 MCG: 0.5 SUSPENSION RESPIRATORY (INHALATION) at 04:45

## 2024-03-19 RX ADMIN — IPRATROPIUM BROMIDE AND ALBUTEROL SULFATE 1 DOSE: .5; 2.5 SOLUTION RESPIRATORY (INHALATION) at 21:34

## 2024-03-19 RX ADMIN — BUDESONIDE INHALATION 500 MCG: 0.5 SUSPENSION RESPIRATORY (INHALATION) at 16:58

## 2024-03-19 RX ADMIN — SODIUM CHLORIDE: 9 INJECTION, SOLUTION INTRAVENOUS at 13:22

## 2024-03-19 RX ADMIN — MIDODRINE HYDROCHLORIDE 15 MG: 5 TABLET ORAL at 14:31

## 2024-03-19 RX ADMIN — MIDODRINE HYDROCHLORIDE 15 MG: 5 TABLET ORAL at 23:00

## 2024-03-19 RX ADMIN — SODIUM CHLORIDE, PRESERVATIVE FREE 40 MG: 5 INJECTION INTRAVENOUS at 09:06

## 2024-03-19 RX ADMIN — DEXTROSE MONOHYDRATE 125 ML: 100 INJECTION, SOLUTION INTRAVENOUS at 16:57

## 2024-03-19 RX ADMIN — Medication 10 ML: at 20:26

## 2024-03-19 RX ADMIN — WATER 1000 MG: 1 INJECTION INTRAMUSCULAR; INTRAVENOUS; SUBCUTANEOUS at 09:07

## 2024-03-19 RX ADMIN — IPRATROPIUM BROMIDE AND ALBUTEROL SULFATE 1 DOSE: .5; 2.5 SOLUTION RESPIRATORY (INHALATION) at 09:16

## 2024-03-19 RX ADMIN — MUPIROCIN: 20 OINTMENT TOPICAL at 20:12

## 2024-03-19 RX ADMIN — Medication 10 ML: at 09:20

## 2024-03-19 RX ADMIN — PREDNISONE 5 MG: 5 TABLET ORAL at 20:26

## 2024-03-19 RX ADMIN — IPRATROPIUM BROMIDE AND ALBUTEROL SULFATE 1 DOSE: .5; 2.5 SOLUTION RESPIRATORY (INHALATION) at 16:58

## 2024-03-19 RX ADMIN — IPRATROPIUM BROMIDE AND ALBUTEROL SULFATE 1 DOSE: .5; 2.5 SOLUTION RESPIRATORY (INHALATION) at 04:45

## 2024-03-19 RX ADMIN — MIDODRINE HYDROCHLORIDE 15 MG: 5 TABLET ORAL at 06:49

## 2024-03-19 RX ADMIN — PROPOFOL 50 MG: 10 INJECTION, EMULSION INTRAVENOUS at 13:28

## 2024-03-19 RX ADMIN — WATER 1000 MG: 1 INJECTION INTRAMUSCULAR; INTRAVENOUS; SUBCUTANEOUS at 19:59

## 2024-03-19 RX ADMIN — MUPIROCIN: 20 OINTMENT TOPICAL at 09:20

## 2024-03-19 RX ADMIN — SODIUM CHLORIDE, PRESERVATIVE FREE 40 MG: 5 INJECTION INTRAVENOUS at 20:21

## 2024-03-19 RX ADMIN — LIDOCAINE HYDROCHLORIDE 20 ML: 20 INJECTION, SOLUTION INFILTRATION; PERINEURAL at 13:45

## 2024-03-19 ASSESSMENT — PULMONARY FUNCTION TESTS
PIF_VALUE: 19
PIF_VALUE: 26
PIF_VALUE: 21
PIF_VALUE: 26
PIF_VALUE: 26
PIF_VALUE: 29
PIF_VALUE: 23
PIF_VALUE: 24
PIF_VALUE: 11
PIF_VALUE: 27
PIF_VALUE: 20
PIF_VALUE: 29
PIF_VALUE: 27
PIF_VALUE: 30
PIF_VALUE: 25
PIF_VALUE: 26
PIF_VALUE: 21
PIF_VALUE: 25
PIF_VALUE: 26
PIF_VALUE: 27
PIF_VALUE: 22
PIF_VALUE: 28
PIF_VALUE: 22
PIF_VALUE: 28
PIF_VALUE: 25
PIF_VALUE: 26
PIF_VALUE: 19

## 2024-03-19 ASSESSMENT — PAIN SCALES - GENERAL
PAINLEVEL_OUTOF10: 0

## 2024-03-19 NOTE — PROGRESS NOTES
PULMONARY  CRITICAL CARE   SERVICE DAILY PROGRESS  NOTE     3/19/2024   Hospital  LOS: 9 days      Admit date- 3/10/2024       Initially admitted for -   Altered Mental Status (Ems report pt was c/c Friday, missed dialysis on Friday, home in poor condition-dog poop everywhere, 79 on RA, hypotension )       Subjective:      Remains intubated, not on any sedation this morning, follows commands , awake .   Secretions Continue to improve  after scopolamine patch   Hemodynamically stable off Levophed for 4  days now  Seems very weak and deconditioned  Overnight uneventful    Review of Systems      Unable to obtain secondary to mental status. Pt is intubated and sedated                      Allergies:  Allergies   Allergen Reactions    Pcn [Penicillins] Hives    Sulfa Antibiotics Other (See Comments)     \"passed out with IV\"    Bactrim [Sulfamethoxazole-Trimethoprim] Hives     Home Meds:  Prior to Admission medications    Medication Sig Start Date End Date Taking? Authorizing Provider   ferric citrate (AURYXIA) 1  MG(Fe) TABS tablet Take 1 tablet by mouth as needed (with Snacks)   Yes ProviderMeri MD   montelukast (SINGULAIR) 10 MG tablet Take 1 tablet by mouth nightly 1/29/24   Grey Chua PA-C   vitamin B-12 (CYANOCOBALAMIN) 1000 MCG tablet Take 1 tablet by mouth daily 1/29/24   Grey Chua PA-C   levothyroxine (SYNTHROID) 100 MCG tablet Take 1 tablet by mouth daily 11/8/23   Grey Chua PA-C   vitamin D (CHOLECALCIFEROL) 25 MCG (1000 UT) TABS tablet Take 1 tablet by mouth daily    ProviderMeri MD   fexofenadine (ALLEGRA) 180 MG tablet Take 1 tablet by mouth daily    Meri Larios MD   rOPINIRole (REQUIP) 2 MG tablet Take 1 tablet by mouth nightly 11/1/23   Grey Chua PA-C   calcium carbonate 1500 (600 Ca) MG TABS tablet Take 1 tablet by mouth 2 times daily 11/1/23   Grey Chua PA-C   traZODone (DESYREL) 50 MG tablet Take 1 tablet by mouth nightly 11/1/23

## 2024-03-19 NOTE — PROGRESS NOTES
Patients glucose 53 @ 1651. D10 bolus administered per PRN Protocol. Bolus complete @1710. Glucose 78 @ 1728.    RNCC Care Coordination Note    Encounter Summary:    Patient reports she had called in previously to have her Oxycodone refilled and when the script was sent to the pharmacy it was for 30 pills instead of the normal 180.  She is unsure why the script would be limited.  Please clarify or adjust the order.  She reports 30 tabs will only get her through Monday afternoon.    Also, patient reports very loose bowel movements and abdominal pain since last weekend.  She is often unable to eat anything but is able to drink Gatorade.  She was recently (5/29) switched from Macrobid to Macrodantin for UTI prevention by urology but problems started 1.5 week later.  Any recommendations?      RNCC Assessments    See Care Coordination Smartform for complete baseline and current assessment.       Current Signs and Symptoms Assessed this encounter: Yes   [] No new or worsening symptoms present  Symptoms present:    Cardiac/Pulmonary   [] Chest Pain    [] Cough   [] Increased Sputum   [] Orthopnea   [] Palpitations   [] Shortness of breath with activity   [] Shortness of breath at rest   [] Swelling    [] Wheezing  Activity   [] ADL impairment   [] Dizziness   [] Increased Fatigue   [] Lightheadedness    [] New Fear of Falling or Recent Fall    [] Weakness  Mood    [] Angry   [] Anxious   [] Crying   [] Depressed   [] Withdrawn  General   [] Acute Weight Concern   [] Appetite Concern   [] Chills    [] Confusion   [] Constipation   [] Diaphoresis    [x] Diarrhea loose BMs since 6/9   [] Increased Thirst   [] Inadequate support    [] Lethargy   [] Nausea   [] Numbness   [] Sleep Concern   []Tingling    [] Urinary Concern   [] Visual Changes   [] Vomiting    [x] Wound heel wound is \"almost healed.\"   [] Other  Acute Pain Assessment    Acute Pain:  No       Chronic Pain Assessment    Chronic Pain:  Yes  Chronic Pain Narrative:  12/4/2017 chronic back pain and phantom pain to RLE       Labs/Diagnostics Reviewed: Not applicable    Symptom  Trends:    Diabetes Symptom Course   No change  Activity Status  No change    Utilization Since last  contact:   Specialty Appointment: [facility]     Medication:  New RX since Last Contact: No   New Medication Concerns (See Patient Level Medication section for baseline adherence assessment): Yes Oxycodone quantity prescribed    Goals Addressed        Patient Stated    • To be able to walk without a walker.  (pt-stated)                  5/7/2018 Pt is working with [facility] on strengthening, stairs, and balance in order to not need to use the walker.           RNCC Interventions    Collaboration/Communication:  Yes         PCP Care Team         Faciliated Medication Adjustment   Patient Education:  Yes   Recipients of Education:  Patient   Education Methods used this encounter:  Verbal   Wellness/Lifestyle, S/Sx Infection, Nutrition   Reinforce Care Plan        Acuity Assessment     Acuity Assessment:   Date Acuity Level   6/14/18 2

## 2024-03-19 NOTE — PLAN OF CARE
Problem: Safety - Adult  Goal: Free from fall injury  3/19/2024 1022 by Dalton Pathak, RN  Outcome: Progressing  3/19/2024 0635 by Sally Aguilar, RN  Outcome: Progressing     Problem: Discharge Planning  Goal: Discharge to home or other facility with appropriate resources  Outcome: Progressing     Problem: Pain  Goal: Verbalizes/displays adequate comfort level or baseline comfort level  Outcome: Progressing     Problem: Skin/Tissue Integrity  Goal: Absence of new skin breakdown  Description: 1.  Monitor for areas of redness and/or skin breakdown  2.  Assess vascular access sites hourly  3.  Every 4-6 hours minimum:  Change oxygen saturation probe site  4.  Every 4-6 hours:  If on nasal continuous positive airway pressure, respiratory therapy assess nares and determine need for appliance change or resting period.  Outcome: Progressing     Problem: Confusion  Goal: Confusion, delirium, dementia, or psychosis is improved or at baseline  Description: INTERVENTIONS:  1. Assess for possible contributors to thought disturbance, including medications, impaired vision or hearing, underlying metabolic abnormalities, dehydration, psychiatric diagnoses, and notify attending LIP  2. [place] high risk fall precautions, as indicated  3. Provide frequent short contacts to provide reality reorientation, refocusing and direction  4. Decrease environmental stimuli, including noise as appropriate  5. Monitor and intervene to maintain adequate nutrition, hydration, elimination, sleep and activity  6. If unable to ensure safety without constant attention obtain sitter and review sitter guidelines with assigned personnel  7. Initiate Psychosocial CNS and Spiritual Care consult, as indicated  Outcome: Progressing     Problem: Safety - Medical Restraint  Goal: Remains free of injury from restraints (Restraint for Interference with Medical Device)  Description: INTERVENTIONS:  1. Determine that other, less restrictive measures have

## 2024-03-19 NOTE — PROGRESS NOTES
Patient did a SBT with PS +5 Peep +5 40% from 1445 to 1449. The trial was stopped and patient was placed back on AC/VC, due pulling small tidal volumes, tachypnea and drowsiness. The plan is to try again tomorrow.

## 2024-03-19 NOTE — PROGRESS NOTES
Admitting Date and Time: 3/10/2024  2:37 PM  Admit Dx: Hyperkalemia [E87.5]  Encounter for dialysis (HCC) [Z99.2]  End stage renal disease on dialysis (HCC) [N18.6, Z99.2]  Acute respiratory failure with hypoxia (HCC) [J96.01]  Acute on chronic respiratory failure with hypoxia and hypercapnia (HCC) [J96.21, J96.22]  Leukocytosis, unspecified type [D72.829]  Acute metabolic encephalopathy [G93.41]    Subjective:  3/18 , pt is intubated off sedation follows commands , opens her eyes , seems comfortable   3/19 pt still intubated and follows commands , off sedation , bronchoscopy was not done yesterday.  Since her secretions were much better.     cefTRIAXone (ROCEPHIN) IV  1,000 mg IntraVENous Q12H    propofol  100 mg IntraVENous Once    [Held by provider] apixaban  2.5 mg Oral BID    scopolamine  1 patch TransDERmal Q72H    midodrine  15 mg Oral q8h    insulin glargine  25 Units SubCUTAneous Daily    insulin lispro  0-16 Units SubCUTAneous Q4H    predniSONE  5 mg Oral BID    pantoprazole (PROTONIX) 40 mg in sodium chloride (PF) 0.9 % 10 mL injection  40 mg IntraVENous Q12H    epoetin andrzej-epbx  2,000 Units SubCUTAneous Once per day on Mon Wed Fri    mupirocin   Each Nostril BID    budesonide  0.5 mg Nebulization BID RT    ipratropium 0.5 mg-albuterol 2.5 mg  1 Dose Inhalation Q4H RT    sodium chloride flush  10 mL IntraVENous 2 times per day     sodium chloride flush, 10 mL, PRN  promethazine, 12.5 mg, Q6H PRN   Or  ondansetron, 4 mg, Q6H PRN  polyethylene glycol, 17 g, Daily PRN  acetaminophen, 650 mg, Q6H PRN   Or  acetaminophen, 650 mg, Q6H PRN  glucose, 4 tablet, PRN  dextrose bolus, 125 mL, PRN   Or  dextrose bolus, 250 mL, PRN  glucagon (rDNA), 1 mg, PRN  dextrose, , Continuous PRN  sodium chloride, , PRN         Objective:    /76   Pulse 77   Temp 98.8 °F (37.1 °C) (Axillary)   Resp 18   Ht 1.651 m (5' 5\")   Wt 104.5 kg (230 lb 6 oz)   SpO2 96%   BMI 38.34 kg/m²   Patient is awake and ventilated.

## 2024-03-19 NOTE — FLOWSHEET NOTE
03/18/24 2015   Vital Signs   /60   Temp 98.9 °F (37.2 °C)   Pulse 87   Respirations 20   SpO2 96 %   Weight - Scale 98.9 kg (218 lb 0.6 oz)   Weight Method Bed scale   Percent Weight Change -7.22   Pain Assessment   Pain Assessment None - Denies Pain   Post-Hemodialysis Assessment   Rinseback Volume (ml) 300 ml   Blood Volume Processed (Liters) 85.6 L   Dialyzer Clearance Lightly streaked   Duration of Treatment (minutes) 240 minutes   Hemodialysis Intake (ml) 300 ml   Hemodialysis Output (ml) 2612 ml   NET Removed (ml) 2312   Tolerated Treatment Good   Bilateral Breath Sounds Clear;Diminished   Edema Generalized   Edema Generalized +1;+2   Time Off 2007   Patient Disposition Remain in ICU/ED   Observations & Evaluations   Level of Consciousness 0   Oriented X   (CRESENCIO)   Heart Rhythm Irregular   Respiratory Quality/Effort Unlabored   O2 Device Ventilator   Skin Color Pink   Skin Condition/Temp Dry;Warm   Abdomen Inspection Obese   Comments Net UF 2312 ml. HD catheter locked with sodium citrate.

## 2024-03-19 NOTE — PROGRESS NOTES
Palliative Care Department  [phone]  Palliative Care Progress Note  Provider Akosua Dahl, APRN - CNP     Ainsley Morris  [id number]  Hospital Day: 10  Date of Initial Consult: 3/16/2024  Referring Provider: Javy Leal MD   Palliative Medicine was consulted for assistance with: Goals of Care, End Stage Disease    HPI:   Ainsley Morris is a 79 y.o. with a medical history of hyperlipidemia, hypothyroidism, COPD, asthma, sleep apnea, CKD, DM, anemia, arthritis who was admitted on 3/10/2024 from home with a CHIEF COMPLAINT of altered mental status, hypotension, and missed hemodialysis.  Patient was found to be in a poor living condition at home.  Patient also found to be hypotensive with hypercapnia and placed on BiPAP initially.  She is currently intubated and in the ICU.  Palliative medicine consulted for goals of care and end-stage disease.    ASSESSMENT/PLAN:     Pertinent Hospital Diagnoses     Acute respiratory failure  Bacteremia  New onset atrial fibrillation  Encephalopathy  CKD      Palliative Care Encounter / Counseling Regarding Goals of Care  Please see detailed goals of care discussion as below  At this time, Ainsley Morris, Does Not have capacity for medical decision-making.  Capacity is time limited and situation/question specific  During encounter, sister Delarosa, was surrogate medical decision-maker  Outcome of goals of care meeting:   Continue full code  Continue current management  Code status Full Code  Advanced Directives:  POA or living will in [facility]  Surrogate/Legal NOK:  sister Orr, [phone]    Spiritual assessment: no spiritual distress identified  Bereavement and grief: to be determined  Referrals to: none today  SUBJECTIVE:     Current medical issues leading to Palliative Medicine involvement include   Active Hospital Problems    Diagnosis Date Noted    Hyperkalemia [E87.5] 03/11/2024    Acute respiratory failure with hypoxia (HCC) [J96.01] 03/10/2024       Details  of Conversation:   Chart reviewed.  Patient seen at the bedside, intubated, off sedation.  Patient following simple commands.  Patient unable to partake in meaningful conversation and unable to make decisions for herself.  Update provided by bedside RN.  No family present at the bedside.  Patient is for bronchoscopy today and further trials thereafter.  Will continue to monitor patient's clinical progression in hopes that she is able to be extubated.  Palliative medicine will continue to follow.      OBJECTIVE:   Prognosis: depends upon goals and unknown    Physical Exam:  /76   Pulse 77   Temp 98.8 °F (37.1 °C) (Axillary)   Resp 18   Ht 1.651 m (5' 5\")   Wt 104.5 kg (230 lb 6 oz)   SpO2 96%   BMI 38.34 kg/m²   Constitutional:  Intubated, awakens to voice  Neck:  trachea midline, no JVD  Lungs:  Intubated  Abd:  Soft  Ext:  + edema  Skin:  Warm and dry  Neuro:   Awakens to voice, follows simple commands    Objective data reviewed: labs, images, records, medication use, vitals, and chart    Discussed patient and the plan of care with the other IDT members: Palliative Medicine IDT Team    Time/Communication  Greater than 50% of time spent, total 25 minutes in counseling and coordination of care at the bedside regarding goals of care, diagnosis and prognosis, and see above.    Thank you for allowing Palliative Medicine to participate in the care of Ainsley KIYA Jarrelljoseph.

## 2024-03-19 NOTE — PLAN OF CARE
Problem: Safety - Adult  Goal: Free from fall injury  Outcome: Progressing     Problem: Safety - Medical Restraint  Goal: Remains free of injury from restraints (Restraint for Interference with Medical Device)  Description: INTERVENTIONS:  1. Determine that other, less restrictive measures have been tried or would not be effective before applying the restraint  2. Evaluate the patient's condition at the time of restraint application  3. Inform patient/family regarding the reason for restraint  4. Q2H: Monitor safety, psychosocial status, comfort, nutrition and hydration  Recent Flowsheet Documentation  Taken 3/19/2024 0600 by Sally Aguilar RN  Remains free of injury from restraints (restraint for interference with medical device): Every 2 hours: Monitor safety, psychosocial status, comfort, nutrition and hydration  Taken 3/19/2024 0400 by Sally Aguilar RN  Remains free of injury from restraints (restraint for interference with medical device): Every 2 hours: Monitor safety, psychosocial status, comfort, nutrition and hydration  Taken 3/19/2024 0200 by Sally Aguilar RN  Remains free of injury from restraints (restraint for interference with medical device): Every 2 hours: Monitor safety, psychosocial status, comfort, nutrition and hydration  Taken 3/19/2024 0000 by Sally Aguilar RN  Remains free of injury from restraints (restraint for interference with medical device): Every 2 hours: Monitor safety, psychosocial status, comfort, nutrition and hydration  Taken 3/18/2024 2200 by Sally Aguilar RN  Remains free of injury from restraints (restraint for interference with medical device): Every 2 hours: Monitor safety, psychosocial status, comfort, nutrition and hydration  Taken 3/18/2024 2000 by Sally Aguilar RN  Remains free of injury from restraints (restraint for interference with medical device): Every 2 hours: Monitor safety, psychosocial status, comfort, nutrition and hydration  Taken  3/18/2024 1800 by Juliana Bella RN  Remains free of injury from restraints (restraint for interference with medical device): Every 2 hours: Monitor safety, psychosocial status, comfort, nutrition and hydration

## 2024-03-19 NOTE — PROGRESS NOTES
[facility]  Nephrology Progress Note    SUBJECTIVE:  We are following this patient for end-stage renal failure .     3/19/24: Seen and examined in the ICU setting.  Tolerated hemodialysis well yesterday for net UF of 2.3 L.  No acute issues overnight.  Patient currently off sedation.  She will open eyes and nod appropriately.  I reviewed all aspects of care with the primary RN.  Plan is for bronchoscopy this afternoon.  Goal is eventual extubation.  No family present at the time of my exam.      MEDS (scheduled):   cefTRIAXone (ROCEPHIN) IV  1,000 mg IntraVENous Q12H    propofol  100 mg IntraVENous Once    [Held by provider] apixaban  2.5 mg Oral BID    scopolamine  1 patch TransDERmal Q72H    midodrine  15 mg Oral q8h    insulin glargine  25 Units SubCUTAneous Daily    insulin lispro  0-16 Units SubCUTAneous Q4H    predniSONE  5 mg Oral BID    pantoprazole (PROTONIX) 40 mg in sodium chloride (PF) 0.9 % 10 mL injection  40 mg IntraVENous Q12H    epoetin andrzej-epbx  2,000 Units SubCUTAneous Once per day on Mon Wed Fri    mupirocin   Each Nostril BID    budesonide  0.5 mg Nebulization BID RT    ipratropium 0.5 mg-albuterol 2.5 mg  1 Dose Inhalation Q4H RT    sodium chloride flush  10 mL IntraVENous 2 times per day       MEDS (infusions):   sodium chloride 30 mL/hr at 03/18/24 1528    dextrose      sodium chloride         ADULT TUBE FEEDING; Orogastric; Renal Formula; Continuous; 10; Yes; 10; Q 6 hours; 29; 30; Q 4 hours; Protein, Other/Comment; Four doses per day: 2 at breakfast, 1 at lunch, 1 at dinner; 3 Doses; TID      PHYSICAL EXAM:      Patient Vitals for the past 24 hrs:   BP Temp Temp src Pulse Resp SpO2 Height Weight   03/19/24 1200 123/73 98.5 °F (36.9 °C) Axillary 75 18 98 % -- --   03/19/24 1100 118/70 -- -- 77 18 98 % -- --   03/19/24 1000 137/76 -- -- 77 18 96 % -- --   03/19/24 0900 (!) 152/102 -- -- 82 20 99 % -- --   03/19/24 0800 (!) 140/78 98.8 °F (37.1 °C) Axillary 72 18 98 % -- --   03/19/24  several outpatient treatments  Was found in very poor living conditions  Left upper extremity AV fistula without thrill or bruit  Patient had placement of temporary dialysis catheter on 3/11 with dialysis thereafter.   Plan for next treatment on Wednesday  Will also plan for IR to place new TDC - will need to hold anticoagulation     2.  Hyperkalemia-  In the setting of missed dialysis treatments  Improved postdialysis with adjustment in dialysis bath.  Dialysate adjusted as needed    3.  Hypotension likely in the setting of sepsis-  Now resolved    4.  Metabolic acidosis with adequate respiratory compensation.  Metabolic acidosis resolved with IHD    5.  Secondary hyperparathyroidism of renal origin-  Phosphorus upon presentation was 10.8 mg/dL   Phosphorus checked this morning was 3.9 mg/dL    6.  Anemia with chronic kidney disease stage V/ESRD-  Hemoglobin  goal of greater than 10.0 g/dL currently below goal  7.9 g/dL this AM  Continue on JESSE  q. MWF    7.  Left upper extremity access malfunction-  Likely in the setting of profound hypotension  Will continue to follow as patient required placement of temporary dialysis catheter emergently        KACIE Mckay CNP    Patient seen and examined.   No family member is present at the bedside.  Chart reviewed.  I had a face to face encounter with the patient.   Agree with exam.    Agree with  formulation, assessment and plan as outlined above by KACIE Petit CNP   and directed by me.   Addition and corrections were done as deemed appropriate.   My exam and plan include:     Continue current treatment as outlined above.  Patient remains intubated and on mechanical ventilation.  Plan is for bronchoscopy later today.               Paras Enrique MD  Nephrology        Electronically signed by Paras Enrique MD on 3/19/2024 at 1:52 PM

## 2024-03-20 ENCOUNTER — APPOINTMENT (OUTPATIENT)
Dept: INTERVENTIONAL RADIOLOGY/VASCULAR | Age: 79
DRG: 004 | End: 2024-03-20
Payer: COMMERCIAL

## 2024-03-20 LAB
AADO2: 6.8 MMHG
ALBUMIN SERPL-MCNC: 2.4 G/DL (ref 3.5–5.2)
ALP SERPL-CCNC: 101 U/L (ref 35–104)
ALT SERPL-CCNC: 46 U/L (ref 0–32)
ANION GAP SERPL CALCULATED.3IONS-SCNC: 11 MMOL/L (ref 7–16)
AST SERPL-CCNC: 21 U/L (ref 0–31)
B.E.: -1.3 MMOL/L (ref -3–3)
BASOPHILS # BLD: 0.01 K/UL (ref 0–0.2)
BASOPHILS NFR BLD: 0 % (ref 0–2)
BILIRUB SERPL-MCNC: 0.5 MG/DL (ref 0–1.2)
BUN SERPL-MCNC: 42 MG/DL (ref 6–23)
CALCIUM SERPL-MCNC: 7.7 MG/DL (ref 8.6–10.2)
CHLORIDE SERPL-SCNC: 93 MMOL/L (ref 98–107)
CO2 SERPL-SCNC: 25 MMOL/L (ref 22–29)
COHB: 1.9 % (ref 0–1.5)
CREAT SERPL-MCNC: 3.2 MG/DL (ref 0.5–1)
CRITICAL: ABNORMAL
DATE ANALYZED: ABNORMAL
DATE OF COLLECTION: ABNORMAL
EOSINOPHIL # BLD: 0.02 K/UL (ref 0.05–0.5)
EOSINOPHILS RELATIVE PERCENT: 0 % (ref 0–6)
ERYTHROCYTE [DISTWIDTH] IN BLOOD BY AUTOMATED COUNT: 15.6 % (ref 11.5–15)
FIO2: 30 %
GFR SERPL CREATININE-BSD FRML MDRD: 14 ML/MIN/1.73M2
GLUCOSE BLD-MCNC: 142 MG/DL (ref 74–99)
GLUCOSE BLD-MCNC: 149 MG/DL (ref 74–99)
GLUCOSE BLD-MCNC: 149 MG/DL (ref 74–99)
GLUCOSE BLD-MCNC: 164 MG/DL (ref 74–99)
GLUCOSE BLD-MCNC: 168 MG/DL (ref 74–99)
GLUCOSE SERPL-MCNC: 135 MG/DL (ref 74–99)
HCO3: 21.2 MMOL/L (ref 22–26)
HCT VFR BLD AUTO: 23.3 % (ref 34–48)
HGB BLD-MCNC: 7.6 G/DL (ref 11.5–15.5)
HHB: 0.9 % (ref 0–5)
IMM GRANULOCYTES # BLD AUTO: 0.13 K/UL (ref 0–0.58)
IMM GRANULOCYTES NFR BLD: 1 % (ref 0–5)
LAB: ABNORMAL
LYMPHOCYTES NFR BLD: 0.66 K/UL (ref 1.5–4)
LYMPHOCYTES RELATIVE PERCENT: 7 % (ref 20–42)
Lab: 459
MAGNESIUM SERPL-MCNC: 2.1 MG/DL (ref 1.6–2.6)
MCH RBC QN AUTO: 30.4 PG (ref 26–35)
MCHC RBC AUTO-ENTMCNC: 32.6 G/DL (ref 32–34.5)
MCV RBC AUTO: 93.2 FL (ref 80–99.9)
METHB: 0.3 % (ref 0–1.5)
MODE: AC
MONOCYTES NFR BLD: 0.93 K/UL (ref 0.1–0.95)
MONOCYTES NFR BLD: 10 % (ref 2–12)
NEUTROPHILS NFR BLD: 81 % (ref 43–80)
NEUTS SEG NFR BLD: 7.61 K/UL (ref 1.8–7.3)
O2 CONTENT: 10.6 ML/DL
O2 SATURATION: 99.1 % (ref 92–98.5)
O2HB: 96.9 % (ref 94–97)
OPERATOR ID: ABNORMAL
PATIENT TEMP: 37 C
PCO2: 26.8 MMHG (ref 35–45)
PEEP/CPAP: 5 CMH2O
PFO2: 5.6 MMHG/%
PH BLOOD GAS: 7.52 (ref 7.35–7.45)
PHOSPHATE SERPL-MCNC: 5.3 MG/DL (ref 2.5–4.5)
PLATELET # BLD AUTO: 120 K/UL (ref 130–450)
PMV BLD AUTO: 11.4 FL (ref 7–12)
PO2: 168.1 MMHG (ref 75–100)
POTASSIUM SERPL-SCNC: 3.9 MMOL/L (ref 3.5–5)
PROT SERPL-MCNC: 5.4 G/DL (ref 6.4–8.3)
RBC # BLD AUTO: 2.5 M/UL (ref 3.5–5.5)
RI(T): 0.04
RR MECHANICAL: 18 B/MIN
SODIUM SERPL-SCNC: 129 MMOL/L (ref 132–146)
SOURCE, BLOOD GAS: ABNORMAL
THB: 7.5 G/DL (ref 11.5–16.5)
TIME ANALYZED: 508
VT MECHANICAL: 400 ML
WBC OTHER # BLD: 9.4 K/UL (ref 4.5–11.5)

## 2024-03-20 PROCEDURE — A4216 STERILE WATER/SALINE, 10 ML: HCPCS | Performed by: INTERNAL MEDICINE

## 2024-03-20 PROCEDURE — 82962 GLUCOSE BLOOD TEST: CPT

## 2024-03-20 PROCEDURE — 2580000003 HC RX 258: Performed by: INTERNAL MEDICINE

## 2024-03-20 PROCEDURE — 94003 VENT MGMT INPAT SUBQ DAY: CPT

## 2024-03-20 PROCEDURE — 2000000000 HC ICU R&B

## 2024-03-20 PROCEDURE — 6370000000 HC RX 637 (ALT 250 FOR IP): Performed by: INTERNAL MEDICINE

## 2024-03-20 PROCEDURE — 85025 COMPLETE CBC W/AUTO DIFF WBC: CPT

## 2024-03-20 PROCEDURE — 2720000010 HC SURG SUPPLY STERILE

## 2024-03-20 PROCEDURE — 84100 ASSAY OF PHOSPHORUS: CPT

## 2024-03-20 PROCEDURE — 90935 HEMODIALYSIS ONE EVALUATION: CPT

## 2024-03-20 PROCEDURE — 99233 SBSQ HOSP IP/OBS HIGH 50: CPT | Performed by: INTERNAL MEDICINE

## 2024-03-20 PROCEDURE — 99291 CRITICAL CARE FIRST HOUR: CPT | Performed by: INTERNAL MEDICINE

## 2024-03-20 PROCEDURE — 80053 COMPREHEN METABOLIC PANEL: CPT

## 2024-03-20 PROCEDURE — 83735 ASSAY OF MAGNESIUM: CPT

## 2024-03-20 PROCEDURE — 94640 AIRWAY INHALATION TREATMENT: CPT

## 2024-03-20 PROCEDURE — 93923 UPR/LXTR ART STDY 3+ LVLS: CPT

## 2024-03-20 PROCEDURE — C9113 INJ PANTOPRAZOLE SODIUM, VIA: HCPCS | Performed by: INTERNAL MEDICINE

## 2024-03-20 PROCEDURE — 36592 COLLECT BLOOD FROM PICC: CPT

## 2024-03-20 PROCEDURE — 6360000002 HC RX W HCPCS: Performed by: INTERNAL MEDICINE

## 2024-03-20 PROCEDURE — 82805 BLOOD GASES W/O2 SATURATION: CPT

## 2024-03-20 RX ADMIN — MIDODRINE HYDROCHLORIDE 15 MG: 5 TABLET ORAL at 19:50

## 2024-03-20 RX ADMIN — BUDESONIDE INHALATION 500 MCG: 0.5 SUSPENSION RESPIRATORY (INHALATION) at 04:48

## 2024-03-20 RX ADMIN — BUDESONIDE INHALATION 500 MCG: 0.5 SUSPENSION RESPIRATORY (INHALATION) at 17:25

## 2024-03-20 RX ADMIN — EPOETIN ALFA-EPBX 10000 UNITS: 10000 INJECTION, SOLUTION INTRAVENOUS; SUBCUTANEOUS at 10:07

## 2024-03-20 RX ADMIN — WATER 1000 MG: 1 INJECTION INTRAMUSCULAR; INTRAVENOUS; SUBCUTANEOUS at 20:12

## 2024-03-20 RX ADMIN — Medication 10 ML: at 22:01

## 2024-03-20 RX ADMIN — Medication 10 ML: at 08:18

## 2024-03-20 RX ADMIN — IPRATROPIUM BROMIDE AND ALBUTEROL SULFATE 1 DOSE: .5; 2.5 SOLUTION RESPIRATORY (INHALATION) at 04:47

## 2024-03-20 RX ADMIN — PREDNISONE 5 MG: 5 TABLET ORAL at 08:07

## 2024-03-20 RX ADMIN — NITROGLYCERIN 1 INCH: 20 OINTMENT TOPICAL at 13:37

## 2024-03-20 RX ADMIN — IPRATROPIUM BROMIDE AND ALBUTEROL SULFATE 1 DOSE: .5; 2.5 SOLUTION RESPIRATORY (INHALATION) at 22:22

## 2024-03-20 RX ADMIN — PREDNISONE 5 MG: 5 TABLET ORAL at 21:53

## 2024-03-20 RX ADMIN — IPRATROPIUM BROMIDE AND ALBUTEROL SULFATE 1 DOSE: .5; 2.5 SOLUTION RESPIRATORY (INHALATION) at 02:24

## 2024-03-20 RX ADMIN — SODIUM CHLORIDE, PRESERVATIVE FREE 40 MG: 5 INJECTION INTRAVENOUS at 21:53

## 2024-03-20 RX ADMIN — NITROGLYCERIN 1 INCH: 20 OINTMENT TOPICAL at 17:04

## 2024-03-20 RX ADMIN — WATER 1000 MG: 1 INJECTION INTRAMUSCULAR; INTRAVENOUS; SUBCUTANEOUS at 08:14

## 2024-03-20 RX ADMIN — IPRATROPIUM BROMIDE AND ALBUTEROL SULFATE 1 DOSE: .5; 2.5 SOLUTION RESPIRATORY (INHALATION) at 17:25

## 2024-03-20 RX ADMIN — SODIUM CHLORIDE, PRESERVATIVE FREE 40 MG: 5 INJECTION INTRAVENOUS at 08:11

## 2024-03-20 RX ADMIN — IPRATROPIUM BROMIDE AND ALBUTEROL SULFATE 1 DOSE: .5; 2.5 SOLUTION RESPIRATORY (INHALATION) at 12:42

## 2024-03-20 RX ADMIN — IPRATROPIUM BROMIDE AND ALBUTEROL SULFATE 1 DOSE: .5; 2.5 SOLUTION RESPIRATORY (INHALATION) at 08:59

## 2024-03-20 ASSESSMENT — PULMONARY FUNCTION TESTS
PIF_VALUE: 21
PIF_VALUE: 22
PIF_VALUE: 50
PIF_VALUE: 11
PIF_VALUE: 11
PIF_VALUE: 20
PIF_VALUE: 24
PIF_VALUE: 20
PIF_VALUE: 20
PIF_VALUE: 19
PIF_VALUE: 20
PIF_VALUE: 21
PIF_VALUE: 41
PIF_VALUE: 20
PIF_VALUE: 11
PIF_VALUE: 21
PIF_VALUE: 22
PIF_VALUE: 20
PIF_VALUE: 24
PIF_VALUE: 18
PIF_VALUE: 27
PIF_VALUE: 24
PIF_VALUE: 20
PIF_VALUE: 20
PIF_VALUE: 22
PIF_VALUE: 25
PIF_VALUE: 20
PIF_VALUE: 21
PIF_VALUE: 24
PIF_VALUE: 25
PIF_VALUE: 21
PIF_VALUE: 28
PIF_VALUE: 21
PIF_VALUE: 27
PIF_VALUE: 21
PIF_VALUE: 24

## 2024-03-20 ASSESSMENT — PAIN SCALES - GENERAL
PAINLEVEL_OUTOF10: 0

## 2024-03-20 NOTE — FLOWSHEET NOTE
03/20/24 1250   Vital Signs   BP (!) 83/72   Temp 99.1 °F (37.3 °C)   Pulse 89   Respirations 18   SpO2 100 %   Weight - Scale 103.7 kg (228 lb 9.9 oz)   Weight Method Estimated   Percent Weight Change -1.24   Pain Assessment   Pain Assessment Critical Care Pain Observation Tool (CPOT)   Pain Level 0   Post-Hemodialysis Assessment   Post-Treatment Procedures Blood returned;Catheter capped, clamped with Citrate x 2 ports   Machine Disinfection Process Acid/Vinegar Clean;Heat Disinfect;Exterior Machine Disinfection;Machine Absence of Bleach Machine   Dialyzer Clearance Lightly streaked   Duration of Treatment (minutes) 240 minutes   Hemodialysis Intake (ml) 300 ml   Hemodialysis Output (ml) 2000 ml   NET Removed (ml) 1700   Tolerated Treatment Good   Patient Response to Treatment Net -1700ml off with dialyusis treatments today.  Pt remained stable throughout treatment.  No issues.   Bilateral Breath Sounds Diminished   Edema Generalized;Right upper extremity;Right lower extremity;Left lower extremity;Left upper extremity   Edema Generalized Weeping   RUE Edema +1;Pitting;Weeping   LUE Edema +1;Pitting;Weeping   RLE Edema +1;Pitting   LLE Edema +1;Pitting   Time Off 1220   Patient Disposition Remain in ICU/ED   Observations & Evaluations   Level of Consciousness 1   Heart Rhythm Irregular   Respiratory Quality/Effort Unlabored   FiO2  30 %   O2 Device Ventilator   Skin Color Pink;Other (comment)  (edematous - toes are purple)   Abdomen Inspection Rounded;Soft   Bowel Sounds (All Quadrants) Audible;Hypoactive

## 2024-03-20 NOTE — PROGRESS NOTES
Multiple attempts have been made to contact patients legal next of kin isaura Hajikamlesh to obtain telephone consent for tunneled HD catheter. At this time nursing is unable to speak with Isaura due to her phone \"not accepting calls at this time.\"

## 2024-03-20 NOTE — PROCEDURES
Therapeutic Bronchoscopy Procedure Note    Indication:  Persistent Atelectasis and/or Hypoventilation    Time Out:  Completed immediately prior to the start of the procedure which included verification of the correct patient, correct site and agreement on the procedure to be done.    Consent:  The indications, risks, benefits, alternatives to the procedure were explained to the patient/surrogate decision maker and their questions answered. Consent was obtained to proceed with the procedure.    Sedation:   The patient was already adequately sedated and received no additional medication.    Procedure in detail:   Therapeutic Bronchoscopy via ETT: All required PPE, including an N95 mask, was worn by myself and all individuals inside the room throughout the procedure. The scope was advanced through the ETT until the distal trachea was visualized. The ETT was around 5 cm above the mini. The airways were examined in detail through the subsegmental level of both the right and left lungs. The patient was kept connected to the ventilator throughout the entire procedure.    Findings:   Successful suctioning of thick secretions from bilateral airways using large bore therapeutic bronchoscope and saline instillations .     Specimens:   Specimen samples were obtained and are being sent to lab for further analysis.    Estimated Blood Loss:  None    Complications:  No apparent complications    Proceduralist:   I personally performed the procedure documented as signed by this procedure note.     Assistant(s):  Not applicable    Supervision:  No supervision required    Electronically signed by Javy Leal MD on 3/19/2024 at 8:34 PM

## 2024-03-20 NOTE — PROGRESS NOTES
Patient on spontaneous trial from 13:37p to 15:15p patient switched back to current AC settings @ this time d/t decreasing tidal volumes of 228 ml and RR of 29 nurse and intensivist at bedside

## 2024-03-20 NOTE — PLAN OF CARE
Problem: Safety - Adult  Goal: Free from fall injury  Outcome: Progressing     Problem: Safety - Medical Restraint  Goal: Remains free of injury from restraints (Restraint for Interference with Medical Device)  Description: INTERVENTIONS:  1. Determine that other, less restrictive measures have been tried or would not be effective before applying the restraint  2. Evaluate the patient's condition at the time of restraint application  3. Inform patient/family regarding the reason for restraint  4. Q2H: Monitor safety, psychosocial status, comfort, nutrition and hydration  Recent Flowsheet Documentation  Taken 3/20/2024 0400 by Sally Aguilar RN  Remains free of injury from restraints (restraint for interference with medical device): Every 2 hours: Monitor safety, psychosocial status, comfort, nutrition and hydration  Taken 3/20/2024 0200 by Sally Aguilar RN  Remains free of injury from restraints (restraint for interference with medical device): Every 2 hours: Monitor safety, psychosocial status, comfort, nutrition and hydration  Taken 3/20/2024 0000 by Sally Aguilar RN  Remains free of injury from restraints (restraint for interference with medical device): Every 2 hours: Monitor safety, psychosocial status, comfort, nutrition and hydration  Taken 3/19/2024 2200 by Sally Aguilar RN  Remains free of injury from restraints (restraint for interference with medical device): Every 2 hours: Monitor safety, psychosocial status, comfort, nutrition and hydration  Taken 3/19/2024 2000 by Sally Aguilar RN  Remains free of injury from restraints (restraint for interference with medical device): Every 2 hours: Monitor safety, psychosocial status, comfort, nutrition and hydration  Taken 3/19/2024 1800 by Dalton Pathak RN  Remains free of injury from restraints (restraint for interference with medical device): Every 2 hours: Monitor safety, psychosocial status, comfort, nutrition and hydration  Taken

## 2024-03-20 NOTE — PLAN OF CARE
Problem: Safety - Adult  Goal: Free from fall injury  3/20/2024 1505 by Dalton Pathak, RN  Outcome: Progressing  3/20/2024 0540 by Sally Aguilar, RN  Outcome: Progressing     Problem: Discharge Planning  Goal: Discharge to home or other facility with appropriate resources  Outcome: Progressing     Problem: Pain  Goal: Verbalizes/displays adequate comfort level or baseline comfort level  Outcome: Progressing     Problem: Skin/Tissue Integrity  Goal: Absence of new skin breakdown  Description: 1.  Monitor for areas of redness and/or skin breakdown  2.  Assess vascular access sites hourly  3.  Every 4-6 hours minimum:  Change oxygen saturation probe site  4.  Every 4-6 hours:  If on nasal continuous positive airway pressure, respiratory therapy assess nares and determine need for appliance change or resting period.  Outcome: Progressing     Problem: Confusion  Goal: Confusion, delirium, dementia, or psychosis is improved or at baseline  Description: INTERVENTIONS:  1. Assess for possible contributors to thought disturbance, including medications, impaired vision or hearing, underlying metabolic abnormalities, dehydration, psychiatric diagnoses, and notify attending LIP  2. Edwards high risk fall precautions, as indicated  3. Provide frequent short contacts to provide reality reorientation, refocusing and direction  4. Decrease environmental stimuli, including noise as appropriate  5. Monitor and intervene to maintain adequate nutrition, hydration, elimination, sleep and activity  6. If unable to ensure safety without constant attention obtain sitter and review sitter guidelines with assigned personnel  7. Initiate Psychosocial CNS and Spiritual Care consult, as indicated  Outcome: Progressing     Problem: Safety - Medical Restraint  Goal: Remains free of injury from restraints (Restraint for Interference with Medical Device)  Description: INTERVENTIONS:  1. Determine that other, less restrictive measures have

## 2024-03-20 NOTE — PROGRESS NOTES
Admitting Date and Time: 3/10/2024  2:37 PM  Admit Dx: Hyperkalemia [E87.5]  Encounter for dialysis (HCC) [Z99.2]  End stage renal disease on dialysis (HCC) [N18.6, Z99.2]  Acute respiratory failure with hypoxia ([facility]) [J96.01]  Acute on chronic respiratory failure with hypoxia and hypercapnia ([facility]) [J96.21, J96.22]  Leukocytosis, unspecified type [D72.829]  Acute metabolic encephalopathy [G93.41]    Subjective:  3/18 , pt is intubated off sedation follows commands , opens her eyes , seems comfortable   3/19 pt still intubated and follows commands , off sedation , bronchoscopy was not done yesterday.  Since her secretions were much better.  3/20 , pt still intubated couldn't tolerate SBT yesterday after bronchoscopy , no polyps or masses seen , only secretions and cultures sent , will try again for extubation today      nitroglycerin  1 inch Topical 4 times per day    [START ON 3/22/2024] epoetin andrzej-epbx  2,000 Units SubCUTAneous Once per day on Mon Wed Fri    cefTRIAXone (ROCEPHIN) IV  1,000 mg IntraVENous Q12H    [Held by provider] apixaban  2.5 mg Oral BID    scopolamine  1 patch TransDERmal Q72H    midodrine  15 mg Oral q8h    [Held by provider] insulin glargine  25 Units SubCUTAneous Daily    insulin lispro  0-16 Units SubCUTAneous Q4H    predniSONE  5 mg Oral BID    pantoprazole (PROTONIX) 40 mg in sodium chloride (PF) 0.9 % 10 mL injection  40 mg IntraVENous Q12H    budesonide  0.5 mg Nebulization BID RT    ipratropium 0.5 mg-albuterol 2.5 mg  1 Dose Inhalation Q4H RT    sodium chloride flush  10 mL IntraVENous 2 times per day     sodium chloride flush, 10 mL, PRN  promethazine, 12.5 mg, Q6H PRN   Or  ondansetron, 4 mg, Q6H PRN  polyethylene glycol, 17 g, Daily PRN  acetaminophen, 650 mg, Q6H PRN   Or  acetaminophen, 650 mg, Q6H PRN  glucose, 4 tablet, PRN  dextrose bolus, 125 mL, PRN   Or  dextrose bolus, 250 mL, PRN  glucagon (rDNA), 1 mg, PRN  dextrose, , Continuous PRN  sodium chloride, , PRN      intensivist amount of bronchoscopy because there was a possible mass./ polyp   Since her platelets are good and off Eliquis he will proceed with bronchoscopy today. Cefepime switched to rocephin ,   3/20 , patient underwent bronchoscopy yesterday and no polyps or masses seen.  Adjust secretions which were suctioned and 3 sent for culture.  She failed SBT yesterday we will try again today for possible extubation.  End-stage renal disease missed hemodialysis thus has volume overload and hyperkalemia thus getting hemodialysis per nephrology  Respiratory failure with hypoxia and hypercapnia possible acute exacerbation COPD status post hydrocortisone DuoNebs.  Ventilated.  On prednisone small dose,  Vent per pulmonary, will do a trial for extubation again today   New onset  fin during this hospitalization and was started on eliquis low dose due to thrombocytopenia , platelets better consider increase to regular dose , by tomorrow if still stable   Former smoker she should not restart  Hypothyroidism thyroid replacement.  Tsh wnl  Left forearm AV fistula  malfunctioning thus line placed for HD  Thrombocytopenia: monitor. Improved,  had bronchoscopy  done 3/19 , and  no polyps or masses seen , no biopsies needed and had some secretions resend for culture  discussed with dr sun   Peripheral vascular disease with cyanosis in the toes worse , but pedal pulses felt on right , left with doppler , I will add nitro paste to the toes and will need arterial doppler US   DVT prophylaxis: apixaban held due to #7, will restart eliquis today 3/20   Full code.  Disposition: To be determined      NOTE: This report was transcribed using voice recognition software. Every effort was made to ensure accuracy; however, inadvertent computerized transcription errors may be present.     Electronically signed by Ford Freed MD on 3/20/2024 at 11:01 AM

## 2024-03-20 NOTE — PROGRESS NOTES
[facility]  Nephrology Progress Note    SUBJECTIVE:  We are following this patient for end-stage renal failure .     3/20/24: Seen and examined in the ICU setting.  Currently on hemodialysis tolerating well.  Patient alert and responding appropriately to commands.  In process of being weaned from ventilator support with goal of extubation later today.  Care reviewed with the primary RN as well as the dialysis RN.      MEDS (scheduled):   nitroglycerin  1 inch Topical 4 times per day    [START ON 3/22/2024] epoetin andrzej-epbx  2,000 Units SubCUTAneous Once per day on Mon Wed Fri    cefTRIAXone (ROCEPHIN) IV  1,000 mg IntraVENous Q12H    [Held by provider] apixaban  2.5 mg Oral BID    scopolamine  1 patch TransDERmal Q72H    midodrine  15 mg Oral q8h    [Held by provider] insulin glargine  25 Units SubCUTAneous Daily    insulin lispro  0-16 Units SubCUTAneous Q4H    predniSONE  5 mg Oral BID    pantoprazole (PROTONIX) 40 mg in sodium chloride (PF) 0.9 % 10 mL injection  40 mg IntraVENous Q12H    budesonide  0.5 mg Nebulization BID RT    ipratropium 0.5 mg-albuterol 2.5 mg  1 Dose Inhalation Q4H RT    sodium chloride flush  10 mL IntraVENous 2 times per day       MEDS (infusions):   sodium chloride 30 mL/hr at 03/19/24 1322    dextrose      sodium chloride         ADULT TUBE FEEDING; Orogastric; Renal Formula; Continuous; 10; Yes; 10; Q 6 hours; 29; 30; Q 4 hours; Protein, Other/Comment; Four doses per day: 2 at breakfast, 1 at lunch, 1 at dinner; 3 Doses; TID      PHYSICAL EXAM:      Patient Vitals for the past 24 hrs:   BP Temp Temp src Pulse Resp SpO2 Weight   03/20/24 1200 -- -- -- 87 18 100 % --   03/20/24 1100 120/67 -- -- 89 19 100 % --   03/20/24 1000 (!) 126/98 -- -- 89 19 99 % --   03/20/24 0900 125/84 -- -- 77 18 100 % --   03/20/24 0820 129/71 99.4 °F (37.4 °C) -- 83 17 -- 105 kg (231 lb 7.7 oz)   03/20/24 0800 129/71 99.4 °F (37.4 °C) Axillary 87 18 97 % --   03/20/24 0700 118/72 -- -- 82 18 97 % --    03/20/24 0600 113/66 -- -- 83 18 95 % --   03/20/24 0500 108/76 -- -- 82 18 96 % 105 kg (231 lb 8 oz)   03/20/24 0449 -- -- -- 86 18 96 % --   03/20/24 0400 117/79 99.4 °F (37.4 °C) Axillary 79 18 97 % --   03/20/24 0300 102/75 -- -- 86 18 94 % --   03/20/24 0246 110/67 -- -- 90 18 -- --   03/20/24 0225 -- -- -- 83 21 (!) 79 % --   03/20/24 0200 127/81 -- -- 76 18 98 % --   03/20/24 0100 127/61 -- -- 77 18 99 % --   03/20/24 0000 128/75 99.7 °F (37.6 °C) Axillary 78 18 98 % --   03/19/24 2300 116/66 -- -- 83 18 96 % --   03/19/24 2200 117/72 -- -- 83 16 96 % --   03/19/24 2134 -- -- -- 82 18 98 % --   03/19/24 2100 (!) 111/56 -- -- 70 18 99 % --   03/19/24 2000 123/66 99.4 °F (37.4 °C) Axillary 78 18 98 % --   03/19/24 1800 112/62 -- -- 82 18 98 % --   03/19/24 1700 125/75 -- -- 82 18 98 % --   03/19/24 1600 127/65 -- -- 79 18 99 % --   03/19/24 1500 115/72 -- -- 78 18 98 % --   03/19/24 1449 -- -- -- 87 28 96 % --   03/19/24 1400 97/61 -- -- 95 19 93 % --   03/19/24 1318 -- -- -- 84 18 96 % --   03/19/24 1300 (!) 116/58 -- -- 83 18 97 % --          Intake/Output Summary (Last 24 hours) at 3/20/2024 1214  Last data filed at 3/20/2024 0741  Gross per 24 hour   Intake 493 ml   Output --   Net 493 ml       Wt Readings from Last 3 Encounters:   03/20/24 105 kg (231 lb 7.7 oz)   03/08/24 95.3 kg (210 lb)   11/08/23 95.3 kg (210 lb)       Constitutional: Patient is intubated off sedation  Head: normocephalic, atraumatic  Cardiovascular: S1-S2 no S3  Respiratory:  Clear upper diminished bases  Gastrointestinal: soft, nontender, nondistended  Ext: Bilateral lower extremities cool with discoloration of toes, 1+ LE edema, with some trace UE edema noted  Neuro: off sedation, following commands  Skin: dry, no rash      DATA:      Recent Labs     03/18/24  0434 03/19/24  0505 03/20/24  0406   WBC 12.1* 10.4 9.4   HGB 8.0* 7.9* 7.6*   HCT 24.9* 23.9* 23.3*   MCV 93.3 93.0 93.2   PLT 95*  --  120*     Recent Labs

## 2024-03-20 NOTE — PROGRESS NOTES
PULMONARY  CRITICAL CARE   SERVICE DAILY PROGRESS  NOTE     3/20/2024   Hospital  LOS: 10 days      Admit date- 3/10/2024       Initially admitted for -   Altered Mental Status (Ems report pt was c/c Friday, missed dialysis on Friday, home in poor condition-dog poop everywhere, 77 on RA, hypotension )       Subjective:      Remains intubated, not on any sedation this morning, follows commands , awake .   Hypoglycemic episodes - 53, 67 --> better after D 50   Secretions Continue to improve  after scopolamine patch   Hemodynamically stable off Levophed for 5  days now  Seems very weak and deconditioned  Overnight uneventful  Possible liberation today     Review of Systems      Unable to obtain secondary to mental status. Pt is intubated and sedated                      Allergies:  Allergies   Allergen Reactions    Pcn [Penicillins] Hives    Sulfa Antibiotics Other (See Comments)     \"passed out with IV\"    Bactrim [Sulfamethoxazole-Trimethoprim] Hives     Home Meds:  Prior to Admission medications    Medication Sig Start Date End Date Taking? Authorizing Provider   ferric citrate (AURYXIA) 1  MG(Fe) TABS tablet Take 1 tablet by mouth as needed (with Snacks)   Yes Meri Larios MD   montelukast (SINGULAIR) 10 MG tablet Take 1 tablet by mouth nightly 1/29/24   Grey Chua PA-C   vitamin B-12 (CYANOCOBALAMIN) 1000 MCG tablet Take 1 tablet by mouth daily 1/29/24   Grey Chua PA-C   levothyroxine (SYNTHROID) 100 MCG tablet Take 1 tablet by mouth daily 11/8/23   Grey Chua PA-C   vitamin D (CHOLECALCIFEROL) 25 MCG (1000 UT) TABS tablet Take 1 tablet by mouth daily    Meri Larios MD   fexofenadine (ALLEGRA) 180 MG tablet Take 1 tablet by mouth daily    Meri Larios MD   rOPINIRole (REQUIP) 2 MG tablet Take 1 tablet by mouth nightly 11/1/23   Grey Chua PA-C   calcium carbonate 1500 (600 Ca) MG TABS tablet Take 1 tablet by mouth 2 times daily 11/1/23   Grey Chua

## 2024-03-20 NOTE — PROGRESS NOTES
Chart reviewed. Patient seen at the bedside. Update provided by bedside RN. RN and myself unable to get in touch with patients sister, Isaura, today. Will continue to attempt to reach her. Palliative will continue to follow.

## 2024-03-21 ENCOUNTER — APPOINTMENT (OUTPATIENT)
Dept: INTERVENTIONAL RADIOLOGY/VASCULAR | Age: 79
DRG: 004 | End: 2024-03-21
Payer: COMMERCIAL

## 2024-03-21 ENCOUNTER — APPOINTMENT (OUTPATIENT)
Dept: GENERAL RADIOLOGY | Age: 79
DRG: 004 | End: 2024-03-21
Payer: COMMERCIAL

## 2024-03-21 LAB
ALBUMIN SERPL-MCNC: 2.1 G/DL (ref 3.5–5.2)
ALP SERPL-CCNC: 102 U/L (ref 35–104)
ALT SERPL-CCNC: 36 U/L (ref 0–32)
ANION GAP SERPL CALCULATED.3IONS-SCNC: 13 MMOL/L (ref 7–16)
AST SERPL-CCNC: 16 U/L (ref 0–31)
BASOPHILS # BLD: 0 K/UL (ref 0–0.2)
BASOPHILS NFR BLD: 0 % (ref 0–2)
BILIRUB SERPL-MCNC: 0.5 MG/DL (ref 0–1.2)
BUN SERPL-MCNC: 27 MG/DL (ref 6–23)
CALCIUM SERPL-MCNC: 8.3 MG/DL (ref 8.6–10.2)
CHLORIDE SERPL-SCNC: 96 MMOL/L (ref 98–107)
CO2 SERPL-SCNC: 22 MMOL/L (ref 22–29)
CREAT SERPL-MCNC: 2.3 MG/DL (ref 0.5–1)
EOSINOPHIL # BLD: 0 K/UL (ref 0.05–0.5)
EOSINOPHILS RELATIVE PERCENT: 0 % (ref 0–6)
ERYTHROCYTE [DISTWIDTH] IN BLOOD BY AUTOMATED COUNT: 15.5 % (ref 11.5–15)
GFR SERPL CREATININE-BSD FRML MDRD: 22 ML/MIN/1.73M2
GLUCOSE BLD-MCNC: 105 MG/DL (ref 74–99)
GLUCOSE BLD-MCNC: 206 MG/DL (ref 74–99)
GLUCOSE BLD-MCNC: 209 MG/DL (ref 74–99)
GLUCOSE BLD-MCNC: 213 MG/DL (ref 74–99)
GLUCOSE BLD-MCNC: 219 MG/DL (ref 74–99)
GLUCOSE BLD-MCNC: 226 MG/DL (ref 74–99)
GLUCOSE BLD-MCNC: 241 MG/DL (ref 74–99)
GLUCOSE SERPL-MCNC: 219 MG/DL (ref 74–99)
HCT VFR BLD AUTO: 24 % (ref 34–48)
HGB BLD-MCNC: 7.7 G/DL (ref 11.5–15.5)
LYMPHOCYTES NFR BLD: 0.33 K/UL (ref 1.5–4)
LYMPHOCYTES RELATIVE PERCENT: 4 % (ref 20–42)
MAGNESIUM SERPL-MCNC: 2.1 MG/DL (ref 1.6–2.6)
MCH RBC QN AUTO: 30.8 PG (ref 26–35)
MCHC RBC AUTO-ENTMCNC: 32.1 G/DL (ref 32–34.5)
MCV RBC AUTO: 96 FL (ref 80–99.9)
METAMYELOCYTES ABSOLUTE COUNT: 0.07 K/UL (ref 0–0.12)
METAMYELOCYTES: 1 % (ref 0–1)
MICROORGANISM SPEC CULT: ABNORMAL
MICROORGANISM SPEC CULT: ABNORMAL
MICROORGANISM/AGENT SPEC: ABNORMAL
MONOCYTES NFR BLD: 0.33 K/UL (ref 0.1–0.95)
MONOCYTES NFR BLD: 4 % (ref 2–12)
NEUTROPHILS NFR BLD: 90 % (ref 43–80)
NEUTS SEG NFR BLD: 6.87 K/UL (ref 1.8–7.3)
PHOSPHATE SERPL-MCNC: 4.6 MG/DL (ref 2.5–4.5)
PLATELET # BLD AUTO: 129 K/UL (ref 130–450)
PMV BLD AUTO: 11.1 FL (ref 7–12)
POTASSIUM SERPL-SCNC: 4 MMOL/L (ref 3.5–5)
PROT SERPL-MCNC: 5.6 G/DL (ref 6.4–8.3)
RBC # BLD AUTO: 2.5 M/UL (ref 3.5–5.5)
RBC # BLD: NORMAL 10*6/UL
SODIUM SERPL-SCNC: 131 MMOL/L (ref 132–146)
SPECIMEN DESCRIPTION: ABNORMAL
WBC OTHER # BLD: 7.6 K/UL (ref 4.5–11.5)

## 2024-03-21 PROCEDURE — 2580000003 HC RX 258: Performed by: INTERNAL MEDICINE

## 2024-03-21 PROCEDURE — 6360000002 HC RX W HCPCS: Performed by: INTERNAL MEDICINE

## 2024-03-21 PROCEDURE — 36592 COLLECT BLOOD FROM PICC: CPT

## 2024-03-21 PROCEDURE — 99233 SBSQ HOSP IP/OBS HIGH 50: CPT | Performed by: INTERNAL MEDICINE

## 2024-03-21 PROCEDURE — 77001 FLUOROGUIDE FOR VEIN DEVICE: CPT

## 2024-03-21 PROCEDURE — C9113 INJ PANTOPRAZOLE SODIUM, VIA: HCPCS | Performed by: INTERNAL MEDICINE

## 2024-03-21 PROCEDURE — 6370000000 HC RX 637 (ALT 250 FOR IP): Performed by: INTERNAL MEDICINE

## 2024-03-21 PROCEDURE — 83735 ASSAY OF MAGNESIUM: CPT

## 2024-03-21 PROCEDURE — 80053 COMPREHEN METABOLIC PANEL: CPT

## 2024-03-21 PROCEDURE — A4216 STERILE WATER/SALINE, 10 ML: HCPCS | Performed by: INTERNAL MEDICINE

## 2024-03-21 PROCEDURE — 2500000003 HC RX 250 WO HCPCS: Performed by: INTERNAL MEDICINE

## 2024-03-21 PROCEDURE — 84100 ASSAY OF PHOSPHORUS: CPT

## 2024-03-21 PROCEDURE — 0JH63XZ INSERTION OF TUNNELED VASCULAR ACCESS DEVICE INTO CHEST SUBCUTANEOUS TISSUE AND FASCIA, PERCUTANEOUS APPROACH: ICD-10-PCS | Performed by: INTERNAL MEDICINE

## 2024-03-21 PROCEDURE — 82962 GLUCOSE BLOOD TEST: CPT

## 2024-03-21 PROCEDURE — 85025 COMPLETE CBC W/AUTO DIFF WBC: CPT

## 2024-03-21 PROCEDURE — C1881 DIALYSIS ACCESS SYSTEM: HCPCS

## 2024-03-21 PROCEDURE — 2000000000 HC ICU R&B

## 2024-03-21 PROCEDURE — 36558 INSERT TUNNELED CV CATH: CPT

## 2024-03-21 PROCEDURE — 71045 X-RAY EXAM CHEST 1 VIEW: CPT

## 2024-03-21 PROCEDURE — 94640 AIRWAY INHALATION TREATMENT: CPT

## 2024-03-21 PROCEDURE — 76937 US GUIDE VASCULAR ACCESS: CPT

## 2024-03-21 PROCEDURE — 94003 VENT MGMT INPAT SUBQ DAY: CPT

## 2024-03-21 RX ORDER — INSULIN GLARGINE 100 [IU]/ML
10 INJECTION, SOLUTION SUBCUTANEOUS DAILY
Status: DISCONTINUED | OUTPATIENT
Start: 2024-03-21 | End: 2024-03-23

## 2024-03-21 RX ADMIN — NITROGLYCERIN 1 INCH: 20 OINTMENT TOPICAL at 12:31

## 2024-03-21 RX ADMIN — PREDNISONE 5 MG: 5 TABLET ORAL at 08:14

## 2024-03-21 RX ADMIN — SODIUM CHLORIDE: 9 INJECTION, SOLUTION INTRAVENOUS at 01:19

## 2024-03-21 RX ADMIN — WATER 1000 MG: 1 INJECTION INTRAMUSCULAR; INTRAVENOUS; SUBCUTANEOUS at 21:08

## 2024-03-21 RX ADMIN — IPRATROPIUM BROMIDE AND ALBUTEROL SULFATE 1 DOSE: .5; 2.5 SOLUTION RESPIRATORY (INHALATION) at 09:14

## 2024-03-21 RX ADMIN — ANTI-FUNGAL POWDER MICONAZOLE NITRATE TALC FREE: 1.42 POWDER TOPICAL at 21:32

## 2024-03-21 RX ADMIN — BUDESONIDE INHALATION 500 MCG: 0.5 SUSPENSION RESPIRATORY (INHALATION) at 16:31

## 2024-03-21 RX ADMIN — BUDESONIDE INHALATION 500 MCG: 0.5 SUSPENSION RESPIRATORY (INHALATION) at 05:32

## 2024-03-21 RX ADMIN — Medication 10 ML: at 08:25

## 2024-03-21 RX ADMIN — IPRATROPIUM BROMIDE AND ALBUTEROL SULFATE 1 DOSE: .5; 2.5 SOLUTION RESPIRATORY (INHALATION) at 16:31

## 2024-03-21 RX ADMIN — NITROGLYCERIN 1 INCH: 20 OINTMENT TOPICAL at 17:48

## 2024-03-21 RX ADMIN — NITROGLYCERIN 1 INCH: 20 OINTMENT TOPICAL at 00:00

## 2024-03-21 RX ADMIN — IPRATROPIUM BROMIDE AND ALBUTEROL SULFATE 1 DOSE: .5; 2.5 SOLUTION RESPIRATORY (INHALATION) at 01:36

## 2024-03-21 RX ADMIN — PREDNISONE 5 MG: 5 TABLET ORAL at 21:24

## 2024-03-21 RX ADMIN — WATER 1000 MG: 1 INJECTION INTRAMUSCULAR; INTRAVENOUS; SUBCUTANEOUS at 08:14

## 2024-03-21 RX ADMIN — IPRATROPIUM BROMIDE AND ALBUTEROL SULFATE 1 DOSE: .5; 2.5 SOLUTION RESPIRATORY (INHALATION) at 05:32

## 2024-03-21 RX ADMIN — SODIUM CHLORIDE, PRESERVATIVE FREE 40 MG: 5 INJECTION INTRAVENOUS at 08:14

## 2024-03-21 RX ADMIN — NITROGLYCERIN 1 INCH: 20 OINTMENT TOPICAL at 06:12

## 2024-03-21 RX ADMIN — SODIUM CHLORIDE, PRESERVATIVE FREE 40 MG: 5 INJECTION INTRAVENOUS at 21:22

## 2024-03-21 RX ADMIN — Medication 10 ML: at 21:33

## 2024-03-21 RX ADMIN — APIXABAN 2.5 MG: 2.5 TABLET, FILM COATED ORAL at 21:24

## 2024-03-21 RX ADMIN — INSULIN LISPRO 4 UNITS: 100 INJECTION, SOLUTION INTRAVENOUS; SUBCUTANEOUS at 21:18

## 2024-03-21 RX ADMIN — MIDODRINE HYDROCHLORIDE 15 MG: 5 TABLET ORAL at 21:24

## 2024-03-21 RX ADMIN — INSULIN LISPRO 4 UNITS: 100 INJECTION, SOLUTION INTRAVENOUS; SUBCUTANEOUS at 17:07

## 2024-03-21 RX ADMIN — ANTI-FUNGAL POWDER MICONAZOLE NITRATE TALC FREE: 1.42 POWDER TOPICAL at 15:29

## 2024-03-21 RX ADMIN — INSULIN GLARGINE 10 UNITS: 100 INJECTION, SOLUTION SUBCUTANEOUS at 12:29

## 2024-03-21 RX ADMIN — IPRATROPIUM BROMIDE AND ALBUTEROL SULFATE 1 DOSE: .5; 2.5 SOLUTION RESPIRATORY (INHALATION) at 21:05

## 2024-03-21 ASSESSMENT — PULMONARY FUNCTION TESTS
PIF_VALUE: 26
PIF_VALUE: 34
PIF_VALUE: 27
PIF_VALUE: 27
PIF_VALUE: 11
PIF_VALUE: 25
PIF_VALUE: 31
PIF_VALUE: 27
PIF_VALUE: 24
PIF_VALUE: 24
PIF_VALUE: 21
PIF_VALUE: 26
PIF_VALUE: 12
PIF_VALUE: 27
PIF_VALUE: 10
PIF_VALUE: 11
PIF_VALUE: 29
PIF_VALUE: 23
PIF_VALUE: 28
PIF_VALUE: 28
PIF_VALUE: 22
PIF_VALUE: 24

## 2024-03-21 ASSESSMENT — PAIN SCALES - GENERAL
PAINLEVEL_OUTOF10: 0

## 2024-03-21 NOTE — PLAN OF CARE
Problem: Safety - Adult  Goal: Free from fall injury  Outcome: Progressing     Problem: Safety - Medical Restraint  Goal: Remains free of injury from restraints (Restraint for Interference with Medical Device)  Description: INTERVENTIONS:  1. Determine that other, less restrictive measures have been tried or would not be effective before applying the restraint  2. Evaluate the patient's condition at the time of restraint application  3. Inform patient/family regarding the reason for restraint  4. Q2H: Monitor safety, psychosocial status, comfort, nutrition and hydration  Outcome: Progressing  Flowsheets  Taken 3/21/2024 0600 by Sally Aguilar RN  Remains free of injury from restraints (restraint for interference with medical device): Every 2 hours: Monitor safety, psychosocial status, comfort, nutrition and hydration  Taken 3/21/2024 0400 by Sally Aguilar RN  Remains free of injury from restraints (restraint for interference with medical device): Every 2 hours: Monitor safety, psychosocial status, comfort, nutrition and hydration  Taken 3/21/2024 0200 by Sally Aguilar RN  Remains free of injury from restraints (restraint for interference with medical device): Every 2 hours: Monitor safety, psychosocial status, comfort, nutrition and hydration  Taken 3/21/2024 0000 by Sally Aguilar RN  Remains free of injury from restraints (restraint for interference with medical device): Every 2 hours: Monitor safety, psychosocial status, comfort, nutrition and hydration  Taken 3/20/2024 2200 by Sally Aguilar RN  Remains free of injury from restraints (restraint for interference with medical device): Every 2 hours: Monitor safety, psychosocial status, comfort, nutrition and hydration  Taken 3/20/2024 2000 by Sally Aguilar RN  Remains free of injury from restraints (restraint for interference with medical device): Every 2 hours: Monitor safety, psychosocial status, comfort, nutrition and  hydration  Taken 3/20/2024 1934 by Sally Aguilar, RN  Remains free of injury from restraints (restraint for interference with medical device): Every 2 hours: Monitor safety, psychosocial status, comfort, nutrition and hydration  Taken 3/20/2024 1800 by Dalton Pathak RN  Remains free of injury from restraints (restraint for interference with medical device): Every 2 hours: Monitor safety, psychosocial status, comfort, nutrition and hydration

## 2024-03-21 NOTE — FLOWSHEET NOTE
Inpatient Wound Care (initial consult) ICU-5    Admit Date: 3/10/2024  2:37 PM    Reason for consult:  mouth    Significant history:  per H&P    CHIEF COMPLAINT / REASON FOR ADMISSION: AMS, Hypotension, Missed HD  HPI:   This is a 79 y.o. female  has a past medical history of Anemia, Arrhythmia, Arthritis, Asthma, Chronic kidney disease, COPD (chronic obstructive pulmonary disease) ([facility]), Depression, Hemodialysis patient ([facility]), Hyperlipidemia, Hypothyroid, Sleep apnea, Type II or unspecified type diabetes mellitus without mention of complication, not stated as uncontrolled, and 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. presented with AMS, Hypotension, Missed HD  for last few days prior to arrival to the hospital.  She missed her HD and found to be in a poor living condition at home with dog feces. She was found to be hypotensive with  hypercapnia and placed on BIPAP. Nephrology consulted for fluid overload with hyperkalemia and was given HD but could no finish up the session due to fistula malfunction/bleeding. Poor historian, confused and lethargic, no additional history could be obtained.     Past Medical History:   Diagnosis Date    Anemia     Arrhythmia     AF    Arthritis     RA    Asthma     Chronic kidney disease     COPD (chronic obstructive pulmonary disease) ([facility])     Depression     Hemodialysis patient ([facility])     T-Th-Sat    History of blood transfusion     Hyperlipidemia     Hypothyroid     Sleep apnea     no CPAP    Type II or unspecified type diabetes mellitus without mention of complication, not stated as uncontrolled     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 06/15/2017     Wound history:  Patient has ET tube in mouth and on vent.     Findings:     03/21/24 0944   Skin Integumentary    Skin Integrity Ecchymosis   Location BUE, abdomen, breast,   Skin Integrity Site 2   Skin Integrity Location 2 Redness  (blanching, chronic  skin discoloration)   Location 2   bilateral buttocks   Skin Integrity Site 3   Skin Integrity Location 3 Redness;Rash;Erosion/denuded    Location 3  Left abdomen fold     Left breast fold     Right breast fold     Right abdomen fold    abdomen folds, breast folds, axillas   Skin Integrity Site 4   Skin Integrity Location 4 Redness;Rash   Location 4 groins   Skin Integrity Site 5   Skin Integrity Location 5   (intact blanching dry flaky)   Location 5 both heels   Wound 03/11/24 Arm Left;Lower   Date First Assessed/Time First Assessed: 03/11/24 0030   Present on Original Admission: Yes  Location: Arm  Wound Location Orientation: Left;Lower   Wound Image    Wound Etiology Skin Tear   Dressing Status Clean;Dry;Intact   Dressing/Treatment   (Versatel one)   Wound Length (cm) 2 cm   Wound Width (cm) 1 cm   Wound Depth (cm) 0.1 cm   Wound Surface Area (cm^2) 2 cm^2   Wound Volume (cm^3) 0.2 cm^3   Wound Assessment Pink/red   Drainage Amount None (dry)   Roselyn-wound Assessment Ecchymosis   Wound 03/19/24 Mouth Left   Date First Assessed/Time First Assessed: 03/19/24 0800   Present on Original Admission: No  Location: Mouth  Wound Location Orientation: Left   Wound Image    Wound Etiology Pressure Mucous Membrane   Dressing/Treatment Open to air   Wound Length (cm) 0.5 cm   Wound Width (cm) 0.7 cm   Wound Depth (cm)   (unable to determine)   Wound Surface Area (cm^2) 0.35 cm^2   Wound Assessment Purple/maroon;Pink/red   Drainage Amount Scant (moist but unmeasurable)   Drainage Description Serosanguinous   Odor None   Roselyn-wound Assessment Fragile   Wound 03/19/24 Mouth Right   Date First Assessed/Time First Assessed: 03/19/24 0800   Present on Original Admission: No  Location: Mouth  Wound Location Orientation: Right   Wound Image    Wound Etiology Pressure Mucous Membrane   Dressing/Treatment Open to air   Wound Length (cm) 0.3 cm   Wound Width (cm) 0.3 cm   Wound Depth (cm)   (unable to determine)   Wound Surface Area

## 2024-03-21 NOTE — PROGRESS NOTES
[facility]  Nephrology Progress Note    SUBJECTIVE:  We are following this patient for end-stage renal failure .     3/21/24: Seen and examined in the ICU setting.  All care reviewed with the primary RN.  She did tolerate hemodialysis well yesterday.  Patient failed weaning attempts yesterday afternoon.  No acute issues overnight.  No family present during my exam.      MEDS (scheduled):   nitroglycerin  1 inch Topical 4 times per day    [START ON 3/22/2024] epoetin andrzej-epbx  2,000 Units SubCUTAneous Once per day on Mon Wed Fri    cefTRIAXone (ROCEPHIN) IV  1,000 mg IntraVENous Q12H    [Held by provider] apixaban  2.5 mg Oral BID    scopolamine  1 patch TransDERmal Q72H    midodrine  15 mg Oral q8h    [Held by provider] insulin glargine  25 Units SubCUTAneous Daily    insulin lispro  0-16 Units SubCUTAneous Q4H    predniSONE  5 mg Oral BID    pantoprazole (PROTONIX) 40 mg in sodium chloride (PF) 0.9 % 10 mL injection  40 mg IntraVENous Q12H    budesonide  0.5 mg Nebulization BID RT    ipratropium 0.5 mg-albuterol 2.5 mg  1 Dose Inhalation Q4H RT    sodium chloride flush  10 mL IntraVENous 2 times per day       MEDS (infusions):   sodium chloride 30 mL/hr at 03/21/24 0119    dextrose      sodium chloride         ADULT TUBE FEEDING; Orogastric; Renal Formula; Continuous; 10; Yes; 10; Q 6 hours; 29; 30; Q 4 hours; Protein, Other/Comment; Four doses per day: 2 at breakfast, 1 at lunch, 1 at dinner; 3 Doses; TID      PHYSICAL EXAM:      Patient Vitals for the past 24 hrs:   BP Temp Temp src Pulse Resp SpO2 Weight   03/21/24 0600 (!) 154/64 -- -- 83 18 100 % --   03/21/24 0400 (!) 150/87 97.7 °F (36.5 °C) Axillary 88 18 100 % 104.9 kg (231 lb 4 oz)   03/21/24 0200 115/70 -- -- 85 18 98 % --   03/21/24 0100 (!) 155/71 -- -- 84 18 98 % --   03/21/24 0000 (!) 128/49 98.8 °F (37.1 °C) Axillary 81 18 98 % --   03/20/24 2300 137/78 -- -- 89 18 99 % --   03/20/24 2200 (!) 158/139 -- -- 83 18 100 % --   03/20/24 2145 (!)  catheter on 3/11 with dialysis thereafter.   Plan for next treatment on Friday   Will also plan for IR to place new TDC - will need to hold anticoagulation prior to procedure - consents to be obtained today    2.  Hypotension likely in the setting of sepsis-  Now resolved    3.  Metabolic acidosis with adequate respiratory compensation.  Metabolic acidosis controlled with IHD    4.  Secondary hyperparathyroidism of renal origin-  Phosphorus upon presentation was 10.8 mg/dL   Phosphorus checked this morning was 4.6 mg/dL    5.  Anemia with chronic kidney disease stage V/ESRD-  Hemoglobin goal of >10.0 g/dL - currently below goal  7.7 g/dL this AM  Continue on JESSE  q. MWF    7.  Left upper extremity access malfunction-  Likely in the setting of profound hypotension  Will continue to follow as patient required placement of temporary dialysis catheter emergently upon admission  As noted, goal to transition to tunneled dialysis catheter soon        KACIE Mckay - CNP    Seen in ICU remains intubated , alert responsive . Clotted access , for tunneled catheter placement   Possible today   Patient examined , chart and labs reviewed     Agree with plan as outlined above , dialysis as planned     Dr ROBERTO

## 2024-03-21 NOTE — PROGRESS NOTES
Patient came down to special procedures for tunneled dialysis catheter placement.      Procedure was explained, questions were answered.  Patient was educated about the amount of radiation used with today's procedure.   Patient prepped secured and draped.     1351 -  Procedure start /81 93 11 94% on the ventilator supine     1406 -  Procedure end /67 89 14 94% on the ventilator, supine     Tunneled dialysis catheter to right IJ/chest.  Catheter sutured in place with one suture around catheter and two sutures anchoring catheter.  1 non absorbable Silk sutures placed to right IJ.      Folded 4 x 4 and tegaderm applied to  right IJ and tegaderm with CHG gel applied to right chest.  CDI    Patient tolerated procedure       nurse to nurse given to Sujey MCLAUGHLIN, ICU nurse in specials with patient, nurse notified of above information.      Patient transported back to the ICU with respiratory and nurses

## 2024-03-21 NOTE — PROGRESS NOTES
Admitting Date and Time: 3/10/2024  2:37 PM  Admit Dx: Hyperkalemia [E87.5]  Encounter for dialysis ([facility]) [Z99.2]  End stage renal disease on dialysis ([facility]) [N18.6, Z99.2]  Acute respiratory failure with hypoxia ([facility]) [J96.01]  Acute on chronic respiratory failure with hypoxia and hypercapnia ([facility]) [J96.21, J96.22]  Leukocytosis, unspecified type [D72.829]  Acute metabolic encephalopathy [G93.41]    Subjective:  3/18 , pt is intubated off sedation follows commands , opens her eyes , seems comfortable   3/19 pt still intubated and follows commands , off sedation , bronchoscopy was not done yesterday.  Since her secretions were much better.  3/20 , pt still intubated couldn't tolerate SBT yesterday after bronchoscopy , no polyps or masses seen , only secretions and cultures sent , will try again for extubation today   3/21 pt still intubated and  didn't tolerate SBT yesterday or today ? Might need Trach , nurse can't get hold of the family to discuss , I will to reach them later today  Needs tessio  tunneled cath today       miconazole   Topical BID    insulin glargine  10 Units SubCUTAneous Daily    cefTRIAXone (ROCEPHIN) IV  1,000 mg IntraVENous Q12H    nitroglycerin  1 inch Topical 4 times per day    [START ON 3/22/2024] epoetin andrzej-epbx  2,000 Units SubCUTAneous Once per day on Mon Wed Fri    apixaban  2.5 mg Oral BID    scopolamine  1 patch TransDERmal Q72H    midodrine  15 mg Oral q8h    insulin lispro  0-16 Units SubCUTAneous Q4H    predniSONE  5 mg Oral BID    pantoprazole (PROTONIX) 40 mg in sodium chloride (PF) 0.9 % 10 mL injection  40 mg IntraVENous Q12H    budesonide  0.5 mg Nebulization BID RT    ipratropium 0.5 mg-albuterol 2.5 mg  1 Dose Inhalation Q4H RT    sodium chloride flush  10 mL IntraVENous 2 times per day     sodium chloride flush, 10 mL, PRN  promethazine, 12.5 mg, Q6H PRN   Or  ondansetron, 4 mg, Q6H PRN  polyethylene glycol, 17 g, Daily PRN  acetaminophen, 650 mg, Q6H PRN    off sedation  Bronchoscopy on 3/15: polypoid appearing mucosa not biopsied due to #7, going for bronchoscopy again and biopsy   ha bronchoscopy again yesterday no masses or polyps detected , just secretions and cultures resent   3/15: stopped pressors continuing midrodrine  3/16: breathing trial went well.   3/17: If scopolamine doesn't help enough with secretions then pulmonary  plans to scope  3/18 going for bronchoscopy and biopsy today   3/19 , patient still intubated but looks comfortable.  Follows commands did not go for bronchoscopy yesterday since her  secretions were better.  I discussed with Dr. Leal/ intensivist going for bronchoscopy because there was a possible mass./ polyp   Since her platelets are good and off Eliquis had bronchoscopy. Cefepime switched to rocephin ,   3/20 , patient underwent bronchoscopy yesterday and no polyps or masses seen.  Adjust secretions which were suctioned and 3 sent for culture.  She failed SBT yesterday we will try again today for possible extubation.  3/21 pt couldn't tolerate SBT yesterday or today , will probably need trach , will wait to try to reach family  and going for tessio / tunneled cath   End-stage renal disease missed hemodialysis thus has volume overload and hyperkalemia thus getting hemodialysis per nephrology  Respiratory failure with hypoxia and hypercapnia possible acute exacerbation COPD status post hydrocortisone DuoNebs.  Ventilated.  On prednisone small dose,  Vent per pulmonary, will do a trial for extubation again today   New onset  a fib  during this hospitalization and was started on eliquis low dose due to thrombocytopenia , platelets better , I will  increase to regular dose ,   Former smoker she should not restart  Hypothyroidism thyroid replacement.  Tsh wnl  Left forearm AV fistula  malfunctioning thus line placed for HD  Thrombocytopenia: monitor. Improved,  had bronchoscopy  done 3/19 , and  no polyps or masses seen , no biopsies

## 2024-03-21 NOTE — PROGRESS NOTES
PULMONARY  CRITICAL CARE   SERVICE DAILY PROGRESS  NOTE     3/21/2024   Hospital  LOS: 11 days      Admit date- 3/10/2024       Initially admitted for -   Altered Mental Status (Ems report pt was c/c Friday, missed dialysis on Friday, home in poor condition-dog poop everywhere, 79 on RA, hypotension )    Subjective:      Day - 10 of MV , off sedation for several days   Remains intubated, not on any sedation this morning, follows commands , awake .   Hypoglycemic episodes - resolved    Secretions Continue to improve  after scopolamine patch   Hemodynamically stable off Levophed for 6  days now  Seems very weak and deconditioned    Review of Systems      Unable to obtain secondary to mental status. Pt is intubated and sedated                      Allergies:  Allergies   Allergen Reactions    Pcn [Penicillins] Hives    Sulfa Antibiotics Other (See Comments)     \"passed out with IV\"    Bactrim [Sulfamethoxazole-Trimethoprim] Hives     Home Meds:  Prior to Admission medications    Medication Sig Start Date End Date Taking? Authorizing Provider   ferric citrate (AURYXIA) 1  MG(Fe) TABS tablet Take 1 tablet by mouth as needed (with Snacks)   Yes Meri Larios MD   montelukast (SINGULAIR) 10 MG tablet Take 1 tablet by mouth nightly 1/29/24   Grey Chua PA-C   vitamin B-12 (CYANOCOBALAMIN) 1000 MCG tablet Take 1 tablet by mouth daily 1/29/24   Grey Chua PA-C   levothyroxine (SYNTHROID) 100 MCG tablet Take 1 tablet by mouth daily 11/8/23   Grey Chua PA-C   vitamin D (CHOLECALCIFEROL) 25 MCG (1000 UT) TABS tablet Take 1 tablet by mouth daily    Meri Larios MD   fexofenadine (ALLEGRA) 180 MG tablet Take 1 tablet by mouth daily    Meri Larios MD   rOPINIRole (REQUIP) 2 MG tablet Take 1 tablet by mouth nightly 11/1/23   Grey Chua PA-C   calcium carbonate 1500 (600 Ca) MG TABS tablet Take 1 tablet by mouth 2 times daily 11/1/23   Grey Chua PA-C   traZODone

## 2024-03-21 NOTE — PROGRESS NOTES
Patient switched back to AC settings at this time d/t increased HR and RR and with any movement while nursing care performed

## 2024-03-21 NOTE — PLAN OF CARE
Problem: Safety - Adult  Goal: Free from fall injury  3/21/2024 1310 by Sujey Layton, RN  Outcome: Progressing  3/21/2024 0623 by Sally Aguilar, RN  Outcome: Progressing     Problem: Discharge Planning  Goal: Discharge to home or other facility with appropriate resources  Outcome: Progressing     Problem: Pain  Goal: Verbalizes/displays adequate comfort level or baseline comfort level  Outcome: Progressing     Problem: Skin/Tissue Integrity  Goal: Absence of new skin breakdown  Description: 1.  Monitor for areas of redness and/or skin breakdown  2.  Assess vascular access sites hourly  3.  Every 4-6 hours minimum:  Change oxygen saturation probe site  4.  Every 4-6 hours:  If on nasal continuous positive airway pressure, respiratory therapy assess nares and determine need for appliance change or resting period.  Outcome: Progressing     Problem: Confusion  Goal: Confusion, delirium, dementia, or psychosis is improved or at baseline  Description: INTERVENTIONS:  1. Assess for possible contributors to thought disturbance, including medications, impaired vision or hearing, underlying metabolic abnormalities, dehydration, psychiatric diagnoses, and notify attending LIP  2. [place] high risk fall precautions, as indicated  3. Provide frequent short contacts to provide reality reorientation, refocusing and direction  4. Decrease environmental stimuli, including noise as appropriate  5. Monitor and intervene to maintain adequate nutrition, hydration, elimination, sleep and activity  6. If unable to ensure safety without constant attention obtain sitter and review sitter guidelines with assigned personnel  7. Initiate Psychosocial CNS and Spiritual Care consult, as indicated  Outcome: Progressing     Problem: Safety - Medical Restraint  Goal: Remains free of injury from restraints (Restraint for Interference with Medical Device)  Description: INTERVENTIONS:  1. Determine that other, less restrictive measures

## 2024-03-21 NOTE — PROGRESS NOTES
Still unable to reach the patients sister and nephew by phone after multiple attempts. Will continue to try. The patient has not had visitors today or yesterday.

## 2024-03-21 NOTE — PROGRESS NOTES
Multiple attempts to reach family for consent for tunneled dialysis cath insertion. Both family members phone numbers go directly to a message stating they are not receiving calls at this time.

## 2024-03-21 NOTE — CONSULTS
via the right internal jugular vein. Administration of conscious sedation.     XR CHEST PORTABLE    Result Date: 3/21/2024  As described above.     Vascular lower extremity arterial segmental pressures w PPG    Result Date: 3/20/2024  1. NOHELIA right: 1.13, normal. 2. NOHELIA left: artifactually elevated from arthrosclerosis. 3. Toe pressures could not be obtained because of severely damped pressure waveforms in all the digits bilaterally.     Atbx  Vanco once 3/10  Cefepime 3/11-3/18  Ceftriaxone 3/19-present     Past Medical History     Past Medical History:   Diagnosis Date    Anemia     Arrhythmia     AF    Arthritis     RA    Asthma     Chronic kidney disease     COPD (chronic obstructive pulmonary disease) ([facility])     Depression     Hemodialysis patient ([facility])     T-Th-Sat    History of blood transfusion     Hyperlipidemia     Hypothyroid     Sleep apnea     no CPAP    Type II or unspecified type diabetes mellitus without mention of complication, not stated as uncontrolled     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 06/15/2017        Past Surgical History     Past Surgical History:   Procedure Laterality Date    BRONCHOSCOPY Bilateral 3/15/2024    BRONCHOSCOPY DIAGNOSTIC OR CELL WASH performed by Cheng Payne MD at [facility] ENDOSCOPY    CARPAL TUNNEL RELEASE Left 8/12/2020    LEFT CARPAL TUNNEL RELEASE performed by Sandor Tierney MD at [facility] OR    CARPAL TUNNEL RELEASE Right 10/7/2020    RIGHT CARPAL TUNNEL RELEASE performed by Sandor Tierney MD at [facility] OR    CHOLECYSTECTOMY  2004    DIALYSIS FISTULA CREATION Left 03/02/2018    AV fistula arm/Dr. Blum    EYE SURGERY Right 2010    cataract    FOOT SURGERY Left 2012,2013    pin put in, then pin removed    GASTRIC BYPASS SURGERY  2004    HAND SURGERY Right 2012    ligament was cut    HERNIA REPAIR      INVASIVE VASCULAR N/A 11/8/2023    Fistulogram left performed by Mendoza Alvarenga MD at [facility] CARDIAC CATH LAB    INVASIVE  03/15/24 1424    Order Status: Canceled Specimen: Respiratory from Bronchial Washing     Culture, Respiratory [[id number]] Collected: 03/15/24 1424    Order Status: Completed Specimen: Respiratory from Bronchial Washing Updated: 03/18/24 0613     Specimen Description .BRONCHIAL WASHINGS     Direct Exam RARE Polymorphonuclear leukocytes      NO EPITHELIAL CELLS      FEW MIXED BACTERIAL MORPHOTYPES SEEN ON GRAM STAIN.     Culture NORMAL MELVIN    Culture, Respiratory [[id number]] Collected: 03/11/24 1956    Order Status: Completed Specimen: Endotracheal Updated: 03/14/24 0845     Specimen Description .ENDOTRACHEAL     Direct Exam NO Polymorphonuclear leukocytes      FEW EPITHELIAL CELLS      NO ORGANISMS SEEN     Culture NORMAL RESPIRATORY MELVIN    Culture, MRSA, Screening [[id number]]  (Abnormal) Collected: 03/11/24 1550    Order Status: Completed Specimen: Nares Updated: 03/13/24 1001     Specimen Description .NARES     Culture POSITIVE FOR: METHICILLIN RESISTANT STAPHYLOCOCCUS AUREUS    Culture, Blood 2 [[id number]]  (Abnormal) Collected: 03/10/24 1902    Order Status: Completed Specimen: Blood Updated: 03/13/24 1213     Specimen Description .BLOOD     Special Requests          Direct Exam DIRECT GRAM STAIN FROM BOTTLE: GRAM NEGATIVE RODS Previous panic on this admission, call not needed per  SOP.     Culture KLEBSIELLA PNEUMONIAE For susceptibility, refer to previous culture. [id number] 3/10/24 1500    Culture, Blood 1 [[id number]]  (Abnormal)  (Susceptibility) Collected: 03/10/24 1500    Order Status: Completed Specimen: Blood Updated: 03/13/24 1026     Specimen Description .BLOOD .ARM     Special Requests          [employer] test comment AEROBIC BLD BOTTLE     Direct Exam DIRECT GRAM STAIN FROM BOTTLE: GRAM NEGATIVE RODS     Comment: Direct Gram Stain from bottle result called to and read back by: HARDIK RODRIGUEZ RN AT 1010   3/11/2024           Culture KLEBSIELLA PNEUMONIAE     Daisy auris Not Detected     Candida

## 2024-03-22 LAB
ANION GAP SERPL CALCULATED.3IONS-SCNC: 11 MMOL/L (ref 7–16)
BASOPHILS # BLD: 0.01 K/UL (ref 0–0.2)
BASOPHILS NFR BLD: 0 % (ref 0–2)
BUN SERPL-MCNC: 36 MG/DL (ref 6–23)
CALCIUM SERPL-MCNC: 7.9 MG/DL (ref 8.6–10.2)
CHLORIDE SERPL-SCNC: 97 MMOL/L (ref 98–107)
CO2 SERPL-SCNC: 23 MMOL/L (ref 22–29)
CREAT SERPL-MCNC: 2.9 MG/DL (ref 0.5–1)
EOSINOPHIL # BLD: 0.04 K/UL (ref 0.05–0.5)
EOSINOPHILS RELATIVE PERCENT: 1 % (ref 0–6)
ERYTHROCYTE [DISTWIDTH] IN BLOOD BY AUTOMATED COUNT: 15.3 % (ref 11.5–15)
FERRITIN SERPL-MCNC: 3650 NG/ML
FOLATE SERPL-MCNC: 7.2 NG/ML (ref 4.8–24.2)
GFR SERPL CREATININE-BSD FRML MDRD: 16 ML/MIN/1.73M2
GLUCOSE BLD-MCNC: 127 MG/DL (ref 74–99)
GLUCOSE BLD-MCNC: 196 MG/DL (ref 74–99)
GLUCOSE BLD-MCNC: 196 MG/DL (ref 74–99)
GLUCOSE BLD-MCNC: 199 MG/DL (ref 74–99)
GLUCOSE BLD-MCNC: 200 MG/DL (ref 74–99)
GLUCOSE BLD-MCNC: 222 MG/DL (ref 74–99)
GLUCOSE SERPL-MCNC: 210 MG/DL (ref 74–99)
HCT VFR BLD AUTO: 24.3 % (ref 34–48)
HGB BLD-MCNC: 7.4 G/DL (ref 11.5–15.5)
IMM GRANULOCYTES # BLD AUTO: 0.04 K/UL (ref 0–0.58)
IMM GRANULOCYTES NFR BLD: 1 % (ref 0–5)
IRON SATN MFR SERPL: 37 % (ref 15–50)
IRON SERPL-MCNC: 43 UG/DL (ref 37–145)
LYMPHOCYTES NFR BLD: 0.51 K/UL (ref 1.5–4)
LYMPHOCYTES RELATIVE PERCENT: 8 % (ref 20–42)
MAGNESIUM SERPL-MCNC: 2.2 MG/DL (ref 1.6–2.6)
MCH RBC QN AUTO: 29.6 PG (ref 26–35)
MCHC RBC AUTO-ENTMCNC: 30.5 G/DL (ref 32–34.5)
MCV RBC AUTO: 97.2 FL (ref 80–99.9)
MONOCYTES NFR BLD: 0.5 K/UL (ref 0.1–0.95)
MONOCYTES NFR BLD: 7 % (ref 2–12)
NEUTROPHILS NFR BLD: 84 % (ref 43–80)
NEUTS SEG NFR BLD: 5.66 K/UL (ref 1.8–7.3)
PHOSPHATE SERPL-MCNC: 5.1 MG/DL (ref 2.5–4.5)
PLATELET, FLUORESCENCE: 135 K/UL (ref 130–450)
PMV BLD AUTO: 11.6 FL (ref 7–12)
POTASSIUM SERPL-SCNC: 4.2 MMOL/L (ref 3.5–5)
RBC # BLD AUTO: 2.5 M/UL (ref 3.5–5.5)
RBC # BLD: ABNORMAL 10*6/UL
SODIUM SERPL-SCNC: 131 MMOL/L (ref 132–146)
TIBC SERPL-MCNC: 116 UG/DL (ref 250–450)
VIT B12 SERPL-MCNC: 1626 PG/ML (ref 211–946)
WBC OTHER # BLD: 6.8 K/UL (ref 4.5–11.5)

## 2024-03-22 PROCEDURE — 99233 SBSQ HOSP IP/OBS HIGH 50: CPT | Performed by: INTERNAL MEDICINE

## 2024-03-22 PROCEDURE — 82746 ASSAY OF FOLIC ACID SERUM: CPT

## 2024-03-22 PROCEDURE — 94003 VENT MGMT INPAT SUBQ DAY: CPT

## 2024-03-22 PROCEDURE — 82607 VITAMIN B-12: CPT

## 2024-03-22 PROCEDURE — 94640 AIRWAY INHALATION TREATMENT: CPT

## 2024-03-22 PROCEDURE — 85025 COMPLETE CBC W/AUTO DIFF WBC: CPT

## 2024-03-22 PROCEDURE — 99291 CRITICAL CARE FIRST HOUR: CPT | Performed by: INTERNAL MEDICINE

## 2024-03-22 PROCEDURE — 80048 BASIC METABOLIC PNL TOTAL CA: CPT

## 2024-03-22 PROCEDURE — 2000000000 HC ICU R&B

## 2024-03-22 PROCEDURE — 83550 IRON BINDING TEST: CPT

## 2024-03-22 PROCEDURE — 6370000000 HC RX 637 (ALT 250 FOR IP): Performed by: INTERNAL MEDICINE

## 2024-03-22 PROCEDURE — 6360000002 HC RX W HCPCS: Performed by: INTERNAL MEDICINE

## 2024-03-22 PROCEDURE — 2580000003 HC RX 258: Performed by: INTERNAL MEDICINE

## 2024-03-22 PROCEDURE — 36415 COLL VENOUS BLD VENIPUNCTURE: CPT

## 2024-03-22 PROCEDURE — C9113 INJ PANTOPRAZOLE SODIUM, VIA: HCPCS | Performed by: INTERNAL MEDICINE

## 2024-03-22 PROCEDURE — 99232 SBSQ HOSP IP/OBS MODERATE 35: CPT | Performed by: NURSE PRACTITIONER

## 2024-03-22 PROCEDURE — 83735 ASSAY OF MAGNESIUM: CPT

## 2024-03-22 PROCEDURE — 2700000000 HC OXYGEN THERAPY PER DAY

## 2024-03-22 PROCEDURE — 84100 ASSAY OF PHOSPHORUS: CPT

## 2024-03-22 PROCEDURE — 82962 GLUCOSE BLOOD TEST: CPT

## 2024-03-22 PROCEDURE — 82728 ASSAY OF FERRITIN: CPT

## 2024-03-22 PROCEDURE — A4216 STERILE WATER/SALINE, 10 ML: HCPCS | Performed by: INTERNAL MEDICINE

## 2024-03-22 PROCEDURE — 90935 HEMODIALYSIS ONE EVALUATION: CPT

## 2024-03-22 PROCEDURE — 83540 ASSAY OF IRON: CPT

## 2024-03-22 RX ADMIN — INSULIN GLARGINE 10 UNITS: 100 INJECTION, SOLUTION SUBCUTANEOUS at 09:07

## 2024-03-22 RX ADMIN — APIXABAN 2.5 MG: 2.5 TABLET, FILM COATED ORAL at 20:58

## 2024-03-22 RX ADMIN — IPRATROPIUM BROMIDE AND ALBUTEROL SULFATE 1 DOSE: .5; 2.5 SOLUTION RESPIRATORY (INHALATION) at 01:17

## 2024-03-22 RX ADMIN — INSULIN LISPRO 4 UNITS: 100 INJECTION, SOLUTION INTRAVENOUS; SUBCUTANEOUS at 16:56

## 2024-03-22 RX ADMIN — IPRATROPIUM BROMIDE AND ALBUTEROL SULFATE 1 DOSE: .5; 2.5 SOLUTION RESPIRATORY (INHALATION) at 21:31

## 2024-03-22 RX ADMIN — IPRATROPIUM BROMIDE AND ALBUTEROL SULFATE 1 DOSE: .5; 2.5 SOLUTION RESPIRATORY (INHALATION) at 04:25

## 2024-03-22 RX ADMIN — Medication 10 ML: at 09:00

## 2024-03-22 RX ADMIN — BUDESONIDE INHALATION 500 MCG: 0.5 SUSPENSION RESPIRATORY (INHALATION) at 04:25

## 2024-03-22 RX ADMIN — MIDODRINE HYDROCHLORIDE 15 MG: 5 TABLET ORAL at 13:13

## 2024-03-22 RX ADMIN — INSULIN LISPRO 4 UNITS: 100 INJECTION, SOLUTION INTRAVENOUS; SUBCUTANEOUS at 05:30

## 2024-03-22 RX ADMIN — NITROGLYCERIN 1 INCH: 20 OINTMENT TOPICAL at 13:13

## 2024-03-22 RX ADMIN — IPRATROPIUM BROMIDE AND ALBUTEROL SULFATE 1 DOSE: .5; 2.5 SOLUTION RESPIRATORY (INHALATION) at 14:29

## 2024-03-22 RX ADMIN — MIDODRINE HYDROCHLORIDE 15 MG: 5 TABLET ORAL at 04:00

## 2024-03-22 RX ADMIN — EPOETIN ALFA-EPBX 2000 UNITS: 2000 INJECTION, SOLUTION INTRAVENOUS; SUBCUTANEOUS at 16:57

## 2024-03-22 RX ADMIN — IPRATROPIUM BROMIDE AND ALBUTEROL SULFATE 1 DOSE: .5; 2.5 SOLUTION RESPIRATORY (INHALATION) at 08:55

## 2024-03-22 RX ADMIN — BUDESONIDE INHALATION 500 MCG: 0.5 SUSPENSION RESPIRATORY (INHALATION) at 18:17

## 2024-03-22 RX ADMIN — NITROGLYCERIN 1 INCH: 20 OINTMENT TOPICAL at 23:40

## 2024-03-22 RX ADMIN — IPRATROPIUM BROMIDE AND ALBUTEROL SULFATE 1 DOSE: .5; 2.5 SOLUTION RESPIRATORY (INHALATION) at 18:17

## 2024-03-22 RX ADMIN — NITROGLYCERIN 1 INCH: 20 OINTMENT TOPICAL at 00:00

## 2024-03-22 RX ADMIN — Medication 10 ML: at 20:52

## 2024-03-22 RX ADMIN — INSULIN LISPRO 4 UNITS: 100 INJECTION, SOLUTION INTRAVENOUS; SUBCUTANEOUS at 09:08

## 2024-03-22 RX ADMIN — NITROGLYCERIN 1 INCH: 20 OINTMENT TOPICAL at 06:38

## 2024-03-22 RX ADMIN — PREDNISONE 5 MG: 5 TABLET ORAL at 20:58

## 2024-03-22 RX ADMIN — SODIUM CHLORIDE, PRESERVATIVE FREE 40 MG: 5 INJECTION INTRAVENOUS at 20:48

## 2024-03-22 RX ADMIN — SODIUM CHLORIDE, PRESERVATIVE FREE 40 MG: 5 INJECTION INTRAVENOUS at 13:31

## 2024-03-22 RX ADMIN — PREDNISONE 5 MG: 5 TABLET ORAL at 13:12

## 2024-03-22 RX ADMIN — WATER 1000 MG: 1 INJECTION INTRAMUSCULAR; INTRAVENOUS; SUBCUTANEOUS at 13:11

## 2024-03-22 RX ADMIN — APIXABAN 2.5 MG: 2.5 TABLET, FILM COATED ORAL at 13:11

## 2024-03-22 RX ADMIN — NITROGLYCERIN 1 INCH: 20 OINTMENT TOPICAL at 17:38

## 2024-03-22 RX ADMIN — WATER 1000 MG: 1 INJECTION INTRAMUSCULAR; INTRAVENOUS; SUBCUTANEOUS at 20:59

## 2024-03-22 RX ADMIN — MIDODRINE HYDROCHLORIDE 15 MG: 5 TABLET ORAL at 20:48

## 2024-03-22 RX ADMIN — ANTI-FUNGAL POWDER MICONAZOLE NITRATE TALC FREE: 1.42 POWDER TOPICAL at 20:59

## 2024-03-22 RX ADMIN — ANTI-FUNGAL POWDER MICONAZOLE NITRATE TALC FREE: 1.42 POWDER TOPICAL at 13:12

## 2024-03-22 ASSESSMENT — PULMONARY FUNCTION TESTS
PIF_VALUE: 21
PIF_VALUE: 22
PIF_VALUE: 28
PIF_VALUE: 26
PIF_VALUE: 26
PIF_VALUE: 27
PIF_VALUE: 22
PIF_VALUE: 10
PIF_VALUE: 11
PIF_VALUE: 23
PIF_VALUE: 24
PIF_VALUE: 11
PIF_VALUE: 25
PIF_VALUE: 21
PIF_VALUE: 19
PIF_VALUE: 26
PIF_VALUE: 28
PIF_VALUE: 32
PIF_VALUE: 28
PIF_VALUE: 43
PIF_VALUE: 20
PIF_VALUE: 26
PIF_VALUE: 22
PIF_VALUE: 11
PIF_VALUE: 22

## 2024-03-22 ASSESSMENT — PAIN SCALES - GENERAL
PAINLEVEL_OUTOF10: 0

## 2024-03-22 NOTE — PROGRESS NOTES
Phone call from pt's sister, Isaura - updated with pt's status, including change in Code Status and pt willing to have trach & PEG. She also gave me a phone number where she can be reached - [phone].    Mary Grey RN  3/22/2024

## 2024-03-22 NOTE — PROGRESS NOTES
[facility]  Nephrology Progress Note    SUBJECTIVE:  We are following this patient for end-stage renal failure .     3/22/24: Seen and examined in the ICU setting.  Had a new tunneled catheter placed in IR yesterday.  No adverse events overnight.  Currently on hemodialysis tolerating well.  Remains ventilator dependent.  Care reviewed with dialysis RN.      MEDS (scheduled):   miconazole   Topical BID    insulin glargine  10 Units SubCUTAneous Daily    cefTRIAXone (ROCEPHIN) IV  1,000 mg IntraVENous Q12H    nitroglycerin  1 inch Topical 4 times per day    epoetin andrzej-epbx  2,000 Units SubCUTAneous Once per day on Mon Wed Fri    apixaban  2.5 mg Oral BID    scopolamine  1 patch TransDERmal Q72H    midodrine  15 mg Oral q8h    insulin lispro  0-16 Units SubCUTAneous Q4H    predniSONE  5 mg Oral BID    pantoprazole (PROTONIX) 40 mg in sodium chloride (PF) 0.9 % 10 mL injection  40 mg IntraVENous Q12H    budesonide  0.5 mg Nebulization BID RT    ipratropium 0.5 mg-albuterol 2.5 mg  1 Dose Inhalation Q4H RT    sodium chloride flush  10 mL IntraVENous 2 times per day       MEDS (infusions):   dextrose      sodium chloride         ADULT TUBE FEEDING; Orogastric; Renal Formula; Continuous; 10; Yes; 10; Q 6 hours; 29; 30; Q 4 hours; Protein, Other/Comment; Four doses per day: 2 at breakfast, 1 at lunch, 1 at dinner; 3 Doses; TID      PHYSICAL EXAM:      Patient Vitals for the past 24 hrs:   BP Temp Temp src Pulse Resp SpO2 Height Weight   03/22/24 0923 -- -- -- -- -- -- 1.651 m (5' 5\") --   03/22/24 0914 -- -- -- 94 -- 97 % -- --   03/22/24 0800 -- 98.7 °F (37.1 °C) Axillary -- -- -- -- --   03/22/24 0600 117/78 -- -- 87 18 98 % -- --   03/22/24 0500 (!) 100/55 -- -- 99 17 97 % -- --   03/22/24 0426 -- -- -- 87 18 98 % -- --   03/22/24 0425 -- -- -- 87 18 98 % -- --   03/22/24 0400 (!) 111/50 99 °F (37.2 °C) Axillary 85 18 98 % -- 108.1 kg (238 lb 5 oz)   03/22/24 0300 117/75 -- -- 79 18 98 % -- --   03/22/24 0200

## 2024-03-22 NOTE — PROGRESS NOTES
Palliative Care Department  [phone]  Palliative Care Progress Note  Provider Akosua Dahl, APRN - CNP     Ainsley Morris  [id number]  Hospital Day: 13  Date of Initial Consult: 3/16/2024  Referring Provider: Javy Leal MD   Palliative Medicine was consulted for assistance with: Goals of Care, End Stage Disease    HPI:   Ainsley Morris is a 79 y.o. with a medical history of hyperlipidemia, hypothyroidism, COPD, asthma, sleep apnea, CKD, DM, anemia, arthritis who was admitted on 3/10/2024 from home with a CHIEF COMPLAINT of altered mental status, hypotension, and missed hemodialysis.  Patient was found to be in a poor living condition at home.  Patient also found to be hypotensive with hypercapnia and placed on BiPAP initially.  She is currently intubated and in the ICU.  Palliative medicine consulted for goals of care and end-stage disease.    ASSESSMENT/PLAN:     Pertinent Hospital Diagnoses     Acute respiratory failure  Bacteremia  New onset atrial fibrillation  Encephalopathy  CKD      Palliative Care Encounter / Counseling Regarding Goals of Care  Please see detailed goals of care discussion as below  At this time, Ainsley Morris, Does have capacity for medical decision-making.  Capacity is time limited and situation/question specific  During encounter there was no surrogate medical decision-maker  Outcome of goals of care meeting:   Discussed trach/PEG vs comfort  Discussed code status options  Code status DNR-CCA  Advanced Directives:  POA or living will in [facility]  Surrogate/Legal NOK:  Isaura tSarr, sister, [phone]    Spiritual assessment: no spiritual distress identified  Bereavement and grief: to be determined  Referrals to: none today  SUBJECTIVE:     Current medical issues leading to Palliative Medicine involvement include   Active Hospital Problems    Diagnosis Date Noted    Palliative care encounter [Z51.5] 03/19/2024    Hyperkalemia [E87.5] 03/11/2024    Acute respiratory failure with  diminished  Abd:  Soft  Ext:  + edema  Skin:  Warm and dry  Neuro:   Appears awake & oriented, nods/gestures appropriately, responds to name, able to state she is in the hospital and in [place], follows commands    Objective data reviewed: labs, images, records, medication use, vitals, and chart    Discussed patient and the plan of care with the other IDT members: Palliative Medicine IDT Team    Time/Communication  Greater than 50% of time spent, total 35 minutes in counseling and coordination of care at the bedside regarding goals of care, diagnosis and prognosis, and see above.    Thank you for allowing Palliative Medicine to participate in the care of Ainsley Morris.

## 2024-03-22 NOTE — PROGRESS NOTES
Admitting Date and Time: 3/10/2024  2:37 PM  Admit Dx: Hyperkalemia [E87.5]  Encounter for dialysis ([facility]) [Z99.2]  End stage renal disease on dialysis ([facility]) [N18.6, Z99.2]  Acute respiratory failure with hypoxia ([facility]) [J96.01]  Acute on chronic respiratory failure with hypoxia and hypercapnia ([facility]) [J96.21, J96.22]  Leukocytosis, unspecified type [D72.829]  Acute metabolic encephalopathy [G93.41]    Subjective:  3/18 , pt is intubated off sedation follows commands , opens her eyes , seems comfortable   3/19 pt still intubated and follows commands , off sedation , bronchoscopy was not done yesterday.  Since her secretions were much better.  3/20 , pt still intubated couldn't tolerate SBT yesterday after bronchoscopy , no polyps or masses seen , only secretions and cultures sent , will try again for extubation today   3/21 pt still intubated and  didn't tolerate SBT yesterday or today ? Might need Trach , nurse can't get hold of the family to discuss , I will to reach them later today  Needs tessio  tunneled cath today    3/22 pt is intubated and alert and follows commands      miconazole   Topical BID    insulin glargine  10 Units SubCUTAneous Daily    cefTRIAXone (ROCEPHIN) IV  1,000 mg IntraVENous Q12H    nitroglycerin  1 inch Topical 4 times per day    epoetin andrzej-epbx  2,000 Units SubCUTAneous Once per day on Mon Wed Fri    apixaban  2.5 mg Oral BID    scopolamine  1 patch TransDERmal Q72H    midodrine  15 mg Oral q8h    insulin lispro  0-16 Units SubCUTAneous Q4H    predniSONE  5 mg Oral BID    pantoprazole (PROTONIX) 40 mg in sodium chloride (PF) 0.9 % 10 mL injection  40 mg IntraVENous Q12H    budesonide  0.5 mg Nebulization BID RT    ipratropium 0.5 mg-albuterol 2.5 mg  1 Dose Inhalation Q4H RT    sodium chloride flush  10 mL IntraVENous 2 times per day     sodium chloride flush, 10 mL, PRN  promethazine, 12.5 mg, Q6H PRN   Or  ondansetron, 4 mg, Q6H PRN  polyethylene glycol, 17 g, Daily PRN  acetaminophen,  to sepsis/septic shock with Klebsiella bacteremia causing hypotension leukocytosis empiric antibiotics as blood cultures start to show Klebsiella.  Supportive care in the ICU's restarting home midodrine. 3/12: weaned levophed this am. 3/13: weaned off Critical care ID and renal are on consult.  Steroids/ prednisone PO .  3/14 weaned off sedation  Bronchoscopy on 3/15: polypoid appearing mucosa not biopsied due to #7, going for bronchoscopy again and biopsy   ha bronchoscopy again yesterday no masses or polyps detected , just secretions and cultures resent   3/15: stopped pressors continuing midrodrine  3/16: breathing trial went well.   3/17: If scopolamine doesn't help enough with secretions then pulmonary  plans to scope  3/18 going for bronchoscopy and biopsy today   3/19 , patient still intubated but looks comfortable.  Follows commands did not go for bronchoscopy yesterday since her  secretions were better.  I discussed with Dr. Leal/ intensivist going for bronchoscopy because there was a possible mass./ polyp   Since her platelets are good and off Eliquis had bronchoscopy. Cefepime switched to rocephin ,   3/20 , patient underwent bronchoscopy yesterday and no polyps or masses seen.  Adjust secretions which were suctioned and 3 sent for culture.  She failed SBT yesterday we will try again today for possible extubation.  3/21 pt couldn't tolerate SBT yesterday or today , will probably need trach , will wait to try to reach family  and going for tessio / tunneled cath   3/22 had tunneled cath yesterday did well , and discussed with pt about PEG and trach ? She seems to understand and she is agreeable if needed , pt has been off sedation for at least 48 hours   End-stage renal disease missed hemodialysis thus has volume overload and hyperkalemia thus getting hemodialysis per nephrology, having dialysis now   Respiratory failure with hypoxia and hypercapnia possible acute exacerbation COPD status post

## 2024-03-22 NOTE — PROGRESS NOTES
PULMONARY  CRITICAL CARE   SERVICE DAILY PROGRESS  NOTE     3/22/2024   Hospital  LOS: 12 days      Admit date- 3/10/2024       Initially admitted for -   Altered Mental Status (Ems report pt was c/c Friday, missed dialysis on Friday, home in poor condition-dog poop everywhere, 79 on RA, hypotension )    Subjective:      Day - 11 of MV , off sedation for several days   Remains intubated, not on any sedation this morning, follows commands , awake .   Hypoglycemic episodes - resolved    Secretions Continue to improve  after scopolamine patch   Hemodynamically stable off Levophed for a week today   Seems very weak and deconditioned  Getting iHD this am , removing 1.5 L     Review of Systems      Unable to obtain secondary to mental status. Pt is intubated and sedated                      Allergies:  Allergies   Allergen Reactions    Pcn [Penicillins] Hives    Sulfa Antibiotics Other (See Comments)     \"passed out with IV\"    Bactrim [Sulfamethoxazole-Trimethoprim] Hives     Home Meds:  Prior to Admission medications    Medication Sig Start Date End Date Taking? Authorizing Provider   ferric citrate (AURYXIA) 1  MG(Fe) TABS tablet Take 1 tablet by mouth as needed (with Snacks)   Yes Meri Larios MD   montelukast (SINGULAIR) 10 MG tablet Take 1 tablet by mouth nightly 1/29/24   Grey Chua PA-C   vitamin B-12 (CYANOCOBALAMIN) 1000 MCG tablet Take 1 tablet by mouth daily 1/29/24   Grey Chua PA-C   levothyroxine (SYNTHROID) 100 MCG tablet Take 1 tablet by mouth daily 11/8/23   Grey Chua PA-C   vitamin D (CHOLECALCIFEROL) 25 MCG (1000 UT) TABS tablet Take 1 tablet by mouth daily    ProviderMeri MD   fexofenadine (ALLEGRA) 180 MG tablet Take 1 tablet by mouth daily    Meri Larios MD   rOPINIRole (REQUIP) 2 MG tablet Take 1 tablet by mouth nightly 11/1/23   Grey Chua PA-C   calcium carbonate 1500 (600 Ca) MG TABS tablet Take 1 tablet by mouth 2 times daily 11/1/23    Grey Chua PA-C   traZODone (DESYREL) 50 MG tablet Take 1 tablet by mouth nightly 11/1/23   Grey Chua PA-C   folic acid (FOLVITE) 1 MG tablet Take 1 tablet by mouth daily 11/1/23   Grey Chua PA-C   budesonide-formoterol (SYMBICORT) 160-4.5 MCG/ACT AERO Inhale 2 puffs into the lungs 2 times daily 11/1/23   Grey Chua PA-C   fluticasone-salmeterol (ADVAIR) 100-50 MCG/ACT AEPB diskus inhaler Inhale 1 puff into the lungs in the morning and 1 puff in the evening. 11/1/23   Grey Chua PA-C   atorvastatin (LIPITOR) 10 MG tablet Take 1 tablet by mouth nightly 10/3/23   Grey Chua PA-C   insulin lispro, 1 Unit Dial, (HUMALOG/ADMELOG) 100 UNIT/ML SOPN INJECT SUBCUTANEOUSLY WITH MEALS THREE TIMES DAILY. 2 UNITS FOR BLOOD SUGAR , 4 UNITS , 6 UNITS , 8 UNITS . MAX DAILY DOSE 10 UNITS 7/7/23   Ric Kelly DO   pantoprazole (PROTONIX) 40 MG tablet Take 1 tablet by mouth daily 4/7/23   Meri Larios MD LANTUS SOLOSTAR 100 UNIT/ML injection pen INJECT 35 UNITS UNDER INTO THE SKIN DAILY 4/10/23   Ric Kelly DO   Ferric Citrate (AURYXIA) 1  MG(Fe) TABS Take 2 tablets by mouth 3 times daily (with meals)    Meri Larios MD   B Complex-C-Folic Acid (TOD-RENETTA) TABS Take 1 tablet by mouth daily Rx    Meri Larios MD   midodrine (PROAMATINE) 5 MG tablet Take 1 tablet by mouth as needed Tuesday, Thursday, Saturday    Meri Larios MD     Scheduled Meds:   miconazole   Topical BID    insulin glargine  10 Units SubCUTAneous Daily    cefTRIAXone (ROCEPHIN) IV  1,000 mg IntraVENous Q12H    nitroglycerin  1 inch Topical 4 times per day    epoetin andrzej-epbx  2,000 Units SubCUTAneous Once per day on Mon Wed Fri    apixaban  2.5 mg Oral BID    scopolamine  1 patch TransDERmal Q72H    midodrine  15 mg Oral q8h    insulin lispro  0-16 Units SubCUTAneous Q4H    predniSONE  5 mg Oral BID    pantoprazole (PROTONIX) 40 mg in

## 2024-03-22 NOTE — DIALYSIS
HD completed today as ordered. Total UF 1.8L, BVP 83.8. Lines flushed and capped x2 per protocol. Vital signs stable throughout treatment. Report given to ICU RN.

## 2024-03-22 NOTE — PROGRESS NOTES
NAME: Ainsley Morris  MR:  [id number]  :   1945  Admit Date:  3/10/2024    Elements of this note, were copied and pasted from Previous. Updates have been made where noted and reflect current exam and medical decision making from the DOS of this encounter.  CHIEF COMPLAINT       Chief Complaint   Patient presents with    Altered Mental Status     Ems report pt was c/c Friday, missed dialysis on Friday, home in poor condition-dog poop everywhere, 79 on RA, hypotension      HISTORY OF PRESENT ILLNESS     Ainsley Morris is a 79 y.o. female admitted on 3/10/2024 and has  has a past medical history of Anemia, Arrhythmia, Arthritis, Asthma, Chronic kidney disease, COPD (chronic obstructive pulmonary disease) ([facility]), Depression, Hemodialysis patient ([facility]), History of blood transfusion, Hyperlipidemia, Hypothyroid, Sleep apnea, Type II or unspecified type diabetes mellitus without mention of complication, not stated as uncontrolled, and 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.     This is a face to face encounter   24 afebrile ac30% wbc6.8 cr2.9 on hemo to be extubated     Patient is tolerating medications. No reported adverse drug reactions.  Available labs, imaging studies, microbiologic studies have been reviewed with pt and family if present.  Assessment & Plan     Admitted for   Hyperkalemia [E87.5]  Encounter for dialysis ([facility]) [Z99.2]  End stage renal disease on dialysis ([facility]) [N18.6, Z99.2]  Acute respiratory failure with hypoxia ([facility]) [J96.01]  Acute on chronic respiratory failure with hypoxia and hypercapnia ([facility]) [J96.21, J96.22]  Leukocytosis, unspecified type [D72.829]  Acute metabolic encephalopathy [G93.41]  ID following for   Respiratory failure  Oral candidaisis  Suggest nystatin s/s  Klebsiella septicemia    Stable loculated fluid collection associated with ventral abdominal wall   musculature which may represent a stable postoperative seroma   Source  not clear   Suggest to end rx 3/25  Ckd/hemo new hd cath      ID will see prn   DISPOSITION:  REVIEW OF SYSTEMS     As stated above      PHYSICAL EXAMINATION    /78   Pulse 94   Temp 98.7 °F (37.1 °C) (Axillary)   Resp 18   Ht 1.651 m (5' 5\")   Wt 108.1 kg (238 lb 5 oz)   SpO2 97%   BMI 39.66 kg/m²   Temp  Av.2 °F (36.8 °C)  Min: 97.7 °F (36.5 °C)  Max: 99 °F (37.2 °C)  CONSTITUTIONAL:  IN BED no apparent distress   ENT:  Normocephalic, atraumatic,  external ears without lesions,    LUNGS:  No increased work of breathing, coarse to auscultation bilaterally   CARDIOVASCULAR:  Normal apical impulse, regular rate and rhythm, normal S1 and S2,  no murmur noted  ABDOMEN:  Normal bowel sounds, soft, non-distended, non-tender  MUSCULOSKELETAL:  There is no redness, warmth, o+ swelling  BLE.       NEUROLOGIC:   rousable   SKIN:  Normal skin color, texture, turgor and no rashes  Lines:          Peripheral Intravenous Line:  Peripheral IV 24 Distal;Right;Anterior Cephalic (Active)   Site Assessment Clean, dry & intact 24 0400   Line Status Infusing 24 0400   Line Care Connections checked and tightened 24 0400   Phlebitis Assessment No symptoms 24 0400   Infiltration Assessment 0 24 0400   Alcohol Cap Used Yes 24 0400   Dressing Status Clean, dry & intact 24 0400   Dressing Type Transparent 24 0400   Dressing Intervention Other (Comment) 24 040       Peripheral IV 24 Distal;Right;Anterior Wrist (Active)   Site Assessment Clean, dry & intact 24 0400   Line Status Normal saline locked 24 0400   Line Care Connections checked and tightened 24 0400   Phlebitis Assessment No symptoms 24 0400   Infiltration Assessment 0 24 0400   Alcohol Cap Used Yes 24 0400   Dressing Status Clean, dry & intact 24 0400   Dressing Type Transparent 24 0400   Dressing Intervention Other (Comment) 24 040

## 2024-03-22 NOTE — PROGRESS NOTES
Comprehensive Nutrition Assessment    Type and Reason for Visit:  Reassess    Nutrition Recommendations/Plan:   Continue NPO  Continue Current Tube feeding:    Renal (Nepro w/ carbsteady) @25 mls increasing to goal of 29mls/hr x 24 hours, additional 30mls q4 hours  and four protein modulars provides: 1,639 kcals, 156 grams of protein, 688 mls H2O meeting 100% of calorie and protein goals per day. Continue to monitor renal function and adjust TF appropriately.      Malnutrition Assessment:  Malnutrition Status:  No malnutrition (03/12/24 1534)    Context:  Chronic Illness     Findings of the 6 clinical characteristics of malnutrition:  Energy Intake:  Mild decrease in energy intake (Comment) (poor po intake prior to admission)  Weight Loss:  Unable to assess (no wt hx in emr)     Body Fat Loss:  No significant body fat loss     Muscle Mass Loss:  No significant muscle mass loss    Fluid Accumulation:  Mild Extremities   Strength:  Not Performed    Nutrition Assessment:    Pt admit for AMS, missed dialysis treatments, respiratory failure w/ hypoxia, hyperkalemia, septic shock Kleibsella (unknown etiology.) PMHx: Anemia, Arrythmia, Arthritis, Asthma, CKD on dialysis, COPD, Hypothyroid, HLD, Sleep apnea, T2DM. Pt intubated and sedated (propofol), pressors x1. Pt was to have dialysis but there was a fistula malfunction, next HD dependent upon pressor needs. Pt will try to be weaned 1-2 days. WIll provide tube feeding recommendations, continue inpaitent monitoring f/up per policy. 03/16/24: F/up: Pt off propofol, off pressors x2 days, MAP 35, will re-calculate Tube Feeding however d/t MAP score do not increase above current rate at 25mls/hr, once MAP improves slowly increase to new goal rate. of 29mls/hr x24 hours, will f/up on 03/18/24 to determine MAP. 03/18/24: F/up: Pt off all sedation and pressors x3 days, remains intubated. Spontaneous breathing trial failed 03/17. Will adjust Tube Feeding to reflect these  Energy Requirements: Admission  Energy (kcal/day): 1600 -1700 kcals/kg ([facility]W [facility] 2010 BMI >30, age 60+)  Weight Used for Protein Requirements: Ideal  Protein (g/day): 147-159 g/day ([facility] Critically Ill Protein >= 2.5 g/kg IBW)  Method Used for Fluid Requirements: 1 ml/kcal  Fluid (ml/day): per critical care team    Nutrition Diagnosis:   Inadequate oral intake related to catabolic illness, impaired respiratory function as evidenced by NPO or clear liquid status due to medical condition, intubation, nutrition support - enteral nutrition  Increased nutrient needs related to catabolic illness, impaired respiratory function as evidenced by wounds, intubation, nutrition support - enteral nutrition, NPO or clear liquid status due to medical condition    Nutrition Interventions:   Food and/or Nutrient Delivery: Continue NPO, Continue Current Tube Feeding  Nutrition Education/Counseling: No recommendation at this time  Coordination of Nutrition Care: Continue to monitor while inpatient       Goals:  Previous Goal Met: Progressing toward Goal(s)  Goals: Tolerate nutrition support at goal rate, by next RD assessment       Nutrition Monitoring and Evaluation:   Behavioral-Environmental Outcomes: None Identified  Food/Nutrient Intake Outcomes: Enteral Nutrition Intake/Tolerance  Physical Signs/Symptoms Outcomes: Biochemical Data, GI Status, Fluid Status or Edema, Hemodynamic Status, Nutrition Focused Physical Findings, Skin, Weight    Discharge Planning:    Too soon to determine     Denise Cole RD, LD  Contact: [phone]

## 2024-03-22 NOTE — PROGRESS NOTES
Pt placed on SBT at this time  PS 5 PEEP +5 fio2 30%    HR 88  SPO2 94%    RR 12    Will continue to monitor.

## 2024-03-23 ENCOUNTER — APPOINTMENT (OUTPATIENT)
Dept: GENERAL RADIOLOGY | Age: 79
DRG: 004 | End: 2024-03-23
Payer: COMMERCIAL

## 2024-03-23 LAB
ALBUMIN SERPL-MCNC: 2.2 G/DL (ref 3.5–5.2)
ALP SERPL-CCNC: 84 U/L (ref 35–104)
ALT SERPL-CCNC: 25 U/L (ref 0–32)
ANION GAP SERPL CALCULATED.3IONS-SCNC: 14 MMOL/L (ref 7–16)
AST SERPL-CCNC: 28 U/L (ref 0–31)
BASOPHILS # BLD: 0.01 K/UL (ref 0–0.2)
BASOPHILS NFR BLD: 0 % (ref 0–2)
BILIRUB SERPL-MCNC: 0.4 MG/DL (ref 0–1.2)
BUN SERPL-MCNC: 23 MG/DL (ref 6–23)
CALCIUM SERPL-MCNC: 8.3 MG/DL (ref 8.6–10.2)
CHLORIDE SERPL-SCNC: 98 MMOL/L (ref 98–107)
CO2 SERPL-SCNC: 20 MMOL/L (ref 22–29)
CREAT SERPL-MCNC: 2.1 MG/DL (ref 0.5–1)
EOSINOPHIL # BLD: 0.01 K/UL (ref 0.05–0.5)
EOSINOPHILS RELATIVE PERCENT: 0 % (ref 0–6)
ERYTHROCYTE [DISTWIDTH] IN BLOOD BY AUTOMATED COUNT: 15.5 % (ref 11.5–15)
GFR SERPL CREATININE-BSD FRML MDRD: 24 ML/MIN/1.73M2
GLUCOSE BLD-MCNC: 159 MG/DL (ref 74–99)
GLUCOSE BLD-MCNC: 238 MG/DL (ref 74–99)
GLUCOSE BLD-MCNC: 262 MG/DL (ref 74–99)
GLUCOSE SERPL-MCNC: 226 MG/DL (ref 74–99)
HCT VFR BLD AUTO: 23 % (ref 34–48)
HGB BLD-MCNC: 7.2 G/DL (ref 11.5–15.5)
IMM GRANULOCYTES # BLD AUTO: 0.05 K/UL (ref 0–0.58)
IMM GRANULOCYTES NFR BLD: 1 % (ref 0–5)
LYMPHOCYTES NFR BLD: 0.66 K/UL (ref 1.5–4)
LYMPHOCYTES RELATIVE PERCENT: 9 % (ref 20–42)
MAGNESIUM SERPL-MCNC: 2.1 MG/DL (ref 1.6–2.6)
MCH RBC QN AUTO: 30.1 PG (ref 26–35)
MCHC RBC AUTO-ENTMCNC: 31.3 G/DL (ref 32–34.5)
MCV RBC AUTO: 96.2 FL (ref 80–99.9)
MONOCYTES NFR BLD: 0.49 K/UL (ref 0.1–0.95)
MONOCYTES NFR BLD: 7 % (ref 2–12)
NEUTROPHILS NFR BLD: 83 % (ref 43–80)
NEUTS SEG NFR BLD: 6.04 K/UL (ref 1.8–7.3)
PHOSPHATE SERPL-MCNC: 3.8 MG/DL (ref 2.5–4.5)
PLATELET # BLD AUTO: 157 K/UL (ref 130–450)
PMV BLD AUTO: 11.8 FL (ref 7–12)
POTASSIUM SERPL-SCNC: 4.9 MMOL/L (ref 3.5–5)
PROT SERPL-MCNC: 5.4 G/DL (ref 6.4–8.3)
RBC # BLD AUTO: 2.39 M/UL (ref 3.5–5.5)
SODIUM SERPL-SCNC: 132 MMOL/L (ref 132–146)
WBC OTHER # BLD: 7.3 K/UL (ref 4.5–11.5)

## 2024-03-23 PROCEDURE — 36556 INSERT NON-TUNNEL CV CATH: CPT

## 2024-03-23 PROCEDURE — 80053 COMPREHEN METABOLIC PANEL: CPT

## 2024-03-23 PROCEDURE — 94003 VENT MGMT INPAT SUBQ DAY: CPT

## 2024-03-23 PROCEDURE — 6360000002 HC RX W HCPCS: Performed by: INTERNAL MEDICINE

## 2024-03-23 PROCEDURE — 4A043B0 MEASUREMENT OF VENOUS PRESSURE, CENTRAL, PERCUTANEOUS APPROACH: ICD-10-PCS | Performed by: INTERNAL MEDICINE

## 2024-03-23 PROCEDURE — 6370000000 HC RX 637 (ALT 250 FOR IP): Performed by: INTERNAL MEDICINE

## 2024-03-23 PROCEDURE — 2000000000 HC ICU R&B

## 2024-03-23 PROCEDURE — 84100 ASSAY OF PHOSPHORUS: CPT

## 2024-03-23 PROCEDURE — 85025 COMPLETE CBC W/AUTO DIFF WBC: CPT

## 2024-03-23 PROCEDURE — C9113 INJ PANTOPRAZOLE SODIUM, VIA: HCPCS | Performed by: INTERNAL MEDICINE

## 2024-03-23 PROCEDURE — 36556 INSERT NON-TUNNEL CV CATH: CPT | Performed by: INTERNAL MEDICINE

## 2024-03-23 PROCEDURE — B543ZZA ULTRASONOGRAPHY OF RIGHT JUGULAR VEINS, GUIDANCE: ICD-10-PCS | Performed by: INTERNAL MEDICINE

## 2024-03-23 PROCEDURE — B548ZZA ULTRASONOGRAPHY OF SUPERIOR VENA CAVA, GUIDANCE: ICD-10-PCS | Performed by: INTERNAL MEDICINE

## 2024-03-23 PROCEDURE — 2580000003 HC RX 258: Performed by: INTERNAL MEDICINE

## 2024-03-23 PROCEDURE — A4216 STERILE WATER/SALINE, 10 ML: HCPCS | Performed by: INTERNAL MEDICINE

## 2024-03-23 PROCEDURE — 82962 GLUCOSE BLOOD TEST: CPT

## 2024-03-23 PROCEDURE — 99291 CRITICAL CARE FIRST HOUR: CPT | Performed by: INTERNAL MEDICINE

## 2024-03-23 PROCEDURE — 02HV33Z INSERTION OF INFUSION DEVICE INTO SUPERIOR VENA CAVA, PERCUTANEOUS APPROACH: ICD-10-PCS | Performed by: INTERNAL MEDICINE

## 2024-03-23 PROCEDURE — 83735 ASSAY OF MAGNESIUM: CPT

## 2024-03-23 PROCEDURE — 94640 AIRWAY INHALATION TREATMENT: CPT

## 2024-03-23 PROCEDURE — 99233 SBSQ HOSP IP/OBS HIGH 50: CPT | Performed by: INTERNAL MEDICINE

## 2024-03-23 PROCEDURE — 71045 X-RAY EXAM CHEST 1 VIEW: CPT

## 2024-03-23 RX ORDER — INSULIN GLARGINE 100 [IU]/ML
8 INJECTION, SOLUTION SUBCUTANEOUS 2 TIMES DAILY
Status: DISCONTINUED | OUTPATIENT
Start: 2024-03-23 | End: 2024-03-30 | Stop reason: HOSPADM

## 2024-03-23 RX ORDER — GLYCOPYRROLATE 1 MG/1
1 TABLET ORAL 3 TIMES DAILY
Status: DISCONTINUED | OUTPATIENT
Start: 2024-03-23 | End: 2024-03-28

## 2024-03-23 RX ADMIN — WATER 1000 MG: 1 INJECTION INTRAMUSCULAR; INTRAVENOUS; SUBCUTANEOUS at 08:42

## 2024-03-23 RX ADMIN — BUDESONIDE INHALATION 500 MCG: 0.5 SUSPENSION RESPIRATORY (INHALATION) at 16:46

## 2024-03-23 RX ADMIN — Medication 10 ML: at 20:44

## 2024-03-23 RX ADMIN — INSULIN LISPRO 4 UNITS: 100 INJECTION, SOLUTION INTRAVENOUS; SUBCUTANEOUS at 08:47

## 2024-03-23 RX ADMIN — APIXABAN 2.5 MG: 2.5 TABLET, FILM COATED ORAL at 08:42

## 2024-03-23 RX ADMIN — GLYCOPYRROLATE 1 MG: 1 TABLET ORAL at 13:49

## 2024-03-23 RX ADMIN — MIDODRINE HYDROCHLORIDE 15 MG: 5 TABLET ORAL at 05:39

## 2024-03-23 RX ADMIN — IPRATROPIUM BROMIDE AND ALBUTEROL SULFATE 1 DOSE: .5; 2.5 SOLUTION RESPIRATORY (INHALATION) at 09:39

## 2024-03-23 RX ADMIN — NITROGLYCERIN 1 INCH: 20 OINTMENT TOPICAL at 13:50

## 2024-03-23 RX ADMIN — IPRATROPIUM BROMIDE AND ALBUTEROL SULFATE 1 DOSE: .5; 2.5 SOLUTION RESPIRATORY (INHALATION) at 01:26

## 2024-03-23 RX ADMIN — ANTI-FUNGAL POWDER MICONAZOLE NITRATE TALC FREE: 1.42 POWDER TOPICAL at 08:42

## 2024-03-23 RX ADMIN — INSULIN GLARGINE 8 UNITS: 100 INJECTION, SOLUTION SUBCUTANEOUS at 21:04

## 2024-03-23 RX ADMIN — IPRATROPIUM BROMIDE AND ALBUTEROL SULFATE 1 DOSE: .5; 2.5 SOLUTION RESPIRATORY (INHALATION) at 21:05

## 2024-03-23 RX ADMIN — GLYCOPYRROLATE 1 MG: 1 TABLET ORAL at 20:50

## 2024-03-23 RX ADMIN — NITROGLYCERIN 1 INCH: 20 OINTMENT TOPICAL at 05:27

## 2024-03-23 RX ADMIN — IPRATROPIUM BROMIDE AND ALBUTEROL SULFATE 1 DOSE: .5; 2.5 SOLUTION RESPIRATORY (INHALATION) at 16:46

## 2024-03-23 RX ADMIN — IPRATROPIUM BROMIDE AND ALBUTEROL SULFATE 1 DOSE: .5; 2.5 SOLUTION RESPIRATORY (INHALATION) at 04:53

## 2024-03-23 RX ADMIN — INSULIN LISPRO 8 UNITS: 100 INJECTION, SOLUTION INTRAVENOUS; SUBCUTANEOUS at 16:16

## 2024-03-23 RX ADMIN — Medication 10 ML: at 08:43

## 2024-03-23 RX ADMIN — INSULIN GLARGINE 10 UNITS: 100 INJECTION, SOLUTION SUBCUTANEOUS at 08:46

## 2024-03-23 RX ADMIN — BUDESONIDE INHALATION 500 MCG: 0.5 SUSPENSION RESPIRATORY (INHALATION) at 04:53

## 2024-03-23 RX ADMIN — IPRATROPIUM BROMIDE AND ALBUTEROL SULFATE 1 DOSE: .5; 2.5 SOLUTION RESPIRATORY (INHALATION) at 13:11

## 2024-03-23 RX ADMIN — WATER 1000 MG: 1 INJECTION INTRAMUSCULAR; INTRAVENOUS; SUBCUTANEOUS at 20:43

## 2024-03-23 RX ADMIN — SODIUM CHLORIDE, PRESERVATIVE FREE 40 MG: 5 INJECTION INTRAVENOUS at 20:43

## 2024-03-23 RX ADMIN — PREDNISONE 5 MG: 5 TABLET ORAL at 08:42

## 2024-03-23 RX ADMIN — NITROGLYCERIN 1 INCH: 20 OINTMENT TOPICAL at 18:09

## 2024-03-23 RX ADMIN — ANTI-FUNGAL POWDER MICONAZOLE NITRATE TALC FREE: 1.42 POWDER TOPICAL at 20:52

## 2024-03-23 RX ADMIN — SODIUM CHLORIDE, PRESERVATIVE FREE 40 MG: 5 INJECTION INTRAVENOUS at 08:42

## 2024-03-23 RX ADMIN — INSULIN LISPRO 4 UNITS: 100 INJECTION, SOLUTION INTRAVENOUS; SUBCUTANEOUS at 05:38

## 2024-03-23 RX ADMIN — APIXABAN 2.5 MG: 2.5 TABLET, FILM COATED ORAL at 20:43

## 2024-03-23 ASSESSMENT — PULMONARY FUNCTION TESTS
PIF_VALUE: 50
PIF_VALUE: 23
PIF_VALUE: 23
PIF_VALUE: 25
PIF_VALUE: 27
PIF_VALUE: 28
PIF_VALUE: 29
PIF_VALUE: 20
PIF_VALUE: 26
PIF_VALUE: 27
PIF_VALUE: 25
PIF_VALUE: 25

## 2024-03-23 ASSESSMENT — PAIN SCALES - GENERAL: PAINLEVEL_OUTOF10: 0

## 2024-03-23 NOTE — CONSULTS
Assessment and Plan  Patient is a 79 y.o. female with the following medical Problems:   Acute on chronic hypoxic and hypercapnic respiratory failure requiring intubation and mechanical ventilation.  End-stage renal disease on hemodialysis  New onset atrial fibrillation on Eliquis  Left forearm AV fistula  Thrombocytopenia  Nicotine dependence  Klebsiella bacteremia  Metabolic encephalopathy  Morbid obesity  Type 2 diabetes with hyperglycemia      Plan of care:  Patient is currently on Eliquis which covers for DVT prophylaxis.  GI prophylaxis.  Optimize volume status.  Daily sedation vacation and awakening trial.  Optimize tube feeding and optimize nutrition.  Discontinue steroid  Discontinue midodrine  Robinul 1 mg p.o. 3 times daily for excessive secretions  Increase Lantus due to hyperglycemia    History of Present Illness:   Patient is a 79-year-old woman with end-stage renal disease on hemodialysis, anemia of chronic disease, COPD, dyslipidemia, depression, morbid obesity, and hypertension, who was admitted on March 10, 2024 with acute on chronic hypoxic and hypercapnic respiratory failure and hypotension.  Patient failed multiple weaning trials and scheduled to undergo tracheostomy and PEG next week.  Patient failed weaning trial today    Past Medical History:  Past Medical History:   Diagnosis Date    Anemia     Arrhythmia     AF    Arthritis     RA    Asthma     Chronic kidney disease     COPD (chronic obstructive pulmonary disease) ([facility])     Depression     Hemodialysis patient ([facility])     T-Th-Sat    History of blood transfusion     Hyperlipidemia     Hypothyroid     Sleep apnea     no CPAP    Type II or unspecified type diabetes mellitus without mention of complication, not stated as uncontrolled     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 06/15/2017      Past Surgical History:   Procedure Laterality Date    BRONCHOSCOPY Bilateral 3/15/2024    BRONCHOSCOPY DIAGNOSTIC OR  obtain due to medical condition    Physical Exam:   Vital Signs:  /81   Pulse 82   Temp 98.9 °F (37.2 °C) (Axillary)   Resp 18   Ht 1.651 m (5' 5\")   Wt 108 kg (238 lb)   SpO2 98%   BMI 39.61 kg/m²     Input/Output:  In: 908.3 [I.V.:490.3; NG/GT:418]  Out: -     Oxygen requirements: MV    Ventilator Information:  Vent Information  Ventilator ID: [id number]  Equipment Changed: Humidification  Ventilator Initiate: Yes  Vent Mode: AC/VC    General appearance: , not in pain or distress, in no respiratory distress    HEENT: Intubated  Neck: Supple, no jugular venous distension, lymphadenopathy, thyromegaly or carotid bruits  Chest: Decreased breath sounds, no wheezing, +ve crackles and no tenderness over ribs   Cardiovascular: Normal S1 , S2, regular rate and rhythm, no murmur, rub or gallop  Abdomen: Normal sounds present, soft, lax with no tenderness, no hepatosplenomegaly, and no masses  Extremities: No edema. Pulses are equally present.   Skin: intact, no rashes   Neurologic: Awake and follows command off sedation, no focal deficit     Investigations:  Labs, radiological imaging and cardiac work up were personally reviewed        ICU STAFF PHYSICIAN NOTE OF PERSONAL INVOLVEMENT IN CARE  As the attending physician, I certify that I personally reviewed the patient's history and personally examined the patient to confirm the physical findings described above, and that I reviewed the relevant imaging studies and available reports.  I also discussed the differential diagnosis and all of the proposed management plans with the patient and individuals accompanying the patient to this visit.  They had the opportunity to ask questions about the proposed management plans and to have those questions answered.    This patient has a high probability of sudden, clinically significant deterioration, which requires the highest level of physician preparedness to intervene urgently.  I managed/supervised life or organ

## 2024-03-23 NOTE — PROCEDURES
PROCEDURE: Left Internal jugular central venous catheter, U/S guided.    INDICATION:   Central venous pressure measurement    PROCEDURE :   Edgard Fox MD    CONSENT: Consent was obtained prior to the procedure. Indications, risks, and benefits were explained at length.  ?  PROCEDURE SUMMARY: A time-out was performed. The patient’s neck region was prepped and draped in sterile fashion using chlorhexidine scrub. Anesthesia was achieved with 1% lidocaine. The left internal jugular vein was accessed under ultrasound guidance using a finder needle and sheath. U/S was used. Venous blood was withdrawn and the sheath was advanced into the vein and the needle was withdrawn. A guidewire was advanced through the sheath. A small incision was made with a 10-blade scalpel and the sheath was exchanged for a dilator over a guidewire until appropriate dilation was obtained. The dilator was removed and an 8.5 [place] central venous three-lumen catheter was advanced over the guidewire and secured into place.   At time of procedure completion, all ports aspirated and flushed properly. Post-procedure x-ray is pending.    COMPLICATIONS:   NONE    ESTIMATED BLOOD LOSS:   2 ml        Electronically signed by Edgard Fox MD on 3/23/2024 at 1:06 PM

## 2024-03-23 NOTE — PLAN OF CARE
Problem: Safety - Adult  Goal: Free from fall injury  3/23/2024 0611 by Akosua Pollock RN  Outcome: Progressing  3/22/2024 2002 by Mary Grey RN  Outcome: Progressing     Problem: Discharge Planning  Goal: Discharge to home or other facility with appropriate resources  3/22/2024 2002 by Mary Grey RN  Outcome: Progressing     Problem: Pain  Goal: Verbalizes/displays adequate comfort level or baseline comfort level  3/23/2024 0611 by Akosua Pollock RN  Outcome: Progressing  3/22/2024 2002 by Mary Grey RN  Outcome: Progressing     Problem: Skin/Tissue Integrity  Goal: Absence of new skin breakdown  Description: 1.  Monitor for areas of redness and/or skin breakdown  2.  Assess vascular access sites hourly  3.  Every 4-6 hours minimum:  Change oxygen saturation probe site  4.  Every 4-6 hours:  If on nasal continuous positive airway pressure, respiratory therapy assess nares and determine need for appliance change or resting period.  3/23/2024 0611 by Akosua Pollock RN  Outcome: Progressing  3/22/2024 2002 by Mary Grey RN  Outcome: Progressing     Problem: Confusion  Goal: Confusion, delirium, dementia, or psychosis is improved or at baseline  Description: INTERVENTIONS:  1. Assess for possible contributors to thought disturbance, including medications, impaired vision or hearing, underlying metabolic abnormalities, dehydration, psychiatric diagnoses, and notify attending LIP  2. [place] high risk fall precautions, as indicated  3. Provide frequent short contacts to provide reality reorientation, refocusing and direction  4. Decrease environmental stimuli, including noise as appropriate  5. Monitor and intervene to maintain adequate nutrition, hydration, elimination, sleep and activity  6. If unable to ensure safety without constant attention obtain sitter and review sitter guidelines with assigned personnel  7. Initiate Psychosocial CNS and Spiritual Care consult, as indicated  3/23/2024 0611 by Phill

## 2024-03-23 NOTE — PLAN OF CARE
Problem: Safety - Adult  Goal: Free from fall injury  Outcome: Progressing     Problem: Discharge Planning  Goal: Discharge to home or other facility with appropriate resources  Outcome: Progressing     Problem: Pain  Goal: Verbalizes/displays adequate comfort level or baseline comfort level  Outcome: Progressing     Problem: Skin/Tissue Integrity  Goal: Absence of new skin breakdown  Description: 1.  Monitor for areas of redness and/or skin breakdown  2.  Assess vascular access sites hourly  3.  Every 4-6 hours minimum:  Change oxygen saturation probe site  4.  Every 4-6 hours:  If on nasal continuous positive airway pressure, respiratory therapy assess nares and determine need for appliance change or resting period.  Outcome: Progressing     Problem: Confusion  Goal: Confusion, delirium, dementia, or psychosis is improved or at baseline  Description: INTERVENTIONS:  1. Assess for possible contributors to thought disturbance, including medications, impaired vision or hearing, underlying metabolic abnormalities, dehydration, psychiatric diagnoses, and notify attending LIP  2. [place] high risk fall precautions, as indicated  3. Provide frequent short contacts to provide reality reorientation, refocusing and direction  4. Decrease environmental stimuli, including noise as appropriate  5. Monitor and intervene to maintain adequate nutrition, hydration, elimination, sleep and activity  6. If unable to ensure safety without constant attention obtain sitter and review sitter guidelines with assigned personnel  7. Initiate Psychosocial CNS and Spiritual Care consult, as indicated  Outcome: Progressing     Problem: Chronic Conditions and Co-morbidities  Goal: Patient's chronic conditions and co-morbidity symptoms are monitored and maintained or improved  Outcome: Progressing     Problem: ABCDS Injury Assessment  Goal: Absence of physical injury  Outcome: Progressing     Problem: Respiratory - Adult  Goal: Achieves

## 2024-03-23 NOTE — PROGRESS NOTES
Progress Note  3/23/2024 2:50 PM  Subjective:   Admit Date: 3/10/2024  PCP: Grey Chua PA-C  Interval History: Patient examined , failed trial of extubation today , currently having triple lumen placed   In ICU     Diet: ADULT TUBE FEEDING; Orogastric; Renal Formula; Continuous; 10; Yes; 10; Q 6 hours; 29; 30; Q 4 hours; Protein, Other/Comment; Four doses per day: 2 at breakfast, 1 at lunch, 1 at dinner; 3 Doses; TID    Data:   Scheduled Meds:   insulin glargine  8 Units SubCUTAneous BID    glycopyrrolate  1 mg Oral TID    miconazole   Topical BID    cefTRIAXone (ROCEPHIN) IV  1,000 mg IntraVENous Q12H    nitroglycerin  1 inch Topical 4 times per day    epoetin andrzej-epbx  2,000 Units SubCUTAneous Once per day on Mon Wed Fri    apixaban  2.5 mg Oral BID    scopolamine  1 patch TransDERmal Q72H    [Held by provider] midodrine  15 mg Oral q8h    insulin lispro  0-16 Units SubCUTAneous Q4H    pantoprazole (PROTONIX) 40 mg in sodium chloride (PF) 0.9 % 10 mL injection  40 mg IntraVENous Q12H    budesonide  0.5 mg Nebulization BID RT    ipratropium 0.5 mg-albuterol 2.5 mg  1 Dose Inhalation Q4H RT    sodium chloride flush  10 mL IntraVENous 2 times per day     Continuous Infusions:   dextrose      sodium chloride       PRN Meds:sodium chloride flush, promethazine **OR** ondansetron, polyethylene glycol, acetaminophen **OR** acetaminophen, glucose, dextrose bolus **OR** dextrose bolus, glucagon (rDNA), dextrose, sodium chloride  I/O last 3 completed shifts:  In: 908.3 [I.V.:490.3; NG/GT:418]  Out: -   I/O this shift:  In: 313 [NG/GT:313]  Out: 0     Intake/Output Summary (Last 24 hours) at 3/23/2024 1450  Last data filed at 3/23/2024 1357  Gross per 24 hour   Intake 731 ml   Output 0 ml   Net 731 ml     CBC:   Recent Labs     03/21/24  0523 03/22/24  0402 03/23/24  0423   WBC 7.6 6.8 7.3   HGB 7.7* 7.4* 7.2*   *  --  157     BMP:    Recent Labs     03/21/24  0523 03/22/24  0402 03/23/24  0423   * 131*

## 2024-03-23 NOTE — PROGRESS NOTES
Admitting Date and Time: 3/10/2024  2:37 PM  Admit Dx: Hyperkalemia [E87.5]  Encounter for dialysis ([facility]) [Z99.2]  End stage renal disease on dialysis ([facility]) [N18.6, Z99.2]  Acute respiratory failure with hypoxia ([facility]) [J96.01]  Acute on chronic respiratory failure with hypoxia and hypercapnia ([facility]) [J96.21, J96.22]  Leukocytosis, unspecified type [D72.829]  Acute metabolic encephalopathy [G93.41]    Subjective:  3/18 , pt is intubated off sedation follows commands , opens her eyes , seems comfortable   3/19 pt still intubated and follows commands , off sedation , bronchoscopy was not done yesterday.  Since her secretions were much better.  3/20 , pt still intubated couldn't tolerate SBT yesterday after bronchoscopy , no polyps or masses seen , only secretions and cultures sent , will try again for extubation today   3/21 pt still intubated and  didn't tolerate SBT yesterday or today ? Might need Trach , nurse can't get hold of the family to discuss , I will to reach them later today  Needs tessio  tunneled cath today    3/22 pt is intubated and alert and follows commands   3/23 pt is awake and looks comfortable , and responsive and very alert      insulin glargine  8 Units SubCUTAneous BID    glycopyrrolate  1 mg Oral TID    miconazole   Topical BID    cefTRIAXone (ROCEPHIN) IV  1,000 mg IntraVENous Q12H    nitroglycerin  1 inch Topical 4 times per day    epoetin andrzej-epbx  2,000 Units SubCUTAneous Once per day on Mon Wed Fri    apixaban  2.5 mg Oral BID    scopolamine  1 patch TransDERmal Q72H    [Held by provider] midodrine  15 mg Oral q8h    insulin lispro  0-16 Units SubCUTAneous Q4H    pantoprazole (PROTONIX) 40 mg in sodium chloride (PF) 0.9 % 10 mL injection  40 mg IntraVENous Q12H    budesonide  0.5 mg Nebulization BID RT    ipratropium 0.5 mg-albuterol 2.5 mg  1 Dose Inhalation Q4H RT    sodium chloride flush  10 mL IntraVENous 2 times per day     sodium chloride flush, 10 mL, PRN  promethazine, 12.5 mg,  None   Final Result   1.  No acute process in abdomen or pelvis.      2.  Atrophy of both kidneys suggesting chronic medical renal disease.      3.  Stable loculated fluid collection associated with ventral abdominal wall   musculature which may represent a stable postoperative seroma.  This lesion   is similar in size compared with prior from [facility], 2022.         XR CHEST 1 VIEW   Final Result   1. Central bronchial wall thickening and chronic interstitial coarsening.   2. Low lung volumes.   3. Stable right hemidiaphragm eventration.   4. ASVD, cardiomegaly.         CT HEAD WO CONTRAST   Final Result   No evidence of acute intracranial hemorrhage or mass effect.         Vascular duplex lower extremity arteries bilateral    (Results Pending)   XR CHEST PORTABLE    (Results Pending)       Assessment:  Principal Problem:    Acute respiratory failure with hypoxia (HCC)  Active Problems:    Hyperkalemia    Palliative care encounter  Resolved Problems:    * No resolved hospital problems. *      Plan: History of present illness from History and Physical:  Per dr LEOS on 3/10;   This is a 79 y.o. female  has a past medical history of Anemia, Arrhythmia, Arthritis, Asthma, Chronic kidney disease, COPD (chronic obstructive pulmonary disease) ([facility]), Depression, Hemodialysis patient ([facility]), Hyperlipidemia, Hypothyroid, Sleep apnea, Type II or unspecified type diabetes mellitus without mention of complication, not stated as uncontrolled, and 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. presented with AMS, Hypotension, Missed HD  for last few days prior to arrival to the hospital.  She missed her HD and found to be in a poor living condition at home with dog feces. She was found to be hypotensive with  hypercapnia and placed on BIPAP. Nephrology consulted for fluid overload with hyperkalemia and was given HD but could no finish up the session due to fistula malfunction/bleeding. Poor historian,

## 2024-03-23 NOTE — PROGRESS NOTES
Assessment and Plan  Patient is a 79 y.o. female with the following medical Problems:   Acute on chronic hypoxic and hypercapnic respiratory failure requiring intubation and mechanical ventilation.  End-stage renal disease on hemodialysis  New onset atrial fibrillation on Eliquis  Left forearm AV fistula  Thrombocytopenia  Nicotine dependence  Klebsiella bacteremia  Metabolic encephalopathy  Morbid obesity  Type 2 diabetes with hyperglycemia      Plan of care:  Patient is currently on Eliquis which covers for DVT prophylaxis.  GI prophylaxis.  Optimize volume status.  Daily sedation vacation and awakening trial.  Optimize tube feeding and optimize nutrition.  Discontinue steroid  Discontinue midodrine  Robinul 1 mg p.o. 3 times daily for excessive secretions  Increase Lantus due to hyperglycemia    History of Present Illness:   Patient is a 79-year-old woman with end-stage renal disease on hemodialysis, anemia of chronic disease, COPD, dyslipidemia, depression, morbid obesity, and hypertension, who was admitted on March 10, 2024 with acute on chronic hypoxic and hypercapnic respiratory failure and hypotension.  Patient failed multiple weaning trials and scheduled to undergo tracheostomy and PEG next week.  Patient failed weaning trial today    Past Medical History:  Past Medical History:   Diagnosis Date    Anemia     Arrhythmia     AF    Arthritis     RA    Asthma     Chronic kidney disease     COPD (chronic obstructive pulmonary disease) ([facility])     Depression     Hemodialysis patient ([facility])     T-Th-Sat    History of blood transfusion     Hyperlipidemia     Hypothyroid     Sleep apnea     no CPAP    Type II or unspecified type diabetes mellitus without mention of complication, not stated as uncontrolled     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 06/15/2017      Past Surgical History:   Procedure Laterality Date    BRONCHOSCOPY Bilateral 3/15/2024    BRONCHOSCOPY DIAGNOSTIC OR  to medical condition    Physical Exam:   Vital Signs:  /81   Pulse 82   Temp 98.9 °F (37.2 °C) (Axillary)   Resp 18   Ht 1.651 m (5' 5\")   Wt 108 kg (238 lb)   SpO2 98%   BMI 39.61 kg/m²     Input/Output:  In: 908.3 [I.V.:490.3; NG/GT:418]  Out: -     Oxygen requirements: MV    Ventilator Information:  Vent Information  Ventilator ID: [id number]  Equipment Changed: Humidification  Ventilator Initiate: Yes  Vent Mode: AC/VC    General appearance: , not in pain or distress, in no respiratory distress    HEENT: Intubated  Neck: Supple, no jugular venous distension, lymphadenopathy, thyromegaly or carotid bruits  Chest: Decreased breath sounds, no wheezing, +ve crackles and no tenderness over ribs   Cardiovascular: Normal S1 , S2, regular rate and rhythm, no murmur, rub or gallop  Abdomen: Normal sounds present, soft, lax with no tenderness, no hepatosplenomegaly, and no masses  Extremities: No edema. Pulses are equally present.   Skin: intact, no rashes   Neurologic: Awake and follows command off sedation, no focal deficit     Investigations:  Labs, radiological imaging and cardiac work up were personally reviewed        ICU STAFF PHYSICIAN NOTE OF PERSONAL INVOLVEMENT IN CARE  As the attending physician, I certify that I personally reviewed the patient's history and personally examined the patient to confirm the physical findings described above, and that I reviewed the relevant imaging studies and available reports.  I also discussed the differential diagnosis and all of the proposed management plans with the patient and individuals accompanying the patient to this visit.  They had the opportunity to ask questions about the proposed management plans and to have those questions answered.    This patient has a high probability of sudden, clinically significant deterioration, which requires the highest level of physician preparedness to intervene urgently.  I managed/supervised life or organ supporting

## 2024-03-24 ENCOUNTER — APPOINTMENT (OUTPATIENT)
Dept: GENERAL RADIOLOGY | Age: 79
DRG: 004 | End: 2024-03-24
Payer: COMMERCIAL

## 2024-03-24 LAB
ALBUMIN SERPL-MCNC: 2.3 G/DL (ref 3.5–5.2)
ALP SERPL-CCNC: 84 U/L (ref 35–104)
ALT SERPL-CCNC: 20 U/L (ref 0–32)
ANION GAP SERPL CALCULATED.3IONS-SCNC: 12 MMOL/L (ref 7–16)
AST SERPL-CCNC: 14 U/L (ref 0–31)
BASOPHILS # BLD: 0 K/UL (ref 0–0.2)
BASOPHILS NFR BLD: 0 % (ref 0–2)
BILIRUB SERPL-MCNC: 0.4 MG/DL (ref 0–1.2)
BUN SERPL-MCNC: 32 MG/DL (ref 6–23)
CALCIUM SERPL-MCNC: 8.3 MG/DL (ref 8.6–10.2)
CHLORIDE SERPL-SCNC: 97 MMOL/L (ref 98–107)
CO2 SERPL-SCNC: 23 MMOL/L (ref 22–29)
CREAT SERPL-MCNC: 2.9 MG/DL (ref 0.5–1)
EOSINOPHIL # BLD: 0.07 K/UL (ref 0.05–0.5)
EOSINOPHILS RELATIVE PERCENT: 1 % (ref 0–6)
ERYTHROCYTE [DISTWIDTH] IN BLOOD BY AUTOMATED COUNT: 15.4 % (ref 11.5–15)
GFR SERPL CREATININE-BSD FRML MDRD: 16 ML/MIN/1.73M2
GLUCOSE BLD-MCNC: 156 MG/DL (ref 74–99)
GLUCOSE BLD-MCNC: 190 MG/DL (ref 74–99)
GLUCOSE BLD-MCNC: 196 MG/DL (ref 74–99)
GLUCOSE BLD-MCNC: 201 MG/DL (ref 74–99)
GLUCOSE BLD-MCNC: 206 MG/DL (ref 74–99)
GLUCOSE SERPL-MCNC: 138 MG/DL (ref 74–99)
HCT VFR BLD AUTO: 21.7 % (ref 34–48)
HCT VFR BLD AUTO: 23.8 % (ref 34–48)
HGB BLD-MCNC: 6.7 G/DL (ref 11.5–15.5)
HGB BLD-MCNC: 7.5 G/DL (ref 11.5–15.5)
LYMPHOCYTES NFR BLD: 0.37 K/UL (ref 1.5–4)
LYMPHOCYTES RELATIVE PERCENT: 4 % (ref 20–42)
MAGNESIUM SERPL-MCNC: 2.2 MG/DL (ref 1.6–2.6)
MCH RBC QN AUTO: 30 PG (ref 26–35)
MCHC RBC AUTO-ENTMCNC: 30.9 G/DL (ref 32–34.5)
MCV RBC AUTO: 97.3 FL (ref 80–99.9)
MONOCYTES NFR BLD: 0.15 K/UL (ref 0.1–0.95)
MONOCYTES NFR BLD: 2 % (ref 2–12)
MYELOCYTES ABSOLUTE COUNT: 0.07 K/UL
MYELOCYTES: 1 %
NEUTROPHILS NFR BLD: 92 % (ref 43–80)
NEUTS SEG NFR BLD: 7.83 K/UL (ref 1.8–7.3)
PHOSPHATE SERPL-MCNC: 3.9 MG/DL (ref 2.5–4.5)
PLATELET, FLUORESCENCE: 141 K/UL (ref 130–450)
PMV BLD AUTO: 10.8 FL (ref 7–12)
POTASSIUM SERPL-SCNC: 3.9 MMOL/L (ref 3.5–5)
PROT SERPL-MCNC: 5.4 G/DL (ref 6.4–8.3)
RBC # BLD AUTO: 2.23 M/UL (ref 3.5–5.5)
RBC # BLD: ABNORMAL 10*6/UL
SODIUM SERPL-SCNC: 132 MMOL/L (ref 132–146)
WBC OTHER # BLD: 8.5 K/UL (ref 4.5–11.5)

## 2024-03-24 PROCEDURE — P9016 RBC LEUKOCYTES REDUCED: HCPCS

## 2024-03-24 PROCEDURE — 86923 COMPATIBILITY TEST ELECTRIC: CPT

## 2024-03-24 PROCEDURE — A4216 STERILE WATER/SALINE, 10 ML: HCPCS | Performed by: INTERNAL MEDICINE

## 2024-03-24 PROCEDURE — 6370000000 HC RX 637 (ALT 250 FOR IP): Performed by: INTERNAL MEDICINE

## 2024-03-24 PROCEDURE — 84100 ASSAY OF PHOSPHORUS: CPT

## 2024-03-24 PROCEDURE — 6360000002 HC RX W HCPCS: Performed by: INTERNAL MEDICINE

## 2024-03-24 PROCEDURE — 94640 AIRWAY INHALATION TREATMENT: CPT

## 2024-03-24 PROCEDURE — 36592 COLLECT BLOOD FROM PICC: CPT

## 2024-03-24 PROCEDURE — 86850 RBC ANTIBODY SCREEN: CPT

## 2024-03-24 PROCEDURE — 2580000003 HC RX 258: Performed by: INTERNAL MEDICINE

## 2024-03-24 PROCEDURE — 99233 SBSQ HOSP IP/OBS HIGH 50: CPT | Performed by: INTERNAL MEDICINE

## 2024-03-24 PROCEDURE — 85014 HEMATOCRIT: CPT

## 2024-03-24 PROCEDURE — 2000000000 HC ICU R&B

## 2024-03-24 PROCEDURE — 86900 BLOOD TYPING SEROLOGIC ABO: CPT

## 2024-03-24 PROCEDURE — 80053 COMPREHEN METABOLIC PANEL: CPT

## 2024-03-24 PROCEDURE — 30233N1 TRANSFUSION OF NONAUTOLOGOUS RED BLOOD CELLS INTO PERIPHERAL VEIN, PERCUTANEOUS APPROACH: ICD-10-PCS | Performed by: INTERNAL MEDICINE

## 2024-03-24 PROCEDURE — 99291 CRITICAL CARE FIRST HOUR: CPT | Performed by: INTERNAL MEDICINE

## 2024-03-24 PROCEDURE — C9113 INJ PANTOPRAZOLE SODIUM, VIA: HCPCS | Performed by: INTERNAL MEDICINE

## 2024-03-24 PROCEDURE — 71045 X-RAY EXAM CHEST 1 VIEW: CPT

## 2024-03-24 PROCEDURE — 6360000002 HC RX W HCPCS

## 2024-03-24 PROCEDURE — 86901 BLOOD TYPING SEROLOGIC RH(D): CPT

## 2024-03-24 PROCEDURE — 85018 HEMOGLOBIN: CPT

## 2024-03-24 PROCEDURE — 2700000000 HC OXYGEN THERAPY PER DAY

## 2024-03-24 PROCEDURE — 82962 GLUCOSE BLOOD TEST: CPT

## 2024-03-24 PROCEDURE — 85025 COMPLETE CBC W/AUTO DIFF WBC: CPT

## 2024-03-24 PROCEDURE — 94003 VENT MGMT INPAT SUBQ DAY: CPT

## 2024-03-24 PROCEDURE — 36430 TRANSFUSION BLD/BLD COMPNT: CPT

## 2024-03-24 PROCEDURE — 2500000003 HC RX 250 WO HCPCS: Performed by: INTERNAL MEDICINE

## 2024-03-24 PROCEDURE — 83735 ASSAY OF MAGNESIUM: CPT

## 2024-03-24 RX ORDER — POTASSIUM CHLORIDE 29.8 MG/ML
20 INJECTION INTRAVENOUS ONCE
Status: COMPLETED | OUTPATIENT
Start: 2024-03-24 | End: 2024-03-24

## 2024-03-24 RX ORDER — HEPARIN SODIUM 5000 [USP'U]/ML
5000 INJECTION, SOLUTION INTRAVENOUS; SUBCUTANEOUS EVERY 8 HOURS SCHEDULED
Status: DISPENSED | OUTPATIENT
Start: 2024-03-24 | End: 2024-03-26

## 2024-03-24 RX ORDER — SODIUM CHLORIDE 9 MG/ML
INJECTION, SOLUTION INTRAVENOUS PRN
Status: DISCONTINUED | OUTPATIENT
Start: 2024-03-24 | End: 2024-03-24

## 2024-03-24 RX ORDER — DEXMEDETOMIDINE HYDROCHLORIDE 4 UG/ML
.1-1.5 INJECTION, SOLUTION INTRAVENOUS CONTINUOUS
Status: DISCONTINUED | OUTPATIENT
Start: 2024-03-24 | End: 2024-03-27

## 2024-03-24 RX ADMIN — SODIUM CHLORIDE, PRESERVATIVE FREE 40 MG: 5 INJECTION INTRAVENOUS at 21:54

## 2024-03-24 RX ADMIN — Medication 10 ML: at 21:56

## 2024-03-24 RX ADMIN — INSULIN GLARGINE 8 UNITS: 100 INJECTION, SOLUTION SUBCUTANEOUS at 07:55

## 2024-03-24 RX ADMIN — APIXABAN 2.5 MG: 2.5 TABLET, FILM COATED ORAL at 07:57

## 2024-03-24 RX ADMIN — NITROGLYCERIN 1 INCH: 20 OINTMENT TOPICAL at 06:00

## 2024-03-24 RX ADMIN — ALTEPLASE 1 MG: 2.2 INJECTION, POWDER, LYOPHILIZED, FOR SOLUTION INTRAVENOUS at 15:48

## 2024-03-24 RX ADMIN — POTASSIUM CHLORIDE 20 MEQ: 29.8 INJECTION, SOLUTION INTRAVENOUS at 06:30

## 2024-03-24 RX ADMIN — NITROGLYCERIN 1 INCH: 20 OINTMENT TOPICAL at 17:28

## 2024-03-24 RX ADMIN — Medication 10 ML: at 07:56

## 2024-03-24 RX ADMIN — GLYCOPYRROLATE 1 MG: 1 TABLET ORAL at 07:57

## 2024-03-24 RX ADMIN — ANTI-FUNGAL POWDER MICONAZOLE NITRATE TALC FREE: 1.42 POWDER TOPICAL at 07:57

## 2024-03-24 RX ADMIN — Medication 0.4 MCG/KG/HR: at 11:26

## 2024-03-24 RX ADMIN — BUDESONIDE INHALATION 500 MCG: 0.5 SUSPENSION RESPIRATORY (INHALATION) at 17:12

## 2024-03-24 RX ADMIN — IPRATROPIUM BROMIDE AND ALBUTEROL SULFATE 1 DOSE: .5; 2.5 SOLUTION RESPIRATORY (INHALATION) at 17:12

## 2024-03-24 RX ADMIN — INSULIN LISPRO 4 UNITS: 100 INJECTION, SOLUTION INTRAVENOUS; SUBCUTANEOUS at 22:57

## 2024-03-24 RX ADMIN — BUDESONIDE INHALATION 500 MCG: 0.5 SUSPENSION RESPIRATORY (INHALATION) at 04:30

## 2024-03-24 RX ADMIN — IPRATROPIUM BROMIDE AND ALBUTEROL SULFATE 1 DOSE: .5; 2.5 SOLUTION RESPIRATORY (INHALATION) at 13:16

## 2024-03-24 RX ADMIN — IPRATROPIUM BROMIDE AND ALBUTEROL SULFATE 1 DOSE: .5; 2.5 SOLUTION RESPIRATORY (INHALATION) at 04:30

## 2024-03-24 RX ADMIN — SODIUM CHLORIDE, PRESERVATIVE FREE 40 MG: 5 INJECTION INTRAVENOUS at 07:54

## 2024-03-24 RX ADMIN — INSULIN LISPRO 4 UNITS: 100 INJECTION, SOLUTION INTRAVENOUS; SUBCUTANEOUS at 00:31

## 2024-03-24 RX ADMIN — IPRATROPIUM BROMIDE AND ALBUTEROL SULFATE 1 DOSE: .5; 2.5 SOLUTION RESPIRATORY (INHALATION) at 09:13

## 2024-03-24 RX ADMIN — GLYCOPYRROLATE 1 MG: 1 TABLET ORAL at 13:09

## 2024-03-24 RX ADMIN — IPRATROPIUM BROMIDE AND ALBUTEROL SULFATE 1 DOSE: .5; 2.5 SOLUTION RESPIRATORY (INHALATION) at 21:33

## 2024-03-24 RX ADMIN — IPRATROPIUM BROMIDE AND ALBUTEROL SULFATE 1 DOSE: .5; 2.5 SOLUTION RESPIRATORY (INHALATION) at 02:11

## 2024-03-24 RX ADMIN — WATER 1000 MG: 1 INJECTION INTRAMUSCULAR; INTRAVENOUS; SUBCUTANEOUS at 09:30

## 2024-03-24 RX ADMIN — HEPARIN SODIUM 5000 UNITS: 5000 INJECTION INTRAVENOUS; SUBCUTANEOUS at 22:48

## 2024-03-24 RX ADMIN — NITROGLYCERIN 1 INCH: 20 OINTMENT TOPICAL at 13:09

## 2024-03-24 RX ADMIN — GLYCOPYRROLATE 1 MG: 1 TABLET ORAL at 22:48

## 2024-03-24 RX ADMIN — NITROGLYCERIN 1 INCH: 20 OINTMENT TOPICAL at 00:28

## 2024-03-24 RX ADMIN — ANTI-FUNGAL POWDER MICONAZOLE NITRATE TALC FREE: 1.42 POWDER TOPICAL at 22:49

## 2024-03-24 ASSESSMENT — PULMONARY FUNCTION TESTS
PIF_VALUE: 36
PIF_VALUE: 34
PIF_VALUE: 30
PIF_VALUE: 24
PIF_VALUE: 50
PIF_VALUE: 25
PIF_VALUE: 29
PIF_VALUE: 34
PIF_VALUE: 33
PIF_VALUE: 19
PIF_VALUE: 28
PIF_VALUE: 36
PIF_VALUE: 32
PIF_VALUE: 33
PIF_VALUE: 37
PIF_VALUE: 30
PIF_VALUE: 34
PIF_VALUE: 21
PIF_VALUE: 28

## 2024-03-24 ASSESSMENT — PAIN SCALES - GENERAL
PAINLEVEL_OUTOF10: 0

## 2024-03-24 NOTE — PROGRESS NOTES
Assessment and Plan  Patient is a 79 y.o. female with the following medical Problems:   Acute on chronic hypoxic and hypercapnic respiratory failure requiring intubation and mechanical ventilation.  End-stage renal disease on hemodialysis  New onset atrial fibrillation on Eliquis  Left forearm AV fistula  Thrombocytopenia  Nicotine dependence  Klebsiella bacteremia  Metabolic encephalopathy  Morbid obesity  Type 2 diabetes with hyperglycemia      Plan of care:  Patient is currently on Eliquis which covers for DVT prophylaxis.  GI prophylaxis.  Optimize volume status.  Daily sedation vacation and awakening trial.  Optimize tube feeding and optimize nutrition.  Discontinue steroid  Discontinue midodrine  Robinul 1 mg p.o. 3 times daily for excessive secretions  Lantus due to hyperglycemia  Transfuse to keep hemoglobin above 7  Spontaneous breathing trial while on Precedex    History of Present Illness:   Patient is a 79-year-old woman with end-stage renal disease on hemodialysis, anemia of chronic disease, COPD, dyslipidemia, depression, morbid obesity, and hypertension, who was admitted on March 10, 2024 with acute on chronic hypoxic and hypercapnic respiratory failure and hypotension.  Patient failed multiple weaning trials and scheduled to undergo tracheostomy and PEG next week.  Patient failed weaning trial today    Daily progress:  March 24, 2024: Patient remains sedated, intubated, mechanically ventilated.  Secretions have improved.  She was transfused 1 unit of packed red blood cells for low hemoglobin.  She is awake and communicating.  Blood sugars much better improved    Past Medical History:  Past Medical History:   Diagnosis Date    Anemia     Arrhythmia     AF    Arthritis     RA    Asthma     Chronic kidney disease     COPD (chronic obstructive pulmonary disease) (HCC)     Depression     Hemodialysis patient ([facility])     T-Th-Sat    History of blood transfusion     Hyperlipidemia     Hypothyroid     Sleep  tobacco: Never   Vaping Use    Vaping Use: Never used   Substance and Sexual Activity    Alcohol use: Yes     Comment: on holidays    Drug use: No    Sexual activity: Not Currently   Other Topics Concern    Not on file   Social History Narrative    Not on file     Social Determinants of Health     Financial Resource Strain: Low Risk  (6/2/2023)    Overall Financial Resource Strain (CARDIA)     Difficulty of Paying Living Expenses: Not hard at all   Food Insecurity: No Food Insecurity (3/11/2024)    Hunger Vital Sign     Worried About Running Out of Food in the Last Year: Never true     Ran Out of Food in the Last Year: Never true   Transportation Needs: Patient Unable To Answer (3/11/2024)    PRAPARE - Transportation     Lack of Transportation (Medical): Patient unable to answer     Lack of Transportation (Non-Medical): Patient unable to answer   Physical Activity: Inactive (6/2/2023)    Exercise Vital Sign     Days of Exercise per Week: 0 days     Minutes of Exercise per Session: 0 min   Stress: Not on file   Social Connections: Not on file   Intimate Partner Violence: Not on file   Housing Stability: Patient Unable To Answer (3/11/2024)    Housing Stability Vital Sign     Unable to Pay for Housing in the Last Year: Patient unable to answer     Number of Places Lived in the Last Year: 1     Unstable Housing in the Last Year: Patient unable to answer       Current Medications:     Current Facility-Administered Medications:     0.9 % sodium chloride infusion, , IntraVENous, PRN, Wanda Francois, APRN - CNP    dexmedeTOMIDine (PRECEDEX) 400 mcg in sodium chloride 0.9 % 100 mL infusion, 0.1-1.5 mcg/kg/hr, IntraVENous, Continuous, Edgard Henson MD    insulin glargine (LANTUS) injection vial 8 Units, 8 Units, SubCUTAneous, BID, Edgard Henson MD, 8 Units at 03/24/24 0752    glycopyrrolate (ROBINUL) tablet 1 mg, 1 mg, Oral, TID, Edgard Henson MD, 1 mg at 03/24/24 0754

## 2024-03-24 NOTE — PLAN OF CARE
Problem: Discharge Planning  Goal: Discharge to home or other facility with appropriate resources  Outcome: Not Progressing     Problem: Safety - Adult  Goal: Free from fall injury  Outcome: Progressing     Problem: Pain  Goal: Verbalizes/displays adequate comfort level or baseline comfort level  Outcome: Progressing     Problem: Skin/Tissue Integrity  Goal: Absence of new skin breakdown  Description: 1.  Monitor for areas of redness and/or skin breakdown  2.  Assess vascular access sites hourly  3.  Every 4-6 hours minimum:  Change oxygen saturation probe site  4.  Every 4-6 hours:  If on nasal continuous positive airway pressure, respiratory therapy assess nares and determine need for appliance change or resting period.  Outcome: Progressing     Problem: Confusion  Goal: Confusion, delirium, dementia, or psychosis is improved or at baseline  Description: INTERVENTIONS:  1. Assess for possible contributors to thought disturbance, including medications, impaired vision or hearing, underlying metabolic abnormalities, dehydration, psychiatric diagnoses, and notify attending LIP  2. [place] high risk fall precautions, as indicated  3. Provide frequent short contacts to provide reality reorientation, refocusing and direction  4. Decrease environmental stimuli, including noise as appropriate  5. Monitor and intervene to maintain adequate nutrition, hydration, elimination, sleep and activity  6. If unable to ensure safety without constant attention obtain sitter and review sitter guidelines with assigned personnel  7. Initiate Psychosocial CNS and Spiritual Care consult, as indicated  Outcome: Progressing     Problem: Chronic Conditions and Co-morbidities  Goal: Patient's chronic conditions and co-morbidity symptoms are monitored and maintained or improved  Outcome: Progressing     Problem: ABCDS Injury Assessment  Goal: Absence of physical injury  Outcome: Progressing     Problem: Respiratory - Adult  Goal: Achieves

## 2024-03-24 NOTE — PROGRESS NOTES
Admitting Date and Time: 3/10/2024  2:37 PM  Admit Dx: Hyperkalemia [E87.5]  Encounter for dialysis ([facility]) [Z99.2]  End stage renal disease on dialysis ([facility]) [N18.6, Z99.2]  Acute respiratory failure with hypoxia ([facility]) [J96.01]  Acute on chronic respiratory failure with hypoxia and hypercapnia ([facility]) [J96.21, J96.22]  Leukocytosis, unspecified type [D72.829]  Acute metabolic encephalopathy [G93.41]    Subjective:  3/18 , pt is intubated off sedation follows commands , opens her eyes , seems comfortable   3/19 pt still intubated and follows commands , off sedation , bronchoscopy was not done yesterday.  Since her secretions were much better.  3/20 , pt still intubated couldn't tolerate SBT yesterday after bronchoscopy , no polyps or masses seen , only secretions and cultures sent , will try again for extubation today   3/21 pt still intubated and  didn't tolerate SBT yesterday or today ? Might need Trach , nurse can't get hold of the family to discuss , I will to reach them later today  Needs tessio  tunneled cath today    3/22 pt is intubated and alert and follows commands   3/23 pt is awake and looks comfortable , and responsive and very alert   3/24 pt looks more lethargic and tired today still arousable , and responsive and follows command , hb 6.7 will transfuse 1 unit of  PRBC     sodium bicarbonate        insulin glargine  8 Units SubCUTAneous BID    glycopyrrolate  1 mg Oral TID    miconazole   Topical BID    nitroglycerin  1 inch Topical 4 times per day    epoetin andrzej-epbx  2,000 Units SubCUTAneous Once per day on Mon Wed Fri    apixaban  2.5 mg Oral BID    scopolamine  1 patch TransDERmal Q72H    [Held by provider] midodrine  15 mg Oral q8h    insulin lispro  0-16 Units SubCUTAneous Q4H    pantoprazole (PROTONIX) 40 mg in sodium chloride (PF) 0.9 % 10 mL injection  40 mg IntraVENous Q12H    budesonide  0.5 mg Nebulization BID RT    ipratropium 0.5 mg-albuterol 2.5 mg  1 Dose Inhalation Q4H RT    sodium  will probably need trach , will wait to try to reach family  and going for tessio / tunneled cath   3/22 had tunneled cath yesterday did well , and discussed with pt about PEG and trach ? She seems to understand and she is agreeable if needed , pt has been off sedation for at least 48 hours   3/23 stable , and awake and agreeable on proceeding with trach and PEG if needed   3/24 stable still on the vent and will go for another SBT , and if fails , will need PEG and trach tomorrow , Hb dropped 6.7 will transfuse 1 unit of PRBC   End-stage renal disease missed hemodialysis thus has volume overload and hyperkalemia thus getting hemodialysis per nephrology,   Respiratory failure with hypoxia and hypercapnia possible acute exacerbation COPD status post hydrocortisone DuoNebs.  Ventilated.  On prednisone small dose,  Vent per pulmonary, trial for extubation  x 3  and failed   New onset  a fib  during this hospitalization and was started on eliquis low dose due to thrombocytopenia , platelets better , I will  increase to regular dose , if she fails SBT today and  is going to PEG and trach on Monday  will have to hold eliquis all together for now , I will check with dr Henson   Former smoker she should not restart  Hypothyroidism thyroid replacement.  Tsh wnl  Left forearm AV fistula  malfunctioning thus line placed for HD  Thrombocytopenia: monitor. Improved,  had bronchoscopy  done 3/19 , and  no polyps or masses seen , no biopsies needed and had some secretions resend for culture    Peripheral vascular disease with cyanosis in the toes worse , but pedal pulses felt on right , left with doppler , nitro paste to the toes helping some  and  arterial doppler US done 3/20 , NOHELIA right good , left artificially elevated from atherosclerosis   DVT prophylaxis:  on eliquis might need to hold it if going for trach and PEG on Monday   Full code.  Disposition: To be determined  for the SBT trial again today , if failed will go for

## 2024-03-24 NOTE — PROGRESS NOTES
[facility]  Nephrology Progress Note    Patient's Name: Ainsley Morris    SUBJECTIVE from 3/11 Consult Note:  \"We are following this patient for end-stage renal failure .      Patient is a 79-year-old female well-known to us through her outpatient hemodialysis at [facility].  She had apparently been in the emergency department on 3/8 for complaints of a fall.  She then represented to the emergency department on 3/10 from home with altered mental status.  Per EMS account she was found in poor condition with poor environmental conditions as well.  Additionally she was found to be hypotensive and hypercapnic was placed on BiPAP.  She had missed several days of dialysis and was noted to have hyperkalemia with potassium of 6.1 mg/dL on initial check.  Hemodialysis was attempted overnight with excess infiltration and inability to complete treatment.  Patient was then medically treated for hyperkalemia.  She was seen and examined in the intensive care unit where she was transferred after RRT yesterday for hypotension.  At the time my visit she was on 35 mcg of Levophed with systolic blood pressure in the 80s.  Her left upper arm AV fistula has no thrill nor bruit that I could appreciate.  Discussed with nurse at the bedside caring for the patient.  Plan is for reevaluation of respiratory status with ABGs and possible intubation if not improving.  Patient will then be transported to the interventional radiology suite where she will have placement of temporary dialysis catheter and initiation of hemodialysis thereafter.  Patient was wearing BiPAP at the time of my visit she was unable to provide any meaningful history.  Patient with poor vascular access and need for PICC line which was discussed with Dr. Enriuqe and karla for this to be placed.\"    Interval History:    3/24: Patient was seen and examined in the ICU.  She is intubated.  She is lethargic but opens her eyes and nods to questions.    PMH:    Past Medical  Inhale 1 puff into the lungs in the morning and 1 puff in the evening. 14 each 5    atorvastatin (LIPITOR) 10 MG tablet Take 1 tablet by mouth nightly 90 tablet 2    insulin lispro, 1 Unit Dial, (HUMALOG/ADMELOG) 100 UNIT/ML SOPN INJECT SUBCUTANEOUSLY WITH MEALS THREE TIMES DAILY. 2 UNITS FOR BLOOD SUGAR , 4 UNITS , 6 UNITS , 8 UNITS . MAX DAILY DOSE 10 UNITS 15 mL 10    pantoprazole (PROTONIX) 40 MG tablet Take 1 tablet by mouth daily      LANTUS SOLOSTAR 100 UNIT/ML injection pen INJECT 35 UNITS UNDER INTO THE SKIN DAILY 15 mL 10    Ferric Citrate (AURYXIA) 1  MG(Fe) TABS Take 2 tablets by mouth 3 times daily (with meals)      B Complex-C-Folic Acid (TOD-RENETTA) TABS Take 1 tablet by mouth daily Rx      midodrine (PROAMATINE) 5 MG tablet Take 1 tablet by mouth as needed Tuesday, Thursday, Saturday         Allergies:    Pcn [penicillins], Sulfa antibiotics, and Bactrim [sulfamethoxazole-trimethoprim]    Social History:     reports that she has never smoked. She has never used smokeless tobacco. She reports current alcohol use. She reports that she does not use drugs.    Family History:         Problem Relation Age of Onset    Emphysema Mother     No Known Problems Father        Physical Exam:      Patient Vitals for the past 24 hrs:   BP Temp Temp src Pulse Resp SpO2 Weight   03/24/24 0800 -- 98.1 °F (36.7 °C) Axillary -- -- -- --   03/24/24 0600 (!) 148/115 -- -- 97 18 97 % --   03/24/24 0500 (!) 143/67 -- -- 93 18 96 % --   03/24/24 0432 -- -- -- 95 18 98 % --   03/24/24 0431 -- -- -- 94 18 97 % --   03/24/24 0400 (!) 140/80 98.7 °F (37.1 °C) Axillary 90 18 97 % --   03/24/24 0300 (!) 144/92 -- -- 95 18 98 % --   03/24/24 0212 -- -- -- 86 18 98 % --   03/24/24 0211 -- -- -- 85 19 97 % --   03/24/24 0200 121/65 -- -- 93 18 97 % --   03/24/24 0150 -- -- -- -- -- -- 105.6 kg (232 lb 14.4 oz)   03/24/24 0100 117/68 -- -- 89 18 96 % --   03/24/24 0000 131/69 98 °F (36.7 °C) Axillary 83

## 2024-03-24 NOTE — PROGRESS NOTES
Patient was placed on SBT at 1330 5/5 25%. Patient unfortunately was unable to maintain a tidal volume of greater than 200 and experienced a brief desaturation, as well as tachypnea throughout the entirety of her trial. Patient was placed back on her AC settings at 1400. Will continue to monitor patient.

## 2024-03-25 LAB
ALBUMIN SERPL-MCNC: 2.3 G/DL (ref 3.5–5.2)
ALP SERPL-CCNC: 81 U/L (ref 35–104)
ALT SERPL-CCNC: 18 U/L (ref 0–32)
ANION GAP SERPL CALCULATED.3IONS-SCNC: 10 MMOL/L (ref 7–16)
AST SERPL-CCNC: 14 U/L (ref 0–31)
BASOPHILS # BLD: 0.02 K/UL (ref 0–0.2)
BASOPHILS NFR BLD: 0 % (ref 0–2)
BILIRUB SERPL-MCNC: 0.4 MG/DL (ref 0–1.2)
BUN SERPL-MCNC: 41 MG/DL (ref 6–23)
CALCIUM SERPL-MCNC: 8.2 MG/DL (ref 8.6–10.2)
CHLORIDE SERPL-SCNC: 97 MMOL/L (ref 98–107)
CO2 SERPL-SCNC: 23 MMOL/L (ref 22–29)
CREAT SERPL-MCNC: 3.7 MG/DL (ref 0.5–1)
EOSINOPHIL # BLD: 0.06 K/UL (ref 0.05–0.5)
EOSINOPHILS RELATIVE PERCENT: 1 % (ref 0–6)
ERYTHROCYTE [DISTWIDTH] IN BLOOD BY AUTOMATED COUNT: 15.5 % (ref 11.5–15)
GFR SERPL CREATININE-BSD FRML MDRD: 12 ML/MIN/1.73M2
GLUCOSE BLD-MCNC: 131 MG/DL (ref 74–99)
GLUCOSE BLD-MCNC: 158 MG/DL (ref 74–99)
GLUCOSE BLD-MCNC: 164 MG/DL (ref 74–99)
GLUCOSE SERPL-MCNC: 147 MG/DL (ref 74–99)
HCT VFR BLD AUTO: 23.3 % (ref 34–48)
HGB BLD-MCNC: 7.4 G/DL (ref 11.5–15.5)
IMM GRANULOCYTES # BLD AUTO: 0.03 K/UL (ref 0–0.58)
IMM GRANULOCYTES NFR BLD: 0 % (ref 0–5)
INR PPP: 1.3
LYMPHOCYTES NFR BLD: 0.68 K/UL (ref 1.5–4)
LYMPHOCYTES RELATIVE PERCENT: 10 % (ref 20–42)
MAGNESIUM SERPL-MCNC: 2.2 MG/DL (ref 1.6–2.6)
MCH RBC QN AUTO: 30.2 PG (ref 26–35)
MCHC RBC AUTO-ENTMCNC: 31.8 G/DL (ref 32–34.5)
MCV RBC AUTO: 95.1 FL (ref 80–99.9)
MICROORGANISM SPEC CULT: ABNORMAL
MICROORGANISM SPEC CULT: ABNORMAL
MICROORGANISM/AGENT SPEC: ABNORMAL
MONOCYTES NFR BLD: 0.45 K/UL (ref 0.1–0.95)
MONOCYTES NFR BLD: 7 % (ref 2–12)
NEUTROPHILS NFR BLD: 82 % (ref 43–80)
NEUTS SEG NFR BLD: 5.49 K/UL (ref 1.8–7.3)
PHOSPHATE SERPL-MCNC: 4.7 MG/DL (ref 2.5–4.5)
PLATELET, FLUORESCENCE: 122 K/UL (ref 130–450)
PMV BLD AUTO: 11 FL (ref 7–12)
POTASSIUM SERPL-SCNC: 4.3 MMOL/L (ref 3.5–5)
PROT SERPL-MCNC: 5.3 G/DL (ref 6.4–8.3)
PROTHROMBIN TIME: 14.7 SEC (ref 9.3–12.4)
RBC # BLD AUTO: 2.45 M/UL (ref 3.5–5.5)
SODIUM SERPL-SCNC: 130 MMOL/L (ref 132–146)
SPECIMEN DESCRIPTION: ABNORMAL
WBC OTHER # BLD: 6.7 K/UL (ref 4.5–11.5)

## 2024-03-25 PROCEDURE — 2580000003 HC RX 258: Performed by: INTERNAL MEDICINE

## 2024-03-25 PROCEDURE — 99291 CRITICAL CARE FIRST HOUR: CPT | Performed by: INTERNAL MEDICINE

## 2024-03-25 PROCEDURE — 90935 HEMODIALYSIS ONE EVALUATION: CPT

## 2024-03-25 PROCEDURE — 6360000002 HC RX W HCPCS: Performed by: INTERNAL MEDICINE

## 2024-03-25 PROCEDURE — 99233 SBSQ HOSP IP/OBS HIGH 50: CPT | Performed by: INTERNAL MEDICINE

## 2024-03-25 PROCEDURE — 80053 COMPREHEN METABOLIC PANEL: CPT

## 2024-03-25 PROCEDURE — 84100 ASSAY OF PHOSPHORUS: CPT

## 2024-03-25 PROCEDURE — C9113 INJ PANTOPRAZOLE SODIUM, VIA: HCPCS | Performed by: INTERNAL MEDICINE

## 2024-03-25 PROCEDURE — 2000000000 HC ICU R&B

## 2024-03-25 PROCEDURE — 6370000000 HC RX 637 (ALT 250 FOR IP): Performed by: INTERNAL MEDICINE

## 2024-03-25 PROCEDURE — 36592 COLLECT BLOOD FROM PICC: CPT

## 2024-03-25 PROCEDURE — 99221 1ST HOSP IP/OBS SF/LOW 40: CPT | Performed by: SURGERY

## 2024-03-25 PROCEDURE — 82962 GLUCOSE BLOOD TEST: CPT

## 2024-03-25 PROCEDURE — 94003 VENT MGMT INPAT SUBQ DAY: CPT

## 2024-03-25 PROCEDURE — 85610 PROTHROMBIN TIME: CPT

## 2024-03-25 PROCEDURE — 94640 AIRWAY INHALATION TREATMENT: CPT

## 2024-03-25 PROCEDURE — 83735 ASSAY OF MAGNESIUM: CPT

## 2024-03-25 PROCEDURE — A4216 STERILE WATER/SALINE, 10 ML: HCPCS | Performed by: INTERNAL MEDICINE

## 2024-03-25 PROCEDURE — 85025 COMPLETE CBC W/AUTO DIFF WBC: CPT

## 2024-03-25 PROCEDURE — 99231 SBSQ HOSP IP/OBS SF/LOW 25: CPT | Performed by: NURSE PRACTITIONER

## 2024-03-25 RX ORDER — DEXTROSE, SODIUM CHLORIDE, SODIUM LACTATE, POTASSIUM CHLORIDE, AND CALCIUM CHLORIDE 5; .6; .31; .03; .02 G/100ML; G/100ML; G/100ML; G/100ML; G/100ML
INJECTION, SOLUTION INTRAVENOUS CONTINUOUS
Status: DISCONTINUED | OUTPATIENT
Start: 2024-03-26 | End: 2024-03-27

## 2024-03-25 RX ORDER — INSULIN LISPRO 100 [IU]/ML
0-16 INJECTION, SOLUTION INTRAVENOUS; SUBCUTANEOUS EVERY 6 HOURS
Status: DISCONTINUED | OUTPATIENT
Start: 2024-03-25 | End: 2024-03-30 | Stop reason: HOSPADM

## 2024-03-25 RX ORDER — DEXTROSE, SODIUM CHLORIDE, SODIUM LACTATE, POTASSIUM CHLORIDE, AND CALCIUM CHLORIDE 5; .6; .31; .03; .02 G/100ML; G/100ML; G/100ML; G/100ML; G/100ML
INJECTION, SOLUTION INTRAVENOUS CONTINUOUS
Status: DISCONTINUED | OUTPATIENT
Start: 2024-03-25 | End: 2024-03-25

## 2024-03-25 RX ADMIN — EPOETIN ALFA-EPBX 2000 UNITS: 2000 INJECTION, SOLUTION INTRAVENOUS; SUBCUTANEOUS at 16:50

## 2024-03-25 RX ADMIN — ANTI-FUNGAL POWDER MICONAZOLE NITRATE TALC FREE: 1.42 POWDER TOPICAL at 22:12

## 2024-03-25 RX ADMIN — HEPARIN SODIUM 5000 UNITS: 5000 INJECTION INTRAVENOUS; SUBCUTANEOUS at 13:33

## 2024-03-25 RX ADMIN — SODIUM CHLORIDE, PRESERVATIVE FREE 40 MG: 5 INJECTION INTRAVENOUS at 22:13

## 2024-03-25 RX ADMIN — NITROGLYCERIN 1 INCH: 20 OINTMENT TOPICAL at 07:01

## 2024-03-25 RX ADMIN — IPRATROPIUM BROMIDE AND ALBUTEROL SULFATE 1 DOSE: .5; 2.5 SOLUTION RESPIRATORY (INHALATION) at 05:30

## 2024-03-25 RX ADMIN — IPRATROPIUM BROMIDE AND ALBUTEROL SULFATE 1 DOSE: .5; 2.5 SOLUTION RESPIRATORY (INHALATION) at 09:55

## 2024-03-25 RX ADMIN — NITROGLYCERIN 1 INCH: 20 OINTMENT TOPICAL at 12:49

## 2024-03-25 RX ADMIN — HEPARIN SODIUM 5000 UNITS: 5000 INJECTION INTRAVENOUS; SUBCUTANEOUS at 22:17

## 2024-03-25 RX ADMIN — GLYCOPYRROLATE 1 MG: 1 TABLET ORAL at 13:33

## 2024-03-25 RX ADMIN — Medication 10 ML: at 08:24

## 2024-03-25 RX ADMIN — IPRATROPIUM BROMIDE AND ALBUTEROL SULFATE 1 DOSE: .5; 2.5 SOLUTION RESPIRATORY (INHALATION) at 18:59

## 2024-03-25 RX ADMIN — BUDESONIDE INHALATION 500 MCG: 0.5 SUSPENSION RESPIRATORY (INHALATION) at 05:30

## 2024-03-25 RX ADMIN — IPRATROPIUM BROMIDE AND ALBUTEROL SULFATE 1 DOSE: .5; 2.5 SOLUTION RESPIRATORY (INHALATION) at 22:17

## 2024-03-25 RX ADMIN — ANTI-FUNGAL POWDER MICONAZOLE NITRATE TALC FREE: 1.42 POWDER TOPICAL at 08:24

## 2024-03-25 RX ADMIN — GLYCOPYRROLATE 1 MG: 1 TABLET ORAL at 08:23

## 2024-03-25 RX ADMIN — IPRATROPIUM BROMIDE AND ALBUTEROL SULFATE 1 DOSE: .5; 2.5 SOLUTION RESPIRATORY (INHALATION) at 14:42

## 2024-03-25 RX ADMIN — INSULIN GLARGINE 8 UNITS: 100 INJECTION, SOLUTION SUBCUTANEOUS at 22:11

## 2024-03-25 RX ADMIN — Medication 10 ML: at 22:15

## 2024-03-25 RX ADMIN — NITROGLYCERIN 1 INCH: 20 OINTMENT TOPICAL at 16:57

## 2024-03-25 RX ADMIN — SODIUM CHLORIDE, PRESERVATIVE FREE 40 MG: 5 INJECTION INTRAVENOUS at 08:23

## 2024-03-25 RX ADMIN — IPRATROPIUM BROMIDE AND ALBUTEROL SULFATE 1 DOSE: .5; 2.5 SOLUTION RESPIRATORY (INHALATION) at 01:14

## 2024-03-25 RX ADMIN — NITROGLYCERIN 1 INCH: 20 OINTMENT TOPICAL at 00:00

## 2024-03-25 RX ADMIN — GLYCOPYRROLATE 1 MG: 1 TABLET ORAL at 22:08

## 2024-03-25 RX ADMIN — BUDESONIDE INHALATION 500 MCG: 0.5 SUSPENSION RESPIRATORY (INHALATION) at 18:59

## 2024-03-25 ASSESSMENT — PULMONARY FUNCTION TESTS
PIF_VALUE: 19
PIF_VALUE: 21
PIF_VALUE: 19
PIF_VALUE: 26
PIF_VALUE: 26
PIF_VALUE: 24
PIF_VALUE: 21
PIF_VALUE: 25
PIF_VALUE: 28
PIF_VALUE: 24
PIF_VALUE: 20
PIF_VALUE: 21
PIF_VALUE: 23
PIF_VALUE: 24
PIF_VALUE: 22
PIF_VALUE: 25
PIF_VALUE: 25
PIF_VALUE: 19
PIF_VALUE: 27
PIF_VALUE: 25
PIF_VALUE: 18
PIF_VALUE: 21
PIF_VALUE: 25
PIF_VALUE: 25
PIF_VALUE: 26
PIF_VALUE: 19
PIF_VALUE: 27
PIF_VALUE: 25

## 2024-03-25 ASSESSMENT — PAIN SCALES - GENERAL
PAINLEVEL_OUTOF10: 0

## 2024-03-25 NOTE — PLAN OF CARE
Problem: Safety - Adult  Goal: Free from fall injury  3/25/2024 1658 by Mikal Ann RN  Outcome: Progressing     Problem: Discharge Planning  Goal: Discharge to home or other facility with appropriate resources  Outcome: Progressing     Problem: Pain  Goal: Verbalizes/displays adequate comfort level or baseline comfort level  Outcome: Progressing     Problem: Skin/Tissue Integrity  Goal: Absence of new skin breakdown  Description: 1.  Monitor for areas of redness and/or skin breakdown  2.  Assess vascular access sites hourly  3.  Every 4-6 hours minimum:  Change oxygen saturation probe site  4.  Every 4-6 hours:  If on nasal continuous positive airway pressure, respiratory therapy assess nares and determine need for appliance change or resting period.  Outcome: Progressing     Problem: Confusion  Goal: Confusion, delirium, dementia, or psychosis is improved or at baseline  Description: INTERVENTIONS:  1. Assess for possible contributors to thought disturbance, including medications, impaired vision or hearing, underlying metabolic abnormalities, dehydration, psychiatric diagnoses, and notify attending LIP  2. [place] high risk fall precautions, as indicated  3. Provide frequent short contacts to provide reality reorientation, refocusing and direction  4. Decrease environmental stimuli, including noise as appropriate  5. Monitor and intervene to maintain adequate nutrition, hydration, elimination, sleep and activity  6. If unable to ensure safety without constant attention obtain sitter and review sitter guidelines with assigned personnel  7. Initiate Psychosocial CNS and Spiritual Care consult, as indicated  Outcome: Progressing     Problem: Chronic Conditions and Co-morbidities  Goal: Patient's chronic conditions and co-morbidity symptoms are monitored and maintained or improved  Outcome: Progressing     Problem: ABCDS Injury Assessment  Goal: Absence of physical injury  Outcome: Progressing     Problem:  (restraint for interference with medical device): Every 2 hours: Monitor safety, psychosocial status, comfort, nutrition and hydration  Taken 3/25/2024 0400 by Sally Aguilar RN  Remains free of injury from restraints (restraint for interference with medical device): Every 2 hours: Monitor safety, psychosocial status, comfort, nutrition and hydration

## 2024-03-25 NOTE — PROGRESS NOTES
history of Anemia, Arrhythmia, Arthritis, Asthma, Chronic kidney disease, COPD (chronic obstructive pulmonary disease) (HCC), Depression, Hemodialysis patient (HCC), Hyperlipidemia, Hypothyroid, Sleep apnea, Type II or unspecified type diabetes mellitus without mention of complication, not stated as uncontrolled, and 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. presented with AMS, Hypotension, Missed HD  for last few days prior to arrival to the hospital.  She missed her HD and found to be in a poor living condition at home with dog feces. She was found to be hypotensive with  hypercapnia and placed on BIPAP. Nephrology consulted for fluid overload with hyperkalemia and was given HD but could no finish up the session due to fistula malfunction/bleeding. Poor historian, confused and lethargic, no additional history could be obtained.     Metabolic encephalopathy due to sepsis/septic shock with Klebsiella bacteremia causing hypotension leukocytosis empiric antibiotics as blood cultures start to show Klebsiella.  Supportive care in the ICU's restarting home midodrine. 3/12: weaned levophed . 3/13: weaned off Critical care ID and renal are on consult.  Steroids/ prednisone PO .  3/14 weaned off sedation  Bronchoscopy on 3/15: polypoid appearing mucosa not biopsied due to #7,   3/15: stopped pressors continuing midrodrine  3/16: breathing trial went well.   3/17: If scopolamine doesn't help enough with secretions then pulmonary  plans to scope  3/18 going for bronchoscopy and biopsy today   3/19 , patient still intubated but looks comfortable.  Follows commands did not go for bronchoscopy yesterday since her  secretions were better.  I discussed with Dr. Leal/ intensivist going for bronchoscopy because there was a possible mass./ polyp   Since her platelets are good and off Eliquis had bronchoscopy. Cefepime switched to rocephin ,   3/20 , patient underwent bronchoscopy yesterday and

## 2024-03-25 NOTE — CONSULTS
Successful ultrasound and fluoroscopy guided temporary dialysis catheter placement.  11.5 [place] by 15 cm triple-lumen catheter was placed.     IR ULTRASOUND GUIDANCE VASCULAR ACCESS    Result Date: 3/11/2024  PROCEDURE: ULTRASOUND GUIDED VASCULAR ACCESS. FLUOROSCOPY GUIDED PLACEMENT OF A TUNNELED CATHETER. 3/11/2024. HISTORY: ORDERING SYSTEM PROVIDED HISTORY: Temporary dialysis cath TECHNOLOGIST PROVIDED HISTORY: ASAP K is 5.8 Reason for exam:->Temporary dialysis cath; ORDERING SYSTEM PROVIDED HISTORY: Hemodialysis Catheter Placement TECHNOLOGIST PROVIDED HISTORY: ASAP K is 5.8 Reason for exam:->Hemodialysis Catheter Placement Access needed for dialysis SEDATION: None FLUOROSCOPY DOSE AND TYPE OR TIME AND EXPOSURES: Air kerma 2.93 mGy.  Fluoroscopy time 0.2 minutes.  Total number images: 2 TECHNIQUE: Informed consent was obtained after a detailed explanation of the procedure including risks, benefits, and alternatives. Universal protocol was observed. The right neck and chest were prepped and draped in sterile fashion using maximum sterile barrier technique. Local anesthesia was achieved with lidocaine. A micropuncture needle was used to access the right internal jugular vein using ultrasound guidance.  An ultrasound image demonstrating patency of the vein with needle tip located within it was obtained and stored in PACS.  A 0.018 inch guidewire guidewire was used to place a 4 [place] microcatheter.  Over a 0.035 inch guidewire, the fascial tract was dilated and an 11.5 [place] by 15 cm triple-lumen temporary hemodialysis catheter was placed under fluoroscopic guidance with the tip near the cavoatrial junction. The catheter flushed easily and there was a good blood return.  The catheter was sutured to the skin.  The catheter was locked with heparinized saline. The patient tolerated the procedure well and there were no immediate complications. FINDINGS: Fluoroscopic image demonstrates the tip of the catheter at  mass effect or midline shift.  No abnormal extra-axial fluid collection.  The gray-white differentiation is maintained without evidence of an acute infarct.  There is no evidence of hydrocephalus. Age related changes of brain volume loss and nonspecific white matter hypodensity most often ascribed to chronic small vessel ischemia.  Atherosclerotic vascular calcifications. ORBITS: The visualized portion of the orbits demonstrate no acute abnormality. SINUSES: The visualized paranasal sinuses and mastoid air cells demonstrate no acute abnormality. SOFT TISSUES/SKULL:  No acute abnormality of the visualized skull or soft tissues.     No evidence of acute intracranial hemorrhage or mass effect.         ASSESSMENT:  79 y.o. female with respiratory failure requiring mechanical ventilation, unable to wean from the ventilator.    PLAN:  -Can plan on percutaneous tracheostomy with EUS gastrostomy tube placement if family is agreeable  -Continue to hold Eliquis  -Timing to be determined - possibly today    Will discuss with Dr. Kwan    Electronically signed by Rema Miranda MD on 3/25/24 at 6:21 AM EDT    As above  Seen and examined  Getting hemodialysis today.   Plan for EUS-directed gastrostomy tube placement and tracheostomy 3/26/24  -The procedure, risks, benefits and alternatives were discussed with patient's family who agree to proceed.    Neto Kwan MD

## 2024-03-25 NOTE — PROGRESS NOTES
Plan for trach / PEG tomorrow 3/26    Electronically signed by Mahad Garcia MD on 3/25/2024 at 12:08 PM

## 2024-03-25 NOTE — PROGRESS NOTES
03/23/24 0423 03/24/24  0431 03/24/24  1310 03/25/24  0450   WBC 7.3 8.5  --  6.7   HGB 7.2* 6.7* 7.5* 7.4*     --   --   --      BMP:    Recent Labs     03/23/24 0423 03/24/24 0431 03/25/24 0441    132 130*   K 4.9 3.9 4.3   CL 98 97* 97*   CO2 20* 23 23   BUN 23 32* 41*   CREATININE 2.1* 2.9* 3.7*   GLUCOSE 226* 138* 147*     Hepatic:   Recent Labs     03/23/24 0423 03/24/24 0431 03/25/24 0441   AST 28 14 14   ALT 25 20 18   BILITOT 0.4 0.4 0.4   ALKPHOS 84 84 81     Troponin: No results for input(s): \"TROPONINI\" in the last 72 hours.  BNP: No results for input(s): \"BNP\" in the last 72 hours.  Lipids: No results for input(s): \"CHOL\", \"HDL\" in the last 72 hours.    Invalid input(s): \"LDLCALCU\"  ABGs: No results found for: \"PHART\", \"PO2ART\", \"BKH3GZE\"  INR:   Recent Labs     03/25/24 0445   INR 1.3       -----------------------------------------------------------------  RAD: IR ULTRASOUND GUIDANCE VASCULAR ACCESS    Result Date: 3/11/2024  PROCEDURE: US GUIDED VASCULAR ACCESS 3/11/2024 HISTORY: ORDERING SYSTEM PROVIDED HISTORY: midline access TECHNOLOGIST PROVIDED HISTORY: Reason for exam:->midline access Need IV access CONTRAST: None SEDATION: None FLUOROSCOPY DOSE AND TYPE: Radiation Exposure Index: None, DESCRIPTION OF PROCEDURE: Informed consent was obtained after a detailed explanation of the procedure including risks, benefits, and alternatives.  Universal protocol was observed. Sterile gowns, masks, hats and gloves utilized for maximal sterile barrier.  Time-out was called and the patient's order and identity were verified. Sonographic examination of the right upper extremity demonstrates few very small veins including brachial vein.  Under sterile conditions and sonographic guidance, multiple attempts were made to obtain venous access with a micropuncture needle.  Due to small size of the veins, venous access could not be obtained.  Therefore, the midline catheter could not be placed.  affected by motion artifacts there is no indication for acute pulmonary embolus. 2.  Prominent diameter for the central pulmonary artery up to 4.2 cm which can indicated a pattern of chronic pulmonary hypertension.  Please correlate clinically. 3.  There is also some increase in the RV/LV ratio up to 1.23 which can be an indication for straining of the right ventricle.  Please correlate clinically. 4.  No aneurysm formation or dissection thoracic aorta although the aorta has upper borderline diameter in the range of 4 cm. 5.  Areas of linear subsegmental atelectasis seen predominant towards the lower lung bases these more likely post inflammatory residual changes.     CT ABDOMEN PELVIS W IV CONTRAST Additional Contrast? None    Result Date: 3/10/2024  EXAMINATION: CT OF THE ABDOMEN AND PELVIS WITH CONTRAST 3/10/2024 3:54 pm TECHNIQUE: CT of the abdomen and pelvis was performed with the administration of intravenous contrast. Multiplanar reformatted images are provided for review. Automated exposure control, iterative reconstruction, and/or weight based adjustment of the mA/kV was utilized to reduce the radiation dose to as low as reasonably achievable. COMPARISON: [facility], 2022 HISTORY: ORDERING SYSTEM PROVIDED HISTORY: abdominal pain/ttp, hypotension, recent fall TECHNOLOGIST PROVIDED HISTORY: Reason for exam:->abdominal pain/ttp, hypotension, recent fall Additional Contrast?->None Decision Support Exception - unselect if not a suspected or confirmed emergency medical condition->Emergency Medical Condition (MA) FINDINGS: No bowel obstruction, free air, or free fluid.  Redemonstration of a fibroid at level of the uterine fundus measuring up to 4.1 cm appearing similar in size.  The appendix is visualized and is unremarkable.  Postsurgical changes are present in the epigastric location.  Liver is unremarkable.  There is evidence of cholecystectomy.  No findings to suggest acute pancreatitis. Redemonstration of

## 2024-03-25 NOTE — PROGRESS NOTES
Palliative Care Department  [phone]  Palliative Care Progress Note  Provider Akosua Dahl, APRN - CNP     Ainsley Morris  [id number]  Hospital Day: 16  Date of Initial Consult: 3/16/2024  Referring Provider: Javy Leal MD   Palliative Medicine was consulted for assistance with: Goals of Care, End Stage Disease    HPI:   Ainsley Morris is a 79 y.o. with a medical history of hyperlipidemia, hypothyroidism, COPD, asthma, sleep apnea, CKD, DM, anemia, arthritis who was admitted on 3/10/2024 from home with a CHIEF COMPLAINT of altered mental status, hypotension, and missed hemodialysis.  Patient was found to be in a poor living condition at home.  Patient also found to be hypotensive with hypercapnia and placed on BiPAP initially.  She is currently intubated and in the ICU.  Palliative medicine consulted for goals of care and end-stage disease.    ASSESSMENT/PLAN:     Pertinent Hospital Diagnoses     Acute respiratory failure  Bacteremia  New onset atrial fibrillation  Encephalopathy  CKD      Palliative Care Encounter / Counseling Regarding Goals of Care  Please see detailed goals of care discussion as below  At this time, Ainsley Morris, Does have capacity for medical decision-making.  Capacity is time limited and situation/question specific  During encounter Isaura was surrogate medical decision-maker  Outcome of goals of care meeting:   Continue current medical management  For trach/PEG insertion today  Code status [id number]  Advanced Directives:  POA or living will in [facility]  Surrogate/Legal NOK:  Isaura Starr, sister, [phone]    Spiritual assessment: no spiritual distress identified  Bereavement and grief: to be determined  Referrals to: none today  SUBJECTIVE:     Current medical issues leading to Palliative Medicine involvement include   Active Hospital Problems    Diagnosis Date Noted    Palliative care encounter [Z51.5] 03/19/2024    Hyperkalemia [E87.5] 03/11/2024    Acute respiratory failure  with hypoxia ([facility]) [J96.01] 03/10/2024       Details of Conversation:   Chart reviewed.  Patient seen at the bedside, intubated, off sedation.  Patient able to partake in conversation and appears to be alert and oriented and appropriate to make decisions.  The patient has made the decision to proceed with trach/PEG insertion as she has been unable to be successfully weaned from the ventilator.  The patient's sister, Isaura, is in agreement and has signed consent for trach/PEG today.  Support provided to patient and Isaura.  Palliative medicine will continue to follow.      OBJECTIVE:   Prognosis: Guarded    Physical Exam:  /62   Pulse 96   Temp 98.5 °F (36.9 °C) (Axillary)   Resp 18   Ht 1.651 m (5' 5\")   Wt 108.8 kg (239 lb 13.8 oz)   SpO2 98%   BMI 39.91 kg/m²   Constitutional:  Intubated, awake, alert, appropriately responds  Neck:  trachea midline, no JVD  Lungs:  Intubated, diminished  Abd:  Soft  Ext:  + edema  Skin:  Warm and dry  Neuro:   Appears awake & oriented, nods/gestures appropriately, responds to name,  follows commands    Objective data reviewed: labs, images, records, medication use, vitals, and chart    Discussed patient and the plan of care with the other IDT members: Palliative Medicine IDT Team    Time/Communication  Greater than 50% of time spent, total 25 minutes in counseling and coordination of care at the bedside regarding goals of care, diagnosis and prognosis, and see above.    Thank you for allowing Palliative Medicine to participate in the care of Ainsley Morris.

## 2024-03-25 NOTE — PLAN OF CARE
Problem: Safety - Adult  Goal: Free from fall injury  Outcome: Progressing     Problem: Safety - Medical Restraint  Goal: Remains free of injury from restraints (Restraint for Interference with Medical Device)  Description: INTERVENTIONS:  1. Determine that other, less restrictive measures have been tried or would not be effective before applying the restraint  2. Evaluate the patient's condition at the time of restraint application  3. Inform patient/family regarding the reason for restraint  4. Q2H: Monitor safety, psychosocial status, comfort, nutrition and hydration  Recent Flowsheet Documentation  Taken 3/25/2024 0600 by Sally Aguilar RN  Remains free of injury from restraints (restraint for interference with medical device): Every 2 hours: Monitor safety, psychosocial status, comfort, nutrition and hydration  Taken 3/25/2024 0400 by Sally Aguilar RN  Remains free of injury from restraints (restraint for interference with medical device): Every 2 hours: Monitor safety, psychosocial status, comfort, nutrition and hydration  Taken 3/25/2024 0200 by Sally Aguilar RN  Remains free of injury from restraints (restraint for interference with medical device): Every 2 hours: Monitor safety, psychosocial status, comfort, nutrition and hydration  Taken 3/25/2024 0002 by Sally Aguilar RN  Remains free of injury from restraints (restraint for interference with medical device): Every 2 hours: Monitor safety, psychosocial status, comfort, nutrition and hydration

## 2024-03-25 NOTE — PROGRESS NOTES
Assessment and Plan  Patient is a 79 y.o. female with the following medical Problems:   Acute on chronic hypoxic and hypercapnic respiratory failure requiring intubation and mechanical ventilation.  End-stage renal disease on hemodialysis  New onset atrial fibrillation on Eliquis  Left forearm AV fistula  Thrombocytopenia  Nicotine dependence  Klebsiella bacteremia  Metabolic encephalopathy  Morbid obesity  Type 2 diabetes with hyperglycemia      Plan of care:  Eliquis is on hold for tracheostomy and PEG tube.  Heparin subcu was started  GI prophylaxis.  Optimize volume status with hemodialysis  Patient failed multiple spontaneous breathing trial.  She is scheduled for tracheostomy and PEG.  Optimize tube feeding and optimize nutrition.  Robinul 1 mg p.o. 3 times daily for excessive secretions  Lantus due to hyperglycemia  Transfuse to keep hemoglobin above 7.  Precedex as needed for sedation      History of Present Illness:   Patient is a 79-year-old woman with end-stage renal disease on hemodialysis, anemia of chronic disease, COPD, dyslipidemia, depression, morbid obesity, and hypertension, who was admitted on March 10, 2024 with acute on chronic hypoxic and hypercapnic respiratory failure and hypotension.  Patient failed multiple weaning trials and scheduled to undergo tracheostomy and PEG next week.  Patient failed weaning trial today    Daily progress:  March 24, 2024: Patient remains sedated, intubated, mechanically ventilated.  Secretions have improved.  She was transfused 1 unit of packed red blood cells for low hemoglobin.  She is awake and communicating.  Blood sugars much better improved.    March 25, 2024: Patient continues to be sedated, intubated, mechanically ventilated.  Hemoglobin improved after transfusion.  She is scheduled for tracheostomy and PEG tube on March 26, 2024.  She had an uneventful night.    Past Medical History:  Past Medical History:   Diagnosis Date    Anemia     Arrhythmia     AF  sedation, no focal deficit     Investigations:  Labs, radiological imaging and cardiac work up were personally reviewed        ICU STAFF PHYSICIAN NOTE OF PERSONAL INVOLVEMENT IN CARE  As the attending physician, I certify that I personally reviewed the patient's history and personally examined the patient to confirm the physical findings described above, and that I reviewed the relevant imaging studies and available reports.  I also discussed the differential diagnosis and all of the proposed management plans with the patient and individuals accompanying the patient to this visit.  They had the opportunity to ask questions about the proposed management plans and to have those questions answered.    This patient has a high probability of sudden, clinically significant deterioration, which requires the highest level of physician preparedness to intervene urgently.  I managed/supervised life or organ supporting interventions that required frequent physician assessment.  I devoted my full attention to the direct care of this patient for the amount of time indicated below.  Time I spent with family or surrogate(s) is included only if the patient was incapable of providing the necessary information or participating in medical decision-making.  Time devoted to teaching and to any procedures I billed separately is not included.  Critical Care Time: 31 minutes      Electronically signed by Edgard Henson MD on 3/25/2024 at 12:10 PM

## 2024-03-25 NOTE — FLOWSHEET NOTE
03/25/24 1215   Vital Signs   /74   Temp 98.4 °F (36.9 °C)   Pulse 94   Respirations 20   Weight - Scale 106.9 kg (235 lb 10.8 oz)   Weight Method Bed scale   Percent Weight Change -1.75   Pain Assessment   Pain Assessment Critical Care Pain Observation Tool (CPOT)   Pain Level 0   Post-Hemodialysis Assessment   Post-Treatment Procedures Blood returned;Catheter capped, clamped with Citrate x 2 ports   Machine Disinfection Process Acid/Vinegar Clean;Machine Absence of Bleach Machine;Exterior Machine Disinfection;Heat Disinfect   Rinseback Volume (ml) 300 ml   Blood Volume Processed (Liters) 88.4 L   Dialyzer Clearance Lightly streaked   Duration of Treatment (minutes) 240 minutes   Hemodialysis Intake (ml) 300 ml   Hemodialysis Output (ml) 2300 ml   NET Removed (ml) 2000   Tolerated Treatment Good   Patient Response to Treatment Net -2000 ml off with dialysis.  Pt remained in the ICU and stable throughout treatment today.   Bilateral Breath Sounds Diminished   Edema Generalized;Right upper extremity;Left upper extremity;Right lower extremity;Left lower extremity;Facial   Edema Generalized +1   RUE Edema +1   LUE Edema +1   RLE Edema +1;Pitting   LLE Edema +1;Pitting   Facial Edema +1   Time Off 1200   Patient Disposition Remain in ICU/ED   Observations & Evaluations   Level of Consciousness 1   Oriented X Follows commands   Heart Rhythm Irregular   Respiratory Quality/Effort Unlabored  (on vent)   O2 Device Ventilator   Skin Color Ecchymosis (comment)   Skin Condition/Temp Swollen/edematous   Abdomen Inspection Rounded;Soft   Bowel Sounds (All Quadrants) Audible;Hypoactive

## 2024-03-26 ENCOUNTER — ANESTHESIA (OUTPATIENT)
Dept: OPERATING ROOM | Age: 79
End: 2024-03-26
Payer: COMMERCIAL

## 2024-03-26 ENCOUNTER — ANESTHESIA EVENT (OUTPATIENT)
Dept: OPERATING ROOM | Age: 79
End: 2024-03-26
Payer: COMMERCIAL

## 2024-03-26 ENCOUNTER — APPOINTMENT (OUTPATIENT)
Dept: GENERAL RADIOLOGY | Age: 79
DRG: 004 | End: 2024-03-26
Payer: COMMERCIAL

## 2024-03-26 LAB
ANION GAP SERPL CALCULATED.3IONS-SCNC: 11 MMOL/L (ref 7–16)
BASOPHILS # BLD: 0.02 K/UL (ref 0–0.2)
BASOPHILS NFR BLD: 0 % (ref 0–2)
BUN SERPL-MCNC: 26 MG/DL (ref 6–23)
CALCIUM SERPL-MCNC: 8.5 MG/DL (ref 8.6–10.2)
CHLORIDE SERPL-SCNC: 98 MMOL/L (ref 98–107)
CO2 SERPL-SCNC: 23 MMOL/L (ref 22–29)
CREAT SERPL-MCNC: 2.6 MG/DL (ref 0.5–1)
EOSINOPHIL # BLD: 0.03 K/UL (ref 0.05–0.5)
EOSINOPHILS RELATIVE PERCENT: 1 % (ref 0–6)
ERYTHROCYTE [DISTWIDTH] IN BLOOD BY AUTOMATED COUNT: 15.9 % (ref 11.5–15)
GFR SERPL CREATININE-BSD FRML MDRD: 19 ML/MIN/1.73M2
GLUCOSE BLD-MCNC: 175 MG/DL (ref 74–99)
GLUCOSE BLD-MCNC: 180 MG/DL (ref 74–99)
GLUCOSE BLD-MCNC: 183 MG/DL (ref 74–99)
GLUCOSE BLD-MCNC: 213 MG/DL (ref 74–99)
GLUCOSE BLD-MCNC: 213 MG/DL (ref 74–99)
GLUCOSE BLD-MCNC: 216 MG/DL (ref 74–99)
GLUCOSE SERPL-MCNC: 184 MG/DL (ref 74–99)
HCT VFR BLD AUTO: 22.3 % (ref 34–48)
HGB BLD-MCNC: 7.2 G/DL (ref 11.5–15.5)
IMM GRANULOCYTES # BLD AUTO: 0.06 K/UL (ref 0–0.58)
IMM GRANULOCYTES NFR BLD: 1 % (ref 0–5)
INR PPP: 1.3
LYMPHOCYTES NFR BLD: 0.53 K/UL (ref 1.5–4)
LYMPHOCYTES RELATIVE PERCENT: 9 % (ref 20–42)
MAGNESIUM SERPL-MCNC: 2.1 MG/DL (ref 1.6–2.6)
MCH RBC QN AUTO: 31 PG (ref 26–35)
MCHC RBC AUTO-ENTMCNC: 32.3 G/DL (ref 32–34.5)
MCV RBC AUTO: 96.1 FL (ref 80–99.9)
MONOCYTES NFR BLD: 0.43 K/UL (ref 0.1–0.95)
MONOCYTES NFR BLD: 8 % (ref 2–12)
NEUTROPHILS NFR BLD: 82 % (ref 43–80)
NEUTS SEG NFR BLD: 4.68 K/UL (ref 1.8–7.3)
PHOSPHATE SERPL-MCNC: 3.7 MG/DL (ref 2.5–4.5)
PLATELET # BLD AUTO: 115 K/UL (ref 130–450)
PMV BLD AUTO: 10.5 FL (ref 7–12)
POTASSIUM SERPL-SCNC: 4.3 MMOL/L (ref 3.5–5)
PROTHROMBIN TIME: 14.1 SEC (ref 9.3–12.4)
RBC # BLD AUTO: 2.32 M/UL (ref 3.5–5.5)
RBC # BLD: ABNORMAL 10*6/UL
SODIUM SERPL-SCNC: 132 MMOL/L (ref 132–146)
WBC OTHER # BLD: 5.8 K/UL (ref 4.5–11.5)

## 2024-03-26 PROCEDURE — 6360000002 HC RX W HCPCS: Performed by: NURSE ANESTHETIST, CERTIFIED REGISTERED

## 2024-03-26 PROCEDURE — 2500000003 HC RX 250 WO HCPCS: Performed by: INTERNAL MEDICINE

## 2024-03-26 PROCEDURE — 3600000013 HC SURGERY LEVEL 3 ADDTL 15MIN: Performed by: SURGERY

## 2024-03-26 PROCEDURE — 3600000003 HC SURGERY LEVEL 3 BASE: Performed by: SURGERY

## 2024-03-26 PROCEDURE — 82962 GLUCOSE BLOOD TEST: CPT

## 2024-03-26 PROCEDURE — 3700000000 HC ANESTHESIA ATTENDED CARE: Performed by: SURGERY

## 2024-03-26 PROCEDURE — A4216 STERILE WATER/SALINE, 10 ML: HCPCS | Performed by: INTERNAL MEDICINE

## 2024-03-26 PROCEDURE — 2500000003 HC RX 250 WO HCPCS: Performed by: NURSE ANESTHETIST, CERTIFIED REGISTERED

## 2024-03-26 PROCEDURE — 6360000002 HC RX W HCPCS: Performed by: INTERNAL MEDICINE

## 2024-03-26 PROCEDURE — 7100000000 HC PACU RECOVERY - FIRST 15 MIN

## 2024-03-26 PROCEDURE — 0DH63UZ INSERTION OF FEEDING DEVICE INTO STOMACH, PERCUTANEOUS APPROACH: ICD-10-PCS | Performed by: SURGERY

## 2024-03-26 PROCEDURE — 2580000003 HC RX 258: Performed by: INTERNAL MEDICINE

## 2024-03-26 PROCEDURE — C9113 INJ PANTOPRAZOLE SODIUM, VIA: HCPCS | Performed by: INTERNAL MEDICINE

## 2024-03-26 PROCEDURE — 2709999900 HC NON-CHARGEABLE SUPPLY: Performed by: SURGERY

## 2024-03-26 PROCEDURE — 83735 ASSAY OF MAGNESIUM: CPT

## 2024-03-26 PROCEDURE — 99232 SBSQ HOSP IP/OBS MODERATE 35: CPT | Performed by: INTERNAL MEDICINE

## 2024-03-26 PROCEDURE — 6370000000 HC RX 637 (ALT 250 FOR IP): Performed by: INTERNAL MEDICINE

## 2024-03-26 PROCEDURE — 84100 ASSAY OF PHOSPHORUS: CPT

## 2024-03-26 PROCEDURE — 94640 AIRWAY INHALATION TREATMENT: CPT

## 2024-03-26 PROCEDURE — 36592 COLLECT BLOOD FROM PICC: CPT

## 2024-03-26 PROCEDURE — 43240 EGD W/TRANSMURAL DRAIN CYST: CPT | Performed by: SURGERY

## 2024-03-26 PROCEDURE — 7100000001 HC PACU RECOVERY - ADDTL 15 MIN

## 2024-03-26 PROCEDURE — C1726 CATH, BAL DIL, NON-VASCULAR: HCPCS | Performed by: SURGERY

## 2024-03-26 PROCEDURE — 2720000010 HC SURG SUPPLY STERILE: Performed by: SURGERY

## 2024-03-26 PROCEDURE — 85610 PROTHROMBIN TIME: CPT

## 2024-03-26 PROCEDURE — 43246 EGD PLACE GASTROSTOMY TUBE: CPT | Performed by: SURGERY

## 2024-03-26 PROCEDURE — 31600 PLANNED TRACHEOSTOMY: CPT | Performed by: SURGERY

## 2024-03-26 PROCEDURE — 3E0G76Z INTRODUCTION OF NUTRITIONAL SUBSTANCE INTO UPPER GI, VIA NATURAL OR ARTIFICIAL OPENING: ICD-10-PCS | Performed by: SURGERY

## 2024-03-26 PROCEDURE — 74018 RADEX ABDOMEN 1 VIEW: CPT

## 2024-03-26 PROCEDURE — 85025 COMPLETE CBC W/AUTO DIFF WBC: CPT

## 2024-03-26 PROCEDURE — 94003 VENT MGMT INPAT SUBQ DAY: CPT

## 2024-03-26 PROCEDURE — C1769 GUIDE WIRE: HCPCS | Performed by: SURGERY

## 2024-03-26 PROCEDURE — 3700000001 HC ADD 15 MINUTES (ANESTHESIA): Performed by: SURGERY

## 2024-03-26 PROCEDURE — 0B113F4 BYPASS TRACHEA TO CUTANEOUS WITH TRACHEOSTOMY DEVICE, PERCUTANEOUS APPROACH: ICD-10-PCS | Performed by: SURGERY

## 2024-03-26 PROCEDURE — C1874 STENT, COATED/COV W/DEL SYS: HCPCS | Performed by: SURGERY

## 2024-03-26 PROCEDURE — 2580000003 HC RX 258: Performed by: NURSE ANESTHETIST, CERTIFIED REGISTERED

## 2024-03-26 PROCEDURE — 80048 BASIC METABOLIC PNL TOTAL CA: CPT

## 2024-03-26 PROCEDURE — 2000000000 HC ICU R&B

## 2024-03-26 DEVICE — STENT AND ELECTROCAUTERY - ENHANCED DELIVERY SYSTEM
Type: IMPLANTABLE DEVICE | Site: ABDOMEN | Status: FUNCTIONAL
Brand: AXIOS™

## 2024-03-26 RX ORDER — MIDAZOLAM HYDROCHLORIDE 1 MG/ML
INJECTION INTRAMUSCULAR; INTRAVENOUS PRN
Status: DISCONTINUED | OUTPATIENT
Start: 2024-03-26 | End: 2024-03-26 | Stop reason: SDUPTHER

## 2024-03-26 RX ORDER — CIPROFLOXACIN 2 MG/ML
200 INJECTION, SOLUTION INTRAVENOUS
Status: CANCELLED | OUTPATIENT
Start: 2024-03-26 | End: 2024-03-27

## 2024-03-26 RX ORDER — ROCURONIUM BROMIDE 10 MG/ML
INJECTION, SOLUTION INTRAVENOUS PRN
Status: DISCONTINUED | OUTPATIENT
Start: 2024-03-26 | End: 2024-03-26 | Stop reason: SDUPTHER

## 2024-03-26 RX ORDER — SODIUM CHLORIDE, SODIUM LACTATE, POTASSIUM CHLORIDE, CALCIUM CHLORIDE 600; 310; 30; 20 MG/100ML; MG/100ML; MG/100ML; MG/100ML
INJECTION, SOLUTION INTRAVENOUS CONTINUOUS PRN
Status: DISCONTINUED | OUTPATIENT
Start: 2024-03-26 | End: 2024-03-26 | Stop reason: SDUPTHER

## 2024-03-26 RX ORDER — CIPROFLOXACIN 2 MG/ML
INJECTION, SOLUTION INTRAVENOUS PRN
Status: DISCONTINUED | OUTPATIENT
Start: 2024-03-26 | End: 2024-03-26 | Stop reason: SDUPTHER

## 2024-03-26 RX ADMIN — BUDESONIDE INHALATION 500 MCG: 0.5 SUSPENSION RESPIRATORY (INHALATION) at 16:39

## 2024-03-26 RX ADMIN — Medication 10 ML: at 08:35

## 2024-03-26 RX ADMIN — HEPARIN SODIUM 5000 UNITS: 5000 INJECTION INTRAVENOUS; SUBCUTANEOUS at 22:00

## 2024-03-26 RX ADMIN — ROCURONIUM BROMIDE 50 MG: 10 SOLUTION INTRAVENOUS at 12:21

## 2024-03-26 RX ADMIN — INSULIN GLARGINE 8 UNITS: 100 INJECTION, SOLUTION SUBCUTANEOUS at 08:37

## 2024-03-26 RX ADMIN — PHENYLEPHRINE HYDROCHLORIDE 100 MCG: 10 INJECTION INTRAVENOUS at 12:54

## 2024-03-26 RX ADMIN — SODIUM CHLORIDE, PRESERVATIVE FREE 40 MG: 5 INJECTION INTRAVENOUS at 21:58

## 2024-03-26 RX ADMIN — PHENYLEPHRINE HYDROCHLORIDE 100 MCG: 10 INJECTION INTRAVENOUS at 12:29

## 2024-03-26 RX ADMIN — CIPROFLOXACIN 400 MG: 2 INJECTION, SOLUTION INTRAVENOUS at 12:33

## 2024-03-26 RX ADMIN — SODIUM CHLORIDE, SODIUM LACTATE, POTASSIUM CHLORIDE, CALCIUM CHLORIDE AND DEXTROSE MONOHYDRATE: 5; 600; 310; 30; 20 INJECTION, SOLUTION INTRAVENOUS at 06:10

## 2024-03-26 RX ADMIN — SODIUM CHLORIDE, PRESERVATIVE FREE 40 MG: 5 INJECTION INTRAVENOUS at 08:35

## 2024-03-26 RX ADMIN — IPRATROPIUM BROMIDE AND ALBUTEROL SULFATE 1 DOSE: .5; 2.5 SOLUTION RESPIRATORY (INHALATION) at 08:53

## 2024-03-26 RX ADMIN — GLYCOPYRROLATE 1 MG: 1 TABLET ORAL at 22:09

## 2024-03-26 RX ADMIN — INSULIN LISPRO 4 UNITS: 100 INJECTION, SOLUTION INTRAVENOUS; SUBCUTANEOUS at 14:06

## 2024-03-26 RX ADMIN — INSULIN GLARGINE 8 UNITS: 100 INJECTION, SOLUTION SUBCUTANEOUS at 22:08

## 2024-03-26 RX ADMIN — IPRATROPIUM BROMIDE AND ALBUTEROL SULFATE 1 DOSE: .5; 2.5 SOLUTION RESPIRATORY (INHALATION) at 01:46

## 2024-03-26 RX ADMIN — NITROGLYCERIN 1 INCH: 20 OINTMENT TOPICAL at 06:14

## 2024-03-26 RX ADMIN — NITROGLYCERIN 1 INCH: 20 OINTMENT TOPICAL at 00:00

## 2024-03-26 RX ADMIN — IPRATROPIUM BROMIDE AND ALBUTEROL SULFATE 1 DOSE: .5; 2.5 SOLUTION RESPIRATORY (INHALATION) at 04:16

## 2024-03-26 RX ADMIN — ANTI-FUNGAL POWDER MICONAZOLE NITRATE TALC FREE: 1.42 POWDER TOPICAL at 08:40

## 2024-03-26 RX ADMIN — SODIUM CHLORIDE, SODIUM LACTATE, POTASSIUM CHLORIDE, CALCIUM CHLORIDE AND DEXTROSE MONOHYDRATE: 5; 600; 310; 30; 20 INJECTION, SOLUTION INTRAVENOUS at 22:08

## 2024-03-26 RX ADMIN — Medication 10 ML: at 21:59

## 2024-03-26 RX ADMIN — MIDAZOLAM 2 MG: 1 INJECTION INTRAMUSCULAR; INTRAVENOUS at 13:02

## 2024-03-26 RX ADMIN — IPRATROPIUM BROMIDE AND ALBUTEROL SULFATE 1 DOSE: .5; 2.5 SOLUTION RESPIRATORY (INHALATION) at 16:39

## 2024-03-26 RX ADMIN — IPRATROPIUM BROMIDE AND ALBUTEROL SULFATE 1 DOSE: .5; 2.5 SOLUTION RESPIRATORY (INHALATION) at 21:07

## 2024-03-26 RX ADMIN — ANTI-FUNGAL POWDER MICONAZOLE NITRATE TALC FREE: 1.42 POWDER TOPICAL at 22:03

## 2024-03-26 RX ADMIN — SODIUM CHLORIDE, POTASSIUM CHLORIDE, SODIUM LACTATE AND CALCIUM CHLORIDE: 600; 310; 30; 20 INJECTION, SOLUTION INTRAVENOUS at 12:13

## 2024-03-26 RX ADMIN — NITROGLYCERIN 1 INCH: 20 OINTMENT TOPICAL at 18:27

## 2024-03-26 RX ADMIN — ROCURONIUM BROMIDE 20 MG: 10 SOLUTION INTRAVENOUS at 13:23

## 2024-03-26 RX ADMIN — BUDESONIDE INHALATION 500 MCG: 0.5 SUSPENSION RESPIRATORY (INHALATION) at 04:16

## 2024-03-26 RX ADMIN — NITROGLYCERIN 1 INCH: 20 OINTMENT TOPICAL at 14:32

## 2024-03-26 ASSESSMENT — PAIN SCALES - GENERAL
PAINLEVEL_OUTOF10: 0

## 2024-03-26 ASSESSMENT — PULMONARY FUNCTION TESTS
PIF_VALUE: 26
PIF_VALUE: 24
PIF_VALUE: 23
PIF_VALUE: 25
PIF_VALUE: 29
PIF_VALUE: 23
PIF_VALUE: 18
PIF_VALUE: 20
PIF_VALUE: 18
PIF_VALUE: 23
PIF_VALUE: 23
PIF_VALUE: 25
PIF_VALUE: 23
PIF_VALUE: 23
PIF_VALUE: 19
PIF_VALUE: 23
PIF_VALUE: 26
PIF_VALUE: 19
PIF_VALUE: 19
PIF_VALUE: 27
PIF_VALUE: 24
PIF_VALUE: 23
PIF_VALUE: 25
PIF_VALUE: 26

## 2024-03-26 ASSESSMENT — LIFESTYLE VARIABLES: SMOKING_STATUS: 0

## 2024-03-26 ASSESSMENT — ENCOUNTER SYMPTOMS: SHORTNESS OF BREATH: 0

## 2024-03-26 NOTE — PROGRESS NOTES
Admitting Date and Time: 3/10/2024  2:37 PM  Admit Dx: Hyperkalemia [E87.5]  Encounter for dialysis ([facility]) [Z99.2]  End stage renal disease on dialysis ([facility]) [N18.6, Z99.2]  Acute respiratory failure with hypoxia ([facility]) [J96.01]  Acute on chronic respiratory failure with hypoxia and hypercapnia ([facility]) [J96.21, J96.22]  Leukocytosis, unspecified type [D72.829]  Acute metabolic encephalopathy [G93.41]    Subjective:  3/18 , pt is intubated off sedation follows commands , opens her eyes , seems comfortable   3/19 pt still intubated and follows commands , off sedation , bronchoscopy was not done yesterday.  Since her secretions were much better.  3/20 , pt still intubated couldn't tolerate SBT yesterday after bronchoscopy , no polyps or masses seen , only secretions and cultures sent , will try again for extubation today   3/21 pt still intubated and  didn't tolerate SBT yesterday or today ? Might need Trach , nurse can't get hold of the family to discuss , I will to reach them later today  Needs tessio  tunneled cath today    3/22 pt is intubated and alert and follows commands   3/23 pt is awake and looks comfortable , and responsive and very alert   3/24 pt looks more lethargic and tired today still arousable , and responsive and follows command , hb 6.7 will transfuse 1 unit of  PRBC  3/25 pt is sleeping comfortable , no sedation , and just had dialysis with 2 L taken off, as per nurse she has been following commands   and comfortable  3/26, patient is sleeping comfortably she is going for PEG and trach today     insulin lispro  0-16 Units SubCUTAneous Q6H    heparin (porcine)  5,000 Units SubCUTAneous 3 times per day    insulin glargine  8 Units SubCUTAneous BID    glycopyrrolate  1 mg Oral TID    miconazole   Topical BID    nitroglycerin  1 inch Topical 4 times per day    epoetin andrzej-epbx  2,000 Units SubCUTAneous Once per day on Mon Wed Fri    [Held by provider] apixaban  2.5 mg Oral BID    scopolamine  1 patch  bronchoscopy yesterday and no polyps or masses seen.  Adjust secretions which were suctioned and 3 sent for culture.  She failed SBT yesterday we will try again today for possible extubation.  3/21 pt couldn't tolerate SBT yesterday or today , will probably need trach , will wait to try to reach family  and going for tessio / tunneled cath   3/22 had tunneled cath yesterday did well , and discussed with pt about PEG and trach ? She seems to understand and she is agreeable if needed , pt has been off sedation for at least 48 hours   3/23 stable , and awake and agreeable on proceeding with trach and PEG if needed   3/24 stable still on the vent and will go for another SBT , and if fails , will need PEG and trach tomorrow , Hb dropped 6.7 will transfuse 1 unit of PRBC   3/25 going for PEG and trach tomorrow , already scheduled   3/26 patient is going for trach and PEG today  End-stage renal disease on hemodialysis per nephrology  Respiratory failure with hypoxia and hypercapnia possible acute exacerbation COPD status post hydrocortisone DuoNebs.  Ventilated.    , trial for extubation at least  x 3  and failed going for trach today  New onset  a fib  during this hospitalization and was started on eliquis low dose due to thrombocytopenia , platelets better , off Eliquis since he is going for trach and PEG today .  Will start her after the procedure on Eliquis 5 mg twice a day and see if she tolerates that dose   Former smoker she should not restart  Hypothyroidism thyroid replacement.  Tsh wnl  Left forearm AV fistula  malfunctioning thus line placed for HD  Thrombocytopenia: monitor. Improved,  had bronchoscopy  done 3/19 , and  no polyps or masses seen , no biopsies needed and had some secretions resend for culture    Peripheral vascular disease with cyanosis in the toes worse , but pedal pulses felt on right , left with doppler , nitro paste to the toes helping some  and  arterial doppler US done 3/20 , NOHELIA right good

## 2024-03-26 NOTE — ANESTHESIA PRE PROCEDURE
Department of Anesthesiology  Preprocedure Note       Name:  Ainsley Morris   Age:  79 y.o.  :  1945                                          MRN:  [id number]         Date:  3/26/2024      Surgeon: Surgeon(s):  Neto Kwan MD    Procedure: Procedure(s):  TRACHEOTOMY  ESOPHAGOGASTRODUODENOSCOPY PERCUTANEOUS ENDOSCOPIC ULTRASOUND GUIDED GASTROSTOMY TUBE PLACEMENT    Medications prior to admission:   Prior to Admission medications    Medication Sig Start Date End Date Taking? Authorizing Provider   ferric citrate (AURYXIA) 1  MG(Fe) TABS tablet Take 1 tablet by mouth as needed (with Snacks)   Yes ProviderMeri MD   montelukast (SINGULAIR) 10 MG tablet Take 1 tablet by mouth nightly 24   Grey Chua PA-C   vitamin B-12 (CYANOCOBALAMIN) 1000 MCG tablet Take 1 tablet by mouth daily 24   Grey Chua PA-C   levothyroxine (SYNTHROID) 100 MCG tablet Take 1 tablet by mouth daily 23   Grey Chua PA-C   vitamin D (CHOLECALCIFEROL) 25 MCG (1000 UT) TABS tablet Take 1 tablet by mouth daily    ProviderMeri MD   fexofenadine (ALLEGRA) 180 MG tablet Take 1 tablet by mouth daily    ProviderMeri MD   rOPINIRole (REQUIP) 2 MG tablet Take 1 tablet by mouth nightly 23   Grey Chua PA-C   calcium carbonate 1500 (600 Ca) MG TABS tablet Take 1 tablet by mouth 2 times daily 23   Grey Chua PA-C   traZODone (DESYREL) 50 MG tablet Take 1 tablet by mouth nightly 23   Grey Chua PA-C   folic acid (FOLVITE) 1 MG tablet Take 1 tablet by mouth daily 23   Grey Chua PA-C   budesonide-formoterol (SYMBICORT) 160-4.5 MCG/ACT AERO Inhale 2 puffs into the lungs 2 times daily 23   Grey Chua PA-C   fluticasone-salmeterol (ADVAIR) 100-50 MCG/ACT AEPB diskus inhaler Inhale 1 puff into the lungs in the morning and 1 puff in the evening. 23   Grey Chua PA-C   atorvastatin (LIPITOR) 10 MG tablet Take 1 tablet by mouth

## 2024-03-26 NOTE — PLAN OF CARE
Problem: Safety - Adult  Goal: Free from fall injury  3/26/2024 0530 by Sally Aguilar, RN  Outcome: Progressing

## 2024-03-26 NOTE — PROGRESS NOTES
[facility]  Nephrology Progress Note    Patient's Name: Ainsley Morris    SUBJECTIVE:  \"We are following this patient for end-stage renal failure .     3/26/24- seen and examined. She is for PEG and tracheostomy today. No family at the bedside. She does awaken and nod to some questions         PMH:    Past Medical History:   Diagnosis Date    Anemia     Arrhythmia     AF    Arthritis     RA    Asthma     Chronic kidney disease     COPD (chronic obstructive pulmonary disease) (HCC)     Depression     Hemodialysis patient (HCC)     T-Th-Sat    History of blood transfusion     Hyperlipidemia     Hypothyroid     Sleep apnea     no CPAP    Type II or unspecified type diabetes mellitus without mention of complication, not stated as uncontrolled     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 06/15/2017       Patient Active Problem List   Diagnosis    Controlled type 2 diabetes mellitus with chronic kidney disease on chronic dialysis, with long-term current use of insulin (HCC)    Mixed hyperlipidemia    Hypothyroidism    Essential hypertension    Sleep apnea    Osteoporosis    Macrocytic anemia with vitamin B12 deficiency    ESRD on dialysis (HCC)    Rheumatoid arthritis (HCC)    Restless leg syndrome, controlled    Depression    Shortness of breath    End stage renal disease (HCC)    Paroxysmal atrial fibrillation (HCC)    Hypotension    Chest pain    Lumbosacral spondylosis without myelopathy    Severe episode of recurrent major depressive disorder, without psychotic features (HCC)    Encounter regarding vascular access for dialysis for end-stage renal disease (HCC)    Acute respiratory failure with hypoxia (HCC)    Hyperkalemia    Palliative care encounter       Diet:    Diet NPO    Meds:     [START ON 3/27/2024] apixaban  5 mg Oral BID    insulin lispro  0-16 Units SubCUTAneous Q6H    heparin (porcine)  5,000 Units SubCUTAneous 3 times per day    insulin glargine  8 Units

## 2024-03-26 NOTE — PROGRESS NOTES
Comprehensive Nutrition Assessment    Type and Reason for Visit:  Reassess    Nutrition Recommendations/Plan:   Continue NPO  Modify Tube Feeding for trach and PEG placement  New Tube Feeding Recommendations:    03/26/24: Modified Tube Feeding:    Renal (Nepro w/ carbsteady) @15 mls increasing 15mls q6 hours to goal of 41mls/hr x 24 hours, additional 30mls q4 hours provides: 1,170 kcals, 81 grams of protein, 727 mls H2O meeting 100% of  calorie and protein needs per day. Continue to monitor renal function and adjust TF appropriately.      Malnutrition Assessment:  Malnutrition Status:  No malnutrition (03/12/24 1534)    Context:  Chronic Illness     Findings of the 6 clinical characteristics of malnutrition:  Energy Intake:  Mild decrease in energy intake (Comment) (poor po intake prior to admission)  Weight Loss:  Unable to assess (no wt hx in emr)     Body Fat Loss:  No significant body fat loss     Muscle Mass Loss:  No significant muscle mass loss    Fluid Accumulation:  Mild Extremities   Strength:  Not Performed    Nutrition Assessment:    Pt admit for AMS, missed dialysis treatments, respiratory failure w/ hypoxia, hyperkalemia, septic shock Kleibsella (unknown etiology.) PMHx: Anemia, Arrythmia, Arthritis, Asthma, CKD on dialysis, COPD, Hypothyroid, HLD, Sleep apnea, T2DM. Pt intubated and sedated (propofol), pressors x1. Pt was to have dialysis but there was a fistula malfunction, next HD dependent upon pressor needs. Pt will try to be weaned 1-2 days. WIll provide tube feeding recommendations, continue inpaitent monitoring f/up per policy. 03/16/24: F/up: Pt off propofol, off pressors x2 days, MAP 35, will re-calculate Tube Feeding however d/t MAP score do not increase above current rate at 25mls/hr, once MAP improves slowly increase to new goal rate. of 29mls/hr x24 hours, will f/up on 03/18/24 to determine MAP. 03/18/24: F/up: Pt off all sedation and pressors x3 days, remains intubated. Spontaneous

## 2024-03-26 NOTE — ANESTHESIA POSTPROCEDURE EVALUATION
Department of Anesthesiology  Postprocedure Note    Patient: Ainsley Morris  MRN: [id number]  YOB: 1945  Date of evaluation: 3/26/2024    Procedure Summary       Date: 03/26/24 Room / Location: [address]    Anesthesia Start: 1213 Anesthesia Stop: 1400    Procedures:       TRACHEOTOMY      ENDOSCOPIC ULTRASOUND (Left)      ESOPHAGOGASTRODUODENOSCOPY PERCUTANEOUS ENDOSCOPIC GASTROSTOMY TUBE INSERTION (Left) Diagnosis:       Respiratory failure, unspecified chronicity, unspecified whether with hypoxia or hypercapnia (HCC)      (Respiratory failure, unspecified chronicity, unspecified whether with hypoxia or hypercapnia (HCC) [J96.90])    Surgeons: Neto Kwan MD Responsible Provider: New Briggs MD    Anesthesia Type: MAC ASA Status: 4            Anesthesia Type: No value filed.    Tavo Phase I: Tavo Score: 5    Tavo Phase II: Tavo Score: 5    Anesthesia Post Evaluation    Patient location during evaluation: ICU  Patient participation: waiting for patient participation  Level of consciousness: sedated and ventilated  Airway patency: patent  Nausea & Vomiting: no nausea and no vomiting  Cardiovascular status: hemodynamically stable  Respiratory status: ventilator  Hydration status: euvolemic  Pain management: adequate        No notable events documented.

## 2024-03-26 NOTE — OP NOTE
Operative Note      Patient: Ainsley Morris  YOB: 1945  MRN: [id number]    Date of Procedure: 3/26/2024    Pre-Op Diagnosis Codes:     * Respiratory failure, unspecified chronicity, unspecified whether with hypoxia or hypercapnia (HCC) [J96.90]    Post-Op Diagnosis: Same       Procedure(s):  TRACHEOTOMY  ENDOSCOPIC ULTRASOUND  ESOPHAGOGASTRODUODENOSCOPY PERCUTANEOUS ENDOSCOPIC GASTROSTOMY TUBE INSERTION    Surgeon(s):  Neto Kwan MD Romain, Consandre, MD    Assistant:   Surgical Assistant: Sonali Bunch RN  First Assistant: Dede Young RN    Anesthesia: Monitor Anesthesia Care    Estimated Blood Loss (mL): less than 50     Complications: None    Specimens:   * No specimens in log *    Implants:  Implant Name Type Inv. Item Serial No.  Lot No. LRB No. Used Action   STENT GI CATH 10.8FR L138CM TOT L146CM FLNG [id number] LUMN - [id number]  STENT GI CATH 10.8FR L138CM TOT L146CM FLNG [id number] LUMN  [employer]- [id number]  1 Implanted         Drains:   Gastrostomy/Enterostomy/Jejunostomy Tube Percutaneous Endoscopic Gastrostomy (PEG) LLQ 1 20 fr (Active)       [REMOVED] NG/OG/NJ/NE Tube Orogastric 16 fr Center mouth (Removed)   Surrounding Skin Clean, dry & intact 03/21/24 0800   Securement device Tape 03/21/24 0800   Status Clamped 03/21/24 0800   Placement Verified External Catheter Length 03/21/24 0800   NG/OG/NJ/NE External Measurement (cm) 65 cm 03/21/24 0800   Drainage Appearance None 03/21/24 0800   Tube Feeding Renal Formula 03/21/24 0800   Tube feeding/verify rate (mL/hr) 29 mL/hr 03/21/24 0400   Tube Feeding Supplement (Product) Protein Modular 03/21/24 0800   Tube Feeding Intake (mL) 403 ml 03/20/24 0741   Tube Feeding Supplement Amount (mL) 184 03/16/24 0501   Free Water/Flush (mL) 90 mL 03/20/24 0741   Action Taken Placement verified (comment) 03/18/24 1600   Residual Volume (ml) 0 ml 03/18/24 1600       [REMOVED] NG/OG/NJ/NE Tube Nasogastric 14 fr Right nostril

## 2024-03-26 NOTE — PROGRESS NOTES
Chart reviewed. Patient in OR for trach/PEG insertion. Goals of care and code status established. There are no further PM needs at this time.  PM will now sign off.  If new PM needs arise, please re-consult.  Thank you.

## 2024-03-26 NOTE — PROGRESS NOTES
veins.         IR FLUORO GUIDED CVA DEVICE PLMT/REPLACE/REMOVAL   Final Result   Successful ultrasound and fluoroscopy guided temporary dialysis catheter   placement.  11.5 [place] by 15 cm triple-lumen catheter was placed.         IR ULTRASOUND GUIDANCE VASCULAR ACCESS   Final Result   Successful ultrasound and fluoroscopy guided temporary dialysis catheter   placement.  11.5 [place] by 15 cm triple-lumen catheter was placed.         XR CHEST PORTABLE   Final Result   1. Endotracheal tube and gastric catheter in good position.   2. Left basilar atelectasis or pneumonia.  Atelectasis is favored.         XR ABDOMEN FOR NG/OG/NE TUBE PLACEMENT   Final Result   Enteric tube tip in the mid gastric body.         CTA PULMONARY W CONTRAST   Final Result   1.  Study difficult to be interpreted due motion artifacts.  In the areas not   seen Hussein affected by motion artifacts there is no indication for acute   pulmonary embolus.      2.  Prominent diameter for the central pulmonary artery up to 4.2 cm which   can indicated a pattern of chronic pulmonary hypertension.  Please correlate   clinically.      3.  There is also some increase in the RV/LV ratio up to 1.23 which can be an   indication for straining of the right ventricle.  Please correlate clinically.      4.  No aneurysm formation or dissection thoracic aorta although the aorta has   upper borderline diameter in the range of 4 cm.      5.  Areas of linear subsegmental atelectasis seen predominant towards the   lower lung bases these more likely post inflammatory residual changes.         CT ABDOMEN PELVIS W IV CONTRAST Additional Contrast? None   Final Result   1.  No acute process in abdomen or pelvis.      2.  Atrophy of both kidneys suggesting chronic medical renal disease.      3.  Stable loculated fluid collection associated with ventral abdominal wall   musculature which may represent a stable postoperative seroma.  This lesion   is similar in size compared  questions answered.     This patient has a high probability of sudden, clinically significant deterioration, which requires the highest level of physician preparedness to intervene urgently.  I managed/supervised life or organ supporting interventions that required frequent physician assessment.   I devoted my full attention to the direct care of this patient for the amount of time indicated below.  Time I spent with the family or surrogate(s) is included only if the patient was incapable of providing the necessary information or participating in medical decisions - Time devoted to teaching and to any procedures I billed separately is not included.    My time caring for this patient including history, physical examination, and medical management and evaluation added up to greater than 50% of the total time spent with this patient, including shared/split visits, should this note reflect this event.      CRITICAL CARE TIME:  36 minutes    Electronically signed by Cheng Payne MD on 3/26/2024 at 2:16 PM

## 2024-03-26 NOTE — PLAN OF CARE
Problem: Safety - Adult  Goal: Free from fall injury  3/26/2024 1819 by Sailaja Lorenzana RN  Outcome: Progressing     Problem: Discharge Planning  Goal: Discharge to home or other facility with appropriate resources  Outcome: Progressing     Problem: Pain  Goal: Verbalizes/displays adequate comfort level or baseline comfort level  Outcome: Progressing     Problem: Skin/Tissue Integrity  Goal: Absence of new skin breakdown  Description: 1.  Monitor for areas of redness and/or skin breakdown  2.  Assess vascular access sites hourly  3.  Every 4-6 hours minimum:  Change oxygen saturation probe site  4.  Every 4-6 hours:  If on nasal continuous positive airway pressure, respiratory therapy assess nares and determine need for appliance change or resting period.  Outcome: Progressing     Problem: ABCDS Injury Assessment  Goal: Absence of physical injury  Outcome: Progressing     Problem: Respiratory - Adult  Goal: Achieves optimal ventilation and oxygenation  Outcome: Progressing     Problem: Skin/Tissue Integrity - Adult  Goal: Skin integrity remains intact  Outcome: Progressing     Problem: Infection - Adult  Goal: Absence of infection at discharge  Outcome: Progressing     Problem: Anxiety  Goal: Will report anxiety at manageable levels  Description: INTERVENTIONS:  1. Administer medication as ordered  2. Teach and rehearse alternative coping skills  3. Provide emotional support with 1:1 interaction with staff  Outcome: Progressing     Problem: Coping  Goal: Pt/Family able to verbalize concerns and demonstrate effective coping strategies  Description: INTERVENTIONS:  1. Assist patient/family to identify coping skills, available support systems and cultural and spiritual values  2. Provide emotional support, including active listening and acknowledgement of concerns of patient and caregivers  3. Reduce environmental stimuli, as able  4. Instruct patient/family in relaxation techniques, as appropriate  5. Assess  for spiritual pain/suffering and initiate Spiritual Care, Psychosocial Clinical Specialist consults as needed  Outcome: Progressing     Problem: Neurosensory - Adult  Goal: Achieves stable or improved neurological status  Outcome: Progressing     Problem: Genitourinary - Adult  Goal: Absence of urinary retention  Outcome: Completed

## 2024-03-26 NOTE — PROGRESS NOTES
GENERAL SURGERY  DAILY PROGRESS NOTE  3/26/2024    Chief Complaint   Patient presents with    Altered Mental Status     Ems report pt was c/c Friday, missed dialysis on Friday, home in poor condition-dog poop everywhere, 79 on RA, hypotension        Subjective:  No major changes overnight, remains on minimal vent settings    Objective:  /62   Pulse 83   Temp 98.7 °F (37.1 °C) (Axillary)   Resp 18   Ht 1.651 m (5' 5\")   Wt 104 kg (229 lb 3 oz)   SpO2 99%   BMI 38.14 kg/m²     GENERAL:  Laying in bed, intubated  HEAD: Normocephalic, atraumatic  EYES: No sclera icterus, pupils equal  LUNGS: Mechanical ventilation  CARDIOVASCULAR:  RR  ABDOMEN:  Soft, non-tender, non-distended.  Prior surgical scars noted  EXTREMITIES: No edema or swelling  SKIN: Warm and dry    Assessment/Plan:  79 y.o. female with respiratory failure, unable to tolerate spontaneous breathing trials    -Plan for tracheostomy and EUS guided gastrostomy tube placement today  -Hold tube feeds  -Hold Eliquis    Electronically signed by Rema Miranda MD on 3/26/2024 at 7:05 AM

## 2024-03-27 ENCOUNTER — APPOINTMENT (OUTPATIENT)
Dept: GENERAL RADIOLOGY | Age: 79
DRG: 004 | End: 2024-03-27
Payer: COMMERCIAL

## 2024-03-27 ENCOUNTER — APPOINTMENT (OUTPATIENT)
Dept: INTERVENTIONAL RADIOLOGY/VASCULAR | Age: 79
DRG: 004 | End: 2024-03-27
Payer: COMMERCIAL

## 2024-03-27 LAB
AADO2: 66.2 MMHG
ALBUMIN SERPL-MCNC: 2.2 G/DL (ref 3.5–5.2)
ALP SERPL-CCNC: 89 U/L (ref 35–104)
ALT SERPL-CCNC: 12 U/L (ref 0–32)
ANION GAP SERPL CALCULATED.3IONS-SCNC: 10 MMOL/L (ref 7–16)
AST SERPL-CCNC: 10 U/L (ref 0–31)
B.E.: -2.4 MMOL/L (ref -3–3)
BASOPHILS # BLD: 0.01 K/UL (ref 0–0.2)
BASOPHILS NFR BLD: 0 % (ref 0–2)
BILIRUB SERPL-MCNC: 0.5 MG/DL (ref 0–1.2)
BUN SERPL-MCNC: 29 MG/DL (ref 6–23)
CALCIUM SERPL-MCNC: 8.1 MG/DL (ref 8.6–10.2)
CHLORIDE SERPL-SCNC: 99 MMOL/L (ref 98–107)
CO2 SERPL-SCNC: 23 MMOL/L (ref 22–29)
COHB: 2.4 % (ref 0–1.5)
CREAT SERPL-MCNC: 3 MG/DL (ref 0.5–1)
CRITICAL: ABNORMAL
DATE ANALYZED: ABNORMAL
DATE OF COLLECTION: ABNORMAL
EOSINOPHIL # BLD: 0.03 K/UL (ref 0.05–0.5)
EOSINOPHILS RELATIVE PERCENT: 1 % (ref 0–6)
ERYTHROCYTE [DISTWIDTH] IN BLOOD BY AUTOMATED COUNT: 15.8 % (ref 11.5–15)
FIO2: 25 %
GFR SERPL CREATININE-BSD FRML MDRD: 16 ML/MIN/1.73M2
GLUCOSE BLD-MCNC: 101 MG/DL (ref 74–99)
GLUCOSE BLD-MCNC: 118 MG/DL (ref 74–99)
GLUCOSE BLD-MCNC: 146 MG/DL (ref 74–99)
GLUCOSE BLD-MCNC: 149 MG/DL (ref 74–99)
GLUCOSE SERPL-MCNC: 139 MG/DL (ref 74–99)
HCO3: 20.8 MMOL/L (ref 22–26)
HCT VFR BLD AUTO: 22.7 % (ref 34–48)
HCT VFR BLD AUTO: 27.9 % (ref 34–48)
HGB BLD-MCNC: 7.2 G/DL (ref 11.5–15.5)
HGB BLD-MCNC: 8.8 G/DL (ref 11.5–15.5)
HHB: 4.9 % (ref 0–5)
IMM GRANULOCYTES # BLD AUTO: 0.03 K/UL (ref 0–0.58)
IMM GRANULOCYTES NFR BLD: 1 % (ref 0–5)
LAB: ABNORMAL
LYMPHOCYTES NFR BLD: 0.65 K/UL (ref 1.5–4)
LYMPHOCYTES RELATIVE PERCENT: 12 % (ref 20–42)
Lab: 430
MAGNESIUM SERPL-MCNC: 2 MG/DL (ref 1.6–2.6)
MCH RBC QN AUTO: 30.4 PG (ref 26–35)
MCHC RBC AUTO-ENTMCNC: 31.7 G/DL (ref 32–34.5)
MCV RBC AUTO: 95.8 FL (ref 80–99.9)
METHB: 0.3 % (ref 0–1.5)
MODE: AC
MONOCYTES NFR BLD: 0.5 K/UL (ref 0.1–0.95)
MONOCYTES NFR BLD: 9 % (ref 2–12)
NEUTROPHILS NFR BLD: 78 % (ref 43–80)
NEUTS SEG NFR BLD: 4.22 K/UL (ref 1.8–7.3)
O2 CONTENT: 10.2 ML/DL
O2 SATURATION: 95 % (ref 92–98.5)
O2HB: 92.4 % (ref 94–97)
OPERATOR ID: 2323
PATIENT TEMP: 37 C
PCO2: 29.5 MMHG (ref 35–45)
PEEP/CPAP: 5 CMH2O
PFO2: 2.83 MMHG/%
PH BLOOD GAS: 7.47 (ref 7.35–7.45)
PHOSPHATE SERPL-MCNC: 4 MG/DL (ref 2.5–4.5)
PLATELET # BLD AUTO: 114 K/UL (ref 130–450)
PLATELET CONFIRMATION: NORMAL
PMV BLD AUTO: 10.8 FL (ref 7–12)
PO2: 70.8 MMHG (ref 75–100)
POTASSIUM SERPL-SCNC: 4.3 MMOL/L (ref 3.5–5)
PROT SERPL-MCNC: 5.2 G/DL (ref 6.4–8.3)
RBC # BLD AUTO: 2.37 M/UL (ref 3.5–5.5)
RI(T): 0.93
RR MECHANICAL: 18 B/MIN
SODIUM SERPL-SCNC: 132 MMOL/L (ref 132–146)
SOURCE, BLOOD GAS: ABNORMAL
THB: 7.8 G/DL (ref 11.5–16.5)
TIME ANALYZED: 440
VT MECHANICAL: 400 ML
WBC OTHER # BLD: 5.4 K/UL (ref 4.5–11.5)

## 2024-03-27 PROCEDURE — 2000000000 HC ICU R&B

## 2024-03-27 PROCEDURE — 2700000000 HC OXYGEN THERAPY PER DAY

## 2024-03-27 PROCEDURE — 6370000000 HC RX 637 (ALT 250 FOR IP): Performed by: INTERNAL MEDICINE

## 2024-03-27 PROCEDURE — 6360000002 HC RX W HCPCS: Performed by: INTERNAL MEDICINE

## 2024-03-27 PROCEDURE — 85018 HEMOGLOBIN: CPT

## 2024-03-27 PROCEDURE — 99231 SBSQ HOSP IP/OBS SF/LOW 25: CPT | Performed by: INTERNAL MEDICINE

## 2024-03-27 PROCEDURE — 2580000003 HC RX 258: Performed by: INTERNAL MEDICINE

## 2024-03-27 PROCEDURE — 82805 BLOOD GASES W/O2 SATURATION: CPT

## 2024-03-27 PROCEDURE — 94640 AIRWAY INHALATION TREATMENT: CPT

## 2024-03-27 PROCEDURE — 82962 GLUCOSE BLOOD TEST: CPT

## 2024-03-27 PROCEDURE — C9113 INJ PANTOPRAZOLE SODIUM, VIA: HCPCS | Performed by: INTERNAL MEDICINE

## 2024-03-27 PROCEDURE — 84100 ASSAY OF PHOSPHORUS: CPT

## 2024-03-27 PROCEDURE — P9016 RBC LEUKOCYTES REDUCED: HCPCS

## 2024-03-27 PROCEDURE — 85014 HEMATOCRIT: CPT

## 2024-03-27 PROCEDURE — 90935 HEMODIALYSIS ONE EVALUATION: CPT

## 2024-03-27 PROCEDURE — 36592 COLLECT BLOOD FROM PICC: CPT

## 2024-03-27 PROCEDURE — 99291 CRITICAL CARE FIRST HOUR: CPT | Performed by: INTERNAL MEDICINE

## 2024-03-27 PROCEDURE — 94003 VENT MGMT INPAT SUBQ DAY: CPT

## 2024-03-27 PROCEDURE — A4216 STERILE WATER/SALINE, 10 ML: HCPCS | Performed by: INTERNAL MEDICINE

## 2024-03-27 PROCEDURE — 80053 COMPREHEN METABOLIC PANEL: CPT

## 2024-03-27 PROCEDURE — 83735 ASSAY OF MAGNESIUM: CPT

## 2024-03-27 PROCEDURE — 85025 COMPLETE CBC W/AUTO DIFF WBC: CPT

## 2024-03-27 PROCEDURE — 71045 X-RAY EXAM CHEST 1 VIEW: CPT

## 2024-03-27 RX ORDER — SODIUM CHLORIDE 9 MG/ML
INJECTION, SOLUTION INTRAVENOUS PRN
Status: DISCONTINUED | OUTPATIENT
Start: 2024-03-27 | End: 2024-03-30 | Stop reason: HOSPADM

## 2024-03-27 RX ORDER — CHLORHEXIDINE GLUCONATE ORAL RINSE 1.2 MG/ML
15 SOLUTION DENTAL 2 TIMES DAILY
Status: DISCONTINUED | OUTPATIENT
Start: 2024-03-27 | End: 2024-03-30 | Stop reason: HOSPADM

## 2024-03-27 RX ORDER — SODIUM CHLORIDE 0.9 % (FLUSH) 0.9 %
5-40 SYRINGE (ML) INJECTION PRN
Status: DISCONTINUED | OUTPATIENT
Start: 2024-03-27 | End: 2024-03-30 | Stop reason: HOSPADM

## 2024-03-27 RX ORDER — SODIUM CHLORIDE 9 MG/ML
INJECTION, SOLUTION INTRAVENOUS PRN
Status: DISCONTINUED | OUTPATIENT
Start: 2024-03-27 | End: 2024-03-27

## 2024-03-27 RX ORDER — SODIUM CHLORIDE 0.9 % (FLUSH) 0.9 %
5-40 SYRINGE (ML) INJECTION EVERY 12 HOURS SCHEDULED
Status: DISCONTINUED | OUTPATIENT
Start: 2024-03-27 | End: 2024-03-30 | Stop reason: HOSPADM

## 2024-03-27 RX ADMIN — ANTI-FUNGAL POWDER MICONAZOLE NITRATE TALC FREE: 1.42 POWDER TOPICAL at 20:11

## 2024-03-27 RX ADMIN — NITROGLYCERIN 1 INCH: 20 OINTMENT TOPICAL at 18:24

## 2024-03-27 RX ADMIN — ANTI-FUNGAL POWDER MICONAZOLE NITRATE TALC FREE: 1.42 POWDER TOPICAL at 08:00

## 2024-03-27 RX ADMIN — Medication 10 ML: at 20:08

## 2024-03-27 RX ADMIN — NITROGLYCERIN 1 INCH: 20 OINTMENT TOPICAL at 07:05

## 2024-03-27 RX ADMIN — NYSTATIN 500000 UNITS: 100000 SUSPENSION ORAL at 20:08

## 2024-03-27 RX ADMIN — GLYCOPYRROLATE 1 MG: 1 TABLET ORAL at 20:07

## 2024-03-27 RX ADMIN — Medication 10 ML: at 08:00

## 2024-03-27 RX ADMIN — NYSTATIN 500000 UNITS: 100000 SUSPENSION ORAL at 13:45

## 2024-03-27 RX ADMIN — 0.12% CHLORHEXIDINE GLUCONATE 15 ML: 1.2 RINSE ORAL at 13:45

## 2024-03-27 RX ADMIN — SODIUM CHLORIDE, PRESERVATIVE FREE 40 MG: 5 INJECTION INTRAVENOUS at 07:59

## 2024-03-27 RX ADMIN — BUDESONIDE INHALATION 500 MCG: 0.5 SUSPENSION RESPIRATORY (INHALATION) at 04:10

## 2024-03-27 RX ADMIN — NYSTATIN 500000 UNITS: 100000 SUSPENSION ORAL at 18:24

## 2024-03-27 RX ADMIN — INSULIN GLARGINE 8 UNITS: 100 INJECTION, SOLUTION SUBCUTANEOUS at 20:10

## 2024-03-27 RX ADMIN — IPRATROPIUM BROMIDE AND ALBUTEROL SULFATE 1 DOSE: .5; 2.5 SOLUTION RESPIRATORY (INHALATION) at 23:19

## 2024-03-27 RX ADMIN — NITROGLYCERIN 1 INCH: 20 OINTMENT TOPICAL at 00:00

## 2024-03-27 RX ADMIN — Medication 10 ML: at 20:11

## 2024-03-27 RX ADMIN — IPRATROPIUM BROMIDE AND ALBUTEROL SULFATE 1 DOSE: .5; 2.5 SOLUTION RESPIRATORY (INHALATION) at 18:17

## 2024-03-27 RX ADMIN — IPRATROPIUM BROMIDE AND ALBUTEROL SULFATE 1 DOSE: .5; 2.5 SOLUTION RESPIRATORY (INHALATION) at 04:10

## 2024-03-27 RX ADMIN — GLYCOPYRROLATE 1 MG: 1 TABLET ORAL at 07:57

## 2024-03-27 RX ADMIN — Medication 10 ML: at 13:46

## 2024-03-27 RX ADMIN — EPOETIN ALFA-EPBX 10000 UNITS: 10000 INJECTION, SOLUTION INTRAVENOUS; SUBCUTANEOUS at 20:07

## 2024-03-27 RX ADMIN — NITROGLYCERIN 1 INCH: 20 OINTMENT TOPICAL at 13:45

## 2024-03-27 RX ADMIN — IPRATROPIUM BROMIDE AND ALBUTEROL SULFATE 1 DOSE: .5; 2.5 SOLUTION RESPIRATORY (INHALATION) at 09:10

## 2024-03-27 RX ADMIN — IPRATROPIUM BROMIDE AND ALBUTEROL SULFATE 1 DOSE: .5; 2.5 SOLUTION RESPIRATORY (INHALATION) at 00:45

## 2024-03-27 RX ADMIN — APIXABAN 5 MG: 5 TABLET, FILM COATED ORAL at 20:07

## 2024-03-27 RX ADMIN — 0.12% CHLORHEXIDINE GLUCONATE 15 ML: 1.2 RINSE ORAL at 20:08

## 2024-03-27 RX ADMIN — BUDESONIDE INHALATION 500 MCG: 0.5 SUSPENSION RESPIRATORY (INHALATION) at 18:17

## 2024-03-27 RX ADMIN — SODIUM CHLORIDE, PRESERVATIVE FREE 40 MG: 5 INJECTION INTRAVENOUS at 20:08

## 2024-03-27 RX ADMIN — GLYCOPYRROLATE 1 MG: 1 TABLET ORAL at 13:46

## 2024-03-27 RX ADMIN — INSULIN GLARGINE 8 UNITS: 100 INJECTION, SOLUTION SUBCUTANEOUS at 07:58

## 2024-03-27 ASSESSMENT — PULMONARY FUNCTION TESTS
PIF_VALUE: 8
PIF_VALUE: 8
PIF_VALUE: 20
PIF_VALUE: 19
PIF_VALUE: 19
PIF_VALUE: 18
PIF_VALUE: 22
PIF_VALUE: 10
PIF_VALUE: 8
PIF_VALUE: 18
PIF_VALUE: 20
PIF_VALUE: 21
PIF_VALUE: 23
PIF_VALUE: 19
PIF_VALUE: 19
PIF_VALUE: 8
PIF_VALUE: 18
PIF_VALUE: 19
PIF_VALUE: 17
PIF_VALUE: 17
PIF_VALUE: 20
PIF_VALUE: 19
PIF_VALUE: 19
PIF_VALUE: 24
PIF_VALUE: 19
PIF_VALUE: 8

## 2024-03-27 ASSESSMENT — PAIN SCALES - GENERAL
PAINLEVEL_OUTOF10: 0

## 2024-03-27 NOTE — PROGRESS NOTES
Patient came down to Special Procedures for ultrasound guided right thoracentesis.    Procedure was explained, questions were answered.     Per Dr. Eng, there is not enough fluid to drain. Unable to proceed with procedure.  procedure cancelled     Nurse to nurse given to Judith MCLAUGHLIN, ICU nurse in specials with patient.  nurse notified of above information    Patient transported back to floor.

## 2024-03-27 NOTE — PROGRESS NOTES
Progress  Note  Chief Complaint   Patient presents with    Altered Mental Status     Ems report pt was c/c Friday, missed dialysis on Friday, home in poor condition-dog poop everywhere, 79 on RA, hypotension      Historical Issues:  Current Facility-Administered Medications   Medication Dose Route Frequency Provider Last Rate Last Admin    0.9 % sodium chloride infusion   IntraVENous PRN Paras Enrique MD        epoetin andrzej-epbx (RETACRIT) injection 10,000 Units  10,000 Units SubCUTAneous Once per day on Mon Wed Fri Paras Enrique MD        sodium chloride flush 0.9 % injection 5-40 mL  5-40 mL IntraVENous 2 times per day Cheng Payne MD        sodium chloride flush 0.9 % injection 5-40 mL  5-40 mL IntraVENous PRN Cheng Payne MD        0.9 % sodium chloride infusion   IntraVENous PRN Cheng Payne MD        chlorhexidine (PERIDEX) 0.12 % solution 15 mL  15 mL Mouth/Throat BID Bradley Perdomo MD        nystatin (MYCOSTATIN) 889685 UNIT/ML suspension 500,000 Units  5 mL Buccal 4x Daily Bradley Perdomo MD        apixaban (ELIQUIS) tablet 5 mg  5 mg Oral BID Ford Freed MD        insulin lispro (HUMALOG) injection vial 0-16 Units  0-16 Units SubCUTAneous Q6H Edgard Henson MD   4 Units at 03/26/24 1406    dextrose 5 % in lactated ringers infusion   IntraVENous Continuous Edgard Henson MD 30 mL/hr at 03/26/24 2208 New Bag at 03/26/24 2208    insulin glargine (LANTUS) injection vial 8 Units  8 Units SubCUTAneous BID Edgard Henson MD   8 Units at 03/27/24 0758    glycopyrrolate (ROBINUL) tablet 1 mg  1 mg Oral TID Edgard Henson MD   1 mg at 03/27/24 0757    miconazole (MICOTIN) 2 % powder   Topical BID Ford Freed MD   Given at 03/27/24 0800    nitroglycerin (NITRO-BID) 2 % ointment 1 inch  1 inch Topical 4 times per day Ford Freed MD   1 inch at 03/27/24 0705    scopolamine (TRANSDERM-SCOP) transdermal patch 1 patch  1 patch TransDERmal

## 2024-03-27 NOTE — PLAN OF CARE
Problem: Safety - Adult  Goal: Free from fall injury  3/27/2024 0508 by Sally Aguilar, RN  Outcome: Progressing

## 2024-03-27 NOTE — PROGRESS NOTES
GENERAL SURGERY  DAILY PROGRESS NOTE  3/27/2024    Chief Complaint   Patient presents with    Altered Mental Status     Ems report pt was c/c Friday, missed dialysis on Friday, home in poor condition-dog poop everywhere, 79 on RA, hypotension        Subjective:  No major changes overnight, tolerated procedure well.  Denies pain this morning    Objective:  BP (!) 142/63   Pulse 85   Temp 98.7 °F (37.1 °C) (Axillary)   Resp 18   Ht 1.651 m (5' 5\")   Wt 104.9 kg (231 lb 3 oz)   SpO2 96%   BMI 38.47 kg/m²     GENERAL:  Laying in bed, mechanical ventilation via tracheostomy, shakes head to questions  HEAD: Normocephalic, atraumatic  EYES: No sclera icterus, pupils equal  LUNGS: Mechanical ventilation  CARDIOVASCULAR:  RR  ABDOMEN:  Soft, non-tender, non-distended.  Gastrostomy tube in place at 3 cm at the skin  EXTREMITIES: No edema or swelling  SKIN: Warm and dry    Assessment/Plan:  79 y.o. 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

## 2024-03-27 NOTE — PROGRESS NOTES
Physical Therapy  Physical Therapy    Room #: IC05/IC05-01  Date: 3/27/2024       Patient Name: Ainsley Morris  : 1945      MRN: [id number]     Patient unavailable for physical therapy eval due to off floor at dialysis. Will attempt PT evaluation at a later time. Thank you.       Perez Marie, PT

## 2024-03-27 NOTE — PROGRESS NOTES
OT SESSION ATTEMPT     Date:3/27/2024  Patient Name: Ainsley Morris  MRN: [id number]  : 1945  Room: [place] [address]     Attempted OT session this date:    [] unavailable due to other medical staff currently with pt   [] unavailable per nursing staff recommendation    [] Unavailable per nursing staff secondary to lab / radiology results    [] pt declined due to ____.  Benefits of participation in therapy reviewed with pt.    [] off unit   [x] Other: Receiving dialysis    Will reattempt OT evaluation and/or treatment at a later time.    Ruchi Delgado OTR/L; [id number]

## 2024-03-27 NOTE — PROGRESS NOTES
Pt transported from ICU to Special Procedures and back again using portable ventilator. No problems encountered. Total time 50 minutes.

## 2024-03-27 NOTE — FLOWSHEET NOTE
03/27/24 1312   Vital Signs   BP (!) 144/71   Temp 98.8 °F (37.1 °C)   Pulse 86   Respirations 18   Weight - Scale 103.2 kg (227 lb 8.2 oz)   Percent Weight Change -1.59   Post-Hemodialysis Assessment   Post-Treatment Procedures Blood returned;Catheter capped, clamped and heparinized x 2 ports   Machine Disinfection Process Acid/Vinegar Clean;Heat Disinfect   Rinseback Volume (ml) 300 ml   Blood Volume Processed (Liters) 82.7 L   Dialyzer Clearance Lightly streaked   Duration of Treatment (minutes) 240 minutes   Heparin Amount Administered During Treatment (mL) 0 mL   Hemodialysis Intake (ml) 650 ml   Hemodialysis Output (ml) 2350 ml   NET Removed (ml) 1700   Tolerated Treatment Good   Patient Response to Treatment tolerated tx well.  Received 1 unit of packed red cells.   Bilateral Breath Sounds Diminished   Patient Disposition Remain in ICU/ED

## 2024-03-27 NOTE — PROGRESS NOTES
[facility]  Nephrology Progress Note    Patient's Name: Ainsley Morris    SUBJECTIVE:  We are following this patient for end-stage renal failure .     3/27/24- seen and examined on dialysis this morning.  She did have a tracheostomy and PEG tube placed yesterday afternoon.  She did awaken and nod yes no to questions.       PMH:    Past Medical History:   Diagnosis Date    Anemia     Arrhythmia     AF    Arthritis     RA    Asthma     Chronic kidney disease     COPD (chronic obstructive pulmonary disease) ([facility])     Depression     Hemodialysis patient ([facility])     T-Th-Sat    History of blood transfusion     Hyperlipidemia     Hypothyroid     Sleep apnea     no CPAP    Type II or unspecified type diabetes mellitus without mention of complication, not stated as uncontrolled     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 06/15/2017       Patient Active Problem List   Diagnosis    Controlled type 2 diabetes mellitus with chronic kidney disease on chronic dialysis, with long-term current use of insulin ([facility])    Mixed hyperlipidemia    Hypothyroidism    Essential hypertension    Sleep apnea    Osteoporosis    Macrocytic anemia with vitamin B12 deficiency    ESRD on dialysis ([facility])    Rheumatoid arthritis ([facility])    Restless leg syndrome, controlled    Depression    Shortness of breath    End stage renal disease ([facility])    Paroxysmal atrial fibrillation ([facility])    Hypotension    Chest pain    Lumbosacral spondylosis without myelopathy    Severe episode of recurrent major depressive disorder, without psychotic features ([facility])    Encounter regarding vascular access for dialysis for end-stage renal disease ([facility])    Acute respiratory failure with hypoxia ([facility])    Hyperkalemia    Palliative care encounter       Diet:    Diet NPO  ADULT TUBE FEEDING; PEG; Renal Formula; Continuous; 15; Yes; 15; Q 4 hours; 41; 30; Q 4 hours    Meds:     epoetin andrzej-epbx  10,000 Units SubCUTAneous Once per day on  Results   Component Value Date/Time    COLORU Yellow 12/17/2018 12:50 PM    NITRU Negative 12/17/2018 12:50 PM    GLUCOSEU Negative 12/17/2018 12:50 PM    KETUA Negative 12/17/2018 12:50 PM    UROBILINOGEN 0.2 12/17/2018 12:50 PM    BILIRUBINUR Negative 12/17/2018 12:50 PM    BILIRUBINUR moderate 04/13/2018 02:40 PM       No results found for: \"TONY\", \"CREURRAN\", \"OSMOU\"    No components found for: \"URIC\"    No results found for: \"LIPIDPAN\"    Assessment and Plans:    Chronic kidney disease stage 5/ESRD  Outpatient [facility] TTS via left arm AV fistula   S/p Right IJ TDC 3/21  S/p HD 3/27 with 1.7 L UF  Patient discharge destination either [facility] or [facility].  As she is trached on vent she is unable to attend outpatient hemodialysis treatment at [facility].      2.  Left arm AV fistula malfunction  Presumed likely in the setting of profound hypotension  S/p right IJ TDC 3/21  Follow    3.  Anemia with CKD  Hemoglobin target 10-12  Hemoglobin 7.2 g/dL  Was being transfused with dialysis this morning.  JESSE increased to 10,000 units q. MWF  Transfuse for hemoglobin<7  Monitor H&H     4.  Metabolic acidosis  Improved with hemodialysis  Monitor labs    5.  Secondary hyperparathyroidism of renal origin   on 1/11 in the outpatient setting / phosphorus 7.3 on 2/22 in the outpatient setting  Phosphorus 4.0 mg/dL   Monitor labs     6.  Acute respiratory failure/ Bacteremia  Tracheostomy tube placed 3/26  Blood culture positive Klebsiella pneumoniae  Defer to ICU      7. Hypertension with ESRD-  Previously with hypotension requiring pressor support  Currently holding midodrine  Will follow with planned UF with HD      Rosa Izaguirre, KACIE - CNP      Patient seen and examined on dialysis.    Dialysis prescription reviewed.    Patient's hypertension is much better controlled.    Patient is status post tracheostomy and PEG tube placement..   No family member is present at the bedside.  Chart reviewed.  I

## 2024-03-27 NOTE — PROGRESS NOTES
Pulmonary/Critical Care Progress Note  Pulmonary/Critical Care Progress Note    We are following patient for acute, superimposed on chronic respiratory failure, severe COPD, improved right pleural effusion according to today's x-ray which I have seen, CKD requiring hemodialysis, diabetes mellitus type 2, nicotine addiction, nonfunctioning left forearm AV fistula, previous Klebsiella bacteremia    SUBJECTIVE:  The patient is arousable off of all sedation.  She underwent tracheostomy and PEG tube placement yesterday without difficulty.  Apixaban was held but cannot be resumed.    We are awaiting a right thoracentesis following completion of dialysis.    After this, we will initiate tracheostomy weaning to begin 30 to 60 minutes on tracheostomy collar every 4 hours through the day.    We are also pursuing discharge to LTAC facilities.    MEDICATIONS:   epoetin andrzej-epbx  10,000 Units SubCUTAneous Once per day on Mon Wed Fri    sodium chloride flush  5-40 mL IntraVENous 2 times per day    apixaban  5 mg Oral BID    insulin lispro  0-16 Units SubCUTAneous Q6H    insulin glargine  8 Units SubCUTAneous BID    glycopyrrolate  1 mg Oral TID    miconazole   Topical BID    nitroglycerin  1 inch Topical 4 times per day    scopolamine  1 patch TransDERmal Q72H    [Held by provider] midodrine  15 mg Oral q8h    pantoprazole (PROTONIX) 40 mg in sodium chloride (PF) 0.9 % 10 mL injection  40 mg IntraVENous Q12H    budesonide  0.5 mg Nebulization BID RT    ipratropium 0.5 mg-albuterol 2.5 mg  1 Dose Inhalation Q4H RT    sodium chloride flush  10 mL IntraVENous 2 times per day      sodium chloride      sodium chloride      dextrose 5% in lactated ringers 30 mL/hr at 03/26/24 2208    dextrose      sodium chloride       sodium chloride, sodium chloride flush, sodium chloride, sodium chloride flush, promethazine **OR** ondansetron, polyethylene glycol, acetaminophen **OR** acetaminophen, glucose, dextrose bolus **OR** dextrose bolus,  signed by Cheng Payne MD on 3/27/2024 at 11:47 AM

## 2024-03-27 NOTE — PLAN OF CARE
Problem: Safety - Adult  Goal: Free from fall injury  3/27/2024 1619 by Sailaja Lorenzana RN  Outcome: Progressing     Problem: Discharge Planning  Goal: Discharge to home or other facility with appropriate resources  Outcome: Progressing     Problem: Pain  Goal: Verbalizes/displays adequate comfort level or baseline comfort level  Outcome: Progressing     Problem: Skin/Tissue Integrity  Goal: Absence of new skin breakdown  Description: 1.  Monitor for areas of redness and/or skin breakdown  2.  Assess vascular access sites hourly  3.  Every 4-6 hours minimum:  Change oxygen saturation probe site  4.  Every 4-6 hours:  If on nasal continuous positive airway pressure, respiratory therapy assess nares and determine need for appliance change or resting period.  Outcome: Progressing     Problem: Confusion  Goal: Confusion, delirium, dementia, or psychosis is improved or at baseline  Description: INTERVENTIONS:  1. Assess for possible contributors to thought disturbance, including medications, impaired vision or hearing, underlying metabolic abnormalities, dehydration, psychiatric diagnoses, and notify attending LIP  2. [place] high risk fall precautions, as indicated  3. Provide frequent short contacts to provide reality reorientation, refocusing and direction  4. Decrease environmental stimuli, including noise as appropriate  5. Monitor and intervene to maintain adequate nutrition, hydration, elimination, sleep and activity  6. If unable to ensure safety without constant attention obtain sitter and review sitter guidelines with assigned personnel  7. Initiate Psychosocial CNS and Spiritual Care consult, as indicated  Outcome: Progressing     Problem: ABCDS Injury Assessment  Goal: Absence of physical injury  Outcome: Progressing     Problem: Respiratory - Adult  Goal: Achieves optimal ventilation and oxygenation  Outcome: Progressing     Problem: Skin/Tissue Integrity - Adult  Goal: Skin integrity remains  intact  Outcome: Progressing     Problem: Infection - Adult  Goal: Absence of infection at discharge  Outcome: Progressing     Problem: Coping  Goal: Pt/Family able to verbalize concerns and demonstrate effective coping strategies  Description: INTERVENTIONS:  1. Assist patient/family to identify coping skills, available support systems and cultural and spiritual values  2. Provide emotional support, including active listening and acknowledgement of concerns of patient and caregivers  3. Reduce environmental stimuli, as able  4. Instruct patient/family in relaxation techniques, as appropriate  5. Assess for spiritual pain/suffering and initiate Spiritual Care, Psychosocial Clinical Specialist consults as needed  Outcome: Progressing     Problem: Gastrointestinal - Adult  Goal: Maintains adequate nutritional intake  Outcome: Progressing     Problem: Nutrition Deficit:  Goal: Optimize nutritional status  Outcome: Progressing     Problem: Cardiovascular - Adult  Goal: Maintains optimal cardiac output and hemodynamic stability  Outcome: Progressing

## 2024-03-28 ENCOUNTER — APPOINTMENT (OUTPATIENT)
Dept: CT IMAGING | Age: 79
DRG: 004 | End: 2024-03-28
Payer: COMMERCIAL

## 2024-03-28 ENCOUNTER — APPOINTMENT (OUTPATIENT)
Dept: GENERAL RADIOLOGY | Age: 79
DRG: 004 | End: 2024-03-28
Payer: COMMERCIAL

## 2024-03-28 LAB
ABO/RH: NORMAL
ALBUMIN SERPL-MCNC: 2.1 G/DL (ref 3.5–5.2)
ALP SERPL-CCNC: 118 U/L (ref 35–104)
ALT SERPL-CCNC: 12 U/L (ref 0–32)
ANION GAP SERPL CALCULATED.3IONS-SCNC: 8 MMOL/L (ref 7–16)
ANTIBODY SCREEN: NEGATIVE
ARM BAND NUMBER: NORMAL
AST SERPL-CCNC: 14 U/L (ref 0–31)
BILIRUB SERPL-MCNC: 0.6 MG/DL (ref 0–1.2)
BLOOD BANK BLOOD PRODUCT EXPIRATION DATE: NORMAL
BLOOD BANK BLOOD PRODUCT EXPIRATION DATE: NORMAL
BLOOD BANK DISPENSE STATUS: NORMAL
BLOOD BANK DISPENSE STATUS: NORMAL
BLOOD BANK ISBT PRODUCT BLOOD TYPE: 6200
BLOOD BANK ISBT PRODUCT BLOOD TYPE: 6200
BLOOD BANK PRODUCT CODE: NORMAL
BLOOD BANK PRODUCT CODE: NORMAL
BLOOD BANK SAMPLE EXPIRATION: NORMAL
BLOOD BANK UNIT TYPE AND RH: NORMAL
BLOOD BANK UNIT TYPE AND RH: NORMAL
BPU ID: NORMAL
BPU ID: NORMAL
BUN SERPL-MCNC: 16 MG/DL (ref 6–23)
CALCIUM SERPL-MCNC: 8.2 MG/DL (ref 8.6–10.2)
CHLORIDE SERPL-SCNC: 100 MMOL/L (ref 98–107)
CO2 SERPL-SCNC: 25 MMOL/L (ref 22–29)
COMPONENT: NORMAL
COMPONENT: NORMAL
CREAT SERPL-MCNC: 2.1 MG/DL (ref 0.5–1)
CROSSMATCH RESULT: NORMAL
CROSSMATCH RESULT: NORMAL
ERYTHROCYTE [DISTWIDTH] IN BLOOD BY AUTOMATED COUNT: 17.2 % (ref 11.5–15)
GFR SERPL CREATININE-BSD FRML MDRD: 24 ML/MIN/1.73M2
GLUCOSE BLD-MCNC: 126 MG/DL (ref 74–99)
GLUCOSE BLD-MCNC: 145 MG/DL (ref 74–99)
GLUCOSE BLD-MCNC: 148 MG/DL (ref 74–99)
GLUCOSE BLD-MCNC: 170 MG/DL (ref 74–99)
GLUCOSE BLD-MCNC: 204 MG/DL (ref 74–99)
GLUCOSE BLD-MCNC: 95 MG/DL (ref 74–99)
GLUCOSE SERPL-MCNC: 92 MG/DL (ref 74–99)
HCT VFR BLD AUTO: 24.2 % (ref 34–48)
HGB BLD-MCNC: 7.8 G/DL (ref 11.5–15.5)
MAGNESIUM SERPL-MCNC: 1.9 MG/DL (ref 1.6–2.6)
MCH RBC QN AUTO: 30.4 PG (ref 26–35)
MCHC RBC AUTO-ENTMCNC: 32.2 G/DL (ref 32–34.5)
MCV RBC AUTO: 94.2 FL (ref 80–99.9)
PHOSPHATE SERPL-MCNC: 2.7 MG/DL (ref 2.5–4.5)
PLATELET, FLUORESCENCE: 109 K/UL (ref 130–450)
PMV BLD AUTO: 10.1 FL (ref 7–12)
POTASSIUM SERPL-SCNC: 3.7 MMOL/L (ref 3.5–5)
PROT SERPL-MCNC: 5 G/DL (ref 6.4–8.3)
RBC # BLD AUTO: 2.57 M/UL (ref 3.5–5.5)
SODIUM SERPL-SCNC: 133 MMOL/L (ref 132–146)
TRANSFUSION STATUS: NORMAL
TRANSFUSION STATUS: NORMAL
UNIT DIVISION: 0
UNIT DIVISION: 0
UNIT ISSUE DATE/TIME: NORMAL
UNIT ISSUE DATE/TIME: NORMAL
WBC OTHER # BLD: 4.5 K/UL (ref 4.5–11.5)

## 2024-03-28 PROCEDURE — 83735 ASSAY OF MAGNESIUM: CPT

## 2024-03-28 PROCEDURE — C9113 INJ PANTOPRAZOLE SODIUM, VIA: HCPCS | Performed by: INTERNAL MEDICINE

## 2024-03-28 PROCEDURE — 6370000000 HC RX 637 (ALT 250 FOR IP): Performed by: INTERNAL MEDICINE

## 2024-03-28 PROCEDURE — A4216 STERILE WATER/SALINE, 10 ML: HCPCS | Performed by: INTERNAL MEDICINE

## 2024-03-28 PROCEDURE — 97161 PT EVAL LOW COMPLEX 20 MIN: CPT | Performed by: PHYSICAL THERAPIST

## 2024-03-28 PROCEDURE — 71250 CT THORAX DX C-: CPT

## 2024-03-28 PROCEDURE — 85027 COMPLETE CBC AUTOMATED: CPT

## 2024-03-28 PROCEDURE — 2580000003 HC RX 258: Performed by: INTERNAL MEDICINE

## 2024-03-28 PROCEDURE — 6360000002 HC RX W HCPCS: Performed by: INTERNAL MEDICINE

## 2024-03-28 PROCEDURE — 6360000002 HC RX W HCPCS

## 2024-03-28 PROCEDURE — 99291 CRITICAL CARE FIRST HOUR: CPT | Performed by: INTERNAL MEDICINE

## 2024-03-28 PROCEDURE — 94003 VENT MGMT INPAT SUBQ DAY: CPT

## 2024-03-28 PROCEDURE — 97110 THERAPEUTIC EXERCISES: CPT | Performed by: PHYSICAL THERAPIST

## 2024-03-28 PROCEDURE — 84100 ASSAY OF PHOSPHORUS: CPT

## 2024-03-28 PROCEDURE — 2700000000 HC OXYGEN THERAPY PER DAY

## 2024-03-28 PROCEDURE — 74176 CT ABD & PELVIS W/O CONTRAST: CPT

## 2024-03-28 PROCEDURE — 82962 GLUCOSE BLOOD TEST: CPT

## 2024-03-28 PROCEDURE — 2000000000 HC ICU R&B

## 2024-03-28 PROCEDURE — 99232 SBSQ HOSP IP/OBS MODERATE 35: CPT | Performed by: INTERNAL MEDICINE

## 2024-03-28 PROCEDURE — 80053 COMPREHEN METABOLIC PANEL: CPT

## 2024-03-28 PROCEDURE — 36592 COLLECT BLOOD FROM PICC: CPT

## 2024-03-28 PROCEDURE — 71045 X-RAY EXAM CHEST 1 VIEW: CPT

## 2024-03-28 PROCEDURE — 94640 AIRWAY INHALATION TREATMENT: CPT

## 2024-03-28 RX ORDER — LANSOPRAZOLE 30 MG/1
30 TABLET, ORALLY DISINTEGRATING, DELAYED RELEASE ORAL 2 TIMES DAILY
Status: DISCONTINUED | OUTPATIENT
Start: 2024-03-28 | End: 2024-03-30 | Stop reason: HOSPADM

## 2024-03-28 RX ORDER — MAGNESIUM SULFATE 1 G/100ML
1000 INJECTION INTRAVENOUS ONCE
Status: COMPLETED | OUTPATIENT
Start: 2024-03-28 | End: 2024-03-28

## 2024-03-28 RX ORDER — GLYCOPYRROLATE 1 MG/1
1 TABLET ORAL 2 TIMES DAILY
Status: DISCONTINUED | OUTPATIENT
Start: 2024-03-28 | End: 2024-03-29

## 2024-03-28 RX ADMIN — INSULIN GLARGINE 8 UNITS: 100 INJECTION, SOLUTION SUBCUTANEOUS at 08:05

## 2024-03-28 RX ADMIN — IPRATROPIUM BROMIDE AND ALBUTEROL SULFATE 1 DOSE: .5; 2.5 SOLUTION RESPIRATORY (INHALATION) at 21:42

## 2024-03-28 RX ADMIN — BUDESONIDE INHALATION 500 MCG: 0.5 SUSPENSION RESPIRATORY (INHALATION) at 06:32

## 2024-03-28 RX ADMIN — LANSOPRAZOLE 30 MG: 30 TABLET, ORALLY DISINTEGRATING ORAL at 20:29

## 2024-03-28 RX ADMIN — ANTI-FUNGAL POWDER MICONAZOLE NITRATE TALC FREE: 1.42 POWDER TOPICAL at 19:46

## 2024-03-28 RX ADMIN — NYSTATIN 500000 UNITS: 100000 SUSPENSION ORAL at 20:27

## 2024-03-28 RX ADMIN — MAGNESIUM SULFATE IN DEXTROSE 1000 MG: 10 INJECTION, SOLUTION INTRAVENOUS at 08:03

## 2024-03-28 RX ADMIN — Medication 10 ML: at 08:11

## 2024-03-28 RX ADMIN — IPRATROPIUM BROMIDE AND ALBUTEROL SULFATE 1 DOSE: .5; 2.5 SOLUTION RESPIRATORY (INHALATION) at 06:31

## 2024-03-28 RX ADMIN — IPRATROPIUM BROMIDE AND ALBUTEROL SULFATE 1 DOSE: .5; 2.5 SOLUTION RESPIRATORY (INHALATION) at 13:20

## 2024-03-28 RX ADMIN — Medication 10 ML: at 19:46

## 2024-03-28 RX ADMIN — NITROGLYCERIN 1 INCH: 20 OINTMENT TOPICAL at 04:30

## 2024-03-28 RX ADMIN — GLYCOPYRROLATE 1 MG: 1 TABLET ORAL at 12:09

## 2024-03-28 RX ADMIN — Medication 10 ML: at 08:13

## 2024-03-28 RX ADMIN — APIXABAN 5 MG: 5 TABLET, FILM COATED ORAL at 08:04

## 2024-03-28 RX ADMIN — IPRATROPIUM BROMIDE AND ALBUTEROL SULFATE 1 DOSE: .5; 2.5 SOLUTION RESPIRATORY (INHALATION) at 16:58

## 2024-03-28 RX ADMIN — ANTI-FUNGAL POWDER MICONAZOLE NITRATE TALC FREE: 1.42 POWDER TOPICAL at 08:13

## 2024-03-28 RX ADMIN — IPRATROPIUM BROMIDE AND ALBUTEROL SULFATE 1 DOSE: .5; 2.5 SOLUTION RESPIRATORY (INHALATION) at 02:23

## 2024-03-28 RX ADMIN — SODIUM CHLORIDE, PRESERVATIVE FREE 40 MG: 5 INJECTION INTRAVENOUS at 08:04

## 2024-03-28 RX ADMIN — MIDODRINE HYDROCHLORIDE 15 MG: 5 TABLET ORAL at 20:29

## 2024-03-28 RX ADMIN — GLYCOPYRROLATE 1 MG: 1 TABLET ORAL at 08:04

## 2024-03-28 RX ADMIN — GLYCOPYRROLATE 1 MG: 1 TABLET ORAL at 20:29

## 2024-03-28 RX ADMIN — NYSTATIN 500000 UNITS: 100000 SUSPENSION ORAL at 12:09

## 2024-03-28 RX ADMIN — INSULIN GLARGINE 8 UNITS: 100 INJECTION, SOLUTION SUBCUTANEOUS at 20:30

## 2024-03-28 RX ADMIN — NITROGLYCERIN 1 INCH: 20 OINTMENT TOPICAL at 17:33

## 2024-03-28 RX ADMIN — 0.12% CHLORHEXIDINE GLUCONATE 15 ML: 1.2 RINSE ORAL at 20:27

## 2024-03-28 RX ADMIN — IPRATROPIUM BROMIDE AND ALBUTEROL SULFATE 1 DOSE: .5; 2.5 SOLUTION RESPIRATORY (INHALATION) at 09:55

## 2024-03-28 RX ADMIN — NYSTATIN 500000 UNITS: 100000 SUSPENSION ORAL at 08:04

## 2024-03-28 RX ADMIN — NYSTATIN 500000 UNITS: 100000 SUSPENSION ORAL at 17:33

## 2024-03-28 RX ADMIN — APIXABAN 2.5 MG: 2.5 TABLET, FILM COATED ORAL at 20:30

## 2024-03-28 RX ADMIN — 0.12% CHLORHEXIDINE GLUCONATE 15 ML: 1.2 RINSE ORAL at 08:04

## 2024-03-28 RX ADMIN — NITROGLYCERIN 1 INCH: 20 OINTMENT TOPICAL at 00:11

## 2024-03-28 ASSESSMENT — PAIN SCALES - GENERAL
PAINLEVEL_OUTOF10: 0
PAINLEVEL_OUTOF10: 0

## 2024-03-28 ASSESSMENT — PULMONARY FUNCTION TESTS
PIF_VALUE: 23
PIF_VALUE: 20
PIF_VALUE: 21
PIF_VALUE: 20
PIF_VALUE: 19
PIF_VALUE: 18
PIF_VALUE: 20
PIF_VALUE: 24
PIF_VALUE: 18
PIF_VALUE: 22
PIF_VALUE: 6
PIF_VALUE: 18
PIF_VALUE: 6
PIF_VALUE: 24
PIF_VALUE: 20
PIF_VALUE: 23
PIF_VALUE: 20
PIF_VALUE: 24
PIF_VALUE: 6
PIF_VALUE: 18
PIF_VALUE: 25
PIF_VALUE: 21
PIF_VALUE: 18
PIF_VALUE: 18
PIF_VALUE: 24
PIF_VALUE: 19
PIF_VALUE: 22
PIF_VALUE: 18
PIF_VALUE: 23
PIF_VALUE: 19

## 2024-03-28 NOTE — PLAN OF CARE
Problem: Safety - Adult  Goal: Free from fall injury  3/28/2024 0924 by Sujey Layton RN  Outcome: Progressing  3/27/2024 2044 by Ruchi Blair RN  Outcome: Progressing     Problem: Discharge Planning  Goal: Discharge to home or other facility with appropriate resources  3/28/2024 0924 by Sujey Layton RN  Outcome: Progressing  3/27/2024 2044 by Ruchi Blair RN  Outcome: Progressing     Problem: Pain  Goal: Verbalizes/displays adequate comfort level or baseline comfort level  3/28/2024 0924 by Sujey Layton RN  Outcome: Progressing  3/27/2024 2044 by Ruchi Blair RN  Outcome: Progressing     Problem: Skin/Tissue Integrity  Goal: Absence of new skin breakdown  Description: 1.  Monitor for areas of redness and/or skin breakdown  2.  Assess vascular access sites hourly  3.  Every 4-6 hours minimum:  Change oxygen saturation probe site  4.  Every 4-6 hours:  If on nasal continuous positive airway pressure, respiratory therapy assess nares and determine need for appliance change or resting period.  3/28/2024 0924 by Sujey Layton RN  Outcome: Progressing  3/27/2024 2044 by Ruchi Blair RN  Outcome: Progressing     Problem: Confusion  Goal: Confusion, delirium, dementia, or psychosis is improved or at baseline  Description: INTERVENTIONS:  1. Assess for possible contributors to thought disturbance, including medications, impaired vision or hearing, underlying metabolic abnormalities, dehydration, psychiatric diagnoses, and notify attending LIP  2. [place] high risk fall precautions, as indicated  3. Provide frequent short contacts to provide reality reorientation, refocusing and direction  4. Decrease environmental stimuli, including noise as appropriate  5. Monitor and intervene to maintain adequate nutrition, hydration, elimination, sleep and activity  6. If unable to ensure safety without constant attention obtain sitter and review sitter guidelines with assigned personnel  7.

## 2024-03-28 NOTE — PROGRESS NOTES
[facility]  Nephrology Progress Note    Patient's Name: Ainsley Morris    SUBJECTIVE:  We are following this patient for end-stage renal failure .     3/28/24-patient seen and examined in the intensive care unit.  No family members at the bedside at time of my visit.  She is resting comfortably trached and on the ventilator.  No acute distress       PMH:    Past Medical History:   Diagnosis Date    Anemia     Arrhythmia     AF    Arthritis     RA    Asthma     Chronic kidney disease     COPD (chronic obstructive pulmonary disease) ([facility])     Depression     Hemodialysis patient ([facility])     T-Th-Sat    History of blood transfusion     Hyperlipidemia     Hypothyroid     Sleep apnea     no CPAP    Type II or unspecified type diabetes mellitus without mention of complication, not stated as uncontrolled     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 06/15/2017       Patient Active Problem List   Diagnosis    Controlled type 2 diabetes mellitus with chronic kidney disease on chronic dialysis, with long-term current use of insulin ([facility])    Mixed hyperlipidemia    Hypothyroidism    Essential hypertension    Sleep apnea    Osteoporosis    Macrocytic anemia with vitamin B12 deficiency    ESRD on dialysis ([facility])    Rheumatoid arthritis ([facility])    Restless leg syndrome, controlled    Depression    Shortness of breath    End stage renal disease ([facility])    Paroxysmal atrial fibrillation ([facility])    Hypotension    Chest pain    Lumbosacral spondylosis without myelopathy    Severe episode of recurrent major depressive disorder, without psychotic features ([facility])    Encounter regarding vascular access for dialysis for end-stage renal disease ([facility])    Acute respiratory failure with hypoxia ([facility])    Hyperkalemia    Palliative care encounter       Diet:    Diet NPO  ADULT TUBE FEEDING; PEG; Renal Formula; Continuous; 15; Yes; 15; Q 4 hours; 41; 30; Q 4 hours    Meds:     glycopyrrolate  1 mg Oral BID     32 -- --   03/28/24 0700 (!) 178/160 -- -- 99 -- 97 % --   03/28/24 0631 -- -- -- 90 -- 98 % --   03/28/24 0600 94/73 -- -- 87 -- 98 % --   03/28/24 0500 93/72 -- -- 84 -- 97 % --   03/28/24 0400 104/61 99.2 °F (37.3 °C) Axillary 83 -- 96 % 108.4 kg (239 lb)   03/28/24 0300 (!) 106/57 -- -- 83 -- 96 % --   03/28/24 0200 100/69 -- -- 87 -- 96 % --   03/28/24 0100 (!) 108/50 -- -- 91 -- 95 % --   03/28/24 0000 (!) 127/44 98.3 °F (36.8 °C) Axillary 95 22 93 % --   03/27/24 2322 -- -- -- (!) 102 -- 96 % --   03/27/24 2300 (!) 137/57 -- -- (!) 102 -- 99 % --   03/27/24 2200 136/82 -- -- (!) 104 -- 99 % --   03/27/24 2100 132/73 -- -- 99 -- 100 % --   03/27/24 2000 (!) 151/75 98.9 °F (37.2 °C) Axillary (!) 102 18 100 % --   03/27/24 1900 -- -- -- 88 22 100 % --   03/27/24 1800 139/82 -- -- 85 18 98 % --   03/27/24 1700 (!) 110/92 -- -- 86 18 98 % --   03/27/24 1600 138/81 98 °F (36.7 °C) Axillary 91 18 96 % --   03/27/24 1500 134/68 -- -- 100 27 100 % --   03/27/24 1400 (!) 193/81 -- -- 97 26 99 % --   03/27/24 1312 (!) 144/71 98.8 °F (37.1 °C) -- 86 18 -- 103.2 kg (227 lb 8.2 oz)         Intake/Output Summary (Last 24 hours) at 3/28/2024 1303  Last data filed at 3/28/2024 1200  Gross per 24 hour   Intake 1708.25 ml   Output 2350 ml   Net -641.75 ml       General: Lethargic, but will awaken and nod head  Neck: No JVD noted tracheostomy  Lungs: Coarse breath sounds upper diminished bases  CV: Regular rate and rhythm.  No rub; R TDC  Abd: Soft, nontender, nondistended.  Active bowel sounds  Skin: Warm and dry.  No rash on exposed extremities  Ext: 1+ BUE/BLE edema bilateral toes discolored    Neuro: Lethargic, opens eyes, nods to questions    Data:    Recent Labs     03/26/24 0419 03/27/24  0433 03/27/24  1609 03/28/24  0436   WBC 5.8 5.4  --  4.5   HGB 7.2* 7.2* 8.8* 7.8*   HCT 22.3* 22.7* 27.9* 24.2*   MCV 96.1 95.8  --  94.2   * 114*  --   --        Recent Labs     03/26/24 0419 03/26/24  0848 03/27/24  0433

## 2024-03-28 NOTE — PROGRESS NOTES
Assessment and Plan  Patient is a 79 y.o. female with the following medical Problems:   Acute on chronic hypoxic and hypercapnic respiratory failure requiring intubation and mechanical ventilation.  End-stage renal disease on hemodialysis  New onset atrial fibrillation on Eliquis  Left forearm AV fistula  Thrombocytopenia  Nicotine dependence  Klebsiella bacteremia (completed treatment)  Metabolic encephalopathy  Morbid obesity  Type 2 diabetes with hyperglycemia      Plan of care:  Patient is currently on Eliquis for atrial fibrillation.  GI prophylaxis.  Optimize volume status with hemodialysis.  Optimize tube feeding and optimize nutrition.  Robinul 1 mg p.o. 2 times daily for excessive secretions  Lantus due to hyperglycemia  Transfuse to keep hemoglobin above 7.  PT, OT.  Midodrine 15 mg 3 times daily for hypotension.  CT chest, abdomen, and pelvis to evaluate for pleural effusion and retroperitoneal hematoma.      History of Present Illness:   Patient is a 79-year-old woman with end-stage renal disease on hemodialysis, anemia of chronic disease, COPD, dyslipidemia, depression, morbid obesity, and hypertension, who was admitted on March 10, 2024 with acute on chronic hypoxic and hypercapnic respiratory failure and hypotension.  Patient failed multiple weaning trials and scheduled to undergo tracheostomy and PEG next week.  Patient failed weaning trial today    Daily progress:  March 24, 2024: Patient remains sedated, intubated, mechanically ventilated.  Secretions have improved.  She was transfused 1 unit of packed red blood cells for low hemoglobin.  She is awake and communicating.  Blood sugars much better improved.    March 25, 2024: Patient continues to be sedated, intubated, mechanically ventilated.  Hemoglobin improved after transfusion.  She is scheduled for tracheostomy and PEG tube on March 26, 2024.  She had an uneventful night.    March 27, 2024: Patient tolerated JUVENTINO trial for 3 hours and was placed  03/28/24 0813    nitroglycerin (NITRO-BID) 2 % ointment 1 inch, 1 inch, Topical, 4 times per day, Ford Freed MD, 1 inch at 03/28/24 0430    scopolamine (TRANSDERM-SCOP) transdermal patch 1 patch, 1 patch, TransDERmal, Q72H, Javy Leal MD, 1 patch at 03/26/24 1431    [Held by provider] midodrine (PROAMATINE) tablet 15 mg, 15 mg, Oral, q8h, Cheng Payne MD, 15 mg at 03/23/24 0539    pantoprazole (PROTONIX) 40 mg in sodium chloride (PF) 0.9 % 10 mL injection, 40 mg, IntraVENous, Q12H, Cheng Payne MD, 40 mg at 03/28/24 0804    budesonide (PULMICORT) nebulizer suspension 500 mcg, 0.5 mg, Nebulization, BID RT, Cheng Payne MD, 500 mcg at 03/28/24 0632    ipratropium 0.5 mg-albuterol 2.5 mg (DUONEB) nebulizer solution 1 Dose, 1 Dose, Inhalation, Q4H RT, Cheng Payne MD, 1 Dose at 03/28/24 0955    sodium chloride flush 0.9 % injection 10 mL, 10 mL, IntraVENous, 2 times per day, Fina Pichardo MD, 10 mL at 03/28/24 0811    sodium chloride flush 0.9 % injection 10 mL, 10 mL, IntraVENous, PRN, Fina Pichardo MD, 10 mL at 03/12/24 0835    promethazine (PHENERGAN) tablet 12.5 mg, 12.5 mg, Oral, Q6H PRN **OR** ondansetron (ZOFRAN) injection 4 mg, 4 mg, IntraVENous, Q6H PRN, Fina Pichardo MD    polyethylene glycol (GLYCOLAX) packet 17 g, 17 g, Oral, Daily PRN, Fina Pichardo MD    acetaminophen (TYLENOL) tablet 650 mg, 650 mg, Oral, Q6H PRN **OR** acetaminophen (TYLENOL) suppository 650 mg, 650 mg, Rectal, Q6H PRN, Fina Pichardo MD    glucose chewable tablet 16 g, 4 tablet, Oral, PRN, Fina Pichardo MD    dextrose bolus 10% 125 mL, 125 mL, IntraVENous, PRN, Stopped at 03/19/24 2021 **OR** dextrose bolus 10% 250 mL, 250 mL, IntraVENous, PRN, Fina Pichardo MD    glucagon injection 1 mg, 1 mg, SubCUTAneous, PRN, Fina Pichardo MD    dextrose 10 % infusion, , IntraVENous, Continuous PRN, Fina Pichardo MD    0.9 % sodium chloride infusion, , IntraVENous, PRN, Cheng Payne MD    Review of

## 2024-03-28 NOTE — PROGRESS NOTES
03/27/24 1500   Patient Observation   Pulse 100   Respirations 27   SpO2 100 %   Ventilator Settings   FiO2  50 %   Vt (Set, mL) 400 mL   Resp Rate (Set) 18 bpm   PEEP/CPAP (cmH2O) 5   Vent Patient Data (Readings)   Vt (Measured) 0 mL   Peak Inspiratory Pressure (cmH2O) 10 cmH2O   Rate Measured 21 br/min   Patient Transport   Time Spent Transporting 46-60   Transport Ventillation Type Transport vent   Transport From ICU   Transport Destination Interventional Radiology   Transport Destination ICU   Emergency Equipment Included Yes   B: Both Spontaneous Awakening and Breathing Trials   Did Patient Receive Sedative and/or Opioid IV Medications in the Last 24 Hours No   Safety Screening Spontaneous Awakening Trial (SAT) Proceed with SAT - no exclusion criteria met   Spontaneous Awakening Trial (SAT) Outcome SAT passed   Was Patient Receiving Mechanical Ventilation Yes   Safety Screening Spontaneous Breathing Trial (SBT) Proceed with SBT - no exclusion criteria met   RT Notified Ready for SBT Yes   Spontaneous Breathing Trial (SBT) Outcome SBT Passed   Patient Meet Extubation Criteria No   Reasons Failed Extubation Criteria None   Provider Ordered Extubation No   Weaning Parameters   Negrete Agitation Sedation Scale (RASS) 0   Early Mobility   Documentation of an Acceptable Level of Exercise/Mobilization Performed Documented level did not meet compliance criteria

## 2024-03-28 NOTE — PROGRESS NOTES
Depression    Shortness of breath    End stage renal disease (HCC)    Paroxysmal atrial fibrillation (HCC)    Hypotension    Chest pain    Lumbosacral spondylosis without myelopathy    Severe episode of recurrent major depressive disorder, without psychotic features (HCC)    Encounter regarding vascular access for dialysis for end-stage renal disease (HCC)    Acute respiratory failure with hypoxia (HCC)    Hyperkalemia    Palliative care encounter        ASSESSMENT of Current Deficits Patient exhibits decreased strength, balance, and endurance impairing functional mobility and tolerance to activity. Pt nonverbal but was appropriately shaking her head yes or no to questioning. Pt agreeable to supine exercises with AAROM/PROM following subjective questioning.        PHYSICAL THERAPY  PLAN OF CARE       Physical therapy plan of care is established based on physician order,  patient diagnosis and clinical assessment    Current Treatment Recommendations:    -Bed Mobility: Lower and upper extremity exercises, and trunk control activities  -Sitting Balance: Incorporate reaching activities to activate trunk muscles , Hands on support to maintain midline , Facilitate active trunk muscle engagement , Facilitate postural control in all planes , and Engage in core activities to allow for movement within base of support   -Endurance: Utilize Supervised activities to increase level of endurance to allow for safe functional mobility including transfers and gait  and Use graduated activities to promote good breathing techniques and provide support and education to maximize respiratory function    PT long term treatment goals are located in below grid    Patient and or family understand(s) diagnosis, prognosis, and plan of care.    Frequency of treatments: Patient will be seen  2-3 times/week.         Prior Level of Function: Patient was largely bed bound and transferred to and from  via [facility]  Rehab Potential: good  for  information, gathering information on past medical history/social history and prior level of function, completion of standardized testing/informal observation of tasks, assessment of data, and development of Plan of care and goals.     CPT codes:  Low Complexity PT evaluation (47160)  Therapeutic exercises (02446)   10 minutes  1 unit(s)    Perez Marie, PT

## 2024-03-28 NOTE — PLAN OF CARE
Problem: Safety - Adult  Goal: Free from fall injury  3/27/2024 2044 by Ruchi Blair RN  Outcome: Progressing  3/27/2024 1619 by Sailaja Lorenzana RN  Outcome: Progressing     Problem: Pain  Goal: Verbalizes/displays adequate comfort level or baseline comfort level  3/27/2024 2044 by Ruchi Blair RN  Outcome: Progressing  3/27/2024 1619 by Sailaja Lorenzana RN  Outcome: Progressing     Problem: Skin/Tissue Integrity  Goal: Absence of new skin breakdown  Description: 1.  Monitor for areas of redness and/or skin breakdown  2.  Assess vascular access sites hourly  3.  Every 4-6 hours minimum:  Change oxygen saturation probe site  4.  Every 4-6 hours:  If on nasal continuous positive airway pressure, respiratory therapy assess nares and determine need for appliance change or resting period.  3/27/2024 2044 by Ruchi Blair RN  Outcome: Progressing  3/27/2024 1619 by Sailaja Lorenzana RN  Outcome: Progressing     Problem: ABCDS Injury Assessment  Goal: Absence of physical injury  3/27/2024 2044 by Ruchi Blair RN  Outcome: Progressing  3/27/2024 1619 by Sailaja Lorenzana RN  Outcome: Progressing     Problem: Respiratory - Adult  Goal: Achieves optimal ventilation and oxygenation  3/27/2024 2044 by Ruchi Blair RN  Outcome: Progressing  3/27/2024 1619 by Sailaja Lorenzana RN  Outcome: Progressing

## 2024-03-28 NOTE — PROGRESS NOTES
Progress  Note  Chief Complaint   Patient presents with    Altered Mental Status     Ems report pt was c/c Friday, missed dialysis on Friday, home in poor condition-dog poop everywhere, 79 on RA, hypotension      Historical Issues:  Current Facility-Administered Medications   Medication Dose Route Frequency Provider Last Rate Last Admin    glycopyrrolate (ROBINUL) tablet 1 mg  1 mg Oral BID Edgard Henson MD        lansoprazole (PREVACID SOLUTAB) disintegrating tablet 30 mg  30 mg PEG Tube BID Edgard Henson MD        apixaban (ELIQUIS) tablet 2.5 mg  2.5 mg Oral BID Edgard Henson MD        epoetin andrzej-epbx (RETACRIT) injection 10,000 Units  10,000 Units SubCUTAneous Once per day on Mon Wed Fri Paras Enrique MD   10,000 Units at 03/27/24 2007    sodium chloride flush 0.9 % injection 5-40 mL  5-40 mL IntraVENous 2 times per day Cheng Payne MD   10 mL at 03/28/24 0813    sodium chloride flush 0.9 % injection 5-40 mL  5-40 mL IntraVENous PRN Cheng Payne MD        0.9 % sodium chloride infusion   IntraVENous PRN Cheng Payne MD        chlorhexidine (PERIDEX) 0.12 % solution 15 mL  15 mL Mouth/Throat BID Bradley Perdomo MD   15 mL at 03/28/24 0804    nystatin (MYCOSTATIN) 490893 UNIT/ML suspension 500,000 Units  5 mL Buccal 4x Daily Bradley Perdomo MD   500,000 Units at 03/28/24 1209    insulin lispro (HUMALOG) injection vial 0-16 Units  0-16 Units SubCUTAneous Q6H Edgard Henson MD   4 Units at 03/26/24 1406    insulin glargine (LANTUS) injection vial 8 Units  8 Units SubCUTAneous BID Edgard Henson MD   8 Units at 03/28/24 0805    miconazole (MICOTIN) 2 % powder   Topical BID Ford Freed MD   Given at 03/28/24 0813    nitroglycerin (NITRO-BID) 2 % ointment 1 inch  1 inch Topical 4 times per day Ford Freed MD   1 inch at 03/28/24 0430    scopolamine (TRANSDERM-SCOP) transdermal patch 1 patch  1 patch TransDERmal Q72H Mel

## 2024-03-28 NOTE — PROGRESS NOTES
Placed back on AC for HS. Patient weaned on JUVENTINO of 40% for 5 hours       03/27/24 7822   Patient Observation   Pulse (!) 102   SpO2 96 %   Vent Information   Ventilator ID [id number]   Vent Mode AC/VC   Ventilator Settings   FiO2  25 %   Vt (Set, mL) 400 mL   Resp Rate (Set) 18 bpm   PEEP/CPAP (cmH2O) 5   Peak Inspiratory Flow (Set) 60 L/sec   Vent Patient Data (Readings)   Vt (Measured) 419 mL   Peak Inspiratory Pressure (cmH2O) 23 cmH2O   Rate Measured 30 br/min   Minute Volume (L/min) 11.8 Liters   Peak Inspiratory Flow (lpm) 60 L/sec   Mean Airway Pressure (cmH2O) 12 cmH20   Plateau Pressure (cm H2O) 0 cm H2O   Driving Pressure -5   I:E Ratio 1:1.80   Flow Sensitivity 3 L/min   Backup Apnea On   Backup Rate 18 Breaths Per Minute   Backup Vt 400   Backup I Time 20   Vent Alarm Settings   High Pressure (cmH2O) 50 cmH2O   Low Minute Volume (lpm) 6 L/min   High Minute Volume (lpm) 18 L/min   Low Exhaled Vt (ml) 300 mL   High Exhaled Vt (ml) 800 mL   RR High (bpm) 40 br/min   Apnea (secs) 20 secs   Additional Respiratoray Assessments   Humidification Source Heated wire   Humidification Temp 36.7   Circuit Condensation Drained   Ambu Bag With Mask At Bedside Yes

## 2024-03-29 VITALS
HEIGHT: 65 IN | TEMPERATURE: 98.6 F | SYSTOLIC BLOOD PRESSURE: 115 MMHG | WEIGHT: 242 LBS | OXYGEN SATURATION: 97 % | BODY MASS INDEX: 40.32 KG/M2 | HEART RATE: 72 BPM | RESPIRATION RATE: 20 BRPM | DIASTOLIC BLOOD PRESSURE: 68 MMHG

## 2024-03-29 LAB
ALBUMIN SERPL-MCNC: 2.1 G/DL (ref 3.5–5.2)
ALP SERPL-CCNC: 110 U/L (ref 35–104)
ALT SERPL-CCNC: 13 U/L (ref 0–32)
ANION GAP SERPL CALCULATED.3IONS-SCNC: 9 MMOL/L (ref 7–16)
AST SERPL-CCNC: 15 U/L (ref 0–31)
BILIRUB SERPL-MCNC: 0.5 MG/DL (ref 0–1.2)
BUN SERPL-MCNC: 27 MG/DL (ref 6–23)
CALCIUM SERPL-MCNC: 8.3 MG/DL (ref 8.6–10.2)
CHLORIDE SERPL-SCNC: 100 MMOL/L (ref 98–107)
CO2 SERPL-SCNC: 24 MMOL/L (ref 22–29)
CREAT SERPL-MCNC: 2.9 MG/DL (ref 0.5–1)
ERYTHROCYTE [DISTWIDTH] IN BLOOD BY AUTOMATED COUNT: 16.9 % (ref 11.5–15)
GFR SERPL CREATININE-BSD FRML MDRD: 16 ML/MIN/1.73M2
GLUCOSE BLD-MCNC: 147 MG/DL (ref 74–99)
GLUCOSE BLD-MCNC: 159 MG/DL (ref 74–99)
GLUCOSE BLD-MCNC: 212 MG/DL (ref 74–99)
GLUCOSE BLD-MCNC: 236 MG/DL (ref 74–99)
GLUCOSE SERPL-MCNC: 199 MG/DL (ref 74–99)
HCT VFR BLD AUTO: 26.7 % (ref 34–48)
HGB BLD-MCNC: 8.4 G/DL (ref 11.5–15.5)
MAGNESIUM SERPL-MCNC: 2.2 MG/DL (ref 1.6–2.6)
MCH RBC QN AUTO: 30.1 PG (ref 26–35)
MCHC RBC AUTO-ENTMCNC: 31.5 G/DL (ref 32–34.5)
MCV RBC AUTO: 95.7 FL (ref 80–99.9)
PHOSPHATE SERPL-MCNC: 3 MG/DL (ref 2.5–4.5)
PLATELET, FLUORESCENCE: 122 K/UL (ref 130–450)
PMV BLD AUTO: 10.4 FL (ref 7–12)
POTASSIUM SERPL-SCNC: 4.2 MMOL/L (ref 3.5–5)
PROT SERPL-MCNC: 5.3 G/DL (ref 6.4–8.3)
RBC # BLD AUTO: 2.79 M/UL (ref 3.5–5.5)
SODIUM SERPL-SCNC: 133 MMOL/L (ref 132–146)
WBC OTHER # BLD: 5 K/UL (ref 4.5–11.5)

## 2024-03-29 PROCEDURE — 94003 VENT MGMT INPAT SUBQ DAY: CPT

## 2024-03-29 PROCEDURE — 6370000000 HC RX 637 (ALT 250 FOR IP): Performed by: INTERNAL MEDICINE

## 2024-03-29 PROCEDURE — 83735 ASSAY OF MAGNESIUM: CPT

## 2024-03-29 PROCEDURE — 90935 HEMODIALYSIS ONE EVALUATION: CPT

## 2024-03-29 PROCEDURE — 99239 HOSP IP/OBS DSCHRG MGMT >30: CPT | Performed by: INTERNAL MEDICINE

## 2024-03-29 PROCEDURE — 87340 HEPATITIS B SURFACE AG IA: CPT

## 2024-03-29 PROCEDURE — 85027 COMPLETE CBC AUTOMATED: CPT

## 2024-03-29 PROCEDURE — 82962 GLUCOSE BLOOD TEST: CPT

## 2024-03-29 PROCEDURE — 36592 COLLECT BLOOD FROM PICC: CPT

## 2024-03-29 PROCEDURE — 84100 ASSAY OF PHOSPHORUS: CPT

## 2024-03-29 PROCEDURE — 2580000003 HC RX 258: Performed by: INTERNAL MEDICINE

## 2024-03-29 PROCEDURE — 6360000002 HC RX W HCPCS

## 2024-03-29 PROCEDURE — 80053 COMPREHEN METABOLIC PANEL: CPT

## 2024-03-29 PROCEDURE — 6360000002 HC RX W HCPCS: Performed by: INTERNAL MEDICINE

## 2024-03-29 PROCEDURE — 99291 CRITICAL CARE FIRST HOUR: CPT | Performed by: INTERNAL MEDICINE

## 2024-03-29 PROCEDURE — 94640 AIRWAY INHALATION TREATMENT: CPT

## 2024-03-29 RX ORDER — CHLORHEXIDINE GLUCONATE ORAL RINSE 1.2 MG/ML
15 SOLUTION DENTAL 2 TIMES DAILY
Qty: 420 ML | Refills: 0
Start: 2024-03-29 | End: 2024-04-12

## 2024-03-29 RX ORDER — SCOLOPAMINE TRANSDERMAL SYSTEM 1 MG/1
1 PATCH, EXTENDED RELEASE TRANSDERMAL
DISCHARGE
Start: 2024-04-01

## 2024-03-29 RX ORDER — IPRATROPIUM BROMIDE AND ALBUTEROL SULFATE 2.5; .5 MG/3ML; MG/3ML
3 SOLUTION RESPIRATORY (INHALATION)
Qty: 360 ML | DISCHARGE
Start: 2024-03-29

## 2024-03-29 RX ORDER — HYDRALAZINE HYDROCHLORIDE 20 MG/ML
5 INJECTION INTRAMUSCULAR; INTRAVENOUS ONCE
Status: COMPLETED | OUTPATIENT
Start: 2024-03-29 | End: 2024-03-29

## 2024-03-29 RX ORDER — GLUCAGON 1 MG/ML
1 KIT INJECTION PRN
Qty: 1 EACH | DISCHARGE
Start: 2024-03-29

## 2024-03-29 RX ORDER — LANSOPRAZOLE 30 MG/1
30 TABLET, ORALLY DISINTEGRATING, DELAYED RELEASE ORAL 2 TIMES DAILY
Qty: 30 TABLET | DISCHARGE
Start: 2024-03-29

## 2024-03-29 RX ORDER — INSULIN LISPRO 100 [IU]/ML
0-16 INJECTION, SOLUTION INTRAVENOUS; SUBCUTANEOUS EVERY 6 HOURS
DISCHARGE
Start: 2024-03-30

## 2024-03-29 RX ADMIN — NYSTATIN 500000 UNITS: 100000 SUSPENSION ORAL at 17:36

## 2024-03-29 RX ADMIN — INSULIN LISPRO 4 UNITS: 100 INJECTION, SOLUTION INTRAVENOUS; SUBCUTANEOUS at 00:06

## 2024-03-29 RX ADMIN — MIDODRINE HYDROCHLORIDE 15 MG: 5 TABLET ORAL at 19:06

## 2024-03-29 RX ADMIN — LANSOPRAZOLE 30 MG: 30 TABLET, ORALLY DISINTEGRATING ORAL at 19:10

## 2024-03-29 RX ADMIN — SODIUM CHLORIDE, PRESERVATIVE FREE 10 ML: 5 INJECTION INTRAVENOUS at 19:03

## 2024-03-29 RX ADMIN — 0.12% CHLORHEXIDINE GLUCONATE 15 ML: 1.2 RINSE ORAL at 09:06

## 2024-03-29 RX ADMIN — NYSTATIN 500000 UNITS: 100000 SUSPENSION ORAL at 13:01

## 2024-03-29 RX ADMIN — LANSOPRAZOLE 30 MG: 30 TABLET, ORALLY DISINTEGRATING ORAL at 10:04

## 2024-03-29 RX ADMIN — NYSTATIN 500000 UNITS: 100000 SUSPENSION ORAL at 20:10

## 2024-03-29 RX ADMIN — INSULIN LISPRO 4 UNITS: 100 INJECTION, SOLUTION INTRAVENOUS; SUBCUTANEOUS at 17:35

## 2024-03-29 RX ADMIN — INSULIN GLARGINE 8 UNITS: 100 INJECTION, SOLUTION SUBCUTANEOUS at 20:10

## 2024-03-29 RX ADMIN — Medication 10 ML: at 19:03

## 2024-03-29 RX ADMIN — EPOETIN ALFA-EPBX 10000 UNITS: 10000 INJECTION, SOLUTION INTRAVENOUS; SUBCUTANEOUS at 09:41

## 2024-03-29 RX ADMIN — NITROGLYCERIN 1 INCH: 20 OINTMENT TOPICAL at 00:06

## 2024-03-29 RX ADMIN — GLYCOPYRROLATE 1 MG: 1 TABLET ORAL at 09:06

## 2024-03-29 RX ADMIN — NITROGLYCERIN 1 INCH: 20 OINTMENT TOPICAL at 13:01

## 2024-03-29 RX ADMIN — IPRATROPIUM BROMIDE AND ALBUTEROL SULFATE 1 DOSE: .5; 2.5 SOLUTION RESPIRATORY (INHALATION) at 16:04

## 2024-03-29 RX ADMIN — NITROGLYCERIN 1 INCH: 20 OINTMENT TOPICAL at 05:08

## 2024-03-29 RX ADMIN — IPRATROPIUM BROMIDE AND ALBUTEROL SULFATE 1 DOSE: .5; 2.5 SOLUTION RESPIRATORY (INHALATION) at 08:30

## 2024-03-29 RX ADMIN — MIDODRINE HYDROCHLORIDE 15 MG: 5 TABLET ORAL at 05:08

## 2024-03-29 RX ADMIN — NYSTATIN 500000 UNITS: 100000 SUSPENSION ORAL at 09:06

## 2024-03-29 RX ADMIN — Medication 10 ML: at 09:07

## 2024-03-29 RX ADMIN — ANTI-FUNGAL POWDER MICONAZOLE NITRATE TALC FREE: 1.42 POWDER TOPICAL at 10:04

## 2024-03-29 RX ADMIN — INSULIN GLARGINE 8 UNITS: 100 INJECTION, SOLUTION SUBCUTANEOUS at 09:14

## 2024-03-29 RX ADMIN — ANTI-FUNGAL POWDER MICONAZOLE NITRATE TALC FREE: 1.42 POWDER TOPICAL at 19:03

## 2024-03-29 RX ADMIN — IPRATROPIUM BROMIDE AND ALBUTEROL SULFATE 1 DOSE: .5; 2.5 SOLUTION RESPIRATORY (INHALATION) at 04:26

## 2024-03-29 RX ADMIN — APIXABAN 2.5 MG: 2.5 TABLET, FILM COATED ORAL at 09:06

## 2024-03-29 RX ADMIN — IPRATROPIUM BROMIDE AND ALBUTEROL SULFATE 1 DOSE: .5; 2.5 SOLUTION RESPIRATORY (INHALATION) at 01:21

## 2024-03-29 RX ADMIN — IPRATROPIUM BROMIDE AND ALBUTEROL SULFATE 1 DOSE: .5; 2.5 SOLUTION RESPIRATORY (INHALATION) at 12:07

## 2024-03-29 RX ADMIN — 0.12% CHLORHEXIDINE GLUCONATE 15 ML: 1.2 RINSE ORAL at 19:06

## 2024-03-29 RX ADMIN — HYDRALAZINE HYDROCHLORIDE 5 MG: 20 INJECTION INTRAMUSCULAR; INTRAVENOUS at 06:11

## 2024-03-29 RX ADMIN — NITROGLYCERIN 1 INCH: 20 OINTMENT TOPICAL at 17:57

## 2024-03-29 ASSESSMENT — PULMONARY FUNCTION TESTS
PIF_VALUE: 24
PIF_VALUE: 24
PIF_VALUE: 22
PIF_VALUE: 28
PIF_VALUE: 25
PIF_VALUE: 24
PIF_VALUE: 23
PIF_VALUE: 19
PIF_VALUE: 30
PIF_VALUE: 24
PIF_VALUE: 29
PIF_VALUE: 20
PIF_VALUE: 21
PIF_VALUE: 22
PIF_VALUE: 21
PIF_VALUE: 46
PIF_VALUE: 25
PIF_VALUE: 19
PIF_VALUE: 46
PIF_VALUE: 24
PIF_VALUE: 28
PIF_VALUE: 23
PIF_VALUE: 22
PIF_VALUE: 37
PIF_VALUE: 28
PIF_VALUE: 23

## 2024-03-29 NOTE — PROGRESS NOTES
Progress  Note  Chief Complaint   Patient presents with    Altered Mental Status     Ems report pt was c/c Friday, missed dialysis on Friday, home in poor condition-dog poop everywhere, 79 on RA, hypotension      Historical Issues:  Current Facility-Administered Medications   Medication Dose Route Frequency Provider Last Rate Last Admin    lansoprazole (PREVACID SOLUTAB) disintegrating tablet 30 mg  30 mg PEG Tube BID Edgard Henson MD   30 mg at 03/29/24 1004    apixaban (ELIQUIS) tablet 2.5 mg  2.5 mg Oral BID Edgard Henson MD   2.5 mg at 03/29/24 0906    epoetin andrzej-epbx (RETACRIT) injection 10,000 Units  10,000 Units SubCUTAneous Once per day on Mon Wed Fri Paras Enrique MD   10,000 Units at 03/29/24 0941    sodium chloride flush 0.9 % injection 5-40 mL  5-40 mL IntraVENous 2 times per day Cheng Payne MD   10 mL at 03/29/24 0907    sodium chloride flush 0.9 % injection 5-40 mL  5-40 mL IntraVENous PRN Cheng Payne MD        0.9 % sodium chloride infusion   IntraVENous PRN Cheng Payne MD        chlorhexidine (PERIDEX) 0.12 % solution 15 mL  15 mL Mouth/Throat BID Bradley Perdomo MD   15 mL at 03/29/24 0906    nystatin (MYCOSTATIN) [id number] UNIT/ML suspension 500,000 Units  5 mL Buccal 4x Daily Bradley Perdomo MD   500,000 Units at 03/29/24 1301    insulin lispro (HUMALOG) injection vial 0-16 Units  0-16 Units SubCUTAneous Q6H Edgard Henson MD   4 Units at 03/29/24 0006    insulin glargine (LANTUS) injection vial 8 Units  8 Units SubCUTAneous BID Edgard Henson MD   8 Units at 03/29/24 0914    miconazole (MICOTIN) 2 % powder   Topical BID Ford Freed MD   Given at 03/29/24 1004    nitroglycerin (NITRO-BID) 2 % ointment 1 inch  1 inch Topical 4 times per day Ford Freed MD   1 inch at 03/29/24 1301    scopolamine (TRANSDERM-SCOP) transdermal patch 1 patch  1 patch TransDERmal Q72H Javy Leal MD   1 patch at 03/29/24 1300    midodrine  03/29/24 0438    133 133   K 4.3 3.7 4.2   CL 99 100 100   CO2 23 25 24   BUN 29* 16 27*   CREATININE 3.0* 2.1* 2.9*   GLUCOSE 139* 92 199*   CALCIUM 8.1* 8.2* 8.3*     Recent Labs     03/27/24  0433 03/27/24  1609 03/28/24  0436 03/29/24 0438   WBC 5.4  --  4.5 5.0   RBC 2.37*  --  2.57* 2.79*   HGB 7.2* 8.8* 7.8* 8.4*   HCT 22.7* 27.9* 24.2* 26.7*   MCV 95.8  --  94.2 95.7   MCH 30.4  --  30.4 30.1   MCHC 31.7*  --  32.2 31.5*   RDW 15.8*  --  17.2* 16.9*   *  --   --   --    MPV 10.8  --  10.1 10.4           Principal Problem:    Acute respiratory failure with hypoxia (HCC)  Active Problems:    Hyperkalemia    Palliative care encounter  Resolved Problems:    * No resolved hospital problems. *      Plan:  P2P call re DC planning, approved for extended LTAC stay.  No current changes in meds  Work on DC when bed available        Case Discussed with:      Electronically signed by Bradley Perdomo MD on 3/29/2024 at 1:14 PM

## 2024-03-29 NOTE — DISCHARGE SUMMARY
Discharge Summary  Patient ID:  Ainsley Morris  [id number]  79 y.o. 1945 female  Grey Chua PA-C        Admit date: 3/10/2024    Discharge date and time:  3/29/2024  5:54 PM      Activity level: per LTAC  Diet:TF  Labs:prn  Disposition:LTAC  Condition on Discharge:Stable  DME:    Admit Diagnoses:   Patient Active Problem List   Diagnosis    Controlled type 2 diabetes mellitus with chronic kidney disease on chronic dialysis, with long-term current use of insulin (HCC)    Mixed hyperlipidemia    Hypothyroidism    Essential hypertension    Sleep apnea    Osteoporosis    Macrocytic anemia with vitamin B12 deficiency    ESRD on dialysis (HCC)    Rheumatoid arthritis (HCC)    Restless leg syndrome, controlled    Depression    Shortness of breath    End stage renal disease (HCC)    Paroxysmal atrial fibrillation (HCC)    Hypotension    Chest pain    Lumbosacral spondylosis without myelopathy    Severe episode of recurrent major depressive disorder, without psychotic features (HCC)    Encounter regarding vascular access for dialysis for end-stage renal disease ([facility])    Acute respiratory failure with hypoxia ([facility])    Hyperkalemia    Palliative care encounter     Past Medical History:   Diagnosis Date    Anemia     Arrhythmia     AF    Arthritis     RA    Asthma     Chronic kidney disease     COPD (chronic obstructive pulmonary disease) (HCC)     Depression     Hemodialysis patient (HCC)     T-Th-Sat    History of blood transfusion     Hyperlipidemia     Hypothyroid     Sleep apnea     no CPAP    Type II or unspecified type diabetes mellitus without mention of complication, not stated as uncontrolled     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 06/15/2017     Past Surgical History:   Procedure Laterality Date    BRONCHOSCOPY Bilateral 3/15/2024    BRONCHOSCOPY DIAGNOSTIC OR CELL WASH performed by Cheng Payne MD at [facility] ENDOSCOPY    CARPAL TUNNEL RELEASE Left 8/12/2020

## 2024-03-29 NOTE — PLAN OF CARE
Problem: Safety - Adult  Goal: Free from fall injury  3/29/2024 0957 by Sujey Layton RN  Outcome: Progressing  3/28/2024 2000 by Ruchi Blair RN  Outcome: Progressing     Problem: Discharge Planning  Goal: Discharge to home or other facility with appropriate resources  3/29/2024 0957 by Sujey Layton RN  Outcome: Progressing  3/28/2024 2000 by Ruchi Blair RN  Outcome: Progressing     Problem: Pain  Goal: Verbalizes/displays adequate comfort level or baseline comfort level  3/29/2024 0957 by Sujey Layton RN  Outcome: Progressing  3/28/2024 2000 by Ruchi Blair RN  Outcome: Progressing     Problem: Skin/Tissue Integrity  Goal: Absence of new skin breakdown  Description: 1.  Monitor for areas of redness and/or skin breakdown  2.  Assess vascular access sites hourly  3.  Every 4-6 hours minimum:  Change oxygen saturation probe site  4.  Every 4-6 hours:  If on nasal continuous positive airway pressure, respiratory therapy assess nares and determine need for appliance change or resting period.  3/29/2024 0957 by Sujey Layton RN  Outcome: Progressing  3/28/2024 2000 by Ruchi Blair RN  Outcome: Progressing     Problem: Confusion  Goal: Confusion, delirium, dementia, or psychosis is improved or at baseline  Description: INTERVENTIONS:  1. Assess for possible contributors to thought disturbance, including medications, impaired vision or hearing, underlying metabolic abnormalities, dehydration, psychiatric diagnoses, and notify attending LIP  2. [place] high risk fall precautions, as indicated  3. Provide frequent short contacts to provide reality reorientation, refocusing and direction  4. Decrease environmental stimuli, including noise as appropriate  5. Monitor and intervene to maintain adequate nutrition, hydration, elimination, sleep and activity  6. If unable to ensure safety without constant attention obtain sitter and review sitter guidelines with assigned personnel  7.  Progressing  3/28/2024 2000 by Ruchi Blair RN  Outcome: Progressing     Problem: Cardiovascular - Adult  Goal: Maintains optimal cardiac output and hemodynamic stability  3/29/2024 0957 by Sujey Layton RN  Outcome: Progressing  3/28/2024 2000 by Ruchi Blair RN  Outcome: Progressing  Goal: Absence of cardiac dysrhythmias or at baseline  3/29/2024 0957 by Sujey Layton RN  Outcome: Progressing  3/28/2024 2000 by Ruchi Blair RN  Outcome: Progressing

## 2024-03-29 NOTE — PROGRESS NOTES
quirino/Dr. Blum    EYE SURGERY Right 2010    cataract    FOOT SURGERY Left 2012,2013    pin put in, then pin removed    GASTRIC BYPASS SURGERY  2004    HAND SURGERY Right 2012    ligament was cut    HERNIA REPAIR      INVASIVE VASCULAR N/A 11/8/2023    Fistulogram left [place] [facility] [place] [facility]    INVASIVE VASCULAR N/A 11/8/2023    Angioplasty fistula/dialysis circuit performed [place] [facility] [place] [place] [facility]    KNEE SURGERY  2009    R knee left side    PANCREAS SURGERY  3/26/2024    PANCREATIC LESION BIOPSY EXCISION DRAINAGE performed by Neto Kwan MD at [facility] OR    NE REVJ OPN ARVEN FSTL W/O THRMBC DIAL GRF Left 5/23/2018    SUPERFICIALIZATION AV FISTULA - LEFT UPPER ARM [place] [facility] [place]    TONSILLECTOMY      TRACHEOSTOMY N/A 3/26/2024    TRACHEOTOMY performed by Neto Kwan MD at [facility] OR    UPPER GASTROINTESTINAL ENDOSCOPY Left 3/26/2024    ENDOSCOPIC ULTRASOUND performed by Neto Kwan MD at [facility] OR    UPPER GASTROINTESTINAL ENDOSCOPY Left 3/26/2024    ESOPHAGOGASTRODUODENOSCOPY PERCUTANEOUS ENDOSCOPIC GASTROSTOMY TUBE INSERTION performed by Neto Kwan MD at [facility] OR       Family History:   Family History   Problem Relation Age of Onset    Emphysema Mother     No Known Problems Father         Allergies:         Pcn [penicillins], Sulfa antibiotics, and Bactrim [sulfamethoxazole-trimethoprim]    Social history:  Social History     Socioeconomic History    Marital status:      Spouse name: Not on file    Number of children: Not on file    Years of education: Not on file    Highest education level: Not on file   Occupational History    Not on file   Tobacco Use    Smoking status: Never    Smokeless tobacco: Never   Vaping Use    Vaping Use: Never used   Substance and Sexual Activity    Alcohol use: Yes     Comment: on holidays    Drug use: No    Sexual activity: Not Currently   Other Topics Concern     reports.  I also discussed the differential diagnosis and all of the proposed management plans with the patient and individuals accompanying the patient to this visit.  They had the opportunity to ask questions about the proposed management plans and to have those questions answered.    This patient has a high probability of sudden, clinically significant deterioration, which requires the highest level of physician preparedness to intervene urgently.  I managed/supervised life or organ supporting interventions that required frequent physician assessment.  I devoted my full attention to the direct care of this patient for the amount of time indicated below.  Time I spent with family or surrogate(s) is included only if the patient was incapable of providing the necessary information or participating in medical decision-making.  Time devoted to teaching and to any procedures I billed separately is not included.  Critical Care Time: 31 minutes      Electronically signed by Edgard Henson MD on 3/29/2024 at 10:06 AM

## 2024-03-29 NOTE — PROGRESS NOTES
folic acid (FOLVITE) 1 MG tablet Take 1 tablet by mouth daily 30 tablet 10    budesonide-formoterol (SYMBICORT) 160-4.5 MCG/ACT AERO Inhale 2 puffs into the lungs 2 times daily 10.2 g 5    fluticasone-salmeterol (ADVAIR) 100-50 MCG/ACT AEPB diskus inhaler Inhale 1 puff into the lungs in the morning and 1 puff in the evening. 14 each 5    atorvastatin (LIPITOR) 10 MG tablet Take 1 tablet by mouth nightly 90 tablet 2    insulin lispro, 1 Unit Dial, (HUMALOG/ADMELOG) 100 UNIT/ML SOPN INJECT SUBCUTANEOUSLY WITH MEALS THREE TIMES DAILY. 2 UNITS FOR BLOOD SUGAR , 4 UNITS , 6 UNITS , 8 UNITS . MAX DAILY DOSE 10 UNITS 15 mL 10    pantoprazole (PROTONIX) 40 MG tablet Take 1 tablet by mouth daily      LANTUS SOLOSTAR 100 UNIT/ML injection pen INJECT 35 UNITS UNDER INTO THE SKIN DAILY 15 mL 10    Ferric Citrate (AURYXIA) 1  MG(Fe) TABS Take 2 tablets by mouth 3 times daily (with meals)      B Complex-C-Folic Acid (TOD-RENETTA) TABS Take 1 tablet by mouth daily Rx      midodrine (PROAMATINE) 5 MG tablet Take 1 tablet by mouth as needed Tuesday, Thursday, Saturday         Allergies:    Pcn [penicillins], Sulfa antibiotics, and Bactrim [sulfamethoxazole-trimethoprim]    Social History:     reports that she has never smoked. She has never used smokeless tobacco. She reports current alcohol use. She reports that she does not use drugs.    Family History:         Problem Relation Age of Onset    Emphysema Mother     No Known Problems Father        Physical Exam:      Patient Vitals for the past 24 hrs:   BP Temp Temp src Pulse Resp SpO2 Height Weight   03/29/24 1046 -- -- -- -- -- -- 1.651 m (5' 5\") --   03/29/24 1000 120/87 -- -- 85 18 100 % -- --   03/29/24 0900 (!) 174/83 -- -- 83 18 100 % -- --   03/29/24 0845 (!) 159/79 -- -- 80 -- 99 % -- --   03/29/24 0800 (!) 147/81 98.6 °F (37 °C) Axillary 62 18 98 % -- --   03/29/24 0715 137/70 -- -- 64 -- 97 % -- --   03/29/24 0700 (!) 146/83 -- -- 75 --  bilateral toes discolored    Neuro: Lethargic, opens eyes, nods to questions    Data:    Recent Labs     03/27/24  0433 03/27/24  1609 03/28/24 0436 03/29/24 0438   WBC 5.4  --  4.5 5.0   HGB 7.2* 8.8* 7.8* 8.4*   HCT 22.7* 27.9* 24.2* 26.7*   MCV 95.8  --  94.2 95.7   *  --   --   --        Recent Labs     03/27/24 0433 03/28/24 0436 03/29/24 0438    133 133   K 4.3 3.7 4.2   CL 99 100 100   CO2 23 25 24   CREATININE 3.0* 2.1* 2.9*   BUN 29* 16 27*   LABGLOM 16* 24* 16*   GLUCOSE 139* 92 199*   CALCIUM 8.1* 8.2* 8.3*   PHOS 4.0 2.7 3.0   MG 2.0 1.9 2.2       Vit D, 25-Hydroxy   Date Value Ref Range Status   08/05/2022 43 30 - 100 ng/mL Final     Comment:     <20 ng/mL.............Deficient  20-30 ng/mL...........Insufficient   ng/mL..........Sufficient  >100 ng/mL............Toxic         PTH   Date Value Ref Range Status   08/22/2017 74 (H) 15 - 65 pg/mL Final       Recent Labs     03/27/24 0433 03/28/24 0436 03/29/24 0438   ALT 12 12 13   AST 10 14 15   ALKPHOS 89 118* 110*   BILITOT 0.5 0.6 0.5       Recent Labs     03/27/24 0433 03/28/24 0436 03/29/24 0438   LABALBU 2.2* 2.1* 2.1*       Ferritin   Date Value Ref Range Status   03/22/2024 3,650 ng/mL Final     Comment:           FERRITIN Reference Ranges:  Adult Males   20 - 60 years:    30 - 400 ng/mL  Adult females 17 - 60 years:    13 - 150 ng/mL  Adults greater than 60 years:   no established reference range  Pediatrics:  no established reference range       Iron   Date Value Ref Range Status   03/22/2024 43 37 - 145 ug/dL Final     TIBC   Date Value Ref Range Status   03/22/2024 116 (L) 250 - 450 ug/dL Final       Vitamin B-12   Date Value Ref Range Status   03/22/2024 1626 (H) 211 - 946 pg/mL Final       Folate   Date Value Ref Range Status   03/22/2024 7.2 4.8 - 24.2 ng/mL Final       Lab Results   Component Value Date/Time    COLORU Yellow 12/17/2018 12:50 PM    NITRU Negative 12/17/2018 12:50 PM    GLUCOSEU Negative

## 2024-03-29 NOTE — CARE COORDINATION
3-11-Cm note: pt in ICU, Intubated /sedated, NOK sister Isaura listed, called and left message to return call for transition of care, per old notes [facility], called [facility] spoke to Tomas, dialysis info is correct ,she has transport to HD from [place]. Per old notes pt has a hx of [facility] . Cm will need to do a full assessment once sister calls back. Cm following. Electronically signed by Myah Abraham RN on 3/11/2024 at 3:57 PM    
3/12/2024 ICU, Vent/Sedation, Levophed gtt. IVF, Maxipime, Solucortef, Protonix, BS with coverage. [facility] HD on T-TH-Sat chairtime 6 am, transport by [place]. Pts sister Isaura states she owns the home that both Ainsley and Isaura's son Galindo reside in. Isaura explains to CM the place is a \"dump\". If pt discharges to home may need to call APS for them to review pts living conditions. APS: [phone]. Pt has an \"old cpap\" that she doesn't use from [employer]. Pt wears a depends- and urinates in them but will use the toilet for bowel movements. Pt bathes herself and helps with dishes- using a Rolator at the sink. Iasura does online grocery shopping from [employer]. Cm following.  Electronically signed by LIAM Ruano on 3/12/2024 at 1:57 PM  
3/13/2024 ICU, Vent/Sedation. Levophed gtt, Maxipime, Solucortef, Protonix. [facility] HD on T-TH-Sat chairtime 6 am, transport by [place]. Pts sister Isaura states she owns the home that both Ainsley and Isaura's son Galindo reside in. Isaura explains to CM the place is a \"dump\". If pt discharges to home may need to call APS for them to review pts living conditions. APS: [phone].Cm to follow. Electronically signed by Tyra Monroe RN-BC on 3/13/2024 at 2:00 PM  
3/14/2024 ICU, Vent- sedation currently off. Potential for weaning trial. AV fistula not working. Pt has temporary HD cath in. [facility] HD on T-TH-Sat chairtime 6 am, transport by [place]. Pts sister Isaura states she owns the home that both Ainsley and Isaura's son Galindo reside in. Isaura explains to CM the place is a \"dump\". If pt discharges to home may need to call APS for them to review pts living conditions. APS: [phone].Provided updates to [facility] Transition care [facility]. Pt would benefit from rehab at discharge- possible SNF.  Cm to follow. Electronically signed by LIAM Ruano on 3/14/2024 at 10:07 AM        The Plan for Transition of Care is related to the following treatment goals: SNF    The Patient and/or patient representative Isaura her sister was provided with a choice of provider and agrees with the discharge plan. [x] Yes [] No    Freedom of choice list was provided with basic dialogue that supports the patient's individualized plan of care/goals, treatment preferences and shares the quality data associated with the providers. [x] Yes [] No        Electronically signed by LIAM Ruano on 3/14/2024 at 10:09 AM      
3/15/2024 ICU, Vent, no sedation, Levophed gtt, Maxipime, Protonix. Pt had a temporary HD line placed- her AV fistula malfunctioning. [facility] HD on T-TH-Sat chairtime 6 am, transport by [place]. Pts sister Isaura states she owns the home that both Ainsley and Isaura's son Galindo reside in. Isaura explains to CM the place is a \"dump\". If pt discharges to home may need to call APS for them to review pts living conditions. APS: [phone].Provided updates to [facility] Transition care [facility]. Hoping pt will agree to discharge to a SNF. NEW eliquis- likely SNF at discharge. Pt very non compliant with her home medications, she missed HD and she lives in a home with poor living conditions. CM following. Electronically signed by Tyra Monroe RN-BC on 3/15/2024 at 12:07 PM  
3/18/2024 ICU, Vent- SBT, Maxipime, Protonix. Referral placed to LTACH- as requested by reviewers. Dialysis - had permanent cath non functioning- has new temporary line in. HD [facility] HD on T-TH-Sat chairtime 6 am, transport by [place].  If pt discharges to home may need to call APS for them to review pts living conditions. APS: [phone]  Palliative care consutled- and spoke to pts sister Isaura today. Living Will indicates \"no feeding tube, and no long term life support\". CM following.  Electronically signed by Tyra Monroe RN-BC on 3/18/2024 at 1:58 PM  
3/19/2024 ICU, MRSA Nares Contact isolation. Vent, NO sedation. Maxipime, MgSulfate, Referral placed to LTACH- as requested by reviewers. [facility] HD on T-TH-Sat chairtime 6 am, transport by [place]. Had non fucntioning permanent cath- currently with temporary HD line. If pt discharges to home may need to call APS for them to review pts living conditions. APS: [phone]. Palliative care on consult. Living will states no long term life support and No feeding tube. Legal nok Sister Isaura.  Electronically signed by Tyra Monroe RN-BC on 3/19/2024 at 10:19 AM  
3/20/2024 ICU, Vent, Contact isolation- MRSA Nares. Protonix, Rocephin. NO Sedation. Referral placed to LTACH- as requested by [facility]. [facility] HD on T-TH-Sat chairtime 6 am, transport by [place]. Pts AV fistula non working- has temporary HD line. If pt discharges to home may need to call APS for them to review pts living conditions. APS: [phone]. PT/OT when pt off the vent for discharge planning needs. Palliative care following. Electronically signed by Tyra Monroe RN-BC on 3/20/2024 at 11:07 AM  
3/21/2024 ICU, Vent/No sedation, Rocephin, Protonix, LTACH following- as requested by [facility]. LTACH versus SNF at discharge, likely. PRECERT needed, Signed ELIANE and HENS at discharge.  [facility] HD on T-TH-Sat chairtime 6 am, transport by [place]. If pt discharges to home may need to call APS for them to review pts living conditions. APS: [phone]. Cm continues to follow, Palliative care following. Legal nok sister Isaura. Electronically signed by Tyra Monroe RN-BC on 3/21/2024 at 8:23 AM  
3/25/2024 ICU, Vent, Precedex gtt on/off, HD- tunneled cath. Protonix. CM spoke to pts sister Isaura- legal nok. Plans for Trache/Peg today. [place] LTACH 1st choice- will need PRECERT. If denied [facility] took information and [facility] Luis Girard following. PRECERT needed, Signed ELIANE and HENS if SNF at discharge. [facility] HD on T-TH-Sat chairtime 6 am, transport by [place].Palliative care following. Cm following.  Electronically signed by Tyra Monroe RN-BC on 3/25/2024 at 12:00 PM      The Plan for Transition of Care is related to the following treatment goals: LTACH versus SNF with Vents    The Patient and/or patient representative Isaura-sister was provided with a choice of provider and agrees   with the discharge plan. [x] Yes [] No    Freedom of choice list was provided with basic dialogue that supports the patient's individualized plan of care/goals, treatment preferences and shares the quality data associated with the providers. [x] Yes [] No    
3/26/2024 ICU, Vent at 25% peep 5. Plans for Trache/Peg today 3/26/24. IVF, BS with coverage, Protonix. LT[facility] will start PRECERT, day after trache/peg in if medically stable. If denied LTACH then [facility] and [facility] following. PRECERT needed, Signed ELIANE and HENS if SNF at discharge. [facility] HD on T-TH-Sat chairtime 6 am, transport by [place]. Palliative care, Pastoral care and Cm following. Electronically signed by Tyra Monroe RN-BC on 3/26/2024 at 7:21 AM  
3/27/2024 ICU, IVF, Protonix. [facility] started PRECERT. If denied LTACH then [facility] and [facility] following. PRECERT needed, Signed ELIANE and HENS if SNF at discharge. [facility] HD on T-TH-Sat chairtime 6 am, transport by [place]. Gabriele to follow. Electronically signed by Tyra Monroe RN -BC on 3/27/2024 at 8:51 AM  
3/28/2024 ICU, Rt Thoracentesis not enough fluids to drain. Vent/Tracheostomy new this admission. Pt on trache mask trials. [facility] pending PRECERT (started Wednesday 3/27/24). If denied LTACH then [facility] and [facility] following. PRECERT needed, Signed ELIANE and HENS if SNF at discharge. [facility] HD on T-TH-Sat chairtime 6 am, transport by [place]. Pt is DNR-CCA. Palliative care following. Ambulance sheet completed in soft chart.  Electronically signed by Tyra Monroe RN-BC on 3/28/2024 at 12:24 PM  
3/29/2024 DISCHARGE- being arranged- pending final auth for LTACH- hoping today soon. LT[facility]- peer to peer completed- CM was instructed to work on discharge. Farzana needs the auth before bed can be assigned. AMBER Garcia- [phone] set up for 20:00 pm since no official auth and no bed assigned yet. Ambulance sheet in soft chart. Gabriele spoke to isac Delarosa- to tell her possible discharge to LTACH as soon as this evening. ELIANE needs signed.  Electronically signed by Tyra Monroe RN-BC on 3/29/2024 at 2:35 PM      PLACED IN WILL CALL- PAS- since likely no discharge today- per ICU staff and speaking to Dr. Henson- pt may need a chest tube placed.       Discharging to Facility: pending room assignment and final auth:     Name: [facility]    Address:[address] [place] [address]  [id number] [phone]  NURSE TO NURSE  
3/29/2024 ICU, Vent-Trache, HD, IV Apresoline, Blood sugars with coverage. [facility]- pending peer to peer Predetermination.  If denied LTACH then [facility] Luis following. PRECERT needed, Signed ELIANE and HENS if SNF at discharge. [facility] HD on T-TH-Sat chairtime 6 am, transport by [place]. Will notify legal nok once determination for LTACH made. Both local SNF's following. Electronically signed by Tyra Monroe RN-BC on 3/29/2024 at 12:20 PM  
Bridger Monge with [facility]- offering Peer to Peer predetermination for LTACH. Call [phone] Option #5 by 16:00 pm today. Need pt name, , and Member ID number: [id number]. Reference number is [id number]. Call to Dr. Henson, reached Voice maill left a message for a return call. Call to Dr. AGUSTINA Perdomo he will try to call [facility]. Electronically signed by Tyra Monroe RN-BC on 3/29/2024 at 12:18 PM    
[facility] requested additional clinical information. Sent to Fax: [phone]. Electronically signed by Tyra Monroe RN-BC on 3/28/2024 at 1:01 PM    
Never

## 2024-03-29 NOTE — PROGRESS NOTES
Date:3/29/2024  Patient Name: Ainsley Morris  MRN: [id number]  : 1945  ROOM #: [id number]    Occupational Therapy order received, chart reviewed and evaluation attempted multiple times this date. Patient is unavailable for OT evaluation due to dialysis in the AM and procedure being perform in the room in the PM.     Will attempt OT evaluation at a later time. Thank you.   Nik Oropeza OTR/L #[id number]

## 2024-03-29 NOTE — PLAN OF CARE
and maintained or improved  3/29/2024 1713 by Galindo Pulido RN  Outcome: Completed     Problem: ABCDS Injury Assessment  Goal: Absence of physical injury  3/29/2024 1713 by Galindo Pulido RN  Outcome: Completed     Problem: Respiratory - Adult  Goal: Achieves optimal ventilation and oxygenation  3/29/2024 1713 by Galindo Pulido RN  Outcome: Completed     Problem: Skin/Tissue Integrity - Adult  Goal: Skin integrity remains intact  3/29/2024 1713 by Galindo Pulido RN  Outcome: Completed     Problem: Musculoskeletal - Adult  Goal: Maintain proper alignment of affected body part  3/29/2024 1713 by Galindo Pulido RN  Outcome: Completed     Problem: Musculoskeletal - Adult  Goal: Return ADL status to a safe level of function  3/29/2024 1713 by Galindo Pulido RN  Outcome: Completed     Problem: Infection - Adult  Goal: Absence of infection at discharge  3/29/2024 1713 by Galindo Pluido RN  Outcome: Completed     Problem: Infection - Adult  Goal: Absence of infection during hospitalization  3/29/2024 1713 by Galindo Pulido RN  Outcome: Completed     Problem: Metabolic/Fluid and Electrolytes - Adult  Goal: Hemodynamic stability and optimal renal function maintained  3/29/2024 1713 by Galindo Pulido RN  Outcome: Completed     Problem: Neurosensory - Adult  Goal: Achieves stable or improved neurological status  3/29/2024 1713 by Galindo Pulido RN  Outcome: Completed     Problem: Neurosensory - Adult  Goal: Achieves maximal functionality and self care  3/29/2024 1713 by Galindo Pulido RN  Outcome: Completed     Problem: Gastrointestinal - Adult  Goal: Maintains adequate nutritional intake  3/29/2024 1713 by Galindo Pulido RN  Outcome: Completed     Problem: Cardiovascular - Adult  Goal: Maintains optimal cardiac output and hemodynamic stability  3/29/2024 1713 by Galindo Pulido RN  Outcome: Completed     Problem: Cardiovascular - Adult  Goal: Absence of cardiac dysrhythmias  or at baseline  3/29/2024 1713 by Galindo Pulido, RN  Outcome: Completed     Problem: Anxiety  Goal: Will report anxiety at manageable levels  Description: INTERVENTIONS:  1. Administer medication as ordered  2. Teach and rehearse alternative coping skills  3. Provide emotional support with 1:1 interaction with staff  3/29/2024 1713 by Galindo Pulido, RN  Outcome: Completed     Problem: Coping  Goal: Pt/Family able to verbalize concerns and demonstrate effective coping strategies  Description: INTERVENTIONS:  1. Assist patient/family to identify coping skills, available support systems and cultural and spiritual values  2. Provide emotional support, including active listening and acknowledgement of concerns of patient and caregivers  3. Reduce environmental stimuli, as able  4. Instruct patient/family in relaxation techniques, as appropriate  5. Assess for spiritual pain/suffering and initiate Spiritual Care, Psychosocial Clinical Specialist consults as needed  3/29/2024 1713 by Galindo Pulido, RN  Outcome: Completed     Problem: Nutrition Deficit:  Goal: Optimize nutritional status  3/29/2024 1713 by Galindo Pulido, RN  Outcome: Completed  Flowsheets (Taken 3/29/2024 1106 by Denise Cole, RD, LD)  Nutrient intake appropriate for improving, restoring, or maintaining nutritional needs:   Assess nutritional status and recommend course of action   Recommend, monitor, and adjust tube feedings and TPN/PPN based on assessed needs

## 2024-03-29 NOTE — PLAN OF CARE
Problem: Safety - Adult  Goal: Free from fall injury  3/28/2024 2000 by Ruchi Blair RN  Outcome: Progressing  3/28/2024 0924 by Sujey Layton RN  Outcome: Progressing     Problem: Pain  Goal: Verbalizes/displays adequate comfort level or baseline comfort level  3/28/2024 2000 by Ruchi Blair RN  Outcome: Progressing  3/28/2024 0924 by Sujey Layton RN  Outcome: Progressing     Problem: Skin/Tissue Integrity  Goal: Absence of new skin breakdown  Description: 1.  Monitor for areas of redness and/or skin breakdown  2.  Assess vascular access sites hourly  3.  Every 4-6 hours minimum:  Change oxygen saturation probe site  4.  Every 4-6 hours:  If on nasal continuous positive airway pressure, respiratory therapy assess nares and determine need for appliance change or resting period.  3/28/2024 2000 by Ruchi Blair RN  Outcome: Progressing  3/28/2024 0924 by Sujey Layton RN  Outcome: Progressing

## 2024-03-29 NOTE — PROGRESS NOTES
HD X 3.5 HOURS FOR TUF 1.5L. PT TOLERATED TX WELL. POST DIALYSIS REPORT GIVEN TO CLEMENT MARIE RN. PT STABLE AT THIS TIME.

## 2024-03-29 NOTE — PROGRESS NOTES
Comprehensive Nutrition Assessment    Type and Reason for Visit:  Reassess    Nutrition Recommendations/Plan:   Continue NPO  Begin AGUS BID via PEG  Continue current tube feeding:    Renal (Nepro w/ carbsteady) @15 mls increasing 15mls q6 hours to goal of 41mls/hr x 24 hours, additional 30mls q4 hours provides: 1,770 kcals, 81 grams of protein, 727 mls H2O meeting 100% of calorie and  protein needs per day. Continue to monitor renal function and adjust TF appropriately.      Malnutrition Assessment:  Malnutrition Status:  No malnutrition (03/12/24 1534)    Context:  Chronic Illness     Findings of the 6 clinical characteristics of malnutrition:  Energy Intake:  Mild decrease in energy intake (Comment) (poor po intake prior to admission)  Weight Loss:  Unable to assess (no wt hx in emr)     Body Fat Loss:  No significant body fat loss     Muscle Mass Loss:  No significant muscle mass loss    Fluid Accumulation:  Mild Extremities   Strength:  Not Performed    Nutrition Assessment:    Pt admit for AMS, missed dialysis treatments, respiratory failure w/ hypoxia, hyperkalemia, septic shock Kleibsella (unknown etiology.) PMHx: Anemia, Arrythmia, Arthritis, Asthma, CKD on dialysis, COPD, Hypothyroid, HLD, Sleep apnea, T2DM. Pt intubated and sedated (propofol), pressors x1. Pt was to have dialysis but there was a fistula malfunction, next HD dependent upon pressor needs. Pt will try to be weaned 1-2 days. WIll provide tube feeding recommendations, continue inpaitent monitoring f/up per policy. 03/16/24: F/up: Pt off propofol, off pressors x2 days, MAP 35, will re-calculate Tube Feeding however d/t MAP score do not increase above current rate at 25mls/hr, once MAP improves slowly increase to new goal rate. of 29mls/hr x24 hours, will f/up on 03/18/24 to determine MAP. 03/18/24: F/up: Pt off all sedation and pressors x3 days, remains intubated. Spontaneous breathing trial failed 03/17. Will adjust Tube Feeding to  (03/29/24)  Current BMI (kg/m2): 40.3  Usual Body Weight: 104.3 kg (230 lb)  % Weight Change (Calculated): 5.2  Weight Adjustment For: No Adjustment           BMI Categories: Obese Class 3 (BMI 40.0 or greater)    Estimated Daily Nutrient Needs:  Energy Requirements Based On: Formula  Weight Used for Energy Requirements: Admission  Energy (kcal/day): 1600 -1700 kcals/kg ([facility] 2010 BMI >30, age 60+)  Weight Used for Protein Requirements: Ideal  Protein (g/day): 70 - 120 g/day (IBW 1.2-2g/kg Mechanically Vented guidelines)  Method Used for Fluid Requirements: 1 ml/kcal  Fluid (ml/day): per critical care team    Nutrition Diagnosis:   Inadequate oral intake related to catabolic illness, impaired respiratory function as evidenced by NPO or clear liquid status due to medical condition, intubation, nutrition support - enteral nutrition  Increased nutrient needs related to catabolic illness, impaired respiratory function as evidenced by wounds, BMI, NPO or clear liquid status due to medical condition, intubation, nutrition support - enteral nutrition    Nutrition Interventions:   Food and/or Nutrient Delivery: Continue NPO, Continue Current Diet  Nutrition Education/Counseling: No recommendation at this time  Coordination of Nutrition Care: Continue to monitor while inpatient     Goals:  Previous Goal Met: Progress towards Goal(s) Declining  Goals: Meet at least 75% of estimated needs, Tolerate nutrition support at goal rate, by next RD assessment       Nutrition Monitoring and Evaluation:   Behavioral-Environmental Outcomes: None Identified  Food/Nutrient Intake Outcomes: Enteral Nutrition Intake/Tolerance  Physical Signs/Symptoms Outcomes: Biochemical Data, GI Status, Fluid Status or Edema, Hemodynamic Status, Nutrition Focused Physical Findings, Skin, Weight    Discharge Planning:    Too soon to determine     Denise Cole RD, LD  Contact: [phone]

## 2024-03-29 NOTE — DISCHARGE INSTR - COC
Drainage Amount Scant (moist but unmeasurable) 03/26/24 1600   Drainage Description Serosanguinous 03/26/24 1600   Odor None 03/26/24 1600   Roselyn-wound Assessment Fragile 03/26/24 1600   Number of days: 10       Wound 03/22/24 Back Lateral;Lower;Right 1x1x0.01 (Active)   Dressing Status Clean;Dry;Intact 03/29/24 1200   Wound Cleansed Irrigated with saline 03/26/24 1600   Dressing/Treatment Foam 03/29/24 1200   Number of days: 7       Incision 03/26/24 Throat (Active)   Number of days: 3        Elimination:  Continence:   Bowel: No  Bladder: anuric  Urinary Catheter: None   Colostomy/Ileostomy/Ileal Conduit: No       Date of Last BM: 3/27/24    Intake/Output Summary (Last 24 hours) at 3/29/2024 1437  Last data filed at 3/29/2024 1230  Gross per 24 hour   Intake 1990.15 ml   Output 1900 ml   Net 90.15 ml     I/O last 3 completed shifts:  In: 1914.2 [I.V.:92.2; NG/GT:1822]  Out: -     Safety Concerns:     Aspiration Risk    Impairments/Disabilities:      None    Nutrition Therapy:  Current Nutrition Therapy:   - Tube Feedings:  Renal    Routes of Feeding: Gastrostomy Tube  Liquids: No Liquids  Daily Fluid Restriction: no  Last Modified Barium Swallow with Video (Video Swallowing Test): not done    Treatments at the Time of Hospital Discharge:   Respiratory Treatments: vent/trach mask  Oxygen Therapy:   vent  Ventilator:    - Ventilator Settings:    Vt (Set, mL): 400 mL  Resp Rate (Set): 18 bpm  FiO2 : 30 %    PEEP/CPAP (cmH2O): 5  Pressure Support: (S) 5 cmH20    Rehab Therapies: Physical Therapy and Occupational Therapy  Weight Bearing Status/Restrictions: No weight bearing restrictions  Other Medical Equipment (for information only, NOT a DME order):  hospital bed  Other Treatments: no    Patient's personal belongings (please select all that are sent with patient):  Glasses, Dentures upper and lower, Medical alert necklace    RN SIGNATURE:  Electronically signed by Galindo Pulido RN on 3/29/24 at 5:11 PM  EDT    CASE MANAGEMENT/SOCIAL WORK SECTION    Inpatient Status Date: 3/10/2024    Readmission Risk Assessment Score:  Readmission Risk              Risk of Unplanned Readmission:  36           Discharging to Facility/ Agency   Name: [facility]   Address:[address] [place] [address]  [phone]      Dialysis Facility (if applicable)   Name:  Address:  Dialysis Schedule:  Phone:  Fax:    / signature: Electronically signed by Tyra Monroe RN-BC on 3/29/2024 at 2:38 PM      PHYSICIAN SECTION    Prognosis: Good    Condition at Discharge: Stable    Rehab Potential (if transferring to Rehab): Good    Recommended Labs or Other Treatments After Discharge:     Physician Certification: I certify the above information and transfer of Ainsley Morris  is necessary for the continuing treatment of the diagnosis listed and that she requires LTAC for less 30 days.     Update Admission H&P: No change in H&P   yes

## 2024-04-01 LAB — HBV SURFACE AG SERPL QL IA: NONREACTIVE

## 2024-04-02 ENCOUNTER — TELEPHONE (OUTPATIENT)
Dept: PRIMARY CARE CLINIC | Age: 79
End: 2024-04-02

## 2024-04-02 NOTE — TELEPHONE ENCOUNTER
Refill request by [facility]      Pen Needles 32 gauge 4 mm #100    Symbicort 160/4.5  Si puffs bid  (*Showing discontinued by NPK 23)    Vit B-12 1000 mcg  Si qd  (*Showing discontinued by Dr. Perdomo possibly in hospital, note on 3/29/24)      Informed pharmacist I was not sure if any of the above prescriptions were able to be filled at this time      Please, advise

## 2024-05-01 DIAGNOSIS — E11.9 TYPE 2 DIABETES MELLITUS WITHOUT COMPLICATION, WITH LONG-TERM CURRENT USE OF INSULIN (HCC): ICD-10-CM

## 2024-05-01 DIAGNOSIS — Z79.4 TYPE 2 DIABETES MELLITUS WITHOUT COMPLICATION, WITH LONG-TERM CURRENT USE OF INSULIN (HCC): ICD-10-CM

## 2024-05-01 RX ORDER — INSULIN GLARGINE 100 [IU]/ML
INJECTION, SOLUTION SUBCUTANEOUS
Qty: 15 ML | Refills: 10 | OUTPATIENT
Start: 2024-05-01

## 2024-05-01 NOTE — TELEPHONE ENCOUNTER
Is she in a nursing home? Or assisted living? Bc if there is someone else managing her care now they should be providing refills. We have her last recorded A1c from 6 months ago I believe     Spoke with pts sister/Emergency Contact, Isaura, who stated pt is currently in [facility] (Pt still have a trache in and a feeding tube)

## 2024-05-19 DIAGNOSIS — E78.49 OTHER HYPERLIPIDEMIA: ICD-10-CM

## 2024-05-20 RX ORDER — ATORVASTATIN CALCIUM 10 MG/1
10 TABLET, FILM COATED ORAL NIGHTLY
Qty: 30 TABLET | Refills: 10 | OUTPATIENT
Start: 2024-05-20

## 2024-06-11 DIAGNOSIS — E78.49 OTHER HYPERLIPIDEMIA: ICD-10-CM

## 2024-06-11 RX ORDER — ATORVASTATIN CALCIUM 10 MG/1
10 TABLET, FILM COATED ORAL NIGHTLY
Qty: 30 TABLET | Refills: 10 | OUTPATIENT
Start: 2024-06-11

## 2024-08-05 RX ORDER — ROPINIROLE 2 MG/1
2 TABLET, FILM COATED ORAL NIGHTLY
Qty: 30 TABLET | Refills: 10 | OUTPATIENT
Start: 2024-08-05

## 2024-08-05 RX ORDER — FOLIC ACID 1 MG/1
1000 TABLET ORAL DAILY
Qty: 30 TABLET | Refills: 10 | OUTPATIENT
Start: 2024-08-05

## 2024-08-05 RX ORDER — TRAZODONE HYDROCHLORIDE 50 MG/1
50 TABLET ORAL NIGHTLY
Qty: 30 TABLET | Refills: 10 | OUTPATIENT
Start: 2024-08-05

## 2024-09-12 ENCOUNTER — HOSPITAL ENCOUNTER (INPATIENT)
Age: 79
LOS: 11 days | Discharge: NURSING, REHAB OR LONG-TERM CARE | DRG: 987 | End: 2024-09-23
Attending: EMERGENCY MEDICINE | Admitting: INTERNAL MEDICINE
Payer: MEDICARE

## 2024-09-12 ENCOUNTER — APPOINTMENT (OUTPATIENT)
Dept: CT IMAGING | Age: 79
DRG: 987 | End: 2024-09-12
Payer: MEDICARE

## 2024-09-12 DIAGNOSIS — K52.9 COLITIS: ICD-10-CM

## 2024-09-12 DIAGNOSIS — N18.6 CONTROLLED TYPE 2 DIABETES MELLITUS WITH CHRONIC KIDNEY DISEASE ON CHRONIC DIALYSIS, WITH LONG-TERM CURRENT USE OF INSULIN (HCC): ICD-10-CM

## 2024-09-12 DIAGNOSIS — D64.9 ANEMIA, UNSPECIFIED TYPE: ICD-10-CM

## 2024-09-12 DIAGNOSIS — N18.6 ESRD ON DIALYSIS (HCC): ICD-10-CM

## 2024-09-12 DIAGNOSIS — Z99.2 CONTROLLED TYPE 2 DIABETES MELLITUS WITH CHRONIC KIDNEY DISEASE ON CHRONIC DIALYSIS, WITH LONG-TERM CURRENT USE OF INSULIN (HCC): ICD-10-CM

## 2024-09-12 DIAGNOSIS — E11.22 CONTROLLED TYPE 2 DIABETES MELLITUS WITH CHRONIC KIDNEY DISEASE ON CHRONIC DIALYSIS, WITH LONG-TERM CURRENT USE OF INSULIN (HCC): ICD-10-CM

## 2024-09-12 DIAGNOSIS — Z79.4 CONTROLLED TYPE 2 DIABETES MELLITUS WITH CHRONIC KIDNEY DISEASE ON CHRONIC DIALYSIS, WITH LONG-TERM CURRENT USE OF INSULIN (HCC): ICD-10-CM

## 2024-09-12 DIAGNOSIS — Z99.2 ESRD ON DIALYSIS (HCC): ICD-10-CM

## 2024-09-12 DIAGNOSIS — D64.9 CHRONIC ANEMIA: ICD-10-CM

## 2024-09-12 DIAGNOSIS — K92.2 GASTROINTESTINAL HEMORRHAGE, UNSPECIFIED GASTROINTESTINAL HEMORRHAGE TYPE: Primary | ICD-10-CM

## 2024-09-12 PROBLEM — K62.5 RECTAL BLEEDING: Status: ACTIVE | Noted: 2024-09-12

## 2024-09-12 PROBLEM — E66.813 CLASS 3 SEVERE OBESITY WITH SERIOUS COMORBIDITY AND BODY MASS INDEX (BMI) OF 40.0 TO 44.9 IN ADULT: Status: ACTIVE | Noted: 2024-09-12

## 2024-09-12 PROBLEM — E66.01 CLASS 3 SEVERE OBESITY WITH SERIOUS COMORBIDITY AND BODY MASS INDEX (BMI) OF 40.0 TO 44.9 IN ADULT (HCC): Status: ACTIVE | Noted: 2024-09-12

## 2024-09-12 LAB
ALBUMIN SERPL-MCNC: 2.7 G/DL (ref 3.5–5.2)
ALP SERPL-CCNC: 108 U/L (ref 35–104)
ALT SERPL-CCNC: 6 U/L (ref 0–32)
ANION GAP SERPL CALCULATED.3IONS-SCNC: 8 MMOL/L (ref 7–16)
AST SERPL-CCNC: 12 U/L (ref 0–31)
BASOPHILS # BLD: 0.04 K/UL (ref 0–0.2)
BASOPHILS NFR BLD: 1 % (ref 0–2)
BILIRUB SERPL-MCNC: 0.2 MG/DL (ref 0–1.2)
BUN SERPL-MCNC: 57 MG/DL (ref 6–23)
CALCIUM SERPL-MCNC: 11.3 MG/DL (ref 8.6–10.2)
CHLORIDE SERPL-SCNC: 93 MMOL/L (ref 98–107)
CO2 SERPL-SCNC: 33 MMOL/L (ref 22–29)
CREAT SERPL-MCNC: 2.7 MG/DL (ref 0.5–1)
EOSINOPHIL # BLD: 0.81 K/UL (ref 0.05–0.5)
EOSINOPHILS RELATIVE PERCENT: 10 % (ref 0–6)
ERYTHROCYTE [DISTWIDTH] IN BLOOD BY AUTOMATED COUNT: 15.9 % (ref 11.5–15)
GFR, ESTIMATED: 18 ML/MIN/1.73M2
GLUCOSE BLD-MCNC: 151 MG/DL (ref 74–99)
GLUCOSE BLD-MCNC: 152 MG/DL (ref 74–99)
GLUCOSE SERPL-MCNC: 181 MG/DL (ref 74–99)
HCT VFR BLD AUTO: 28.9 % (ref 34–48)
HCT VFR BLD AUTO: 33.2 % (ref 34–48)
HGB BLD-MCNC: 10.2 G/DL (ref 11.5–15.5)
HGB BLD-MCNC: 8.8 G/DL (ref 11.5–15.5)
IMM GRANULOCYTES # BLD AUTO: 0.04 K/UL (ref 0–0.58)
IMM GRANULOCYTES NFR BLD: 1 % (ref 0–5)
INR PPP: 1.3
LACTATE BLDV-SCNC: 1.6 MMOL/L (ref 0.5–2.2)
LYMPHOCYTES NFR BLD: 1.6 K/UL (ref 1.5–4)
LYMPHOCYTES RELATIVE PERCENT: 20 % (ref 20–42)
MCH RBC QN AUTO: 32.4 PG (ref 26–35)
MCHC RBC AUTO-ENTMCNC: 30.4 G/DL (ref 32–34.5)
MCV RBC AUTO: 106.3 FL (ref 80–99.9)
MONOCYTES NFR BLD: 0.87 K/UL (ref 0.1–0.95)
MONOCYTES NFR BLD: 11 % (ref 2–12)
NEUTROPHILS NFR BLD: 57 % (ref 43–80)
NEUTS SEG NFR BLD: 4.53 K/UL (ref 1.8–7.3)
PLATELET # BLD AUTO: 165 K/UL (ref 130–450)
PMV BLD AUTO: 10.4 FL (ref 7–12)
POTASSIUM SERPL-SCNC: 4 MMOL/L (ref 3.5–5)
PROT SERPL-MCNC: 6 G/DL (ref 6.4–8.3)
PROTHROMBIN TIME: 14 SEC (ref 9.3–12.4)
RBC # BLD AUTO: 2.72 M/UL (ref 3.5–5.5)
SODIUM SERPL-SCNC: 134 MMOL/L (ref 132–146)
TROPONIN I SERPL HS-MCNC: 97 NG/L (ref 0–9)
WBC OTHER # BLD: 7.9 K/UL (ref 4.5–11.5)

## 2024-09-12 PROCEDURE — 87449 NOS EACH ORGANISM AG IA: CPT

## 2024-09-12 PROCEDURE — 2060000000 HC ICU INTERMEDIATE R&B

## 2024-09-12 PROCEDURE — 85018 HEMOGLOBIN: CPT

## 2024-09-12 PROCEDURE — 2580000003 HC RX 258: Performed by: EMERGENCY MEDICINE

## 2024-09-12 PROCEDURE — 86900 BLOOD TYPING SEROLOGIC ABO: CPT

## 2024-09-12 PROCEDURE — 83605 ASSAY OF LACTIC ACID: CPT

## 2024-09-12 PROCEDURE — 85014 HEMATOCRIT: CPT

## 2024-09-12 PROCEDURE — 85025 COMPLETE CBC W/AUTO DIFF WBC: CPT

## 2024-09-12 PROCEDURE — 36415 COLL VENOUS BLD VENIPUNCTURE: CPT

## 2024-09-12 PROCEDURE — 99223 1ST HOSP IP/OBS HIGH 75: CPT | Performed by: HOSPITALIST

## 2024-09-12 PROCEDURE — 96365 THER/PROPH/DIAG IV INF INIT: CPT

## 2024-09-12 PROCEDURE — 85610 PROTHROMBIN TIME: CPT

## 2024-09-12 PROCEDURE — 87324 CLOSTRIDIUM AG IA: CPT

## 2024-09-12 PROCEDURE — 86850 RBC ANTIBODY SCREEN: CPT

## 2024-09-12 PROCEDURE — 2580000003 HC RX 258: Performed by: HOSPITALIST

## 2024-09-12 PROCEDURE — 5A1955Z RESPIRATORY VENTILATION, GREATER THAN 96 CONSECUTIVE HOURS: ICD-10-PCS | Performed by: FAMILY MEDICINE

## 2024-09-12 PROCEDURE — 86923 COMPATIBILITY TEST ELECTRIC: CPT

## 2024-09-12 PROCEDURE — 80053 COMPREHEN METABOLIC PANEL: CPT

## 2024-09-12 PROCEDURE — 6360000002 HC RX W HCPCS: Performed by: EMERGENCY MEDICINE

## 2024-09-12 PROCEDURE — 94002 VENT MGMT INPAT INIT DAY: CPT

## 2024-09-12 PROCEDURE — 84484 ASSAY OF TROPONIN QUANT: CPT

## 2024-09-12 PROCEDURE — 82962 GLUCOSE BLOOD TEST: CPT

## 2024-09-12 PROCEDURE — 86901 BLOOD TYPING SEROLOGIC RH(D): CPT

## 2024-09-12 PROCEDURE — 74176 CT ABD & PELVIS W/O CONTRAST: CPT

## 2024-09-12 PROCEDURE — 99285 EMERGENCY DEPT VISIT HI MDM: CPT

## 2024-09-12 RX ORDER — HYDRALAZINE HYDROCHLORIDE 25 MG/1
25 TABLET, FILM COATED ORAL EVERY 6 HOURS PRN
COMMUNITY

## 2024-09-12 RX ORDER — ONDANSETRON 2 MG/ML
4 INJECTION INTRAMUSCULAR; INTRAVENOUS EVERY 6 HOURS PRN
Status: DISCONTINUED | OUTPATIENT
Start: 2024-09-12 | End: 2024-09-23 | Stop reason: HOSPADM

## 2024-09-12 RX ORDER — ACETAMINOPHEN 650 MG/1
650 SUPPOSITORY RECTAL EVERY 6 HOURS PRN
Status: DISCONTINUED | OUTPATIENT
Start: 2024-09-12 | End: 2024-09-23 | Stop reason: HOSPADM

## 2024-09-12 RX ORDER — LANSOPRAZOLE 30 MG/1
30 TABLET, ORALLY DISINTEGRATING, DELAYED RELEASE ORAL 2 TIMES DAILY
Status: DISCONTINUED | OUTPATIENT
Start: 2024-09-12 | End: 2024-09-13 | Stop reason: SDUPTHER

## 2024-09-12 RX ORDER — SODIUM CHLORIDE 0.9 % (FLUSH) 0.9 %
5-40 SYRINGE (ML) INJECTION EVERY 12 HOURS SCHEDULED
Status: DISCONTINUED | OUTPATIENT
Start: 2024-09-12 | End: 2024-09-23 | Stop reason: HOSPADM

## 2024-09-12 RX ORDER — MAGNESIUM SULFATE IN WATER 40 MG/ML
2000 INJECTION, SOLUTION INTRAVENOUS PRN
Status: DISCONTINUED | OUTPATIENT
Start: 2024-09-12 | End: 2024-09-23 | Stop reason: HOSPADM

## 2024-09-12 RX ORDER — ATORVASTATIN CALCIUM 10 MG/1
10 TABLET, FILM COATED ORAL NIGHTLY
Status: DISCONTINUED | OUTPATIENT
Start: 2024-09-12 | End: 2024-09-13 | Stop reason: SDUPTHER

## 2024-09-12 RX ORDER — ONDANSETRON 4 MG/1
4 TABLET, ORALLY DISINTEGRATING ORAL EVERY 8 HOURS PRN
Status: DISCONTINUED | OUTPATIENT
Start: 2024-09-12 | End: 2024-09-23 | Stop reason: HOSPADM

## 2024-09-12 RX ORDER — ERTAPENEM 1 G/1
INJECTION, POWDER, LYOPHILIZED, FOR SOLUTION INTRAMUSCULAR; INTRAVENOUS
COMMUNITY
Start: 2024-09-27

## 2024-09-12 RX ORDER — NYSTATIN 100000 [USP'U]/ML
5 SUSPENSION ORAL 4 TIMES DAILY
Status: DISCONTINUED | OUTPATIENT
Start: 2024-09-12 | End: 2024-09-13 | Stop reason: SDUPTHER

## 2024-09-12 RX ORDER — SODIUM CHLORIDE 9 MG/ML
INJECTION, SOLUTION INTRAVENOUS PRN
Status: DISCONTINUED | OUTPATIENT
Start: 2024-09-12 | End: 2024-09-23 | Stop reason: HOSPADM

## 2024-09-12 RX ORDER — INSULIN GLARGINE 300 U/ML
8 INJECTION, SOLUTION SUBCUTANEOUS 2 TIMES DAILY
Status: ON HOLD | COMMUNITY
End: 2024-09-13

## 2024-09-12 RX ORDER — ACETAMINOPHEN 325 MG/1
650 TABLET ORAL EVERY 6 HOURS PRN
Status: DISCONTINUED | OUTPATIENT
Start: 2024-09-12 | End: 2024-09-23 | Stop reason: HOSPADM

## 2024-09-12 RX ORDER — METRONIDAZOLE 500 MG/100ML
500 INJECTION, SOLUTION INTRAVENOUS ONCE
Status: COMPLETED | OUTPATIENT
Start: 2024-09-12 | End: 2024-09-12

## 2024-09-12 RX ORDER — 0.9 % SODIUM CHLORIDE 0.9 %
1000 INTRAVENOUS SOLUTION INTRAVENOUS ONCE
Status: COMPLETED | OUTPATIENT
Start: 2024-09-12 | End: 2024-09-12

## 2024-09-12 RX ORDER — SCOLOPAMINE TRANSDERMAL SYSTEM 1 MG/1
1 PATCH, EXTENDED RELEASE TRANSDERMAL
Status: DISCONTINUED | OUTPATIENT
Start: 2024-09-12 | End: 2024-09-13 | Stop reason: SDUPTHER

## 2024-09-12 RX ORDER — GLUCAGON 1 MG/ML
1 KIT INJECTION PRN
Status: DISCONTINUED | OUTPATIENT
Start: 2024-09-12 | End: 2024-09-23 | Stop reason: HOSPADM

## 2024-09-12 RX ORDER — POTASSIUM CHLORIDE 7.45 MG/ML
10 INJECTION INTRAVENOUS PRN
Status: DISCONTINUED | OUTPATIENT
Start: 2024-09-12 | End: 2024-09-23 | Stop reason: HOSPADM

## 2024-09-12 RX ORDER — POTASSIUM CHLORIDE 1500 MG/1
40 TABLET, EXTENDED RELEASE ORAL PRN
Status: DISCONTINUED | OUTPATIENT
Start: 2024-09-12 | End: 2024-09-23 | Stop reason: HOSPADM

## 2024-09-12 RX ORDER — INSULIN LISPRO 100 [IU]/ML
0-4 INJECTION, SOLUTION INTRAVENOUS; SUBCUTANEOUS NIGHTLY
Status: DISCONTINUED | OUTPATIENT
Start: 2024-09-12 | End: 2024-09-23 | Stop reason: HOSPADM

## 2024-09-12 RX ORDER — POTASSIUM CHLORIDE 750 MG/1
10 TABLET, EXTENDED RELEASE ORAL DAILY
COMMUNITY

## 2024-09-12 RX ORDER — DEXTROSE MONOHYDRATE 100 MG/ML
INJECTION, SOLUTION INTRAVENOUS CONTINUOUS PRN
Status: DISCONTINUED | OUTPATIENT
Start: 2024-09-12 | End: 2024-09-23 | Stop reason: HOSPADM

## 2024-09-12 RX ORDER — SODIUM CHLORIDE 0.9 % (FLUSH) 0.9 %
5-40 SYRINGE (ML) INJECTION PRN
Status: DISCONTINUED | OUTPATIENT
Start: 2024-09-12 | End: 2024-09-23 | Stop reason: HOSPADM

## 2024-09-12 RX ORDER — MIDODRINE HYDROCHLORIDE 5 MG/1
5 TABLET ORAL AS NEEDED
Status: DISCONTINUED | OUTPATIENT
Start: 2024-09-12 | End: 2024-09-23 | Stop reason: HOSPADM

## 2024-09-12 RX ORDER — POLYETHYLENE GLYCOL 3350 17 G/17G
17 POWDER, FOR SOLUTION ORAL DAILY PRN
Status: DISCONTINUED | OUTPATIENT
Start: 2024-09-12 | End: 2024-09-23 | Stop reason: HOSPADM

## 2024-09-12 RX ORDER — INSULIN LISPRO 100 [IU]/ML
0-4 INJECTION, SOLUTION INTRAVENOUS; SUBCUTANEOUS
Status: DISCONTINUED | OUTPATIENT
Start: 2024-09-12 | End: 2024-09-23 | Stop reason: HOSPADM

## 2024-09-12 RX ADMIN — WATER 2000 MG: 1 INJECTION INTRAMUSCULAR; INTRAVENOUS; SUBCUTANEOUS at 14:27

## 2024-09-12 RX ADMIN — SODIUM CHLORIDE 1000 ML: 9 INJECTION, SOLUTION INTRAVENOUS at 14:24

## 2024-09-12 RX ADMIN — PANTOPRAZOLE SODIUM 80 MG: 40 INJECTION, POWDER, FOR SOLUTION INTRAVENOUS at 07:16

## 2024-09-12 RX ADMIN — SODIUM CHLORIDE 1000 ML: 9 INJECTION, SOLUTION INTRAVENOUS at 07:14

## 2024-09-12 RX ADMIN — Medication 10 ML: at 23:14

## 2024-09-12 RX ADMIN — METRONIDAZOLE 500 MG: 500 INJECTION, SOLUTION INTRAVENOUS at 14:32

## 2024-09-12 ASSESSMENT — PAIN SCALES - GENERAL
PAINLEVEL_OUTOF10: 4
PAINLEVEL_OUTOF10: 3

## 2024-09-12 ASSESSMENT — PULMONARY FUNCTION TESTS
PIF_VALUE: 21
PIF_VALUE: 21
PIF_VALUE: 24
PIF_VALUE: 24
PIF_VALUE: 36

## 2024-09-12 ASSESSMENT — PAIN DESCRIPTION - LOCATION: LOCATION: BUTTOCKS

## 2024-09-13 ENCOUNTER — ANESTHESIA (OUTPATIENT)
Dept: ENDOSCOPY | Age: 79
End: 2024-09-13
Payer: MEDICARE

## 2024-09-13 ENCOUNTER — ANESTHESIA EVENT (OUTPATIENT)
Dept: ENDOSCOPY | Age: 79
End: 2024-09-13
Payer: MEDICARE

## 2024-09-13 PROBLEM — K92.2 GASTROINTESTINAL HEMORRHAGE: Status: ACTIVE | Noted: 2024-09-13

## 2024-09-13 LAB
ANION GAP SERPL CALCULATED.3IONS-SCNC: 14 MMOL/L (ref 7–16)
BASOPHILS # BLD: 0.05 K/UL (ref 0–0.2)
BASOPHILS NFR BLD: 1 % (ref 0–2)
BUN SERPL-MCNC: 69 MG/DL (ref 6–23)
C DIFF GDH + TOXINS A+B STL QL IA.RAPID: NEGATIVE
CALCIUM SERPL-MCNC: 11 MG/DL (ref 8.6–10.2)
CHLORIDE SERPL-SCNC: 98 MMOL/L (ref 98–107)
CO2 SERPL-SCNC: 26 MMOL/L (ref 22–29)
CREAT SERPL-MCNC: 3.2 MG/DL (ref 0.5–1)
EOSINOPHIL # BLD: 0.68 K/UL (ref 0.05–0.5)
EOSINOPHILS RELATIVE PERCENT: 7 % (ref 0–6)
ERYTHROCYTE [DISTWIDTH] IN BLOOD BY AUTOMATED COUNT: 16.1 % (ref 11.5–15)
FERRITIN SERPL-MCNC: 2622 NG/ML
FOLATE SERPL-MCNC: >20 NG/ML (ref 4.8–24.2)
GFR, ESTIMATED: 14 ML/MIN/1.73M2
GLUCOSE BLD-MCNC: 125 MG/DL (ref 74–99)
GLUCOSE BLD-MCNC: 127 MG/DL (ref 74–99)
GLUCOSE BLD-MCNC: 139 MG/DL (ref 74–99)
GLUCOSE BLD-MCNC: 140 MG/DL (ref 74–99)
GLUCOSE BLD-MCNC: 155 MG/DL (ref 74–99)
GLUCOSE SERPL-MCNC: 150 MG/DL (ref 74–99)
HCT VFR BLD AUTO: 24.6 % (ref 34–48)
HCT VFR BLD AUTO: 26.6 % (ref 34–48)
HGB BLD-MCNC: 7.9 G/DL (ref 11.5–15.5)
HGB BLD-MCNC: 8.1 G/DL (ref 11.5–15.5)
IMM GRANULOCYTES # BLD AUTO: 0.06 K/UL (ref 0–0.58)
IMM GRANULOCYTES NFR BLD: 1 % (ref 0–5)
IRON SATN MFR SERPL: 28 % (ref 15–50)
IRON SERPL-MCNC: 34 UG/DL (ref 37–145)
LYMPHOCYTES NFR BLD: 0.87 K/UL (ref 1.5–4)
LYMPHOCYTES RELATIVE PERCENT: 9 % (ref 20–42)
MCH RBC QN AUTO: 32.1 PG (ref 26–35)
MCHC RBC AUTO-ENTMCNC: 30.5 G/DL (ref 32–34.5)
MCV RBC AUTO: 105.6 FL (ref 80–99.9)
MONOCYTES NFR BLD: 0.77 K/UL (ref 0.1–0.95)
MONOCYTES NFR BLD: 8 % (ref 2–12)
NEUTROPHILS NFR BLD: 75 % (ref 43–80)
NEUTS SEG NFR BLD: 7.23 K/UL (ref 1.8–7.3)
PLATELET # BLD AUTO: 181 K/UL (ref 130–450)
PMV BLD AUTO: 10.6 FL (ref 7–12)
POTASSIUM SERPL-SCNC: 4 MMOL/L (ref 3.5–5)
RBC # BLD AUTO: 2.52 M/UL (ref 3.5–5.5)
SODIUM SERPL-SCNC: 138 MMOL/L (ref 132–146)
SPECIMEN DESCRIPTION: NORMAL
TIBC SERPL-MCNC: 122 UG/DL (ref 250–450)
TSH SERPL DL<=0.05 MIU/L-ACNC: 5.82 UIU/ML (ref 0.27–4.2)
VIT B12 SERPL-MCNC: 1168 PG/ML (ref 211–946)
WBC OTHER # BLD: 9.7 K/UL (ref 4.5–11.5)

## 2024-09-13 PROCEDURE — 6360000002 HC RX W HCPCS: Performed by: INTERNAL MEDICINE

## 2024-09-13 PROCEDURE — 85025 COMPLETE CBC W/AUTO DIFF WBC: CPT

## 2024-09-13 PROCEDURE — 7100000011 HC PHASE II RECOVERY - ADDTL 15 MIN: Performed by: INTERNAL MEDICINE

## 2024-09-13 PROCEDURE — 84443 ASSAY THYROID STIM HORMONE: CPT

## 2024-09-13 PROCEDURE — 7100000010 HC PHASE II RECOVERY - FIRST 15 MIN: Performed by: INTERNAL MEDICINE

## 2024-09-13 PROCEDURE — 99233 SBSQ HOSP IP/OBS HIGH 50: CPT | Performed by: INTERNAL MEDICINE

## 2024-09-13 PROCEDURE — 0DJ08ZZ INSPECTION OF UPPER INTESTINAL TRACT, VIA NATURAL OR ARTIFICIAL OPENING ENDOSCOPIC: ICD-10-PCS | Performed by: INTERNAL MEDICINE

## 2024-09-13 PROCEDURE — 80048 BASIC METABOLIC PNL TOTAL CA: CPT

## 2024-09-13 PROCEDURE — 2060000000 HC ICU INTERMEDIATE R&B

## 2024-09-13 PROCEDURE — 82607 VITAMIN B-12: CPT

## 2024-09-13 PROCEDURE — 2709999900 HC NON-CHARGEABLE SUPPLY: Performed by: INTERNAL MEDICINE

## 2024-09-13 PROCEDURE — 2580000003 HC RX 258: Performed by: INTERNAL MEDICINE

## 2024-09-13 PROCEDURE — 3609017100 HC EGD: Performed by: INTERNAL MEDICINE

## 2024-09-13 PROCEDURE — 36415 COLL VENOUS BLD VENIPUNCTURE: CPT

## 2024-09-13 PROCEDURE — 3700000001 HC ADD 15 MINUTES (ANESTHESIA): Performed by: INTERNAL MEDICINE

## 2024-09-13 PROCEDURE — 2580000003 HC RX 258: Performed by: HOSPITALIST

## 2024-09-13 PROCEDURE — 76937 US GUIDE VASCULAR ACCESS: CPT

## 2024-09-13 PROCEDURE — 99222 1ST HOSP IP/OBS MODERATE 55: CPT | Performed by: SURGERY

## 2024-09-13 PROCEDURE — 6360000002 HC RX W HCPCS

## 2024-09-13 PROCEDURE — 6360000002 HC RX W HCPCS: Performed by: HOSPITALIST

## 2024-09-13 PROCEDURE — 82962 GLUCOSE BLOOD TEST: CPT

## 2024-09-13 PROCEDURE — 85014 HEMATOCRIT: CPT

## 2024-09-13 PROCEDURE — 82746 ASSAY OF FOLIC ACID SERUM: CPT

## 2024-09-13 PROCEDURE — 6370000000 HC RX 637 (ALT 250 FOR IP): Performed by: FAMILY MEDICINE

## 2024-09-13 PROCEDURE — 83540 ASSAY OF IRON: CPT

## 2024-09-13 PROCEDURE — 85018 HEMOGLOBIN: CPT

## 2024-09-13 PROCEDURE — 5A1D70Z PERFORMANCE OF URINARY FILTRATION, INTERMITTENT, LESS THAN 6 HOURS PER DAY: ICD-10-PCS | Performed by: FAMILY MEDICINE

## 2024-09-13 PROCEDURE — 99221 1ST HOSP IP/OBS SF/LOW 40: CPT | Performed by: INTERNAL MEDICINE

## 2024-09-13 PROCEDURE — 2580000003 HC RX 258

## 2024-09-13 PROCEDURE — 82728 ASSAY OF FERRITIN: CPT

## 2024-09-13 PROCEDURE — 94003 VENT MGMT INPAT SUBQ DAY: CPT

## 2024-09-13 PROCEDURE — 3700000000 HC ANESTHESIA ATTENDED CARE: Performed by: INTERNAL MEDICINE

## 2024-09-13 PROCEDURE — 83550 IRON BINDING TEST: CPT

## 2024-09-13 RX ORDER — PROPOFOL 10 MG/ML
INJECTION, EMULSION INTRAVENOUS
Status: DISCONTINUED | OUTPATIENT
Start: 2024-09-13 | End: 2024-09-13 | Stop reason: SDUPTHER

## 2024-09-13 RX ORDER — POLYETHYLENE GLYCOL 3350 17 G/17G
17 POWDER, FOR SOLUTION ORAL DAILY PRN
COMMUNITY

## 2024-09-13 RX ORDER — INSULIN GLARGINE 100 [IU]/ML
6 INJECTION, SOLUTION SUBCUTANEOUS 2 TIMES DAILY
Status: DISCONTINUED | OUTPATIENT
Start: 2024-09-13 | End: 2024-09-23 | Stop reason: HOSPADM

## 2024-09-13 RX ORDER — METRONIDAZOLE 500 MG/100ML
500 INJECTION, SOLUTION INTRAVENOUS EVERY 8 HOURS
Status: DISCONTINUED | OUTPATIENT
Start: 2024-09-13 | End: 2024-09-21

## 2024-09-13 RX ORDER — PROMETHAZINE HYDROCHLORIDE 12.5 MG/1
12.5 TABLET ORAL EVERY 6 HOURS PRN
COMMUNITY

## 2024-09-13 RX ORDER — CHLORHEXIDINE GLUCONATE ORAL RINSE 1.2 MG/ML
15 SOLUTION DENTAL 2 TIMES DAILY
COMMUNITY

## 2024-09-13 RX ORDER — SODIUM CHLORIDE, SODIUM LACTATE, POTASSIUM CHLORIDE, CALCIUM CHLORIDE 600; 310; 30; 20 MG/100ML; MG/100ML; MG/100ML; MG/100ML
INJECTION, SOLUTION INTRAVENOUS
Status: DISCONTINUED | OUTPATIENT
Start: 2024-09-13 | End: 2024-09-13 | Stop reason: SDUPTHER

## 2024-09-13 RX ORDER — SODIUM CHLORIDE 9 MG/ML
INJECTION, SOLUTION INTRAVENOUS PRN
Status: DISCONTINUED | OUTPATIENT
Start: 2024-09-13 | End: 2024-09-23 | Stop reason: HOSPADM

## 2024-09-13 RX ORDER — MIDODRINE HYDROCHLORIDE 10 MG/1
10 TABLET ORAL ONCE
Status: COMPLETED | OUTPATIENT
Start: 2024-09-13 | End: 2024-09-13

## 2024-09-13 RX ORDER — INSULIN GLARGINE 300 U/ML
8 INJECTION, SOLUTION SUBCUTANEOUS 2 TIMES DAILY
COMMUNITY

## 2024-09-13 RX ORDER — FORMOTEROL FUMARATE DIHYDRATE 20 UG/2ML
20 SOLUTION RESPIRATORY (INHALATION) EVERY 12 HOURS SCHEDULED
COMMUNITY

## 2024-09-13 RX ORDER — VASOPRESSIN 20 U/ML
INJECTION PARENTERAL
Status: DISCONTINUED | OUTPATIENT
Start: 2024-09-13 | End: 2024-09-13 | Stop reason: SDUPTHER

## 2024-09-13 RX ORDER — CARVEDILOL 3.12 MG/1
3.12 TABLET ORAL 2 TIMES DAILY
COMMUNITY

## 2024-09-13 RX ORDER — ACETAMINOPHEN 325 MG/1
650 TABLET ORAL EVERY 6 HOURS PRN
COMMUNITY

## 2024-09-13 RX ADMIN — SODIUM CHLORIDE, POTASSIUM CHLORIDE, SODIUM LACTATE AND CALCIUM CHLORIDE: 600; 310; 30; 20 INJECTION, SOLUTION INTRAVENOUS at 16:00

## 2024-09-13 RX ADMIN — PANTOPRAZOLE SODIUM 80 MG: 40 INJECTION, POWDER, FOR SOLUTION INTRAVENOUS at 13:18

## 2024-09-13 RX ADMIN — INSULIN GLARGINE 6 UNITS: 100 INJECTION, SOLUTION SUBCUTANEOUS at 01:04

## 2024-09-13 RX ADMIN — VASOPRESSIN 2 UNITS: 20 INJECTION INTRAVENOUS at 16:10

## 2024-09-13 RX ADMIN — Medication 10 ML: at 21:48

## 2024-09-13 RX ADMIN — PANTOPRAZOLE SODIUM 8 MG/HR: 40 INJECTION, POWDER, FOR SOLUTION INTRAVENOUS at 14:00

## 2024-09-13 RX ADMIN — PROPOFOL 180 MG: 10 INJECTION, EMULSION INTRAVENOUS at 16:11

## 2024-09-13 RX ADMIN — METRONIDAZOLE 500 MG: 500 INJECTION, SOLUTION INTRAVENOUS at 17:35

## 2024-09-13 RX ADMIN — MIDODRINE HYDROCHLORIDE 10 MG: 10 TABLET ORAL at 21:48

## 2024-09-13 RX ADMIN — WATER 1000 MG: 1 INJECTION INTRAMUSCULAR; INTRAVENOUS; SUBCUTANEOUS at 17:35

## 2024-09-13 ASSESSMENT — PAIN - FUNCTIONAL ASSESSMENT
PAIN_FUNCTIONAL_ASSESSMENT: NONE - DENIES PAIN
PAIN_FUNCTIONAL_ASSESSMENT: NONE - DENIES PAIN

## 2024-09-13 ASSESSMENT — ENCOUNTER SYMPTOMS: SHORTNESS OF BREATH: 0

## 2024-09-13 ASSESSMENT — PULMONARY FUNCTION TESTS
PIF_VALUE: 21
PIF_VALUE: 18
PIF_VALUE: 24
PIF_VALUE: 19
PIF_VALUE: 20

## 2024-09-13 ASSESSMENT — LIFESTYLE VARIABLES: SMOKING_STATUS: 0

## 2024-09-13 ASSESSMENT — PAIN SCALES - GENERAL: PAINLEVEL_OUTOF10: 0

## 2024-09-14 LAB
GLUCOSE BLD-MCNC: 102 MG/DL (ref 74–99)
GLUCOSE BLD-MCNC: 73 MG/DL (ref 74–99)
HCT VFR BLD AUTO: 23 % (ref 34–48)
HCT VFR BLD AUTO: 23.5 % (ref 34–48)
HCT VFR BLD AUTO: 23.5 % (ref 34–48)
HGB BLD-MCNC: 7.1 G/DL (ref 11.5–15.5)
HGB BLD-MCNC: 7.1 G/DL (ref 11.5–15.5)
HGB BLD-MCNC: 7.2 G/DL (ref 11.5–15.5)
PROCALCITONIN SERPL-MCNC: 0.84 NG/ML (ref 0–0.08)

## 2024-09-14 PROCEDURE — 2580000003 HC RX 258: Performed by: HOSPITALIST

## 2024-09-14 PROCEDURE — 2060000000 HC ICU INTERMEDIATE R&B

## 2024-09-14 PROCEDURE — 87040 BLOOD CULTURE FOR BACTERIA: CPT

## 2024-09-14 PROCEDURE — 94003 VENT MGMT INPAT SUBQ DAY: CPT

## 2024-09-14 PROCEDURE — 36415 COLL VENOUS BLD VENIPUNCTURE: CPT

## 2024-09-14 PROCEDURE — 90935 HEMODIALYSIS ONE EVALUATION: CPT

## 2024-09-14 PROCEDURE — 2580000003 HC RX 258: Performed by: INTERNAL MEDICINE

## 2024-09-14 PROCEDURE — 6370000000 HC RX 637 (ALT 250 FOR IP): Performed by: HOSPITALIST

## 2024-09-14 PROCEDURE — 85014 HEMATOCRIT: CPT

## 2024-09-14 PROCEDURE — 84145 PROCALCITONIN (PCT): CPT

## 2024-09-14 PROCEDURE — 6360000002 HC RX W HCPCS: Performed by: INTERNAL MEDICINE

## 2024-09-14 PROCEDURE — 99232 SBSQ HOSP IP/OBS MODERATE 35: CPT | Performed by: INTERNAL MEDICINE

## 2024-09-14 PROCEDURE — 6360000002 HC RX W HCPCS: Performed by: HOSPITALIST

## 2024-09-14 PROCEDURE — 82962 GLUCOSE BLOOD TEST: CPT

## 2024-09-14 PROCEDURE — 85018 HEMOGLOBIN: CPT

## 2024-09-14 RX ORDER — FENTANYL CITRATE 0.05 MG/ML
25 INJECTION, SOLUTION INTRAMUSCULAR; INTRAVENOUS
Status: DISCONTINUED | OUTPATIENT
Start: 2024-09-14 | End: 2024-09-23 | Stop reason: HOSPADM

## 2024-09-14 RX ADMIN — METRONIDAZOLE 500 MG: 500 INJECTION, SOLUTION INTRAVENOUS at 02:30

## 2024-09-14 RX ADMIN — FENTANYL CITRATE 25 MCG: 50 INJECTION INTRAMUSCULAR; INTRAVENOUS at 11:18

## 2024-09-14 RX ADMIN — Medication 10 ML: at 09:06

## 2024-09-14 RX ADMIN — Medication 10 ML: at 20:29

## 2024-09-14 RX ADMIN — FENTANYL CITRATE 25 MCG: 50 INJECTION INTRAMUSCULAR; INTRAVENOUS at 18:50

## 2024-09-14 RX ADMIN — PANTOPRAZOLE SODIUM 8 MG/HR: 40 INJECTION, POWDER, FOR SOLUTION INTRAVENOUS at 13:28

## 2024-09-14 RX ADMIN — PANTOPRAZOLE SODIUM 8 MG/HR: 40 INJECTION, POWDER, FOR SOLUTION INTRAVENOUS at 02:00

## 2024-09-14 RX ADMIN — WATER 1000 MG: 1 INJECTION INTRAMUSCULAR; INTRAVENOUS; SUBCUTANEOUS at 18:51

## 2024-09-14 RX ADMIN — METRONIDAZOLE 500 MG: 500 INJECTION, SOLUTION INTRAVENOUS at 19:04

## 2024-09-14 RX ADMIN — MIDODRINE HYDROCHLORIDE 5 MG: 5 TABLET ORAL at 16:23

## 2024-09-14 RX ADMIN — METRONIDAZOLE 500 MG: 500 INJECTION, SOLUTION INTRAVENOUS at 09:06

## 2024-09-14 RX ADMIN — FENTANYL CITRATE 25 MCG: 50 INJECTION INTRAMUSCULAR; INTRAVENOUS at 13:32

## 2024-09-14 RX ADMIN — EPOETIN ALFA-EPBX 10000 UNITS: 10000 INJECTION, SOLUTION INTRAVENOUS; SUBCUTANEOUS at 09:07

## 2024-09-14 ASSESSMENT — PAIN DESCRIPTION - ORIENTATION
ORIENTATION: RIGHT;LEFT

## 2024-09-14 ASSESSMENT — PAIN DESCRIPTION - DESCRIPTORS
DESCRIPTORS: ACHING

## 2024-09-14 ASSESSMENT — PAIN SCALES - GENERAL
PAINLEVEL_OUTOF10: 9
PAINLEVEL_OUTOF10: 8
PAINLEVEL_OUTOF10: 0

## 2024-09-14 ASSESSMENT — PULMONARY FUNCTION TESTS
PIF_VALUE: 24
PIF_VALUE: 25
PIF_VALUE: 28
PIF_VALUE: 28

## 2024-09-14 ASSESSMENT — PAIN DESCRIPTION - LOCATION
LOCATION: TOE (COMMENT WHICH ONE)

## 2024-09-15 LAB
ALBUMIN SERPL-MCNC: 2.6 G/DL (ref 3.5–5.2)
ALP SERPL-CCNC: 107 U/L (ref 35–104)
ALT SERPL-CCNC: <5 U/L (ref 0–32)
ANION GAP SERPL CALCULATED.3IONS-SCNC: 14 MMOL/L (ref 7–16)
AST SERPL-CCNC: 15 U/L (ref 0–31)
BASOPHILS # BLD: 0.05 K/UL (ref 0–0.2)
BASOPHILS NFR BLD: 1 % (ref 0–2)
BILIRUB SERPL-MCNC: 0.3 MG/DL (ref 0–1.2)
BUN SERPL-MCNC: 31 MG/DL (ref 6–23)
CALCIUM SERPL-MCNC: 9.4 MG/DL (ref 8.6–10.2)
CHLORIDE SERPL-SCNC: 105 MMOL/L (ref 98–107)
CO2 SERPL-SCNC: 22 MMOL/L (ref 22–29)
CREAT SERPL-MCNC: 2.5 MG/DL (ref 0.5–1)
EOSINOPHIL # BLD: 0.86 K/UL (ref 0.05–0.5)
EOSINOPHILS RELATIVE PERCENT: 11 % (ref 0–6)
ERYTHROCYTE [DISTWIDTH] IN BLOOD BY AUTOMATED COUNT: 17.6 % (ref 11.5–15)
GFR, ESTIMATED: 20 ML/MIN/1.73M2
GLUCOSE BLD-MCNC: 141 MG/DL (ref 74–99)
GLUCOSE BLD-MCNC: 60 MG/DL (ref 74–99)
GLUCOSE BLD-MCNC: 64 MG/DL (ref 74–99)
GLUCOSE BLD-MCNC: 66 MG/DL (ref 74–99)
GLUCOSE BLD-MCNC: 71 MG/DL (ref 74–99)
GLUCOSE BLD-MCNC: 77 MG/DL (ref 74–99)
GLUCOSE BLD-MCNC: 91 MG/DL (ref 74–99)
GLUCOSE SERPL-MCNC: 72 MG/DL (ref 74–99)
HCT VFR BLD AUTO: 24 % (ref 34–48)
HCT VFR BLD AUTO: 25.3 % (ref 34–48)
HCT VFR BLD AUTO: 26.4 % (ref 34–48)
HGB BLD-MCNC: 7.2 G/DL (ref 11.5–15.5)
HGB BLD-MCNC: 7.6 G/DL (ref 11.5–15.5)
HGB BLD-MCNC: 7.8 G/DL (ref 11.5–15.5)
IMM GRANULOCYTES # BLD AUTO: 0.03 K/UL (ref 0–0.58)
IMM GRANULOCYTES NFR BLD: 0 % (ref 0–5)
LYMPHOCYTES NFR BLD: 1.06 K/UL (ref 1.5–4)
LYMPHOCYTES RELATIVE PERCENT: 14 % (ref 20–42)
MCH RBC QN AUTO: 32.9 PG (ref 26–35)
MCHC RBC AUTO-ENTMCNC: 29.5 G/DL (ref 32–34.5)
MCV RBC AUTO: 111.4 FL (ref 80–99.9)
MONOCYTES NFR BLD: 0.81 K/UL (ref 0.1–0.95)
MONOCYTES NFR BLD: 11 % (ref 2–12)
NEUTROPHILS NFR BLD: 63 % (ref 43–80)
NEUTS SEG NFR BLD: 4.88 K/UL (ref 1.8–7.3)
PLATELET # BLD AUTO: 202 K/UL (ref 130–450)
PMV BLD AUTO: 10.5 FL (ref 7–12)
POTASSIUM SERPL-SCNC: 4.2 MMOL/L (ref 3.5–5)
PROT SERPL-MCNC: 5.6 G/DL (ref 6.4–8.3)
RBC # BLD AUTO: 2.37 M/UL (ref 3.5–5.5)
SODIUM SERPL-SCNC: 141 MMOL/L (ref 132–146)
WBC OTHER # BLD: 7.7 K/UL (ref 4.5–11.5)

## 2024-09-15 PROCEDURE — 2580000003 HC RX 258: Performed by: HOSPITALIST

## 2024-09-15 PROCEDURE — 2580000003 HC RX 258: Performed by: INTERNAL MEDICINE

## 2024-09-15 PROCEDURE — 85014 HEMATOCRIT: CPT

## 2024-09-15 PROCEDURE — 6360000002 HC RX W HCPCS: Performed by: HOSPITALIST

## 2024-09-15 PROCEDURE — 2060000000 HC ICU INTERMEDIATE R&B

## 2024-09-15 PROCEDURE — 80053 COMPREHEN METABOLIC PANEL: CPT

## 2024-09-15 PROCEDURE — 36415 COLL VENOUS BLD VENIPUNCTURE: CPT

## 2024-09-15 PROCEDURE — 99233 SBSQ HOSP IP/OBS HIGH 50: CPT | Performed by: HOSPITALIST

## 2024-09-15 PROCEDURE — 82962 GLUCOSE BLOOD TEST: CPT

## 2024-09-15 PROCEDURE — 94003 VENT MGMT INPAT SUBQ DAY: CPT

## 2024-09-15 PROCEDURE — 85018 HEMOGLOBIN: CPT

## 2024-09-15 PROCEDURE — 6360000002 HC RX W HCPCS: Performed by: INTERNAL MEDICINE

## 2024-09-15 PROCEDURE — 6370000000 HC RX 637 (ALT 250 FOR IP): Performed by: HOSPITALIST

## 2024-09-15 PROCEDURE — 6370000000 HC RX 637 (ALT 250 FOR IP): Performed by: INTERNAL MEDICINE

## 2024-09-15 PROCEDURE — 85025 COMPLETE CBC W/AUTO DIFF WBC: CPT

## 2024-09-15 RX ORDER — MIDODRINE HYDROCHLORIDE 5 MG/1
5 TABLET ORAL
Status: DISCONTINUED | OUTPATIENT
Start: 2024-09-15 | End: 2024-09-18

## 2024-09-15 RX ORDER — LEVOTHYROXINE SODIUM 50 UG/1
50 TABLET ORAL DAILY
Status: DISCONTINUED | OUTPATIENT
Start: 2024-09-15 | End: 2024-09-23 | Stop reason: HOSPADM

## 2024-09-15 RX ADMIN — MIDODRINE HYDROCHLORIDE 5 MG: 5 TABLET ORAL at 16:31

## 2024-09-15 RX ADMIN — PANTOPRAZOLE SODIUM 8 MG/HR: 40 INJECTION, POWDER, FOR SOLUTION INTRAVENOUS at 04:19

## 2024-09-15 RX ADMIN — FENTANYL CITRATE 25 MCG: 50 INJECTION INTRAMUSCULAR; INTRAVENOUS at 04:11

## 2024-09-15 RX ADMIN — ONDANSETRON 4 MG: 2 INJECTION INTRAMUSCULAR; INTRAVENOUS at 15:47

## 2024-09-15 RX ADMIN — ACETAMINOPHEN 650 MG: 325 TABLET ORAL at 15:46

## 2024-09-15 RX ADMIN — MIDODRINE HYDROCHLORIDE 5 MG: 5 TABLET ORAL at 04:27

## 2024-09-15 RX ADMIN — LEVOTHYROXINE SODIUM 50 MCG: 0.05 TABLET ORAL at 12:18

## 2024-09-15 RX ADMIN — Medication 10 ML: at 21:23

## 2024-09-15 RX ADMIN — Medication 16 G: at 17:17

## 2024-09-15 RX ADMIN — WATER 1000 MG: 1 INJECTION INTRAMUSCULAR; INTRAVENOUS; SUBCUTANEOUS at 14:47

## 2024-09-15 RX ADMIN — METRONIDAZOLE 500 MG: 500 INJECTION, SOLUTION INTRAVENOUS at 02:19

## 2024-09-15 RX ADMIN — PANTOPRAZOLE SODIUM 8 MG/HR: 40 INJECTION, POWDER, FOR SOLUTION INTRAVENOUS at 12:28

## 2024-09-15 RX ADMIN — MIDODRINE HYDROCHLORIDE 5 MG: 5 TABLET ORAL at 14:48

## 2024-09-15 RX ADMIN — METRONIDAZOLE 500 MG: 500 INJECTION, SOLUTION INTRAVENOUS at 16:34

## 2024-09-15 RX ADMIN — Medication 10 ML: at 10:34

## 2024-09-15 RX ADMIN — METRONIDAZOLE 500 MG: 500 INJECTION, SOLUTION INTRAVENOUS at 10:33

## 2024-09-15 ASSESSMENT — PULMONARY FUNCTION TESTS
PIF_VALUE: 30
PIF_VALUE: 32
PIF_VALUE: 23
PIF_VALUE: 30
PIF_VALUE: 29

## 2024-09-15 ASSESSMENT — PAIN SCALES - GENERAL
PAINLEVEL_OUTOF10: 0
PAINLEVEL_OUTOF10: 8
PAINLEVEL_OUTOF10: 4
PAINLEVEL_OUTOF10: 3

## 2024-09-15 ASSESSMENT — PAIN SCALES - WONG BAKER: WONGBAKER_NUMERICALRESPONSE: NO HURT

## 2024-09-15 ASSESSMENT — PAIN DESCRIPTION - DESCRIPTORS
DESCRIPTORS: ACHING;CRAMPING
DESCRIPTORS: ACHING;SORE

## 2024-09-15 ASSESSMENT — PAIN DESCRIPTION - ORIENTATION
ORIENTATION: RIGHT;LEFT
ORIENTATION: MID

## 2024-09-15 ASSESSMENT — PAIN DESCRIPTION - LOCATION
LOCATION: TOE (COMMENT WHICH ONE)
LOCATION: ABDOMEN

## 2024-09-15 ASSESSMENT — PAIN - FUNCTIONAL ASSESSMENT: PAIN_FUNCTIONAL_ASSESSMENT: PREVENTS OR INTERFERES SOME ACTIVE ACTIVITIES AND ADLS

## 2024-09-16 ENCOUNTER — APPOINTMENT (OUTPATIENT)
Dept: MRI IMAGING | Age: 79
DRG: 987 | End: 2024-09-16
Payer: MEDICARE

## 2024-09-16 LAB
25(OH)D3 SERPL-MCNC: 66.1 NG/ML (ref 30–100)
ABO/RH: NORMAL
ANTIBODY SCREEN: NEGATIVE
ARM BAND NUMBER: NORMAL
BLOOD BANK DISPENSE STATUS: NORMAL
BLOOD BANK DISPENSE STATUS: NORMAL
BLOOD BANK SAMPLE EXPIRATION: NORMAL
BPU ID: NORMAL
BPU ID: NORMAL
COMPONENT: NORMAL
COMPONENT: NORMAL
CROSSMATCH RESULT: NORMAL
CROSSMATCH RESULT: NORMAL
GLUCOSE BLD-MCNC: 103 MG/DL (ref 74–99)
GLUCOSE BLD-MCNC: 104 MG/DL (ref 74–99)
GLUCOSE BLD-MCNC: 119 MG/DL (ref 74–99)
GLUCOSE BLD-MCNC: 156 MG/DL (ref 74–99)
HCT VFR BLD AUTO: 26.2 % (ref 34–48)
HGB BLD-MCNC: 7.8 G/DL (ref 11.5–15.5)
MAGNESIUM SERPL-MCNC: 2.3 MG/DL (ref 1.6–2.6)
PHOSPHATE SERPL-MCNC: 4.2 MG/DL (ref 2.5–4.5)
PTH-INTACT SERPL-MCNC: 20.9 PG/ML (ref 15–65)
TRANSFUSION STATUS: NORMAL
TRANSFUSION STATUS: NORMAL
TRIGL SERPL-MCNC: 101 MG/DL
UNIT DIVISION: 0
UNIT DIVISION: 0

## 2024-09-16 PROCEDURE — 84100 ASSAY OF PHOSPHORUS: CPT

## 2024-09-16 PROCEDURE — 76937 US GUIDE VASCULAR ACCESS: CPT

## 2024-09-16 PROCEDURE — 85014 HEMATOCRIT: CPT

## 2024-09-16 PROCEDURE — 2580000003 HC RX 258: Performed by: HOSPITALIST

## 2024-09-16 PROCEDURE — 6360000002 HC RX W HCPCS: Performed by: HOSPITALIST

## 2024-09-16 PROCEDURE — 2060000000 HC ICU INTERMEDIATE R&B

## 2024-09-16 PROCEDURE — 82306 VITAMIN D 25 HYDROXY: CPT

## 2024-09-16 PROCEDURE — 6370000000 HC RX 637 (ALT 250 FOR IP): Performed by: FAMILY MEDICINE

## 2024-09-16 PROCEDURE — 6370000000 HC RX 637 (ALT 250 FOR IP): Performed by: HOSPITALIST

## 2024-09-16 PROCEDURE — 2580000003 HC RX 258: Performed by: INTERNAL MEDICINE

## 2024-09-16 PROCEDURE — 84478 ASSAY OF TRIGLYCERIDES: CPT

## 2024-09-16 PROCEDURE — 83970 ASSAY OF PARATHORMONE: CPT

## 2024-09-16 PROCEDURE — 90935 HEMODIALYSIS ONE EVALUATION: CPT

## 2024-09-16 PROCEDURE — 82962 GLUCOSE BLOOD TEST: CPT

## 2024-09-16 PROCEDURE — 36415 COLL VENOUS BLD VENIPUNCTURE: CPT

## 2024-09-16 PROCEDURE — 6360000002 HC RX W HCPCS: Performed by: INTERNAL MEDICINE

## 2024-09-16 PROCEDURE — 99232 SBSQ HOSP IP/OBS MODERATE 35: CPT | Performed by: FAMILY MEDICINE

## 2024-09-16 PROCEDURE — 99232 SBSQ HOSP IP/OBS MODERATE 35: CPT | Performed by: INTERNAL MEDICINE

## 2024-09-16 PROCEDURE — 6370000000 HC RX 637 (ALT 250 FOR IP): Performed by: INTERNAL MEDICINE

## 2024-09-16 PROCEDURE — 85018 HEMOGLOBIN: CPT

## 2024-09-16 PROCEDURE — 83735 ASSAY OF MAGNESIUM: CPT

## 2024-09-16 PROCEDURE — 72148 MRI LUMBAR SPINE W/O DYE: CPT

## 2024-09-16 PROCEDURE — 94003 VENT MGMT INPAT SUBQ DAY: CPT

## 2024-09-16 RX ORDER — HEPARIN SODIUM 1000 [USP'U]/ML
INJECTION, SOLUTION INTRAVENOUS; SUBCUTANEOUS
Status: DISPENSED
Start: 2024-09-16 | End: 2024-09-16

## 2024-09-16 RX ORDER — HEPARIN 100 UNIT/ML
SYRINGE INTRAVENOUS
Status: DISCONTINUED
Start: 2024-09-16 | End: 2024-09-16 | Stop reason: WASHOUT

## 2024-09-16 RX ADMIN — MIDODRINE HYDROCHLORIDE 5 MG: 5 TABLET ORAL at 12:56

## 2024-09-16 RX ADMIN — Medication 10 ML: at 20:30

## 2024-09-16 RX ADMIN — Medication 10 ML: at 15:34

## 2024-09-16 RX ADMIN — EPOETIN ALFA-EPBX 10000 UNITS: 10000 INJECTION, SOLUTION INTRAVENOUS; SUBCUTANEOUS at 18:56

## 2024-09-16 RX ADMIN — FENTANYL CITRATE 25 MCG: 50 INJECTION INTRAMUSCULAR; INTRAVENOUS at 15:32

## 2024-09-16 RX ADMIN — METRONIDAZOLE 500 MG: 500 INJECTION, SOLUTION INTRAVENOUS at 18:26

## 2024-09-16 RX ADMIN — PANTOPRAZOLE SODIUM 8 MG/HR: 40 INJECTION, POWDER, FOR SOLUTION INTRAVENOUS at 02:34

## 2024-09-16 RX ADMIN — WATER 1000 MG: 1 INJECTION INTRAMUSCULAR; INTRAVENOUS; SUBCUTANEOUS at 12:56

## 2024-09-16 RX ADMIN — INSULIN GLARGINE 6 UNITS: 100 INJECTION, SOLUTION SUBCUTANEOUS at 20:29

## 2024-09-16 RX ADMIN — MIDODRINE HYDROCHLORIDE 5 MG: 5 TABLET ORAL at 18:22

## 2024-09-16 RX ADMIN — METRONIDAZOLE 500 MG: 500 INJECTION, SOLUTION INTRAVENOUS at 01:28

## 2024-09-16 RX ADMIN — METRONIDAZOLE 500 MG: 500 INJECTION, SOLUTION INTRAVENOUS at 14:03

## 2024-09-16 RX ADMIN — FENTANYL CITRATE 25 MCG: 50 INJECTION INTRAMUSCULAR; INTRAVENOUS at 13:01

## 2024-09-16 RX ADMIN — POLYETHYLENE GLYCOL-3350 AND ELECTROLYTES 4000 ML: 236; 6.74; 5.86; 2.97; 22.74 POWDER, FOR SOLUTION ORAL at 18:30

## 2024-09-16 ASSESSMENT — PULMONARY FUNCTION TESTS
PIF_VALUE: 19
PIF_VALUE: 32
PIF_VALUE: 18
PIF_VALUE: 22
PIF_VALUE: 21

## 2024-09-16 ASSESSMENT — PAIN DESCRIPTION - LOCATION: LOCATION: FOOT

## 2024-09-16 ASSESSMENT — PAIN - FUNCTIONAL ASSESSMENT: PAIN_FUNCTIONAL_ASSESSMENT: ACTIVITIES ARE NOT PREVENTED

## 2024-09-16 ASSESSMENT — PAIN DESCRIPTION - ORIENTATION: ORIENTATION: LEFT

## 2024-09-16 ASSESSMENT — PAIN SCALES - WONG BAKER: WONGBAKER_NUMERICALRESPONSE: NO HURT

## 2024-09-16 ASSESSMENT — PAIN SCALES - GENERAL: PAINLEVEL_OUTOF10: 10

## 2024-09-17 ENCOUNTER — ANESTHESIA (OUTPATIENT)
Dept: ENDOSCOPY | Age: 79
End: 2024-09-17
Payer: MEDICARE

## 2024-09-17 ENCOUNTER — ANESTHESIA EVENT (OUTPATIENT)
Dept: ENDOSCOPY | Age: 79
End: 2024-09-17
Payer: MEDICARE

## 2024-09-17 LAB
ALBUMIN SERPL-MCNC: 2.6 G/DL (ref 3.5–5.2)
ALP SERPL-CCNC: 111 U/L (ref 35–104)
ALT SERPL-CCNC: <5 U/L (ref 0–32)
ANION GAP SERPL CALCULATED.3IONS-SCNC: 14 MMOL/L (ref 7–16)
AST SERPL-CCNC: 17 U/L (ref 0–31)
BILIRUB SERPL-MCNC: 0.2 MG/DL (ref 0–1.2)
BUN SERPL-MCNC: 19 MG/DL (ref 6–23)
CA-I BLD-SCNC: 1.23 MMOL/L (ref 1.15–1.33)
CALCIUM SERPL-MCNC: 8.9 MG/DL (ref 8.6–10.2)
CHLORIDE SERPL-SCNC: 105 MMOL/L (ref 98–107)
CO2 SERPL-SCNC: 24 MMOL/L (ref 22–29)
CREAT SERPL-MCNC: 2.3 MG/DL (ref 0.5–1)
GFR, ESTIMATED: 21 ML/MIN/1.73M2
GLUCOSE BLD-MCNC: 69 MG/DL (ref 74–99)
GLUCOSE BLD-MCNC: 72 MG/DL (ref 74–99)
GLUCOSE BLD-MCNC: 72 MG/DL (ref 74–99)
GLUCOSE BLD-MCNC: 77 MG/DL (ref 74–99)
GLUCOSE BLD-MCNC: 81 MG/DL (ref 74–99)
GLUCOSE BLD-MCNC: 81 MG/DL (ref 74–99)
GLUCOSE SERPL-MCNC: 78 MG/DL (ref 74–99)
HCT VFR BLD AUTO: 25.9 % (ref 34–48)
HCT VFR BLD AUTO: 26.8 % (ref 34–48)
HCT VFR BLD AUTO: 30.1 % (ref 34–48)
HGB BLD-MCNC: 7.6 G/DL (ref 11.5–15.5)
HGB BLD-MCNC: 8.4 G/DL (ref 11.5–15.5)
HGB BLD-MCNC: 9.3 G/DL (ref 11.5–15.5)
MAGNESIUM SERPL-MCNC: 2 MG/DL (ref 1.6–2.6)
PHOSPHATE SERPL-MCNC: 3 MG/DL (ref 2.5–4.5)
POTASSIUM SERPL-SCNC: 4.1 MMOL/L (ref 3.5–5)
PROT SERPL-MCNC: 5.2 G/DL (ref 6.4–8.3)
SODIUM SERPL-SCNC: 143 MMOL/L (ref 132–146)

## 2024-09-17 PROCEDURE — 3700000001 HC ADD 15 MINUTES (ANESTHESIA): Performed by: INTERNAL MEDICINE

## 2024-09-17 PROCEDURE — 0DBH8ZZ EXCISION OF CECUM, VIA NATURAL OR ARTIFICIAL OPENING ENDOSCOPIC: ICD-10-PCS | Performed by: INTERNAL MEDICINE

## 2024-09-17 PROCEDURE — 88305 TISSUE EXAM BY PATHOLOGIST: CPT

## 2024-09-17 PROCEDURE — 7100000011 HC PHASE II RECOVERY - ADDTL 15 MIN: Performed by: INTERNAL MEDICINE

## 2024-09-17 PROCEDURE — 82330 ASSAY OF CALCIUM: CPT

## 2024-09-17 PROCEDURE — 85018 HEMOGLOBIN: CPT

## 2024-09-17 PROCEDURE — 3609010600 HC COLONOSCOPY POLYPECTOMY SNARE/COLD BIOPSY: Performed by: INTERNAL MEDICINE

## 2024-09-17 PROCEDURE — 2580000003 HC RX 258: Performed by: HOSPITALIST

## 2024-09-17 PROCEDURE — 85014 HEMATOCRIT: CPT

## 2024-09-17 PROCEDURE — 7100000010 HC PHASE II RECOVERY - FIRST 15 MIN: Performed by: INTERNAL MEDICINE

## 2024-09-17 PROCEDURE — 2060000000 HC ICU INTERMEDIATE R&B

## 2024-09-17 PROCEDURE — 84100 ASSAY OF PHOSPHORUS: CPT

## 2024-09-17 PROCEDURE — 6360000002 HC RX W HCPCS: Performed by: HOSPITALIST

## 2024-09-17 PROCEDURE — 2709999900 HC NON-CHARGEABLE SUPPLY: Performed by: INTERNAL MEDICINE

## 2024-09-17 PROCEDURE — 80053 COMPREHEN METABOLIC PANEL: CPT

## 2024-09-17 PROCEDURE — 6360000002 HC RX W HCPCS: Performed by: INTERNAL MEDICINE

## 2024-09-17 PROCEDURE — 0DBM8ZZ EXCISION OF DESCENDING COLON, VIA NATURAL OR ARTIFICIAL OPENING ENDOSCOPIC: ICD-10-PCS | Performed by: INTERNAL MEDICINE

## 2024-09-17 PROCEDURE — 83735 ASSAY OF MAGNESIUM: CPT

## 2024-09-17 PROCEDURE — 94003 VENT MGMT INPAT SUBQ DAY: CPT

## 2024-09-17 PROCEDURE — 82962 GLUCOSE BLOOD TEST: CPT

## 2024-09-17 PROCEDURE — 6360000002 HC RX W HCPCS

## 2024-09-17 PROCEDURE — 2580000003 HC RX 258

## 2024-09-17 PROCEDURE — 36415 COLL VENOUS BLD VENIPUNCTURE: CPT

## 2024-09-17 PROCEDURE — 2580000003 HC RX 258: Performed by: INTERNAL MEDICINE

## 2024-09-17 PROCEDURE — 99232 SBSQ HOSP IP/OBS MODERATE 35: CPT | Performed by: FAMILY MEDICINE

## 2024-09-17 PROCEDURE — 3700000000 HC ANESTHESIA ATTENDED CARE: Performed by: INTERNAL MEDICINE

## 2024-09-17 PROCEDURE — 6370000000 HC RX 637 (ALT 250 FOR IP): Performed by: INTERNAL MEDICINE

## 2024-09-17 PROCEDURE — 6360000002 HC RX W HCPCS: Performed by: FAMILY MEDICINE

## 2024-09-17 PROCEDURE — 6370000000 HC RX 637 (ALT 250 FOR IP): Performed by: HOSPITALIST

## 2024-09-17 RX ORDER — SODIUM CHLORIDE 9 MG/ML
INJECTION, SOLUTION INTRAVENOUS
Status: DISCONTINUED | OUTPATIENT
Start: 2024-09-17 | End: 2024-09-17 | Stop reason: SDUPTHER

## 2024-09-17 RX ORDER — GLYCOPYRROLATE 0.2 MG/ML
INJECTION INTRAMUSCULAR; INTRAVENOUS
Status: DISCONTINUED | OUTPATIENT
Start: 2024-09-17 | End: 2024-09-17 | Stop reason: SDUPTHER

## 2024-09-17 RX ORDER — PROPOFOL 10 MG/ML
INJECTION, EMULSION INTRAVENOUS
Status: DISCONTINUED | OUTPATIENT
Start: 2024-09-17 | End: 2024-09-17 | Stop reason: SDUPTHER

## 2024-09-17 RX ADMIN — Medication 16 G: at 11:42

## 2024-09-17 RX ADMIN — MIDODRINE HYDROCHLORIDE 5 MG: 5 TABLET ORAL at 18:31

## 2024-09-17 RX ADMIN — PROPOFOL 150 MG: 10 INJECTION, EMULSION INTRAVENOUS at 16:08

## 2024-09-17 RX ADMIN — FENTANYL CITRATE 25 MCG: 50 INJECTION INTRAMUSCULAR; INTRAVENOUS at 08:31

## 2024-09-17 RX ADMIN — METRONIDAZOLE 500 MG: 500 INJECTION, SOLUTION INTRAVENOUS at 00:23

## 2024-09-17 RX ADMIN — SODIUM CHLORIDE: 9 INJECTION, SOLUTION INTRAVENOUS at 16:08

## 2024-09-17 RX ADMIN — WATER 1000 MG: 1 INJECTION INTRAMUSCULAR; INTRAVENOUS; SUBCUTANEOUS at 13:44

## 2024-09-17 RX ADMIN — PHENYLEPHRINE HYDROCHLORIDE 200 MCG: 10 INJECTION INTRAVENOUS at 16:38

## 2024-09-17 RX ADMIN — SODIUM CHLORIDE, PRESERVATIVE FREE 10 ML: 5 INJECTION INTRAVENOUS at 13:45

## 2024-09-17 RX ADMIN — METRONIDAZOLE 500 MG: 500 INJECTION, SOLUTION INTRAVENOUS at 08:43

## 2024-09-17 RX ADMIN — PANTOPRAZOLE SODIUM 40 MG: 40 INJECTION, POWDER, FOR SOLUTION INTRAVENOUS at 08:31

## 2024-09-17 RX ADMIN — GLYCOPYRROLATE 0.2 MG: 0.2 INJECTION, SOLUTION INTRAMUSCULAR; INTRAVENOUS at 16:19

## 2024-09-17 RX ADMIN — Medication 10 ML: at 20:49

## 2024-09-17 RX ADMIN — LEVOTHYROXINE SODIUM 50 MCG: 0.05 TABLET ORAL at 05:26

## 2024-09-17 RX ADMIN — MIDODRINE HYDROCHLORIDE 5 MG: 5 TABLET ORAL at 08:31

## 2024-09-17 RX ADMIN — HYDROMORPHONE HYDROCHLORIDE 0.25 MG: 1 INJECTION, SOLUTION INTRAMUSCULAR; INTRAVENOUS; SUBCUTANEOUS at 11:53

## 2024-09-17 RX ADMIN — PHENYLEPHRINE HYDROCHLORIDE 100 MCG: 10 INJECTION INTRAVENOUS at 16:19

## 2024-09-17 RX ADMIN — PHENYLEPHRINE HYDROCHLORIDE 200 MCG: 10 INJECTION INTRAVENOUS at 16:34

## 2024-09-17 RX ADMIN — MIDODRINE HYDROCHLORIDE 5 MG: 5 TABLET ORAL at 11:47

## 2024-09-17 RX ADMIN — Medication 10 ML: at 08:32

## 2024-09-17 RX ADMIN — METRONIDAZOLE 500 MG: 500 INJECTION, SOLUTION INTRAVENOUS at 23:31

## 2024-09-17 RX ADMIN — METRONIDAZOLE 500 MG: 500 INJECTION, SOLUTION INTRAVENOUS at 18:34

## 2024-09-17 ASSESSMENT — PAIN - FUNCTIONAL ASSESSMENT: PAIN_FUNCTIONAL_ASSESSMENT: ACTIVITIES ARE NOT PREVENTED

## 2024-09-17 ASSESSMENT — PULMONARY FUNCTION TESTS
PIF_VALUE: 25
PIF_VALUE: 39
PIF_VALUE: 25
PIF_VALUE: 34
PIF_VALUE: 28

## 2024-09-17 ASSESSMENT — PAIN DESCRIPTION - ORIENTATION
ORIENTATION: RIGHT;LEFT

## 2024-09-17 ASSESSMENT — ENCOUNTER SYMPTOMS: SHORTNESS OF BREATH: 0

## 2024-09-17 ASSESSMENT — PAIN SCALES - GENERAL
PAINLEVEL_OUTOF10: 5
PAINLEVEL_OUTOF10: 8
PAINLEVEL_OUTOF10: 8

## 2024-09-17 ASSESSMENT — PAIN DESCRIPTION - DESCRIPTORS
DESCRIPTORS: ACHING

## 2024-09-17 ASSESSMENT — PAIN DESCRIPTION - LOCATION
LOCATION: FOOT

## 2024-09-17 ASSESSMENT — LIFESTYLE VARIABLES: SMOKING_STATUS: 0

## 2024-09-18 LAB
ALBUMIN SERPL-MCNC: 2.5 G/DL (ref 3.5–5.2)
ALP SERPL-CCNC: 120 U/L (ref 35–104)
ALT SERPL-CCNC: <5 U/L (ref 0–32)
ANION GAP SERPL CALCULATED.3IONS-SCNC: 14 MMOL/L (ref 7–16)
AST SERPL-CCNC: 16 U/L (ref 0–31)
BILIRUB SERPL-MCNC: 0.2 MG/DL (ref 0–1.2)
BUN SERPL-MCNC: 24 MG/DL (ref 6–23)
CALCIUM SERPL-MCNC: 9.5 MG/DL (ref 8.6–10.2)
CHLORIDE SERPL-SCNC: 106 MMOL/L (ref 98–107)
CO2 SERPL-SCNC: 22 MMOL/L (ref 22–29)
CREAT SERPL-MCNC: 2.9 MG/DL (ref 0.5–1)
GFR, ESTIMATED: 16 ML/MIN/1.73M2
GLUCOSE BLD-MCNC: 114 MG/DL (ref 74–99)
GLUCOSE BLD-MCNC: 118 MG/DL (ref 74–99)
GLUCOSE BLD-MCNC: 122 MG/DL (ref 74–99)
GLUCOSE BLD-MCNC: 129 MG/DL (ref 74–99)
GLUCOSE BLD-MCNC: 135 MG/DL (ref 74–99)
GLUCOSE SERPL-MCNC: 116 MG/DL (ref 74–99)
HCT VFR BLD AUTO: 26.9 % (ref 34–48)
HCT VFR BLD AUTO: 27.6 % (ref 34–48)
HGB BLD-MCNC: 8.1 G/DL (ref 11.5–15.5)
HGB BLD-MCNC: 8.2 G/DL (ref 11.5–15.5)
MAGNESIUM SERPL-MCNC: 2.2 MG/DL (ref 1.6–2.6)
PHOSPHATE SERPL-MCNC: 4.1 MG/DL (ref 2.5–4.5)
POTASSIUM SERPL-SCNC: 4 MMOL/L (ref 3.5–5)
PROT SERPL-MCNC: 5.6 G/DL (ref 6.4–8.3)
SODIUM SERPL-SCNC: 142 MMOL/L (ref 132–146)

## 2024-09-18 PROCEDURE — 87102 FUNGUS ISOLATION CULTURE: CPT

## 2024-09-18 PROCEDURE — 6370000000 HC RX 637 (ALT 250 FOR IP): Performed by: INTERNAL MEDICINE

## 2024-09-18 PROCEDURE — 2580000003 HC RX 258: Performed by: INTERNAL MEDICINE

## 2024-09-18 PROCEDURE — 87449 NOS EACH ORGANISM AG IA: CPT

## 2024-09-18 PROCEDURE — 85018 HEMOGLOBIN: CPT

## 2024-09-18 PROCEDURE — 83735 ASSAY OF MAGNESIUM: CPT

## 2024-09-18 PROCEDURE — P9047 ALBUMIN (HUMAN), 25%, 50ML: HCPCS | Performed by: FAMILY MEDICINE

## 2024-09-18 PROCEDURE — 2580000003 HC RX 258: Performed by: HOSPITALIST

## 2024-09-18 PROCEDURE — 99232 SBSQ HOSP IP/OBS MODERATE 35: CPT | Performed by: FAMILY MEDICINE

## 2024-09-18 PROCEDURE — 84100 ASSAY OF PHOSPHORUS: CPT

## 2024-09-18 PROCEDURE — 36415 COLL VENOUS BLD VENIPUNCTURE: CPT

## 2024-09-18 PROCEDURE — 82962 GLUCOSE BLOOD TEST: CPT

## 2024-09-18 PROCEDURE — 80053 COMPREHEN METABOLIC PANEL: CPT

## 2024-09-18 PROCEDURE — 6370000000 HC RX 637 (ALT 250 FOR IP): Performed by: FAMILY MEDICINE

## 2024-09-18 PROCEDURE — 86481 TB AG RESPONSE T-CELL SUSP: CPT

## 2024-09-18 PROCEDURE — 6360000002 HC RX W HCPCS: Performed by: INTERNAL MEDICINE

## 2024-09-18 PROCEDURE — 85014 HEMATOCRIT: CPT

## 2024-09-18 PROCEDURE — 94003 VENT MGMT INPAT SUBQ DAY: CPT

## 2024-09-18 PROCEDURE — 90935 HEMODIALYSIS ONE EVALUATION: CPT

## 2024-09-18 PROCEDURE — 87106 FUNGI IDENTIFICATION YEAST: CPT

## 2024-09-18 PROCEDURE — 6360000002 HC RX W HCPCS: Performed by: FAMILY MEDICINE

## 2024-09-18 PROCEDURE — 6360000002 HC RX W HCPCS: Performed by: HOSPITALIST

## 2024-09-18 PROCEDURE — 6370000000 HC RX 637 (ALT 250 FOR IP): Performed by: HOSPITALIST

## 2024-09-18 PROCEDURE — 6370000000 HC RX 637 (ALT 250 FOR IP): Performed by: STUDENT IN AN ORGANIZED HEALTH CARE EDUCATION/TRAINING PROGRAM

## 2024-09-18 PROCEDURE — 2060000000 HC ICU INTERMEDIATE R&B

## 2024-09-18 RX ORDER — ALBUMIN (HUMAN) 12.5 G/50ML
50 SOLUTION INTRAVENOUS ONCE
Status: COMPLETED | OUTPATIENT
Start: 2024-09-18 | End: 2024-09-18

## 2024-09-18 RX ORDER — MIDODRINE HYDROCHLORIDE 10 MG/1
10 TABLET ORAL
Status: DISCONTINUED | OUTPATIENT
Start: 2024-09-18 | End: 2024-09-23 | Stop reason: HOSPADM

## 2024-09-18 RX ADMIN — METRONIDAZOLE 500 MG: 500 INJECTION, SOLUTION INTRAVENOUS at 13:09

## 2024-09-18 RX ADMIN — Medication 10 ML: at 13:13

## 2024-09-18 RX ADMIN — Medication 10 ML: at 22:57

## 2024-09-18 RX ADMIN — INSULIN GLARGINE 6 UNITS: 100 INJECTION, SOLUTION SUBCUTANEOUS at 22:49

## 2024-09-18 RX ADMIN — MIDODRINE HYDROCHLORIDE 10 MG: 10 TABLET ORAL at 17:51

## 2024-09-18 RX ADMIN — WATER 1000 MG: 1 INJECTION INTRAMUSCULAR; INTRAVENOUS; SUBCUTANEOUS at 13:51

## 2024-09-18 RX ADMIN — MIDODRINE HYDROCHLORIDE 10 MG: 10 TABLET ORAL at 12:37

## 2024-09-18 RX ADMIN — LEVOTHYROXINE SODIUM 50 MCG: 0.05 TABLET ORAL at 05:15

## 2024-09-18 RX ADMIN — PANTOPRAZOLE SODIUM 40 MG: 40 INJECTION, POWDER, FOR SOLUTION INTRAVENOUS at 08:46

## 2024-09-18 RX ADMIN — FENTANYL CITRATE 25 MCG: 50 INJECTION INTRAMUSCULAR; INTRAVENOUS at 23:04

## 2024-09-18 RX ADMIN — ALBUMIN (HUMAN) 50 G: 0.25 INJECTION, SOLUTION INTRAVENOUS at 12:41

## 2024-09-18 RX ADMIN — METRONIDAZOLE 500 MG: 500 INJECTION, SOLUTION INTRAVENOUS at 22:55

## 2024-09-18 RX ADMIN — MIDODRINE HYDROCHLORIDE 5 MG: 5 TABLET ORAL at 08:46

## 2024-09-18 RX ADMIN — FENTANYL CITRATE 25 MCG: 50 INJECTION INTRAMUSCULAR; INTRAVENOUS at 08:45

## 2024-09-18 ASSESSMENT — PULMONARY FUNCTION TESTS
PIF_VALUE: 22
PIF_VALUE: 24
PIF_VALUE: 33
PIF_VALUE: 27
PIF_VALUE: 27

## 2024-09-18 ASSESSMENT — PAIN DESCRIPTION - LOCATION
LOCATION: TOE (COMMENT WHICH ONE)
LOCATION: FOOT

## 2024-09-18 ASSESSMENT — PAIN SCALES - GENERAL
PAINLEVEL_OUTOF10: 2
PAINLEVEL_OUTOF10: 8
PAINLEVEL_OUTOF10: 5

## 2024-09-18 ASSESSMENT — PAIN DESCRIPTION - ORIENTATION
ORIENTATION: LEFT;RIGHT
ORIENTATION: RIGHT;LEFT

## 2024-09-18 ASSESSMENT — PAIN DESCRIPTION - DESCRIPTORS: DESCRIPTORS: ACHING;BURNING

## 2024-09-19 ENCOUNTER — APPOINTMENT (OUTPATIENT)
Dept: INTERVENTIONAL RADIOLOGY/VASCULAR | Age: 79
DRG: 987 | End: 2024-09-19
Payer: MEDICARE

## 2024-09-19 PROBLEM — Z93.0 TRACHEOSTOMY STATUS (HCC): Status: ACTIVE | Noted: 2024-09-19

## 2024-09-19 PROBLEM — I48.92 ATRIAL FLUTTER, CHRONIC (HCC): Status: ACTIVE | Noted: 2024-09-19

## 2024-09-19 PROBLEM — I96 GANGRENE OF TOE OF RIGHT FOOT (HCC): Status: ACTIVE | Noted: 2024-09-19

## 2024-09-19 PROBLEM — M46.26 ACUTE OSTEOMYELITIS OF LUMBAR SPINE: Status: ACTIVE | Noted: 2024-09-19

## 2024-09-19 PROBLEM — D63.1 ANEMIA DUE TO CHRONIC KIDNEY DISEASE, ON CHRONIC DIALYSIS (HCC): Status: ACTIVE | Noted: 2024-09-19

## 2024-09-19 PROBLEM — I50.22 CHRONIC HFREF (HEART FAILURE WITH REDUCED EJECTION FRACTION) (HCC): Status: ACTIVE | Noted: 2024-09-19

## 2024-09-19 PROBLEM — N18.6 ANEMIA DUE TO CHRONIC KIDNEY DISEASE, ON CHRONIC DIALYSIS (HCC): Status: ACTIVE | Noted: 2024-09-19

## 2024-09-19 PROBLEM — T07.XXXA MULTIPLE OPEN WOUNDS: Status: ACTIVE | Noted: 2024-09-19

## 2024-09-19 PROBLEM — D64.9 ANEMIA REQUIRING TRANSFUSIONS: Status: ACTIVE | Noted: 2024-09-19

## 2024-09-19 PROBLEM — Z99.2 ANEMIA DUE TO CHRONIC KIDNEY DISEASE, ON CHRONIC DIALYSIS (HCC): Status: ACTIVE | Noted: 2024-09-19

## 2024-09-19 PROBLEM — I96 GANGRENE OF TOE OF LEFT FOOT (HCC): Status: ACTIVE | Noted: 2024-09-19

## 2024-09-19 LAB
1,3 BETA GLUCAN SER-MCNC: 70 PG/ML
1,3 BETA-D-GLUCAN INTERP: ABNORMAL
ALBUMIN SERPL-MCNC: 2.9 G/DL (ref 3.5–5.2)
ALP SERPL-CCNC: 98 U/L (ref 35–104)
ALT SERPL-CCNC: <5 U/L (ref 0–32)
ANION GAP SERPL CALCULATED.3IONS-SCNC: 12 MMOL/L (ref 7–16)
AST SERPL-CCNC: 12 U/L (ref 0–31)
BASOPHILS # BLD: 0.06 K/UL (ref 0–0.2)
BASOPHILS NFR BLD: 1 % (ref 0–2)
BILIRUB SERPL-MCNC: 0.2 MG/DL (ref 0–1.2)
BUN SERPL-MCNC: 12 MG/DL (ref 6–23)
CALCIUM SERPL-MCNC: 9 MG/DL (ref 8.6–10.2)
CHLORIDE SERPL-SCNC: 104 MMOL/L (ref 98–107)
CO2 SERPL-SCNC: 22 MMOL/L (ref 22–29)
CREAT SERPL-MCNC: 2 MG/DL (ref 0.5–1)
EOSINOPHIL # BLD: 0.93 K/UL (ref 0.05–0.5)
EOSINOPHILS RELATIVE PERCENT: 14 % (ref 0–6)
ERYTHROCYTE [DISTWIDTH] IN BLOOD BY AUTOMATED COUNT: 19 % (ref 11.5–15)
GFR, ESTIMATED: 25 ML/MIN/1.73M2
GLUCOSE BLD-MCNC: 100 MG/DL (ref 74–99)
GLUCOSE BLD-MCNC: 84 MG/DL (ref 74–99)
GLUCOSE BLD-MCNC: 98 MG/DL (ref 74–99)
GLUCOSE SERPL-MCNC: 115 MG/DL (ref 74–99)
HCT VFR BLD AUTO: 25.7 % (ref 34–48)
HGB BLD-MCNC: 7.9 G/DL (ref 11.5–15.5)
IMM GRANULOCYTES # BLD AUTO: 0.03 K/UL (ref 0–0.58)
IMM GRANULOCYTES NFR BLD: 0 % (ref 0–5)
LYMPHOCYTES NFR BLD: 1.42 K/UL (ref 1.5–4)
LYMPHOCYTES RELATIVE PERCENT: 21 % (ref 20–42)
MAGNESIUM SERPL-MCNC: 2 MG/DL (ref 1.6–2.6)
MCH RBC QN AUTO: 33.2 PG (ref 26–35)
MCHC RBC AUTO-ENTMCNC: 30.7 G/DL (ref 32–34.5)
MCV RBC AUTO: 108 FL (ref 80–99.9)
MONOCYTES NFR BLD: 0.86 K/UL (ref 0.1–0.95)
MONOCYTES NFR BLD: 13 % (ref 2–12)
NEUTROPHILS NFR BLD: 52 % (ref 43–80)
NEUTS SEG NFR BLD: 3.54 K/UL (ref 1.8–7.3)
PHOSPHATE SERPL-MCNC: 2.7 MG/DL (ref 2.5–4.5)
PLATELET # BLD AUTO: 207 K/UL (ref 130–450)
PMV BLD AUTO: 10.7 FL (ref 7–12)
POTASSIUM SERPL-SCNC: 3.8 MMOL/L (ref 3.5–5)
PROT SERPL-MCNC: 5.4 G/DL (ref 6.4–8.3)
RBC # BLD AUTO: 2.38 M/UL (ref 3.5–5.5)
SODIUM SERPL-SCNC: 138 MMOL/L (ref 132–146)
T4 FREE SERPL-MCNC: 1.1 NG/DL (ref 0.9–1.7)
WBC OTHER # BLD: 6.8 K/UL (ref 4.5–11.5)

## 2024-09-19 PROCEDURE — 84439 ASSAY OF FREE THYROXINE: CPT

## 2024-09-19 PROCEDURE — 87070 CULTURE OTHR SPECIMN AEROBIC: CPT

## 2024-09-19 PROCEDURE — 80053 COMPREHEN METABOLIC PANEL: CPT

## 2024-09-19 PROCEDURE — 2580000003 HC RX 258: Performed by: HOSPITALIST

## 2024-09-19 PROCEDURE — 6360000002 HC RX W HCPCS: Performed by: INTERNAL MEDICINE

## 2024-09-19 PROCEDURE — 93923 UPR/LXTR ART STDY 3+ LVLS: CPT

## 2024-09-19 PROCEDURE — 2060000000 HC ICU INTERMEDIATE R&B

## 2024-09-19 PROCEDURE — APPSS60 APP SPLIT SHARED TIME 46-60 MINUTES

## 2024-09-19 PROCEDURE — 87205 SMEAR GRAM STAIN: CPT

## 2024-09-19 PROCEDURE — 77012 CT SCAN FOR NEEDLE BIOPSY: CPT

## 2024-09-19 PROCEDURE — 0Q903ZX DRAINAGE OF LUMBAR VERTEBRA, PERCUTANEOUS APPROACH, DIAGNOSTIC: ICD-10-PCS | Performed by: RADIOLOGY

## 2024-09-19 PROCEDURE — 99233 SBSQ HOSP IP/OBS HIGH 50: CPT | Performed by: INTERNAL MEDICINE

## 2024-09-19 PROCEDURE — 99232 SBSQ HOSP IP/OBS MODERATE 35: CPT | Performed by: FAMILY MEDICINE

## 2024-09-19 PROCEDURE — 93005 ELECTROCARDIOGRAM TRACING: CPT

## 2024-09-19 PROCEDURE — 62267 INTERDISCAL PERQ ASPIR DX: CPT

## 2024-09-19 PROCEDURE — 6360000002 HC RX W HCPCS: Performed by: RADIOLOGY

## 2024-09-19 PROCEDURE — 82962 GLUCOSE BLOOD TEST: CPT

## 2024-09-19 PROCEDURE — 2580000003 HC RX 258: Performed by: INTERNAL MEDICINE

## 2024-09-19 PROCEDURE — 83735 ASSAY OF MAGNESIUM: CPT

## 2024-09-19 PROCEDURE — 36415 COLL VENOUS BLD VENIPUNCTURE: CPT

## 2024-09-19 PROCEDURE — 94003 VENT MGMT INPAT SUBQ DAY: CPT

## 2024-09-19 PROCEDURE — 6370000000 HC RX 637 (ALT 250 FOR IP): Performed by: STUDENT IN AN ORGANIZED HEALTH CARE EDUCATION/TRAINING PROGRAM

## 2024-09-19 PROCEDURE — 6360000002 HC RX W HCPCS: Performed by: HOSPITALIST

## 2024-09-19 PROCEDURE — 85025 COMPLETE CBC W/AUTO DIFF WBC: CPT

## 2024-09-19 PROCEDURE — 84100 ASSAY OF PHOSPHORUS: CPT

## 2024-09-19 PROCEDURE — 99223 1ST HOSP IP/OBS HIGH 75: CPT | Performed by: INTERNAL MEDICINE

## 2024-09-19 RX ORDER — FENTANYL CITRATE 0.05 MG/ML
INJECTION, SOLUTION INTRAMUSCULAR; INTRAVENOUS PRN
Status: COMPLETED | OUTPATIENT
Start: 2024-09-19 | End: 2024-09-19

## 2024-09-19 RX ORDER — MIDAZOLAM HYDROCHLORIDE 1 MG/ML
2 INJECTION INTRAMUSCULAR; INTRAVENOUS ONCE
Status: DISCONTINUED | OUTPATIENT
Start: 2024-09-19 | End: 2024-09-23 | Stop reason: HOSPADM

## 2024-09-19 RX ADMIN — Medication 10 ML: at 10:41

## 2024-09-19 RX ADMIN — Medication 10 ML: at 21:24

## 2024-09-19 RX ADMIN — MIDODRINE HYDROCHLORIDE 10 MG: 10 TABLET ORAL at 18:17

## 2024-09-19 RX ADMIN — METRONIDAZOLE 500 MG: 500 INJECTION, SOLUTION INTRAVENOUS at 05:09

## 2024-09-19 RX ADMIN — FENTANYL CITRATE 25 MCG: 50 INJECTION INTRAMUSCULAR; INTRAVENOUS at 18:21

## 2024-09-19 RX ADMIN — WATER 1000 MG: 1 INJECTION INTRAMUSCULAR; INTRAVENOUS; SUBCUTANEOUS at 13:56

## 2024-09-19 RX ADMIN — METRONIDAZOLE 500 MG: 500 INJECTION, SOLUTION INTRAVENOUS at 13:56

## 2024-09-19 RX ADMIN — METRONIDAZOLE 500 MG: 500 INJECTION, SOLUTION INTRAVENOUS at 21:23

## 2024-09-19 RX ADMIN — FENTANYL CITRATE 50 MCG: 50 INJECTION INTRAMUSCULAR; INTRAVENOUS at 15:59

## 2024-09-19 RX ADMIN — PANTOPRAZOLE SODIUM 40 MG: 40 INJECTION, POWDER, FOR SOLUTION INTRAVENOUS at 10:34

## 2024-09-19 ASSESSMENT — PAIN DESCRIPTION - ORIENTATION: ORIENTATION: RIGHT;LEFT

## 2024-09-19 ASSESSMENT — PAIN DESCRIPTION - LOCATION: LOCATION: TOE (COMMENT WHICH ONE)

## 2024-09-19 ASSESSMENT — PULMONARY FUNCTION TESTS
PIF_VALUE: 21
PIF_VALUE: 22
PIF_VALUE: 22
PIF_VALUE: 23

## 2024-09-19 ASSESSMENT — PAIN DESCRIPTION - DESCRIPTORS: DESCRIPTORS: THROBBING

## 2024-09-19 ASSESSMENT — PAIN SCALES - GENERAL
PAINLEVEL_OUTOF10: 0
PAINLEVEL_OUTOF10: 10
PAINLEVEL_OUTOF10: 0

## 2024-09-19 ASSESSMENT — PAIN SCALES - WONG BAKER: WONGBAKER_NUMERICALRESPONSE: NO HURT

## 2024-09-20 ENCOUNTER — APPOINTMENT (OUTPATIENT)
Dept: GENERAL RADIOLOGY | Age: 79
DRG: 987 | End: 2024-09-20
Payer: MEDICARE

## 2024-09-20 LAB
ALBUMIN SERPL-MCNC: 3 G/DL (ref 3.5–5.2)
ALP SERPL-CCNC: 103 U/L (ref 35–104)
ALT SERPL-CCNC: <5 U/L (ref 0–32)
ANION GAP SERPL CALCULATED.3IONS-SCNC: 11 MMOL/L (ref 7–16)
AST SERPL-CCNC: 13 U/L (ref 0–31)
BASOPHILS # BLD: 0.04 K/UL (ref 0–0.2)
BASOPHILS NFR BLD: 1 % (ref 0–2)
BILIRUB SERPL-MCNC: 0.3 MG/DL (ref 0–1.2)
BUN SERPL-MCNC: 17 MG/DL (ref 6–23)
CALCIUM SERPL-MCNC: 9.4 MG/DL (ref 8.6–10.2)
CHLORIDE SERPL-SCNC: 105 MMOL/L (ref 98–107)
CO2 SERPL-SCNC: 24 MMOL/L (ref 22–29)
CREAT SERPL-MCNC: 2.8 MG/DL (ref 0.5–1)
EKG ATRIAL RATE: 44 BPM
EKG ATRIAL RATE: 51 BPM
EKG Q-T INTERVAL: 530 MS
EKG Q-T INTERVAL: 532 MS
EKG QRS DURATION: 84 MS
EKG QRS DURATION: 92 MS
EKG QTC CALCULATION (BAZETT): 494 MS
EKG QTC CALCULATION (BAZETT): 507 MS
EKG R AXIS: 0 DEGREES
EKG R AXIS: 4 DEGREES
EKG T AXIS: 33 DEGREES
EKG T AXIS: 9 DEGREES
EKG VENTRICULAR RATE: 52 BPM
EKG VENTRICULAR RATE: 55 BPM
EOSINOPHIL # BLD: 0.82 K/UL (ref 0.05–0.5)
EOSINOPHILS RELATIVE PERCENT: 14 % (ref 0–6)
ERYTHROCYTE [DISTWIDTH] IN BLOOD BY AUTOMATED COUNT: 18.8 % (ref 11.5–15)
GFR, ESTIMATED: 17 ML/MIN/1.73M2
GLUCOSE BLD-MCNC: 151 MG/DL (ref 74–99)
GLUCOSE BLD-MCNC: 166 MG/DL (ref 74–99)
GLUCOSE BLD-MCNC: 180 MG/DL (ref 74–99)
GLUCOSE BLD-MCNC: 193 MG/DL (ref 74–99)
GLUCOSE SERPL-MCNC: 179 MG/DL (ref 74–99)
HCT VFR BLD AUTO: 27.7 % (ref 34–48)
HGB BLD-MCNC: 8.5 G/DL (ref 11.5–15.5)
IMM GRANULOCYTES # BLD AUTO: <0.03 K/UL (ref 0–0.58)
IMM GRANULOCYTES NFR BLD: 0 % (ref 0–5)
LYMPHOCYTES NFR BLD: 1.03 K/UL (ref 1.5–4)
LYMPHOCYTES RELATIVE PERCENT: 17 % (ref 20–42)
MAGNESIUM SERPL-MCNC: 2.2 MG/DL (ref 1.6–2.6)
MCH RBC QN AUTO: 33.1 PG (ref 26–35)
MCHC RBC AUTO-ENTMCNC: 30.7 G/DL (ref 32–34.5)
MCV RBC AUTO: 107.8 FL (ref 80–99.9)
MICROORGANISM SPEC CULT: NORMAL
MICROORGANISM SPEC CULT: NORMAL
MONOCYTES NFR BLD: 0.83 K/UL (ref 0.1–0.95)
MONOCYTES NFR BLD: 14 % (ref 2–12)
NEUTROPHILS NFR BLD: 55 % (ref 43–80)
NEUTS SEG NFR BLD: 3.3 K/UL (ref 1.8–7.3)
PHOSPHATE SERPL-MCNC: 3.6 MG/DL (ref 2.5–4.5)
PLATELET # BLD AUTO: 198 K/UL (ref 130–450)
PMV BLD AUTO: 10.3 FL (ref 7–12)
POTASSIUM SERPL-SCNC: 3.9 MMOL/L (ref 3.5–5)
PROT SERPL-MCNC: 5.6 G/DL (ref 6.4–8.3)
RBC # BLD AUTO: 2.57 M/UL (ref 3.5–5.5)
SERVICE CMNT-IMP: NORMAL
SERVICE CMNT-IMP: NORMAL
SODIUM SERPL-SCNC: 140 MMOL/L (ref 132–146)
SPECIMEN DESCRIPTION: NORMAL
SPECIMEN DESCRIPTION: NORMAL
SURGICAL PATHOLOGY REPORT: NORMAL
WBC OTHER # BLD: 6 K/UL (ref 4.5–11.5)

## 2024-09-20 PROCEDURE — 99232 SBSQ HOSP IP/OBS MODERATE 35: CPT | Performed by: STUDENT IN AN ORGANIZED HEALTH CARE EDUCATION/TRAINING PROGRAM

## 2024-09-20 PROCEDURE — 94003 VENT MGMT INPAT SUBQ DAY: CPT

## 2024-09-20 PROCEDURE — 6370000000 HC RX 637 (ALT 250 FOR IP): Performed by: HOSPITALIST

## 2024-09-20 PROCEDURE — 2580000003 HC RX 258: Performed by: HOSPITALIST

## 2024-09-20 PROCEDURE — 93010 ELECTROCARDIOGRAM REPORT: CPT | Performed by: INTERNAL MEDICINE

## 2024-09-20 PROCEDURE — 80053 COMPREHEN METABOLIC PANEL: CPT

## 2024-09-20 PROCEDURE — 82962 GLUCOSE BLOOD TEST: CPT

## 2024-09-20 PROCEDURE — 83735 ASSAY OF MAGNESIUM: CPT

## 2024-09-20 PROCEDURE — 6360000002 HC RX W HCPCS: Performed by: INTERNAL MEDICINE

## 2024-09-20 PROCEDURE — 99233 SBSQ HOSP IP/OBS HIGH 50: CPT | Performed by: INTERNAL MEDICINE

## 2024-09-20 PROCEDURE — 2580000003 HC RX 258: Performed by: INTERNAL MEDICINE

## 2024-09-20 PROCEDURE — 85025 COMPLETE CBC W/AUTO DIFF WBC: CPT

## 2024-09-20 PROCEDURE — 71045 X-RAY EXAM CHEST 1 VIEW: CPT

## 2024-09-20 PROCEDURE — 6370000000 HC RX 637 (ALT 250 FOR IP): Performed by: STUDENT IN AN ORGANIZED HEALTH CARE EDUCATION/TRAINING PROGRAM

## 2024-09-20 PROCEDURE — 2060000000 HC ICU INTERMEDIATE R&B

## 2024-09-20 PROCEDURE — 6360000002 HC RX W HCPCS: Performed by: HOSPITALIST

## 2024-09-20 PROCEDURE — 90935 HEMODIALYSIS ONE EVALUATION: CPT

## 2024-09-20 PROCEDURE — 84100 ASSAY OF PHOSPHORUS: CPT

## 2024-09-20 PROCEDURE — 93005 ELECTROCARDIOGRAM TRACING: CPT

## 2024-09-20 RX ADMIN — METRONIDAZOLE 500 MG: 500 INJECTION, SOLUTION INTRAVENOUS at 12:30

## 2024-09-20 RX ADMIN — MIDODRINE HYDROCHLORIDE 10 MG: 10 TABLET ORAL at 17:31

## 2024-09-20 RX ADMIN — LEVOTHYROXINE SODIUM 50 MCG: 0.05 TABLET ORAL at 05:14

## 2024-09-20 RX ADMIN — EPOETIN ALFA-EPBX 10000 UNITS: 10000 INJECTION, SOLUTION INTRAVENOUS; SUBCUTANEOUS at 17:29

## 2024-09-20 RX ADMIN — PANTOPRAZOLE SODIUM 40 MG: 40 INJECTION, POWDER, FOR SOLUTION INTRAVENOUS at 12:31

## 2024-09-20 RX ADMIN — METRONIDAZOLE 500 MG: 500 INJECTION, SOLUTION INTRAVENOUS at 05:14

## 2024-09-20 RX ADMIN — MIDODRINE HYDROCHLORIDE 10 MG: 10 TABLET ORAL at 12:24

## 2024-09-20 RX ADMIN — WATER 1000 MG: 1 INJECTION INTRAMUSCULAR; INTRAVENOUS; SUBCUTANEOUS at 17:27

## 2024-09-20 RX ADMIN — METRONIDAZOLE 500 MG: 500 INJECTION, SOLUTION INTRAVENOUS at 21:43

## 2024-09-20 RX ADMIN — FENTANYL CITRATE 25 MCG: 50 INJECTION INTRAMUSCULAR; INTRAVENOUS at 12:21

## 2024-09-20 RX ADMIN — Medication 10 ML: at 21:00

## 2024-09-20 ASSESSMENT — PAIN SCALES - WONG BAKER
WONGBAKER_NUMERICALRESPONSE: NO HURT

## 2024-09-20 ASSESSMENT — PULMONARY FUNCTION TESTS
PIF_VALUE: 24
PIF_VALUE: 27
PIF_VALUE: 27

## 2024-09-20 ASSESSMENT — PAIN - FUNCTIONAL ASSESSMENT: PAIN_FUNCTIONAL_ASSESSMENT: PREVENTS OR INTERFERES SOME ACTIVE ACTIVITIES AND ADLS

## 2024-09-20 ASSESSMENT — PAIN SCALES - GENERAL: PAINLEVEL_OUTOF10: 0

## 2024-09-20 ASSESSMENT — PAIN DESCRIPTION - LOCATION: LOCATION: OTHER (COMMENT);BACK

## 2024-09-20 ASSESSMENT — PAIN DESCRIPTION - DESCRIPTORS: DESCRIPTORS: ACHING;PRESSURE

## 2024-09-21 LAB
ALBUMIN SERPL-MCNC: 3 G/DL (ref 3.5–5.2)
ALP SERPL-CCNC: 106 U/L (ref 35–104)
ALT SERPL-CCNC: 5 U/L (ref 0–32)
ANION GAP SERPL CALCULATED.3IONS-SCNC: 10 MMOL/L (ref 7–16)
AST SERPL-CCNC: 14 U/L (ref 0–31)
BASOPHILS # BLD: 0.04 K/UL (ref 0–0.2)
BASOPHILS NFR BLD: 1 % (ref 0–2)
BILIRUB SERPL-MCNC: 0.3 MG/DL (ref 0–1.2)
BUN SERPL-MCNC: 12 MG/DL (ref 6–23)
CALCIUM SERPL-MCNC: 9.1 MG/DL (ref 8.6–10.2)
CHLORIDE SERPL-SCNC: 104 MMOL/L (ref 98–107)
CO2 SERPL-SCNC: 24 MMOL/L (ref 22–29)
CREAT SERPL-MCNC: 2.3 MG/DL (ref 0.5–1)
EKG ATRIAL RATE: 277 BPM
EKG Q-T INTERVAL: 478 MS
EKG QRS DURATION: 88 MS
EKG QTC CALCULATION (BAZETT): 448 MS
EKG R AXIS: 62 DEGREES
EKG T AXIS: 64 DEGREES
EKG VENTRICULAR RATE: 53 BPM
EOSINOPHIL # BLD: 0.82 K/UL (ref 0.05–0.5)
EOSINOPHILS RELATIVE PERCENT: 15 % (ref 0–6)
ERYTHROCYTE [DISTWIDTH] IN BLOOD BY AUTOMATED COUNT: 18.6 % (ref 11.5–15)
GFR, ESTIMATED: 22 ML/MIN/1.73M2
GLUCOSE BLD-MCNC: 128 MG/DL (ref 74–99)
GLUCOSE BLD-MCNC: 147 MG/DL (ref 74–99)
GLUCOSE BLD-MCNC: 167 MG/DL (ref 74–99)
GLUCOSE BLD-MCNC: 180 MG/DL (ref 74–99)
GLUCOSE SERPL-MCNC: 172 MG/DL (ref 74–99)
HCT VFR BLD AUTO: 29.7 % (ref 34–48)
HGB BLD-MCNC: 8.9 G/DL (ref 11.5–15.5)
IMM GRANULOCYTES # BLD AUTO: <0.03 K/UL (ref 0–0.58)
IMM GRANULOCYTES NFR BLD: 0 % (ref 0–5)
LYMPHOCYTES NFR BLD: 1.04 K/UL (ref 1.5–4)
LYMPHOCYTES RELATIVE PERCENT: 19 % (ref 20–42)
MAGNESIUM SERPL-MCNC: 2.1 MG/DL (ref 1.6–2.6)
MCH RBC QN AUTO: 33.2 PG (ref 26–35)
MCHC RBC AUTO-ENTMCNC: 30 G/DL (ref 32–34.5)
MCV RBC AUTO: 110.8 FL (ref 80–99.9)
MICROORGANISM SPEC CULT: ABNORMAL
MICROORGANISM SPEC CULT: ABNORMAL
MONOCYTES NFR BLD: 0.7 K/UL (ref 0.1–0.95)
MONOCYTES NFR BLD: 13 % (ref 2–12)
NEUTROPHILS NFR BLD: 53 % (ref 43–80)
NEUTS SEG NFR BLD: 2.95 K/UL (ref 1.8–7.3)
PHOSPHATE SERPL-MCNC: 3.3 MG/DL (ref 2.5–4.5)
PLATELET # BLD AUTO: 164 K/UL (ref 130–450)
PMV BLD AUTO: 10.5 FL (ref 7–12)
POTASSIUM SERPL-SCNC: 4 MMOL/L (ref 3.5–5)
PROT SERPL-MCNC: 5.5 G/DL (ref 6.4–8.3)
RBC # BLD AUTO: 2.68 M/UL (ref 3.5–5.5)
RBC # BLD: ABNORMAL 10*6/UL
RBC # BLD: ABNORMAL 10*6/UL
SODIUM SERPL-SCNC: 138 MMOL/L (ref 132–146)
SPECIMEN DESCRIPTION: ABNORMAL
WBC OTHER # BLD: 5.6 K/UL (ref 4.5–11.5)

## 2024-09-21 PROCEDURE — 99232 SBSQ HOSP IP/OBS MODERATE 35: CPT | Performed by: STUDENT IN AN ORGANIZED HEALTH CARE EDUCATION/TRAINING PROGRAM

## 2024-09-21 PROCEDURE — 2580000003 HC RX 258: Performed by: HOSPITALIST

## 2024-09-21 PROCEDURE — 6360000002 HC RX W HCPCS: Performed by: HOSPITALIST

## 2024-09-21 PROCEDURE — 6370000000 HC RX 637 (ALT 250 FOR IP): Performed by: HOSPITALIST

## 2024-09-21 PROCEDURE — 82962 GLUCOSE BLOOD TEST: CPT

## 2024-09-21 PROCEDURE — 99233 SBSQ HOSP IP/OBS HIGH 50: CPT | Performed by: INTERNAL MEDICINE

## 2024-09-21 PROCEDURE — 6360000002 HC RX W HCPCS: Performed by: INTERNAL MEDICINE

## 2024-09-21 PROCEDURE — 36415 COLL VENOUS BLD VENIPUNCTURE: CPT

## 2024-09-21 PROCEDURE — 93010 ELECTROCARDIOGRAM REPORT: CPT | Performed by: INTERNAL MEDICINE

## 2024-09-21 PROCEDURE — 83735 ASSAY OF MAGNESIUM: CPT

## 2024-09-21 PROCEDURE — 84100 ASSAY OF PHOSPHORUS: CPT

## 2024-09-21 PROCEDURE — 85025 COMPLETE CBC W/AUTO DIFF WBC: CPT

## 2024-09-21 PROCEDURE — 6370000000 HC RX 637 (ALT 250 FOR IP): Performed by: STUDENT IN AN ORGANIZED HEALTH CARE EDUCATION/TRAINING PROGRAM

## 2024-09-21 PROCEDURE — 6370000000 HC RX 637 (ALT 250 FOR IP): Performed by: FAMILY MEDICINE

## 2024-09-21 PROCEDURE — 2060000000 HC ICU INTERMEDIATE R&B

## 2024-09-21 PROCEDURE — 2580000003 HC RX 258: Performed by: INTERNAL MEDICINE

## 2024-09-21 PROCEDURE — 94003 VENT MGMT INPAT SUBQ DAY: CPT

## 2024-09-21 PROCEDURE — 80053 COMPREHEN METABOLIC PANEL: CPT

## 2024-09-21 PROCEDURE — 93005 ELECTROCARDIOGRAM TRACING: CPT

## 2024-09-21 RX ADMIN — PANTOPRAZOLE SODIUM 40 MG: 40 INJECTION, POWDER, FOR SOLUTION INTRAVENOUS at 08:41

## 2024-09-21 RX ADMIN — Medication 10 ML: at 08:42

## 2024-09-21 RX ADMIN — MIDODRINE HYDROCHLORIDE 10 MG: 10 TABLET ORAL at 08:41

## 2024-09-21 RX ADMIN — INSULIN GLARGINE 6 UNITS: 100 INJECTION, SOLUTION SUBCUTANEOUS at 08:40

## 2024-09-21 RX ADMIN — METRONIDAZOLE 500 MG: 500 INJECTION, SOLUTION INTRAVENOUS at 14:16

## 2024-09-21 RX ADMIN — MIDODRINE HYDROCHLORIDE 10 MG: 10 TABLET ORAL at 13:02

## 2024-09-21 RX ADMIN — MIDODRINE HYDROCHLORIDE 10 MG: 10 TABLET ORAL at 17:15

## 2024-09-21 RX ADMIN — WATER 1000 MG: 1 INJECTION INTRAMUSCULAR; INTRAVENOUS; SUBCUTANEOUS at 14:05

## 2024-09-21 RX ADMIN — FENTANYL CITRATE 25 MCG: 50 INJECTION INTRAMUSCULAR; INTRAVENOUS at 01:35

## 2024-09-21 RX ADMIN — FENTANYL CITRATE 25 MCG: 50 INJECTION INTRAMUSCULAR; INTRAVENOUS at 09:16

## 2024-09-21 RX ADMIN — LEVOTHYROXINE SODIUM 50 MCG: 0.05 TABLET ORAL at 06:07

## 2024-09-21 RX ADMIN — METRONIDAZOLE 500 MG: 500 INJECTION, SOLUTION INTRAVENOUS at 06:07

## 2024-09-21 RX ADMIN — FENTANYL CITRATE 25 MCG: 50 INJECTION INTRAMUSCULAR; INTRAVENOUS at 21:43

## 2024-09-21 RX ADMIN — Medication 10 ML: at 21:43

## 2024-09-21 ASSESSMENT — PAIN DESCRIPTION - DESCRIPTORS
DESCRIPTORS: ACHING;THROBBING;TENDER
DESCRIPTORS: ACHING;SORE;THROBBING

## 2024-09-21 ASSESSMENT — PULMONARY FUNCTION TESTS
PIF_VALUE: 17
PIF_VALUE: 23
PIF_VALUE: 24
PIF_VALUE: 20
PIF_VALUE: 25

## 2024-09-21 ASSESSMENT — PAIN SCALES - GENERAL
PAINLEVEL_OUTOF10: 0
PAINLEVEL_OUTOF10: 8
PAINLEVEL_OUTOF10: 0
PAINLEVEL_OUTOF10: 8

## 2024-09-21 ASSESSMENT — PAIN SCALES - WONG BAKER
WONGBAKER_NUMERICALRESPONSE: NO HURT

## 2024-09-21 ASSESSMENT — PAIN DESCRIPTION - ORIENTATION
ORIENTATION: RIGHT;LEFT
ORIENTATION: RIGHT;LEFT

## 2024-09-21 ASSESSMENT — PAIN DESCRIPTION - LOCATION
LOCATION: LEG;FOOT
LOCATION: FOOT
LOCATION: FOOT;TOE (COMMENT WHICH ONE)

## 2024-09-22 LAB
ALBUMIN SERPL-MCNC: 2.8 G/DL (ref 3.5–5.2)
ALP SERPL-CCNC: 104 U/L (ref 35–104)
ALT SERPL-CCNC: <5 U/L (ref 0–32)
ANION GAP SERPL CALCULATED.3IONS-SCNC: 10 MMOL/L (ref 7–16)
AST SERPL-CCNC: 11 U/L (ref 0–31)
BASOPHILS # BLD: 0.04 K/UL (ref 0–0.2)
BASOPHILS NFR BLD: 1 % (ref 0–2)
BILIRUB SERPL-MCNC: 0.2 MG/DL (ref 0–1.2)
BUN SERPL-MCNC: 19 MG/DL (ref 6–23)
CALCIUM SERPL-MCNC: 9.7 MG/DL (ref 8.6–10.2)
CHLORIDE SERPL-SCNC: 104 MMOL/L (ref 98–107)
CO2 SERPL-SCNC: 26 MMOL/L (ref 22–29)
CREAT SERPL-MCNC: 3 MG/DL (ref 0.5–1)
EKG ATRIAL RATE: 41 BPM
EKG Q-T INTERVAL: 498 MS
EKG QRS DURATION: 80 MS
EKG QTC CALCULATION (BAZETT): 463 MS
EKG R AXIS: 10 DEGREES
EKG T AXIS: 5 DEGREES
EKG VENTRICULAR RATE: 52 BPM
EOSINOPHIL # BLD: 0.88 K/UL (ref 0.05–0.5)
EOSINOPHILS RELATIVE PERCENT: 13 % (ref 0–6)
ERYTHROCYTE [DISTWIDTH] IN BLOOD BY AUTOMATED COUNT: 18.6 % (ref 11.5–15)
GFR, ESTIMATED: 16 ML/MIN/1.73M2
GLUCOSE BLD-MCNC: 134 MG/DL (ref 74–99)
GLUCOSE BLD-MCNC: 143 MG/DL (ref 74–99)
GLUCOSE BLD-MCNC: 150 MG/DL (ref 74–99)
GLUCOSE BLD-MCNC: 151 MG/DL (ref 74–99)
GLUCOSE SERPL-MCNC: 152 MG/DL (ref 74–99)
HCT VFR BLD AUTO: 30.2 % (ref 34–48)
HGB BLD-MCNC: 9.1 G/DL (ref 11.5–15.5)
IMM GRANULOCYTES # BLD AUTO: 0.03 K/UL (ref 0–0.58)
IMM GRANULOCYTES NFR BLD: 0 % (ref 0–5)
LYMPHOCYTES NFR BLD: 1.35 K/UL (ref 1.5–4)
LYMPHOCYTES RELATIVE PERCENT: 20 % (ref 20–42)
MAGNESIUM SERPL-MCNC: 2.4 MG/DL (ref 1.6–2.6)
MCH RBC QN AUTO: 33.2 PG (ref 26–35)
MCHC RBC AUTO-ENTMCNC: 30.1 G/DL (ref 32–34.5)
MCV RBC AUTO: 110.2 FL (ref 80–99.9)
MICROORGANISM SPEC CULT: NO GROWTH
MICROORGANISM/AGENT SPEC: NORMAL
MONOCYTES NFR BLD: 0.85 K/UL (ref 0.1–0.95)
MONOCYTES NFR BLD: 13 % (ref 2–12)
NEUTROPHILS NFR BLD: 54 % (ref 43–80)
NEUTS SEG NFR BLD: 3.62 K/UL (ref 1.8–7.3)
PHOSPHATE SERPL-MCNC: 4 MG/DL (ref 2.5–4.5)
PLATELET # BLD AUTO: 181 K/UL (ref 130–450)
PMV BLD AUTO: 10.5 FL (ref 7–12)
POTASSIUM SERPL-SCNC: 4.4 MMOL/L (ref 3.5–5)
PROT SERPL-MCNC: 5.6 G/DL (ref 6.4–8.3)
RBC # BLD AUTO: 2.74 M/UL (ref 3.5–5.5)
RBC # BLD: ABNORMAL 10*6/UL
RBC # BLD: ABNORMAL 10*6/UL
SODIUM SERPL-SCNC: 140 MMOL/L (ref 132–146)
SPECIMEN DESCRIPTION: NORMAL
WBC OTHER # BLD: 6.8 K/UL (ref 4.5–11.5)

## 2024-09-22 PROCEDURE — 36415 COLL VENOUS BLD VENIPUNCTURE: CPT

## 2024-09-22 PROCEDURE — 93010 ELECTROCARDIOGRAM REPORT: CPT | Performed by: INTERNAL MEDICINE

## 2024-09-22 PROCEDURE — 83735 ASSAY OF MAGNESIUM: CPT

## 2024-09-22 PROCEDURE — 6370000000 HC RX 637 (ALT 250 FOR IP): Performed by: FAMILY MEDICINE

## 2024-09-22 PROCEDURE — 85025 COMPLETE CBC W/AUTO DIFF WBC: CPT

## 2024-09-22 PROCEDURE — 6360000002 HC RX W HCPCS: Performed by: HOSPITALIST

## 2024-09-22 PROCEDURE — 82962 GLUCOSE BLOOD TEST: CPT

## 2024-09-22 PROCEDURE — 6370000000 HC RX 637 (ALT 250 FOR IP): Performed by: STUDENT IN AN ORGANIZED HEALTH CARE EDUCATION/TRAINING PROGRAM

## 2024-09-22 PROCEDURE — 94003 VENT MGMT INPAT SUBQ DAY: CPT

## 2024-09-22 PROCEDURE — 99232 SBSQ HOSP IP/OBS MODERATE 35: CPT | Performed by: STUDENT IN AN ORGANIZED HEALTH CARE EDUCATION/TRAINING PROGRAM

## 2024-09-22 PROCEDURE — 2060000000 HC ICU INTERMEDIATE R&B

## 2024-09-22 PROCEDURE — 80053 COMPREHEN METABOLIC PANEL: CPT

## 2024-09-22 PROCEDURE — 99233 SBSQ HOSP IP/OBS HIGH 50: CPT | Performed by: INTERNAL MEDICINE

## 2024-09-22 PROCEDURE — 2580000003 HC RX 258: Performed by: HOSPITALIST

## 2024-09-22 PROCEDURE — 93005 ELECTROCARDIOGRAM TRACING: CPT

## 2024-09-22 PROCEDURE — 6370000000 HC RX 637 (ALT 250 FOR IP): Performed by: HOSPITALIST

## 2024-09-22 PROCEDURE — 6360000002 HC RX W HCPCS: Performed by: INTERNAL MEDICINE

## 2024-09-22 PROCEDURE — 84100 ASSAY OF PHOSPHORUS: CPT

## 2024-09-22 RX ADMIN — Medication 10 ML: at 11:26

## 2024-09-22 RX ADMIN — Medication 10 ML: at 21:34

## 2024-09-22 RX ADMIN — PANTOPRAZOLE SODIUM 40 MG: 40 INJECTION, POWDER, FOR SOLUTION INTRAVENOUS at 10:43

## 2024-09-22 RX ADMIN — FENTANYL CITRATE 25 MCG: 50 INJECTION INTRAMUSCULAR; INTRAVENOUS at 21:40

## 2024-09-22 RX ADMIN — LEVOTHYROXINE SODIUM 50 MCG: 0.05 TABLET ORAL at 06:08

## 2024-09-22 RX ADMIN — FENTANYL CITRATE 25 MCG: 50 INJECTION INTRAMUSCULAR; INTRAVENOUS at 06:29

## 2024-09-22 RX ADMIN — INSULIN GLARGINE 6 UNITS: 100 INJECTION, SOLUTION SUBCUTANEOUS at 10:44

## 2024-09-22 RX ADMIN — MIDODRINE HYDROCHLORIDE 10 MG: 10 TABLET ORAL at 10:43

## 2024-09-22 RX ADMIN — MIDODRINE HYDROCHLORIDE 10 MG: 10 TABLET ORAL at 19:05

## 2024-09-22 RX ADMIN — MIDODRINE HYDROCHLORIDE 10 MG: 10 TABLET ORAL at 15:39

## 2024-09-22 ASSESSMENT — PAIN DESCRIPTION - DESCRIPTORS
DESCRIPTORS: ACHING;THROBBING
DESCRIPTORS: ACHING;DISCOMFORT;SORE

## 2024-09-22 ASSESSMENT — PULMONARY FUNCTION TESTS
PIF_VALUE: 26
PIF_VALUE: 23
PIF_VALUE: 21

## 2024-09-22 ASSESSMENT — PAIN SCALES - WONG BAKER
WONGBAKER_NUMERICALRESPONSE: NO HURT

## 2024-09-22 ASSESSMENT — PAIN SCALES - GENERAL
PAINLEVEL_OUTOF10: 8
PAINLEVEL_OUTOF10: 8
PAINLEVEL_OUTOF10: 0

## 2024-09-22 ASSESSMENT — PAIN DESCRIPTION - LOCATION
LOCATION: FOOT
LOCATION: FOOT

## 2024-09-22 ASSESSMENT — PAIN DESCRIPTION - ORIENTATION
ORIENTATION: RIGHT;LEFT
ORIENTATION: RIGHT;LEFT

## 2024-09-23 VITALS
OXYGEN SATURATION: 99 % | DIASTOLIC BLOOD PRESSURE: 62 MMHG | BODY MASS INDEX: 33.34 KG/M2 | RESPIRATION RATE: 16 BRPM | WEIGHT: 207.45 LBS | TEMPERATURE: 97.7 F | HEIGHT: 66 IN | HEART RATE: 69 BPM | SYSTOLIC BLOOD PRESSURE: 107 MMHG

## 2024-09-23 LAB
ALBUMIN SERPL-MCNC: 3 G/DL (ref 3.5–5.2)
ALP SERPL-CCNC: 103 U/L (ref 35–104)
ALT SERPL-CCNC: <5 U/L (ref 0–32)
ANION GAP SERPL CALCULATED.3IONS-SCNC: 12 MMOL/L (ref 7–16)
AST SERPL-CCNC: 14 U/L (ref 0–31)
BASOPHILS # BLD: 0 K/UL (ref 0–0.2)
BASOPHILS NFR BLD: 0 % (ref 0–2)
BILIRUB SERPL-MCNC: 0.3 MG/DL (ref 0–1.2)
BUN SERPL-MCNC: 25 MG/DL (ref 6–23)
CALCIUM SERPL-MCNC: 9.9 MG/DL (ref 8.6–10.2)
CHLORIDE SERPL-SCNC: 102 MMOL/L (ref 98–107)
CO2 SERPL-SCNC: 23 MMOL/L (ref 22–29)
CREAT SERPL-MCNC: 3.3 MG/DL (ref 0.5–1)
EOSINOPHIL # BLD: 0.45 K/UL (ref 0.05–0.5)
EOSINOPHILS RELATIVE PERCENT: 6 % (ref 0–6)
ERYTHROCYTE [DISTWIDTH] IN BLOOD BY AUTOMATED COUNT: 18.7 % (ref 11.5–15)
GFR, ESTIMATED: 14 ML/MIN/1.73M2
GLUCOSE BLD-MCNC: 140 MG/DL (ref 74–99)
GLUCOSE BLD-MCNC: 144 MG/DL (ref 74–99)
GLUCOSE SERPL-MCNC: 142 MG/DL (ref 74–99)
HCT VFR BLD AUTO: 30.2 % (ref 34–48)
HGB BLD-MCNC: 9 G/DL (ref 11.5–15.5)
LYMPHOCYTES NFR BLD: 1.09 K/UL (ref 1.5–4)
LYMPHOCYTES RELATIVE PERCENT: 15 % (ref 20–42)
MAGNESIUM SERPL-MCNC: 2.5 MG/DL (ref 1.6–2.6)
MCH RBC QN AUTO: 32.8 PG (ref 26–35)
MCHC RBC AUTO-ENTMCNC: 29.8 G/DL (ref 32–34.5)
MCV RBC AUTO: 110.2 FL (ref 80–99.9)
MONOCYTES NFR BLD: 0.45 K/UL (ref 0.1–0.95)
MONOCYTES NFR BLD: 6 % (ref 2–12)
MYELOCYTES ABSOLUTE COUNT: 0.06 K/UL
MYELOCYTES: 1 %
NEUTROPHILS NFR BLD: 72 % (ref 43–80)
NEUTS SEG NFR BLD: 5.25 K/UL (ref 1.8–7.3)
PHOSPHATE SERPL-MCNC: 5 MG/DL (ref 2.5–4.5)
PLATELET # BLD AUTO: 195 K/UL (ref 130–450)
PMV BLD AUTO: 10.8 FL (ref 7–12)
POTASSIUM SERPL-SCNC: 4.8 MMOL/L (ref 3.5–5)
PROT SERPL-MCNC: 6 G/DL (ref 6.4–8.3)
RBC # BLD AUTO: 2.74 M/UL (ref 3.5–5.5)
RBC # BLD: ABNORMAL 10*6/UL
SODIUM SERPL-SCNC: 137 MMOL/L (ref 132–146)
T SPOT TB TEST: NORMAL
WBC OTHER # BLD: 7.3 K/UL (ref 4.5–11.5)

## 2024-09-23 PROCEDURE — 6360000002 HC RX W HCPCS: Performed by: HOSPITALIST

## 2024-09-23 PROCEDURE — 85025 COMPLETE CBC W/AUTO DIFF WBC: CPT

## 2024-09-23 PROCEDURE — 36556 INSERT NON-TUNNEL CV CATH: CPT

## 2024-09-23 PROCEDURE — 6370000000 HC RX 637 (ALT 250 FOR IP): Performed by: STUDENT IN AN ORGANIZED HEALTH CARE EDUCATION/TRAINING PROGRAM

## 2024-09-23 PROCEDURE — 6370000000 HC RX 637 (ALT 250 FOR IP): Performed by: FAMILY MEDICINE

## 2024-09-23 PROCEDURE — 2580000003 HC RX 258: Performed by: HOSPITALIST

## 2024-09-23 PROCEDURE — 99239 HOSP IP/OBS DSCHRG MGMT >30: CPT | Performed by: FAMILY MEDICINE

## 2024-09-23 PROCEDURE — 83735 ASSAY OF MAGNESIUM: CPT

## 2024-09-23 PROCEDURE — 84100 ASSAY OF PHOSPHORUS: CPT

## 2024-09-23 PROCEDURE — 94003 VENT MGMT INPAT SUBQ DAY: CPT

## 2024-09-23 PROCEDURE — 6370000000 HC RX 637 (ALT 250 FOR IP): Performed by: HOSPITALIST

## 2024-09-23 PROCEDURE — 82962 GLUCOSE BLOOD TEST: CPT

## 2024-09-23 PROCEDURE — 90935 HEMODIALYSIS ONE EVALUATION: CPT

## 2024-09-23 PROCEDURE — 36415 COLL VENOUS BLD VENIPUNCTURE: CPT

## 2024-09-23 PROCEDURE — 80053 COMPREHEN METABOLIC PANEL: CPT

## 2024-09-23 RX ORDER — MIDODRINE HYDROCHLORIDE 10 MG/1
10 TABLET ORAL
DISCHARGE
Start: 2024-09-23

## 2024-09-23 RX ORDER — TRIAMCINOLONE ACETONIDE 1 MG/G
CREAM TOPICAL 2 TIMES DAILY PRN
Status: DISCONTINUED | OUTPATIENT
Start: 2024-09-23 | End: 2024-09-23 | Stop reason: HOSPADM

## 2024-09-23 RX ORDER — FLASH GLUCOSE SENSOR
1 KIT MISCELLANEOUS
DISCHARGE
Start: 2024-09-23

## 2024-09-23 RX ORDER — HYDROXYZINE HYDROCHLORIDE 50 MG/ML
25 INJECTION, SOLUTION INTRAMUSCULAR EVERY 6 HOURS PRN
Status: DISCONTINUED | OUTPATIENT
Start: 2024-09-23 | End: 2024-09-23 | Stop reason: HOSPADM

## 2024-09-23 RX ORDER — DIPHENHYDRAMINE HCL 25 MG
25 TABLET ORAL EVERY 6 HOURS PRN
Status: DISCONTINUED | OUTPATIENT
Start: 2024-09-23 | End: 2024-09-23 | Stop reason: HOSPADM

## 2024-09-23 RX ORDER — LEVOTHYROXINE SODIUM 50 UG/1
50 TABLET ORAL DAILY
DISCHARGE
Start: 2024-09-24

## 2024-09-23 RX ORDER — TRIAMCINOLONE ACETONIDE 1 MG/G
CREAM TOPICAL
DISCHARGE
Start: 2024-09-23

## 2024-09-23 RX ORDER — FLASH GLUCOSE SCANNING READER
1 EACH MISCELLANEOUS
DISCHARGE
Start: 2024-09-23

## 2024-09-23 RX ORDER — DIPHENHYDRAMINE HCL 25 MG
25 TABLET ORAL EVERY 6 HOURS PRN
DISCHARGE
Start: 2024-09-23 | End: 2024-10-23

## 2024-09-23 RX ADMIN — MIDODRINE HYDROCHLORIDE 10 MG: 10 TABLET ORAL at 11:39

## 2024-09-23 RX ADMIN — MIDODRINE HYDROCHLORIDE 10 MG: 10 TABLET ORAL at 10:08

## 2024-09-23 RX ADMIN — PANTOPRAZOLE SODIUM 40 MG: 40 INJECTION, POWDER, FOR SOLUTION INTRAVENOUS at 10:09

## 2024-09-23 RX ADMIN — Medication 10 ML: at 10:10

## 2024-09-23 RX ADMIN — DIPHENHYDRAMINE HYDROCHLORIDE 25 MG: 25 TABLET ORAL at 10:08

## 2024-09-23 RX ADMIN — LEVOTHYROXINE SODIUM 50 MCG: 0.05 TABLET ORAL at 05:30

## 2024-09-23 ASSESSMENT — PULMONARY FUNCTION TESTS
PIF_VALUE: 24
PIF_VALUE: 21

## 2024-09-23 ASSESSMENT — PAIN DESCRIPTION - DESCRIPTORS: DESCRIPTORS: ITCHING

## 2024-09-23 ASSESSMENT — PAIN SCALES - GENERAL: PAINLEVEL_OUTOF10: 0

## 2024-09-23 ASSESSMENT — PAIN SCALES - WONG BAKER
WONGBAKER_NUMERICALRESPONSE: NO HURT
WONGBAKER_NUMERICALRESPONSE: NO HURT

## 2024-09-27 LAB
EKG ATRIAL RATE: 68 BPM
EKG P AXIS: 92 DEGREES
EKG P-R INTERVAL: 194 MS
EKG Q-T INTERVAL: 540 MS
EKG QRS DURATION: 82 MS
EKG QTC CALCULATION (BAZETT): 574 MS
EKG R AXIS: 14 DEGREES
EKG T AXIS: 109 DEGREES
EKG VENTRICULAR RATE: 68 BPM

## 2024-11-19 ENCOUNTER — APPOINTMENT (OUTPATIENT)
Dept: GENERAL RADIOLOGY | Age: 79
DRG: 377 | End: 2024-11-19
Payer: MEDICARE

## 2024-11-19 ENCOUNTER — HOSPITAL ENCOUNTER (INPATIENT)
Age: 79
LOS: 2 days | Discharge: NURSING, REHAB OR LONG-TERM CARE | DRG: 377 | End: 2024-11-21
Attending: EMERGENCY MEDICINE | Admitting: FAMILY MEDICINE
Payer: MEDICARE

## 2024-11-19 ENCOUNTER — APPOINTMENT (OUTPATIENT)
Dept: CT IMAGING | Age: 79
DRG: 377 | End: 2024-11-19
Payer: MEDICARE

## 2024-11-19 DIAGNOSIS — J96.11 CHRONIC RESPIRATORY FAILURE WITH HYPOXIA: ICD-10-CM

## 2024-11-19 DIAGNOSIS — R40.0 SOMNOLENCE: Primary | ICD-10-CM

## 2024-11-19 PROBLEM — E87.1 HYPONATREMIA: Status: ACTIVE | Noted: 2024-11-19

## 2024-11-19 LAB
ABO + RH BLD: NORMAL
ALBUMIN SERPL-MCNC: 2.6 G/DL (ref 3.5–5.2)
ALP SERPL-CCNC: 145 U/L (ref 35–104)
ALT SERPL-CCNC: 13 U/L (ref 0–32)
ANION GAP SERPL CALCULATED.3IONS-SCNC: 8 MMOL/L (ref 7–16)
ARM BAND NUMBER: NORMAL
AST SERPL-CCNC: 52 U/L (ref 0–31)
BASOPHILS # BLD: 0.03 K/UL (ref 0–0.2)
BASOPHILS NFR BLD: 0 % (ref 0–2)
BILIRUB SERPL-MCNC: 0.3 MG/DL (ref 0–1.2)
BLOOD BANK SAMPLE EXPIRATION: NORMAL
BLOOD GROUP ANTIBODIES SERPL: NEGATIVE
BNP SERPL-MCNC: ABNORMAL PG/ML (ref 0–450)
BUN SERPL-MCNC: 70 MG/DL (ref 6–23)
CALCIUM SERPL-MCNC: 13.2 MG/DL (ref 8.6–10.2)
CHLORIDE SERPL-SCNC: 88 MMOL/L (ref 98–107)
CO2 SERPL-SCNC: 28 MMOL/L (ref 22–29)
CREAT SERPL-MCNC: 2.8 MG/DL (ref 0.5–1)
EKG ATRIAL RATE: 288 BPM
EKG P AXIS: 84 DEGREES
EKG P-R INTERVAL: 112 MS
EKG Q-T INTERVAL: 464 MS
EKG QRS DURATION: 92 MS
EKG QTC CALCULATION (BAZETT): 459 MS
EKG R AXIS: 18 DEGREES
EKG T AXIS: 36 DEGREES
EKG VENTRICULAR RATE: 59 BPM
EOSINOPHIL # BLD: 0.34 K/UL (ref 0.05–0.5)
EOSINOPHILS RELATIVE PERCENT: 3 % (ref 0–6)
ERYTHROCYTE [DISTWIDTH] IN BLOOD BY AUTOMATED COUNT: 15.1 % (ref 11.5–15)
GFR, ESTIMATED: 17 ML/MIN/1.73M2
GLUCOSE BLD-MCNC: 184 MG/DL (ref 74–99)
GLUCOSE BLD-MCNC: 212 MG/DL (ref 74–99)
GLUCOSE SERPL-MCNC: 212 MG/DL (ref 74–99)
HCT VFR BLD AUTO: 30.6 % (ref 34–48)
HGB BLD-MCNC: 10.3 G/DL (ref 11.5–15.5)
IMM GRANULOCYTES # BLD AUTO: 0.14 K/UL (ref 0–0.58)
IMM GRANULOCYTES NFR BLD: 1 % (ref 0–5)
LACTATE BLDV-SCNC: 1.8 MMOL/L (ref 0.5–1.9)
LACTATE BLDV-SCNC: 1.8 MMOL/L (ref 0.5–1.9)
LYMPHOCYTES NFR BLD: 1.27 K/UL (ref 1.5–4)
LYMPHOCYTES RELATIVE PERCENT: 12 % (ref 20–42)
MCH RBC QN AUTO: 32.2 PG (ref 26–35)
MCHC RBC AUTO-ENTMCNC: 33.7 G/DL (ref 32–34.5)
MCV RBC AUTO: 95.6 FL (ref 80–99.9)
MONOCYTES NFR BLD: 1.22 K/UL (ref 0.1–0.95)
MONOCYTES NFR BLD: 11 % (ref 2–12)
NEUTROPHILS NFR BLD: 72 % (ref 43–80)
NEUTS SEG NFR BLD: 7.69 K/UL (ref 1.8–7.3)
PLATELET # BLD AUTO: 229 K/UL (ref 130–450)
PMV BLD AUTO: 10.1 FL (ref 7–12)
POTASSIUM SERPL-SCNC: 4.8 MMOL/L (ref 3.5–5)
PROT SERPL-MCNC: 6.2 G/DL (ref 6.4–8.3)
RBC # BLD AUTO: 3.2 M/UL (ref 3.5–5.5)
SODIUM SERPL-SCNC: 124 MMOL/L (ref 132–146)
TROPONIN I SERPL HS-MCNC: 113 NG/L (ref 0–9)
TROPONIN I SERPL HS-MCNC: 125 NG/L (ref 0–9)
WBC OTHER # BLD: 10.7 K/UL (ref 4.5–11.5)

## 2024-11-19 PROCEDURE — 70450 CT HEAD/BRAIN W/O DYE: CPT

## 2024-11-19 PROCEDURE — 80053 COMPREHEN METABOLIC PANEL: CPT

## 2024-11-19 PROCEDURE — 99285 EMERGENCY DEPT VISIT HI MDM: CPT

## 2024-11-19 PROCEDURE — 94002 VENT MGMT INPAT INIT DAY: CPT

## 2024-11-19 PROCEDURE — 87040 BLOOD CULTURE FOR BACTERIA: CPT

## 2024-11-19 PROCEDURE — 85025 COMPLETE CBC W/AUTO DIFF WBC: CPT

## 2024-11-19 PROCEDURE — 84484 ASSAY OF TROPONIN QUANT: CPT

## 2024-11-19 PROCEDURE — 86900 BLOOD TYPING SEROLOGIC ABO: CPT

## 2024-11-19 PROCEDURE — 2500000003 HC RX 250 WO HCPCS: Performed by: FAMILY MEDICINE

## 2024-11-19 PROCEDURE — 82947 ASSAY GLUCOSE BLOOD QUANT: CPT

## 2024-11-19 PROCEDURE — 2060000000 HC ICU INTERMEDIATE R&B

## 2024-11-19 PROCEDURE — 86850 RBC ANTIBODY SCREEN: CPT

## 2024-11-19 PROCEDURE — 83605 ASSAY OF LACTIC ACID: CPT

## 2024-11-19 PROCEDURE — 87077 CULTURE AEROBIC IDENTIFY: CPT

## 2024-11-19 PROCEDURE — 87150 DNA/RNA AMPLIFIED PROBE: CPT

## 2024-11-19 PROCEDURE — 86901 BLOOD TYPING SEROLOGIC RH(D): CPT

## 2024-11-19 PROCEDURE — 6370000000 HC RX 637 (ALT 250 FOR IP): Performed by: FAMILY MEDICINE

## 2024-11-19 PROCEDURE — 83880 ASSAY OF NATRIURETIC PEPTIDE: CPT

## 2024-11-19 PROCEDURE — 5A1945Z RESPIRATORY VENTILATION, 24-96 CONSECUTIVE HOURS: ICD-10-PCS | Performed by: FAMILY MEDICINE

## 2024-11-19 PROCEDURE — 71045 X-RAY EXAM CHEST 1 VIEW: CPT

## 2024-11-19 RX ORDER — POLYETHYLENE GLYCOL 3350 17 G/17G
17 POWDER, FOR SOLUTION ORAL DAILY PRN
Status: DISCONTINUED | OUTPATIENT
Start: 2024-11-19 | End: 2024-11-21 | Stop reason: HOSPADM

## 2024-11-19 RX ORDER — INSULIN LISPRO-AABC 100 [IU]/ML
4-16 INJECTION, SOLUTION SUBCUTANEOUS SEE ADMIN INSTRUCTIONS
COMMUNITY

## 2024-11-19 RX ORDER — INSULIN GLARGINE 100 [IU]/ML
6 INJECTION, SOLUTION SUBCUTANEOUS 2 TIMES DAILY
Status: DISCONTINUED | OUTPATIENT
Start: 2024-11-19 | End: 2024-11-21 | Stop reason: HOSPADM

## 2024-11-19 RX ORDER — IPRATROPIUM BROMIDE AND ALBUTEROL SULFATE 2.5; .5 MG/3ML; MG/3ML
1 SOLUTION RESPIRATORY (INHALATION)
Status: DISCONTINUED | OUTPATIENT
Start: 2024-11-20 | End: 2024-11-21 | Stop reason: HOSPADM

## 2024-11-19 RX ORDER — FOLIC ACID/VIT B COMPLEX AND C 0.8 MG
1 TABLET ORAL DAILY
Status: DISCONTINUED | OUTPATIENT
Start: 2024-11-20 | End: 2024-11-21 | Stop reason: HOSPADM

## 2024-11-19 RX ORDER — DIPHENHYDRAMINE HCL 25 MG
25 TABLET ORAL EVERY 6 HOURS PRN
Status: DISCONTINUED | OUTPATIENT
Start: 2024-11-19 | End: 2024-11-21 | Stop reason: HOSPADM

## 2024-11-19 RX ORDER — GLUCAGON 1 MG/ML
1 KIT INJECTION PRN
Status: DISCONTINUED | OUTPATIENT
Start: 2024-11-19 | End: 2024-11-21 | Stop reason: HOSPADM

## 2024-11-19 RX ORDER — TRIAMCINOLONE ACETONIDE 1 MG/G
CREAM TOPICAL 2 TIMES DAILY
Status: DISCONTINUED | OUTPATIENT
Start: 2024-11-19 | End: 2024-11-21 | Stop reason: HOSPADM

## 2024-11-19 RX ORDER — INSULIN LISPRO 100 [IU]/ML
4-16 INJECTION, SOLUTION INTRAVENOUS; SUBCUTANEOUS
Status: DISCONTINUED | OUTPATIENT
Start: 2024-11-19 | End: 2024-11-21 | Stop reason: HOSPADM

## 2024-11-19 RX ORDER — HYDRALAZINE HYDROCHLORIDE 25 MG/1
25 TABLET, FILM COATED ORAL EVERY 6 HOURS PRN
Status: DISCONTINUED | OUTPATIENT
Start: 2024-11-19 | End: 2024-11-21 | Stop reason: HOSPADM

## 2024-11-19 RX ORDER — LEVOTHYROXINE SODIUM 50 UG/1
50 TABLET ORAL DAILY
Status: DISCONTINUED | OUTPATIENT
Start: 2024-11-20 | End: 2024-11-21 | Stop reason: HOSPADM

## 2024-11-19 RX ORDER — IPRATROPIUM BROMIDE AND ALBUTEROL SULFATE 2.5; .5 MG/3ML; MG/3ML
1 SOLUTION RESPIRATORY (INHALATION)
Status: DISCONTINUED | OUTPATIENT
Start: 2024-11-20 | End: 2024-11-19

## 2024-11-19 RX ORDER — CARVEDILOL 3.12 MG/1
3.12 TABLET ORAL 2 TIMES DAILY
Status: DISCONTINUED | OUTPATIENT
Start: 2024-11-19 | End: 2024-11-21 | Stop reason: HOSPADM

## 2024-11-19 RX ORDER — DIPHENHYDRAMINE HCL 25 MG
25 TABLET ORAL EVERY 6 HOURS PRN
COMMUNITY

## 2024-11-19 RX ORDER — CHLORHEXIDINE GLUCONATE ORAL RINSE 1.2 MG/ML
15 SOLUTION DENTAL 2 TIMES DAILY
Status: DISCONTINUED | OUTPATIENT
Start: 2024-11-19 | End: 2024-11-21 | Stop reason: HOSPADM

## 2024-11-19 RX ORDER — DEXTROSE MONOHYDRATE 100 MG/ML
INJECTION, SOLUTION INTRAVENOUS CONTINUOUS PRN
Status: DISCONTINUED | OUTPATIENT
Start: 2024-11-19 | End: 2024-11-21 | Stop reason: HOSPADM

## 2024-11-19 RX ORDER — ACETAMINOPHEN 325 MG/1
650 TABLET ORAL EVERY 6 HOURS PRN
Status: DISCONTINUED | OUTPATIENT
Start: 2024-11-19 | End: 2024-11-21 | Stop reason: HOSPADM

## 2024-11-19 RX ORDER — LANSOPRAZOLE 30 MG/1
30 TABLET, ORALLY DISINTEGRATING, DELAYED RELEASE ORAL
Status: DISCONTINUED | OUTPATIENT
Start: 2024-11-20 | End: 2024-11-21 | Stop reason: HOSPADM

## 2024-11-19 RX ORDER — MIDODRINE HYDROCHLORIDE 10 MG/1
10 TABLET ORAL
Status: DISCONTINUED | OUTPATIENT
Start: 2024-11-20 | End: 2024-11-21 | Stop reason: HOSPADM

## 2024-11-19 RX ORDER — POTASSIUM CHLORIDE 750 MG/1
10 TABLET, EXTENDED RELEASE ORAL DAILY
Status: DISCONTINUED | OUTPATIENT
Start: 2024-11-20 | End: 2024-11-21 | Stop reason: HOSPADM

## 2024-11-19 RX ORDER — PROMETHAZINE HYDROCHLORIDE 25 MG/1
12.5 TABLET ORAL EVERY 6 HOURS PRN
Status: DISCONTINUED | OUTPATIENT
Start: 2024-11-19 | End: 2024-11-21 | Stop reason: HOSPADM

## 2024-11-19 RX ORDER — ARFORMOTEROL TARTRATE 15 UG/2ML
15 SOLUTION RESPIRATORY (INHALATION)
Status: DISCONTINUED | OUTPATIENT
Start: 2024-11-19 | End: 2024-11-21 | Stop reason: HOSPADM

## 2024-11-19 RX ORDER — GLUCAGON 1 MG/ML
1 KIT INJECTION PRN
Status: DISCONTINUED | OUTPATIENT
Start: 2024-11-19 | End: 2024-11-19 | Stop reason: SDUPTHER

## 2024-11-19 RX ADMIN — ANTI-FUNGAL POWDER MICONAZOLE NITRATE TALC FREE 1 APPLICATION: 1.42 POWDER TOPICAL at 23:54

## 2024-11-19 RX ADMIN — INSULIN GLARGINE 6 UNITS: 100 INJECTION, SOLUTION SUBCUTANEOUS at 23:53

## 2024-11-19 RX ADMIN — TRIAMCINOLONE ACETONIDE: 1 CREAM TOPICAL at 23:54

## 2024-11-19 RX ADMIN — 0.12% CHLORHEXIDINE GLUCONATE 15 ML: 1.2 RINSE ORAL at 23:58

## 2024-11-19 ASSESSMENT — PULMONARY FUNCTION TESTS
PIF_VALUE: 40
PIF_VALUE: 34

## 2024-11-19 NOTE — ED PROVIDER NOTES
79-year-old female presenting from nursing facility.  She has a tracheostomy and is ventilated.  Upon arrival she is not awake and talking.  She does respond to noxious stimuli and does reach towards noxious stimuli.  Blood sugar 212.  The reported reason for coming to the emergency department is her rectal bleeding.      Family History   Problem Relation Age of Onset    Emphysema Mother     No Known Problems Father      Past Surgical History:   Procedure Laterality Date    BRONCHOSCOPY Bilateral 3/15/2024    BRONCHOSCOPY DIAGNOSTIC OR CELL WASH performed by Cheng Payne MD at [facility] ENDOSCOPY    CARPAL TUNNEL RELEASE Left 8/12/2020    LEFT CARPAL TUNNEL RELEASE performed by Sandor Tierney MD at [facility] OR    CARPAL TUNNEL RELEASE Right 10/7/2020    RIGHT CARPAL TUNNEL RELEASE performed by Sandor Tierney MD at [facility] OR    CHOLECYSTECTOMY  2004    COLONOSCOPY N/A 9/17/2024    COLONOSCOPY POLYPECTOMY SNARE performed by Blake Estrella DO at [facility] ENDOSCOPY    DIALYSIS FISTULA CREATION Left 03/02/2018    AV fistula arm/Dr. Blum    EYE SURGERY Right 2010    cataract    FOOT SURGERY Left 2012,2013    pin put in, then pin removed    GASTRIC BYPASS SURGERY  2004    HAND SURGERY Right 2012    ligament was cut    HERNIA REPAIR      INVASIVE VASCULAR N/A 11/8/2023    Fistulogram left performed by Mendoza Alvarenga MD at [facility] CARDIAC CATH LAB    INVASIVE VASCULAR N/A 11/8/2023    Angioplasty fistula/dialysis circuit performed by Mendoza Alvarenga MD at [facility] CARDIAC CATH LAB    KNEE SURGERY  2009    R knee left side    PANCREAS SURGERY  3/26/2024    PANCREATIC LESION BIOPSY EXCISION DRAINAGE performed by Neto Kwan MD at [facility] OR    [place] REVJ OPN ARVEN FSTL W/O THRMBC DIAL GRF Left 5/23/2018    SUPERFICIALIZATION AV FISTULA - LEFT UPPER ARM performed by Braulio Blum MD at [facility] OR    TONSILLECTOMY      TRACHEOSTOMY N/A 3/26/2024    TRACHEOTOMY performed by Neto Kwan MD at [facility] OR    UPPER GASTROINTESTINAL

## 2024-11-20 LAB
ALBUMIN SERPL-MCNC: 3.1 G/DL (ref 3.5–5.2)
ALP SERPL-CCNC: 150 U/L (ref 35–104)
ALT SERPL-CCNC: 8 U/L (ref 0–32)
ANION GAP SERPL CALCULATED.3IONS-SCNC: 12 MMOL/L (ref 7–16)
AST SERPL-CCNC: 14 U/L (ref 0–31)
BILIRUB SERPL-MCNC: 0.4 MG/DL (ref 0–1.2)
BUN SERPL-MCNC: 82 MG/DL (ref 6–23)
CALCIUM SERPL-MCNC: 14.2 MG/DL (ref 8.6–10.2)
CHLORIDE SERPL-SCNC: 88 MMOL/L (ref 98–107)
CO2 SERPL-SCNC: 27 MMOL/L (ref 22–29)
CREAT SERPL-MCNC: 3.4 MG/DL (ref 0.5–1)
GFR, ESTIMATED: 14 ML/MIN/1.73M2
GLUCOSE BLD-MCNC: 196 MG/DL (ref 74–99)
GLUCOSE BLD-MCNC: 200 MG/DL (ref 74–99)
GLUCOSE BLD-MCNC: 220 MG/DL (ref 74–99)
GLUCOSE BLD-MCNC: 240 MG/DL (ref 74–99)
GLUCOSE SERPL-MCNC: 184 MG/DL (ref 74–99)
MAGNESIUM SERPL-MCNC: 2.2 MG/DL (ref 1.6–2.6)
PHOSPHATE SERPL-MCNC: 1.2 MG/DL (ref 2.5–4.5)
POTASSIUM SERPL-SCNC: 3.2 MMOL/L (ref 3.5–5)
PROT SERPL-MCNC: 6.3 G/DL (ref 6.4–8.3)
SODIUM SERPL-SCNC: 127 MMOL/L (ref 132–146)

## 2024-11-20 PROCEDURE — 99291 CRITICAL CARE FIRST HOUR: CPT | Performed by: INTERNAL MEDICINE

## 2024-11-20 PROCEDURE — 83735 ASSAY OF MAGNESIUM: CPT

## 2024-11-20 PROCEDURE — 94640 AIRWAY INHALATION TREATMENT: CPT

## 2024-11-20 PROCEDURE — 6370000000 HC RX 637 (ALT 250 FOR IP): Performed by: FAMILY MEDICINE

## 2024-11-20 PROCEDURE — 87040 BLOOD CULTURE FOR BACTERIA: CPT

## 2024-11-20 PROCEDURE — 6360000002 HC RX W HCPCS: Performed by: FAMILY MEDICINE

## 2024-11-20 PROCEDURE — 36415 COLL VENOUS BLD VENIPUNCTURE: CPT

## 2024-11-20 PROCEDURE — 82947 ASSAY GLUCOSE BLOOD QUANT: CPT

## 2024-11-20 PROCEDURE — 80053 COMPREHEN METABOLIC PANEL: CPT

## 2024-11-20 PROCEDURE — 94003 VENT MGMT INPAT SUBQ DAY: CPT

## 2024-11-20 PROCEDURE — 84100 ASSAY OF PHOSPHORUS: CPT

## 2024-11-20 PROCEDURE — 2060000000 HC ICU INTERMEDIATE R&B

## 2024-11-20 RX ADMIN — ARFORMOTEROL TARTRATE 15 MCG: 15 SOLUTION RESPIRATORY (INHALATION) at 19:12

## 2024-11-20 RX ADMIN — INSULIN GLARGINE 6 UNITS: 100 INJECTION, SOLUTION SUBCUTANEOUS at 09:22

## 2024-11-20 RX ADMIN — ANTI-FUNGAL POWDER MICONAZOLE NITRATE TALC FREE 1 APPLICATION: 1.42 POWDER TOPICAL at 09:24

## 2024-11-20 RX ADMIN — ANTI-FUNGAL POWDER MICONAZOLE NITRATE TALC FREE 1 APPLICATION: 1.42 POWDER TOPICAL at 20:27

## 2024-11-20 RX ADMIN — LANSOPRAZOLE 30 MG: 30 TABLET, ORALLY DISINTEGRATING, DELAYED RELEASE ORAL at 05:18

## 2024-11-20 RX ADMIN — MIDODRINE HYDROCHLORIDE 10 MG: 10 TABLET ORAL at 09:26

## 2024-11-20 RX ADMIN — TRIAMCINOLONE ACETONIDE: 1 CREAM TOPICAL at 20:27

## 2024-11-20 RX ADMIN — INSULIN LISPRO 4 UNITS: 100 INJECTION, SOLUTION INTRAVENOUS; SUBCUTANEOUS at 16:41

## 2024-11-20 RX ADMIN — IPRATROPIUM BROMIDE AND ALBUTEROL SULFATE 1 DOSE: 2.5; .5 SOLUTION RESPIRATORY (INHALATION) at 06:36

## 2024-11-20 RX ADMIN — INSULIN LISPRO 4 UNITS: 100 INJECTION, SOLUTION INTRAVENOUS; SUBCUTANEOUS at 20:28

## 2024-11-20 RX ADMIN — TRIAMCINOLONE ACETONIDE: 1 CREAM TOPICAL at 10:01

## 2024-11-20 RX ADMIN — LANSOPRAZOLE 30 MG: 30 TABLET, ORALLY DISINTEGRATING, DELAYED RELEASE ORAL at 16:47

## 2024-11-20 RX ADMIN — NITROGLYCERIN 1 INCH: 20 OINTMENT TOPICAL at 16:38

## 2024-11-20 RX ADMIN — INSULIN LISPRO 4 UNITS: 100 INJECTION, SOLUTION INTRAVENOUS; SUBCUTANEOUS at 11:18

## 2024-11-20 RX ADMIN — MIDODRINE HYDROCHLORIDE 10 MG: 10 TABLET ORAL at 12:02

## 2024-11-20 RX ADMIN — ARFORMOTEROL TARTRATE 15 MCG: 15 SOLUTION RESPIRATORY (INHALATION) at 06:37

## 2024-11-20 RX ADMIN — IPRATROPIUM BROMIDE AND ALBUTEROL SULFATE 1 DOSE: 2.5; .5 SOLUTION RESPIRATORY (INHALATION) at 19:12

## 2024-11-20 RX ADMIN — INSULIN GLARGINE 6 UNITS: 100 INJECTION, SOLUTION SUBCUTANEOUS at 21:16

## 2024-11-20 RX ADMIN — Medication 1 TABLET: at 09:28

## 2024-11-20 RX ADMIN — NITROGLYCERIN 1 INCH: 20 OINTMENT TOPICAL at 21:16

## 2024-11-20 RX ADMIN — LEVOTHYROXINE SODIUM 50 MCG: 0.05 TABLET ORAL at 05:19

## 2024-11-20 RX ADMIN — MIDODRINE HYDROCHLORIDE 10 MG: 10 TABLET ORAL at 16:47

## 2024-11-20 RX ADMIN — CHOLECALCIFEROL TAB 125 MCG (5000 UNIT) 5000 UNITS: 125 TAB at 09:27

## 2024-11-20 RX ADMIN — IPRATROPIUM BROMIDE AND ALBUTEROL SULFATE 1 DOSE: 2.5; .5 SOLUTION RESPIRATORY (INHALATION) at 10:21

## 2024-11-20 RX ADMIN — 0.12% CHLORHEXIDINE GLUCONATE 15 ML: 1.2 RINSE ORAL at 09:44

## 2024-11-20 RX ADMIN — 0.12% CHLORHEXIDINE GLUCONATE 15 ML: 1.2 RINSE ORAL at 20:28

## 2024-11-20 RX ADMIN — IPRATROPIUM BROMIDE AND ALBUTEROL SULFATE 1 DOSE: 2.5; .5 SOLUTION RESPIRATORY (INHALATION) at 14:19

## 2024-11-20 RX ADMIN — EPOETIN ALFA-EPBX 10000 UNITS: 10000 INJECTION, SOLUTION INTRAVENOUS; SUBCUTANEOUS at 16:43

## 2024-11-20 RX ADMIN — NITROGLYCERIN 1 INCH: 20 OINTMENT TOPICAL at 05:18

## 2024-11-20 RX ADMIN — CARVEDILOL 3.12 MG: 3.12 TABLET, FILM COATED ORAL at 09:27

## 2024-11-20 RX ADMIN — CARVEDILOL 3.12 MG: 3.12 TABLET, FILM COATED ORAL at 20:28

## 2024-11-20 RX ADMIN — NITROGLYCERIN 1 INCH: 20 OINTMENT TOPICAL at 10:07

## 2024-11-20 ASSESSMENT — PULMONARY FUNCTION TESTS
PIF_VALUE: 17
PIF_VALUE: 20
PIF_VALUE: 17
PIF_VALUE: 25
PIF_VALUE: 21

## 2024-11-20 ASSESSMENT — PAIN SCALES - WONG BAKER
WONGBAKER_NUMERICALRESPONSE: NO HURT

## 2024-11-20 NOTE — CARE COORDINATION
Case Management Assessment  Initial Evaluation    Date/Time of Evaluation: 11/20/2024 3:43 PM  Assessment Completed by: Rosa Negron RN    If patient is discharged prior to next notation, then this note serves as note for discharge by case management.    Patient Name: Ainsley Morris                   YOB: 1945  Diagnosis: Somnolence [R40.0]  Hyponatremia [E87.1]                   Date / Time: 11/19/2024  7:00 AM    Patient Admission Status: Inpatient   Readmission Risk (Low < 19, Mod (19-27), High > 27): Readmission Risk Score: 23.1    Current PCP: Sudha Anton, DO  PCP verified by CM? Yes    Chart Reviewed: Yes      History Provided by: Medical Record, Other (see comment) (NH liaison)  Patient Orientation: Unresponsive    Patient Cognition: Other (see comment) (unresponsive)    Hospitalization in the last 30 days (Readmission):  No    If yes, Readmission Assessment in  Navigator will be completed.    Advance Directives:      Code Status: DNR-CC   Patient's Primary Decision Maker is: Legal Next of Kin    Primary Decision Maker: Isaura Starr - Brother/Sister - [phone]    Discharge Planning:    Patient lives with: Alone Type of Home: Long-Term Care  Primary Care Giver: Other (Comment) (NH staff)  Patient Support Systems include: Family Members, Children   Current Financial resources:    Current community resources:    Current services prior to admission: None            Current DME:              Type of Home Care services:  None    ADLS  Prior functional level: Assistance with the following:, Bathing, Dressing, Toileting, Feeding, Cooking, Housework, Mobility, Shopping  Current functional level: Assistance with the following:, Dressing, Bathing, Toileting, Feeding, Cooking, Housework, Shopping, Mobility    PT AM-PAC:   /24  OT AM-PAC:   /24    Family can provide assistance at DC: No (Resides LTC at [facility])  Would you like Case Management to discuss the discharge plan with any other family

## 2024-11-20 NOTE — CONSULTS
Pulmonary/Critical Care Consult Note    CHIEF COMPLAINT: GI bleed, status post tracheostomy, chronic kidney disease, on dialysis,    HISTORY OF PRESENT ILLNESS: The patient was reported to have GI bleeding in the nursing home but there was no evidence of GI bleeding upon arrival in the emergency room yesterday.  The patient's baseline mental status is quite poor and she is generally obtunded as she continues to be.    The patient's current CODE STATUS is DNR CC but I see that there has been no consultation from the nephrologist.  However, she has been seen by gastroenterology with no plans to do any endoscopy at this time.    From the pulmonary standpoint the patient is tolerating ventilatory parameters well and is being supported via tracheostomy.  I have looked at the patient's chest x-ray which looks entirely clear.  All appropriate support lines and tubes are in place.  Her current FiO2 is at 30% with a 100% saturation.    ALLERGY:  Pcn [penicillins], Sulfa antibiotics, and Bactrim [sulfamethoxazole-trimethoprim]    FAMILY HISTORY:  Family History   Problem Relation Age of Onset    Emphysema Mother     No Known Problems Father        SOCIAL HISTORY:  Social History     Socioeconomic History    Marital status:      Spouse name: Not on file    Number of children: Not on file    Years of education: Not on file    Highest education level: Not on file   Occupational History    Not on file   Tobacco Use    Smoking status: Never    Smokeless tobacco: Never   Vaping Use    Vaping status: Never Used   Substance and Sexual Activity    Alcohol use: Yes     Comment: on holidays    Drug use: No    Sexual activity: Not Currently   Other Topics Concern    Not on file   Social History Narrative    Not on file     Social Determinants of Health     Financial Resource Strain: Low Risk  (4/1/2024)    Received from [facility]    Overall Financial Resource Strain ([facility])     Difficulty of Paying Living Expenses: Not very 
muscle mass loss    Fluid Accumulation:  Mild Extremities   Strength:  Not Performed    Nutrition Assessment:    Pt admit from nursing home for bloody stools, Hyponatremia, multiple e'lyte derangements and wounds. Pt w/ ESRD on Dialysis. Pt w/ wounds, necrotic toes. Pt w/ trach and PEG. PMHx: Anemia, A-Fib, T2DM, HTN/HLD, Hypothyroid, ESRD on HD, Asthma, COPD, Sleep Apnea. RD consulted for wounds. Pt w/out diet order, This RD assumes PEG tube feeding will resume when appropriate per GI. Will provide Tube Feeding Recommendations, Begin Otoniel BID via PEG when appropriate, continue inpatient monitoring, f/up per policy. This RD suspects inadequate enteral nutrition infusion at nursing home as evidenced by multiple e'lyte derangments below normal limits. Palliative Care and Hospice consulted. Will continue to follow.    Nutrition Related Findings:    A/O x O, I/O not recorded, abd wdl, bowel sounds active x4, Ostomy in place, Trach, PEG, Na+ 127, K+ 3.2, Chloride 88, elevated BUN/Creatinne, GFR 14, Glucose 184, Ca+ 14.2, Phos 1.2, BNP 70,000, Troponin 13, ALK PHOS 150, Hgb 10.3 Wound Type: Pressure Injury, Unstageable (Coccyx, Bilateral Back of thighs)       Current Nutrition Intake & Therapies:    Average Meal Intake: NPO  Average Supplements Intake: NPO  No diet orders on file    Anthropometric Measures:  Height: 167.6 cm (5' 5.98\")  Ideal Body Weight (IBW): 130 lbs (59 kg)    Admission Body Weight: 94 kg (207 lb 3.7 oz) (11/19/24)  Current Body Weight: 94 kg (207 lb 3.7 oz), 159.4 % IBW. Weight Source: Bed scale (11/20/24)  Current BMI (kg/m2): 33.5  Usual Body Weight: 95.3 kg (210 lb 1.6 oz) (03/08/24)     % Weight Change (Calculated): -1.4  Weight Adjustment For: No Adjustment      BMI Categories: Obese Class 1 (BMI 30.0-34.9)    Estimated Daily Nutrient Needs:  Energy Requirements Based On: Formula     Energy (kcal/day): 2045-5115 kcals/day ([facility] 2010)  Weight Used for Protein Requirements: 
at 11/19/24 2354    insulin lispro (HUMALOG,ADMELOG) injection vial 4-16 Units  4-16 Units SubCUTAneous 4x Daily AC & HS Sudha Anton, DO        nitroglycerin (NITRO-BID) 2 % ointment 1 inch  1 inch Topical Q6H Sudha Anton, DO   1 inch at 11/20/24 0518    polyethylene glycol (GLYCOLAX) packet 17 g  17 g Per G Tube Daily PRN Sudha Anton,         potassium chloride (KLOR-CON M) extended release tablet 10 mEq  10 mEq Oral Daily Sudha Anton DO        promethazine (PHENERGAN) tablet 12.5 mg  12.5 mg PEG Tube Q6H PRN Sudha Anton,         triamcinolone (KENALOG) 0.1 % cream   Topical BID Sudha Anton,    Given at 11/19/24 2354    ipratropium 0.5 mg-albuterol 2.5 mg (DUONEB) nebulizer solution 1 Dose  1 Dose Inhalation Q4H WA RT Sudha Anton, DO   1 Dose at 11/20/24 0636    lansoprazole (PREVACID SOLUTAB) disintegrating tablet 30 mg  30 mg PEG Tube BID AC Sudha Anton, DO   30 mg at 11/20/24 0518    insulin glargine (LANTUS) injection vial 6 Units  6 Units SubCUTAneous BID Sudha Anton,    6 Units at 11/20/24 0922    glucose chewable tablet 16 g  4 tablet Oral PRN Sudha Anton,         dextrose bolus 10% 125 mL  125 mL IntraVENous PRN Sudha Anton,         Or    dextrose bolus 10% 250 mL  250 mL IntraVENous PRN Sudha Anton,         glucagon injection 1 mg  1 mg SubCUTAneous PRN Sudha Anton,         dextrose 10 % infusion   IntraVENous Continuous PRN Sudha Anton,             SocHx:  Social History     Socioeconomic History    Marital status:      Spouse name: Not on file    Number of children: Not on file    Years of education: Not on file    Highest education level: Not on file   Occupational History    Not on file   Tobacco Use    Smoking status: Never    Smokeless tobacco: Never   Vaping Use    Vaping status: Never Used   Substance and Sexual Activity    Alcohol use: Yes     Comment: on holidays    Drug use: No    Sexual activity: 
equal and reactive to light and accommodation.  Fundus examination deferred.  Mouth and throat dry oral mucosa without any mucosal ulcerations or exudates.  Neck: No JVD now with TDC  Chest: Chest is symmetrical,  Lungs: diminished mechanically ventilated, coarse breath sounds. No rales or ronchi.  Heart: Regular rate and rhythm. No S3 gallop. No  murmrur.  Abdomen: Soft non distended, non tender and normal bowel sounds.  Extremeties: No edema.          DATA:        CT HEAD WO CONTRAST   Final Result   No acute intracranial abnormality.         XR CHEST PORTABLE   Final Result   Cardiomegaly with mild increased markings at the left lung base with small   left pleural effusion. No new focal parenchymal opacification present.               Labs:    CBC:   Recent Labs     11/19/24  0721   WBC 10.7   HGB 10.3*           Lab Results   Component Value Date    IRON 34 (L) 09/13/2024    TIBC 122 (L) 09/13/2024    FERRITIN 2,622 09/13/2024       Lab Results   Component Value Date    CALCIUM 14.2 (HH) 11/20/2024    CALCIUM 13.2 (HH) 11/19/2024    CALCIUM 9.9 09/23/2024    CAION 1.23 09/17/2024    CAION 1.20 01/04/2018    CAION 1.08 (L) 01/02/2018    PHOS 1.2 (L) 11/20/2024    PHOS 5.0 (H) 09/23/2024    PHOS 4.0 09/22/2024    MG 2.2 11/20/2024    MG 2.5 09/23/2024    MG 2.4 09/22/2024       BMP:   Recent Labs     11/19/24  0721 11/20/24  0602   * 127*   K 4.8 3.2*   CL 88* 88*   CO2 28 27   BUN 70* 82*   CREATININE 2.8* 3.4*   GLUCOSE 212* 184*             Labs:  CBC with Differential:    Lab Results   Component Value Date/Time    WBC 10.7 11/19/2024 07:21 AM    RBC 3.20 11/19/2024 07:21 AM    HGB 10.3 11/19/2024 07:21 AM    HCT 30.6 11/19/2024 07:21 AM     11/19/2024 07:21 AM    MCV 95.6 11/19/2024 07:21 AM    MCH 32.2 11/19/2024 07:21 AM    MCHC 33.7 11/19/2024 07:21 AM    RDW 15.1 11/19/2024 07:21 AM    NRBC 0.9 12/26/2020 05:46 AM    METASPCT 1 03/21/2024 05:23 AM    METASPCT 2.0 12/28/2020 05:08 AM

## 2024-11-20 NOTE — H&P
RELEASE performed by Sandor Tierney MD at [facility] OR    CHOLECYSTECTOMY  2004    COLONOSCOPY N/A 9/17/2024    COLONOSCOPY POLYPECTOMY SNARE performed by Blake Esterlla DO at [facility] ENDOSCOPY    DIALYSIS FISTULA CREATION Left 03/02/2018    AV fistula arm/Dr. Blum    EYE SURGERY Right 2010    cataract    FOOT SURGERY Left 2012,2013    pin put in, then pin removed    GASTRIC BYPASS SURGERY  2004    HAND SURGERY Right 2012    ligament was cut    HERNIA REPAIR      INVASIVE VASCULAR N/A 11/8/2023    Fistulogram left performed by Menodza Alvarenga MD at [facility] CARDIAC CATH LAB    INVASIVE VASCULAR N/A 11/8/2023    Angioplasty fistula/dialysis circuit performed by Mendoza Alvarenga MD at [facility] CARDIAC CATH LAB    KNEE SURGERY  2009    R knee left side    PANCREAS SURGERY  3/26/2024    PANCREATIC LESION BIOPSY EXCISION DRAINAGE performed by Neto Kwan MD at [facility] OR    [place] REVJ OPN ARVEN FSTL W/O THRMBC DIAL GRF Left 5/23/2018    SUPERFICIALIZATION AV FISTULA - LEFT UPPER ARM performed by Braulio Blum MD at [facility] OR    TONSILLECTOMY      TRACHEOSTOMY N/A 3/26/2024    TRACHEOTOMY performed by Neto Kwan MD at [facility] OR    UPPER GASTROINTESTINAL ENDOSCOPY Left 3/26/2024    ENDOSCOPIC ULTRASOUND performed by Neto Kwan MD at [facility] OR    UPPER GASTROINTESTINAL ENDOSCOPY Left 3/26/2024    ESOPHAGOGASTRODUODENOSCOPY PERCUTANEOUS ENDOSCOPIC GASTROSTOMY TUBE INSERTION performed by Neto Kwan MD at [facility] OR    UPPER GASTROINTESTINAL ENDOSCOPY N/A 9/13/2024    ESOPHAGOGASTRODUODENOSCOPY performed by Mihai Davis MD at [facility] ENDOSCOPY     Immunizations:              Influenza:  Indicated for current flu vaccination season Oct. to Feb.            Pneumococcal Polysaccharide:  Indicated for current flu vaccination season Oct. to Feb.    Medications Prior to Admission:   Not in a hospital admission.    Allergies:  Pcn [penicillins], Sulfa antibiotics, and Bactrim [sulfamethoxazole-trimethoprim]    Social

## 2024-11-20 NOTE — PROGRESS NOTES
4 Eyes Skin Assessment     NAME:  Ainsley Morris  YOB: 1945  MEDICAL RECORD NUMBER:  [id number]    The patient is being assessed for  Admission    I agree that at least one RN has performed a thorough Head to Toe Skin Assessment on the patient. ALL assessment sites listed below have been assessed.      Areas assessed by both nurses:    Head, Face, Ears, Shoulders, Back, Chest, Arms, Elbows, Hands, Sacrum. Buttock, Coccyx, Ischium, Legs. Feet and Heels, and Under Medical Devices         Does the Patient have a Wound? Yes wound(s) were present on assessment. LDA wound assessment was Initiated and completed by RN       Porter Prevention initiated by RN: Yes  Wound Care Orders initiated by RN: Yes    Pressure Injury (Stage 3,4, Unstageable, DTI, NWPT, and Complex wounds) if present, place Wound referral order by RN under : Yes    New Ostomies, if present place, Ostomy referral order under : No     Nurse 1 eSignature: Electronically signed by Ferdinand Lozano RN on 11/19/24 at 10:36 PM EST    **SHARE this note so that the co-signing nurse can place an eSignature**    Nurse 2 eSignature: Electronically signed by Frieda Rivera RN on 11/19/24 at 10:49 PM EST

## 2024-11-21 VITALS
OXYGEN SATURATION: 100 % | HEIGHT: 66 IN | DIASTOLIC BLOOD PRESSURE: 68 MMHG | TEMPERATURE: 99 F | WEIGHT: 207.2 LBS | SYSTOLIC BLOOD PRESSURE: 124 MMHG | BODY MASS INDEX: 33.3 KG/M2 | RESPIRATION RATE: 15 BRPM | HEART RATE: 75 BPM

## 2024-11-21 LAB
ALBUMIN SERPL-MCNC: 2.8 G/DL (ref 3.5–5.2)
ALP SERPL-CCNC: 139 U/L (ref 35–104)
ALT SERPL-CCNC: 8 U/L (ref 0–32)
ANION GAP SERPL CALCULATED.3IONS-SCNC: 11 MMOL/L (ref 7–16)
AST SERPL-CCNC: 14 U/L (ref 0–31)
BILIRUB SERPL-MCNC: 0.3 MG/DL (ref 0–1.2)
BUN SERPL-MCNC: 92 MG/DL (ref 6–23)
CALCIUM SERPL-MCNC: 14.1 MG/DL (ref 8.6–10.2)
CHLORIDE SERPL-SCNC: 89 MMOL/L (ref 98–107)
CO2 SERPL-SCNC: 27 MMOL/L (ref 22–29)
CREAT SERPL-MCNC: 4 MG/DL (ref 0.5–1)
GFR, ESTIMATED: 11 ML/MIN/1.73M2
GLUCOSE BLD-MCNC: 174 MG/DL (ref 74–99)
GLUCOSE BLD-MCNC: 227 MG/DL (ref 74–99)
GLUCOSE SERPL-MCNC: 169 MG/DL (ref 74–99)
MAGNESIUM SERPL-MCNC: 2.3 MG/DL (ref 1.6–2.6)
PHOSPHATE SERPL-MCNC: 1 MG/DL (ref 2.5–4.5)
POTASSIUM SERPL-SCNC: 3.2 MMOL/L (ref 3.5–5)
PROT SERPL-MCNC: 5.7 G/DL (ref 6.4–8.3)
SODIUM SERPL-SCNC: 127 MMOL/L (ref 132–146)

## 2024-11-21 PROCEDURE — 82947 ASSAY GLUCOSE BLOOD QUANT: CPT

## 2024-11-21 PROCEDURE — 6360000002 HC RX W HCPCS: Performed by: FAMILY MEDICINE

## 2024-11-21 PROCEDURE — 94640 AIRWAY INHALATION TREATMENT: CPT

## 2024-11-21 PROCEDURE — 84100 ASSAY OF PHOSPHORUS: CPT

## 2024-11-21 PROCEDURE — 6370000000 HC RX 637 (ALT 250 FOR IP): Performed by: FAMILY MEDICINE

## 2024-11-21 PROCEDURE — 36415 COLL VENOUS BLD VENIPUNCTURE: CPT

## 2024-11-21 PROCEDURE — 94003 VENT MGMT INPAT SUBQ DAY: CPT

## 2024-11-21 PROCEDURE — 83735 ASSAY OF MAGNESIUM: CPT

## 2024-11-21 PROCEDURE — 80053 COMPREHEN METABOLIC PANEL: CPT

## 2024-11-21 RX ORDER — IPRATROPIUM BROMIDE AND ALBUTEROL SULFATE 2.5; .5 MG/3ML; MG/3ML
3 SOLUTION RESPIRATORY (INHALATION) EVERY 4 HOURS PRN
Qty: 3 ML | Refills: 0
Start: 2024-11-21

## 2024-11-21 RX ORDER — LANSOPRAZOLE 30 MG/1
30 TABLET, ORALLY DISINTEGRATING, DELAYED RELEASE ORAL
Qty: 60 TABLET | Refills: 0 | Status: SHIPPED | OUTPATIENT
Start: 2024-11-21 | End: 2024-12-21

## 2024-11-21 RX ORDER — TRIAMCINOLONE ACETONIDE 1 MG/G
CREAM TOPICAL
Qty: 45 G | Refills: 0
Start: 2024-11-21

## 2024-11-21 RX ADMIN — MIDODRINE HYDROCHLORIDE 10 MG: 10 TABLET ORAL at 10:19

## 2024-11-21 RX ADMIN — CHOLECALCIFEROL TAB 125 MCG (5000 UNIT) 5000 UNITS: 125 TAB at 10:19

## 2024-11-21 RX ADMIN — NITROGLYCERIN 1 INCH: 20 OINTMENT TOPICAL at 03:58

## 2024-11-21 RX ADMIN — 0.12% CHLORHEXIDINE GLUCONATE 15 ML: 1.2 RINSE ORAL at 10:25

## 2024-11-21 RX ADMIN — IPRATROPIUM BROMIDE AND ALBUTEROL SULFATE 1 DOSE: 2.5; .5 SOLUTION RESPIRATORY (INHALATION) at 09:34

## 2024-11-21 RX ADMIN — NITROGLYCERIN 1 INCH: 20 OINTMENT TOPICAL at 10:23

## 2024-11-21 RX ADMIN — INSULIN LISPRO 4 UNITS: 100 INJECTION, SOLUTION INTRAVENOUS; SUBCUTANEOUS at 10:51

## 2024-11-21 RX ADMIN — LANSOPRAZOLE 30 MG: 30 TABLET, ORALLY DISINTEGRATING, DELAYED RELEASE ORAL at 05:05

## 2024-11-21 RX ADMIN — MIDODRINE HYDROCHLORIDE 10 MG: 10 TABLET ORAL at 12:22

## 2024-11-21 RX ADMIN — ARFORMOTEROL TARTRATE 15 MCG: 15 SOLUTION RESPIRATORY (INHALATION) at 06:49

## 2024-11-21 RX ADMIN — INSULIN GLARGINE 6 UNITS: 100 INJECTION, SOLUTION SUBCUTANEOUS at 10:16

## 2024-11-21 RX ADMIN — IPRATROPIUM BROMIDE AND ALBUTEROL SULFATE 1 DOSE: 2.5; .5 SOLUTION RESPIRATORY (INHALATION) at 14:02

## 2024-11-21 RX ADMIN — LEVOTHYROXINE SODIUM 50 MCG: 0.05 TABLET ORAL at 05:05

## 2024-11-21 RX ADMIN — TRIAMCINOLONE ACETONIDE: 1 CREAM TOPICAL at 10:31

## 2024-11-21 RX ADMIN — Medication 1 TABLET: at 10:19

## 2024-11-21 RX ADMIN — ANTI-FUNGAL POWDER MICONAZOLE NITRATE TALC FREE 1 APPLICATION: 1.42 POWDER TOPICAL at 10:29

## 2024-11-21 RX ADMIN — CARVEDILOL 3.12 MG: 3.12 TABLET, FILM COATED ORAL at 10:19

## 2024-11-21 RX ADMIN — IPRATROPIUM BROMIDE AND ALBUTEROL SULFATE 1 DOSE: 2.5; .5 SOLUTION RESPIRATORY (INHALATION) at 06:48

## 2024-11-21 ASSESSMENT — PULMONARY FUNCTION TESTS
PIF_VALUE: 21
PIF_VALUE: 21
PIF_VALUE: 28
PIF_VALUE: 22

## 2024-11-21 ASSESSMENT — PAIN SCALES - WONG BAKER
WONGBAKER_NUMERICALRESPONSE: HURTS A LITTLE BIT
WONGBAKER_NUMERICALRESPONSE: NO HURT
WONGBAKER_NUMERICALRESPONSE: NO HURT

## 2024-11-21 NOTE — PROGRESS NOTES
Department of Family Practice  Adult Daily Progress Note      SUBJECTIVE  SHERIF IS SEEN IN FOLLOW UP ON IM.  NO PLAN FOR EGD.  DNRCC.     OBJECTIVE    Physical  VITALS:  BP (!) 141/85   Pulse 66   Temp 97.5 °F (36.4 °C) (Axillary)   Resp 16   Ht 1.676 m (5' 5.98\")   Wt 94 kg (207 lb 3.2 oz)   SpO2 98%   BMI 33.46 kg/m²   {PHYSICAL EXAM:669898525}  Data  CBC with Differential:    Lab Results   Component Value Date/Time    WBC 10.7 11/19/2024 07:21 AM    RBC 3.20 11/19/2024 07:21 AM    HGB 10.3 11/19/2024 07:21 AM    HCT 30.6 11/19/2024 07:21 AM     11/19/2024 07:21 AM    MCV 95.6 11/19/2024 07:21 AM    MCH 32.2 11/19/2024 07:21 AM    MCHC 33.7 11/19/2024 07:21 AM    RDW 15.1 11/19/2024 07:21 AM    NRBC 0.9 12/26/2020 05:46 AM    METASPCT 1 03/21/2024 05:23 AM    METASPCT 2.0 12/28/2020 05:08 AM    LYMPHOPCT 12 11/19/2024 07:21 AM    MONOPCT 11 11/19/2024 07:21 AM    MYELOPCT 1 09/23/2024 05:47 AM    MYELOPCT 1.0 12/28/2020 05:08 AM    EOSPCT 3 11/19/2024 07:21 AM    BASOPCT 0 11/19/2024 07:21 AM    MONOSABS 1.22 11/19/2024 07:21 AM    LYMPHSABS 1.27 11/19/2024 07:21 AM    EOSABS 0.34 11/19/2024 07:21 AM    BASOSABS 0.03 11/19/2024 07:21 AM     BMP:    Lab Results   Component Value Date/Time     11/20/2024 06:02 AM    K 3.2 11/20/2024 06:02 AM    K 4.1 06/17/2022 01:16 PM    CL 88 11/20/2024 06:02 AM    CO2 27 11/20/2024 06:02 AM    BUN 82 11/20/2024 06:02 AM    CREATININE 3.4 11/20/2024 06:02 AM    CALCIUM 14.2 11/20/2024 06:02 AM    GFRAA 12 08/05/2022 09:39 AM    LABGLOM 14 11/20/2024 06:02 AM    LABGLOM 16 03/29/2024 04:38 AM    GLUCOSE 184 11/20/2024 06:02 AM    GLUCOSE 135 06/01/2012 10:36 AM     Current Medications  Scheduled Meds:   [Held by provider] apixaban  2.5 mg Oral BID    brodie-mario  1 tablet Per G Tube Daily    carvedilol  3.125 mg PEG Tube BID    chlorhexidine  15 mL Mouth/Throat BID    vitamin D3  5,000 Units PEG Tube Daily    epoetin andrzej-epbx  10,000 Units SubCUTAneous Once

## 2024-11-21 NOTE — DIALYSIS
Spoke with Isaura DALLAS in regards to continuation of dialysis.  Isaura states she does not wish for Ainsley to continue hemodialysis at this time.

## 2024-11-21 NOTE — PLAN OF CARE
Problem: Chronic Conditions and Co-morbidities  Goal: Patient's chronic conditions and co-morbidity symptoms are monitored and maintained or improved  Outcome: Progressing  Flowsheets (Taken 11/21/2024 0743)  Care Plan - Patient's Chronic Conditions and Co-Morbidity Symptoms are Monitored and Maintained or Improved:   Monitor and assess patient's chronic conditions and comorbid symptoms for stability, deterioration, or improvement   Collaborate with multidisciplinary team to address chronic and comorbid conditions and prevent exacerbation or deterioration   Update acute care plan with appropriate goals if chronic or comorbid symptoms are exacerbated and prevent overall improvement and discharge     Problem: Discharge Planning  Goal: Discharge to home or other facility with appropriate resources  Outcome: Progressing  Flowsheets (Taken 11/21/2024 0743)  Discharge to home or other facility with appropriate resources:   Identify barriers to discharge with patient and caregiver   Arrange for needed discharge resources and transportation as appropriate   Identify discharge learning needs (meds, wound care, etc)     Problem: Skin/Tissue Integrity  Goal: Absence of new skin breakdown  Description: 1.  Monitor for areas of redness and/or skin breakdown  2.  Assess vascular access sites hourly  3.  Every 4-6 hours minimum:  Change oxygen saturation probe site  4.  Every 4-6 hours:  If on nasal continuous positive airway pressure, respiratory therapy assess nares and determine need for appliance change or resting period.  11/21/2024 1208 by William Genao, RN  Outcome: Progressing  11/20/2024 2224 by Ferdinand Lozano, RN  Outcome: Progressing     Problem: ABCDS Injury Assessment  Goal: Absence of physical injury  Outcome: Progressing     Problem: Safety - Adult  Goal: Free from fall injury  Outcome: Progressing     Problem: Pain  Goal: Verbalizes/displays adequate comfort level or baseline comfort level  Outcome: 
  Problem: Skin/Tissue Integrity  Goal: Absence of new skin breakdown  Description: 1.  Monitor for areas of redness and/or skin breakdown  2.  Assess vascular access sites hourly  3.  Every 4-6 hours minimum:  Change oxygen saturation probe site  4.  Every 4-6 hours:  If on nasal continuous positive airway pressure, respiratory therapy assess nares and determine need for appliance change or resting period.  11/20/2024 0223 by Ferdinand Lozano, RN  Outcome: Not Progressing  11/20/2024 0223 by Ferdinand Lozano RN  Outcome: Not Progressing     Problem: Skin/Tissue Integrity  Goal: Absence of new skin breakdown  Description: 1.  Monitor for areas of redness and/or skin breakdown  2.  Assess vascular access sites hourly  3.  Every 4-6 hours minimum:  Change oxygen saturation probe site  4.  Every 4-6 hours:  If on nasal continuous positive airway pressure, respiratory therapy assess nares and determine need for appliance change or resting period.  11/20/2024 0223 by Ferdinand Lozano, RN  Outcome: Not Progressing  11/20/2024 0223 by Ferdinand Lozano, RN  Outcome: Not Progressing     
  Problem: Skin/Tissue Integrity  Goal: Absence of new skin breakdown  Description: 1.  Monitor for areas of redness and/or skin breakdown  2.  Assess vascular access sites hourly  3.  Every 4-6 hours minimum:  Change oxygen saturation probe site  4.  Every 4-6 hours:  If on nasal continuous positive airway pressure, respiratory therapy assess nares and determine need for appliance change or resting period.  11/20/2024 2224 by Ferdinand Lozano RN  Outcome: Progressing     
Chart reviewed  Spoke with dialysis nursing staff  Patient is being discharged today with hospice care  No further dialysis indicated    Will sign off    Deep Cast MD    
Verbalizes/displays adequate comfort level or baseline comfort level  Outcome: Progressing     Problem: Nutrition Deficit:  Goal: Optimize nutritional status  11/20/2024 1339 by William Genao, RN  Outcome: Progressing  11/20/2024 1118 by Denise Cole, RD, LD  Outcome: Progressing  Flowsheets (Taken 11/20/2024 1118)  Nutrient intake appropriate for improving, restoring, or maintaining nutritional needs:   Assess nutritional status and recommend course of action   Monitor oral intake, labs, and treatment plans   Recommend appropriate diets, oral nutritional supplements, and vitamin/mineral supplements   Order, calculate, and assess calorie counts as needed   Recommend, monitor, and adjust tube feedings and TPN/PPN based on assessed needs     Problem: Skin/Tissue Integrity  Goal: Absence of new skin breakdown  Description: 1.  Monitor for areas of redness and/or skin breakdown  2.  Assess vascular access sites hourly  3.  Every 4-6 hours minimum:  Change oxygen saturation probe site  4.  Every 4-6 hours:  If on nasal continuous positive airway pressure, respiratory therapy assess nares and determine need for appliance change or resting period.  11/20/2024 1339 by William Genao, RN  Outcome: Progressing  11/20/2024 0223 by Ferdinand Lozano, RN  Outcome: Not Progressing  11/20/2024 0223 by Ferdinand Lozano, RN  Outcome: Not Progressing

## 2024-11-21 NOTE — DISCHARGE INSTR - COC
Continuity of Care Form    Patient Name: Ainsley Morris   :  1945  MRN:  [id number]    Admit date:  2024  Discharge date:  2024      Code Status Order: DNR-CC   Advance Directives:   Advance Care Flowsheet Documentation             Admitting Physician:  Sudha Anton DO  PCP: Sudha Anton DO    Discharging Nurse: William Genao RN  Discharging Hospital Unit/Room#: 0630/0630-01  Discharging Unit Phone Number: [phone]      Emergency Contact:   Extended Emergency Contact Information  Primary Emergency Contact: Isaura Starr  Address: [address]  Home Phone: [phone]  Mobile Phone: [phone]  Relation: Brother/Sister   needed? No  Secondary Emergency Contact: Galindo Pickard   [facility]  Home Phone: [phone]  Mobile Phone: [phone]  Relation: Other    Past Surgical History:  Past Surgical History:   Procedure Laterality Date    BRONCHOSCOPY Bilateral 3/15/2024    BRONCHOSCOPY DIAGNOSTIC OR CELL WASH performed by Cheng Payne MD at [facility] ENDOSCOPY    CARPAL TUNNEL RELEASE Left 2020    LEFT CARPAL TUNNEL RELEASE performed by Sandor Tierney MD at [facility] OR    CARPAL TUNNEL RELEASE Right 10/7/2020    RIGHT CARPAL TUNNEL RELEASE performed by Sandor Tierney MD at [facility] OR    CHOLECYSTECTOMY  2004    COLONOSCOPY N/A 2024    COLONOSCOPY POLYPECTOMY SNARE performed by Blake Estrella DO at [facility] ENDOSCOPY    DIALYSIS FISTULA CREATION Left 2018    AV fistula arm/Dr. Blum    EYE SURGERY Right     cataract    FOOT SURGERY Left ,    pin put in, then pin removed    GASTRIC BYPASS SURGERY      HAND SURGERY Right     ligament was cut    HERNIA REPAIR      INVASIVE VASCULAR N/A 2023    Fistulogram left performed by Mendoza Alvarenga MD at [facility] CARDIAC CATH LAB    INVASIVE VASCULAR N/A 2023    Angioplasty fistula/dialysis circuit performed by Mendoza Alvarenga

## 2024-11-21 NOTE — CARE COORDINATION
11/21/2024 1200 CM Note:  Pt is being discharged back to [facility].  Pt's sister has decided that once back at the facility she will transition to Hospice and she did not allow dialysis to be done.  Pt is a LONG TERM Bed Hold.  NO PRECERT NEEDED.  [facility] is scheduled to transport pt via stretcher at 1530, ambulance from [facility] and place with the soft chart.  Nohemy liaison aware of time as well as pt's sister Isaura.  Electronically signed by Rosa Negron RN on 11/21/2024 at 12:07 PM

## 2024-11-21 NOTE — PROGRESS NOTES
PROGRESS NOTE  By Meño Corona M.D.    [facility]  Dr. Mary Ann Sarabia M.D.,  Dr. Isrrael Perez M.D.,   BRIANNA Dumont.O.,  Dr. Nam Martinez M.D.,  Dr. Mikel Guthrie D.O.,          Ainsleylyndsey Morris  79 y.o.  female    SUBJECTIVE:  Remains nonverbal.  No family at bedside    OBJECTIVE:    /69   Pulse 68   Temp 99.9 °F (37.7 °C) (Oral)   Resp 16   Ht 1.676 m (5' 5.98\")   Wt 94 kg (207 lb 3.2 oz)   SpO2 96%   BMI 33.46 kg/m²     General: Older adult obese  female.  HEENT: Moist oral mucosa/poor dentition  Neck: Supple/trachea midline/tracheostomy  Chest: Mechanically ventilated via tracheostomy/coarse breath sounds throughout  Cor: Appears regular/S1-S2  Abd.: Soft and obese.  PEG tube in place  Extr.:  Bilateral lower extremity edema.  Decreased muscle tone and bulk throughout  Skin: Warm and dry/anicteric      DATA:    Monitor data reviewed -sinus rhythm noted.       Lab Results   Component Value Date/Time    WBC 10.7 11/19/2024 07:21 AM    RBC 3.20 11/19/2024 07:21 AM    HGB 10.3 11/19/2024 07:21 AM    HCT 30.6 11/19/2024 07:21 AM    MCV 95.6 11/19/2024 07:21 AM    MCH 32.2 11/19/2024 07:21 AM    MCHC 33.7 11/19/2024 07:21 AM    RDW 15.1 11/19/2024 07:21 AM     11/19/2024 07:21 AM    MPV 10.1 11/19/2024 07:21 AM     Lab Results   Component Value Date/Time     11/21/2024 05:13 AM    K 3.2 11/21/2024 05:13 AM    K 4.1 06/17/2022 01:16 PM    CL 89 11/21/2024 05:13 AM    CO2 27 11/21/2024 05:13 AM    BUN 92 11/21/2024 05:13 AM    CREATININE 4.0 11/21/2024 05:13 AM    CALCIUM 14.1 11/21/2024 05:13 AM    BILITOT 0.3 11/21/2024 05:13 AM    ALKPHOS 139 11/21/2024 05:13 AM    AST 14 11/21/2024 05:13 AM    ALT 8 11/21/2024 05:13 AM     Lab Results   Component Value Date/Time    LIPASE 8 02/15/2021 11:52 AM     Lab Results   Component Value Date/Time    AMYLASE 27 02/15/2021 11:52 AM         ASSESSMENT/PLAN:  Patient Active Problem List   Diagnosis    Controlled type 2

## 2024-11-23 LAB
ACB COMPLEX DNA BLD POS QL NAA+NON-PROBE: NOT DETECTED
B FRAGILIS DNA BLD POS QL NAA+NON-PROBE: NOT DETECTED
BIOFIRE TEST COMMENT: ABNORMAL
C ALBICANS DNA BLD POS QL NAA+NON-PROBE: NOT DETECTED
C AURIS DNA BLD POS QL NAA+NON-PROBE: NOT DETECTED
C GATTII+NEOFOR DNA BLD POS QL NAA+N-PRB: NOT DETECTED
C GLABRATA DNA BLD POS QL NAA+NON-PROBE: NOT DETECTED
C KRUSEI DNA BLD POS QL NAA+NON-PROBE: NOT DETECTED
C PARAP DNA BLD POS QL NAA+NON-PROBE: NOT DETECTED
C TROPICLS DNA BLD POS QL NAA+NON-PROBE: NOT DETECTED
E CLOAC COMP DNA BLD POS NAA+NON-PROBE: NOT DETECTED
E COLI DNA BLD POS QL NAA+NON-PROBE: NOT DETECTED
E FAECALIS DNA BLD POS QL NAA+NON-PROBE: NOT DETECTED
E FAECIUM DNA BLD POS QL NAA+NON-PROBE: NOT DETECTED
ENTEROBACTERALES DNA BLD POS NAA+N-PRB: NOT DETECTED
GP B STREP DNA BLD POS QL NAA+NON-PROBE: NOT DETECTED
HAEM INFLU DNA BLD POS QL NAA+NON-PROBE: NOT DETECTED
K OXYTOCA DNA BLD POS QL NAA+NON-PROBE: NOT DETECTED
KLEBSIELLA SP DNA BLD POS QL NAA+NON-PRB: NOT DETECTED
KLEBSIELLA SP DNA BLD POS QL NAA+NON-PRB: NOT DETECTED
L MONOCYTOG DNA BLD POS QL NAA+NON-PROBE: NOT DETECTED
MECA+MECC ISLT/SPM QL: DETECTED
MICROORGANISM SPEC CULT: ABNORMAL
MICROORGANISM SPEC CULT: ABNORMAL
MICROORGANISM/AGENT SPEC: ABNORMAL
N MEN DNA BLD POS QL NAA+NON-PROBE: NOT DETECTED
P AERUGINOSA DNA BLD POS NAA+NON-PROBE: NOT DETECTED
PROTEUS SP DNA BLD POS QL NAA+NON-PROBE: NOT DETECTED
S AUREUS DNA BLD POS QL NAA+NON-PROBE: NOT DETECTED
S AUREUS+CONS DNA BLD POS NAA+NON-PROBE: DETECTED
S EPIDERMIDIS DNA BLD POS QL NAA+NON-PRB: DETECTED
S LUGDUNENSIS DNA BLD POS QL NAA+NON-PRB: NOT DETECTED
S MALTOPHILIA DNA BLD POS QL NAA+NON-PRB: NOT DETECTED
S MARCESCENS DNA BLD POS NAA+NON-PROBE: NOT DETECTED
S PNEUM DNA BLD POS QL NAA+NON-PROBE: NOT DETECTED
S PYO DNA BLD POS QL NAA+NON-PROBE: NOT DETECTED
SALMONELLA DNA BLD POS QL NAA+NON-PROBE: NOT DETECTED
SERVICE CMNT-IMP: ABNORMAL
SPECIMEN DESCRIPTION: ABNORMAL
STREPTOCOCCUS DNA BLD POS NAA+NON-PROBE: NOT DETECTED

## 2024-11-24 LAB
MICROORGANISM SPEC CULT: NORMAL
SERVICE CMNT-IMP: NORMAL
SPECIMEN DESCRIPTION: NORMAL

## 2024-11-25 LAB
EKG ATRIAL RATE: 288 BPM
EKG P AXIS: 84 DEGREES
EKG P-R INTERVAL: 112 MS
EKG Q-T INTERVAL: 464 MS
EKG QRS DURATION: 92 MS
EKG QTC CALCULATION (BAZETT): 459 MS
EKG R AXIS: 18 DEGREES
EKG T AXIS: 36 DEGREES
EKG VENTRICULAR RATE: 59 BPM
MICROORGANISM SPEC CULT: NORMAL
SERVICE CMNT-IMP: NORMAL
SPECIMEN DESCRIPTION: NORMAL

## (undated) DEVICE — DEVICE INFL 60ML 15ATM DISPOSABLE STERIFLATE CRE

## (undated) DEVICE — BAG SPECIMEN BIOHAZARD 6IN X 9IN

## (undated) DEVICE — JELLY,LUBE,STERILE,FLIP TOP,TUBE,4-OZ: Brand: MEDLINE

## (undated) DEVICE — TOWEL,OR,DSP,ST,BLUE,STD,6/PK,12PK/CS: Brand: MEDLINE

## (undated) DEVICE — SPONGE GZ 4IN 4IN 4 PLY N WVN AVANT

## (undated) DEVICE — PADDING UNDERCAST W4INXL4YD COT FBR LO LINTING WYTEX

## (undated) DEVICE — SOLUTION IV IRRIG POUR BRL 0.9% SODIUM CHL 2F7124

## (undated) DEVICE — MASK,FACE,MAXFLUIDPROTECT,SHIELD/ERLPS: Brand: MEDLINE

## (undated) DEVICE — 1000 S-DRAPE TOWEL DRAPE 10/BX: Brand: STERI-DRAPE™

## (undated) DEVICE — TRAP,MUCUS SPECIMEN, 80CC: Brand: MEDLINE

## (undated) DEVICE — GUIDEWIRE VASC L145CM DIA0035IN FLPY TIP EXTN COMPATIBLE STR

## (undated) DEVICE — CATHETER SUCT TR FL TIP W/ O CTRL 10FR

## (undated) DEVICE — KIT BEDSIDE REVITAL OX 500ML

## (undated) DEVICE — Device

## (undated) DEVICE — SPONGE,PEANUT,XRAY,ST,SM,3/8",5/CARD: Brand: MEDLINE INDUSTRIES, INC.

## (undated) DEVICE — TUBING, SUCTION, 1/4" X 10', STRAIGHT: Brand: MEDLINE

## (undated) DEVICE — BLADE ES L6IN ELASTOMERIC COAT EXT DURABLE BEND UPTO 90DEG

## (undated) DEVICE — SPONGE,DRAIN,NONWVN,4"X4",6PLY,STRL,LF: Brand: MEDLINE

## (undated) DEVICE — TOWEL,OR,DSP,ST,BLUE,DLX,4/PK,20PK/CS: Brand: MEDLINE

## (undated) DEVICE — 4-PORT MANIFOLD: Brand: NEPTUNE 2

## (undated) DEVICE — NEEDLE SPNL 22GA L5IN BLK HUB S STL W/ QNCKE PNT W/OUT

## (undated) DEVICE — GLOVE ORANGE PI 8 1/2   MSG9085

## (undated) DEVICE — CLIP INT SM TI EZ LD LIG SYS WECK HORZ

## (undated) DEVICE — PACK SURG CARDIAC CATH

## (undated) DEVICE — SOLUTION IV 250ML 0.9% SOD CHL PH 5 INJ USP VIAFLX PLAS

## (undated) DEVICE — CONTROL SYRINGE LUER-LOCK TIP: Brand: MONOJECT

## (undated) DEVICE — YANKAUER,BULB TIP,W/O VENT,RIGID,STERILE: Brand: MEDLINE

## (undated) DEVICE — SOLUTION IV IRRIG WATER 1000ML POUR BRL 2F7114

## (undated) DEVICE — KENDALL 450 SERIES MONITORING FOAM ELECTRODE - RECTANGULAR SHAPE ( 3/PK): Brand: KENDALL

## (undated) DEVICE — SET CLAMP NEONATAL VASCUALR

## (undated) DEVICE — NDL CNTR 40CT FM MAG: Brand: MEDLINE INDUSTRIES, INC.

## (undated) DEVICE — APPLICATOR  COTTON-TIPPED 6 IN WOOD STRL

## (undated) DEVICE — FORCEPS BX L240CM JAW DIA2.8MM L CAP W/ NDL MIC MESH TOOTH

## (undated) DEVICE — FORCEPS ENDO L28CM JAW W4XL23MM DIA6MM STD GRSP JACK ALGTR

## (undated) DEVICE — PAD,EYE,1-5/8X2 5/8,STERILE,LF,1/PK: Brand: MEDLINE

## (undated) DEVICE — GOWN,SIRUS,POLYRNF,BRTHSLV,XLN/XXL,18/CS: Brand: MEDLINE

## (undated) DEVICE — ELECTRODE PT RET AD L9FT HI MOIST COND ADH HYDRGEL CORDED

## (undated) DEVICE — GUIDEWIRE VASC L260CM TIP L5CM 0.25FR STR RND TIP TUNGSTEN

## (undated) DEVICE — COTTONBALL L DIA1.25IN 100% HIGHLY ABSRB BLEACH COT WHT SFT

## (undated) DEVICE — FEEDING TUBE • RADIOPAQUE: Brand: ARGYLE

## (undated) DEVICE — SNARE ENDOSCP AD SM L240CM LOOP W1.3CM SHTH DIA2.4MM POLYP

## (undated) DEVICE — GOWN,SIRUS,NONRNF,SETINSLV,XL,20/CS: Brand: MEDLINE

## (undated) DEVICE — NEEDLE HYPO 25GA L1.5IN BLU POLYPR HUB S STL REG BVL STR

## (undated) DEVICE — SINGLE USE SUCTION VALVE MAJ-209: Brand: SINGLE USE SUCTION VALVE (STERILE)

## (undated) DEVICE — GLOVE ORANGE PI 7 1/2   MSG9075

## (undated) DEVICE — TUBING PRSS MON L12IN PVC RIG NONEXPANDING M TO FEM CONN

## (undated) DEVICE — BASIC DOUBLE BASIN 2-LF: Brand: MEDLINE INDUSTRIES, INC.

## (undated) DEVICE — GOWN ISOLATN XL BLU POLYPR OVR HD OPN BK KNIT CUF PROTCT

## (undated) DEVICE — APPLICATOR MEDICATED 26 CC SOLUTION HI LT ORNG CHLORAPREP

## (undated) DEVICE — GOWN,SIRUS,FABRNF,L,20/CS: Brand: MEDLINE

## (undated) DEVICE — AIR/WATER CLEANING ADAPTER FOR OLYMPUS® GI ENDOSCOPE: Brand: BULLDOG®

## (undated) DEVICE — INTRODUCER SHTH 0.038 IN 6 FRX11 CM W/ GUIDEWIRE SUPER SHTH

## (undated) DEVICE — MICROPUNCTURE INTRODUCER SET SILHOUETTE TRANSITIONLESS PUSH-PLUS DESIGN - STIFFENED CANNULA WITH STAINLESS STEEL WIRE GUIDE: Brand: MICROPUNCTURE

## (undated) DEVICE — GLOVE SURG SZ 65 THK91MIL LTX FREE SYN POLYISOPRENE

## (undated) DEVICE — PTA BALLOON DILATATION CATHETER: Brand: MUSTANG™

## (undated) DEVICE — COVER,TABLE,44X90,STERILE: Brand: MEDLINE

## (undated) DEVICE — SPLINT ORTH W4XL15IN PLSTR OF PARIS LO EXOTHERM SMOOTH

## (undated) DEVICE — DILATOR ART

## (undated) DEVICE — ENDOSCOPIC ULTRASOUND FINE NEEDLE BIOPSY (FNB) DEVICE: Brand: ACQUIRE

## (undated) DEVICE — INFLATION DEVICE KIT: Brand: ENCORE™ 26 ADVANTAGE KIT

## (undated) DEVICE — TOWEL SURG W16XL26IN WHT NONFENESTRATED ST 4 PER PK

## (undated) DEVICE — FORCEPS BX L100CM DIA1.8MM WRK CHN 2MM PULM S STL RAD JAW 4

## (undated) DEVICE — TRAP SURG QUAD PARABOLA SLOT DSGN SFTY SCRN TRAPEASE

## (undated) DEVICE — GEL US 20GM NONIRRITATING OVERWRAPPED FILE PCH TRNSMIT

## (undated) DEVICE — WIPES SKIN CLOTH READYPREP 9 X 10.5 IN 2% CHLORHEX GLUCONATE CHG PREOP

## (undated) DEVICE — SKIN AFFIX SURG ADHESIVE 72/CS 0.55ML: Brand: MEDLINE

## (undated) DEVICE — ESOPHAGEAL/PYLORIC/COLONIC/BILIARY WIREGUIDED BALLOON DILATATION CATHETER: Brand: CRE™ PRO

## (undated) DEVICE — SYRINGE MED 10ML TRNSLUC BRL PLUNG BLK MRK POLYPR CTRL

## (undated) DEVICE — ENDOVIVE SFT PEG KIT PULL WENFIT 20F BX2

## (undated) DEVICE — DRAPE SURGICAL HAND PROX AURORA

## (undated) DEVICE — PADDING,UNDERCAST,COTTON, 4"X4YD STERILE: Brand: MEDLINE

## (undated) DEVICE — TRAY SET HAND REUSABLE

## (undated) DEVICE — CANNULA NSL CANN NSL L25FT TBNG AD O2 SUP SFT UC

## (undated) DEVICE — TUBE TRACH AD SZ 8 L79MM OD122MM ID85MM DK BLU PVC CUF CANN

## (undated) DEVICE — GLOVE SURG SZ 6 THK91MIL LTX FREE SYN POLYISOPRENE ANTI

## (undated) DEVICE — CONTAINER SPEC COLL 960ML POLYPR TRIANG GRAD INTAKE/OUTPUT

## (undated) DEVICE — SYRINGE 20ML LL S/C 50

## (undated) DEVICE — PACK,LAPAROTOMY,NO GOWNS: Brand: MEDLINE

## (undated) DEVICE — STETHOSCOPE FLX BELL SGL HD CHROM PLT

## (undated) DEVICE — SET SURG INSTR MINI VASC

## (undated) DEVICE — DOUBLE BASIN SET: Brand: MEDLINE INDUSTRIES, INC.

## (undated) DEVICE — LABEL MED 4 IN SURG PANEL W/ PEN STRL

## (undated) DEVICE — STANDARD HYPODERMIC NEEDLE,ALUMINUM HUB: Brand: MONOJECT

## (undated) DEVICE — Device: Brand: DEFENDO VALVE AND CONNECTOR KIT

## (undated) DEVICE — COVER,LIGHT HANDLE,FLX,1/PK: Brand: MEDLINE INDUSTRIES, INC.

## (undated) DEVICE — BLUE RHINO G2-MULTI PERCUTANEOUS TRACHEOSTOMY INTRODUCER TRAY: Brand: BLUE RHINO

## (undated) DEVICE — GAUZE,SPONGE,4"X4",16PLY,XRAY,STRL,LF: Brand: MEDLINE

## (undated) DEVICE — VESSEL LOOPS,MAXI, RED: Brand: DEVON

## (undated) DEVICE — SURGICAL PROCEDURE PACK VASC MAJ CUST

## (undated) DEVICE — SINGLE USE BIOPSY VALVE MAJ-210: Brand: SINGLE USE BIOPSY VALVE (STERILE)

## (undated) DEVICE — NEEDLE FLTR 18GA L1.5IN MEM THK5UM BLNT DISP

## (undated) DEVICE — BLOCK BITE 60FR CAREGUARD

## (undated) DEVICE — STOCKINETTE,DOUBLE PLY,6X48,STERILE: Brand: MEDLINE

## (undated) DEVICE — BLADE ES ELASTOMERIC COAT INSUL DURABLE BEND UPTO 90DEG

## (undated) DEVICE — MARKER,SKIN,WI/RULER AND LABELS: Brand: MEDLINE

## (undated) DEVICE — SURGICAL PROCEDURE PACK HND